# Patient Record
Sex: MALE | Race: WHITE | NOT HISPANIC OR LATINO | Employment: FULL TIME | URBAN - NONMETROPOLITAN AREA
[De-identification: names, ages, dates, MRNs, and addresses within clinical notes are randomized per-mention and may not be internally consistent; named-entity substitution may affect disease eponyms.]

---

## 2020-02-13 ENCOUNTER — APPOINTMENT (OUTPATIENT)
Dept: CT IMAGING | Facility: HOSPITAL | Age: 70
End: 2020-02-13
Payer: MEDICARE

## 2020-02-13 ENCOUNTER — HOSPITAL ENCOUNTER (EMERGENCY)
Facility: HOSPITAL | Age: 70
Discharge: HOME | End: 2020-02-13
Payer: MEDICARE

## 2020-02-13 VITALS
HEART RATE: 64 BPM | RESPIRATION RATE: 16 BRPM | DIASTOLIC BLOOD PRESSURE: 80 MMHG | OXYGEN SATURATION: 92 % | TEMPERATURE: 97.5 F | BODY MASS INDEX: 30.75 KG/M2 | WEIGHT: 232 LBS | HEIGHT: 73 IN | SYSTOLIC BLOOD PRESSURE: 123 MMHG

## 2020-02-13 DIAGNOSIS — N30.90 CYSTITIS: Primary | ICD-10-CM

## 2020-02-13 LAB
ALBUMIN SERPL-MCNC: 4.1 G/DL (ref 3.5–5.3)
ALP SERPL-CCNC: 45 U/L (ref 45–115)
ALT SERPL-CCNC: 13 U/L (ref 0–52)
ANION GAP SERPL CALC-SCNC: 7 MMOL/L (ref 3–11)
APTT PPP: 33.8 SECONDS (ref 25.1–36.5)
AST SERPL-CCNC: 18 U/L (ref 0–39)
BACTERIA #/AREA URNS HPF: ABNORMAL /HPF
BASOPHILS # BLD AUTO: 0 10*3/UL
BASOPHILS NFR BLD AUTO: 1 % (ref 0–2)
BILIRUB SERPL-MCNC: 1.49 MG/DL (ref 0–1.4)
BILIRUB UR QL: NEGATIVE
BUN SERPL-MCNC: 15 MG/DL (ref 7–25)
CALCIUM ALBUM COR SERPL-MCNC: 9.2 MG/DL (ref 8.6–10.3)
CALCIUM SERPL-MCNC: 9.3 MG/DL (ref 8.6–10.3)
CHLORIDE SERPL-SCNC: 105 MMOL/L (ref 98–107)
CLARITY UR: ABNORMAL
CO2 SERPL-SCNC: 26 MMOL/L (ref 21–32)
COLOR UR: ABNORMAL
CREAT SERPL-MCNC: 0.83 MG/DL (ref 0.7–1.3)
EOSINOPHIL # BLD AUTO: 0.1 10*3/UL
EOSINOPHIL NFR BLD AUTO: 2 % (ref 0–3)
ERYTHROCYTE [DISTWIDTH] IN BLOOD BY AUTOMATED COUNT: 13.7 % (ref 11.5–15)
GFR SERPL CREATININE-BSD FRML MDRD: 89 ML/MIN/1.73M*2
GLUCOSE SERPL-MCNC: 92 MG/DL (ref 70–105)
GLUCOSE UR QL: NEGATIVE MG/DL
HCT VFR BLD AUTO: 45.2 % (ref 38–50)
HGB BLD-MCNC: 15.1 G/DL (ref 13.2–17.2)
HGB UR QL: ABNORMAL
INR BLD: 1.1
KETONES UR-MCNC: 15 MG/DL
LEUKOCYTE ESTERASE UR QL STRIP: ABNORMAL
LIPASE SERPL-CCNC: 12 U/L (ref 11–82)
LYMPHOCYTES # BLD AUTO: 1.5 10*3/UL
LYMPHOCYTES NFR BLD AUTO: 28 % (ref 15–47)
MCH RBC QN AUTO: 31 PG (ref 29–34)
MCHC RBC AUTO-ENTMCNC: 33.5 G/DL (ref 32–36)
MCV RBC AUTO: 92.6 FL (ref 82–97)
MONOCYTES # BLD AUTO: 0.7 10*3/UL
MONOCYTES NFR BLD AUTO: 13 % (ref 5–13)
NEUTROPHILS # BLD AUTO: 3 10*3/UL
NEUTROPHILS NFR BLD AUTO: 56 % (ref 46–70)
NITRITE UR QL: NEGATIVE
PH UR: 6 PH
PLATELET # BLD AUTO: 219 10*3/UL (ref 130–350)
PMV BLD AUTO: 8.4 FL (ref 6.9–10.8)
POTASSIUM SERPL-SCNC: 3.9 MMOL/L (ref 3.5–5.1)
PROT SERPL-MCNC: 6.6 G/DL (ref 6–8.3)
PROT UR STRIP-MCNC: 100 MG/DL
PROTHROMBIN TIME: 12.6 SECONDS (ref 9.4–12.5)
RBC # BLD AUTO: 4.88 10*6/ΜL (ref 4.1–5.8)
RBC #/AREA URNS HPF: >100 /HPF
SODIUM SERPL-SCNC: 138 MMOL/L (ref 135–145)
SP GR UR: 1.01 (ref 1–1.03)
SQUAMOUS #/AREA URNS HPF: ABNORMAL /HPF
UROBILINOGEN UR-MCNC: 0.2 E.U./DL
WBC # BLD AUTO: 5.3 10*3/UL (ref 3.7–9.6)
WBC #/AREA URNS HPF: ABNORMAL /HPF

## 2020-02-13 PROCEDURE — 85610 PROTHROMBIN TIME: CPT | Performed by: FAMILY MEDICINE

## 2020-02-13 PROCEDURE — 80053 COMPREHEN METABOLIC PANEL: CPT | Performed by: FAMILY MEDICINE

## 2020-02-13 PROCEDURE — 74176 CT ABD & PELVIS W/O CONTRAST: CPT | Mod: 26,MG | Performed by: RADIOLOGY

## 2020-02-13 PROCEDURE — 99283 EMERGENCY DEPT VISIT LOW MDM: CPT

## 2020-02-13 PROCEDURE — 74176 CT ABD & PELVIS W/O CONTRAST: CPT | Mod: MG

## 2020-02-13 PROCEDURE — 83690 ASSAY OF LIPASE: CPT | Performed by: FAMILY MEDICINE

## 2020-02-13 PROCEDURE — 36415 COLL VENOUS BLD VENIPUNCTURE: CPT | Performed by: FAMILY MEDICINE

## 2020-02-13 PROCEDURE — 87088 URINE BACTERIA CULTURE: CPT | Performed by: FAMILY MEDICINE

## 2020-02-13 PROCEDURE — 81001 URINALYSIS AUTO W/SCOPE: CPT | Performed by: FAMILY MEDICINE

## 2020-02-13 PROCEDURE — 85025 COMPLETE CBC W/AUTO DIFF WBC: CPT | Performed by: FAMILY MEDICINE

## 2020-02-13 PROCEDURE — 85730 THROMBOPLASTIN TIME PARTIAL: CPT | Performed by: FAMILY MEDICINE

## 2020-02-13 PROCEDURE — 99284 EMERGENCY DEPT VISIT MOD MDM: CPT | Performed by: FAMILY MEDICINE

## 2020-02-13 PROCEDURE — G1004 CDSM NDSC: HCPCS | Performed by: RADIOLOGY

## 2020-02-13 RX ORDER — CIPROFLOXACIN 500 MG/1
500 TABLET ORAL 2 TIMES DAILY
Qty: 14 TABLET | Refills: 0 | Status: SHIPPED | OUTPATIENT
Start: 2020-02-13 | End: 2020-02-20

## 2020-02-13 ASSESSMENT — ENCOUNTER SYMPTOMS
COUGH: 0
DYSURIA: 0
ABDOMINAL PAIN: 0
CHILLS: 0
FEVER: 0
PALPITATIONS: 0
FREQUENCY: 0
HEADACHES: 0
BACK PAIN: 1
SHORTNESS OF BREATH: 0
DIARRHEA: 0
HEMATURIA: 1
VOMITING: 0
CONFUSION: 0

## 2020-02-13 NOTE — ED PROVIDER NOTES
HPI:  Chief Complaint   Patient presents with   • Blood in Urine     started 2 days ago, afebrile, no pain       Patient is a pleasant 69-year-old male presenting to the emergency room with complaints of hematuria over the last 2 days.  Patient reports he thought this was originally secondary to his diet however the hematuria persist.  The patient denies any dysuria or frequency.  Patient does endorse some minor left flank pain however he attributes this to being a  and sitting for prolonged periods.  He reports his back pain started approximately 5 days ago which he reports is minor.  No vomiting or diarrhea.  No abdominal pain.  No rashes.          HISTORY:  History reviewed. No pertinent past medical history.    Past Surgical History:   Procedure Laterality Date   • ABDOMINAL SURGERY     • APPENDECTOMY     • HERNIA REPAIR         History reviewed. No pertinent family history.    Social History     Tobacco Use   • Smoking status: Never Smoker   • Smokeless tobacco: Never Used   Substance Use Topics   • Alcohol use: Not Currently   • Drug use: Not Currently         ROS:  Review of Systems   Constitutional: Negative for chills and fever.   Respiratory: Negative for cough and shortness of breath.    Cardiovascular: Negative for chest pain and palpitations.   Gastrointestinal: Negative for abdominal pain, diarrhea and vomiting.   Genitourinary: Positive for hematuria. Negative for dysuria and frequency.   Musculoskeletal: Positive for back pain.   Skin: Negative for rash.   Neurological: Negative for headaches.   Psychiatric/Behavioral: Negative for confusion.       PE:  ED Triage Vitals   Temp Heart Rate Resp BP SpO2   02/13/20 0743 02/13/20 0743 02/13/20 0743 02/13/20 0743 02/13/20 0743   36.4 °C (97.5 °F) 65 18 143/92 93 %      Temp Source Heart Rate Source Patient Position BP Location FiO2 (%)   02/13/20 0743 -- 02/13/20 0858 -- --   Temporal  Sitting         Physical Exam  Vitals signs and nursing  note reviewed.   Constitutional:       Appearance: Normal appearance.   HENT:      Head: Normocephalic.   Cardiovascular:      Rate and Rhythm: Normal rate and regular rhythm.   Pulmonary:      Effort: Pulmonary effort is normal.      Breath sounds: Normal breath sounds.   Abdominal:      Tenderness: There is no abdominal tenderness. There is no right CVA tenderness or left CVA tenderness.   Neurological:      Mental Status: He is alert.   Psychiatric:         Mood and Affect: Mood normal.         Behavior: Behavior normal.         ED LABS:  Labs Reviewed   URINALYSIS DIPSTICK REFLEX TO CULTURE FOR USE WITH MICROSCOPIC PANEL - Abnormal       Result Value    Color, Urine Brown (*)     Clarity, Urine Cloudy (*)     pH, Urine 6.0      Specific Gravity, Urine 1.015      Protein, Urine 100  (*)     Glucose, Urine Negative      Ketones, Urine 15  (*)     Blood, Urine Large (*)     Nitrite, Urine Negative      Bilirubin, Urine Negative      Leukocytes, Urine Trace (*)     Urobilinogen, Urine 0.2     URINALYSIS MICROSCOPIC, REFLEX CULTURE - Abnormal    RBC, Urine >100 (*)     WBC, Urine 10-14 (*)     Squamous Epithelial, Urine 0-4      Bacteria, Urine Moderate (*)    PROTIME-INR - Abnormal    Protime 12.6 (*)     INR 1.1     COMPREHENSIVE METABOLIC PANEL - Abnormal    Sodium 138      Potassium 3.9      Chloride 105      CO2 26      Anion Gap 7      BUN 15      Creatinine 0.83      Glucose 92      Calcium 9.3      AST 18      ALT (SGPT) 13      Alkaline Phosphatase 45      Total Protein 6.6      Albumin 4.1      Total Bilirubin 1.49 (*)     eGFR 89      Corrected Calcium 9.2      Narrative:     Estimated GFR calculated using the 2009 CKD-EPI creatinine equation.   PTT (ACTIVATED PARTIAL THROMBOPLASTIN TIME) - Normal    PTT 33.8     LIPASE - Normal    Lipase 12     URINE CULTURE   URINALYSIS WITH MICROSCOPIC, REFLEX CULTURE    Narrative:     The following orders were created for panel order Urinalysis w/microscopic, reflex  culture Urine, Clean Catch.  Procedure                               Abnormality         Status                     ---------                               -----------         ------                     Urinalysis, Dip (part 1 of...[[id number]]  Abnormal            Final result               Urinalysis, Micro (part 2...[[id number]]  Abnormal            Final result                 Please view results for these tests on the individual orders.   CBC WITH AUTO DIFFERENTIAL    WBC 5.3      RBC 4.88      Hemoglobin 15.1      Hematocrit 45.2      MCV 92.6      MCH 31.0      MCHC 33.5      RDW 13.7      Platelets 219      MPV 8.4      Neutrophils% 56      Lymphocytes% 28      Monocytes% 13      Eosinophils% 2      Basophils% 1      Neutrophils Absolute 3.00      Lymphocytes Absolute 1.50      Monocytes Absolute 0.70      Eosinophils Absolute 0.10      Basophils Absolute 0.00           ED IMAGES:  CT abdomen pelvis renal stone protocol    (Results Pending)       ED PROCEDURES:  Procedures    ED COURSE:   The patient is a very pleasant 69-year-old male who presents emergency room with complaints of blood in his urine.  Patient CMP is unremarkable.  INR is normal at 1.1.  Patient CBC is unremarkable as well.  Urinalysis did show greater than 100 RBCs and 10-14 WBCs.  Moderate amount of bacteria.  CT scan of abdomen pelvis -no obvious ureterolithiasis is noted.  Patient does have a rather large bladder diverticulum which is changed from previous exam.  Thickening of bladder wall is also noted suggestive of cystitis.  The patient will be treated with Cipro 500 mg twice daily x7 days.  Contact information was given to the patient for follow-up in clinic this coming week.         MDM:  MDM  Number of Diagnoses or Management Options     Amount and/or Complexity of Data Reviewed  Clinical lab tests: reviewed and ordered  Tests in the radiology section of CPT®: ordered and reviewed  Review and summarize past medical records: yes    Risk  of Complications, Morbidity, and/or Mortality  Presenting problems: moderate  Diagnostic procedures: high  Management options: moderate    Patient Progress  Patient progress: stable      Final diagnoses:   [N30.90] Cystitis        Sukhwinder Estes MD  02/13/20 0904

## 2020-02-13 NOTE — DISCHARGE INSTRUCTIONS
Cipro 500 mg p.o. twice daily x7 days.  Please avoid any aspirin products and follow-up at [facility] next week.

## 2020-02-15 LAB — BACTERIA UR CULT: NORMAL

## 2020-02-18 ENCOUNTER — OFFICE VISIT (OUTPATIENT)
Dept: INTERNAL MEDICINE | Facility: CLINIC | Age: 70
End: 2020-02-18
Payer: MEDICARE

## 2020-02-18 VITALS
DIASTOLIC BLOOD PRESSURE: 78 MMHG | SYSTOLIC BLOOD PRESSURE: 130 MMHG | OXYGEN SATURATION: 97 % | WEIGHT: 235 LBS | HEIGHT: 73 IN | BODY MASS INDEX: 31.14 KG/M2 | HEART RATE: 80 BPM | RESPIRATION RATE: 16 BRPM

## 2020-02-18 DIAGNOSIS — I89.0 LYMPHEDEMA: ICD-10-CM

## 2020-02-18 DIAGNOSIS — R31.0 GROSS HEMATURIA: Primary | ICD-10-CM

## 2020-02-18 DIAGNOSIS — N32.3 BLADDER DIVERTICULUM: ICD-10-CM

## 2020-02-18 PROCEDURE — G0463 HOSPITAL OUTPT CLINIC VISIT: HCPCS | Performed by: INTERNAL MEDICINE

## 2020-02-18 PROCEDURE — 99204 OFFICE O/P NEW MOD 45 MIN: CPT | Performed by: INTERNAL MEDICINE

## 2020-02-18 ASSESSMENT — PAIN SCALES - GENERAL: PAINLEVEL: 0-NO PAIN

## 2020-02-18 NOTE — PROGRESS NOTES
Internal Medicine History and Physical    Chief Complaint:   Chief Complaint   Patient presents with   • Establish Care     Patient is in today to get established.  Patient was also in ER last week.          HPI: Patient is a 69 y.o. male comes in to get established patients not seen a physician in years.  On 2/11 this patient started to have gross painless hematuria presented to the emergency room on 2/13 CT scan at that time revealed a fairly large bladder diverticulum and then some thickening of the bladder wall.  Urine culture was unremarkable.  The patient was placed on Cipro and has had no further bleeding since his emergency room visit.    Social History     Occupational History   • Occupation:    Tobacco Use   • Smoking status: Never Smoker   • Smokeless tobacco: Never Used   Substance and Sexual Activity   • Alcohol use: Not Currently   • Drug use: Not Currently   • Sexual activity: Defer   Social History Narrative   • Not on file       History reviewed. No pertinent family history.    History reviewed. No pertinent past medical history.    Past Surgical History:   Procedure Laterality Date   • ABDOMINAL SURGERY      anuerysm   • APPENDECTOMY     • HERNIA REPAIR      x 3       Review of Systems  HEENT: The patient rarely uses reading glasses his last eye exam was 10 to 15 years ago.  Hearing is decreased and he is a hearing aid in the right ear.  He denies any problems chewing, swallowing, change in his voice.  Pulmonary: No cough, shortness of breath, or dyspnea upon exertion  Cardiovascular: No chest pain, palpitations, or anginal symptoms patient evidently did have a partially ruptured abdominal aortic aneurysm 6 to 7 years ago requiring emergent surgery.  GI: No indigestion, heartburn, constipation or diarrhea patient has never had a colonoscopy and does not wish to have one.  : The patient has nocturia x1  Musculoskeletal: A little bit of left knee discomfort otherwise he does fairly  well.  Sleep: Stable  Weight: Down about 40 pounds in the last 6 to 8 months due to extensive dieting.  Energy: Improved with the weight loss  Stress: Stable      Current Outpatient Medications:   •  ciprofloxacin (CIPRO) 500 mg tablet, Take 1 tablet (500 mg total) by mouth 2 (two) times a day for 7 days, Disp: 14 tablet, Rfl: 0    Allergies: No Known Allergies      Labs:   Admission on 02/13/2020, Discharged on 02/13/2020   Component Date Value Ref Range Status   • Color, Urine 02/13/2020 Brown* Yellow Final   • Clarity, Urine 02/13/2020 Cloudy* Clear Final   • pH, Urine 02/13/2020 6.0  5.0 - 8.0 PH Final   • Specific Gravity, Urine 02/13/2020 1.015  1.003 - 1.030 Final   • Protein, Urine 02/13/2020 100 * Negative mg/dL Final   • Glucose, Urine 02/13/2020 Negative  Negative mg/dL Final   • Ketones, Urine 02/13/2020 15 * Negative mg/dL Final   • Blood, Urine 02/13/2020 Large* Negative Final   • Nitrite, Urine 02/13/2020 Negative  Negative Final   • Bilirubin, Urine 02/13/2020 Negative  Negative Final   • Leukocytes, Urine 02/13/2020 Trace* Negative Final   • Urobilinogen, Urine 02/13/2020 0.2  <2.0 E.U./dL Final   • RBC, Urine 02/13/2020 >100* None seen, 0-2, Negative /HPF Final   • WBC, Urine 02/13/2020 10-14* 0 - 4 /HPF Final   • Squamous Epithelial, Urine 02/13/2020 0-4  None Seen-9 /HPF Final   • Bacteria, Urine 02/13/2020 Moderate* None seen, Few /HPF Final   • Urine Culture 02/13/2020 Few (<10 Colonies) Diphtheroid gram positive rods   Final    Normal skin charles.   • WBC 02/13/2020 5.3  3.7 - 9.6 10*3/uL Final   • RBC 02/13/2020 4.88  4.10 - 5.80 10*6/µL Final   • Hemoglobin 02/13/2020 15.1  13.2 - 17.2 g/dL Final   • Hematocrit 02/13/2020 45.2  38.0 - 50.0 % Final   • MCV 02/13/2020 92.6  82.0 - 97.0 fL Final   • MCH 02/13/2020 31.0  29.0 - 34.0 pg Final   • MCHC 02/13/2020 33.5  32.0 - 36.0 g/dL Final   • RDW 02/13/2020 13.7  11.5 - 15.0 % Final   • Platelets 02/13/2020 219  130 - 350 10*3/uL Final   • MPV  02/13/2020 8.4  6.9 - 10.8 fL Final   • Neutrophils% 02/13/2020 56  46 - 70 % Final   • Lymphocytes% 02/13/2020 28  15 - 47 % Final   • Monocytes% 02/13/2020 13  5 - 13 % Final   • Eosinophils% 02/13/2020 2  0 - 3 % Final   • Basophils% 02/13/2020 1  0 - 2 % Final   • Neutrophils Absolute 02/13/2020 3.00  10*3/uL Final   • Lymphocytes Absolute 02/13/2020 1.50  10*3/uL Final   • Monocytes Absolute 02/13/2020 0.70  10*3/uL Final   • Eosinophils Absolute 02/13/2020 0.10  10*3/uL Final   • Basophils Absolute 02/13/2020 0.00  10*3/uL Final   • Protime 02/13/2020 12.6* 9.4 - 12.5 seconds Final   • INR 02/13/2020 1.1  <=1.1 Final   • PTT 02/13/2020 33.8  25.1 - 36.5 SECONDS Final   • Sodium 02/13/2020 138  135 - 145 mmol/L Final   • Potassium 02/13/2020 3.9  3.5 - 5.1 mmol/L Final   • Chloride 02/13/2020 105  98 - 107 mmol/L Final   • CO2 02/13/2020 26  21 - 32 mmol/L Final   • Anion Gap 02/13/2020 7  3 - 11 mmol/L Final   • BUN 02/13/2020 15  7 - 25 mg/dL Final   • Creatinine 02/13/2020 0.83  0.70 - 1.30 mg/dL Final   • Glucose 02/13/2020 92  70 - 105 mg/dL Final   • Calcium 02/13/2020 9.3  8.6 - 10.3 mg/dL Final   • AST 02/13/2020 18  0 - 39 U/L Final   • ALT (SGPT) 02/13/2020 13  0 - 52 U/L Final   • Alkaline Phosphatase 02/13/2020 45  45 - 115 U/L Final   • Total Protein 02/13/2020 6.6  6.0 - 8.3 g/dL Final   • Albumin 02/13/2020 4.1  3.5 - 5.3 g/dL Final   • Total Bilirubin 02/13/2020 1.49* 0.00 - 1.40 mg/dL Final   • eGFR 02/13/2020 89  >60 mL/min/1.73m*2 Final   • Corrected Calcium 02/13/2020 9.2  8.6 - 10.3 mg/dL Final   • Lipase 02/13/2020 12  11 - 82 U/L Final           Imaging: Ct Abdomen Pelvis Renal Stone Protocol    Result Date: 2/13/2020  Narrative: DATE OF STUDY: 2/13/2020 8:31 AM STUDY: CT ABDOMEN PELVIS RENAL STONE PROTOCOL HISTORY:  Hematuria  left flank pain COMPARISONS: 8/12/2015 CONTRAST: Without IV contrast Contrast per Department Protocol. TECHNIQUE: Multiple computed axial tomograms obtained from  dome the diaphragm to ischial tuberosities without IV contrast. Coronal and sagittal reconstructions. FINDINGS: LUNG BASES: Scattered linear subsegmental atelectasis or scar in the lung bases increased since previous study. Postoperative change in the anterior abdominal wall consistent with previous hernia repair. ABDOMEN: No calcifications identified within the kidneys or along the course of the ureters. A 1.3 cm hypodensity in the left lateral mid kidney is likely a small renal cyst that was present previously. Probable parapelvic cysts which were also present previously. Accessory splenule. Evaluation of visceral organs limited by lack of IV contrast. The adrenals, pancreas, gallbladder, kidneys have an otherwise normal and stable CT scan appearance. Atherosclerotic change in the aorta and its branches. PELVIS: There is a very large bladder diverticulum along the right side of the bladder. It is actually bigger than the bladder. The bladder wall is thickened. There is some stranding around the distal ureters and there is either a second diverticulum off the posterior aspect of the bladder possibly a Hutch diverticulum off the posterior aspect of the bladder related to one of the ureters, likely the right. That measures 1 cm. The large bladder diverticulum creates mass effect on the prostate and seminal vesicles which are pushed to the left. The prostate is enlarged and heterogeneous with areas of calcifications. Soft tissue density posterior to the prostate and extending superiorly from it probably related to seminal vesicles. Other mass lesion not completely excluded but considered less likely. Follow-up contrasted CT scan may be helpful. Small lymph nodes in the pelvis slightly more numerous than normally seen, though most do not appear enlarged by CT criteria. Left inguinal hernia contained fat. Postoperative change right groin likely related to previous hernia repair. Scattered degenerative change in the  "spine similar to previous study. Mild anterolisthesis L4 on 5 similar to previous study.     Impression: IMPRESSION: 1.  Thickened bladder wall. Clinical correlation regarding possible cystitis suggested. 2.  There is a very large right bladder diverticulum that pushes the bladder to the left and creates mass effect on the prostate and seminal vesicles. That large diverticulum extends into the right inferior pelvis. 3.  Prostate is heterogeneous with areas of calcification. That can be seen with both benign and malignant etiologies. 4.  Probable parapelvic cysts in the kidneys but follow-up with contrasted CT scan may be helpful  after current episode. Stable left renal cyst. 5.  Otherwise stable CT scan of abdomen and pelvis see above findings.      Vitals: Blood pressure 130/78, pulse 80, resp. rate 16, height 1.854 m (6' 1\"), weight 107 kg (235 lb), SpO2 97 %.      Physical Exam  Vitals signs and nursing note reviewed.   Constitutional:       Appearance: Normal appearance.   HENT:      Right Ear: External ear normal.      Left Ear: Tympanic membrane, ear canal and external ear normal.      Nose: Nose normal.      Mouth/Throat:      Mouth: Mucous membranes are moist.   Eyes:      Extraocular Movements: Extraocular movements intact.      Conjunctiva/sclera: Conjunctivae normal.      Pupils: Pupils are equal, round, and reactive to light.   Neck:      Musculoskeletal: Normal range of motion and neck supple.   Cardiovascular:      Rate and Rhythm: Normal rate and regular rhythm.   Pulmonary:      Effort: Pulmonary effort is normal.      Breath sounds: Normal breath sounds. No rales.   Abdominal:      General: Bowel sounds are normal.      Palpations: Abdomen is soft.      Tenderness: There is no right CVA tenderness or left CVA tenderness.      Comments: Patient has marked weakness of the musculature from his previous abdominal aortic aneurysm repair   Musculoskeletal:      Comments: Patient has +3/+4 for lymphedema, " right leg is a little bit worse than the left.   Lymphadenopathy:      Cervical: No cervical adenopathy.           Plan and Discussion:   Diagnosis Plan   1. Gross hematuria  Ambulatory referral to Urology   2. Lymphedema     3. Bladder diverticulum     For the patient's gross hematuria/bladder diverticulum he needs a cystoscopy.  Will refer him to urology.  Lymphedema: Cording of the patient is markedly improved with his weight loss and at this time.  He is not seeking therapy or treatment for this.    CAM LAWRENCE MD    Date: 2/18/2020  Time: 1:58 PM

## 2020-03-10 ENCOUNTER — TELEPHONE (OUTPATIENT)
Dept: FAMILY MEDICINE | Facility: CLINIC | Age: 70
End: 2020-03-10

## 2020-03-10 NOTE — TELEPHONE ENCOUNTER
Pt contacted.  States he has had blood in his urine again for 2 days.  Per Dr Decker, if pt is having infectious symptoms, he should be seen.  Pt denies fever, chills, pain with urination.  Pt is having frequency.  Pt states that he is driving to [place] for work.  Pt will go into urgent care there if he continues to have concerns.  Pt advised that most importantly he keep his appt with urology.  Pt verbalized understanding.

## 2020-03-10 NOTE — TELEPHONE ENCOUNTER
Patient is having blood in urine again and is currently in [place], wondering if he needs to go in. Please call.

## 2020-03-11 ENCOUNTER — TRANSFERRED RECORDS (OUTPATIENT)
Dept: HEALTH INFORMATION MANAGEMENT | Facility: CLINIC | Age: 70
End: 2020-03-11

## 2020-03-11 ENCOUNTER — HOSPITAL ENCOUNTER (EMERGENCY)
Facility: CLINIC | Age: 70
Discharge: HOME | End: 2020-03-11
Attending: EMERGENCY MEDICINE | Admitting: EMERGENCY MEDICINE
Payer: MEDICARE

## 2020-03-11 ENCOUNTER — APPOINTMENT (OUTPATIENT)
Dept: MRI IMAGING | Facility: CLINIC | Age: 70
End: 2020-03-11
Attending: EMERGENCY MEDICINE
Payer: MEDICARE

## 2020-03-11 VITALS
DIASTOLIC BLOOD PRESSURE: 102 MMHG | RESPIRATION RATE: 18 BRPM | TEMPERATURE: 98.8 F | SYSTOLIC BLOOD PRESSURE: 110 MMHG | OXYGEN SATURATION: 94 % | HEART RATE: 71 BPM

## 2020-03-11 DIAGNOSIS — R20.2 PARESTHESIAS: ICD-10-CM

## 2020-03-11 DIAGNOSIS — R20.2 TINGLING: ICD-10-CM

## 2020-03-11 LAB
ALBUMIN SERPL-MCNC: 3.6 G/DL (ref 3.4–5)
ALBUMIN UR-MCNC: 100 MG/DL
ALP SERPL-CCNC: 53 U/L (ref 40–150)
ALT SERPL W P-5'-P-CCNC: 20 U/L (ref 0–70)
ANION GAP SERPL CALCULATED.3IONS-SCNC: 3 MMOL/L (ref 3–14)
APPEARANCE UR: ABNORMAL
AST SERPL W P-5'-P-CCNC: 14 U/L (ref 0–45)
BASOPHILS # BLD AUTO: 0 10E9/L (ref 0–0.2)
BASOPHILS NFR BLD AUTO: 0.3 %
BILIRUB SERPL-MCNC: 0.7 MG/DL (ref 0.2–1.3)
BILIRUB UR QL STRIP: NEGATIVE
BUN SERPL-MCNC: 16 MG/DL (ref 7–30)
CALCIUM SERPL-MCNC: 8.7 MG/DL (ref 8.5–10.1)
CHLORIDE SERPL-SCNC: 111 MMOL/L (ref 94–109)
CO2 SERPL-SCNC: 27 MMOL/L (ref 20–32)
COLOR UR AUTO: ABNORMAL
CREAT SERPL-MCNC: 0.78 MG/DL (ref 0.66–1.25)
DIFFERENTIAL METHOD BLD: NORMAL
EOSINOPHIL # BLD AUTO: 0 10E9/L (ref 0–0.7)
EOSINOPHIL NFR BLD AUTO: 0.7 %
ERYTHROCYTE [DISTWIDTH] IN BLOOD BY AUTOMATED COUNT: 13.6 % (ref 10–15)
GFR SERPL CREATININE-BSD FRML MDRD: >90 ML/MIN/{1.73_M2}
GLUCOSE SERPL-MCNC: 106 MG/DL (ref 70–99)
GLUCOSE UR STRIP-MCNC: NEGATIVE MG/DL
HCT VFR BLD AUTO: 43.7 % (ref 40–53)
HGB BLD-MCNC: 14.6 G/DL (ref 13.3–17.7)
HGB UR QL STRIP: ABNORMAL
IMM GRANULOCYTES # BLD: 0 10E9/L (ref 0–0.4)
IMM GRANULOCYTES NFR BLD: 0.2 %
INTERPRETATION ECG - MUSE: NORMAL
KETONES UR STRIP-MCNC: NEGATIVE MG/DL
LEUKOCYTE ESTERASE UR QL STRIP: ABNORMAL
LYMPHOCYTES # BLD AUTO: 1.3 10E9/L (ref 0.8–5.3)
LYMPHOCYTES NFR BLD AUTO: 21.3 %
MCH RBC QN AUTO: 31.5 PG (ref 26.5–33)
MCHC RBC AUTO-ENTMCNC: 33.4 G/DL (ref 31.5–36.5)
MCV RBC AUTO: 94 FL (ref 78–100)
MONOCYTES # BLD AUTO: 0.6 10E9/L (ref 0–1.3)
MONOCYTES NFR BLD AUTO: 10.6 %
MUCOUS THREADS #/AREA URNS LPF: PRESENT /LPF
NEUTROPHILS # BLD AUTO: 4 10E9/L (ref 1.6–8.3)
NEUTROPHILS NFR BLD AUTO: 66.9 %
NITRATE UR QL: NEGATIVE
NRBC # BLD AUTO: 0 10*3/UL
NRBC BLD AUTO-RTO: 0 /100
PH UR STRIP: 6.5 PH (ref 5–7)
PLATELET # BLD AUTO: 191 10E9/L (ref 150–450)
POTASSIUM SERPL-SCNC: 3.7 MMOL/L (ref 3.4–5.3)
PROT SERPL-MCNC: 6.8 G/DL (ref 6.8–8.8)
RBC # BLD AUTO: 4.64 10E12/L (ref 4.4–5.9)
RBC #/AREA URNS AUTO: >182 /HPF (ref 0–2)
SODIUM SERPL-SCNC: 141 MMOL/L (ref 133–144)
SOURCE: ABNORMAL
SP GR UR STRIP: 1.01 (ref 1–1.03)
SQUAMOUS #/AREA URNS AUTO: 1 /HPF (ref 0–1)
TROPONIN I SERPL-MCNC: <0.015 UG/L (ref 0–0.04)
UROBILINOGEN UR STRIP-MCNC: NORMAL MG/DL (ref 0–2)
WBC # BLD AUTO: 6 10E9/L (ref 4–11)
WBC #/AREA URNS AUTO: 73 /HPF (ref 0–5)

## 2020-03-11 PROCEDURE — 25500064 ZZH RX 255 OP 636: Performed by: EMERGENCY MEDICINE

## 2020-03-11 PROCEDURE — 99285 EMERGENCY DEPT VISIT HI MDM: CPT | Mod: 25

## 2020-03-11 PROCEDURE — 84484 ASSAY OF TROPONIN QUANT: CPT | Performed by: EMERGENCY MEDICINE

## 2020-03-11 PROCEDURE — 80053 COMPREHEN METABOLIC PANEL: CPT | Performed by: EMERGENCY MEDICINE

## 2020-03-11 PROCEDURE — 81001 URINALYSIS AUTO W/SCOPE: CPT | Performed by: EMERGENCY MEDICINE

## 2020-03-11 PROCEDURE — A9585 GADOBUTROL INJECTION: HCPCS | Performed by: EMERGENCY MEDICINE

## 2020-03-11 PROCEDURE — 85025 COMPLETE CBC W/AUTO DIFF WBC: CPT | Performed by: EMERGENCY MEDICINE

## 2020-03-11 PROCEDURE — 93005 ELECTROCARDIOGRAM TRACING: CPT

## 2020-03-11 PROCEDURE — 70553 MRI BRAIN STEM W/O & W/DYE: CPT

## 2020-03-11 RX ORDER — GADOBUTROL 604.72 MG/ML
11 INJECTION INTRAVENOUS ONCE
Status: COMPLETED | OUTPATIENT
Start: 2020-03-11 | End: 2020-03-11

## 2020-03-11 RX ADMIN — GADOBUTROL 11 ML: 604.72 INJECTION INTRAVENOUS at 11:16

## 2020-03-11 ASSESSMENT — ENCOUNTER SYMPTOMS
WEAKNESS: 0
SPEECH DIFFICULTY: 0
HEMATURIA: 1
NUMBNESS: 1
FEVER: 0

## 2020-03-11 NOTE — ED PROVIDER NOTES
History     Chief Complaint:  Numbness and Hematuria    HPI   Henry Villatoro is a 69 year old male who presents via EMS for evaluation of left arm numbness. Approximately 6-7am, the patient reports the onset of left arm numbness after getting back on a bus from a rest stop. He also felt a slight tingling in his right arm, but this has since resolved. The left arm symptoms have persisted, thus prompting call to EMS. He denies any left upper extremity weakness, lower extremity changes, or perceived speech difficulties. He has no prior history of stroke and is not anticoagulated. The only change to his normal activities of late is he recently started a keto diet. Secondary to the numbness, he reports continued hematuria. He was seen for this a month ago in the [place], [place] ED and was diagnosed with cystitis.  He has a urology follow-up appointment in a couple weeks and denies any acute worsening of the bleeding. Of note, he is due to return to [facility].     Allergies:  NKDA    Medications:    The patient is currently on no regular medications.     Past Medical History:    Lymphedema  Bladder diverticulum     Past Surgical History:    Appendectomy   Abdominal aneurysm surgery   Hernia repair x3    Family History:    No past pertinent family history.    Social History:  Marital Status:   [2]  Negative for tobacco use.  Negative for current alcohol use.     Review of Systems   Constitutional: Negative for fever.   Eyes: Negative for visual disturbance.   Genitourinary: Positive for hematuria.   Neurological: Positive for numbness (left). Negative for speech difficulty and weakness.   All other systems reviewed and are negative.      Physical Exam     Patient Vitals for the past 24 hrs:   BP Temp Temp src Pulse Resp SpO2   03/11/20 1202 (!) 110/102 -- -- 71 -- 96 %   03/11/20 0956 136/86 98.8  F (37.1  C) Oral 73 18 95 %      Physical Exam  Vitals: reviewed by me  General: Pt seen on [facility]  pleasant, cooperative, and alert to conversation  Eyes: Tracking well, clear conjunctiva BL  ENT: MMM, midline trachea.   Lungs: No tachypnea, no accessory muscle use. No respiratory distress.   CV: Rate as above, regular rhythm.    Abd: Soft, non tender, no guarding, no rebound. Non distended  MSK: no peripheral edema or joint effusion.  No evidence of trauma  Skin: No rash, normal turgor and temperature  Neuro: Clear speech and no facial droop.  CN 2 - 12 in tact  BUE and BLE with SILT and 5/5 motor  Psych: Not RIS, no e/o AH/VH      Emergency Department Course   ECG:  Indication: Numbness, unilateral  Time: 1015  Vent. Rate 69 bpm. NM interval 200. QRS duration 96. QT/QTc 410/439. P-R-T axis 69 -11 36.    Sinus rhythm with PACs  Otherwise normal ECG.     Imaging:  Radiographic findings were communicated with the patient who voiced understanding of the findings.  MR Brain w/o & w/ contrast:  Unremarkable brain MRI without evidence of acute infarct,   mass, hemorrhage, or herniation, as per radiology.     Laboratory:  CBC: WBC: 6.0, HGB: 14.6, PLT: 191  CMP: Glucose 106 (H), Cl: 111 (H), o/w WNL (Creatinine: 0.78)  1007 Troponin: <0.015     UA with Microscopic: Blood: Large (A), Albumin: 100 (A), Leukocyte esterase: Moderate (A), WBC: 73 (H), RBC: >182 (H), Mucous: Present (A), o/w WNL     Procedures:  None    Emergency Department Course:  Nursing notes and vitals reviewed.   1000: I performed an exam of the patient as documented above.      IV was inserted and blood was drawn for laboratory testing, results above.   EKG obtained in the ED, see results above.   The patient provided a urine sample here in the emergency department. This was sent for laboratory testing, findings above.  The patient was sent for a MR brain while in the emergency department, results above.      1240: I rechecked the patient and discussed the results of his workup thus far.     Findings and plan explained to the Patient. Patient  discharged home with instructions regarding supportive care, medications, and reasons to return. The importance of close follow-up was reviewed.     I personally reviewed the laboratory and imaging results with the Patient and answered all related questions prior to discharge.    Impression & Plan      Medical Decision Making:  Henry Villatoro is a 69 year old male who presents to the emergency room with paresthesias in the left upper extremity, unclear cause. His MRI is negative, and he is resting comfortably here in the ER with no pain or discomfort. I did do a troponin to rule out any type of atypical MI, and he has no evidence of cardiac injury.  Furthermore, this is nonexertional, his EKG is unremarkable, and at no point is he had any chest pain or chest pain equivalent.  His only complaint is paresthesias predominantly in his left but possibly also in his right upper extremity, leading me to think this is likely neurological. Reassuringly, he has a normal neurologic exam here, is otherwise doing well, and will plan for discharge home with instructions to follow-up with his primary care doctor for a stress test and continued management of his paresthesias.     Diagnosis:    ICD-10-CM    1. Tingling  R20.2    2. Paresthesias  R20.2        Disposition:  discharged to home      Scribe Disclosure:  I, Chanda Ackerman, am serving as a scribe on 3/11/2020 at 9:58 AM to personally document services performed by Christopher Pierson MD based on my observations and the provider's statements to me.       3/11/2020    EMERGENCY DEPARTMENT       Christopher Pierson MD  03/11/20 3952

## 2020-03-11 NOTE — ED TRIAGE NOTES
Left arm numbness starting today, about 3 hours ago. Blood in urine started a couple of weeks ago. Went to ED in [place]. Was told he has thickening on one side of his bladder

## 2020-03-11 NOTE — ED NOTES
Bed: ED10  Expected date: 3/11/20  Expected time: 9:36 AM  Means of arrival: Ambulance  Comments:  [place] 69m left arm numb  ETA 0989

## 2020-03-11 NOTE — ED AVS SNAPSHOT
Emergency Department  [address] [place] [id number]  Phone:  [phone]  Fax:  [phone]                                    Henry Villatoro   MRN: [id number]    Department:   Emergency Department   Date of Visit:  3/11/2020           After Visit Summary Signature Page    I have received my discharge instructions, and my questions have been answered. I have discussed any challenges I see with this plan with the nurse or doctor.    ..........................................................................................................................................  Patient/Patient Representative Signature      ..........................................................................................................................................  Patient Representative Print Name and Relationship to Patient    ..................................................               ................................................  Date                                   Time    ..........................................................................................................................................  Reviewed by Signature/Title    ...................................................              ..............................................  Date                                               Time          22EPIC Rev 08/18

## 2020-04-03 ENCOUNTER — TELEPHONE (OUTPATIENT)
Dept: UROLOGY | Facility: CLINIC | Age: 70
End: 2020-04-03

## 2020-05-15 ENCOUNTER — OFFICE VISIT (OUTPATIENT)
Dept: FAMILY MEDICINE | Facility: CLINIC | Age: 70
End: 2020-05-15
Payer: MEDICARE

## 2020-05-15 VITALS
OXYGEN SATURATION: 93 % | DIASTOLIC BLOOD PRESSURE: 68 MMHG | TEMPERATURE: 98.1 F | HEART RATE: 90 BPM | SYSTOLIC BLOOD PRESSURE: 126 MMHG

## 2020-05-15 DIAGNOSIS — R31.0 GROSS HEMATURIA: ICD-10-CM

## 2020-05-15 DIAGNOSIS — H91.93 BILATERAL HEARING LOSS, UNSPECIFIED HEARING LOSS TYPE: Primary | ICD-10-CM

## 2020-05-15 DIAGNOSIS — N32.3 BLADDER DIVERTICULUM: ICD-10-CM

## 2020-05-15 DIAGNOSIS — R35.0 FREQUENCY OF URINATION: ICD-10-CM

## 2020-05-15 LAB
ANION GAP SERPL CALC-SCNC: 9 MMOL/L (ref 3–11)
BILIRUBIN, POC: NEGATIVE
BLOOD URINE, POC: ABNORMAL
BUN SERPL-MCNC: 18 MG/DL (ref 7–25)
CALCIUM SERPL-MCNC: 9.4 MG/DL (ref 8.6–10.3)
CHLORIDE SERPL-SCNC: 105 MMOL/L (ref 98–107)
CO2 SERPL-SCNC: 25 MMOL/L (ref 21–32)
CREAT SERPL-MCNC: 0.85 MG/DL (ref 0.7–1.3)
GFR SERPL CREATININE-BSD FRML MDRD: 88 ML/MIN/1.73M*2
GLUCOSE SERPL-MCNC: 104 MG/DL (ref 70–105)
GLUCOSE URINE, POC: NEGATIVE MG/DL
KETONES, POC: NEGATIVE MG/DL
LEUKOCYTE EST, POC: ABNORMAL
NITRITE, POC: POSITIVE
POC PH URINE: 6 PH (ref 5–9)
POC SPECIFIC GRAVITY: 1.01 (ref 1–1.03)
POTASSIUM SERPL-SCNC: 3.7 MMOL/L (ref 3.5–5.1)
PROTEIN, POC: >=300 MG/DL
SODIUM SERPL-SCNC: 139 MMOL/L (ref 135–145)
UROBILINOGEN, POC: 0.2

## 2020-05-15 PROCEDURE — 81003 URINALYSIS AUTO W/O SCOPE: CPT | Mod: QW | Performed by: FAMILY MEDICINE

## 2020-05-15 PROCEDURE — 36415 COLL VENOUS BLD VENIPUNCTURE: CPT | Performed by: FAMILY MEDICINE

## 2020-05-15 PROCEDURE — 80048 BASIC METABOLIC PNL TOTAL CA: CPT | Performed by: FAMILY MEDICINE

## 2020-05-15 PROCEDURE — 99214 OFFICE O/P EST MOD 30 MIN: CPT | Performed by: FAMILY MEDICINE

## 2020-05-15 PROCEDURE — G0463 HOSPITAL OUTPT CLINIC VISIT: HCPCS | Performed by: FAMILY MEDICINE

## 2020-05-15 RX ORDER — CIPROFLOXACIN 500 MG/1
500 TABLET ORAL 2 TIMES DAILY
Qty: 14 TABLET | Refills: 0 | Status: SHIPPED | OUTPATIENT
Start: 2020-05-15 | End: 2020-05-22

## 2020-05-15 ASSESSMENT — ENCOUNTER SYMPTOMS
HEMATURIA: 1
NAUSEA: 0
FLANK PAIN: 0
CHILLS: 0
ARTHRALGIAS: 1
VOMITING: 0
FREQUENCY: 1
SWEATS: 0
CONSTITUTIONAL NEGATIVE: 1

## 2020-05-15 NOTE — PROGRESS NOTES
Subjective      Cesar Yuen is a 69 y.o. male who presents for Urinary Frequency (Frequency and cloudy urine X3. Denies fever/chills. Denies hematuria. Denies dysuria. )      Additionally notes hearing loss.  This is been a longstanding issue for him.  He does wear a hearing aid in right ear.  States that he has issues hearing almost any voice but primarily seems to be more higher frequencies it sounds like.    Urinary Frequency    This is a new problem. The current episode started in the past 7 days. The problem occurs every urination. The problem has been unchanged. The patient is experiencing no pain. There has been no fever. He is not sexually active. Associated symptoms include frequency and hematuria (hx; none currently). Pertinent negatives include no chills, discharge, flank pain, hesitancy, nausea, sweats, urgency or vomiting. He has tried nothing for the symptoms. The treatment provided no relief.   Notably the patient had a history of recent gross hematuria with with the last few months.  He was referred to urology however this appointment got rescheduled due to the COVID-19 issues.  Has not had any bleeding since that episode though he did have evidence on CT scan of a bladder diverticula.    The following have been reviewed and updated as appropriate in this visit:  Allergies  Meds  Problems  Med Hx  Surg Hx  Fam Hx         No Known Allergies  Current Outpatient Medications   Medication Sig Dispense Refill   • ciprofloxacin (CIPRO) 500 mg tablet Take 1 tablet (500 mg total) by mouth 2 (two) times a day for 7 days 14 tablet 0     No current facility-administered medications for this visit.      History reviewed. No pertinent past medical history.  Past Surgical History:   Procedure Laterality Date   • ABDOMINAL SURGERY      anuerysm   • APPENDECTOMY     • HERNIA REPAIR      x 3     History reviewed. No pertinent family history.  Social History     Occupational History   • Occupation:     Tobacco Use   • Smoking status: Never Smoker   • Smokeless tobacco: Never Used   Substance and Sexual Activity   • Alcohol use: Not Currently   • Drug use: Not Currently   • Sexual activity: Defer   Social History Narrative   • Not on file       Review of Systems   Constitutional: Negative.  Negative for chills.   HENT: Positive for hearing loss.    Gastrointestinal: Negative for nausea and vomiting.   Genitourinary: Positive for frequency and hematuria (hx; none currently). Negative for flank pain, hesitancy and urgency.   Musculoskeletal: Positive for arthralgias.   All other systems reviewed and are negative.      Objective     VITAL SIGNS  /68   Pulse 90   Temp 36.7 °C (98.1 °F) (Temporal)   SpO2 93%     Physical Exam  Vitals signs and nursing note reviewed.   Constitutional:       General: He is not in acute distress.     Appearance: He is well-developed.      Comments: Hard of hearing   HENT:      Head: Normocephalic and atraumatic.      Right Ear: Tympanic membrane normal.      Left Ear: Tympanic membrane normal.      Nose: Nose normal.      Mouth/Throat:      Mouth: Mucous membranes are moist.   Eyes:      Pupils: Pupils are equal, round, and reactive to light.   Cardiovascular:      Rate and Rhythm: Normal rate and regular rhythm.      Heart sounds: Normal heart sounds.   Pulmonary:      Effort: Pulmonary effort is normal.      Breath sounds: Normal breath sounds.   Abdominal:      General: Bowel sounds are normal. There is no distension.      Palpations: Abdomen is soft.      Tenderness: There is no abdominal tenderness. There is no right CVA tenderness, left CVA tenderness or guarding.   Genitourinary:     Prostate: Not enlarged and not tender.   Skin:     General: Skin is warm.      Capillary Refill: Capillary refill takes less than 2 seconds.   Neurological:      Mental Status: He is alert and oriented to person, place, and time.         Lab Results   Component Value Date    GLUCOSE 92  02/13/2020    CALCIUM 9.3 02/13/2020     02/13/2020    K 3.9 02/13/2020    CO2 26 02/13/2020     02/13/2020    BUN 15 02/13/2020    CREATININE 0.83 02/13/2020    ANIONGAP 7 02/13/2020     Lab Results   Component Value Date    WBC 5.3 02/13/2020    HGB 15.1 02/13/2020    HCT 45.2 02/13/2020    MCV 92.6 02/13/2020     02/13/2020       Assessment/Plan   Problem List Items Addressed This Visit        Genitourinary    Gross hematuria    Relevant Orders    Ambulatory referral to Urology    Basic metabolic panel Blood, Venous    Bladder diverticulum    Relevant Orders    Ambulatory referral to Urology      Other Visit Diagnoses     Bilateral hearing loss, unspecified hearing loss type    -  Primary    Relevant Orders    Ambulatory referral to Audiology    Frequency of urination        Relevant Medications    ciprofloxacin (CIPRO) 500 mg tablet    Other Relevant Orders    POCT Urinalysis, Dipstick Only (Completed)    Basic metabolic panel Blood, Venous        Referral to audiology was sent.  Patient truly needs some consideration for new hearing aids or to consider wearing both hearing aids.    Patient should be seen by urology given the questionable bladder diverticulum as well as history of close hematuria.  On this occasion it seems fairly positive for UTI and will treat with ciprofloxacin.  He was advised to drink plenty of fluids and notify us if he has any issues such as fevers, chills, nausea, vomiting, flank pain.    Giorgi Fuller MD

## 2020-06-02 ENCOUNTER — TELEPHONE (OUTPATIENT)
Dept: UROLOGY | Facility: CLINIC | Age: 70
End: 2020-06-02

## 2020-06-02 NOTE — TELEPHONE ENCOUNTER
Called patient to see if he could do a CT scan before his appointment next week. He said that he would be able to and wanted to confirm that we got the scans that they did a while back. I told him that we did and this scan that we want him to do is different and will show us more. And that we also want him to get blood work. He said that he will need to do it today or tomorrow and he is busy the rest of the week. He can do it today some time after 2 or tomorrow in the morning. I told him I will call the [facility] and get it scheduled and call him back with that information. He voiced understanding and had no questions.         Left message with imaging that I needed patient scheduled and to call back on [phone].

## 2020-06-02 NOTE — TELEPHONE ENCOUNTER
I first got a hold of the [facility] to schedule the CT scan and they said they absolutely cannot get him in for the next couple weeks because the radiologist that reads the urograms is out. I called the patient and told him this and told him that if he wanted to he could go in to get the labs drawn before the appointment next week. He then said that he had labs drawn previously and why we couldn't use those lab results. I told him that they did not get a PSA which is a lab test that we do to check the level of the prostate. I tried to explain this to him several times, but he was getting frustrated and would not let me speak and kept saying that all we want to do is send him a bill. I apologized and told him he did not need to get the labs drawn until he speaks to Ynes at his appointment, so she could explain the reasoning for the tests to him. He said if he is going to get them done anyway he might as well do them now. He had no other questions or concerns.

## 2020-06-03 ENCOUNTER — TELEPHONE (OUTPATIENT)
Dept: UROLOGY | Facility: CLINIC | Age: 70
End: 2020-06-03

## 2020-06-03 NOTE — TELEPHONE ENCOUNTER
This nurse placed call to patient to inform them of updated appointment times (Lab, CT and Consult). I am also placing reminders/ directions in mail to patient. LVM for patient to return call to myself.

## 2020-06-08 ENCOUNTER — APPOINTMENT (OUTPATIENT)
Dept: LAB | Facility: CLINIC | Age: 70
End: 2020-06-08
Payer: MEDICARE

## 2020-06-08 ENCOUNTER — TELEPHONE (OUTPATIENT)
Dept: UROLOGY | Facility: CLINIC | Age: 70
End: 2020-06-08

## 2020-06-08 DIAGNOSIS — R39.15 URGENCY OF URINATION: ICD-10-CM

## 2020-06-08 LAB
BACTERIA #/AREA URNS HPF: ABNORMAL /HPF
BILIRUB UR QL: NEGATIVE
CLARITY UR: ABNORMAL
COLOR UR: YELLOW
GLUCOSE UR QL: NEGATIVE MG/DL
HGB UR QL: ABNORMAL
KETONES UR-MCNC: NEGATIVE MG/DL
LEUKOCYTE ESTERASE UR QL STRIP: ABNORMAL
NITRITE UR QL: POSITIVE
PH UR: 6 PH
PROT UR STRIP-MCNC: 100 MG/DL
RBC #/AREA URNS HPF: ABNORMAL /HPF
SP GR UR: 1.01 (ref 1–1.03)
SQUAMOUS #/AREA URNS HPF: ABNORMAL /HPF
UROBILINOGEN UR-MCNC: 0.2 E.U./DL
WBC #/AREA URNS HPF: >100 /HPF

## 2020-06-08 PROCEDURE — 87088 URINE BACTERIA CULTURE: CPT

## 2020-06-08 PROCEDURE — 81003 URINALYSIS AUTO W/O SCOPE: CPT

## 2020-06-08 NOTE — TELEPHONE ENCOUNTER
This nurse spoke with provider, Ynes GANDHI in regard to this patient. Ynes advises to contact patient and have them complete a UA today in [place]. Upon results of this UA, we will move forward or reschedule further testing ordered for tomorrow. This nurse placed order and contacted patient to visit about plan. This nurse asked patient if they had done testing for UA anywhere or how they are aware that they have UTI. Patient states that they ordered bulk dill some time ago and every time they eat it, they end up with UTI symptoms. Patient also states that they had not used any of this dill for quite some time for this reason but then had found some of the dill in the freezer this weekend and decided to make it. Patient states now it has been 3 days since they ate the dill and they are having UTI symptoms. This nurse let patient know to proceed to the [place] [facility], complete the UA and once we receive results, we would contact him.

## 2020-06-08 NOTE — TELEPHONE ENCOUNTER
Voice mail.    Pt is calling stating that he has an UTI. He was wondering if he can still come in or not. He was wondering if his UTI will effect anything like a test.     Please call back at [phone]  Thank you!

## 2020-06-10 ENCOUNTER — HOSPITAL ENCOUNTER (EMERGENCY)
Facility: HOSPITAL | Age: 70
Discharge: HOME | End: 2020-06-11
Payer: MEDICARE

## 2020-06-10 ENCOUNTER — APPOINTMENT (OUTPATIENT)
Dept: CT IMAGING | Facility: HOSPITAL | Age: 70
End: 2020-06-10
Payer: MEDICARE

## 2020-06-10 DIAGNOSIS — N39.0 LOWER URINARY TRACT INFECTIOUS DISEASE: ICD-10-CM

## 2020-06-10 DIAGNOSIS — R33.8 ACUTE URINARY RETENTION: Primary | ICD-10-CM

## 2020-06-10 LAB
ALBUMIN SERPL-MCNC: 3.8 G/DL (ref 3.5–5.3)
ALP SERPL-CCNC: 44 U/L (ref 45–115)
ALT SERPL-CCNC: 13 U/L (ref 7–52)
ANION GAP SERPL CALC-SCNC: 9 MMOL/L (ref 3–11)
AST SERPL-CCNC: 15 U/L
BACTERIA #/AREA URNS HPF: ABNORMAL /HPF
BACTERIA UR CULT: ABNORMAL
BASOPHILS # BLD AUTO: 0.1 10*3/UL
BASOPHILS NFR BLD AUTO: 1 % (ref 0–2)
BILIRUB SERPL-MCNC: 0.59 MG/DL (ref 0.2–1.4)
BILIRUB UR QL: NEGATIVE
BUN SERPL-MCNC: 13 MG/DL (ref 7–25)
CALCIUM ALBUM COR SERPL-MCNC: 9.2 MG/DL (ref 8.6–10.3)
CALCIUM SERPL-MCNC: 9 MG/DL (ref 8.6–10.3)
CHLORIDE SERPL-SCNC: 102 MMOL/L (ref 98–107)
CLARITY UR: CLEAR
CO2 SERPL-SCNC: 23 MMOL/L (ref 21–32)
COLOR UR: YELLOW
CREAT SERPL-MCNC: 0.89 MG/DL (ref 0.7–1.3)
EOSINOPHIL # BLD AUTO: 0.1 10*3/UL
EOSINOPHIL NFR BLD AUTO: 1 % (ref 0–3)
ERYTHROCYTE [DISTWIDTH] IN BLOOD BY AUTOMATED COUNT: 13.6 % (ref 11.5–15)
GFR SERPL CREATININE-BSD FRML MDRD: 86 ML/MIN/1.73M*2
GLUCOSE SERPL-MCNC: 115 MG/DL (ref 70–105)
GLUCOSE UR QL: NEGATIVE MG/DL
HCT VFR BLD AUTO: 41.8 % (ref 38–50)
HGB BLD-MCNC: 14.2 G/DL (ref 13.2–17.2)
HGB UR QL: ABNORMAL
KETONES UR-MCNC: NEGATIVE MG/DL
LEUKOCYTE ESTERASE UR QL STRIP: ABNORMAL
LIPASE SERPL-CCNC: 16 U/L (ref 11–82)
LYMPHOCYTES # BLD AUTO: 1.6 10*3/UL
LYMPHOCYTES NFR BLD AUTO: 16 % (ref 15–47)
MCH RBC QN AUTO: 31 PG (ref 29–34)
MCHC RBC AUTO-ENTMCNC: 33.9 G/DL (ref 32–36)
MCV RBC AUTO: 91.2 FL (ref 82–97)
MONOCYTES # BLD AUTO: 1.1 10*3/UL
MONOCYTES NFR BLD AUTO: 11 % (ref 5–13)
NEUTROPHILS # BLD AUTO: 7.3 10*3/UL
NEUTROPHILS NFR BLD AUTO: 71 % (ref 46–70)
NITRITE UR QL: NEGATIVE
PH UR: 7 PH
PLATELET # BLD AUTO: 261 10*3/UL (ref 130–350)
PMV BLD AUTO: 7.8 FL (ref 6.9–10.8)
POTASSIUM SERPL-SCNC: 3.9 MMOL/L (ref 3.5–5.1)
PROT SERPL-MCNC: 6.5 G/DL (ref 6–8.3)
PROT UR STRIP-MCNC: NEGATIVE MG/DL
RBC # BLD AUTO: 4.58 10*6/ΜL (ref 4.1–5.8)
RBC #/AREA URNS HPF: ABNORMAL /HPF
SODIUM SERPL-SCNC: 134 MMOL/L (ref 135–145)
SP GR UR: 1.01 (ref 1–1.03)
SQUAMOUS #/AREA URNS HPF: ABNORMAL /HPF
UROBILINOGEN UR-MCNC: 0.2 E.U./DL
WBC # BLD AUTO: 10.3 10*3/UL (ref 3.7–9.6)
WBC #/AREA URNS HPF: ABNORMAL /HPF

## 2020-06-10 PROCEDURE — 81003 URINALYSIS AUTO W/O SCOPE: CPT | Performed by: EMERGENCY MEDICINE

## 2020-06-10 PROCEDURE — 85025 COMPLETE CBC W/AUTO DIFF WBC: CPT | Performed by: EMERGENCY MEDICINE

## 2020-06-10 PROCEDURE — 99284 EMERGENCY DEPT VISIT MOD MDM: CPT

## 2020-06-10 PROCEDURE — 2550000100 HC RX 255: Performed by: EMERGENCY MEDICINE

## 2020-06-10 PROCEDURE — 6360000200 HC RX 636 W HCPCS (ALT 250 FOR IP): Mod: JW | Performed by: EMERGENCY MEDICINE

## 2020-06-10 PROCEDURE — 36415 COLL VENOUS BLD VENIPUNCTURE: CPT | Performed by: EMERGENCY MEDICINE

## 2020-06-10 PROCEDURE — G1004 CDSM NDSC: HCPCS | Performed by: RADIOLOGY

## 2020-06-10 PROCEDURE — 83690 ASSAY OF LIPASE: CPT | Performed by: EMERGENCY MEDICINE

## 2020-06-10 PROCEDURE — 80053 COMPREHEN METABOLIC PANEL: CPT | Performed by: EMERGENCY MEDICINE

## 2020-06-10 PROCEDURE — 74177 CT ABD & PELVIS W/CONTRAST: CPT | Mod: 26,MG | Performed by: RADIOLOGY

## 2020-06-10 PROCEDURE — G1004 CDSM NDSC: HCPCS

## 2020-06-10 RX ORDER — IOPAMIDOL 755 MG/ML
140 INJECTION, SOLUTION INTRAVASCULAR ONCE
Status: COMPLETED | OUTPATIENT
Start: 2020-06-10 | End: 2020-06-10

## 2020-06-10 RX ORDER — KETOROLAC TROMETHAMINE 30 MG/ML
15 INJECTION, SOLUTION INTRAMUSCULAR; INTRAVENOUS ONCE
Status: COMPLETED | OUTPATIENT
Start: 2020-06-10 | End: 2020-06-10

## 2020-06-10 RX ADMIN — KETOROLAC TROMETHAMINE 15 MG: 30 INJECTION, SOLUTION INTRAMUSCULAR; INTRAVENOUS at 22:47

## 2020-06-10 RX ADMIN — IOPAMIDOL 140 ML: 755 INJECTION, SOLUTION INTRAVENOUS at 23:35

## 2020-06-10 ASSESSMENT — ENCOUNTER SYMPTOMS
WEAKNESS: 0
RHINORRHEA: 0
BACK PAIN: 1
HEMATURIA: 0
EYE DISCHARGE: 0
CONSTIPATION: 0
SHORTNESS OF BREATH: 0
FEVER: 0
NUMBNESS: 0
DYSURIA: 0
SORE THROAT: 0
COUGH: 0
FLANK PAIN: 0
BLOOD IN STOOL: 0
CHEST TIGHTNESS: 0
CONFUSION: 0
DIZZINESS: 0
NAUSEA: 0
FREQUENCY: 0
VOMITING: 0
DIARRHEA: 0
TROUBLE SWALLOWING: 0

## 2020-06-10 ASSESSMENT — PAIN DESCRIPTION - DESCRIPTORS: DESCRIPTORS: ACHING

## 2020-06-11 ENCOUNTER — HOSPITAL ENCOUNTER (EMERGENCY)
Facility: HOSPITAL | Age: 70
Discharge: HOME | End: 2020-06-11
Attending: EMERGENCY MEDICINE
Payer: MEDICARE

## 2020-06-11 VITALS
HEART RATE: 75 BPM | WEIGHT: 230 LBS | DIASTOLIC BLOOD PRESSURE: 70 MMHG | TEMPERATURE: 98.8 F | RESPIRATION RATE: 15 BRPM | SYSTOLIC BLOOD PRESSURE: 114 MMHG | OXYGEN SATURATION: 93 % | HEIGHT: 73 IN | BODY MASS INDEX: 30.48 KG/M2

## 2020-06-11 VITALS
TEMPERATURE: 97.9 F | WEIGHT: 229.94 LBS | HEART RATE: 94 BPM | HEIGHT: 73 IN | DIASTOLIC BLOOD PRESSURE: 89 MMHG | BODY MASS INDEX: 30.47 KG/M2 | RESPIRATION RATE: 18 BRPM | SYSTOLIC BLOOD PRESSURE: 142 MMHG | OXYGEN SATURATION: 97 %

## 2020-06-11 DIAGNOSIS — N39.0 URINARY TRACT INFECTION: Primary | ICD-10-CM

## 2020-06-11 DIAGNOSIS — R10.9 ABDOMINAL PAIN: ICD-10-CM

## 2020-06-11 PROCEDURE — 99282 EMERGENCY DEPT VISIT SF MDM: CPT | Performed by: EMERGENCY MEDICINE

## 2020-06-11 PROCEDURE — 2500000200 HC RX 250 WO HCPCS

## 2020-06-11 PROCEDURE — 96374 THER/PROPH/DIAG INJ IV PUSH: CPT

## 2020-06-11 PROCEDURE — 99284 EMERGENCY DEPT VISIT MOD MDM: CPT | Performed by: EMERGENCY MEDICINE

## 2020-06-11 RX ORDER — LIDOCAINE HYDROCHLORIDE 20 MG/ML
JELLY TOPICAL
Status: COMPLETED
Start: 2020-06-11 | End: 2020-06-11

## 2020-06-11 RX ORDER — TAMSULOSIN HYDROCHLORIDE 0.4 MG/1
0.4 CAPSULE ORAL
Qty: 30 CAPSULE | Refills: 0 | Status: SHIPPED | OUTPATIENT
Start: 2020-06-11 | End: 2020-07-11 | Stop reason: WASHOUT

## 2020-06-11 RX ORDER — LIDOCAINE HYDROCHLORIDE 20 MG/ML
5 JELLY TOPICAL ONCE
Status: COMPLETED | OUTPATIENT
Start: 2020-06-11 | End: 2020-06-11

## 2020-06-11 RX ADMIN — LIDOCAINE HYDROCHLORIDE 5 ML: 20 JELLY TOPICAL at 01:00

## 2020-06-11 ASSESSMENT — ENCOUNTER SYMPTOMS
DIFFICULTY URINATING: 1
NAUSEA: 0
FEVER: 0
SHORTNESS OF BREATH: 0
VOMITING: 0
CHILLS: 0
ABDOMINAL PAIN: 1
FLANK PAIN: 1

## 2020-06-11 NOTE — DISCHARGE INSTRUCTIONS
Thank you for your visit today trusting us with your healthcare needs.      Return immediately if worse abdominal pain, vomiting, bloody diarrhea, fever.  Complete current course of antibiotic for urine infection.  Tamsulosin as prescribed for urinary retention.  Urinary catheter is to remain in place.  Drink lots of fluids.  Follow-up with primary care physician in 5 days.Follow-up with urology as scheduled.

## 2020-06-11 NOTE — ED PROVIDER NOTES
HPI:  Chief Complaint   Patient presents with   • Abdominal Pain     Pt currently being treated for a UTI but now he states he has bad abdominal pain that started this morning. He states he feels bloated and has gained approx 5# and feels like it is all in his abdomen.        Patient is a 70-year-old man who presents with a chief complaint of abdominal pain radiating to his back since this afternoon that is constant, dull, worse when he lays on his back.  Patient denies any nausea, vomiting, diarrhea.  Patient denies any constipation, black or bloody stool.  Patient states he was diagnosed with a urinary tract infection 2 days ago and is taking Bactrim for his UTI.  Patient currently denies any dysuria, frequency, urgency, hematuria.  Patient denies any fever.  Patient states he has had surgery for an abdominal aortic aneurysm repair a number of years ago.          HISTORY:  History reviewed. No pertinent past medical history.    Past Surgical History:   Procedure Laterality Date   • ABDOMINAL SURGERY      anuerysm   • APPENDECTOMY     • HERNIA REPAIR      x 3       History reviewed. No pertinent family history.    Social History     Tobacco Use   • Smoking status: Never Smoker   • Smokeless tobacco: Never Used   Substance Use Topics   • Alcohol use: Not Currently   • Drug use: Not Currently         ROS:  Review of Systems   Constitutional: Negative for fever.   HENT: Negative for congestion, rhinorrhea, sore throat and trouble swallowing.    Eyes: Negative for discharge.   Respiratory: Negative for cough, chest tightness and shortness of breath.    Cardiovascular: Positive for leg swelling. Negative for chest pain.   Gastrointestinal: Negative for blood in stool, constipation, diarrhea, nausea and vomiting.   Genitourinary: Negative for dysuria, flank pain, frequency and hematuria.   Musculoskeletal: Positive for back pain.   Skin: Negative for rash.   Neurological: Negative for dizziness, weakness and  numbness.   Psychiatric/Behavioral: Negative for confusion.   All other systems reviewed and are negative.      PE:  ED Triage Vitals [06/10/20 2229]   Temp Heart Rate Resp BP SpO2   37.1 °C (98.8 °F) 83 18 (!) 172/108 94 %      Temp src Heart Rate Source Patient Position BP Location FiO2 (%)   -- -- -- -- --       Physical Exam  Vitals signs and nursing note reviewed.   Constitutional:       Appearance: He is well-developed.   HENT:      Head: Normocephalic and atraumatic.      Mouth/Throat:      Mouth: Mucous membranes are moist.      Pharynx: Oropharynx is clear.   Eyes:      Extraocular Movements: Extraocular movements intact.      Conjunctiva/sclera: Conjunctivae normal.      Pupils: Pupils are equal, round, and reactive to light.   Neck:      Musculoskeletal: Neck supple.   Cardiovascular:      Rate and Rhythm: Normal rate and regular rhythm.      Heart sounds: Normal heart sounds. No murmur.   Pulmonary:      Effort: Pulmonary effort is normal. No respiratory distress.      Breath sounds: Normal breath sounds.   Abdominal:      General: Bowel sounds are decreased. There is distension.      Palpations: Abdomen is soft. There is no pulsatile mass.      Tenderness: There is generalized abdominal tenderness. There is no guarding or rebound.      Hernia: A hernia is present. Hernia is present in the ventral area.   Skin:     General: Skin is warm and dry.      Capillary Refill: Capillary refill takes less than 2 seconds.   Neurological:      General: No focal deficit present.      Mental Status: He is alert and oriented to person, place, and time.      Cranial Nerves: No cranial nerve deficit.      Motor: No weakness.   Psychiatric:         Mood and Affect: Mood normal.         ED LABS:  Labs Reviewed   CBC WITH AUTO DIFFERENTIAL - Abnormal       Result Value    WBC 10.3 (*)     RBC 4.58      Hemoglobin 14.2      Hematocrit 41.8      MCV 91.2      MCH 31.0      MCHC 33.9      RDW 13.6      Platelets 261      MPV  7.8      Neutrophils% 71 (*)     Lymphocytes% 16      Monocytes% 11      Eosinophils% 1      Basophils% 1      Neutrophils Absolute 7.30      Lymphocytes Absolute 1.60      Monocytes Absolute 1.10      Eosinophils Absolute 0.10      Basophils Absolute 0.10     COMPREHENSIVE METABOLIC PANEL - Abnormal    Sodium 134 (*)     Potassium 3.9      Chloride 102      CO2 23      Anion Gap 9      BUN 13      Creatinine 0.89      Glucose 115 (*)     Calcium 9.0      AST 15      ALT (SGPT) 13      Alkaline Phosphatase 44 (*)     Total Protein 6.5      Albumin 3.8      Total Bilirubin 0.59      eGFR 86      Corrected Calcium 9.2      Narrative:     Estimated GFR calculated using the 2009 CKD-EPI creatinine equation.   URINALYSIS, MICROSCOPIC ONLY - Abnormal    RBC, Urine 0-2      WBC, Urine 50-75 (*)     Squamous Epithelial, Urine 0-4      Bacteria, Urine Few     URINALYSIS, DIPSTICK ONLY, FOR USE WITH MICROSCOPIC PANEL - Abnormal    Color, Urine Yellow      Clarity, Urine Clear      Specific Gravity, Urine 1.015      Leukocytes, Urine Large (*)     Nitrite, Urine Negative      Protein, Urine Negative      Ketones, Urine Negative      Urobilinogen, Urine 0.2      Bilirubin, Urine Negative      Blood, Urine Trace-lysed (*)     Glucose, Urine Negative      pH, Urine 7.0     LIPASE - Normal    Lipase 16     URINALYSIS WITH MICROSCOPIC    Narrative:     The following orders were created for panel order Urinalysis w/microscopic Urine, Clean Catch.  Procedure                               Abnormality         Status                     ---------                               -----------         ------                     Urinalysis, microscopic U...[[id number]]  Abnormal            Final result               Urinalysis, dipstick Urin...[[id number]]  Abnormal            Final result                 Please view results for these tests on the individual orders.         ED IMAGES:  CT ABDOMEN PELVIS W IV CONTRAST No Oral Contrast    (Results  Pending)       ED PROCEDURES:  Procedures    ED COURSE:            MDM:  MDM  Number of Diagnoses or Management Options  Acute urinary retention:   Lower urinary tract infectious disease:   Diagnosis management comments: Patient had an IV established and was given Toradol for his pain with some improvement in his pain.  Patient CBC and CMP were unremarkable.  Patient's UA shows 50-75 white cells with few bacteria which is improved compared to his UA 2 days ago.  On reviewing patient's urine culture he grew out staph aureus that is sensitive to Bactrim which is the antibiotic he is currently taking.  Patient did undergo CT of the abdomen pelvis due to abdominal pain with distention concerning for small bowel obstruction.  CT of the abdomen/pelvis showed no evidence of small bowel obstruction, but distended bladder with large bladder diverticulum.  No other acute findings per radiology.  I did view the patient's images.Patient was somewhat reluctant but did agree to having a Pope catheter placed which drained over thousand mL's of urine with resolution of his symptoms.  Patient appears to have acute urinary retention with resolving UTI.  Patient had Pope placed to a leg bag will be discharged home with a Pope in place.  Patient does not appear to have small bowel obstruction, aortic aneurysm, aortic dissection, perforated intestine, kidney stone, diverticulitis, appendicitis, dehydration, sepsis, or other serious etiology of his symptoms.  Patient will be discharged home.       Amount and/or Complexity of Data Reviewed  Clinical lab tests: reviewed  Tests in the radiology section of CPT®: reviewed  Tests in the medicine section of CPT®: reviewed  Decide to obtain previous medical records or to obtain history from someone other than the patient: yes        Final diagnoses:   [R33.8] Acute urinary retention   [N39.0] Lower urinary tract infectious disease        Jackie Jansen MD  06/11/20 0128

## 2020-06-11 NOTE — DISCHARGE INSTRUCTIONS
I believe that the mass that you feel is a pre-existing hernia.  I think that your abdominal pain continues to be related to your urinary tract infection, continue your antibiotics and other medications as previously prescribed.  Return to the emergency department if worsening pain or fever or other emergent concern develop.  Follow-up with your urologist as soon as possible.

## 2020-06-11 NOTE — ED PROVIDER NOTES
HPI:  Chief Complaint   Patient presents with   • Abdominal Pain     large swelling on abdomen       Patient presents to the emergency department complaining of continued abdominal and right low back pain since being seen in the emergency department last night.  He states this is not dramatically better or worse but is frustrated this is not improving.  He states he has not yet filled medications that were prescribed at that time.  He states he is on an antibiotic that was prescribed by the urology clinic, is not sure what this was although on record review this appears to be Bactrim.  He denies fevers or chills.  He did have apparent urinary retention with a Pope catheter placed last night.  He states he has drained the Pope catheter several times since then and this appears to be draining well.  He does have a history of some previous urinary tract infections but has never had to have a Pope catheter placed in the past.    He did have a CT scan of the abdomen pelvis last night that was read as largely unremarkable with the exception of a large urinary bladder diverticulum with some changes suspicious for cystitis.  Patient feels that he has a raised area to the right of midline in his abdomen, points adjacent to a large old surgical incision scar.  On reviewing his CT scan this appears to correlate with an area of nonincarcerated incisional hernia.    No associated fevers or chills.  No vomiting.  No diarrhea.      History provided by:  Patient  Abdominal Pain   Associated symptoms: no chest pain, no chills, no fever, no nausea, no shortness of breath and no vomiting        HISTORY:  History reviewed. No pertinent past medical history.    Past Surgical History:   Procedure Laterality Date   • ABDOMINAL SURGERY      anuerysm   • APPENDECTOMY     • HERNIA REPAIR      x 3       History reviewed. No pertinent family history.    Social History     Tobacco Use   • Smoking status: Never Smoker   • Smokeless  tobacco: Never Used   Substance Use Topics   • Alcohol use: Not Currently   • Drug use: Not Currently         ROS:  Review of Systems   Constitutional: Negative for chills and fever.   Respiratory: Negative for shortness of breath.    Cardiovascular: Negative for chest pain.   Gastrointestinal: Positive for abdominal pain. Negative for nausea and vomiting.   Genitourinary: Positive for difficulty urinating (now resolved with fuchs catheter) and flank pain.       PE:  ED Triage Vitals [06/11/20 0711]   Temp Heart Rate Resp BP SpO2   36.6 °C (97.9 °F) 94 18 142/89 97 %      Temp Source Heart Rate Source Patient Position BP Location FiO2 (%)   Oral -- -- -- --       Physical Exam  Vitals signs and nursing note reviewed.   Constitutional:       Appearance: He is well-developed.   HENT:      Head: Normocephalic and atraumatic.   Neck:      Musculoskeletal: Normal range of motion.   Pulmonary:      Effort: Pulmonary effort is normal. No respiratory distress.      Breath sounds: No stridor.   Abdominal:      Comments: Largely nontender abdomen.  Does have area of soft tissue prominence adjacent to incisional scar which is nontender   Genitourinary:     Comments: Fuchs catheter in place  Musculoskeletal:      Comments: Patient indicates just below right CVA area as area of back pain although does not have true CVA tenderness   Skin:     General: Skin is warm and dry.      Findings: No rash.   Neurological:      Mental Status: He is alert.         ED LABS:  Labs Reviewed - No data to display      ED IMAGES:  No orders to display       ED PROCEDURES:  Procedures    ED COURSE:            MDM:  MDM    This is a 70 year old male with continued abdominal pain after being seen last night with finding of urinary retention and UTI, otherwise unremarkable extensive workup including CT scan.  He is afebrile, does not have CVA tenderness and I do not suspect developing pyelonephritis.    I suspect he has some residual symptoms of his  cystitis and maybe some discomfort related to the fuchs catheter itself.  The area he feels on his abdomen appears to be non-incarcerated hernia.    We will have patient continue his antibiotics (did have MSSA on culture with no resistance to bactrim) and will f/u with urology.    Final diagnoses:   [N39.0] Urinary tract infection   [R10.9] Abdominal pain        Cristian Johnson MD  06/11/20 0811

## 2020-06-13 ENCOUNTER — HOSPITAL ENCOUNTER (EMERGENCY)
Facility: HOSPITAL | Age: 70
Discharge: HOME | End: 2020-06-13
Attending: FAMILY MEDICINE
Payer: MEDICARE

## 2020-06-13 VITALS
BODY MASS INDEX: 31.02 KG/M2 | HEIGHT: 72 IN | SYSTOLIC BLOOD PRESSURE: 120 MMHG | DIASTOLIC BLOOD PRESSURE: 79 MMHG | WEIGHT: 229 LBS | HEART RATE: 84 BPM | OXYGEN SATURATION: 98 % | TEMPERATURE: 99 F | RESPIRATION RATE: 16 BRPM

## 2020-06-13 DIAGNOSIS — S69.90XA FISHHOOK INJURY TO FINGER: Primary | ICD-10-CM

## 2020-06-13 PROCEDURE — 6360000200 HC RX 636 W HCPCS (ALT 250 FOR IP): Performed by: FAMILY MEDICINE

## 2020-06-13 PROCEDURE — 99282 EMERGENCY DEPT VISIT SF MDM: CPT | Performed by: FAMILY MEDICINE

## 2020-06-13 PROCEDURE — 90471 IMMUNIZATION ADMIN: CPT | Performed by: FAMILY MEDICINE

## 2020-06-13 PROCEDURE — 99283 EMERGENCY DEPT VISIT LOW MDM: CPT | Performed by: FAMILY MEDICINE

## 2020-06-13 PROCEDURE — 90715 TDAP VACCINE 7 YRS/> IM: CPT | Performed by: FAMILY MEDICINE

## 2020-06-13 RX ADMIN — TETANUS TOXOID, REDUCED DIPHTHERIA TOXOID AND ACELLULAR PERTUSSIS VACCINE, ADSORBED 0.5 ML: 5; 2.5; 8; 8; 2.5 SUSPENSION INTRAMUSCULAR at 16:59

## 2020-06-13 ASSESSMENT — ENCOUNTER SYMPTOMS
HEMATURIA: 0
PALPITATIONS: 0
ABDOMINAL PAIN: 0
COLOR CHANGE: 0
EYE PAIN: 0
SEIZURES: 0
SHORTNESS OF BREATH: 0
DYSURIA: 0
COUGH: 0
VOMITING: 0
FEVER: 0
SORE THROAT: 0
BACK PAIN: 0
CHILLS: 0
ARTHRALGIAS: 0

## 2020-06-13 NOTE — DISCHARGE INSTRUCTIONS
Watch for signs infection such as redness, swelling, drainage.  Triple antibiotic ointment to the finger daily.  Follow-up as needed.

## 2020-06-13 NOTE — ED PROVIDER NOTES
HPI:  Chief Complaint   Patient presents with   • Foreign Body     fish hook in ring finger of right hand, patient attempted to remove several times       70-year-old male presents with a fishhook in his ring finger of his right hand.  He is unable to remove it at home.      History provided by:  Patient   used: No    Foreign Body   Intake: [place] right ring finger.  Suspected object: [place].  Pain quality:  Sharp  Pain severity:  Mild  Duration:  2 hours  Timing:  Constant  Progression:  Unchanged  Chronicity:  New  Exacerbated by: Moving and palpation.  Ineffective treatments:  None tried  Associated symptoms: no abdominal pain, no cough, no ear pain, no sore throat and no vomiting        HISTORY:  History reviewed. No pertinent past medical history.    Past Surgical History:   Procedure Laterality Date   • ABDOMINAL SURGERY      anuerysm   • APPENDECTOMY     • HERNIA REPAIR      x 3       History reviewed. No pertinent family history.    Social History     Tobacco Use   • Smoking status: Never Smoker   • Smokeless tobacco: Never Used   Substance Use Topics   • Alcohol use: Not Currently   • Drug use: Not Currently         ROS:  Review of Systems   Constitutional: Negative for chills and fever.   HENT: Negative for ear pain and sore throat.    Eyes: Negative for pain and visual disturbance.   Respiratory: Negative for cough and shortness of breath.    Cardiovascular: Negative for chest pain and palpitations.   Gastrointestinal: Negative for abdominal pain and vomiting.   Genitourinary: Negative for dysuria and hematuria.   Musculoskeletal: Negative for arthralgias and back pain.   Skin: Negative for color change and rash.   Neurological: Negative for seizures and syncope.   All other systems reviewed and are negative.      PE:  ED Triage Vitals [06/13/20 1555]   Temp Heart Rate Resp BP SpO2   37.2 °C (99 °F) 72 16 138/84 93 %      Temp Source Heart Rate Source Patient Position BP  Location FiO2 (%)   Temporal -- -- -- --       Physical Exam  Vitals signs and nursing note reviewed.   Constitutional:       Appearance: He is well-developed.   HENT:      Head: Normocephalic and atraumatic.   Eyes:      Conjunctiva/sclera: Conjunctivae normal.      Pupils: Pupils are equal, round, and reactive to light.   Neck:      Musculoskeletal: Normal range of motion and neck supple.   Cardiovascular:      Rate and Rhythm: Normal rate and regular rhythm.      Heart sounds: Normal heart sounds. No murmur.   Pulmonary:      Effort: Pulmonary effort is normal. No respiratory distress.      Breath sounds: Normal breath sounds.   Abdominal:      General: Bowel sounds are normal.      Palpations: Abdomen is soft.      Tenderness: There is no abdominal tenderness.   Musculoskeletal: Normal range of motion.        Hands:    Skin:     General: Skin is warm and dry.      Capillary Refill: Capillary refill takes less than 2 seconds.   Neurological:      Mental Status: He is alert and oriented to person, place, and time.         ED LABS:  Labs Reviewed - No data to display      ED IMAGES:  No orders to display       ED PROCEDURES:  Procedures    ED COURSE:     70-year-old male presents with a fishhook in his right ring finger.  This was removed easily using a length of 0-silk and a sharp, snapping movement.  The patient's tetanus is updated.  He will follow-up as needed.       MDM:  MDM  Number of Diagnoses or Management Options  [place] injury to finger:   Risk of Complications, Morbidity, and/or Mortality  Presenting problems: moderate  Diagnostic procedures: minimal  Management options: low    Patient Progress  Patient progress: improved      Final diagnoses:   [S69.90XA] [place] injury to finger        Raj Houston MD  06/13/20 [id number]

## 2020-06-18 ENCOUNTER — APPOINTMENT (OUTPATIENT)
Dept: LAB | Facility: CLINIC | Age: 70
End: 2020-06-18
Payer: MEDICARE

## 2020-06-18 ENCOUNTER — HOSPITAL ENCOUNTER (OUTPATIENT)
Dept: CT IMAGING | Facility: HOSPITAL | Age: 70
Discharge: HOME | End: 2020-06-18
Payer: MEDICARE

## 2020-06-18 VITALS — DIASTOLIC BLOOD PRESSURE: 78 MMHG | SYSTOLIC BLOOD PRESSURE: 128 MMHG

## 2020-06-18 DIAGNOSIS — R93.89 ABNORMAL CT SCAN: ICD-10-CM

## 2020-06-18 DIAGNOSIS — R31.0 GROSS HEMATURIA: ICD-10-CM

## 2020-06-18 LAB — PSA SERPL-MCNC: 1.22 NG/ML (ref 0–4)

## 2020-06-18 PROCEDURE — 36415 COLL VENOUS BLD VENIPUNCTURE: CPT

## 2020-06-18 PROCEDURE — 2550000100 HC RX 255: Performed by: NURSE PRACTITIONER

## 2020-06-18 PROCEDURE — 84153 ASSAY OF PSA TOTAL: CPT

## 2020-06-18 PROCEDURE — 6360000200 HC RX 636 W HCPCS (ALT 250 FOR IP): Performed by: NURSE PRACTITIONER

## 2020-06-18 PROCEDURE — G1004 CDSM NDSC: HCPCS

## 2020-06-18 RX ORDER — IOPAMIDOL 755 MG/ML
100 INJECTION, SOLUTION INTRAVASCULAR ONCE
Status: COMPLETED | OUTPATIENT
Start: 2020-06-18 | End: 2020-06-18

## 2020-06-18 RX ORDER — FUROSEMIDE 10 MG/ML
10 INJECTION INTRAMUSCULAR; INTRAVENOUS ONCE
Status: COMPLETED | OUTPATIENT
Start: 2020-06-18 | End: 2020-06-18

## 2020-06-18 RX ADMIN — FUROSEMIDE 10 MG: 10 INJECTION, SOLUTION INTRAMUSCULAR; INTRAVENOUS at 12:22

## 2020-06-18 RX ADMIN — IOPAMIDOL 100 ML: 755 INJECTION, SOLUTION INTRAVENOUS at 13:00

## 2020-06-22 ENCOUNTER — CONSULT (OUTPATIENT)
Dept: UROLOGY | Facility: CLINIC | Age: 70
End: 2020-06-22
Payer: MEDICARE

## 2020-06-22 VITALS
SYSTOLIC BLOOD PRESSURE: 123 MMHG | DIASTOLIC BLOOD PRESSURE: 72 MMHG | OXYGEN SATURATION: 95 % | TEMPERATURE: 97.6 F | HEART RATE: 73 BPM

## 2020-06-22 DIAGNOSIS — R31.0 GROSS HEMATURIA: ICD-10-CM

## 2020-06-22 DIAGNOSIS — R33.9 URINARY RETENTION: Primary | ICD-10-CM

## 2020-06-22 DIAGNOSIS — N32.3 BLADDER DIVERTICULUM: ICD-10-CM

## 2020-06-22 PROCEDURE — 99203 OFFICE O/P NEW LOW 30 MIN: CPT | Mod: 25 | Performed by: NURSE PRACTITIONER

## 2020-06-22 PROCEDURE — G0463 HOSPITAL OUTPT CLINIC VISIT: HCPCS | Performed by: NURSE PRACTITIONER

## 2020-06-22 PROCEDURE — 51700 IRRIGATION OF BLADDER: CPT | Performed by: NURSE PRACTITIONER

## 2020-06-22 RX ORDER — FINASTERIDE 5 MG/1
5 TABLET, FILM COATED ORAL DAILY
Qty: 30 TABLET | Refills: 11 | Status: SHIPPED | OUTPATIENT
Start: 2020-06-22 | End: 2020-12-17 | Stop reason: ALTCHOICE

## 2020-06-22 ASSESSMENT — PAIN SCALES - GENERAL: PAINLEVEL: 0-NO PAIN

## 2020-06-22 NOTE — LETTER
[facility]  [address] [facility] [place] [address] [phone]  Dept: [phone]    June 22, 2020     Michael Oscar MD  [address] [facility] [place] [address]    Patient: Cesar Yuen   YOB: 1950   Date of Visit: 6/22/2020       Dear Dr. Oscar:    Thank you for referring Cesar Yuen to me for evaluation. Below are my notes for this consultation.    If you have questions, please do not hesitate to call me. I look forward to following your patient along with you.         Sincerely,        Seble Penny CNP        CC: No Recipients  Seble Penny CNP  6/22/2020  9:33 AM  Signed  6/22/2020    Chief Complaint:    Chief Complaint   Patient presents with   • Consult     gross hematuria       History of Present Illness:  70 year old male who is here to establish care due to gross hematuria. He reports he first saw gross hematuria about 3-4 months ago - this was associated with a UTI. He states he has had 3 of them since having incident of gross hematuria. He was seen in ED and had indwelling Pope catheter placed on 6/11/20 due to UTI. He feels the UTI is associated with dill he had bought from [employer]. Urine culture showed staph and he was treated with Bactrim ds. CT urogram done on 6/3/20 shows he has a large right sided diverticulum and bladder out let obstruction. He was put on flomax 0.4 mg after catheter placement.   His stream he feels was good prior to catheter placement. He had no dysuria. Nocturia once a night. No history of kidney stones. No history of smoking or pelvic irradiation. No family history of prostate cancer. Last PSA of 1.22 on 6/18/20.   Past Medical History:  No past medical history on file.  Past Surgical History:  Past Surgical History:   Procedure Laterality Date   • ABDOMINAL SURGERY      anuerysm   • APPENDECTOMY     • HERNIA REPAIR      x 3      Social History:  Social History     Tobacco Use   • Smoking status: Never Smoker   • Smokeless tobacco: Never  Used   Substance Use Topics   • Alcohol use: Not Currently     Family History:      No family history on file.  Allergies:  No Known Allergies  Medications:  Current Outpatient Medications   Medication Sig Dispense Refill   • tamsulosin (FLOMAX) 0.4 mg capsule Take 1 capsule (0.4 mg total) by mouth daily with dinner 30 capsule 0   • finasteride (PROSCAR) 5 mg tablet Take 1 tablet (5 mg total) by mouth daily 30 tablet 11     No current facility-administered medications for this visit.        REVIEW OF SYSTEMS:  Review of Systems   GENERAL/CONSTITUTIONAL:  No change in appetite.  Denies chills.  Energy level is good.  Denies fevers.  RESPIRATORY:  No shortness of breath.  CARDIOVASCULAR:  No chest pain.  GASTROINTESTINAL:  No constipation, diarrhea, nausea, or vomiting.  GENITOURINARY:  See HPI for details.    VITAL SIGNS:   temporal temperature is 36.4 °C (97.6 °F). His blood pressure is 123/72 and his pulse is 73. His oxygen saturation is 95%.     PHYSICAL EXAMINATION:  Physical Exam   GENERAL APPEARANCE:  Alert and oriented x3, pleasant, well nourished, well developed, in no acute distress male.  HEAD:  Normocephalic, atraumatic.  SKIN:  Normal, warm and dry.  HEART:  Regular rate and rhythm, no murmurs.  LUNGS:  Clear to auscultation bilaterally with normal respiratory effect.  ABDOMEN:  Soft, nontender, nondistended, no organomegaly.  Bowel sounds present. Bladder is nontender and non-palpable.  BACK:  No costovertebral angle tenderness, spine nontender to palpation.  GENITOURINARY: indwelling fuchs present draining yellow urine.   MUSCULOSKELETAL:  Normal.  EXTREMITIES:  No edema.  NEUROLOGIC:  Gait normal.    Lab Results   Component Value Date    PSA 1.22 06/18/2020       Lab Results   Component Value Date    COLORU Yellow 06/10/2020    CLARITYU Clear 06/10/2020    SPECGRAVU 1.015 06/10/2020    LEUKOCYTESU Large (A) 06/10/2020    NITRITEU Negative 06/10/2020    PROTUR Negative 06/10/2020    KETONESU Negative  06/10/2020    BILIRUBINU Negative 06/10/2020    BLOODU Trace-lysed (A) 06/10/2020    GLUCOSEU Negative 06/10/2020    SEBASTIÁN 7.0 06/10/2020    WBC 10.3 (H) 06/10/2020    RBC 4.58 06/10/2020           Ct Abdomen Pelvis W Iv Contrast No Oral Contrast    Result Date: 6/11/2020  Narrative: Exam: CT of the abdomen and pelvis with contrast from 06/10/2020 Clinical History:  Abdominal pain Comparison(s): 2/13/2020 Technique: Helical axial imaging was performed through the abdomen and pelvis after the intravenous administration of 140 cc of Isovue-370 and without. Five millimeter axial reconstructions and sagittal and coronal reformations were constructed and reviewed. Dose Reduction Technique: Dose reduction technique utilized; automatic/anatomic modulation of X-ray tube current (Auto mA). Findings: Abdomen CT: Scattered subsegmental atelectasis in the lung bases. Motion blurs some of these images. No free air visualized. Accessory splenule. The liver, spleen, adrenals, gallbladder, pancreas have a normal and stable CT scan appearance. Hypodensities in the kidneys some too small to further characterize but the largest compatible with cyst stable since previous exam. Probable parapelvic cysts left kidney. Kidneys have an otherwise normal and stable CT scan appearance. Atherosclerotic change is seen in the aorta and some of its branches. Significant motion limits evaluation of some of the bowel and lack of oral contrast limits evaluation of some of the bowel. There is significant stool seen in the ascending colon. Levoscoliosis in the spine. Scattered mild degenerative change in the spine similar to previous study. Postoperative change consistent with previous hernia repair. Patient is status post appendectomy. Pelvis CT: Large bladder diverticulum is approximately equal in size to the bladder and extends off the posterior right side of the bladder. The bladder diverticulum and the bladder appear thick walled. Clinical  correlation regarding possible cystitis suggested. Prostate is heterogeneous.     Impression: IMPRESSION: 1.  Linear subsegmental atelectasis in the lung bases. 2.  Stable hypodensities and cysts in the kidneys. 3.  Large bladder diverticulum similar in appearance to previous study. Both diverticulum and the bladder wall are thick-walled. Clinical correlation suggested regarding possibility of cystitis. 4.  Preliminary report given by virtual radiology. No significant discrepancy with the preliminary report given by virtual radiology soon after the  CT.    Ct Abdomen Pelvis Urogram With And Without Iv Contrast    Result Date: 6/18/2020  Narrative: CT urogram with and without contrast  06/18/2020 Clinical history: Gross hematuria; Hematuria. Comparison(s): CT 6/10/2020 Technique: The patient was hydrated with 5 glasses of water.  Precontrast CT was performed from the level of the adrenal glands through the bladder. After intravenous administration of 10 mg of Lasix and 100 cc of Isovue-370, helical axial imaging was performed through the abdomen at the level of the kidneys after a 100 second delay. After 8-10 minutes, helical axial imaging was again performed from the level of the adrenal glands through the bladder.  0 cc's of contrast were discarded from a single use vial.  Multiplanar reformations were constructed and reviewed.   Dose reduction technique utilized; automatic/anatomic modulation of X-ray tube current (Auto mA). FINDINGS: CT abdomen: Lung bases are clear. Liver is normal. Gallbladder and common bile duct are normal. Visualized portions of the spleen, pancreas and adrenal glands are normal. Kidneys, collecting systems and proximal ureters are normal. Benign-appearing cyst in the left kidney. Bowel loops are normal. No mass, adenopathy or fluid collection. Postoperative changes in the anterior abdominal wall. CT pelvis: Distal ureters are normal. Pope catheter in the bladder. Large right posterior  "lateral bladder diverticulum. Bladder wall slightly thickened diffusely without mass suggesting changes from bladder outlet obstruction. Bowel loops are normal. No mass, adenopathy or fluid collection. Postoperative changes in the right inguinal region.     Impression: IMPRESSION: 1.  Large right posterior lateral bladder diverticulum. 2.  Bladder wall diffusely thickened suggesting changes from bladder outlet obstruction. No mass lesion. No renal stone.      Assessment and Plan:     Cesar was seen today for consult.    Diagnoses and all orders for this visit:    Urinary retention  -     finasteride (PROSCAR) 5 mg tablet; Take 1 tablet (5 mg total) by mouth daily    Gross hematuria  -     Ambulatory referral to Urology  -     Ambulatory referral to Urology  -     Cystoscopy; Future    Bladder diverticulum  -     Ambulatory referral to Urology    Patient here to establish care due gross hematuria associated with UTI. CT urogram shows large right bladder diverticulum and bladder thickening associated with Bladder outlet obstruction.   We discussed BPH, symptoms including frequent urination, urinating frequently at night, difficulty starting urination, having to urinate urgently, and having a weak urine stream. These symptoms typically appear slowly and get worse gradually over a period of years. We will go ahead and do a voiding trial today.    Patient was placed in supine position. 180 cc of sterile water was instilled into the bladder. Catheter balloon was completely deflated and intact. Catheter was removed without difficulty. Patient tolerated this well. Patient was given privacy and time to void. Patient was able to void 50 ml from bladder. Patient given option to be taught self intermittent catheterization or have catheter replaced. He opts to leave catheter out. If has problems with \"bloating\" he will go back to ED. We will put him on some Finasteride 5 mg along with Flomax 0.4 mg for now.    We will set " patient up in the near furture for cystoscopy for further evaluation of gross hematuria. He will continue with as prescribed.       Seble Penny, CNP

## 2020-06-22 NOTE — PATIENT INSTRUCTIONS
"What is Benign Prostatic Hyperplasia (BPH)?  BPH is an enlarged prostate. The prostate goes through two main growth cycles during a man’s life. The first occurs early in puberty, when the prostate doubles in size. The second phase of growth starts around age 25 and goes on for most of the rest of a man's life. BPH most often occurs during this second growth phase.  As the prostate enlarges, it presses against the urethra. The bladder wall becomes thicker. One day, the bladder may weaken and lose the ability to empty fully, leaving some urine in the bladder. Narrowing of the urethra and urinary retention - being unable to empty the bladder fully - cause many of the problems of BPH.  BPH is benign. This means it is not cancer. It does not cause or lead to cancer. However, BPH and cancer can happen at the same time.  BPH is common. About half of all men between ages 51 and 60 have BPH. Up to 90% of men over age 80 have it.  What is the Prostate  The prostate   is part of the male reproductive system. It is about the size of a walnut and weighs about an ounce. The prostate is found below the bladder and in front of the rectum. It goes all the way around a tube called the urethra, which carries urine from the bladder out through the penis.  The prostate’s main job is to make fluid for semen. During ejaculation, sperm made in the testicles moves to the urethra. At the same time, fluid from the prostate and the seminal vesicles also moves into the urethra. This mixture - semen - goes through the urethra and out through the penis.    Normal and Enlarged Prostate  Symptoms  When the prostate is enlarged, it can bother or block the bladder. Needing to urinate often is a common symptom of BPH. This might be every 1 to 2 hours, mainly at night.  Other symptoms include:  • Feeling that the bladder is full, even right after urinating  • Feeling that urinating \"can't wait\"  • A weak flow of urine  • Needing to stop and start " urinating several times  • Trouble starting to urinate  • Trouble starting to urinate  • Needing to push or strain to urinate  If BPH becomes severe, you might not be able to urinate at all. This is an emergency that must be treated right away.  How Can BPH Affect Your Life?  In most men, BPH gets worse with age. It can lead to bladder damage and infection. It can cause blood in the urine and cause kidney damage.  Causes  The causes of BPH are not well-understood. Some researchers believe that factors related to aging and the testicles may cause BPH. This is because BPH does not develop in men whose testicles were removed before puberty.  Throughout their lives, men produce both testosterone, a male hormone, and small amounts of estrogen, a female hormone. As men age, the amount of active testosterone in the blood lowers, leaving a higher share of estrogen. Studies have suggested that BPH may happen because the higher share of estrogen in the prostate adds to the activity of substances that start prostate cells to grow.  Another theory points to dihydrotestosterone (DHT), a male hormone that plays a role in prostate development and growth. Some research has shown that, even when testosterone levels in the blood start to fall, high levels of DHT still build up in the prostate. This may push prostate cells to continue to grow. Scientists have noted that men who do not produce DHT do not develop BPH.  Who is at Risk for BPH?  Aging and a family history of BPH increase a man’s risk for BPH. Obesity, lack of staying active, and erectile dysfunction can also increase risk.  Can BPH be Prevented?  There is no sure way to stop BPH, but losing weight and eating a healthy diet that involves fruits and vegetables may help. This may relate to having too much body fat, may increase hormone levels and other factors in the blood, and stimulate the growth of prostate cells. Staying active also helps control weight and hormone  levels.  Diagnosis  See your doctor if you have symptoms that might be BPH. See your doctor right away if you have blood in your urine, pain or burning when you urinate, or if you cannot urinate.  Your doctor can diagnose BPH based on  • Personal or family history  • A physical exam  • Medical tests  The American U[employer] Association (AUA) has built a BPH Symptom Score Index. It’s a series of questions about how often urinary symptoms happen. The score rates BPH from mild to severe. Take the test and talk with your doctor about your results.  Your doctor will review your Symptom Score and take a medical history. You will also have a physical exam that involves a digital rectal exam (HARISH). Your doctor may also want you to have some or all of these tests:  • Cystoscopy to look at the urethra or bladder with a scope  • Post-void residual volume to measure urine left in the bladder after urinating  • PSA blood test to screen for prostate cancer  • Ultrasound of the prostate  • Urinalysis (urine test)  • Uroflowmetry to measure how fast urine flows  • Urodynamic pressure to test pressure in the bladder during urinating  • Urinary blood test to screen for bladder cancer  •   PSA Blood Test  Prostate-specific antigen (PSA) is a protein that is made only by the prostate. When the prostate is healthy, very little PSA is found in the blood.  The PSA blood test measures the level of PSA in the blood. The test can be done in a lab, hospital, or doctor's office. No special preparation is needed. The PSA test should be done before the doctor does a HARISH. You should not ejaculate for 2 days before a PSA test. That’s because ejaculation can raise the PSA level for 24 to 48 hours.  A low PSA is better for prostate health. A rapid rise in PSA may be a sign that something is wrong. BPH is one possible cause of a high PSA level. Inflammation of the prostate, or prostatitis, is another common cause of a high PSA  level.                      Digital Rectal Exam of the Prostate    Digital Rectal Exam  The HARISH is done with the man bending over or lying curled on his side. The doctor puts a lubricated, gloved finger into the rectum to feel the shape and thickness of the prostate. The HARISH can help your doctor find prostate problems.  Treatment  There are many options for treating BPH. You and your doctor will decide together which treatment is right for you. Sometimes a mixture of treatments works best. Mild cases of BPH may not need treatment.  The main types of treatments for BPH are  • Watchful Waiting/Active Surveillance  • Medical Therapies  • Minimally Invasive Surgery  • Surgery  Watchful Waiting/Active Surveillance  If you and your doctor choose this treatment option, your BPH will be closely watched but not actively treated. Diet and medicine can control your symptoms. You will have a yearly exam. If your symptoms get worse or if new symptoms appear, your doctor may suggest that you begin active treatment.  What Are The Benefits, Risks and Side Effects of Watchful Waiting/Active Surveillance?  There are no side effects, but it may be harder to reduce your symptoms later.  Who Are Good Candidates for Watchful Waiting/Active Surveillance?  Men with mild symptoms may be good candidates for this. Men with moderate symptoms that do not bother them are also good candidates.  Medical Therapies  Alpha Blockers  Alpha blockers are pills that relax the muscles of the prostate and bladder. They improve urine flow, reduce blockage of the urethra, and reduce BPH symptoms. They do not reduce the size of the prostate. Alpha-blocking drugs include alfuzosin (Uroxatral), terazosin (Hytrin), doxazosin (Cadura), and tamsulosin (Flomax).  What are The Benefits, Risks and Side Effects of Alpha Blockers?  One benefit of alpha blockers is they start to work right away. Side effects may include dizziness, lightheadedness, fatigue, and trouble  ejaculating.  Who Are Good Candidates for Alpha Blockers?  Men with moderate to severe BPH and men who are bothered by their symptoms are good candidates. Alpha blockers are not a good choice for men who are about to have cataract surgery.  5-Alpha Reducatase Inhibitors  5-alpha reductase inhibitors are pills that block the production of DHT, a male hormone that can build up in the prostate and may cause prostate growth. They shrink the prostate and increase urine flow. These drugs include finasteride (Proscar) and dutasteride (Avodart).  What are the benefits, Risks and Side Effects of 5-Alpha Reducatase Inhibitors?  These drugs reduce the risk of BPH complications. They also make it less likely that you will need surgery. Side effects include erectile dysfunction and reduced libido (sex drive). You must keep taking the pills to prevent symptoms from coming back.  Who Are Good Candidates for 5-Alpha Reducatase Inhibitors?  These drugs may be best for men with very large prostate glands. Older men are good candidates.  Combination Therapy  In combination therapy, an alpha blocker and a 5-alpha reductase inhibitor are used together. Many studies, such as the Medical Therapy of Prostatic Symptoms (MTOPS) study, have shown that combining two types of medication, instead of using just one, can more effectively improve symptoms, urine flow, and quality of life. Possible drug combinations include  • Finasteride (Proscar) and doxazosin (Cadura)  • Dutasteride (Avodart) and tamsulosin (Magalis), a combination that is available in a single tablet  A urologist may prescribe a combination of alpha blockers and drugs called antimuscarinics for patients with overactive bladder symptoms. In overactive bladder, the bladder muscles contract uncontrollably and cause urinary frequency, urinary urgency, and urinary incontinence. Antimuscarinics are medications that relax the bladder muscles.  What Are The Benefits, Risks and Side  "Effects of Combination Therapy?  Alpha blockers and 5-alpha reductase inhibiters work better together than either drug does alone. They improve symptoms and prevent BPH from getting worse. But each drug can cause side effects. By taking two drugs, you may have more side effects than if you were taking just one.  In the MTOPS study, the most common side effects in patients on combination therapy were dizziness, erectile dysfunction, weakness or lack of energy, and a drop in blood pressure when moving from sitting or lying down to standing.  Who are Good Candidates for Combination Therapy?  Men with larger prostates are good candidates for this treatment.  Phytotherapies  Phytotherapies are herbal treatments. They are a popular form of \"self-treatment.\" They are not prescribed by a doctor. Instead, you buy them over the counter as dietary supplements. One popular herb is saw palmetto.   Doctors do not currently recommend herbal treatments for BPH.      Catheterization  This is not therapy or a treatment but rather a temporary measure for men who cannot empty their bladders completely. A catheter is a thin, hollow plastic tube that is inserted into the bladder to drain urine. The catheter may be \"clean,\" which means it is placed and removed every 6 to 8 hours. Or it may be \"indwelling,\" which means it is left in the bladder for a short or long time.  A catheter can be placed in the bladder through the urethra. Or, the doctor may insert the catheter through a small puncture in the bladder, just below the navel and above the pubic bone. This is called a suprapubic catheter. A “clean” catheter is one that you, or a caregiver, can place and remove so that you do not have to wear it all the time.  • What are the Benefits, Risks, and Side Effects of Catheterization?  • Catheters can provide temporary relief of BPH symptoms. Infection is the biggest risk of having a catheter in place for a long time. Bacteria can stick to the " catheter surface. This makes it hard for the immune system or antibiotics to work. Using a catheter for a few years increases risk for bladder cancer and can destroy the tissue of the penis. This is probably due to the long-term irritation caused by the catheter sitting in the bladder or at the meatus (urine opening at the tip of the penis). The risk of infection and cancer is lower with “clean intermittent catheterization” than with an “indwelling” catheter.  • Who are Good Candidates for Catheterization?  • Good candidates for catheterization include men who are waiting for medication to work or waiting for surgery. Catheters are also used during treatment for an infection. They may be a good choice for men who have multiple medical problems and a short life expectancy. The risks and discomfort of surgery may outweigh the risk of infection or cancer.        Surgery  When medical therapy fails or when BPH symptoms are severe, surgery can be done to remove obstructing prostate tissue. Surgery is almost always recommended if you:  • Are unable to urinate  • Have kidney damage  • Have frequent urinary tractd infections  • Have a lot of bleeding  • Have stones in the bladder  These types of surgery can be performed for BPH (please note these surgeries appear in order of least invasive to most invasive):  • Transurethral Incision of the Prostate (TUIP)  • Revolix Laser Enucleation of the Prostate   • Urolift  • Transurethral Resection of the Prostate (TURP)    Minimally Invasive Surgeries  Minimally invasive surgery is done with minimal anesthesia, as on outpatient and involves a quicker recovery. The procedure may be done in your doctor's office or at an outpatient center.  Choosing the right type of minimally invasive surgery for you may depend on:  • The size of your prostate  • How healthy you are  • Your personal choice    What Are the Benefits, Risks, and Side Effects of Minimally Invasive Surgeries?  Symptom  relief is the biggest benefit of minimally invasive surgery. Some men have fewer problems controlling their urine after they have one of these procedures. However, having minimally invasive surgery may increase the risk that you will need to have another surgery in the future.  Side effects from minimally invasive surgery may include:  • Urinary tract infection  • Blood in the urine  • Burning with urination  • Needing to urinate more often  • Sudden urges to urinate  • Less often:  o Erectile dysfunction  o Semen flowing backward into the bladder instead of out of the penis (retrograde ejaculation)  Who Are Good Candidates for Minimally Invasive Surgery?  Men who are having trouble urinating are good candidates for minimally invasive surgery. In addition, you may be a good candidate if you:  • Have moderate to severe BPH symptoms  • Have urinary tract obstruction, bladder stones, or blood in your urine  • Can't empty your bladder completely  • Have bleeding from your prostate  • Urinate very slowly  • Have taken medications for BPH but they did not work  Prostatic Urethral Lift (PUL) - UROLIFT  PUL (also known as UroLift) uses a needle to place tiny implants in the prostate. These implants lift and hold the enlarged prostate so that it no longer blocks the urethra. PUL may be done with either local or general anesthesia.  What Are The Benefits, Risks and Side Effects of PUL?  PUL uses no cutting or heat to destroy or remove prostate tissue. It takes less than an hour and you can usually go home the same day. Most men see symptom improvement within about 2 weeks. However, reduction of symptoms and improvement in urinary flow may be less than with transurethral resection of the prostate (TURP).  Some men may have pain or burning when passing urine, blood in the urine, or a strong urge to urinate. These side effects usually go away within two to four weeks. Men may have fewer sexual side effects after PUL than after  other types of minimally invasive surgery.  Who Are Good Candidates For Prostatic Stents?  Men with many medical problems may be good candidates. Men for whom surgery is high-risk may also be good candidates.  Many men with enlarged prostates and urinary symptoms may be good candidates for PUL. However, it is not clear if this therapy works for those prostates with a middle lobe. Men who have PUL can still have other treatment if they need it. If you are allergic to nickel, titanium, or stainless steel, talk to your doctor before getting PUL. This is a newer treatment, so it’s not clear if future treatment would be needed. There are concerns that this may not be a durable treatment as 33% of men need additional surgeries or go back on medications.  Transurethral Incision of the Prostate (TUIP)  TUIP may be used if you have a smaller prostate but major blockage of the urethra. Instead of cutting and removing tissue, TUIP widens the urethra. The surgeon uses a laser beam or an electrical current to make small cuts in the bladder neck, where the urethra joins the bladder, and in the prostate. This reduces the pressure of the prostate on the urethra and makes urination easier. A catheter is left in your bladder for 1 to 3 days after surgery. The hospital stay is 1 to 3 days.  What are the Benefits, Risks, and Side Effects of TUIP  TUIP may improve the ability to urinate. It may ease symptoms. Temporary urine retention, urinary tract infection, dry orgasm, incontinence, and erectile dysfunction are possible side effects. Some men need additional treatment after TUIP.  Who are Good Candidates for TUIP?  Men who have a smaller prostate or do not want a more complete prostate resection but need surgery are good candidates for TUIP. The procedure is less likely to interfere with ejaculation than the more substantial TURP.    Revolix Laser Enucleation of the Prostate   Revolix laser Ablaion of Prostate, the surgeon places a  thin, tube-like instrument (a resectoscope) through the penis into the urethra. A laser inserted into the resectoscope destroys the excess prostate tissue.  What are the Benefits, Risks, and Side Effects of Revolix?  No incisions (cuts) are needed. There is very little bleeding. Recovery is rapid. You will need a catheter, but it is usually removed the next day. You may have blood in your urine or frequent or painful urination for a few days. This is usually an outpatient procedure. But this treatment requires anesthesia. As with any surgery, anesthesia poses a risk.  Who are Good Candidates for Revolix?  Men with larger prostates who wish to avoid more-invasive surgery may be good candidates for this treatment. Men with a higher risk of bleeding, such as those taking blood-thinning medications, may also be good candidates for Revolix.  After Treatment  For most men, symptoms of BPH improve after treatment. Infection, bleeding, incontinence, and erectile dysfunction may occur after some treatments. In some cases, scar tissue may form.  What are the Long-Term Side Effects of Treatment?  Side effects vary with the type of treatment you choose. Most side effects are temporary. It may take a while for sexual function to fully return. Most experts agree that if you were able to have an erection shortly before surgery, you will probably be able to do so after surgery. Most men find little or no difference in orgasm. You may have retrograde ejaculation (when semen enters the bladder rather than being sent out through the penis). For most men, side effects lessen with time. Some treatments may cause long-term side effects for some men.         Transurethral Resection of the Prostate (TURP)   TURP is also a very common surgery for BPH. About 150,000 men in the [place] have TURP each year.  After anesthesia, the surgeon inserts a thin, tube-like instrument (a rectoscope) through the tip of the penis into the urethra.  The resectoscope has a light, valves for irrigating fluid, and a thin wire loop. An electrical current is passed along the wire. The surgeon uses the electrified wire to cut away prostate tissue that is blocking the urethra and seal blood vessels. The removed tissue is flushed into the bladder and from there out of the body. You will need to use a catheter for 1 to 2 days after the procedure.  What are the Benefits, Risks, and Side Effects of TURP?  This treatment has well known long-term outcomes. Other treatments are generally compared with it. Symptoms generally improve markedly. The effects of treatment last for 15 years or more.  TURP does not remove the entire prostate. No incisions (cuts) are needed. The hospital stay is 1 to 2 days or until there is no significant blood in your urine. TURP does require anesthesia. As with any surgery, anesthesia poses a risk.  Side effects of TURP may include retrograde ejaculation, erectile dysfunction, urinary tract infections right after surgery, and urinary incontinence. Full recovery takes about 4 to 6 weeks.  Who are Good Candidates for TURP?  Men who require surgery because of moderate to severe BPH symptoms may be good candidates for TURP.  How Can You Prevent a Recurrence of BPH?  Once you have been treated for BPH, taking medication can prevent symptoms from returning or getting worse. Some men may need additional treatment. Some men need repeated treatment to get rid of bothersome symptoms. In older men, it may be possible to control BPH symptoms to the end of life.        Patient Education     Cystoscopy, Care After  Refer to this sheet in the next few weeks. These instructions provide you with information about caring for yourself after your procedure. Your health care provider may also give you more specific instructions. Your treatment has been planned according to current medical practices, but problems sometimes occur. Call your health care provider if you  have any problems or questions after your procedure.  What can I expect after the procedure?  After the procedure, it is common to have:  · Mild pain when you urinate. Pain should stop within a few minutes after you urinate. This may last for up to 1 week.  · A small amount of blood in your urine for several days.  · Feeling like you need to urinate but producing only a small amount of urine.  Follow these instructions at home:    Medicines  · Take over-the-counter and prescription medicines only as told by your health care provider.  · If you were prescribed an antibiotic medicine, take it as told by your health care provider. Do not stop taking the antibiotic even if you start to feel better.  General instructions    · Return to your normal activities as told by your health care provider. Ask your health care provider what activities are safe for you.  · Do not drive for 24 hours if you received a sedative.  · Watch for any blood in your urine. If the amount of blood in your urine increases, call your health care provider.  · Follow instructions from your health care provider about eating or drinking restrictions.  · If a tissue sample was removed for testing (biopsy) during your procedure, it is your responsibility to get your test results. Ask your health care provider or the department performing the test when your results will be ready.  · Drink enough fluid to keep your urine clear or pale yellow.  · Keep all follow-up visits as told by your health care provider. This is important.  Contact a health care provider if:  · You have pain that gets worse or does not get better with medicine, especially pain when you urinate.  · You have difficulty urinating.  Get help right away if:  · You have more blood in your urine.  · You have blood clots in your urine.  · You have abdominal pain.  · You have a fever or chills.  · You are unable to urinate.  This information is not intended to replace advice given to you by  your health care provider. Make sure you discuss any questions you have with your health care provider.  Document Released: 07/07/2006 Document Revised: 05/25/2017 Document Reviewed: 11/03/2016  [employer] Patient Education © 2019 [employer]       Patient Education     Cystoscopy  Cystoscopy is a procedure that is used to help diagnose and sometimes treat conditions that affect the lower urinary tract. The lower urinary tract includes the bladder and the urethra. The urethra is the tube that drains urine from the bladder. Cystoscopy is done using a thin, tube-shaped instrument with a light and camera at the end (cystoscope). The cystoscope may be hard or flexible, depending on the goal of the procedure. The cystoscope is inserted through the urethra, into the bladder.  Cystoscopy may be recommended if you have:  · Urinary tract infections that keep coming back.  · Blood in the urine (hematuria).  · An inability to control when you urinate (urinary incontinence) or an overactive bladder.  · Unusual cells found in a urine sample.  · A blockage in the urethra, such as a urinary stone.  · Painful urination.  · An abnormality in the bladder found during an intravenous pyelogram (IVP) or CT scan.  Cystoscopy may also be done to remove a sample of tissue to be examined under a microscope (biopsy).  Tell a health care provider about:  · Any allergies you have.  · All medicines you are taking, including vitamins, herbs, eye drops, creams, and over-the-counter medicines.  · Any problems you or family members have had with anesthetic medicines.  · Any blood disorders you have.  · Any surgeries you have had.  · Any medical conditions you have.  · Whether you are pregnant or may be pregnant.  What are the risks?  Generally, this is a safe procedure. However, problems may occur, including:  · Infection.  · Bleeding.  · Allergic reactions to medicines.  · Damage to other structures or organs.  What happens before the  procedure?  · Ask your health care provider about:  ? Changing or stopping your regular medicines. This is especially important if you are taking diabetes medicines or blood thinners.  ? Taking medicines such as aspirin and ibuprofen. These medicines can thin your blood. Do not take these medicines unless your health care provider tells you to take them.  ? Taking over-the-counter medicines, vitamins, herbs, and supplements.  · Follow instructions from your health care provider about eating or drinking restrictions.  · Ask your health care provider what steps will be taken to help prevent infection. These may include:  ? Washing skin with a germ-killing soap.  ? Taking antibiotic medicine.  · You may have an exam or testing, such as:  ? X-rays of the bladder, urethra, or kidneys.  ? Urine tests to check for signs of infection.  · Plan to have someone take you home from the hospital or clinic.  What happens during the procedure?    · You will be given one or more of the following:  ? A medicine to help you relax (sedative).  ? A medicine to numb the area (local anesthetic).  · The area around the opening of your urethra will be cleaned.  · The cystoscope will be passed through your urethra into your bladder.  · Germ-free (sterile) fluid will flow through the cystoscope to fill your bladder. The fluid will stretch your bladder so that your health care provider can clearly examine your bladder walls.  · Your doctor will look at the urethra and bladder. Your doctor may take a biopsy or remove stones.  · The cystoscope will be removed, and your bladder will be emptied.  The procedure may vary among health care providers and hospitals.  What can I expect after the procedure?  After the procedure, it is common to have:  · Some soreness or pain in your abdomen and urethra.  · Urinary symptoms. These include:  ? Mild pain or burning when you urinate. Pain should stop within a few minutes after you urinate. This may last for  up to 1 week.  ? A small amount of blood in your urine for several days.  ? Feeling like you need to urinate but producing only a small amount of urine.  Follow these instructions at home:  Medicines  · Take over-the-counter and prescription medicines only as told by your health care provider.  · If you were prescribed an antibiotic medicine, take it as told by your health care provider. Do not stop taking the antibiotic even if you start to feel better.  General instructions  · Return to your normal activities as told by your health care provider. Ask your health care provider what activities are safe for you.  · Do not drive for 24 hours if you were given a sedative during your procedure.  · Watch for any blood in your urine. If the amount of blood in your urine increases, call your health care provider.  · Follow instructions from your health care provider about eating or drinking restrictions.  · If a tissue sample was removed for testing (biopsy) during your procedure, it is up to you to get your test results. Ask your health care provider, or the department that is doing the test, when your results will be ready.  · Drink enough fluid to keep your urine pale yellow.  · Keep all follow-up visits as told by your health care provider. This is important.  Contact a health care provider if you:  · Have pain that gets worse or does not get better with medicine, especially pain when you urinate.  · Have trouble urinating.  · Have more blood in your urine.  Get help right away if you:  · Have blood clots in your urine.  · Have abdominal pain.  · Have a fever or chills.  · Are unable to urinate.  Summary  · Cystoscopy is a procedure that is used to help diagnose and sometimes treat conditions that affect the lower urinary tract.  · Cystoscopy is done using a thin, tube-shaped instrument with a light and camera at the end.  · After the procedure, it is common to have some soreness or pain in your abdomen and  urethra.  · Watch for any blood in your urine. If the amount of blood in your urine increases, call your health care provider.  · If you were prescribed an antibiotic medicine, take it as told by your health care provider. Do not stop taking the antibiotic even if you start to feel better.  This information is not intended to replace advice given to you by your health care provider. Make sure you discuss any questions you have with your health care provider.  Document Released: 12/15/2001 Document Revised: 12/10/2019 Document Reviewed: 12/10/2019  Elsevier Patient Education © 2020 [employer]

## 2020-06-22 NOTE — PROGRESS NOTES
6/22/2020    Chief Complaint:    Chief Complaint   Patient presents with   • Consult     gross hematuria       History of Present Illness:  70 year old male who is here to establish care due to gross hematuria. He reports he first saw gross hematuria about 3-4 months ago - this was associated with a UTI. He states he has had 3 of them since having incident of gross hematuria. He was seen in ED and had indwelling Pope catheter placed on 6/11/20 due to UTI. He feels the UTI is associated with dill he had bought from [employer]. Urine culture showed staph and he was treated with Bactrim ds. CT urogram done on 6/3/20 shows he has a large right sided diverticulum and bladder out let obstruction. He was put on flomax 0.4 mg after catheter placement.   His stream he feels was good prior to catheter placement. He had no dysuria. Nocturia once a night. No history of kidney stones. No history of smoking or pelvic irradiation. No family history of prostate cancer. Last PSA of 1.22 on 6/18/20.   Past Medical History:  No past medical history on file.  Past Surgical History:  Past Surgical History:   Procedure Laterality Date   • ABDOMINAL SURGERY      anuerysm   • APPENDECTOMY     • HERNIA REPAIR      x 3      Social History:  Social History     Tobacco Use   • Smoking status: Never Smoker   • Smokeless tobacco: Never Used   Substance Use Topics   • Alcohol use: Not Currently     Family History:      No family history on file.  Allergies:  No Known Allergies  Medications:  Current Outpatient Medications   Medication Sig Dispense Refill   • tamsulosin (FLOMAX) 0.4 mg capsule Take 1 capsule (0.4 mg total) by mouth daily with dinner 30 capsule 0   • finasteride (PROSCAR) 5 mg tablet Take 1 tablet (5 mg total) by mouth daily 30 tablet 11     No current facility-administered medications for this visit.        REVIEW OF SYSTEMS:  Review of Systems   GENERAL/CONSTITUTIONAL:  No change in appetite.  Denies chills.  Energy level is good.   Denies fevers.  RESPIRATORY:  No shortness of breath.  CARDIOVASCULAR:  No chest pain.  GASTROINTESTINAL:  No constipation, diarrhea, nausea, or vomiting.  GENITOURINARY:  See HPI for details.    VITAL SIGNS:   temporal temperature is 36.4 °C (97.6 °F). His blood pressure is 123/72 and his pulse is 73. His oxygen saturation is 95%.     PHYSICAL EXAMINATION:  Physical Exam   GENERAL APPEARANCE:  Alert and oriented x3, pleasant, well nourished, well developed, in no acute distress male.  HEAD:  Normocephalic, atraumatic.  SKIN:  Normal, warm and dry.  HEART:  Regular rate and rhythm, no murmurs.  LUNGS:  Clear to auscultation bilaterally with normal respiratory effect.  ABDOMEN:  Soft, nontender, nondistended, no organomegaly.  Bowel sounds present. Bladder is nontender and non-palpable.  BACK:  No costovertebral angle tenderness, spine nontender to palpation.  GENITOURINARY: indwelling fuchs present draining yellow urine.   MUSCULOSKELETAL:  Normal.  EXTREMITIES:  No edema.  NEUROLOGIC:  Gait normal.    Lab Results   Component Value Date    PSA 1.22 06/18/2020       Lab Results   Component Value Date    COLORU Yellow 06/10/2020    CLARITYU Clear 06/10/2020    SPECGRAVU 1.015 06/10/2020    LEUKOCYTESU Large (A) 06/10/2020    NITRITEU Negative 06/10/2020    PROTUR Negative 06/10/2020    KETONESU Negative 06/10/2020    BILIRUBINU Negative 06/10/2020    BLOODU Trace-lysed (A) 06/10/2020    GLUCOSEU Negative 06/10/2020    SEBASTIÁN 7.0 06/10/2020    WBC 10.3 (H) 06/10/2020    RBC 4.58 06/10/2020           Ct Abdomen Pelvis W Iv Contrast No Oral Contrast    Result Date: 6/11/2020  Narrative: Exam: CT of the abdomen and pelvis with contrast from 06/10/2020 Clinical History:  Abdominal pain Comparison(s): 2/13/2020 Technique: Helical axial imaging was performed through the abdomen and pelvis after the intravenous administration of 140 cc of Isovue-370 and without. Five millimeter axial reconstructions and sagittal and coronal  reformations were constructed and reviewed. Dose Reduction Technique: Dose reduction technique utilized; automatic/anatomic modulation of X-ray tube current (Auto mA). Findings: Abdomen CT: Scattered subsegmental atelectasis in the lung bases. Motion blurs some of these images. No free air visualized. Accessory splenule. The liver, spleen, adrenals, gallbladder, pancreas have a normal and stable CT scan appearance. Hypodensities in the kidneys some too small to further characterize but the largest compatible with cyst stable since previous exam. Probable parapelvic cysts left kidney. Kidneys have an otherwise normal and stable CT scan appearance. Atherosclerotic change is seen in the aorta and some of its branches. Significant motion limits evaluation of some of the bowel and lack of oral contrast limits evaluation of some of the bowel. There is significant stool seen in the ascending colon. Levoscoliosis in the spine. Scattered mild degenerative change in the spine similar to previous study. Postoperative change consistent with previous hernia repair. Patient is status post appendectomy. Pelvis CT: Large bladder diverticulum is approximately equal in size to the bladder and extends off the posterior right side of the bladder. The bladder diverticulum and the bladder appear thick walled. Clinical correlation regarding possible cystitis suggested. Prostate is heterogeneous.     Impression: IMPRESSION: 1.  Linear subsegmental atelectasis in the lung bases. 2.  Stable hypodensities and cysts in the kidneys. 3.  Large bladder diverticulum similar in appearance to previous study. Both diverticulum and the bladder wall are thick-walled. Clinical correlation suggested regarding possibility of cystitis. 4.  Preliminary report given by virtual radiology. No significant discrepancy with the preliminary report given by virtual radiology soon after the  CT.    Ct Abdomen Pelvis Urogram With And Without Iv Contrast    Result  Date: 6/18/2020  Narrative: CT urogram with and without contrast  06/18/2020 Clinical history: Gross hematuria; Hematuria. Comparison(s): CT 6/10/2020 Technique: The patient was hydrated with 5 glasses of water.  Precontrast CT was performed from the level of the adrenal glands through the bladder. After intravenous administration of 10 mg of Lasix and 100 cc of Isovue-370, helical axial imaging was performed through the abdomen at the level of the kidneys after a 100 second delay. After 8-10 minutes, helical axial imaging was again performed from the level of the adrenal glands through the bladder.  0 cc's of contrast were discarded from a single use vial.  Multiplanar reformations were constructed and reviewed.   Dose reduction technique utilized; automatic/anatomic modulation of X-ray tube current (Auto mA). FINDINGS: CT abdomen: Lung bases are clear. Liver is normal. Gallbladder and common bile duct are normal. Visualized portions of the spleen, pancreas and adrenal glands are normal. Kidneys, collecting systems and proximal ureters are normal. Benign-appearing cyst in the left kidney. Bowel loops are normal. No mass, adenopathy or fluid collection. Postoperative changes in the anterior abdominal wall. CT pelvis: Distal ureters are normal. Pope catheter in the bladder. Large right posterior lateral bladder diverticulum. Bladder wall slightly thickened diffusely without mass suggesting changes from bladder outlet obstruction. Bowel loops are normal. No mass, adenopathy or fluid collection. Postoperative changes in the right inguinal region.     Impression: IMPRESSION: 1.  Large right posterior lateral bladder diverticulum. 2.  Bladder wall diffusely thickened suggesting changes from bladder outlet obstruction. No mass lesion. No renal stone.      Assessment and Plan:     Cesar was seen today for consult.    Diagnoses and all orders for this visit:    Urinary retention  -     finasteride (PROSCAR) 5 mg tablet;  "Take 1 tablet (5 mg total) by mouth daily    Gross hematuria  -     Ambulatory referral to Urology  -     Ambulatory referral to Urology  -     Cystoscopy; Future    Bladder diverticulum  -     Ambulatory referral to Urology    Patient here to establish care due gross hematuria associated with UTI. CT urogram shows large right bladder diverticulum and bladder thickening associated with Bladder outlet obstruction.   We discussed BPH, symptoms including frequent urination, urinating frequently at night, difficulty starting urination, having to urinate urgently, and having a weak urine stream. These symptoms typically appear slowly and get worse gradually over a period of years. We will go ahead and do a voiding trial today.    Patient was placed in supine position. 180 cc of sterile water was instilled into the bladder. Catheter balloon was completely deflated and intact. Catheter was removed without difficulty. Patient tolerated this well. Patient was given privacy and time to void. Patient was able to void 50 ml from bladder. Patient given option to be taught self intermittent catheterization or have catheter replaced. He opts to leave catheter out. If has problems with \"bloating\" he will go back to ED. We will put him on some Finasteride 5 mg along with Flomax 0.4 mg for now.    We will set patient up in the near furture for cystoscopy for further evaluation of gross hematuria. He will continue with as prescribed.       Seble Penny CNP   "

## 2020-08-18 ENCOUNTER — TELEPHONE (OUTPATIENT)
Dept: UROLOGY | Facility: CLINIC | Age: 70
End: 2020-08-18

## 2020-08-18 NOTE — TELEPHONE ENCOUNTER
Philip at 10:26 am on 8/18/20  Pt is calling to cancel his appt for tomorrow. He states that he owes us money and wants to pay that off before he makes another appt.  Please call back at [phone]  Thank you!

## 2020-10-12 ENCOUNTER — ANCILLARY PROCEDURE (OUTPATIENT)
Dept: RADIOLOGY | Facility: CLINIC | Age: 70
End: 2020-10-12
Payer: MEDICARE

## 2020-10-12 ENCOUNTER — OFFICE VISIT (OUTPATIENT)
Dept: URGENT CARE | Facility: CLINIC | Age: 70
End: 2020-10-12
Payer: MEDICARE

## 2020-10-12 ENCOUNTER — ANCILLARY PROCEDURE (OUTPATIENT)
Dept: ULTRASOUND IMAGING | Facility: CLINIC | Age: 70
End: 2020-10-12
Payer: MEDICARE

## 2020-10-12 VITALS
BODY MASS INDEX: 31.6 KG/M2 | SYSTOLIC BLOOD PRESSURE: 118 MMHG | HEART RATE: 81 BPM | TEMPERATURE: 98.8 F | OXYGEN SATURATION: 95 % | DIASTOLIC BLOOD PRESSURE: 66 MMHG | RESPIRATION RATE: 18 BRPM | WEIGHT: 233 LBS

## 2020-10-12 DIAGNOSIS — N30.01 ACUTE CYSTITIS WITH HEMATURIA: ICD-10-CM

## 2020-10-12 DIAGNOSIS — L03.115 CELLULITIS OF RIGHT LOWER EXTREMITY: Primary | ICD-10-CM

## 2020-10-12 DIAGNOSIS — R60.0 EDEMA OF RIGHT LOWER LEG: ICD-10-CM

## 2020-10-12 DIAGNOSIS — R31.9 HEMATURIA, UNSPECIFIED TYPE: ICD-10-CM

## 2020-10-12 LAB
ALBUMIN SERPL-MCNC: 3.7 G/DL (ref 3.5–5.3)
ALP SERPL-CCNC: 55 U/L (ref 45–115)
ALT SERPL-CCNC: 18 U/L (ref 7–52)
ANION GAP SERPL CALC-SCNC: 7 MMOL/L (ref 3–11)
AST SERPL-CCNC: 18 U/L
BACTERIA #/AREA URNS HPF: ABNORMAL /HPF
BASOPHILS # BLD AUTO: 0 10*3/UL
BASOPHILS NFR BLD AUTO: 1 % (ref 0–2)
BILIRUB SERPL-MCNC: 0.81 MG/DL (ref 0.2–1.4)
BILIRUB UR QL: NEGATIVE
BUN SERPL-MCNC: 15 MG/DL (ref 7–25)
CALCIUM ALBUM COR SERPL-MCNC: 9.4 MG/DL (ref 8.6–10.3)
CALCIUM SERPL-MCNC: 9.2 MG/DL (ref 8.6–10.3)
CHLORIDE SERPL-SCNC: 103 MMOL/L (ref 98–107)
CLARITY UR: ABNORMAL
CO2 SERPL-SCNC: 28 MMOL/L (ref 21–32)
COLOR UR: YELLOW
CREAT SERPL-MCNC: 0.94 MG/DL (ref 0.7–1.3)
CRP SERPL-MCNC: 123.1 MG/L
EOSINOPHIL # BLD AUTO: 0.1 10*3/UL
EOSINOPHIL NFR BLD AUTO: 1 % (ref 0–3)
ERYTHROCYTE [DISTWIDTH] IN BLOOD BY AUTOMATED COUNT: 13.6 % (ref 11.5–15)
ERYTHROCYTE [SEDIMENTATION RATE] IN BLOOD: 36 MM/HR
GFR SERPL CREATININE-BSD FRML MDRD: 81 ML/MIN/1.73M*2
GLUCOSE SERPL-MCNC: 178 MG/DL (ref 70–105)
GLUCOSE UR QL: NEGATIVE MG/DL
HCT VFR BLD AUTO: 44.2 % (ref 38–50)
HGB BLD-MCNC: 15 G/DL (ref 13.2–17.2)
HGB UR QL: ABNORMAL
KETONES UR-MCNC: NEGATIVE MG/DL
LEUKOCYTE ESTERASE UR QL STRIP: ABNORMAL
LYMPHOCYTES # BLD AUTO: 1 10*3/UL
LYMPHOCYTES NFR BLD AUTO: 13 % (ref 15–47)
MCH RBC QN AUTO: 31.1 PG (ref 29–34)
MCHC RBC AUTO-ENTMCNC: 33.9 G/DL (ref 32–36)
MCV RBC AUTO: 91.7 FL (ref 82–97)
MONOCYTES # BLD AUTO: 1.1 10*3/UL
MONOCYTES NFR BLD AUTO: 15 % (ref 5–13)
NEUTROPHILS # BLD AUTO: 5.4 10*3/UL
NEUTROPHILS NFR BLD AUTO: 71 % (ref 46–70)
NITRITE UR QL: NEGATIVE
PH UR: 6 PH
PLATELET # BLD AUTO: 219 10*3/UL (ref 130–350)
PMV BLD AUTO: 9 FL (ref 6.9–10.8)
POTASSIUM SERPL-SCNC: 3.7 MMOL/L (ref 3.5–5.1)
PROT SERPL-MCNC: 6.8 G/DL (ref 6–8.3)
PROT UR STRIP-MCNC: 30 MG/DL
RBC # BLD AUTO: 4.82 10*6/ΜL (ref 4.1–5.8)
RBC #/AREA URNS HPF: ABNORMAL /HPF
SODIUM SERPL-SCNC: 138 MMOL/L (ref 135–145)
SP GR UR: 1.01 (ref 1–1.03)
SQUAMOUS #/AREA URNS HPF: ABNORMAL /HPF
UROBILINOGEN UR-MCNC: 0.2 E.U./DL
WBC # BLD AUTO: 7.7 10*3/UL (ref 3.7–9.6)
WBC #/AREA URNS HPF: ABNORMAL /HPF
WBC CLUMPS #/AREA URNS HPF: ABNORMAL /HPF

## 2020-10-12 PROCEDURE — 85652 RBC SED RATE AUTOMATED: CPT | Performed by: NURSE PRACTITIONER

## 2020-10-12 PROCEDURE — 85025 COMPLETE CBC W/AUTO DIFF WBC: CPT | Performed by: NURSE PRACTITIONER

## 2020-10-12 PROCEDURE — 87088 URINE BACTERIA CULTURE: CPT | Performed by: NURSE PRACTITIONER

## 2020-10-12 PROCEDURE — 86140 C-REACTIVE PROTEIN: CPT | Performed by: NURSE PRACTITIONER

## 2020-10-12 PROCEDURE — 6360000200 HC RX 636 W HCPCS (ALT 250 FOR IP): Performed by: NURSE PRACTITIONER

## 2020-10-12 PROCEDURE — G0463 HOSPITAL OUTPT CLINIC VISIT: HCPCS | Performed by: NURSE PRACTITIONER

## 2020-10-12 PROCEDURE — 36415 COLL VENOUS BLD VENIPUNCTURE: CPT | Performed by: NURSE PRACTITIONER

## 2020-10-12 PROCEDURE — 80053 COMPREHEN METABOLIC PANEL: CPT | Performed by: NURSE PRACTITIONER

## 2020-10-12 PROCEDURE — 99214 OFFICE O/P EST MOD 30 MIN: CPT | Performed by: NURSE PRACTITIONER

## 2020-10-12 PROCEDURE — 93971 EXTREMITY STUDY: CPT | Mod: RT

## 2020-10-12 PROCEDURE — 73630 X-RAY EXAM OF FOOT: CPT | Mod: RT

## 2020-10-12 PROCEDURE — 73630 X-RAY EXAM OF FOOT: CPT | Mod: 26,RT | Performed by: RADIOLOGY

## 2020-10-12 PROCEDURE — 81003 URINALYSIS AUTO W/O SCOPE: CPT | Performed by: NURSE PRACTITIONER

## 2020-10-12 PROCEDURE — 93971 EXTREMITY STUDY: CPT | Mod: 26,RT | Performed by: RADIOLOGY

## 2020-10-12 RX ORDER — CEFTRIAXONE SODIUM 1 G/1
1000 INJECTION, POWDER, FOR SOLUTION INTRAMUSCULAR; INTRAVENOUS ONCE
Status: COMPLETED | OUTPATIENT
Start: 2020-10-12 | End: 2020-10-12

## 2020-10-12 RX ORDER — CEPHALEXIN 500 MG/1
500 CAPSULE ORAL 4 TIMES DAILY
Qty: 40 CAPSULE | Refills: 0 | Status: SHIPPED | OUTPATIENT
Start: 2020-10-12 | End: 2020-10-15 | Stop reason: ALTCHOICE

## 2020-10-12 RX ADMIN — CEFTRIAXONE SODIUM 1000 MG: 1 INJECTION, POWDER, FOR SOLUTION INTRAMUSCULAR; INTRAVENOUS at 10:23

## 2020-10-12 ASSESSMENT — ENCOUNTER SYMPTOMS
FEVER: 0
WOUND: 1
DIARRHEA: 0
SHORTNESS OF BREATH: 0
NAUSEA: 0
CHILLS: 0
HEMATURIA: 1

## 2020-10-12 NOTE — PROGRESS NOTES
Subjective      Cesar Yuen is a 70 y.o. male who presents for redness and swelling to right foot.    Patient states that he has redness and swelling in right foot and now going up to his calf for 2 days. Has a scab to top of right foot for over a month. Rates pain in RLE at 4/10.  Is able to bear weight on RLE. Denies injury.       History provided by:  Patient   used: No        The following have been reviewed and updated as appropriate in this visit:  Allergies  Meds  Problems  Med Hx  Surg Hx  Fam Hx         No Known Allergies  Current Outpatient Medications   Medication Sig Dispense Refill   • cephalexin (Keflex) 500 mg capsule Take 1 capsule (500 mg total) by mouth 4 (four) times a day for 10 days 40 capsule 0   • finasteride (PROSCAR) 5 mg tablet Take 1 tablet (5 mg total) by mouth daily (Patient not taking: Reported on 10/12/2020 ) 30 tablet 11     No current facility-administered medications for this visit.      History reviewed. No pertinent past medical history.  Past Surgical History:   Procedure Laterality Date   • ABDOMINAL SURGERY      anuerysm   • APPENDECTOMY     • HERNIA REPAIR      x 3     History reviewed. No pertinent family history.  Social History     Occupational History   • Occupation:    Tobacco Use   • Smoking status: Never Smoker   • Smokeless tobacco: Never Used   Substance and Sexual Activity   • Alcohol use: Not Currently   • Drug use: Not Currently   • Sexual activity: Defer   Social History Narrative   • Not on file       Review of Systems   Constitutional: Negative for chills and fever.   Respiratory: Negative for shortness of breath.    Cardiovascular: Negative for chest pain.   Gastrointestinal: Negative for diarrhea and nausea.   Genitourinary: Positive for hematuria (Reports he is seeing a provider in [place] for blood in his urine. ).   Skin: Positive for wound (scab to doral area of right foot approx 2 cm in diameter ).        Objective   /66   Pulse 81   Temp 37.1 °C (98.8 °F) (Temporal)   Resp 18   Wt 105.7 kg (233 lb)   SpO2 95%   BMI 31.60 kg/m²     Physical Exam  Constitutional:       Appearance: Normal appearance.   HENT:      Head: Normocephalic.   Cardiovascular:      Rate and Rhythm: Normal rate and regular rhythm.   Pulmonary:      Effort: Pulmonary effort is normal.      Breath sounds: Normal breath sounds.   Musculoskeletal:         General: Swelling and tenderness present.      Right knee: He exhibits swelling and erythema.      Right ankle: He exhibits swelling.      Right lower leg: Edema (+2 pitting edema to RLE, circumfrance is 44.5cm.  Compared with left at 41 cm.) present.      Left lower leg: No edema.        Legs:         Feet:    Feet:      Right foot:      Toenail Condition: Right toenails are abnormally thick.      Left foot:      Toenail Condition: Left toenails are abnormally thick.   Skin:     Capillary Refill: Capillary refill takes 2 to 3 seconds.      Findings: Erythema (RLE from knee to foot ) and wound present.      Comments: 2+ pitting edema to RLE from knee to foot.   Scab to dorsal aspect of right foot that is approximately 2 cm in diameter    Neurological:      Mental Status: He is alert and oriented to person, place, and time.             Assessment/Plan   Diagnoses and all orders for this visit:    Cellulitis of right lower extremity  -     cephalexin (Keflex) 500 mg capsule; Take 1 capsule (500 mg total) by mouth 4 (four) times a day for 10 days    Acute cystitis with hematuria    Edema of right lower leg  -     CBC w/auto differential Blood, Venous  -     Comprehensive metabolic panel Blood, Venous  -     Sedimentation rate, automated Blood, Venous  -     C-reactive protein (Inflammation) Blood, Venous  -     US Venous duplex lower extremity right  -     X-ray foot 3 or more views right  -     cefTRIAXone (ROCEPHIN) IM 1,000 mg  -     cephalexin (Keflex) 500 mg capsule; Take 1  capsule (500 mg total) by mouth 4 (four) times a day for 10 days    Hematuria, unspecified type  -     Urinalysis w/microscopic, reflex culture Urine, Clean Catch  -     Urinalysis, Dip (part 1 of panel) Urine, Clean Catch  -     Urinalysis, Micro (part 2 of panel) Urine, Clean Catch  -     Urine culture        Magalie Ferguson, CNP

## 2020-10-12 NOTE — PATIENT INSTRUCTIONS
Discussed plan of care with Dr Rhoades who agreed with starting Keflex with close follow up in 2 days.     Take Keflex as ordered 4 times per day for 10 days. Please take entire course of antibiotics.   This antibiotic will also treat your urinary tract infection.     If you develop fever greater than 100.4, increased pain, worsening redness or swelling please return to the clinic or local emergency department.       Patient Education     Cellulitis, Adult    Cellulitis is a skin infection. The infected area is often warm, red, swollen, and sore. It occurs most often in the arms and lower legs. It is very important to get treated for this condition.  What are the causes?  This condition is caused by bacteria. The bacteria enter through a break in the skin, such as a cut, burn, insect bite, open sore, or crack.  What increases the risk?  This condition is more likely to occur in people who:  · Have a weak body defense system (immune system).  · Have open cuts, burns, bites, or scrapes on the skin.  · Are older than 60 years of age.  · Have a blood sugar problem (diabetes).  · Have a long-lasting (chronic) liver disease (cirrhosis) or kidney disease.  · Are very overweight (obese).  · Have a skin problem, such as:  ? Itchy rash (eczema).  ? Slow movement of blood in the veins (venous stasis).  ? Fluid buildup below the skin (edema).  · Have been treated with high-energy rays (radiation).  · Use IV drugs.  What are the signs or symptoms?  Symptoms of this condition include:  · Skin that is:  ? Red.  ? Streaking.  ? Spotting.  ? Swollen.  ? Sore or painful when you touch it.  ? Warm.  · A fever.  · Chills.  · Blisters.  How is this diagnosed?  This condition is diagnosed based on:  · Medical history.  · Physical exam.  · Blood tests.  · Imaging tests.  How is this treated?  Treatment for this condition may include:  · Medicines to treat infections or allergies.  · Home care, such as:  ? Rest.  ? Placing cold or warm  cloths (compresses) on the skin.  · Hospital care, if the condition is very bad.  Follow these instructions at home:  Medicines  · Take over-the-counter and prescription medicines only as told by your doctor.  · If you were prescribed an antibiotic medicine, take it as told by your doctor. Do not stop taking it even if you start to feel better.  General instructions    · Drink enough fluid to keep your pee (urine) pale yellow.  · Do not touch or rub the infected area.  · Raise (elevate) the infected area above the level of your heart while you are sitting or lying down.  · Place cold or warm cloths on the area as told by your doctor.  · Keep all follow-up visits as told by your doctor. This is important.  Contact a doctor if:  · You have a fever.  · You do not start to get better after 1-2 days of treatment.  · Your bone or joint under the infected area starts to hurt after the skin has healed.  · Your infection comes back. This can happen in the same area or another area.  · You have a swollen bump in the area.  · You have new symptoms.  · You feel ill and have muscle aches and pains.  Get help right away if:  · Your symptoms get worse.  · You feel very sleepy.  · You throw up (vomit) or have watery poop (diarrhea) for a long time.  · You see red streaks coming from the area.  · Your red area gets larger.  · Your red area turns dark in color.  These symptoms may represent a serious problem that is an emergency. Do not wait to see if the symptoms will go away. Get medical help right away. Call your local emergency services (911 in the [place] Do not drive yourself to the hospital.  Summary  · Cellulitis is a skin infection. The area is often warm, red, swollen, and sore.  · This condition is treated with medicines, rest, and cold and warm cloths.  · Take all medicines only as told by your doctor.  · Tell your doctor if symptoms do not start to get better after 1-2 days of treatment.  This information is not intended  to replace advice given to you by your health care provider. Make sure you discuss any questions you have with your health care provider.  Document Released: 06/05/2009 Document Revised: 05/09/2019 Document Reviewed: 05/09/2019  Elsevier Patient Education © 2020 [employer]

## 2020-10-14 LAB — BACTERIA UR CULT: ABNORMAL

## 2020-10-15 ENCOUNTER — APPOINTMENT (OUTPATIENT)
Dept: CARDIOLOGY | Facility: CLINIC | Age: 70
End: 2020-10-15
Payer: MEDICARE

## 2020-10-15 ENCOUNTER — OFFICE VISIT (OUTPATIENT)
Dept: FAMILY MEDICINE | Facility: CLINIC | Age: 70
End: 2020-10-15
Payer: MEDICARE

## 2020-10-15 VITALS
HEART RATE: 62 BPM | TEMPERATURE: 98.3 F | SYSTOLIC BLOOD PRESSURE: 118 MMHG | WEIGHT: 237 LBS | DIASTOLIC BLOOD PRESSURE: 62 MMHG | BODY MASS INDEX: 32.14 KG/M2 | OXYGEN SATURATION: 95 %

## 2020-10-15 DIAGNOSIS — I49.9 CARDIAC ARRHYTHMIA, UNSPECIFIED CARDIAC ARRHYTHMIA TYPE: ICD-10-CM

## 2020-10-15 DIAGNOSIS — L03.115 CELLULITIS OF RIGHT FOOT: Primary | ICD-10-CM

## 2020-10-15 PROCEDURE — 99214 OFFICE O/P EST MOD 30 MIN: CPT | Mod: 25 | Performed by: FAMILY MEDICINE

## 2020-10-15 PROCEDURE — 93005 ELECTROCARDIOGRAM TRACING: CPT | Performed by: FAMILY MEDICINE

## 2020-10-15 PROCEDURE — G0463 HOSPITAL OUTPT CLINIC VISIT: HCPCS | Performed by: FAMILY MEDICINE

## 2020-10-15 RX ORDER — AMOXICILLIN AND CLAVULANATE POTASSIUM 875; 125 MG/1; MG/1
1 TABLET, FILM COATED ORAL 2 TIMES DAILY
Qty: 14 TABLET | Refills: 0 | Status: SHIPPED | OUTPATIENT
Start: 2020-10-15 | End: 2020-10-15 | Stop reason: SDUPTHER

## 2020-10-15 RX ORDER — AMOXICILLIN AND CLAVULANATE POTASSIUM 875; 125 MG/1; MG/1
1 TABLET, FILM COATED ORAL 2 TIMES DAILY
Qty: 14 TABLET | Refills: 0 | Status: SHIPPED | OUTPATIENT
Start: 2020-10-15 | End: 2020-10-22

## 2020-10-15 ASSESSMENT — PAIN SCALES - GENERAL: PAINLEVEL: 4

## 2020-10-15 NOTE — MEDICAL STUDENT
Cesar has been having redness and swelling of the right leg sicne Saturday 10/10.   He was seen on the 13th and prescribed Keflex for suspected cellulitis.  Today his swelling has increased.  The erythema has progressed further up the leg. He has an open wound on the dorsal foot.  He has no history of DM.       LLE 51.5       Vs 43.     35 ankle     Vs 27

## 2020-10-15 NOTE — PROGRESS NOTES
Subjective      HPI  Cesar Yuen is a 70 y.o. male who presents for follow-up on a recent urgent care visit for swelling of his right leg.  At that visit he did have an ultrasound to rule out DVT and also have labs.  His white count was not elevated but both CRP and sed rate were markedly elevated.  He was started on cephalexin and returns today for evaluation.  Unfortunately the swelling and redness has gotten worse despite the antibiotic.  He does have a wound on the dorsum of the foot that he thinks this initiated from.  That does drain and soaks his sock at times.      The following have been reviewed and updated as appropriate in this visit:    No Known Allergies  Current Outpatient Medications   Medication Sig Dispense Refill   • amoxicillin-pot clavulanate (AUGMENTIN) 875-125 mg per tablet Take 1 tablet by mouth 2 (two) times a day for 7 days 14 tablet 0   • finasteride (PROSCAR) 5 mg tablet Take 1 tablet (5 mg total) by mouth daily (Patient not taking: Reported on 10/12/2020 ) 30 tablet 11     No current facility-administered medications for this visit.      History reviewed. No pertinent past medical history.  Past Surgical History:   Procedure Laterality Date   • ABDOMINAL SURGERY      anuerysm   • APPENDECTOMY     • HERNIA REPAIR      x 3       Review of Systems    Objective   /62 (BP Location: Left arm, Patient Position: Sitting, Cuff Size: Large Adult)   Pulse 62   Temp 36.8 °C (98.3 °F) (Temporal)   Wt 107.5 kg (237 lb)   SpO2 95%   BMI 32.14 kg/m²     Physical Exam  Vitals signs and nursing note reviewed.   Constitutional:       Appearance: Normal appearance. He is well-developed.   HENT:      Head: Normocephalic and atraumatic.   Eyes:      Conjunctiva/sclera: Conjunctivae normal.      Pupils: Pupils are equal, round, and reactive to light.   Neck:      Thyroid: No thyromegaly.   Cardiovascular:      Rate and Rhythm: Normal rate. Rhythm irregular.      Heart sounds: Normal heart  sounds. No murmur. No friction rub. No gallop.    Pulmonary:      Effort: Pulmonary effort is normal.      Breath sounds: Normal breath sounds. No wheezing or rales.   Lymphadenopathy:      Cervical: No cervical adenopathy.   Skin:     General: Skin is warm and dry.      Comments: The right leg is erythematous to just below the knee.  Warm and tender to palpation.  Dime size lesion on the dorsum of his right foot.  Minimal drainage.  2-3+ edema to knee   Neurological:      Mental Status: He is alert.         Assessment/Plan   1. Cellulitis of right foot  By his report and from what I can see in the note from urgent care this has not improved and in fact is somewhat significantly worse.  He does not have any systemic symptoms at this point thankfully.  We will have him stop the cephalexin and switch him to Augmentin to improve coverage.  I explained to him that he should see improvement in the next few days.  I also encouraged him to keep this foot elevated higher than his hip to aid with the edema.    - amoxicillin-pot clavulanate (AUGMENTIN) 875-125 mg per tablet; Take 1 tablet by mouth 2 (two) times a day for 7 days  Dispense: 14 tablet; Refill: 0    2. Cardiac arrhythmia, unspecified cardiac arrhythmia type  He did have an irregularity noted to his heart rhythm today that I cannot find any evidence for previously.  EKG today was repeatedly read as atrial fibrillation however I do think that is an erroneous reading.  I will ask for cardiology over read on that but I think he ultimately would benefit from a Holter monitor as well for better clarification.  We will notify him of the results of the Holter monitor once available.    - ECG 12 lead  - Holter monitor 48 hour; Future      FELY BAILEY MD  10/15/20

## 2020-10-16 PROCEDURE — 93010 ELECTROCARDIOGRAM REPORT: CPT | Performed by: INTERNAL MEDICINE

## 2020-10-18 ENCOUNTER — APPOINTMENT (OUTPATIENT)
Dept: LAB | Facility: CLINIC | Age: 70
End: 2020-10-18
Payer: MEDICARE

## 2020-10-18 ENCOUNTER — OFFICE VISIT (OUTPATIENT)
Dept: URGENT CARE | Facility: CLINIC | Age: 70
End: 2020-10-18
Payer: MEDICARE

## 2020-10-18 VITALS
TEMPERATURE: 98 F | OXYGEN SATURATION: 93 % | SYSTOLIC BLOOD PRESSURE: 114 MMHG | HEART RATE: 78 BPM | DIASTOLIC BLOOD PRESSURE: 63 MMHG | RESPIRATION RATE: 18 BRPM

## 2020-10-18 DIAGNOSIS — L03.115 CELLULITIS OF RIGHT LOWER EXTREMITY: Primary | ICD-10-CM

## 2020-10-18 DIAGNOSIS — R60.9 SWELLING: ICD-10-CM

## 2020-10-18 LAB
ANION GAP SERPL CALC-SCNC: 8 MMOL/L (ref 3–11)
BASOPHILS # BLD AUTO: 0.1 10*3/UL
BASOPHILS NFR BLD AUTO: 1 % (ref 0–2)
BUN SERPL-MCNC: 15 MG/DL (ref 7–25)
CALCIUM SERPL-MCNC: 8.7 MG/DL (ref 8.6–10.3)
CHLORIDE SERPL-SCNC: 106 MMOL/L (ref 98–107)
CO2 SERPL-SCNC: 28 MMOL/L (ref 21–32)
CREAT SERPL-MCNC: 0.86 MG/DL (ref 0.7–1.3)
EOSINOPHIL # BLD AUTO: 0.1 10*3/UL
EOSINOPHIL NFR BLD AUTO: 2 % (ref 0–3)
ERYTHROCYTE [DISTWIDTH] IN BLOOD BY AUTOMATED COUNT: 13.3 % (ref 11.5–15)
GFR SERPL CREATININE-BSD FRML MDRD: 87 ML/MIN/1.73M*2
GLUCOSE SERPL-MCNC: 109 MG/DL (ref 70–105)
HCT VFR BLD AUTO: 39.2 % (ref 38–50)
HGB BLD-MCNC: 13.2 G/DL (ref 13.2–17.2)
LYMPHOCYTES # BLD AUTO: 1.6 10*3/UL
LYMPHOCYTES NFR BLD AUTO: 22 % (ref 15–47)
MCH RBC QN AUTO: 30.9 PG (ref 29–34)
MCHC RBC AUTO-ENTMCNC: 33.7 G/DL (ref 32–36)
MCV RBC AUTO: 91.8 FL (ref 82–97)
MONOCYTES # BLD AUTO: 0.9 10*3/UL
MONOCYTES NFR BLD AUTO: 13 % (ref 5–13)
NEUTROPHILS # BLD AUTO: 4.4 10*3/UL
NEUTROPHILS NFR BLD AUTO: 62 % (ref 46–70)
PLATELET # BLD AUTO: 380 10*3/UL (ref 130–350)
PMV BLD AUTO: 7.5 FL (ref 6.9–10.8)
POTASSIUM SERPL-SCNC: 4 MMOL/L (ref 3.5–5.1)
RBC # BLD AUTO: 4.27 10*6/ΜL (ref 4.1–5.8)
SODIUM SERPL-SCNC: 142 MMOL/L (ref 135–145)
WBC # BLD AUTO: 7.1 10*3/UL (ref 3.7–9.6)

## 2020-10-18 PROCEDURE — 85025 COMPLETE CBC W/AUTO DIFF WBC: CPT | Performed by: NURSE PRACTITIONER

## 2020-10-18 PROCEDURE — 80048 BASIC METABOLIC PNL TOTAL CA: CPT | Performed by: NURSE PRACTITIONER

## 2020-10-18 PROCEDURE — 36415 COLL VENOUS BLD VENIPUNCTURE: CPT | Performed by: NURSE PRACTITIONER

## 2020-10-18 PROCEDURE — 87205 SMEAR GRAM STAIN: CPT | Performed by: NURSE PRACTITIONER

## 2020-10-18 PROCEDURE — 99214 OFFICE O/P EST MOD 30 MIN: CPT | Performed by: NURSE PRACTITIONER

## 2020-10-18 PROCEDURE — G0463 HOSPITAL OUTPT CLINIC VISIT: HCPCS | Performed by: NURSE PRACTITIONER

## 2020-10-18 RX ORDER — DOXYCYCLINE 100 MG/1
100 CAPSULE ORAL 2 TIMES DAILY
Qty: 20 CAPSULE | Refills: 0 | Status: SHIPPED | OUTPATIENT
Start: 2020-10-18 | End: 2020-10-28

## 2020-10-18 NOTE — PATIENT INSTRUCTIONS
Patient Education     Cellulitis, Adult    Cellulitis is a skin infection. The infected area is often warm, red, swollen, and sore. It occurs most often in the arms and lower legs. It is very important to get treated for this condition.  What are the causes?  This condition is caused by bacteria. The bacteria enter through a break in the skin, such as a cut, burn, insect bite, open sore, or crack.  What increases the risk?  This condition is more likely to occur in people who:  · Have a weak body defense system (immune system).  · Have open cuts, burns, bites, or scrapes on the skin.  · Are older than 60 years of age.  · Have a blood sugar problem (diabetes).  · Have a long-lasting (chronic) liver disease (cirrhosis) or kidney disease.  · Are very overweight (obese).  · Have a skin problem, such as:  ? Itchy rash (eczema).  ? Slow movement of blood in the veins (venous stasis).  ? Fluid buildup below the skin (edema).  · Have been treated with high-energy rays (radiation).  · Use IV drugs.  What are the signs or symptoms?  Symptoms of this condition include:  · Skin that is:  ? Red.  ? Streaking.  ? Spotting.  ? Swollen.  ? Sore or painful when you touch it.  ? Warm.  · A fever.  · Chills.  · Blisters.  How is this diagnosed?  This condition is diagnosed based on:  · Medical history.  · Physical exam.  · Blood tests.  · Imaging tests.  How is this treated?  Treatment for this condition may include:  · Medicines to treat infections or allergies.  · Home care, such as:  ? Rest.  ? Placing cold or warm cloths (compresses) on the skin.  · Hospital care, if the condition is very bad.  Follow these instructions at home:  Medicines  · Take over-the-counter and prescription medicines only as told by your doctor.  · If you were prescribed an antibiotic medicine, take it as told by your doctor. Do not stop taking it even if you start to feel better.  General instructions    · Drink enough fluid to keep your pee (urine) pale  yellow.  · Do not touch or rub the infected area.  · Raise (elevate) the infected area above the level of your heart while you are sitting or lying down.  · Place cold or warm cloths on the area as told by your doctor.  · Keep all follow-up visits as told by your doctor. This is important.  Contact a doctor if:  · You have a fever.  · You do not start to get better after 1-2 days of treatment.  · Your bone or joint under the infected area starts to hurt after the skin has healed.  · Your infection comes back. This can happen in the same area or another area.  · You have a swollen bump in the area.  · You have new symptoms.  · You feel ill and have muscle aches and pains.  Get help right away if:  · Your symptoms get worse.  · You feel very sleepy.  · You throw up (vomit) or have watery poop (diarrhea) for a long time.  · You see red streaks coming from the area.  · Your red area gets larger.  · Your red area turns dark in color.  These symptoms may represent a serious problem that is an emergency. Do not wait to see if the symptoms will go away. Get medical help right away. Call your local emergency services (911 in the [place] Do not drive yourself to the hospital.  Summary  · Cellulitis is a skin infection. The area is often warm, red, swollen, and sore.  · This condition is treated with medicines, rest, and cold and warm cloths.  · Take all medicines only as told by your doctor.  · Tell your doctor if symptoms do not start to get better after 1-2 days of treatment.  This information is not intended to replace advice given to you by your health care provider. Make sure you discuss any questions you have with your health care provider.  Document Released: 06/05/2009 Document Revised: 05/09/2019 Document Reviewed: 05/09/2019  Elsevier Patient Education © 2020 [employer]

## 2020-10-18 NOTE — PROGRESS NOTES
Subjective      Cesar Yuen is a 70 y.o. male who presents for follow up for cellulitis.    HPI  Cesar notes that his redness and swelling of his right lower extremity has not improved since he was seen by Dr. Machado on 10/15/2020.    He denies feeling unwell with fever, chills, nausea, vomiting, or changes in appetite.   He does endorse tenderness to the right lower extremity.   He also is having some drainage from the small wound on the dorsal side of his right foot.     The following have been reviewed and updated as appropriate in this visit:  Allergies  Meds  Problems  Surg Hx  Fam Hx         Review of Systems See HPI    Objective   /63   Pulse 78   Temp 36.7 °C (98 °F) (Temporal)   Resp 18   SpO2 93%     Physical Exam  Vitals signs and nursing note reviewed.   Constitutional:       General: He is not in acute distress.     Appearance: He is well-developed.   HENT:      Head: Normocephalic.      Right Ear: External ear normal.      Left Ear: External ear normal.      Nose: Nose normal.   Neck:      Musculoskeletal: Normal range of motion and neck supple.   Cardiovascular:      Rate and Rhythm: Normal rate and regular rhythm.      Pulses: Normal pulses.      Heart sounds: Normal heart sounds.   Pulmonary:      Effort: Pulmonary effort is normal. No respiratory distress.      Breath sounds: Normal breath sounds.   Abdominal:      General: Bowel sounds are normal.      Palpations: Abdomen is soft.      Tenderness: There is no abdominal tenderness.   Skin:     General: Skin is warm and dry.      Capillary Refill: Capillary refill takes less than 2 seconds.      Findings: Erythema and wound present.      Comments: See below picture   Neurological:      Mental Status: He is alert and oriented to person, place, and time.   Psychiatric:         Behavior: Behavior normal.         Thought Content: Thought content normal.          No results found for this or any previous visit (from the past 24  hour(s)).    Assessment/Plan   Diagnoses and all orders for this visit:    Cellulitis of right lower extremity  -     Basic metabolic panel Blood, Venous  -     CBC w/auto differential Blood, Venous  -     Lesion culture w/stain  -     Postop shoe  -     doxycycline (MONODOX) 100 mg capsule; Take 1 capsule (100 mg total) by mouth 2 (two) times a day for 10 days In addition to Augmentin.    Swelling  -     Postop shoe      Will add doxycycline for possible MRSA coverage. Wound culture and labs obtained as well.    ACE compression added to right lower extremity. With compression, his shoe did not fit. Therefore he was provided with a post op shoe to protect his infected extremity.     Keep follow up appointment with Dr. Machado on 10/20.    Handout on cellulitis provided.     Sanam Mcdaniel DNP

## 2020-10-20 ENCOUNTER — OFFICE VISIT (OUTPATIENT)
Dept: FAMILY MEDICINE | Facility: CLINIC | Age: 70
End: 2020-10-20
Payer: MEDICARE

## 2020-10-20 VITALS
SYSTOLIC BLOOD PRESSURE: 100 MMHG | HEART RATE: 79 BPM | BODY MASS INDEX: 32.47 KG/M2 | OXYGEN SATURATION: 97 % | DIASTOLIC BLOOD PRESSURE: 62 MMHG | TEMPERATURE: 97 F | WEIGHT: 239.4 LBS

## 2020-10-20 DIAGNOSIS — L03.115 CELLULITIS OF RIGHT LEG: Primary | ICD-10-CM

## 2020-10-20 PROCEDURE — 99213 OFFICE O/P EST LOW 20 MIN: CPT | Performed by: FAMILY MEDICINE

## 2020-10-20 PROCEDURE — G0463 HOSPITAL OUTPT CLINIC VISIT: HCPCS | Performed by: FAMILY MEDICINE

## 2020-10-20 ASSESSMENT — PAIN SCALES - GENERAL: PAINLEVEL: 2

## 2020-10-20 NOTE — PROGRESS NOTES
Subjective      HPI  Cesar Yuen is a 70 y.o. male who presents for f/u on recent cellulitis of the right lower leg. We switched him to Augmentin last week and he was back in UC on Sunday as it had not improved and  and Doxycycline was added for MRSA coverage.  He feels like it is slightly improved.  He did not get the Doxy started until yesterday morning though. He has also been wrapping the leg.       The following have been reviewed and updated as appropriate in this visit:    No Known Allergies  Current Outpatient Medications   Medication Sig Dispense Refill   • doxycycline (MONODOX) 100 mg capsule Take 1 capsule (100 mg total) by mouth 2 (two) times a day for 10 days In addition to Augmentin. 20 capsule 0   • amoxicillin-pot clavulanate (AUGMENTIN) 875-125 mg per tablet Take 1 tablet by mouth 2 (two) times a day for 7 days 14 tablet 0   • finasteride (PROSCAR) 5 mg tablet Take 1 tablet (5 mg total) by mouth daily (Patient not taking: Reported on 10/12/2020 ) 30 tablet 11     No current facility-administered medications for this visit.      History reviewed. No pertinent past medical history.  Past Surgical History:   Procedure Laterality Date   • ABDOMINAL SURGERY      anuerysm   • APPENDECTOMY     • HERNIA REPAIR      x 3       Review of Systems    Objective   /62 (BP Location: Left arm, Patient Position: Sitting, Cuff Size: Regular Adult)   Pulse 79   Temp 36.1 °C (97 °F) (Temporal)   Wt 108.6 kg (239 lb 6.4 oz)   SpO2 97%   BMI 32.47 kg/m²     Physical Exam  Constitutional:       Appearance: Normal appearance.   HENT:      Head: Normocephalic and atraumatic.   Cardiovascular:      Rate and Rhythm: Normal rate. Rhythm irregular.      Heart sounds: No murmur. No friction rub. No gallop.    Pulmonary:      Effort: Pulmonary effort is normal.      Breath sounds: Normal breath sounds.   Skin:     General: Skin is warm and dry.      Findings: Erythema (R mid calf. and 3+ pitting edema.) and lesion  (R foot dorsum. 2x3 cm.  Serosanguinous drainage) present.   Neurological:      Mental Status: He is alert.         Assessment/Plan   1. Cellulitis of right leg  Cesar appears to have improved from UC visit.  The erythema of the right leg is down to midcalf, swelling has decreased, and the wound on the dorsum of his right foot looks like it is drying out and healing.  Considering he has only taken the Doxy for <24 hours, this is encouraging and recommend he continue his current regimen of Augmentin + Doxy.  He is advised to finish his course of antibiotics completely and is to let us know if he does not continue to improve.      FELY BAILEY MD  10/20/20

## 2020-10-22 ENCOUNTER — ANCILLARY PROCEDURE (OUTPATIENT)
Dept: CARDIOLOGY | Facility: CLINIC | Age: 70
End: 2020-10-22
Payer: MEDICARE

## 2020-10-22 ENCOUNTER — OFFICE VISIT (OUTPATIENT)
Dept: FAMILY MEDICINE | Facility: CLINIC | Age: 70
End: 2020-10-22
Payer: MEDICARE

## 2020-10-22 VITALS
HEIGHT: 73 IN | WEIGHT: 235 LBS | HEART RATE: 96 BPM | OXYGEN SATURATION: 95 % | SYSTOLIC BLOOD PRESSURE: 92 MMHG | DIASTOLIC BLOOD PRESSURE: 58 MMHG | TEMPERATURE: 97.6 F | BODY MASS INDEX: 31.14 KG/M2

## 2020-10-22 DIAGNOSIS — R60.9 EDEMA, UNSPECIFIED TYPE: Primary | ICD-10-CM

## 2020-10-22 LAB
BACTERIA ISLT CULT: NORMAL
GRAM STN SPEC: NORMAL

## 2020-10-22 PROCEDURE — 99213 OFFICE O/P EST LOW 20 MIN: CPT | Performed by: FAMILY MEDICINE

## 2020-10-22 PROCEDURE — G0463 HOSPITAL OUTPT CLINIC VISIT: HCPCS | Performed by: FAMILY MEDICINE

## 2020-10-22 PROCEDURE — 93226 XTRNL ECG REC<48 HR SCAN A/R: CPT

## 2020-10-22 RX ORDER — FUROSEMIDE 40 MG/1
40 TABLET ORAL DAILY
Qty: 5 TABLET | Refills: 0 | Status: SHIPPED | OUTPATIENT
Start: 2020-10-22 | End: 2020-10-27 | Stop reason: ALTCHOICE

## 2020-10-22 ASSESSMENT — PAIN SCALES - GENERAL: PAINLEVEL: 2

## 2020-10-22 NOTE — PROGRESS NOTES
"Subjective      HPI  Cesar Yuen is a 70 y.o. male who presents for follow-up on cellulitis his right lower extremity.  He had a wound on the dorsum of the foot and erythema and swelling into the foreleg now for 12 days.  He was initially started on cephalexin after a dose of ceftriaxone in urgent care and we subsequently switched him to Augmentin 3 days later when he had no further improvement.  He was back in urgent care 3 days later and doxycycline was added to cover MRSA.  I had seen him 2 days ago he thought this was improving but it only been on the doxycycline for 1 day so we did not make any changes at that time.  He returns today feeling like there has been no change since we last saw him.      The following have been reviewed and updated as appropriate in this visit:    No Known Allergies  Current Outpatient Medications   Medication Sig Dispense Refill   • doxycycline (MONODOX) 100 mg capsule Take 1 capsule (100 mg total) by mouth 2 (two) times a day for 10 days In addition to Augmentin. 20 capsule 0   • amoxicillin-pot clavulanate (AUGMENTIN) 875-125 mg per tablet Take 1 tablet by mouth 2 (two) times a day for 7 days 14 tablet 0   • finasteride (PROSCAR) 5 mg tablet Take 1 tablet (5 mg total) by mouth daily (Patient not taking: Reported on 10/12/2020 ) 30 tablet 11     No current facility-administered medications for this visit.      History reviewed. No pertinent past medical history.  Past Surgical History:   Procedure Laterality Date   • ABDOMINAL SURGERY      anuerysm   • APPENDECTOMY     • HERNIA REPAIR      x 3       Review of Systems    Objective   BP 92/58 (BP Location: Left arm, Patient Position: Sitting, Cuff Size: Large Adult)   Pulse 96   Temp 36.4 °C (97.6 °F) (Temporal)   Ht 1.854 m (6' 1\")   Wt 106.6 kg (235 lb)   SpO2 95%   BMI 31.00 kg/m²     Physical Exam  Vitals signs and nursing note reviewed.   Constitutional:       Appearance: Normal appearance.   HENT:      Head: " Normocephalic and atraumatic.   Musculoskeletal:      Right lower leg: Edema present.      Left lower leg: Edema present.      Comments: Right lower extremity: He does still have 2+ pitting edema and erythema of the foreleg.  The foot does have decreased swelling and the skin is not tense.  The wound on the dorsum of the foot is improved both in size and depth.   Neurological:      Mental Status: He is alert.         Assessment/Plan   1. Edema, unspecified type  I do think that his leg overall has improved in the last 2 days.  He certainly has less swelling although certainly not back to normal.  He also has 1+ edema on the left and I think just needs a diuretic to help decompress the swelling.  He is to finish out the doxycycline and will do a 5-day course of Lasix as below.  I did caution him about the risk of orthostasis with this.  If he does not see marked improvement or complete resolution he is to let me know    - furosemide (Lasix) 40 mg tablet; Take 1 tablet (40 mg total) by mouth daily for 5 days  Dispense: 5 tablet; Refill: 0      FELY BAILEY MD  10/22/20

## 2020-10-25 ENCOUNTER — ANCILLARY PROCEDURE (OUTPATIENT)
Dept: RADIOLOGY | Facility: CLINIC | Age: 70
End: 2020-10-25
Payer: MEDICARE

## 2020-10-25 ENCOUNTER — OFFICE VISIT (OUTPATIENT)
Dept: URGENT CARE | Facility: CLINIC | Age: 70
End: 2020-10-25
Payer: MEDICARE

## 2020-10-25 ENCOUNTER — TELEPHONE (OUTPATIENT)
Dept: FAMILY MEDICINE | Facility: CLINIC | Age: 70
End: 2020-10-25

## 2020-10-25 VITALS
BODY MASS INDEX: 31.4 KG/M2 | TEMPERATURE: 99.5 F | SYSTOLIC BLOOD PRESSURE: 127 MMHG | WEIGHT: 238 LBS | OXYGEN SATURATION: 93 % | DIASTOLIC BLOOD PRESSURE: 58 MMHG | RESPIRATION RATE: 18 BRPM | HEART RATE: 127 BPM

## 2020-10-25 DIAGNOSIS — D72.829 LEUKOCYTOSIS, UNSPECIFIED TYPE: ICD-10-CM

## 2020-10-25 DIAGNOSIS — N30.00 ACUTE CYSTITIS WITHOUT HEMATURIA: ICD-10-CM

## 2020-10-25 DIAGNOSIS — R33.9 URINARY RETENTION: Primary | ICD-10-CM

## 2020-10-25 LAB
ALBUMIN SERPL-MCNC: 3.6 G/DL (ref 3.5–5.3)
ALP SERPL-CCNC: 58 U/L (ref 45–115)
ALT SERPL-CCNC: 18 U/L (ref 7–52)
ANION GAP SERPL CALC-SCNC: 10 MMOL/L (ref 3–11)
AST SERPL-CCNC: 20 U/L
BACTERIA #/AREA URNS HPF: ABNORMAL /HPF
BASOPHILS # BLD AUTO: 0 10*3/UL
BASOPHILS NFR BLD AUTO: 0 % (ref 0–2)
BILIRUB SERPL-MCNC: 1.74 MG/DL (ref 0.2–1.4)
BILIRUB UR QL: NEGATIVE
BUN SERPL-MCNC: 28 MG/DL (ref 7–25)
CALCIUM ALBUM COR SERPL-MCNC: 10 MG/DL (ref 8.6–10.3)
CALCIUM SERPL-MCNC: 9.7 MG/DL (ref 8.6–10.3)
CHLORIDE SERPL-SCNC: 98 MMOL/L (ref 98–107)
CLARITY UR: ABNORMAL
CO2 SERPL-SCNC: 24 MMOL/L (ref 21–32)
COLOR UR: ABNORMAL
CREAT SERPL-MCNC: 1.32 MG/DL (ref 0.7–1.3)
EOSINOPHIL # BLD AUTO: 0 10*3/UL
EOSINOPHIL NFR BLD AUTO: 0 % (ref 0–3)
ERYTHROCYTE [DISTWIDTH] IN BLOOD BY AUTOMATED COUNT: 13.9 % (ref 11.5–15)
GFR SERPL CREATININE-BSD FRML MDRD: 54 ML/MIN/1.73M*2
GLUCOSE SERPL-MCNC: 150 MG/DL (ref 70–105)
GLUCOSE UR QL: NEGATIVE MG/DL
HCT VFR BLD AUTO: 42 % (ref 38–50)
HGB BLD-MCNC: 14 G/DL (ref 13.2–17.2)
HGB UR QL: ABNORMAL
KETONES UR-MCNC: ABNORMAL MG/DL
LACTATE BLDV-SCNC: 1.33 MMOL/L (ref 0.5–1.99)
LEUKOCYTE ESTERASE UR QL STRIP: ABNORMAL
LYMPHOCYTES # BLD AUTO: 1.3 10*3/UL
LYMPHOCYTES NFR BLD AUTO: 7 % (ref 15–47)
MCH RBC QN AUTO: 30.5 PG (ref 29–34)
MCHC RBC AUTO-ENTMCNC: 33.2 G/DL (ref 32–36)
MCV RBC AUTO: 91.8 FL (ref 82–97)
MONOCYTES # BLD AUTO: 2.1 10*3/UL
MONOCYTES NFR BLD AUTO: 12 % (ref 5–13)
NEUTROPHILS # BLD AUTO: 14.5 10*3/UL
NEUTROPHILS NFR BLD AUTO: 81 % (ref 46–70)
NITRITE UR QL: NEGATIVE
PH UR: 5.5 PH
PLATELET # BLD AUTO: 370 10*3/UL (ref 130–350)
PMV BLD AUTO: 7.7 FL (ref 6.9–10.8)
POTASSIUM SERPL-SCNC: 3.4 MMOL/L (ref 3.5–5.1)
PROT SERPL-MCNC: 7.1 G/DL (ref 6–8.3)
PROT UR STRIP-MCNC: 30 MG/DL
RBC # BLD AUTO: 4.58 10*6/ΜL (ref 4.1–5.8)
RBC #/AREA URNS HPF: ABNORMAL /HPF
SODIUM SERPL-SCNC: 132 MMOL/L (ref 135–145)
SP GR UR: 1.02 (ref 1–1.03)
SPECIMEN VOL ?TM UR: 999 ML
SPECIMEN VOL ?TM UR: 999 ML
SQUAMOUS #/AREA URNS HPF: ABNORMAL /HPF
UROBILINOGEN UR-MCNC: 0.2 E.U./DL
WBC # BLD AUTO: 17.9 10*3/UL (ref 3.7–9.6)
WBC #/AREA URNS HPF: ABNORMAL /HPF
WBC CLUMPS #/AREA URNS HPF: ABNORMAL /HPF

## 2020-10-25 PROCEDURE — 74018 RADEX ABDOMEN 1 VIEW: CPT

## 2020-10-25 PROCEDURE — 81001 URINALYSIS AUTO W/SCOPE: CPT | Performed by: NURSE PRACTITIONER

## 2020-10-25 PROCEDURE — 83605 ASSAY OF LACTIC ACID: CPT | Performed by: NURSE PRACTITIONER

## 2020-10-25 PROCEDURE — 36415 COLL VENOUS BLD VENIPUNCTURE: CPT | Performed by: NURSE PRACTITIONER

## 2020-10-25 PROCEDURE — 51798 US URINE CAPACITY MEASURE: CPT | Performed by: NURSE PRACTITIONER

## 2020-10-25 PROCEDURE — 87088 URINE BACTERIA CULTURE: CPT | Performed by: NURSE PRACTITIONER

## 2020-10-25 PROCEDURE — 74018 RADEX ABDOMEN 1 VIEW: CPT | Mod: 26 | Performed by: RADIOLOGY

## 2020-10-25 PROCEDURE — 6360000200 HC RX 636 W HCPCS (ALT 250 FOR IP): Performed by: PHYSICIAN ASSISTANT

## 2020-10-25 PROCEDURE — G0463 HOSPITAL OUTPT CLINIC VISIT: HCPCS | Performed by: NURSE PRACTITIONER

## 2020-10-25 PROCEDURE — 80053 COMPREHEN METABOLIC PANEL: CPT | Performed by: NURSE PRACTITIONER

## 2020-10-25 PROCEDURE — 99214 OFFICE O/P EST MOD 30 MIN: CPT | Performed by: NURSE PRACTITIONER

## 2020-10-25 PROCEDURE — 85025 COMPLETE CBC W/AUTO DIFF WBC: CPT | Performed by: NURSE PRACTITIONER

## 2020-10-25 RX ORDER — CIPROFLOXACIN 500 MG/1
500 TABLET ORAL 2 TIMES DAILY
Qty: 10 TABLET | Refills: 0 | Status: SHIPPED | OUTPATIENT
Start: 2020-10-25 | End: 2020-10-30

## 2020-10-25 RX ORDER — TAMSULOSIN HYDROCHLORIDE 0.4 MG/1
0.4 CAPSULE ORAL
Qty: 10 CAPSULE | Refills: 0 | Status: SHIPPED | OUTPATIENT
Start: 2020-10-25 | End: 2020-10-27 | Stop reason: SDUPTHER

## 2020-10-25 RX ORDER — SODIUM CHLORIDE 9 MG/ML
1000 INJECTION, SOLUTION INTRAVENOUS ONCE
Status: COMPLETED | OUTPATIENT
Start: 2020-10-25 | End: 2020-10-25

## 2020-10-25 RX ORDER — CEFTRIAXONE SODIUM 1 G/1
1000 INJECTION, POWDER, FOR SOLUTION INTRAMUSCULAR; INTRAVENOUS ONCE
Status: COMPLETED | OUTPATIENT
Start: 2020-10-25 | End: 2020-10-25

## 2020-10-25 RX ADMIN — CEFTRIAXONE SODIUM 1000 MG: 1 INJECTION, POWDER, FOR SOLUTION INTRAMUSCULAR; INTRAVENOUS at 17:00

## 2020-10-25 RX ADMIN — SODIUM CHLORIDE 1000 ML: 9 INJECTION, SOLUTION INTRAVENOUS at 15:48

## 2020-10-25 ASSESSMENT — ENCOUNTER SYMPTOMS
CONSTIPATION: 0
SORE THROAT: 0
DIARRHEA: 0
ACTIVITY CHANGE: 1
DIFFICULTY URINATING: 1
SINUS PRESSURE: 0
TROUBLE SWALLOWING: 0
SHORTNESS OF BREATH: 0
FATIGUE: 0
HEMATURIA: 0
DIZZINESS: 0
NECK PAIN: 0
PALPITATIONS: 0
FEVER: 0
VOMITING: 0
COUGH: 0
NAUSEA: 0
LIGHT-HEADEDNESS: 0
ABDOMINAL DISTENTION: 1
NECK STIFFNESS: 0
HEADACHES: 0
WEAKNESS: 0
APPETITE CHANGE: 1
FREQUENCY: 0
SINUS PAIN: 0
BACK PAIN: 1
NUMBNESS: 0
CHOKING: 0

## 2020-10-25 NOTE — PROGRESS NOTES
Subjective      Cesar Yuen is a 70 y.o. male who presents for Urinary retention ongoing for 3 days.     HPI  Patient  presents for urinary retention ongoing for 3 days.  He states he is able to produce a small amount of urine when standing in front of the commode.  He relates few dribbles only.  He has been using a depends and finds that when he just relaxes he is able to produce  Urine  but not a significant amount.  He complains of loss of appetite.  He states he has not been drinking as much fluids as of late.  He just completed a round trip bus trip to [place].  He has been with much sitting and believes that his back pain may be attributed to the sitting and travel.    Review of his chart and noted to be with urinary retention in June. He was provided urinary catheter at that time and then asked to follow up with urology. A referral was placed. Patient says he did not go as he couldn't afford this intervention. He was also provided finasteride and Flomax in June and he says he has been using them.  He was provided with a CT scan of his abdomen in June.  And did find bladder diverticulum at that time.    Also noted in his chart is that he has been being seen for a cellulitis.  He was originally prescribed Keflex.  Later Bactrim was added to the regiment and doxycycline.  He has been prescribed a furosemide 40 mg daily for 5 days.  He is poor report of his medications.  When asked about finasteride and Flomax he questions whether or not that is the water pill.      The following have been reviewed and updated as appropriate in this visit:         No Known Allergies  Current Outpatient Medications   Medication Sig Dispense Refill   • doxycycline (MONODOX) 100 mg capsule Take 1 capsule (100 mg total) by mouth 2 (two) times a day for 10 days In addition to Augmentin. 20 capsule 0   • tamsulosin (FLOMAX) 0.4 mg capsule Take 1 capsule (0.4 mg total) by mouth daily with dinner for 10 days 10 capsule 0   •  ciprofloxacin (Cipro) 500 mg tablet Take 1 tablet (500 mg total) by mouth 2 (two) times a day for 5 days 10 tablet 0   • furosemide (Lasix) 40 mg tablet Take 1 tablet (40 mg total) by mouth daily for 5 days (Patient not taking: Reported on 10/25/2020 ) 5 tablet 0   • finasteride (PROSCAR) 5 mg tablet Take 1 tablet (5 mg total) by mouth daily (Patient not taking: Reported on 10/12/2020 ) 30 tablet 11     Current Facility-Administered Medications   Medication Dose Route Frequency Provider Last Rate Last Admin   • sodium chloride 0.9 % bolus 1,000 mL  1,000 mL intravenous Once Jeri Ackerman  mL/hr at 10/25/20 1548 1,000 mL at 10/25/20 1548     Past Medical History:   Diagnosis Date   • Edema     BLE     Past Surgical History:   Procedure Laterality Date   • ABDOMINAL SURGERY      anuerysm   • APPENDECTOMY     • HERNIA REPAIR      x 3     No family history on file.  Social History     Occupational History   • Occupation:    Tobacco Use   • Smoking status: Never Smoker   • Smokeless tobacco: Never Used   Substance and Sexual Activity   • Alcohol use: Not Currently   • Drug use: Not Currently   • Sexual activity: Defer   Social History Narrative   • Not on file       Review of Systems   Constitutional: Positive for activity change and appetite change. Negative for fatigue and fever.   HENT: Negative for congestion, sinus pressure, sinus pain, sore throat and trouble swallowing.    Respiratory: Negative for cough, choking and shortness of breath.    Cardiovascular: Negative for chest pain and palpitations.   Gastrointestinal: Positive for abdominal distention. Negative for constipation, diarrhea, nausea and vomiting.   Genitourinary: Positive for decreased urine volume and difficulty urinating. Negative for discharge, frequency, hematuria, testicular pain and urgency.   Musculoskeletal: Positive for back pain. Negative for neck pain and neck stiffness.   Skin: Negative for rash.   Neurological: Negative  for dizziness, weakness, light-headedness, numbness and headaches.       Objective   /80   Pulse (!) 144   Temp 36.7 °C (98 °F) (Temporal)   Resp 20   Wt 108 kg (238 lb)   SpO2 93%   BMI 31.40 kg/m²     Physical Exam  Constitutional:       Appearance: Normal appearance.   HENT:      Head: Normocephalic and atraumatic.   Eyes:      General:         Right eye: No discharge.         Left eye: No discharge.      Extraocular Movements: Extraocular movements intact.   Neck:      Musculoskeletal: Normal range of motion and neck supple. No neck rigidity.   Cardiovascular:      Rate and Rhythm: Tachycardia present.      Comments: Heart rate slowed after initial presentation and with resolution of bladder distention  Abdominal:      General: There is distension.      Palpations: Abdomen is soft. There is no mass.      Tenderness: There is abdominal tenderness. There is no right CVA tenderness, left CVA tenderness or rebound.      Hernia: No hernia is present.      Comments: Initially bladder distention prior to catheterization.  2600 mL genaro-colored urine initially withdrawn.   Genitourinary:     Penis: Normal.       Scrotum/Testes: Normal.   Musculoskeletal: Normal range of motion.   Skin:     General: Skin is warm and dry.      Comments: Cellulitis right lower extremity.  Erythema, swelling, heat extending from just below the knee and encompasses the entire top of the foot.  1 small dime sized lesion on top of foot.  Cellular debris present in center.  Not necrotic.   Neurological:      General: No focal deficit present.      Mental Status: He is alert.      Cranial Nerves: No cranial nerve deficit.   Psychiatric:         Mood and Affect: Mood normal.         Behavior: Behavior normal.         Recent Results (from the past 24 hour(s))   POCT Bladder Scan    Collection Time: 10/25/20 12:40 PM   Result Value Ref Range    Urine Volume 999 mL    Urine Volume 999 mL   Urinalysis, Dip (part 1 of panel) Urine, Clean  Catch    Collection Time: 10/25/20  1:19 PM    Specimen: Urine, Clean Catch   Result Value Ref Range    Color, Urine Dark Yellow (A) Yellow    Clarity, Urine Cloudy (A) Clear    pH, Urine 5.5 5.0 - 8.0 PH    Specific Gravity, Urine 1.020 1.003 - 1.030    Protein, Urine 30  (A) Negative mg/dL    Glucose, Urine Negative Negative mg/dL    Ketones, Urine Trace (A) Negative mg/dL    Blood, Urine Large (A) Negative    Nitrite, Urine Negative Negative    Bilirubin, Urine Negative Negative    Leukocytes, Urine Small (A) Negative    Urobilinogen, Urine 0.2 <2.0 E.U./dL   Urinalysis, Micro (part 2 of panel) Urine, Clean Catch    Collection Time: 10/25/20  1:19 PM    Specimen: Urine, Clean Catch   Result Value Ref Range    RBC, Urine  (A) None seen, 0-2, Negative /HPF    WBC, Urine  (A) 0 - 4 /HPF    WBC Clumps, Urine Few (A) None seen /HPF    Squamous Epithelial, Urine 0-4 None Seen-9 /HPF    Bacteria, Urine None seen None seen, Few /HPF   CBC w/auto differential Blood, Venous    Collection Time: 10/25/20  1:31 PM   Result Value Ref Range    WBC 17.9 (H) 3.7 - 9.6 10*3/uL    RBC 4.58 4.10 - 5.80 10*6/µL    Hemoglobin 14.0 13.2 - 17.2 g/dL    Hematocrit 42.0 38.0 - 50.0 %    MCV 91.8 82.0 - 97.0 fL    MCH 30.5 29.0 - 34.0 pg    MCHC 33.2 32.0 - 36.0 g/dL    RDW 13.9 11.5 - 15.0 %    Platelets 370 (H) 130 - 350 10*3/uL    MPV 7.7 6.9 - 10.8 fL    Neutrophils% 81 (H) 46 - 70 %    Lymphocytes% 7 (L) 15 - 47 %    Monocytes% 12 5 - 13 %    Eosinophils% 0 0 - 3 %    Basophils% 0 0 - 2 %    Neutrophils Absolute 14.50 10*3/uL    Lymphocytes Absolute 1.30 10*3/uL    Monocytes Absolute 2.10 10*3/uL    Eosinophils Absolute 0.00 10*3/uL    Basophils Absolute 0.00 10*3/uL   Comprehensive metabolic panel Blood, Venous    Collection Time: 10/25/20  1:31 PM   Result Value Ref Range    Sodium 132 (L) 135 - 145 mmol/L    Potassium 3.4 (L) 3.5 - 5.1 mmol/L    Chloride 98 98 - 107 mmol/L    CO2 24 21 - 32 mmol/L    Anion Gap 10 3 - 11  mmol/L    BUN 28 (H) 7 - 25 mg/dL    Creatinine 1.32 (H) 0.70 - 1.30 mg/dL    Glucose 150 (H) 70 - 105 mg/dL    Calcium 9.7 8.6 - 10.3 mg/dL    AST 20 <40 U/L    ALT (SGPT) 18 7 - 52 U/L    Alkaline Phosphatase 58 45 - 115 U/L    Total Protein 7.1 6.0 - 8.3 g/dL    Albumin 3.6 3.5 - 5.3 g/dL    Total Bilirubin 1.74 (H) 0.20 - 1.40 mg/dL    eGFR 54 (L) >60 mL/min/1.73m*2    Corrected Calcium 10.0 8.6 - 10.3 mg/dL   POCT lactic acid (lactate) Blood, Venous    Collection Time: 10/25/20  3:13 PM   Result Value Ref Range    POC Lactate 1.33 0.50 - 1.99 mmol/L       Assessment/Plan   Diagnoses and all orders for this visit:    Urinary retention  -     Urinalysis w/microscopic, reflex culture Urine, Clean Catch  -     Urinalysis, Dip (part 1 of panel) Urine, Clean Catch  -     Urinalysis, Micro (part 2 of panel) Urine, Clean Catch  -     POCT Bladder Scan  -     X-ray kidneys ureters bladder  -     CBC w/auto differential Blood, Venous; Future  -     Comprehensive metabolic panel Blood, Venous; Future  -     Ambulatory referral to Urology; Future  -     tamsulosin (FLOMAX) 0.4 mg capsule; Take 1 capsule (0.4 mg total) by mouth daily with dinner for 10 days  -     Urine culture  -     Bladder Installation  -     POCT lactic acid (lactate) Blood, Venous; Future  -     POCT lactic acid (lactate) Blood, Venous  -     sodium chloride 0.9 % bolus 1,000 mL    Leukocytosis, unspecified type  -     Lactic acid, plasma Blood, Venous; Future  -     POCT lactic acid (lactate) Blood, Venous; Future  -     POCT lactic acid (lactate) Blood, Venous  -     sodium chloride 0.9 % bolus 1,000 mL  -     ciprofloxacin (Cipro) 500 mg tablet; Take 1 tablet (500 mg total) by mouth 2 (two) times a day for 5 days    Provided urinary catheter, initial void was 2600  ml.  Teresita-colored urine.  Catheter met with no resistance.  Provided NS IVF 1000 ml Bolus  Provided KUB: Constipation. Gas dilation    CBC: elevated WBC, plates and nuetrophils  CMP: Low  sodium and potassium- dehydration  Lactic Acid: Normal  Heart rate 142; wearing a Holter monitor.  Noted to be for irregular heartbeat. Holter monitor turned into nursing staff  Patient states that cellulitis of the right lower extremity is improved.    Patient Instructions   Diagnosed with urinary retention.  And with urinary tract infection: Drained 2600 mL from the bladder.  Urinary catheter indwelling placed and patient discharged with it in place. Provided Cipro- antibiotic- as per Dr. Machado via on call consult.   Urinalysis: blood in urine, protein and ketones indicates infection. Will be sent for culture.   CBC complete blood count:  Elevated WBC  CMP complete metabolic panel: Showing some dehydration  Lactic acid is normal today.   Abdominal x-ray KUB, radiologist read gas only      Follow-up with Dr. Machado in the next 1 to 2 days.  Continue taking Flomax and finasteride as prescribed.  Prescription at the local pharmacy for Flomax as it was not listed in your medications    Also diagnosed with constipation; increase fluids and activity.  Please purchase over-the-counter magnesium citrate.  One 8 ounce bottle.  Take 4 ounces and if no bowel movement is produced within 3 to 4 hours then take the other 4 ounces.  Please purchase over-the-counter stool softener.      Jeri Ackerman, CNP

## 2020-10-25 NOTE — Clinical Note
Please review Cesar's chart.  Unable to get an appointment with you this week, so advised to present to  on Tuesday when you work [facility].  Will have him follow up with you.  He currently has [facility] Catheter in place.

## 2020-10-25 NOTE — PROGRESS NOTES
Bladder Installation    Date/Time: 10/25/2020 2:48 PM  Performed by: Britta Chau RN  Authorized by: Jeri Ackerman CNP     Consent  Verbal consent was obtained from the patient. Written consent was not obtained from the patient. Risks, benefits, and alternatives were discussed. Consent given by patient. The patient states understanding of the procedure being performed. Patient ID confirmed verbally with the patient.       Indications:     Indications:  Other    Other indication::  Urinary retenction  Procedure Details:     Preparation: Patient was prepped and draped in usual sterile fashion      Catheter type:  Coude    Catheter size:  16 Fr    Complicated insertion: No      Altered anatomy: No      Bladder irrigation: No      Number of attempts:  1  Post-procedure:     Patient tolerance:  Patient tolerated the procedure well with no immediate complications  Procedure comments: Unable to document on flow sheet as previous fuchs documentation has not been removed.  Witness RUBIN ReyesN.  2605 urine output

## 2020-10-25 NOTE — PATIENT INSTRUCTIONS
Diagnosed with urinary retention and with urinary tract infection: Drained 2600 mL from the bladder.  Urinary catheter indwelling placed and patient discharged with it in place. Provided Cipro- antibiotic- as per Dr. Machado via on call consult.     Advised to begin Ciprofloxacin - 1 tablet in AM and PM x 5 days.      Urinalysis: blood in urine, protein and ketones indicates infection. Will be sent for culture.   CBC complete blood count:  Elevated WBC  CMP complete metabolic panel: Showing some dehydration  Lactic acid is normal today.   Abdominal x-ray KUB, radiologist read gas only.    Follow-up with Dr. Machado in the next 1 to 2 days.  Continue taking Flomax and finasteride as prescribed.  Prescription at the local pharmacy for Flomax as it was not listed in your medications    Also diagnosed with constipation; increase fluids and activity.  Please purchase over-the-counter magnesium citrate.  One 8 ounce bottle.  Take 4 ounces and if no bowel movement is produced within 3 to 4 hours then take the other 4 ounces.  Please purchase over-the-counter stool softener.    Addendum:  Noted purulent discharge from [facility].  Due to elevated WBC and discharge, decision was made to given 1 gram Rocephin while in office today.  Reviewed final radiology report with Cesar - mild gaseous distention of bowel.  He may get some benefit from Gas X - follow package instructions.      Advised to follow up on Tuesday to see Dr. Machado, who is working in [facility] that day.  No appointment needed - walk in appointments only.

## 2020-10-25 NOTE — TELEPHONE ENCOUNTER
Caller complained that he is unable to void and his lower back hurts.  He was advised to proceed to urgent care in [place].  He stated he would do so.

## 2020-10-27 ENCOUNTER — APPOINTMENT (OUTPATIENT)
Dept: LAB | Facility: CLINIC | Age: 70
End: 2020-10-27
Payer: MEDICARE

## 2020-10-27 ENCOUNTER — OFFICE VISIT (OUTPATIENT)
Dept: URGENT CARE | Facility: CLINIC | Age: 70
End: 2020-10-27
Payer: MEDICARE

## 2020-10-27 VITALS
WEIGHT: 233 LBS | HEART RATE: 58 BPM | BODY MASS INDEX: 30.74 KG/M2 | TEMPERATURE: 98.5 F | OXYGEN SATURATION: 94 % | DIASTOLIC BLOOD PRESSURE: 60 MMHG | SYSTOLIC BLOOD PRESSURE: 104 MMHG

## 2020-10-27 DIAGNOSIS — I48.92 ATRIAL FLUTTER WITH RAPID VENTRICULAR RESPONSE (CMS/HCC): Primary | ICD-10-CM

## 2020-10-27 DIAGNOSIS — N40.1 URINARY RETENTION DUE TO BENIGN PROSTATIC HYPERPLASIA: ICD-10-CM

## 2020-10-27 DIAGNOSIS — R33.8 URINARY RETENTION DUE TO BENIGN PROSTATIC HYPERPLASIA: ICD-10-CM

## 2020-10-27 DIAGNOSIS — N30.00 ACUTE CYSTITIS WITHOUT HEMATURIA: ICD-10-CM

## 2020-10-27 DIAGNOSIS — R79.89 ELEVATED SERUM CREATININE: ICD-10-CM

## 2020-10-27 LAB
ALBUMIN SERPL-MCNC: 3.3 G/DL (ref 3.5–5.3)
ALP SERPL-CCNC: 49 U/L (ref 45–115)
ALT SERPL-CCNC: 18 U/L (ref 7–52)
ANION GAP SERPL CALC-SCNC: 8 MMOL/L (ref 3–11)
AST SERPL-CCNC: 17 U/L
BACTERIA UR CULT: NORMAL
BASOPHILS # BLD AUTO: 0.1 10*3/UL
BASOPHILS NFR BLD AUTO: 1 % (ref 0–2)
BILIRUB SERPL-MCNC: 0.49 MG/DL (ref 0.2–1.4)
BUN SERPL-MCNC: 23 MG/DL (ref 7–25)
CALCIUM ALBUM COR SERPL-MCNC: 9.4 MG/DL (ref 8.6–10.3)
CALCIUM SERPL-MCNC: 8.8 MG/DL (ref 8.6–10.3)
CHLORIDE SERPL-SCNC: 104 MMOL/L (ref 98–107)
CO2 SERPL-SCNC: 25 MMOL/L (ref 21–32)
CREAT SERPL-MCNC: 0.81 MG/DL (ref 0.7–1.3)
EOSINOPHIL # BLD AUTO: 0.1 10*3/UL
EOSINOPHIL NFR BLD AUTO: 1 % (ref 0–3)
ERYTHROCYTE [DISTWIDTH] IN BLOOD BY AUTOMATED COUNT: 13.4 % (ref 11.5–15)
GFR SERPL CREATININE-BSD FRML MDRD: 89 ML/MIN/1.73M*2
GLUCOSE SERPL-MCNC: 110 MG/DL (ref 70–105)
HCT VFR BLD AUTO: 39 % (ref 38–50)
HGB BLD-MCNC: 13 G/DL (ref 13.2–17.2)
LYMPHOCYTES # BLD AUTO: 1.9 10*3/UL
LYMPHOCYTES NFR BLD AUTO: 26 % (ref 15–47)
MCH RBC QN AUTO: 30.6 PG (ref 29–34)
MCHC RBC AUTO-ENTMCNC: 33.3 G/DL (ref 32–36)
MCV RBC AUTO: 92 FL (ref 82–97)
MONOCYTES # BLD AUTO: 0.9 10*3/UL
MONOCYTES NFR BLD AUTO: 13 % (ref 5–13)
NEUTROPHILS # BLD AUTO: 4.3 10*3/UL
NEUTROPHILS NFR BLD AUTO: 59 % (ref 46–70)
PLATELET # BLD AUTO: 288 10*3/UL (ref 130–350)
PMV BLD AUTO: 8 FL (ref 6.9–10.8)
POTASSIUM SERPL-SCNC: 3.5 MMOL/L (ref 3.5–5.1)
PROT SERPL-MCNC: 6.3 G/DL (ref 6–8.3)
RBC # BLD AUTO: 4.24 10*6/ΜL (ref 4.1–5.8)
SODIUM SERPL-SCNC: 137 MMOL/L (ref 135–145)
WBC # BLD AUTO: 7.2 10*3/UL (ref 3.7–9.6)

## 2020-10-27 PROCEDURE — 80053 COMPREHEN METABOLIC PANEL: CPT

## 2020-10-27 PROCEDURE — 99214 OFFICE O/P EST MOD 30 MIN: CPT | Performed by: FAMILY MEDICINE

## 2020-10-27 PROCEDURE — 36415 COLL VENOUS BLD VENIPUNCTURE: CPT

## 2020-10-27 PROCEDURE — 93227 XTRNL ECG REC<48 HR R&I: CPT | Performed by: INTERNAL MEDICINE

## 2020-10-27 PROCEDURE — G0463 HOSPITAL OUTPT CLINIC VISIT: HCPCS | Performed by: FAMILY MEDICINE

## 2020-10-27 PROCEDURE — 85025 COMPLETE CBC W/AUTO DIFF WBC: CPT

## 2020-10-27 RX ORDER — METOPROLOL SUCCINATE 25 MG/1
25 TABLET, EXTENDED RELEASE ORAL DAILY
Qty: 30 TABLET | Refills: 3 | Status: SHIPPED | OUTPATIENT
Start: 2020-10-27 | End: 2021-01-16

## 2020-10-27 RX ORDER — TAMSULOSIN HYDROCHLORIDE 0.4 MG/1
0.4 CAPSULE ORAL
Qty: 90 CAPSULE | Refills: 1 | Status: SHIPPED | OUTPATIENT
Start: 2020-10-27 | End: 2020-11-06 | Stop reason: WASHOUT

## 2020-10-27 NOTE — PROGRESS NOTES
Subjective      HPI  Cesar Yuen is a 70 y.o. male who presents for follow-up on his visit yesterday for urinary retention and UTI.  I had seen him late last week for his cellulitis in his lower leg and this was improving but he still had edema.  We had started him on some furosemide at that time to improve the edema.  Then over the weekend he had trouble with emptying his bladder and came in to the urgent care where he was found to have over 2 L of urine retained.  At that point a urinary catheter was placed and he was advised to follow-up today.    In addition when I had initially seen him on the 15th he was seemingly having an irregular heart rate.  EKG at that point was ultimately read as atrial flutter and a Holter monitor was placed.  This has now come back as being predominantly atrial flutter that is poorly rate controlled.      The following have been reviewed and updated as appropriate in this visit:    No Known Allergies  Current Outpatient Medications   Medication Sig Dispense Refill   • ciprofloxacin (Cipro) 500 mg tablet Take 1 tablet (500 mg total) by mouth 2 (two) times a day for 5 days 10 tablet 0   • doxycycline (MONODOX) 100 mg capsule Take 1 capsule (100 mg total) by mouth 2 (two) times a day for 10 days In addition to Augmentin. 20 capsule 0   • tamsulosin (FLOMAX) 0.4 mg capsule Take 1 capsule (0.4 mg total) by mouth daily with dinner for 10 days (Patient not taking: Reported on 10/27/2020 ) 10 capsule 0   • furosemide (Lasix) 40 mg tablet Take 1 tablet (40 mg total) by mouth daily for 5 days (Patient not taking: Reported on 10/25/2020 ) 5 tablet 0   • finasteride (PROSCAR) 5 mg tablet Take 1 tablet (5 mg total) by mouth daily (Patient not taking: Reported on 10/12/2020 ) 30 tablet 11     No current facility-administered medications for this visit.      Past Medical History:   Diagnosis Date   • Edema     BLE     Past Surgical History:   Procedure Laterality Date   • ABDOMINAL SURGERY       anuerysm   • APPENDECTOMY     • HERNIA REPAIR      x 3       Review of Systems    Objective   /60 (BP Location: Left arm, Patient Position: Sitting, Cuff Size: Large Adult)   Pulse 58   Temp 36.9 °C (98.5 °F) (Temporal)   Wt 105.7 kg (233 lb)   SpO2 94%   BMI 30.74 kg/m²     Physical Exam  Vitals signs and nursing note reviewed.   Constitutional:       Appearance: Normal appearance. He is well-developed.   HENT:      Head: Normocephalic and atraumatic.   Eyes:      Conjunctiva/sclera: Conjunctivae normal.      Pupils: Pupils are equal, round, and reactive to light.   Neck:      Thyroid: No thyromegaly.   Cardiovascular:      Rate and Rhythm: Normal rate. Rhythm irregularly irregular.      Heart sounds: Normal heart sounds. No murmur. No friction rub. No gallop.    Pulmonary:      Effort: Pulmonary effort is normal.      Breath sounds: Normal breath sounds. No wheezing or rales.   Musculoskeletal:      Right lower leg: Edema (2+) present.      Left lower leg: Edema (1+) present.   Lymphadenopathy:      Cervical: No cervical adenopathy.   Skin:     General: Skin is warm and dry.      Comments: Erythema of the foreleg and dorsum of the foot is significantly improved.  The wound on the dorsum of the foot is near completely reepithelialized.   Neurological:      Mental Status: He is alert.         Assessment/Plan   1. Acute cystitis without hematuria  Urine culture has now come back negative after 48 hours.  I think this is not consistent with an infection.    2. Elevated serum creatinine  This was most likely due to the urinary retention and ureteral reflux.  Creatinine is back to normal today.    - Comprehensive metabolic panel Blood, Venous; Future    3. Urinary retention due to benign prostatic hyperplasia  We will have him continue to use the catheter through Thursday morning.  At that point he is to come in to have nursing remove the catheter and will see him then Thursday afternoon to see if he was able  to void and check a bladder scan if needed.  In the interim he is to start the tamsulosin.    - CBC w/auto differential Blood, Venous; Future  - tamsulosin (FLOMAX) 0.4 mg capsule; Take 1 capsule (0.4 mg total) by mouth daily with dinner for 10 days  Dispense: 90 capsule; Refill: 1    4. Atrial flutter with rapid ventricular response   His Holter monitor did show predominantly atrial flutter with rapid ventricular response typically in the 120s.  This is a new diagnosis so we will start him on metoprolol as below.  He is a bit hypotensive so we will have to be cautious with dosing.  We will also start him on Eliquis as below.  We will get him set up for an echo as well.  Will follow up on this at his visit on Thursday.    - apixaban (ELIQUIS) 5 mg tablet; Take 1 tablet (5 mg total) by mouth 2 (two) times a day  Dispense: 60 tablet; Refill: 2  - metoprolol succinate XL (TOPROL-XL) 25 mg 24 hr tablet; Take 1 tablet (25 mg total) by mouth daily  Dispense: 30 tablet; Refill: 3  - US Echo complete w saline; Future      FELY BAILEY MD  10/27/20

## 2020-10-27 NOTE — PATIENT INSTRUCTIONS
1.  Finish out antibiotics    2.  Start tamsulosin 0.4mg for prostate    3.  Start Metoprolol 25mg daily to slow your heart rate    4.  Start Eliquis 5mg AM and PM to keep your blood from clotting    5.  Come to clinic Thursday AM to have the catheter removed, come back in the afternoon if you cannot empty your bladder

## 2020-10-29 ENCOUNTER — OFFICE VISIT (OUTPATIENT)
Dept: FAMILY MEDICINE | Facility: CLINIC | Age: 70
End: 2020-10-29
Payer: MEDICARE

## 2020-10-29 ENCOUNTER — CLINICAL SUPPORT (OUTPATIENT)
Dept: URGENT CARE | Facility: CLINIC | Age: 70
End: 2020-10-29
Payer: MEDICARE

## 2020-10-29 VITALS
HEART RATE: 64 BPM | OXYGEN SATURATION: 95 % | DIASTOLIC BLOOD PRESSURE: 70 MMHG | RESPIRATION RATE: 16 BRPM | TEMPERATURE: 97.9 F | SYSTOLIC BLOOD PRESSURE: 122 MMHG

## 2020-10-29 VITALS
HEART RATE: 103 BPM | BODY MASS INDEX: 30.99 KG/M2 | TEMPERATURE: 98 F | HEIGHT: 73 IN | SYSTOLIC BLOOD PRESSURE: 102 MMHG | OXYGEN SATURATION: 96 % | DIASTOLIC BLOOD PRESSURE: 52 MMHG | WEIGHT: 233.8 LBS

## 2020-10-29 DIAGNOSIS — N30.00 ACUTE CYSTITIS WITHOUT HEMATURIA: ICD-10-CM

## 2020-10-29 DIAGNOSIS — I48.92 ATRIAL FLUTTER WITH RAPID VENTRICULAR RESPONSE (CMS/HCC): Primary | ICD-10-CM

## 2020-10-29 PROCEDURE — 99214 OFFICE O/P EST MOD 30 MIN: CPT | Performed by: FAMILY MEDICINE

## 2020-10-29 PROCEDURE — G0463 HOSPITAL OUTPT CLINIC VISIT: HCPCS | Performed by: FAMILY MEDICINE

## 2020-10-29 ASSESSMENT — PAIN SCALES - GENERAL: PAINLEVEL: 2

## 2020-10-29 NOTE — PROGRESS NOTES
Procedures  Per Dr. Machado patient to have fuchs catheter removed this am and has a follow up appointment this afternoon with provider. Catheter removed with clear genaro urine .

## 2020-10-29 NOTE — PROGRESS NOTES
"Subjective      HPI  Cesar Yuen is a 70 y.o. male who presents for follow up on his urinary retention.   He has appointment with urology in 2 weeks.  He had the catheter removed this morning and has not had the urge to go since than.       His R foot is not hurting like it was before, but is still swollen and peeling.         The following have been reviewed and updated as appropriate in this visit:    No Known Allergies  Current Outpatient Medications   Medication Sig Dispense Refill   • apixaban (ELIQUIS) 5 mg tablet Take 1 tablet (5 mg total) by mouth 2 (two) times a day 60 tablet 2   • metoprolol succinate XL (TOPROL-XL) 25 mg 24 hr tablet Take 1 tablet (25 mg total) by mouth daily 30 tablet 3   • tamsulosin (FLOMAX) 0.4 mg capsule Take 1 capsule (0.4 mg total) by mouth daily with dinner for 10 days 90 capsule 1   • ciprofloxacin (Cipro) 500 mg tablet Take 1 tablet (500 mg total) by mouth 2 (two) times a day for 5 days 10 tablet 0   • finasteride (PROSCAR) 5 mg tablet Take 1 tablet (5 mg total) by mouth daily (Patient not taking: Reported on 10/12/2020 ) 30 tablet 11     No current facility-administered medications for this visit.      Past Medical History:   Diagnosis Date   • Edema     BLE     Past Surgical History:   Procedure Laterality Date   • ABDOMINAL SURGERY      anuerysm   • APPENDECTOMY     • HERNIA REPAIR      x 3       Review of Systems    Objective   /52 (BP Location: Left arm, Patient Position: Sitting, Cuff Size: Large Adult)   Pulse 103   Temp 36.7 °C (98 °F) (Temporal)   Ht 1.854 m (6' 1\")   Wt 106.1 kg (233 lb 12.8 oz)   SpO2 96%   BMI 30.85 kg/m²     Physical Exam  Vitals signs and nursing note reviewed.   Constitutional:       Appearance: He is well-developed.   HENT:      Head: Normocephalic and atraumatic.   Eyes:      Conjunctiva/sclera: Conjunctivae normal.      Pupils: Pupils are equal, round, and reactive to light.   Neck:      Thyroid: No thyromegaly. "   Cardiovascular:      Rate and Rhythm: Normal rate. Rhythm irregularly irregular.      Heart sounds: Normal heart sounds. No murmur. No friction rub. No gallop.    Pulmonary:      Effort: Pulmonary effort is normal.      Breath sounds: Normal breath sounds. No wheezing or rales.   Musculoskeletal:      Right lower leg: Edema (2+ R foot up to calf ) present.      Left lower leg: Edema (1+) present.   Lymphadenopathy:      Cervical: No cervical adenopathy.   Skin:     General: Skin is warm and dry.   Neurological:      Mental Status: He is alert.         Assessment/Plan      1. Atrial flutter with rapid ventricular response (CMS/HCC) Legs/feet should continue to get better with time, I think much of his edema is due to his cardiac condition.   His rate is improved but he remains hypotensive but asymptomatic.  We will hold off on increasing his beta-blockade for now.  He is anticoagulated with Eliquis.  No changes made to medication at this time.  I would like to get patient into see cardiology, to have them on board with everything that is going on with patient.   If possible would like to do this at the same time he has his echo.        - Ambulatory referral to Cardiology; Future    2. Acute cystitis without hematuria  -    Biggest thing at this time is to have him empty his bladder.   If he has not voided by 5 pm tonight he is to come back to clinic for catheter placement.   Patient advised to drink plenty of fluids.  If we do need to replace the catheter I would recommend that this stays in until his visit with urology on the 18th of November.      Portions of this note was documented by Lorena Park as I performed the exam and collected the information from Cesar Yuen. I attest that I have reviewed the information as documented, verified the accuracy of the documentation and added additional information as needed.       FELY BAILEY MD  10/30/20

## 2020-10-30 ENCOUNTER — CLINICAL SUPPORT (OUTPATIENT)
Dept: FAMILY MEDICINE | Facility: CLINIC | Age: 70
End: 2020-10-30
Payer: MEDICARE

## 2020-10-30 VITALS
BODY MASS INDEX: 31.56 KG/M2 | OXYGEN SATURATION: 95 % | WEIGHT: 233 LBS | HEIGHT: 72 IN | HEART RATE: 85 BPM | TEMPERATURE: 96.9 F | DIASTOLIC BLOOD PRESSURE: 62 MMHG | SYSTOLIC BLOOD PRESSURE: 138 MMHG

## 2020-10-30 DIAGNOSIS — R33.9 URINARY RETENTION: Primary | ICD-10-CM

## 2020-10-30 PROCEDURE — 51702 INSERT TEMP BLADDER CATH: CPT

## 2020-10-30 ASSESSMENT — PAIN SCALES - GENERAL: PAINLEVEL: 0-NO PAIN

## 2020-10-30 NOTE — PROGRESS NOTES
Bladder Catheterization    Date/Time: 10/30/2020 8:38 AM  Performed by: Britta Chau RN  Authorized by: Balbir Machado MD     Consent  Verbal consent was obtained from the patient. Written consent was not obtained from the patient. Risks, benefits, and alternatives were discussed. Consent given by patient. The patient states understanding of the procedure being performed. Patient ID confirmed verbally with the patient and provided demographic data.       Indications:     Indications: urinary retention    Procedure Details:     Preparation: Patient was prepped and draped in usual sterile fashion      Catheter insertion:  Indwelling    Catheter type:  Coude    Catheter size:  16 Fr    Complicated insertion: No      Altered anatomy: No      Number of attempts:  1    Volume of urine drained (mL):  1400    Urine characteristics:  Bloody  Post-procedure:     Patient tolerance:  Patient tolerated the procedure well with no immediate complications  Procedure comments: Provider notified

## 2020-11-05 ENCOUNTER — ANCILLARY PROCEDURE (OUTPATIENT)
Dept: CARDIOLOGY | Facility: CLINIC | Age: 70
End: 2020-11-05
Payer: MEDICARE

## 2020-11-05 VITALS — WEIGHT: 233 LBS | BODY MASS INDEX: 31.56 KG/M2 | HEIGHT: 72 IN

## 2020-11-05 DIAGNOSIS — I48.92 ATRIAL FLUTTER WITH RAPID VENTRICULAR RESPONSE (CMS/HCC): ICD-10-CM

## 2020-11-05 LAB
ASCENDING AORTA: 4.16 CM
AV LVOT PEAK GRADIENT: 4.41 MMHG
AV MEAN GRADIENT: 3.74 MMHG
AV PEAK GRADIENT: 5.86 MMHG
BSA FOR ECHO PROCEDURE: 2.32 M2
DOP CALC AO PEAK VEL: 1.21 M/S
DOP CALC AO VTI: 23.8 CM
DOP CALC LVOT AREA: 5.31 CM2
DOP CALC LVOT DIAMETER: 2.6 CM
DOP CALC LVOT PEAK VEL: 105 CM/S
DOP CALC LVOT STROKE VOLUME: 106 CM3
DOP CALC MV VTI: 15.24 CM
DOP CALC RVOT PEAK VEL: 0.7 M/S
DOP CALCLVOT PEAK VEL VTI: 19.9 CM
E/E' RATIO (AVERAGE): 6.1
E/E' RATIO: 5.7
ECHO EF ESTIMATED: 55 %
EJECTION FRACTION: 46 %
ERAP: 5 MMHG
INTERVENTRICULAR SEPTUM: 1 CM (ref 0.6–1.1)
IVC PROX: 1.11 CM
LA AREA A4C SYSTOLE: 97.4 CM3
LEFT ATRIUM SIZE: 3.82 CM
LEFT ATRIUM VOLUME INDEX: 42 ML/M2
LEFT ATRIUM VOLUME: 95.7 CM3
LEFT INTERNAL DIMENSION IN SYSTOLE: 3.6 CM (ref 4.17–6.32)
LEFT VENTRICLE DIASTOLIC VOLUME: 114 CM3
LEFT VENTRICLE SYSTOLIC VOLUME: 61 CM3
LEFT VENTRICULAR INTERNAL DIMENSION IN DIASTOLE: 4.8 CM (ref 7.2–10)
LVAD-AP2: 36.1 CM2
LVAD-AP4: 32.5 CM2
LVAD-AP4: 34.7 CM2
LVAD-AP4: 37.5 CM2
MV DEC SLOPE: 3290 MM/S2
MV DT: 242 MS
MV E/E' LATERAL: 6.5
MV MEAN GRADIENT: 1.35 MMHG
MV PEAK E VEL: 72.6 CM/S
MV PEAK GRADIENT: 3 MMHG
MV STENOSIS PRESSURE HALF TIME: 0.07 S
MV VALVE AREA P 1/2 METHOD: 3.19 CM2
MV VMAX: 80 CM/S
MVA (VTI): 6.93 CM2
POSTERIOR WALL: 0.9 CM (ref 0.6–1.1)
PV PEAK GRADIENT: 2.56 MMHG
PV VMAX: 0.8 M/S
RA AREA: 20.6 CM2
RH CV ECHO AV VALVE AREA VEL: 4.6 CM2
RH CV ECHO AV VALVE AREA VTI: 4.4 CM2
RH LVOT PEAK VELOCITY REST: 1.1 M/S
RIGHT VENTRICULAR INTERNAL DIMENSION IN DIASTOLE: 3.9 CM
RV AP4 BASE: 4 CM
S': 10.6 CM/S
TDI: 12.7 CM/S
TDILATERAL: 11.2 CM/S
TR MAX PG: 24.6 MMHG
TRICUSPID ANNULAR PLANE SYSTOLIC EXCURSION: 1.9 CM
TRICUSPID VALVE PEAK REGURGITATION VELOCITY: 2.48 M/S
TV REST PULMONARY ARTERY PRESSURE: 30 MMHG
Z-SCORE OF LEFT VENTRICULAR DIMENSION IN END DIASTOLE: -5.7
Z-SCORE OF LEFT VENTRICULAR DIMENSION IN END SYSTOLE: -2.8

## 2020-11-05 PROCEDURE — 93306 TTE W/DOPPLER COMPLETE: CPT

## 2020-11-05 PROCEDURE — 93306 TTE W/DOPPLER COMPLETE: CPT | Mod: 26 | Performed by: INTERNAL MEDICINE

## 2020-11-05 PROCEDURE — 2500000200 HC RX 250 WO HCPCS: Performed by: FAMILY MEDICINE

## 2020-11-05 RX ORDER — SODIUM CHLORIDE 9 MG/ML
10 INJECTION INTRAMUSCULAR; INTRAVENOUS; SUBCUTANEOUS ONCE
Status: COMPLETED | OUTPATIENT
Start: 2020-11-05 | End: 2020-11-05

## 2020-11-05 RX ADMIN — SODIUM CHLORIDE 10 ML: 9 INJECTION, SOLUTION INTRAMUSCULAR; INTRAVENOUS; SUBCUTANEOUS at 10:35

## 2020-11-10 ENCOUNTER — HOME MONITORING (OUTPATIENT)
Dept: FAMILY MEDICINE | Facility: CLINIC | Age: 70
End: 2020-11-10

## 2020-11-10 ENCOUNTER — TELEPHONE (OUTPATIENT)
Dept: FAMILY MEDICINE | Facility: CLINIC | Age: 70
End: 2020-11-10

## 2020-11-10 NOTE — TELEPHONE ENCOUNTER
COVID-19 SCREENING ASSESSMENT     CRITERIA FOR TESTING  Yes to Any Symptoms or Asymptomatic Testing With Approved Criteria/MH Agreement  What symptoms are you experiencing? None  Asymptomatic testing group: N/A     ORDER QUESTIONS  Date of onset: n/a    Are you a healthcare worker,  or active  or [employer]? No  Do you live in an institutional setting (long term care, assisted living, corrections, etc)? No  Are you a dialysis or current cancer patient? No  Are you currently pregnant? No  Do you work in an educational setting? No     OTHER QUESTIONS  Are you a [employer] employee? No  Have you had close contact with a lab confirmed case of COVID-19 in the last 14 days? no  Details on close contact: n/a    Call was lost, tried to call back. No answer.

## 2020-11-10 NOTE — PROGRESS NOTES
Patient had a left a message on the COVID 19 test results line in reference to being tested as his father is hospitalized at [facility] and is positive for Covid 19.  Advised patient that  is not testing asymptomatic patients at this time.  Patient does have a visit tomorrow with Dr. Balbir Machado.  Instructed patient to call the clinic and advise of exposure.  Clinic may want to cancel or complete a telemedicine visit.  Patient will call clinic.

## 2020-11-11 ENCOUNTER — OFFICE VISIT (OUTPATIENT)
Dept: FAMILY MEDICINE | Facility: CLINIC | Age: 70
End: 2020-11-11
Payer: MEDICARE

## 2020-11-11 VITALS
BODY MASS INDEX: 30.43 KG/M2 | HEART RATE: 88 BPM | SYSTOLIC BLOOD PRESSURE: 102 MMHG | OXYGEN SATURATION: 97 % | TEMPERATURE: 97.3 F | WEIGHT: 229.6 LBS | DIASTOLIC BLOOD PRESSURE: 58 MMHG | HEIGHT: 73 IN

## 2020-11-11 DIAGNOSIS — I48.92 ATRIAL FLUTTER WITH RAPID VENTRICULAR RESPONSE (CMS/HCC): Primary | ICD-10-CM

## 2020-11-11 DIAGNOSIS — R33.8 URINARY RETENTION DUE TO BENIGN PROSTATIC HYPERPLASIA: ICD-10-CM

## 2020-11-11 DIAGNOSIS — N40.1 URINARY RETENTION DUE TO BENIGN PROSTATIC HYPERPLASIA: ICD-10-CM

## 2020-11-11 DIAGNOSIS — I89.0 LYMPHEDEMA: ICD-10-CM

## 2020-11-11 PROCEDURE — 99214 OFFICE O/P EST MOD 30 MIN: CPT | Performed by: FAMILY MEDICINE

## 2020-11-11 PROCEDURE — G0463 HOSPITAL OUTPT CLINIC VISIT: HCPCS | Performed by: FAMILY MEDICINE

## 2020-11-11 ASSESSMENT — PAIN SCALES - GENERAL: PAINLEVEL: 2

## 2020-11-11 NOTE — PROGRESS NOTES
"Subjective      HPI  Cesar Yuen is a 70 y.o. male who presents for follow up on his atrial flutter. And swelling of his legs.   He indicates that the legs are looking better and are a little sore but not to bad.   He is not putting anything on the sore on his foot or his legs.     He is due to have a phone call visit with the heart doctors in December and if his heart is regular at this time should he still do that phone call.      He did have to have the catheter replace the last time as he was unable to void.   He has an appointment with the urologist next week.      He indicates that his dad was recently diagnosed with COVID and is now down at [place].   He was with him for a couple of hours at that time.   He is not experiencing any symptoms at this time for himself.        The following have been reviewed and updated as appropriate in this visit:    No Known Allergies  Current Outpatient Medications   Medication Sig Dispense Refill   • apixaban (ELIQUIS) 5 mg tablet Take 1 tablet (5 mg total) by mouth 2 (two) times a day 60 tablet 2   • metoprolol succinate XL (TOPROL-XL) 25 mg 24 hr tablet Take 1 tablet (25 mg total) by mouth daily 30 tablet 3   • finasteride (PROSCAR) 5 mg tablet Take 1 tablet (5 mg total) by mouth daily (Patient not taking: Reported on 10/12/2020 ) 30 tablet 11     No current facility-administered medications for this visit.      Past Medical History:   Diagnosis Date   • Edema     BLE     Past Surgical History:   Procedure Laterality Date   • ABDOMINAL SURGERY      anuerysm   • APPENDECTOMY     • HERNIA REPAIR      x 3       Review of Systems    Objective   /58 (BP Location: Left arm, Patient Position: Sitting, Cuff Size: Large Adult)   Pulse 88   Temp 36.3 °C (97.3 °F) (Temporal)   Ht 1.854 m (6' 1\")   Wt 104.1 kg (229 lb 9.6 oz)   SpO2 97%   BMI 30.29 kg/m²     Physical Exam  Vitals signs and nursing note reviewed.   Constitutional:       Appearance: Normal appearance. " He is well-developed.   HENT:      Head: Normocephalic and atraumatic.   Eyes:      Conjunctiva/sclera: Conjunctivae normal.      Pupils: Pupils are equal, round, and reactive to light.   Neck:      Thyroid: No thyromegaly.   Cardiovascular:      Rate and Rhythm: Normal rate and regular rhythm.      Heart sounds: Normal heart sounds. No murmur. No friction rub. No gallop.    Pulmonary:      Effort: Pulmonary effort is normal.      Breath sounds: Normal breath sounds. No wheezing or rales.   Lymphadenopathy:      Cervical: No cervical adenopathy.   Skin:     General: Skin is warm and dry.      Comments: R foot -  Superficial dequamation brawny changes on the lower 2/3 decreasing.    to palpation.   Wound dorsum of the foot has a thick eschar.      Neurological:      Mental Status: He is alert.         Assessment/Plan      1. Atrial flutter with rapid ventricular response (CMS/HCC)   Echo results reviewed and discussed with patient.   At this point there is no imminent concerns but will need to recheck down the road.   Heart rhythm is back to being regular and would assume sinus rhythm today.    Patient is to continue with Eliquis and metoprolol and follow up with heart doctors in December.   I did advise patient that he will need to stay on medications indefinitely whether it is these same ones or something different after seeing cardiology.  Follow up with me as needed.       2. Lymphedema -     Swelling is looking better.   Recommended Vaseline on his leg and foot to promote healing of the eschar build up on the top of the R foot.   Also discussed not peeling the skin on his foot as he could get further sores.   No indication for follow up unless things change with swelling or sores develop.       3. Urinary retention due to benign prostatic hyperplasia -  Catheter in place at this point.   Advised patient that will keep this in until he follows up with Urology next week.       Portions of this note was  documented by Lorena Park as I performed the exam and collected the information from Cesar Yuen. I attest that I have reviewed the information as documented, verified the accuracy of the documentation and added additional information as needed.       FELY BAILEY MD  11/11/20

## 2020-11-18 ENCOUNTER — OFFICE VISIT (OUTPATIENT)
Dept: UROLOGY | Facility: CLINIC | Age: 70
End: 2020-11-18
Payer: MEDICARE

## 2020-11-18 VITALS
HEART RATE: 91 BPM | DIASTOLIC BLOOD PRESSURE: 63 MMHG | WEIGHT: 229 LBS | BODY MASS INDEX: 30.35 KG/M2 | TEMPERATURE: 98.7 F | SYSTOLIC BLOOD PRESSURE: 114 MMHG | OXYGEN SATURATION: 95 % | HEIGHT: 73 IN

## 2020-11-18 DIAGNOSIS — N32.9 LESION OF BLADDER: Primary | ICD-10-CM

## 2020-11-18 DIAGNOSIS — R33.9 URINARY RETENTION: ICD-10-CM

## 2020-11-18 DIAGNOSIS — R31.0 GROSS HEMATURIA: ICD-10-CM

## 2020-11-18 DIAGNOSIS — Z11.59 SPECIAL SCREENING EXAMINATION FOR VIRAL DISEASE: ICD-10-CM

## 2020-11-18 PROCEDURE — 52000 CYSTOURETHROSCOPY: CPT | Performed by: UROLOGY

## 2020-11-18 PROCEDURE — 51701 INSERT BLADDER CATHETER: CPT | Performed by: UROLOGY

## 2020-11-18 ASSESSMENT — PAIN SCALES - GENERAL: PAINLEVEL: 0-NO PAIN

## 2020-11-18 NOTE — PROGRESS NOTES
Cystoscopy Procedure Note    11/18/20      Pre-operative Diagnosis:   1. Gross hematuria  Cystoscopy   2. Urinary retention  Ambulatory referral to Urology       Post-operative Diagnosis:   1. Gross hematuria  Cystoscopy   2. Urinary retention  Ambulatory referral to Urology       Surgeon: Daniele Johnson MD    Procedure: Cystoscopy    Procedure Details   The risks, benefits, complications, treatment options, and expected outcomes were discussed with the patient. The patient concurred with the proposed plan, giving informed consent.    Cystoscopy was performed today under local anesthesia, using sterile technique. The patient was placed in the lithotomy position, prepped with Betadine, and draped in the usual sterile fashion. A 17 Syriac flexible cystoscope was inserted into the urethral meatus and then used to inspect both the entire urethra and bladder.    Findings:  Bilobar hypertrophy of the prostate. Large right bladder diverticulum, some erythema at the base of the diverticulum. Moderate trabeculation. No urethral strictures.                 Complications:  None; patient tolerated the procedure well.    Plan:    Will need biopsy of the lesion within the bladder diverticulum. Discussed risks and benefits of cyst/bladder biopsy in the OR. Discussed perforation, bleeding, infection. Patient agrees to proceed.    We also discussed TURP. He clearly has bladder outlet dysfunction. Even if he eventually proceeds to diverticulectomy, his outlet needs to be optimized first. We discussed 10% chance to return to the OR for bleeding or continued resection. We also discussed the fact that it may not be successful to get catheter free, but optimizing his outflow tract is our best option. He understands that there is a 5 to 6 week recovery period that can be taxing. He would also like to proceed with TURP.    He failed his void trial today, catheter replaced. We will leave this in place until his TURP.    He will need to  hold his Eliquis for 3 days prior to the procedure.

## 2020-11-18 NOTE — PROGRESS NOTES
UROLOGY NURSE Pope Catheter removal VISIT:    Cesar Yuen is a 70 y.o. male here for Cystoscopy visit with Dr. Johnson. 16 Pashto catheter removed.  Urine color is genaro.  8cc saline removed from balloon.  Catheter removed without difficulty, tip intact.  Cystoscopy completed by Dr. Johnson.   Patient tolerated well.  Patient instructed on voiding trial after cystoscopy. Unable to urinate. Discussed with Dr. Johnson. Instructed to replace catheter. 16 Pashto catheter inserted with 250 cc urine obtained. 10 cc balloon filled with saline without difficulty.  Catheter draining well. He is instructed on  symptoms of infection, and when to seek medical care if needed.

## 2020-11-25 ENCOUNTER — TELEPHONE (OUTPATIENT)
Dept: FAMILY MEDICINE | Facility: CLINIC | Age: 70
End: 2020-11-25

## 2020-12-04 ENCOUNTER — TELEPHONE (OUTPATIENT)
Dept: FAMILY MEDICINE | Facility: CLINIC | Age: 70
End: 2020-12-04

## 2020-12-10 ENCOUNTER — TELEPHONE - BILLABLE (OUTPATIENT)
Dept: CARDIOLOGY | Facility: CLINIC | Age: 70
End: 2020-12-10
Payer: MEDICARE

## 2020-12-10 DIAGNOSIS — I48.92 ATRIAL FLUTTER WITH RAPID VENTRICULAR RESPONSE (CMS/HCC): Primary | ICD-10-CM

## 2020-12-10 PROCEDURE — G0463 HOSPITAL OUTPT CLINIC VISIT: HCPCS | Performed by: INTERNAL MEDICINE

## 2020-12-10 PROCEDURE — 99443 *INACTIVE DO NOT USE* PR PHYS/QHP TELEPHONE EVALUATION 21-30 MIN: CPT | Performed by: INTERNAL MEDICINE

## 2020-12-10 SDOH — HEALTH STABILITY: MENTAL HEALTH: HOW MANY DRINKS CONTAINING ALCOHOL DO YOU HAVE ON A TYPICAL DAY WHEN YOU ARE DRINKING?: 1 OR 2

## 2020-12-10 SDOH — HEALTH STABILITY: MENTAL HEALTH: HOW OFTEN DO YOU HAVE A DRINK CONTAINING ALCOHOL?: MONTHLY OR LESS

## 2020-12-10 SDOH — ECONOMIC STABILITY: TRANSPORTATION INSECURITY
IN THE PAST 12 MONTHS, HAS LACK OF TRANSPORTATION KEPT YOU FROM MEDICAL APPOINTMENTS OR FROM GETTING MEDICATIONS?: PATIENT DECLINED

## 2020-12-10 SDOH — HEALTH STABILITY: MENTAL HEALTH: HOW OFTEN DO YOU HAVE SIX OR MORE DRINKS ON ONE OCCASION?: NEVER

## 2020-12-10 SDOH — ECONOMIC STABILITY: FOOD INSECURITY: WITHIN THE PAST 12 MONTHS, THE FOOD YOU BOUGHT JUST DIDN'T LAST AND YOU DIDN'T HAVE MONEY TO GET MORE.: PATIENT DECLINED

## 2020-12-10 SDOH — ECONOMIC STABILITY: FOOD INSECURITY
WITHIN THE PAST 12 MONTHS, YOU WORRIED THAT YOUR FOOD WOULD RUN OUT BEFORE YOU GOT THE MONEY TO BUY MORE.: PATIENT DECLINED

## 2020-12-10 SDOH — ECONOMIC STABILITY: FOOD INSECURITY: HOW HARD IS IT FOR YOU TO PAY FOR THE VERY BASICS LIKE FOOD, HOUSING, MEDICAL CARE, AND HEATING?: PATIENT DECLINED

## 2020-12-10 ASSESSMENT — ACTIVITIES OF DAILY LIVING (ADL): LACK_OF_TRANSPORTATION: PATIENT DECLINED

## 2020-12-10 NOTE — PROGRESS NOTES
ELECTROPHYSIOLOGY TELEMEDICINE VISIT        Per discussion with Timoteo Burk DO, Cesar  has verbally consented to be treated via a telephone based visit.  A total of 25 minutes were required for this telephone based visit, of which, greather than 50% was spent in counceling and coordination of care related to The encounter diagnosis was Atrial flutter with rapid ventricular response (CMS/HCC) (HCC)..   Patient Location: Home  Provider Location: Clinic office  Technology used by Provider: Computer and telephone      Chief Complaint:    The encounter diagnosis was Atrial flutter with rapid ventricular response (CMS/HCC) (HCC)..    HPI:    Cesar Yuen is a very pleasant 70 y.o. y/o male who presents with a PMH significant for   Patient Active Problem List   Diagnosis   • Gross hematuria   • Lymphedema   • Bladder diverticulum   • Atrial flutter with rapid ventricular response (CMS/HCC) (HCC)         Cesar  presents today to discuss the management of his atrial flutter.  The arrhythmia was noted somewhat incidentally when he presented for routine follow-up and was found to have an irregular cardiac rhythm.  The electrocardiogram at that time in mid October showed atrial flutter which in turn prompted a Holter monitor which showed atrial flutter throughout the study with an average ventricular rate of 122 bpm.    Cesar tells me that he feels otherwise reasonably well.  He does note some peripheral edema but has not had palpitations, orthopnea PND or chest discomfort.  He has been following up with urology for some urinary retention issues and has a biopsy scheduled in a week or 2.  He has been anticoagulated appropriately and tells me that he has been compliant with his Eliquis as well as his metoprolol.      Medications:    Current Outpatient Medications   Medication Sig Dispense Refill   • multivitamin (MULTI-DAY ORAL) Take by mouth     • apixaban (ELIQUIS) 5 mg tablet Take 1 tablet (5 mg total) by mouth 2  (two) times a day 60 tablet 2   • metoprolol succinate XL (TOPROL-XL) 25 mg 24 hr tablet Take 1 tablet (25 mg total) by mouth daily 30 tablet 3   • finasteride (PROSCAR) 5 mg tablet Take 1 tablet (5 mg total) by mouth daily (Patient not taking: Reported on 10/12/2020 ) 30 tablet 11     No current facility-administered medications for this visit.         Fam Hx:    No family history of sudden cardiac death.   Family History   Problem Relation Age of Onset   • Heart attack Mother's Brother           Soc Hx:    Social History     Occupational History   • Occupation:    Tobacco Use   • Smoking status: Never Smoker   • Smokeless tobacco: Never Used   Substance and Sexual Activity   • Alcohol use: Not Currently     Frequency: Monthly or less     Drinks per session: 1 or 2     Binge frequency: Never   • Drug use: Not Currently   • Sexual activity: Defer   Social History Narrative   • Not on file        ROS:    ROS A comprehensive review of systems was conducted and except as noted in the HPI it is negative.     Objective:    Vitals:  There were no vitals filed for this visit.     Laboratory values:    Lab Results   Component Value Date    INR 1.1 02/13/2020     Lab Results   Component Value Date    WBC 7.2 10/27/2020    RBC 4.24 10/27/2020    HGB 13.0 (L) 10/27/2020    HCT 39.0 10/27/2020    MCV 92.0 10/27/2020    MCH 30.6 10/27/2020    MCHC 33.3 10/27/2020    RDW 13.4 10/27/2020     10/27/2020         Testing Personally Reviewed:      Echo Results    Lab Results   Component Value Date    EF 46 11/05/2020    LASIZE 3.82 11/05/2020    LAVOL 95.7 11/05/2020       ECG: I have reviewed the October 15 tracing as noted above    Telemetry: I have reviewed the Holter as noted above        Assessment and Plan:    Cesar is a very pleasant 70 y.o. male with:      Diagnoses and all orders for this visit:    Atrial flutter with rapid ventricular response (CMS/HCC) (HCC)    We discussed the options for managing the  atrial flutter, specifically that catheter ablation is considered to be first-line therapy.  I reviewed the procedure with him and after discussing the risks, benefits alternatives he is willing to proceed.  He would like however to coordinate this with his upcoming bladder biopsy.  This may be problematic giving that urology would likely want to hold the Eliquis where as we would prefer to keep it going.  I will try to see what can be arranged.     I also discussed that an implantable loop recorder would likely be useful to look for recurrence and also ensure that he does not have coexisting atrial fibrillation.    Thank you for allowing our participation in the care of this very pleasant patient.  Please do not hesitate to contact us at [phone] with any questions or concerns that you may have.    Timoteo Burk, DO     Please note that portions of this document were generated with speech recognition software.  Reasonable efforts have been made to correct any transcriptional errors however some typographical errors may remain.

## 2020-12-10 NOTE — PATIENT INSTRUCTIONS
SVT ABLATION PROCEDURE    The normal electrical system of the heart:    The heart has its own electrical conduction system.  The conduction system sends signals throughout the upper chambers (atria) and lower chambers (ventricles) of the heart to make it beat in a regular, coordinated rhythm.  The conduction system consists of two nodes that contain conduction cells and special pathways that transmit the impulse.    A normal heartbeat begins when an electrical impulse is fired from the sinus node in the right atrium.  The sinus node is responsible for setting the rate and rhythm of the heart and is therefore referred to as the heart's pacemaker.      The electrical impulse fired from the sinus node spreads throughout the atria, causing them to contract and squeeze blood into the ventricles.  The electrical impulse then reaches the atrioventricular node (AV node), which acts as a gateway, slowing and regulating the impulses travelling between the atria and the ventricles.  As the impulse travels down the pathways into the ventricles the heart contracts and pumps blood around the body.  The cycle then begins again.    A normal adult heart beats in a regular pattern  times a minute;  this is called sinus rhythm.      Arrhythmia:    Sometimes if the conduction pathway is damaged, blocked, or an extra pathway exists the heart's rhythm changes.  The heart may beat too quickly or too slowly or irregularly.  This may affect the heart's ability to pump blood around the body.  These abnormal heartbeats are known as arrhythmias.  Arrhythmias can occur in the atria or the ventricles.      Ablation:    An ablation is performed in people who have been diagnosed with an abnormal heart rhythm.  Your arrhythmia is due to an area of extra electrical activity (short circuit) within your heart.  In your heart, the electrical impulses bypass the normal conduction pathway and use an extra electrical pathway or arise from a  group of cells (foci) instead.      The procedure:    For your heart to beat normally again, the extra pathway or group of cells (foci) responsible for your arrhythmia must be blocked.  Ablation is a technique where the doctor applies a small amount of energy using a catheter (thin, flexible wire) directly onto the area responsible for the extra electrical circuit within your heart.  The use of energy causes scar tissue to form which blocks the area from generating or conducting the fast impulses that cause your arrhythmia, providing relief from the symptoms you have been experiencing.  Therefore, the normal conduction pathway takes over and your heart rhythm returns to normal.  This technique has a high success rate of curing many different types of arrhythmias.  It is performed under a local anesthetic with sedation which will help you relax.      Fluoroscope (X-ray) is used during the procedure so if you think you may be pregnant you should let us know before the procedure.        Risks of the procedure:    Ablation is safe; however, as with any procedure, there are potential risks.   We explain to all patients that anything can happen including death, heart attack and stroke although these events are extremely rare.      *Damage to the arteries or veins:  occasionally the catheter electrodes can accidentally damage the blood vessels when being moved into position in the heart.  The risk of this is between 3-5%.  Serious injury requiring surgical repair is extremely rare and occurs in less than 1% of patients.    *Deep vein thrombosis and pulmonary embolism:  the risk of developing blood clots in the legs that could travel to the lungs is extremely rare, less than 1%    *Palpitations:  it is common to experience palpitations (extra heartbeats) during the procedure, due to the catheter electrodes stimulating your heart.  Your heart will usually return to normal rhythm.  However, very occasionally extra treatment  (cardioverison) is needed to correct your arrhythmia.  You are then given a short-acting sedative and once you are asleep, a defibrillator is used to send electrical energy to the heart muscle to restore the normal rhythm and rate.      *Cardiac tamponade:  during placement, the catheters may puncture the heart muscle.  Most of the time, the heart heals itself and does not require any intervention.  Rarely, blood can accumulate around the heart and this has to be removed using a small drain.  The risk of this is less than 1%.      *Bruising or bleeding:  this is common in the groin following the procedure.  This usually disappears within a week and does not cause a problem.    *Success rate:  the ablation procedure is not always successful.  The success rate depends on where the arrhythmia is coming from.  If the procedure is unsuccessful, it may be possible to repeat it at a later date.    Before the procedure:    It is often necessary to hold some of your medications prior to the ablation procedure.  You may be asked to hold certain medications such as calcium channel blocker (diltiazem, cardizem, verapamil), a beta blocker (coreg, metoprolol, atenolol), or an antiarrhythmic medication (flecainide, propafenone, amiodarone) prior to the procedure.  This is necessary in some cases because these medications suppress the arrhythmia. During the procedure it is necessary to try to induce the arrhythmia so that it can be mapped and the exact location of the short circuit can be noted.  If you are asked to hold these medications, you may have episodes of the arrhythmia.  This is nothing to be concerned about and hopefully this will be taken care of with the ablation procedure.  In many cases however it will not be necessary to hold your medications and if you have not been told to do so you should simply continue with your medical therapy as you normally would.    If you are on warfarin, your last dose should be 3 days  prior to the procedure.    If you are on Eliquis, Pradaxa, or Xarelto you should continue this unless directed otherwise by your doctor.    If you are diabetic and are on long acting insulin, you should take 1/2 of the dose the night before the procedure.    You should not eat or drink anything after midnight the night before the procedure.  You can take all of your regular medications with sips of water the morning of the procedure, except for the ones that you were told to hold for the case.    The day of the procedure:    You will need to have someone drive you and pick you up and have someone stay at home with you the night after the procedure.      You will be taken to the Outpatient Care Unit where you will have blood tests taken and an IV will be inserted.  You will have your groin shaved and will be given a hospital gown to wear.      You will then be taken to the Electrophysiology Laboratory.  Outside the laboratory you will meet with Dr. Burk who will discuss the procedure and the risks with you again.  You will be given time to have all of your questions answered.  If you would like to have your family with you, they are welcome to accompany you.  You will be asked to sign the consent form and your family will be escorted to the waiting area.      Please ask your family to provide a phone number to the staff member in the waiting area if they choose to leave the waiting area at any time.  This will help the doctor get in touch with them if they are not in the waiting room.      During the procedure:    You will be taken into the Electrophysiology laboratory where there will be several staff members including nurses and cardiac radiology technicians.  Please be aware that in the room there may be representatives from the company that makes some of the mapping equipment.  They are trained nurses and technicians who help Dr. Burk with the equipment needed to do your ablation procedure.      There is a  lot of equipment in the room which is used to monitor your heart rhythm.  You will be awake during the procedure, but to help you relax your doctor will give you a short acting sedative.  In the beginning of the procedure, you will be kept quite awake.  Once the ablation is started, you will be given additional sedatives to make you comfortable.    The doctor will inject a local anesthetic into your groin.  This may sting and you may feel some mild discomfort.  When the local anesthetic has taken effect, the doctor will insert a small tube (sheath) into your groin.  You should not feel any pain, but you will feel the movement of hands working.  Through the sheath the doctor will thread several flexible wires (catheter electrodes) into your heart.  These special wires will record and ablate the extra signals from within your heart.  The type of arrhythmia you have will determine how many wires your doctor will use for your ablation. The catheters are about the size of a small drinking straw.  The doctor carefully moves the catheters into different positions within your heart under x-ray guidance.  You should not feel pain during this part of the procedure.    Once the catheters are in place, your doctor will attempt to start your arrhythmia by giving your heart small electrical impulses (paced beats) to make it beat at different speeds. This allows the doctor to collect detailed information about the cause of your arrhythmia and pinpoint where the area of extra electrical activity responsible for your arrhythmia is within your heart.  During this time you may feel your heart speeding up, slowing down, or missing a bat.  This may cause you some mild discomfort.  However, this is a normal part of the procedure and in the controlled setting of an ablation is not a danger to you.  Sometimes, your doctor may also need to give you drugs to bring on your arrhythmia.      The doctor will use the ablation catheter to map a  3-dimentional map of your heart.  This allows the doctor to know exactly the structure of your heart and to minimize the use of X-ray.  Once the area that is causing the arrhythmia is noted, the doctor will use the ablation catheter to deliver a small amount of energy directly onto the area of extra electrical activity to create a scar.      You may feel a slight burning sensation or heaviness in your chest during this part of the procedure.  The formation of scar tissue as a result of ablation will not interfere with the normal conduction or function of the heart.  This means that after the ablation your heartbeat will only follow the normal electrical pathway.    After the doctor has performed the ablation, you will be kept in the Electrophysiology laboratory and continue to be monitored.  The doctor will then stimulate your heart again in an attempt to start your arrhythmia.  If this is not possible the ablation is considered a success.      After the procedure is completed the catheters and sheaths will be removed from your groin.  Firm pressure will be applied to your groin where the catheter was inserted to stop you from bleeding.      After the procedure:    You will be moved to the recovery area where you will be monitored for about 3 hours.  You may feel a little sleepy until your sedative has worn off.  The nurse will follow your vital signs and check your groin for bleeding.  After 3 hours, you will be able to get up if there are no complications. You will be able to eat and drink normally as soon as you return to the recovery area.      You will receive some post-procedure instructions.  The doctor will have discussed the results with you and your family.  It is common to forget the details after the procedure. This is a side effect of the sedative you were given.  Your discharge instructions should summarize the results and will also tell you if any of your medications should be changed.      You will  have a small dressing on your puncture site that can be removed the next day.  It is important to keep the area clean and dry until it has healed, which takes about a week.  No other dressing should be placed on the area and no ointments or creams.  If you notice any swelling, redness or oozing, please call the cardiology office.      You can resume your normal daily activities when you leave the hospital.  You should not strain or lift heavy objects for one week so the incision site can heal.  Unless your job requires you to lift heavy objects, you can return to work in a day or two.  You are advised not to drive for one week.      Follow up care:    You will have a follow up appointment with Dr. Burk about 6-8 weeks after the procedure.             [facility]  IMPLANTABLE LOOP RECORDER IMPLANT PROCEDURE    The normal electrical system of the heart:    The heart has its own electrical conduction system.  The conduction system sends signals throughout the upper chambers (atria) and lower chambers (ventricles) of the heart to make it beat in a regular, coordinated rhythm.  The conduction system consists of two nodes that contain conduction cells and special pathways that transmit the impulse.    A normal heartbeat begins when an electrical impulse is fired from the sinus node in the right atrium.  The sinus node is responsible for setting the rate and rhythm of the heart and is therefore referred to as the heart's pacemaker.      The electrical impulse fired from the sinus node spreads throughout the atria, causing them to contract and squeeze blood into the ventricles.  The electrical impulse then reaches the atrioventricular node (AV node), which acts as a gateway, slowing and regulating the impulses travelling between the atria and the ventricles.  As the impulse travels down the pathways into the ventricles the heart contracts and pumps blood around the body.  The cycle then begins again.    A normal adult  heart beats in a regular pattern  times a minute;  this is called sinus rhythm.      Implantable loop recorder (ILR)    An ILR is a small device which contains a battery and electronic circuits.  It is about the size of a few matchsticks.  It is placed underneath the skin on the chest.  It is a long term monitor that can record heart rhythm issues.  An ILR is being recommended because there is an abnormality in the electrical conduction system of the heart or an arrhythmia that has not been picked up on other monitoring.      Risks of the procedure:    As with any invasive procedure there are risks, but in this case the risks are small.  Obviously, anything can happen related to the procedure but this is extremely rare.      *Bruising or bleeding:  This is common around the implant site especially in patients taking blood thinners.  Usually it resolves on its own and no intervention is needed.  The risk is 2-3%.  If this is noted at your wound check you may be placed on some extra antibiotics.  This could take several weeks to resolve.      *Infection:  The risk of infection is about 1-2%.  If an infection occurs, the device will be removed and you will be treated with antibiotics.     Before the procedure:    If you are on warfarin, Eliquis, Pradaxa, or Xarelto, you can continue this.    You can eat breakfast prior to the procedure.      We prefer if you have someone accompany you and drive you to and from the appointment.      You do not need to stop any of your medications prior to the procedure.      On the day of the procedure you should arrive at the designated time and check in at the hospital.  You will be taken to the [facility], given a hospital gown, and the procedure site will be cleaned.      The procedure:    You will be taken into the Electrophysiology laboratory where there will be several staff members including nurses and cardiac radiology technicians.  Please be aware that in the room there may be  representatives from the company that makes the ILR.  They are trained nurses and technicians who provide the device and help your doctor with the equipment during the procedure.      There is a lot of equipment in the room which is used to monitor your heart rhythm.  You will be given local anesthesia in your chest.  The ILR is then inserted underneath the skin.  The whole procedure takes about 5 minutes. The device will be tested to make sure it picks up your heart rate appropriately.  The incision will be closed with Dermabond (glue) and steri-strips.      After the procedure:    You will go to the recovery area and you will be able to go home.  The representative from the ILR  will come and see you and will set you up with a home remote monitor.        Post op care:    The Dermabond will come off on its own, as will the steri-strips.  Please do not pull on it.  You can shower.  The incision can get wet, just don't scrub at the area.      If you find any redness, swelling, drainage, warmth, or have a fever greater than 100 degrees, notify the  Main Office [phone] during normal business hours.     You may continue regular daily activities.  There are no limitations.

## 2020-12-14 ENCOUNTER — PREP FOR CASE (OUTPATIENT)
Dept: CARDIOLOGY | Facility: CLINIC | Age: 70
End: 2020-12-14

## 2020-12-14 ENCOUNTER — TELEPHONE (OUTPATIENT)
Dept: CARDIOLOGY | Facility: CLINIC | Age: 70
End: 2020-12-14

## 2020-12-14 DIAGNOSIS — I48.92 ATRIAL FLUTTER, UNSPECIFIED TYPE (CMS/HCC): Primary | ICD-10-CM

## 2020-12-14 NOTE — TELEPHONE ENCOUNTER
----- Message from Timoteo Burk DO sent at 12/10/2020 11:20 AM [facility] -----  Regarding: Procedure Scheduling  Please set this patient up for atrial flutter ablation and ILR implant.     He prefers to coordinate this with his admission for bladder biopsy.  The issue is going to be that urology will likely want him off the Eliquis in which case we will need to do a PHILLY or wait for several weeks before doing the procedure.      Thanks,     Timoteo

## 2020-12-17 NOTE — PRE-PROCEDURE INSTRUCTIONS
Pre-Surgery Instructions:   Medication Instructions   • multivitamin (MULTI-DAY ORAL) Do not take morning of surgery/procedure   • apixaban (ELIQUIS) 5 mg tablet Paused per prescribing provider   • metoprolol succinate XL (TOPROL-XL) 25 mg 24 hr tablet Takes at night

## 2020-12-21 ENCOUNTER — LAB (OUTPATIENT)
Dept: LAB | Facility: CLINIC | Age: 70
End: 2020-12-21
Payer: MEDICARE

## 2020-12-21 ENCOUNTER — APPOINTMENT (OUTPATIENT)
Dept: UROLOGY | Facility: CLINIC | Age: 70
End: 2020-12-21
Payer: MEDICARE

## 2020-12-21 ENCOUNTER — TELEPHONE (OUTPATIENT)
Dept: UROLOGY | Facility: CLINIC | Age: 70
End: 2020-12-21

## 2020-12-21 DIAGNOSIS — Z11.59 SPECIAL SCREENING EXAMINATION FOR VIRAL DISEASE: Primary | ICD-10-CM

## 2020-12-21 DIAGNOSIS — Z11.59 SPECIAL SCREENING EXAMINATION FOR VIRAL DISEASE: ICD-10-CM

## 2020-12-21 LAB — SARS-COV-2 RDRP RESP QL NAA+PROBE: NORMAL

## 2020-12-21 PROCEDURE — 87635 SARS-COV-2 COVID-19 AMP PRB: CPT | Mod: QW,PO

## 2020-12-21 NOTE — TELEPHONE ENCOUNTER
Pt is calling asking about scheduling his COVID test.  Please call back at [phone]  Thank you!

## 2020-12-21 NOTE — TELEPHONE ENCOUNTER
Philip at 8:07 am on 12/21/2020  Pt is stating that he has a few questions regarding his proc. He stated that he needed to schedule it as well.

## 2020-12-22 ENCOUNTER — ANESTHESIA (OUTPATIENT)
Dept: OPERATING ROOM | Facility: HOSPITAL | Age: 70
End: 2020-12-22
Payer: MEDICARE

## 2020-12-22 ENCOUNTER — ANESTHESIA EVENT (OUTPATIENT)
Dept: OPERATING ROOM | Facility: HOSPITAL | Age: 70
End: 2020-12-22
Payer: MEDICARE

## 2020-12-22 ENCOUNTER — HOSPITAL ENCOUNTER (OUTPATIENT)
Facility: HOSPITAL | Age: 70
Setting detail: OBSERVATION
Discharge: HOME | End: 2020-12-23
Attending: UROLOGY | Admitting: UROLOGY
Payer: MEDICARE

## 2020-12-22 DIAGNOSIS — N13.8 BPH WITH OBSTRUCTION/LOWER URINARY TRACT SYMPTOMS: Primary | ICD-10-CM

## 2020-12-22 DIAGNOSIS — N32.3 BLADDER DIVERTICULUM: ICD-10-CM

## 2020-12-22 DIAGNOSIS — N40.1 BPH WITH OBSTRUCTION/LOWER URINARY TRACT SYMPTOMS: Primary | ICD-10-CM

## 2020-12-22 PROCEDURE — (BLANK) HC GENERAL ANESTHESIA FACILITY CHARGE 1ST 15 MIN: Performed by: UROLOGY

## 2020-12-22 PROCEDURE — 6370000100 HC RX 637 (ALT 250 FOR IP): Performed by: UROLOGY

## 2020-12-22 PROCEDURE — 2580000300 HC RX 258: Performed by: UROLOGY

## 2020-12-22 PROCEDURE — (BLANK) HC GENERAL ANESTHESIA FACILITY CHARGE EACH ADDITIONAL MIN: Performed by: UROLOGY

## 2020-12-22 PROCEDURE — (BLANK) HC OR LEVEL 3 PROC EACH ADDITIONAL MIN: Performed by: UROLOGY

## 2020-12-22 PROCEDURE — 6360000200 HC RX 636 W HCPCS (ALT 250 FOR IP): Performed by: ANESTHESIOLOGY

## 2020-12-22 PROCEDURE — G0378 HOSPITAL OBSERVATION PER HR: HCPCS

## 2020-12-22 PROCEDURE — 52234 CYSTOSCOPY AND TREATMENT: CPT | Mod: 51 | Performed by: UROLOGY

## 2020-12-22 PROCEDURE — 93005 ELECTROCARDIOGRAM TRACING: CPT | Performed by: ANESTHESIOLOGY

## 2020-12-22 PROCEDURE — 88305 TISSUE EXAM BY PATHOLOGIST: CPT

## 2020-12-22 PROCEDURE — 00914 ANES TRURL PX RESCJ PRST8: CPT | Performed by: ANESTHESIOLOGY

## 2020-12-22 PROCEDURE — 2580000300 HC RX 258: Performed by: ANESTHESIOLOGY

## 2020-12-22 PROCEDURE — 6360000200 HC RX 636 W HCPCS (ALT 250 FOR IP)

## 2020-12-22 PROCEDURE — 52601 PROSTATECTOMY (TURP): CPT | Performed by: UROLOGY

## 2020-12-22 PROCEDURE — 2500000200 HC RX 250 WO HCPCS: Performed by: ANESTHESIOLOGY

## 2020-12-22 PROCEDURE — (BLANK) HC OR LEVEL 3 PROC 1ST 15MIN: Performed by: UROLOGY

## 2020-12-22 PROCEDURE — 6360000200 HC RX 636 W HCPCS (ALT 250 FOR IP): Performed by: UROLOGY

## 2020-12-22 PROCEDURE — 6360000200 HC RX 636 W HCPCS (ALT 250 FOR IP): Performed by: NURSE ANESTHETIST, CERTIFIED REGISTERED

## 2020-12-22 PROCEDURE — 99100 ANES PT EXTEME AGE<1 YR&>70: CPT | Performed by: ANESTHESIOLOGY

## 2020-12-22 PROCEDURE — (BLANK) HC RECOVERY PHASE-1 1ST  HOUR ACUITY LEVEL 3: Performed by: UROLOGY

## 2020-12-22 RX ORDER — ATROPA BELLADONNA AND OPIUM 16.2; 3 MG/1; MG/1
1 SUPPOSITORY RECTAL EVERY 6 HOURS PRN
Status: DISCONTINUED | OUTPATIENT
Start: 2020-12-22 | End: 2020-12-23 | Stop reason: HOSPADM

## 2020-12-22 RX ORDER — ONDANSETRON HYDROCHLORIDE 2 MG/ML
INJECTION, SOLUTION INTRAVENOUS AS NEEDED
Status: DISCONTINUED | OUTPATIENT
Start: 2020-12-22 | End: 2020-12-22 | Stop reason: SURG

## 2020-12-22 RX ORDER — METOCLOPRAMIDE HYDROCHLORIDE 5 MG/ML
10 INJECTION INTRAMUSCULAR; INTRAVENOUS ONCE AS NEEDED
Status: COMPLETED | OUTPATIENT
Start: 2020-12-22 | End: 2020-12-22

## 2020-12-22 RX ORDER — FAMOTIDINE 10 MG/ML
INJECTION INTRAVENOUS
Status: COMPLETED
Start: 2020-12-22 | End: 2020-12-22

## 2020-12-22 RX ORDER — METOPROLOL SUCCINATE 25 MG/1
25 TABLET, EXTENDED RELEASE ORAL DAILY
Status: DISCONTINUED | OUTPATIENT
Start: 2020-12-22 | End: 2020-12-23 | Stop reason: HOSPADM

## 2020-12-22 RX ORDER — SODIUM CHLORIDE 9 MG/ML
100 INJECTION, SOLUTION INTRAVENOUS CONTINUOUS
Status: ACTIVE | OUTPATIENT
Start: 2020-12-22 | End: 2020-12-23

## 2020-12-22 RX ORDER — FENTANYL CITRATE/PF 50 MCG/ML
PLASTIC BAG, INJECTION (ML) INTRAVENOUS AS NEEDED
Status: DISCONTINUED | OUTPATIENT
Start: 2020-12-22 | End: 2020-12-22 | Stop reason: SURG

## 2020-12-22 RX ORDER — ONDANSETRON 4 MG/1
4 TABLET, ORALLY DISINTEGRATING ORAL EVERY 6 HOURS PRN
Status: DISCONTINUED | OUTPATIENT
Start: 2020-12-22 | End: 2020-12-23 | Stop reason: HOSPADM

## 2020-12-22 RX ORDER — FENTANYL CITRATE/PF 50 MCG/ML
25 PLASTIC BAG, INJECTION (ML) INTRAVENOUS EVERY 5 MIN PRN
Status: DISCONTINUED | OUTPATIENT
Start: 2020-12-22 | End: 2020-12-22 | Stop reason: HOSPADM

## 2020-12-22 RX ORDER — PROPOFOL 10 MG/ML
INJECTION, EMULSION INTRAVENOUS AS NEEDED
Status: DISCONTINUED | OUTPATIENT
Start: 2020-12-22 | End: 2020-12-22 | Stop reason: SURG

## 2020-12-22 RX ORDER — LIDOCAINE HYDROCHLORIDE 20 MG/ML
INJECTION, SOLUTION EPIDURAL; INFILTRATION; INTRACAUDAL; PERINEURAL AS NEEDED
Status: DISCONTINUED | OUTPATIENT
Start: 2020-12-22 | End: 2020-12-22 | Stop reason: SURG

## 2020-12-22 RX ORDER — LABETALOL HCL 20 MG/4 ML
5 SYRINGE (ML) INTRAVENOUS EVERY 5 MIN PRN
Status: DISCONTINUED | OUTPATIENT
Start: 2020-12-22 | End: 2020-12-22 | Stop reason: HOSPADM

## 2020-12-22 RX ORDER — ALUMINUM HYDROXIDE, MAGNESIUM HYDROXIDE, AND SIMETHICONE 1200; 120; 1200 MG/30ML; MG/30ML; MG/30ML
15-30 SUSPENSION ORAL EVERY 4 HOURS PRN
Status: DISCONTINUED | OUTPATIENT
Start: 2020-12-22 | End: 2020-12-23 | Stop reason: HOSPADM

## 2020-12-22 RX ORDER — ATROPA BELLADONNA AND OPIUM 16.2; 3 MG/1; MG/1
SUPPOSITORY RECTAL AS NEEDED
Status: DISCONTINUED | OUTPATIENT
Start: 2020-12-22 | End: 2020-12-22 | Stop reason: HOSPADM

## 2020-12-22 RX ORDER — DEXAMETHASONE SODIUM PHOSPHATE 4 MG/ML
INJECTION, SOLUTION INTRA-ARTICULAR; INTRALESIONAL; INTRAMUSCULAR; INTRAVENOUS; SOFT TISSUE AS NEEDED
Status: DISCONTINUED | OUTPATIENT
Start: 2020-12-22 | End: 2020-12-22 | Stop reason: SURG

## 2020-12-22 RX ORDER — DIPHENHYDRAMINE HYDROCHLORIDE 50 MG/ML
25 INJECTION INTRAMUSCULAR; INTRAVENOUS ONCE AS NEEDED
Status: DISCONTINUED | OUTPATIENT
Start: 2020-12-22 | End: 2020-12-22 | Stop reason: HOSPADM

## 2020-12-22 RX ORDER — DEXAMETHASONE SODIUM PHOSPHATE 4 MG/ML
4 INJECTION, SOLUTION INTRA-ARTICULAR; INTRALESIONAL; INTRAMUSCULAR; INTRAVENOUS; SOFT TISSUE ONCE AS NEEDED
Status: DISCONTINUED | OUTPATIENT
Start: 2020-12-22 | End: 2020-12-22 | Stop reason: HOSPADM

## 2020-12-22 RX ORDER — ACETAMINOPHEN 325 MG/1
325-650 TABLET ORAL EVERY 4 HOURS PRN
Status: DISCONTINUED | OUTPATIENT
Start: 2020-12-22 | End: 2020-12-23 | Stop reason: HOSPADM

## 2020-12-22 RX ORDER — SODIUM CHLORIDE, SODIUM LACTATE, POTASSIUM CHLORIDE, CALCIUM CHLORIDE 600; 310; 30; 20 MG/100ML; MG/100ML; MG/100ML; MG/100ML
100 INJECTION, SOLUTION INTRAVENOUS CONTINUOUS
Status: DISCONTINUED | OUTPATIENT
Start: 2020-12-22 | End: 2020-12-22 | Stop reason: HOSPADM

## 2020-12-22 RX ORDER — CEFTRIAXONE 1 G/1
INJECTION, POWDER, FOR SOLUTION INTRAMUSCULAR; INTRAVENOUS
Status: DISCONTINUED
Start: 2020-12-22 | End: 2020-12-23 | Stop reason: HOSPADM

## 2020-12-22 RX ORDER — SODIUM CHLORIDE 0.9 % (FLUSH) 0.9 %
3 SYRINGE (ML) INJECTION AS NEEDED
Status: DISCONTINUED | OUTPATIENT
Start: 2020-12-22 | End: 2020-12-23 | Stop reason: HOSPADM

## 2020-12-22 RX ORDER — NALOXONE HYDROCHLORIDE 0.4 MG/ML
0.2 INJECTION, SOLUTION INTRAMUSCULAR; INTRAVENOUS; SUBCUTANEOUS AS NEEDED
Status: DISCONTINUED | OUTPATIENT
Start: 2020-12-22 | End: 2020-12-22 | Stop reason: HOSPADM

## 2020-12-22 RX ORDER — SODIUM CHLORIDE 0.9 % (FLUSH) 0.9 %
2 SYRINGE (ML) INJECTION AS NEEDED
Status: DISCONTINUED | OUTPATIENT
Start: 2020-12-22 | End: 2020-12-22 | Stop reason: HOSPADM

## 2020-12-22 RX ORDER — PROCHLORPERAZINE 25 MG/1
25 SUPPOSITORY RECTAL EVERY 12 HOURS PRN
Status: DISCONTINUED | OUTPATIENT
Start: 2020-12-22 | End: 2020-12-23 | Stop reason: HOSPADM

## 2020-12-22 RX ORDER — CALCIUM CARBONATE 200(500)MG
500-1000 TABLET,CHEWABLE ORAL 4 TIMES DAILY PRN
Status: DISCONTINUED | OUTPATIENT
Start: 2020-12-22 | End: 2020-12-23 | Stop reason: HOSPADM

## 2020-12-22 RX ORDER — METOPROLOL TARTRATE 1 MG/ML
1 INJECTION, SOLUTION INTRAVENOUS EVERY 5 MIN PRN
Status: DISCONTINUED | OUTPATIENT
Start: 2020-12-22 | End: 2020-12-22 | Stop reason: HOSPADM

## 2020-12-22 RX ORDER — ONDANSETRON 4 MG/1
4 TABLET, FILM COATED ORAL EVERY 6 HOURS PRN
Status: DISCONTINUED | OUTPATIENT
Start: 2020-12-22 | End: 2020-12-23 | Stop reason: HOSPADM

## 2020-12-22 RX ORDER — METOCLOPRAMIDE HYDROCHLORIDE 5 MG/ML
INJECTION INTRAMUSCULAR; INTRAVENOUS
Status: COMPLETED
Start: 2020-12-22 | End: 2020-12-22

## 2020-12-22 RX ORDER — SODIUM CHLORIDE 0.9 % (FLUSH) 0.9 %
2 SYRINGE (ML) INJECTION EVERY 8 HOURS SCHEDULED
Status: DISCONTINUED | OUTPATIENT
Start: 2020-12-22 | End: 2020-12-22 | Stop reason: HOSPADM

## 2020-12-22 RX ORDER — ONDANSETRON HYDROCHLORIDE 2 MG/ML
4 INJECTION, SOLUTION INTRAVENOUS ONCE AS NEEDED
Status: DISCONTINUED | OUTPATIENT
Start: 2020-12-22 | End: 2020-12-22 | Stop reason: HOSPADM

## 2020-12-22 RX ORDER — PROCHLORPERAZINE EDISYLATE 5 MG/ML
10 INJECTION INTRAMUSCULAR; INTRAVENOUS EVERY 6 HOURS PRN
Status: DISCONTINUED | OUTPATIENT
Start: 2020-12-22 | End: 2020-12-23 | Stop reason: HOSPADM

## 2020-12-22 RX ORDER — OXYCODONE HYDROCHLORIDE 5 MG/1
5-10 TABLET ORAL EVERY 4 HOURS PRN
Status: DISCONTINUED | OUTPATIENT
Start: 2020-12-22 | End: 2020-12-23 | Stop reason: HOSPADM

## 2020-12-22 RX ORDER — FAMOTIDINE 10 MG/ML
20 INJECTION INTRAVENOUS ONCE
Status: COMPLETED | OUTPATIENT
Start: 2020-12-22 | End: 2020-12-22

## 2020-12-22 RX ORDER — AMOXICILLIN 250 MG
1-2 CAPSULE ORAL 2 TIMES DAILY
Status: DISCONTINUED | OUTPATIENT
Start: 2020-12-22 | End: 2020-12-23 | Stop reason: HOSPADM

## 2020-12-22 RX ORDER — HYDROMORPHONE HYDROCHLORIDE 1 MG/ML
.4-.6 INJECTION, SOLUTION INTRAMUSCULAR; INTRAVENOUS; SUBCUTANEOUS
Status: DISCONTINUED | OUTPATIENT
Start: 2020-12-22 | End: 2020-12-23 | Stop reason: HOSPADM

## 2020-12-22 RX ORDER — ONDANSETRON HYDROCHLORIDE 2 MG/ML
4 INJECTION, SOLUTION INTRAVENOUS EVERY 6 HOURS PRN
Status: DISCONTINUED | OUTPATIENT
Start: 2020-12-22 | End: 2020-12-23 | Stop reason: HOSPADM

## 2020-12-22 RX ORDER — PROCHLORPERAZINE MALEATE 10 MG
10 TABLET ORAL EVERY 6 HOURS PRN
Status: DISCONTINUED | OUTPATIENT
Start: 2020-12-22 | End: 2020-12-23 | Stop reason: HOSPADM

## 2020-12-22 RX ADMIN — METOCLOPRAMIDE HYDROCHLORIDE 10 MG: 5 INJECTION INTRAMUSCULAR; INTRAVENOUS at 12:40

## 2020-12-22 RX ADMIN — DEXMEDETOMIDINE HYDROCHLORIDE 8 MCG: 4 INJECTION, SOLUTION INTRAVENOUS at 14:53

## 2020-12-22 RX ADMIN — Medication 100 MCG: at 15:34

## 2020-12-22 RX ADMIN — PROPOFOL 130 MG: 10 INJECTION, EMULSION INTRAVENOUS at 14:29

## 2020-12-22 RX ADMIN — DEXAMETHASONE SODIUM PHOSPHATE 4 MG: 4 INJECTION, SOLUTION INTRAMUSCULAR; INTRAVENOUS at 15:26

## 2020-12-22 RX ADMIN — Medication 100 MCG: at 15:19

## 2020-12-22 RX ADMIN — FENTANYL CITRATE 50 MCG: 0.05 INJECTION, SOLUTION INTRAMUSCULAR; INTRAVENOUS at 14:38

## 2020-12-22 RX ADMIN — FAMOTIDINE 20 MG: 10 INJECTION, SOLUTION INTRAVENOUS at 12:40

## 2020-12-22 RX ADMIN — CEFTRIAXONE 1000 MG: 1 INJECTION, POWDER, FOR SOLUTION INTRAMUSCULAR; INTRAVENOUS at 14:38

## 2020-12-22 RX ADMIN — LIDOCAINE HYDROCHLORIDE 100 MG: 20 INJECTION, SOLUTION EPIDURAL; INFILTRATION; INTRACAUDAL; PERINEURAL at 14:29

## 2020-12-22 RX ADMIN — SODIUM CHLORIDE, POTASSIUM CHLORIDE, SODIUM LACTATE AND CALCIUM CHLORIDE 100 ML/HR: 600; 310; 30; 20 INJECTION, SOLUTION INTRAVENOUS at 12:41

## 2020-12-22 RX ADMIN — SODIUM CHLORIDE 100 ML/HR: 9 INJECTION, SOLUTION INTRAVENOUS at 22:44

## 2020-12-22 RX ADMIN — FENTANYL CITRATE 50 MCG: 0.05 INJECTION, SOLUTION INTRAMUSCULAR; INTRAVENOUS at 14:29

## 2020-12-22 RX ADMIN — DEXMEDETOMIDINE HYDROCHLORIDE 12 MCG: 4 INJECTION, SOLUTION INTRAVENOUS at 14:47

## 2020-12-22 RX ADMIN — ONDANSETRON 4 MG: 2 INJECTION INTRAMUSCULAR; INTRAVENOUS at 15:26

## 2020-12-22 RX ADMIN — FENTANYL CITRATE 50 MCG: 0.05 INJECTION, SOLUTION INTRAMUSCULAR; INTRAVENOUS at 14:42

## 2020-12-22 RX ADMIN — Medication 100 MCG: at 15:07

## 2020-12-22 RX ADMIN — FAMOTIDINE 20 MG: 10 INJECTION INTRAVENOUS at 12:40

## 2020-12-22 ASSESSMENT — ENCOUNTER SYMPTOMS: DYSRHYTHMIAS: 1

## 2020-12-22 NOTE — H&P
"[facility]  Urology    CC:  \"I am here for my bladder\"    HPI:  Cesar Yuen is a 70 y.o. male with a complex PMH, presenting with a bladder lesion found on cystoscopy, bladder outlet obstruction, large bladder diverticulum, and urinary retention. He has elected to proceed with TURBT and TURP. He has held his blood thinner. He has not had infection since we saw him.    ROS:  General ROS: negative for - chills, fatigue, fever, unexplained weight loss or lack of appetite  Respiratory ROS: no cough, shortness of breath, or wheezing  Cardiovascular ROS: no chest pain or dyspnea on exertion, no palpitations  Gastrointestinal ROS: no abdominal pain, change in bowel habits, or black or bloody stools  Genito-Urinary ROS: see above   Musculoskeletal ROS: negative for - gait disturbance or injury, muscle or joint aches  All other systems reviewed and are negative.    Allergies:  No Known Allergies    Medications:  Current Facility-Administered Medications   Medication Dose Route Frequency Provider Last Rate Last Admin   • cefTRIAXone (ROCEPHIN) 1,000 mg in normal saline 50 mL IVPB - MBP  1,000 mg intravenous Once Daniele Johnson MD       • cefTRIAXone (ROCEPHIN) IV Push            • sodium chloride flush 2 mL  2 mL intravenous q8h JOY Dustin Handley MD       • sodium chloride flush 2 mL  2 mL intravenous PRN Dustin Handley MD       • LR infusion  100 mL/hr intravenous Continuous Dustin Handley  mL/hr at 12/22/20 1241 100 mL/hr at 12/22/20 1241       Past Medical History:  Past Medical History:   Diagnosis Date   • Atrial flutter (CMS/HCC) (HCC)    • Chipped tooth    • Dysrhythmia    • Edema     BLE   • History of transfusion    • Hypertension    • Urinary retention     indwelling Pope in place since November 2020        Past Surgical History:  Past Surgical History:   Procedure Laterality Date   • ABDOMINAL SURGERY      anuerysm   • APPENDECTOMY     • HERNIA REPAIR      x 3        Social " History:  Social History     Tobacco Use   • Smoking status: Never Smoker   • Smokeless tobacco: Never Used   Substance Use Topics   • Alcohol use: Not Currently     Frequency: Monthly or less     Drinks per session: 1 or 2     Binge frequency: Never       Family History:      Family History   Problem Relation Age of Onset   • Heart attack Mother's Brother        Physical Exam:      /90   Pulse 107   Temp 36.8 °C (98.2 °F) (Temporal)   Resp 18   Wt 104.3 kg (230 lb)   SpO2 93%   BMI 30.34 kg/m²   Gen - NAD, well appearing  HEENT - NC/AT, anicteric sclerae, MMM  Neck - no LA; trachea midline  CV - RRR, no IWOB  Chest - CTAB  Abd - soft, nttp, nd; no HSM   - no CVAT  Ext - WWP, no c/c/e  Skin - no rashes  Psych - normal affect      Results:  CBC: No results found for: WBC, RBC, HGB, HCT, PLT  CMP: No results found for: NA, K, BUN, CREATININE, CALCIUM, ALKPHOS, BILITOT, ALT, AST  BMP: No results found for: NA, K, BUN, CREATININE, CALCIUM    Creatinine   Date Value Ref Range Status   10/27/2020 0.81 0.70 - 1.30 mg/dL Final   10/25/2020 1.32 (H) 0.70 - 1.30 mg/dL Final   10/18/2020 0.86 0.70 - 1.30 mg/dL Final   10/12/2020 0.94 0.70 - 1.30 mg/dL Final   06/10/2020 0.89 0.70 - 1.30 mg/dL Final     Lab Results   Component Value Date    PSA 1.22 06/18/2020           Assessment & Plan:  Cesar Yuen is a 70 y.o. male with a complex PMH, presenting with a bladder lesion found on cystoscopy, bladder outlet obstruction, large bladder diverticulum, and urinary retention.    Again, risks, benefits, possible complications of TURBT and TURP reviewed. We also discussed alternative treatment strategies, such as CIC. Informed consent obtained.        ---------------------------------  Daniele Johnson MD  12/22/20 1:44 PM

## 2020-12-22 NOTE — OP NOTE
Cesar Yuen  12/22/20    PREOPERATIVE DIAGNOSIS:  Pre-op Diagnosis     * Lesion of bladder [N32.9]    POSTOPERATIVE DIAGNOSIS:  Post-op Diagnosis     * Lesion of bladder [N32.9]    PROCEDURE:    Procedures:    * CYSTOSCOPY WITH BLADDER BIOPSY    * CYSTOSCOPY TRANSURETHRAL RESECTION PROSTATE, exam under anasthesia      SURGEON: Surgeon(s):  Daniele Johnson MD      ASSISTANT:   None    ANESTHESIA TYPE:  General      SPECIMENS:   Order Name Source Comment Collection Info Order Time   PATHOLOGY SPECIMEN (HISTOLOGY) Urinary Bladder  Collected By: Daniele Johnson MD 12/22/2020  2:44 PM   PATHOLOGY SPECIMEN (HISTOLOGY) Urinary Bladder  Collected By: Daniele Johnson MD 12/22/2020  2:44 PM   PATHOLOGY SPECIMEN (HISTOLOGY) Prostate  Collected By: Daniele Johnson MD 12/22/2020  2:54 PM       DRAINS:    Urethral Catheter Latex;Triple-lumen 22 Fr. (Active)         COMPLICATIONS:  None      DESCRIPTION OF PROCEDURE:   After informed consent was confirmed the patient was brought to the operating room where anesthesia was induced uneventfully.  He received IV ceftriaxone for perioperative coverage.  Pneumoboots were on running prior to induction.  He was placed in the gentle dorsolithotomy position with all pressure points carefully padded.  He was prepped and draped in usual sterile fashion.  A WHO hard stop timeout was conducted by members of the team.    We began the procedure by introducing a 22 Kazakh Storz cystoscope per urethra and into the bladder.  We surveyed the bladder with both a 30 and 70 degree lenses.  There is some catheter cystitis on the posterior wall, and some erythematous patches on the posterior wall as well.  Within the posterior depth of the large right bladder diverticulum, there was a small area of erythema.  Both the posterior wall location, as well as the diverticular location, were sampled with the cold cup biopsy forceps.  Using the ball bipolar electrocautery, these locations were  fulgurated, total area fulgurated approximately 1.5 cm in diameter.  Adequate hemostasis was ensured on low pressure.    We then switched to the 26 Yoruba resectoscope.  Using the loop bipolar electrocautery, we began resecting the prostate down to the level of the surgical capsule.  Care was taken to stay proximal to the verumontanum in order to spare the sphincter.  Once we had adequately reached the surgical capsule, we obtained adequate hemostasis with the ball bipolar electrocautery.  We then evacuated the prostate chips from the bladder.  Care was taken to evacuate any chips that had fallen into the diverticulum.  Final survey of the prostate demonstrated adequate hemostasis.  The UOs were far from the resection, and spared.    A 20 Yoruba catheter was placed with the aid of a Mandarin cath guide.  Continuous bladder irrigation was begun.      Exam under anesthesia revealed a circumcised phallus without lesions, both testes descended without masses.  HARISH revealed a 40 cc gland without any concerning nodules.  Bimanual was negative.    Patient was brought out of dorsolithotomy position.  He woke from anesthesia uneventfully and was returned to the PACU in stable condition.    No blood loss documented.    Daniele Johnson MD  Phone Number: [phone]    DATE: 12/22/20      TIME: 3:37 PM

## 2020-12-22 NOTE — ANESTHESIA PROCEDURE NOTES
Airway  Urgency: elective    Airway not difficult    General Information and Staff    Patient location during procedure: OR  Anesthesiologist: Dustin Handley MD  CRNA: Tonio Claudio CRNA  Other anesthesia staff: LISSETH Bridges  Performed: CRNA and other anesthesia staff     Indications and Patient Condition  Indications for airway management: anesthesia  Spontaneous ventilation: present  Sedation level: deep  Preoxygenated: yes  Patient position: sniffing  MILS maintained throughout  Mask difficulty assessment: 1 - vent by mask    Final Airway Details  Final airway type: supraglottic airway      Successful airway: unique  Size 5  SGA cuff pressure (cm H2O): Minimal occlusive pressure.  Placement verified by: chest auscultation   Number of attempts at approach: 1

## 2020-12-22 NOTE — PERIOPERATIVE NURSING NOTE
MINDY Buck RN met patient in [facility], per report patient stated that he did not want staff to update any family onhis behalf throughout the procedure, only to call brother Joshua when ready to leave.

## 2020-12-22 NOTE — ANESTHESIA PREPROCEDURE EVALUATION
"Pre-Procedure Assessment    Patient: Cesar Yuen, male, 70 y.o.    Ht Readings from Last 1 Encounters:   11/18/20 1.854 m (6' 1\")     Wt Readings from Last 1 Encounters:   12/22/20 104.3 kg (230 lb)       Last Vitals  /90 (12/22/20 1209)    Temp 36.8 °C (98.2 °F) (12/22/20 1209)    Pulse 107 (12/22/20 1209)   Resp 18 (12/22/20 1209)    SpO2 93 % (12/22/20 1209)    Pain Score         Problem list reviewed and Medical history reviewed           Airway   Mallampati: II  TM distance: >3 FB  Neck ROM: full      Dental      Pulmonary - negative ROS    breath sounds clear to auscultation  Cardiovascular   (+) hypertension, dysrhythmias,     Rhythm: regular  Rate: normal    Mental Status/Neuro/Psych - negative ROS   Pt is alert.        GI/Hepatic/Renal    (+) urinary retention    Endo/Other    (+) history of blood transfusion,   Abdominal           Social History     Tobacco Use   • Smoking status: Never Smoker   • Smokeless tobacco: Never Used   Substance Use Topics   • Alcohol use: Not Currently     Frequency: Monthly or less     Drinks per session: 1 or 2     Binge frequency: Never      Hematology   WBC   Date Value Ref Range Status   10/27/2020 7.2 3.7 - 9.6 10*3/uL Final     RBC   Date Value Ref Range Status   10/27/2020 4.24 4.10 - 5.80 10*6/µL Final     MCV   Date Value Ref Range Status   10/27/2020 92.0 82.0 - 97.0 fL Final     Hemoglobin   Date Value Ref Range Status   10/27/2020 13.0 (L) 13.2 - 17.2 g/dL Final     Hematocrit   Date Value Ref Range Status   10/27/2020 39.0 38.0 - 50.0 % Final     Platelets   Date Value Ref Range Status   10/27/2020 288 130 - 350 10*3/uL Final      Coagulation   Protime   Date Value Ref Range Status   02/13/2020 12.6 (H) 9.4 - 12.5 seconds Final     PTT   Date Value Ref Range Status   02/13/2020 33.8 25.1 - 36.5 SECONDS Final     INR   Date Value Ref Range Status   02/13/2020 1.1 <=1.1 Final      General Chemistry   Calcium   Date Value Ref Range Status   10/27/2020 8.8 " 8.6 - 10.3 mg/dL Final     BUN   Date Value Ref Range Status   10/27/2020 23 7 - 25 mg/dL Final     Creatinine   Date Value Ref Range Status   10/27/2020 0.81 0.70 - 1.30 mg/dL Final     Glucose   Date Value Ref Range Status   10/27/2020 110 (H) 70 - 105 mg/dL Final     Sodium   Date Value Ref Range Status   10/27/2020 137 135 - 145 mmol/L Final     Potassium   Date Value Ref Range Status   10/27/2020 3.5 3.5 - 5.1 mmol/L Final     CO2   Date Value Ref Range Status   10/27/2020 25 21 - 32 mmol/L Final     Chloride   Date Value Ref Range Status   10/27/2020 104 98 - 107 mmol/L Final     Anesthesia Plan    ASA 3   NPO status reviewed: > 6 hours    General         Induction: intravenous   Airway Planning: LMA          Plan for postoperative opioid use.    Anesthetic plan and risks discussed with patient.      Plan discussed with CRNA.

## 2020-12-23 VITALS
DIASTOLIC BLOOD PRESSURE: 64 MMHG | TEMPERATURE: 97.7 F | WEIGHT: 246.91 LBS | BODY MASS INDEX: 32.72 KG/M2 | OXYGEN SATURATION: 91 % | HEART RATE: 101 BPM | HEIGHT: 73 IN | RESPIRATION RATE: 16 BRPM | SYSTOLIC BLOOD PRESSURE: 118 MMHG

## 2020-12-23 PROCEDURE — 93010 ELECTROCARDIOGRAM REPORT: CPT | Mod: NCNR | Performed by: INTERNAL MEDICINE

## 2020-12-23 PROCEDURE — 2590000100 HC RX 259: Performed by: UROLOGY

## 2020-12-23 PROCEDURE — 6370000100 HC RX 637 (ALT 250 FOR IP): Performed by: UROLOGY

## 2020-12-23 PROCEDURE — G0378 HOSPITAL OBSERVATION PER HR: HCPCS

## 2020-12-23 RX ORDER — SULFAMETHOXAZOLE AND TRIMETHOPRIM 800; 160 MG/1; MG/1
1 TABLET ORAL 2 TIMES DAILY
Qty: 10 TABLET | Refills: 0 | Status: SHIPPED | OUTPATIENT
Start: 2020-12-23 | End: 2020-12-28

## 2020-12-23 RX ORDER — OXYCODONE HYDROCHLORIDE 5 MG/1
5 TABLET ORAL EVERY 4 HOURS PRN
Qty: 15 TABLET | Refills: 0 | Status: SHIPPED | OUTPATIENT
Start: 2020-12-23 | End: 2020-12-30

## 2020-12-23 RX ADMIN — DOCUSATE SODIUM 50MG AND SENNOSIDES 8.6MG 1 TABLET: 8.6; 5 TABLET, FILM COATED ORAL at 08:24

## 2020-12-23 RX ADMIN — METOPROLOL SUCCINATE 25 MG: 25 TABLET, EXTENDED RELEASE ORAL at 08:24

## 2020-12-23 ASSESSMENT — ACTIVITIES OF DAILY LIVING (ADL)
PATIENT'S MEMORY ADEQUATE TO SAFELY COMPLETE DAILY ACTIVITIES?: YES
ADEQUATE_TO_COMPLETE_ADL: YES

## 2020-12-23 NOTE — INTERDISCIPLINARY/THERAPY
Case Management Discharge Note  [phone]    Discharge Disposition: home     Transportation: has a ride     Specialty Referrals: f/u with urology as scheduled     Support System Notified: none notified by this CM

## 2020-12-23 NOTE — PROGRESS NOTES
"Urology Daily Progress Note    Cesar Yuen   :  1950   Age: 70 y.o.  MRN: [id number]  Primary Care Physician:  FELY BAILEY MD   Attending Physician: Daniele Johnson MD  Code Status: Full Code  Admit Date:  2020    Date of Service: 2020  Admitting Diagnosis: No Principal Problem: There is no principal problem currently on the Problem List. Please update the Problem List and refresh.    Primary Urologist: Daniele Johnson    Patient Summary:   Cesar Yuen is a 70 y.o. male with a PMHx of BPH,  admitted on 2020 for  TURP.    Interval History:  No events    Physical Exam:  Patient Vitals for the past 24 hrs:   BP Temp Temp src Pulse Resp SpO2 Height Weight   20 0824 118/64 -- -- 101 -- -- 1.854 m (6' 1\") --   20 0422 113/68 36.5 °C (97.7 °F) Oral 78 16 91 % -- --   20 0322 103/70 -- -- 108 22 90 % -- --   20 0220 109/66 -- -- 96 20 92 % -- --   20 0122 108/58 36.4 °C (97.5 °F) Axillary 101 16 90 % -- --   20 0022 112/76 -- -- 87 16 99 % -- --   20 0000 -- -- -- -- -- -- -- 112 kg (246 lb 14.6 oz)   20 2352 110/79 -- -- 58 15 98 % -- --   20 2322 108/67 -- -- 76 18 91 % -- --   20 113/81 36.7 °C (98.1 °F) Oral 75 18 91 % -- --   20 118/71 37.1 °C (98.8 °F) Oral 114 18 91 % -- --   20 -- 36.4 °C (97.5 °F) Temporal 77 16 94 % -- --   12/22/20 1930 106/73 -- -- 80 18 94 % -- --   20 1900 111/70 -- -- 82 18 93 % -- --   20 1830 114/75 -- -- 88 17 94 % -- --   20 1800 117/80 -- -- 89 18 93 % -- --   20 1745 127/81 -- -- 65 23 94 % -- --   20 1730 117/82 -- -- 66 15 94 % -- --   20 1715 127/90 -- -- 65 15 96 % -- --   20 1700 113/77 -- -- 67 16 93 % -- --   20 1645 118/86 -- -- 65 12 96 % -- --   20 1630 123/84 -- -- 65 13 99 % -- --   20 1615 116/82 -- -- 65 11 97 % -- --   20 1600 112/80 -- -- 65 11 96 % -- --   20 1555 110/78 -- " -- 64 11 97 % -- --   12/22/20 1550 108/79 -- -- 64 12 96 % -- --   12/22/20 1545 117/81 -- -- 63 11 97 % -- --   12/22/20 1540 132/85 -- -- 62 12 96 % -- --   12/22/20 1538 129/89 36.2 °C (97.2 °F) Temporal 63 12 97 % -- --       I/O last 3 completed shifts:  In: 1310.2 [P.O.:250; I.V.:1060.2]  Out: 3650 [Urine:3650]      Gen: no acute distress  Resp: no respiratory distress  Abdomen: soft, nondistended, no tenderness  : No CVAT, fuchs draining yellow urine off a drip    Labs:  CBC RESULTS: No results found for: WBC, RBC, HGB, HCT, MCV, MCH, MCHC, RDW, PLT    Last Basic Metabolic Panel:  No results found for: NA  No results found for: K  No results found for: CALCIUM  No results found for: BUN  No results found for: CREATININE  No results found for: GLUCOSE      Assessment and Plan:  Cesar Yuen is a 70 y.o. male with a PMHx of BPH,  admitted on 12/22/2020 for  TURP.    - Doing well  - D/c home today with catheter      Daniele Johnson MD  12/23/20 1:24 PM

## 2020-12-23 NOTE — NURSING END OF SHIFT
Nursing End of Shift Summary:    Patient: Cesar Yuen  MRN: [id number]  : 1950, Age: 70 y.o.    Location: [address] [place] [address]    Nursing Goals  Clinical Goals for the Shift: Rest, safety, monitor vitals and CBI.    Narrative Summary of Progress Toward Clinical Goals:  Slept some. Denies pain. Vital signs stable. CBI continues with clear yellow urine returned. Safety maintained.    Barriers to Goals/Nursing Concerns:  No    New Patient or Family Concerns/Issues:  No    Shift Summary:      Significant Events & Communications to Providers (last 12 hours)      Last 5 Values    No documentation.              Oxygen Usage (last 12 hours)      Last 5 Values     Row Name 20                   Oxygen Weaning Trial by Nursing    Is Patient on Room Air OR on the Same Amount of O2 as at Home?  Yes                   Mobility (last 12 hours)      Last 5 Values     Row Name 20 17320 2252 20 0000          Mobility    Activity  --  --  Chair  --     Level of Assistance  --  --  Independent  --     Anti-Embolism Devices  Bilateral;AE calf pump  Bilateral;AE calf pump  --  Bilateral;AE calf pump         Urethral Catheter    Active Urethral Catheter     Name:   Placement date:   Placement time:   Site:   Days:    Urethral Catheter Latex;Triple-lumen 22 Fr.   20    1517     less than 1    Continuous Bladder Irrigation   20    --     1            Active Lines    Active Central venous catheter / Peripherally inserted central catheter / Implantable Port / Hemodialysis catheter / Midline Catheter     None              Infusing Medications   Medication Dose Last Rate   • sodium chloride 0.9 %  100 mL/hr 100 mL/hr (20 0000)     PRN Medications   Medication Dose Last Admin   • sodium chloride  3 mL     • acetaminophen  325-650 mg     • oxyCODONE  5-10 mg     • HYDROmorphone  0.4-0.6 mg     • ondansetron  4 mg      Or   • ondansetron ODT  4 mg      Or   • ondansetron  4 mg     •  prochlorperazine  10 mg      Or   • prochlorperazine  10 mg      Or   • prochlorperazine  25 mg     • calcium carbonate  500-1,000 mg     • alum-mag hydroxide-simeth  15-30 mL     • belladonna alkaloids-opium  1 suppository       _________________________  Lexi Benton RN  12/23/20 4:31 AM

## 2020-12-23 NOTE — PERIOPERATIVE NURSING NOTE
2010:  Report phoned to Bertin.  Pt resting to cart and looking on phone. Denies pain and voices warmth.  Pope emptied and urine remains clear without clots noted.

## 2020-12-23 NOTE — ANESTHESIA POSTPROCEDURE EVALUATION
Patient: Cesar Yuen    Procedure Summary     Date: 12/22/20 Room / Location: [facility] OR 08 / [facility] OR    Anesthesia Start: 1425 Anesthesia Stop: 1541    Procedures:       CYSTOSCOPY WITH BLADDER BIOPSY (N/A Urethra)      CYSTOSCOPY TRANSURETHRAL RESECTION PROSTATE, exam under anasthesia (N/A Urethra) Diagnosis:       Lesion of bladder      (Lesion of bladder [N32.9])    Surgeons: Daniele Johnson MD Responsible Provider: Dustin Handley MD    Anesthesia Type: general ASA Status: 3          Anesthesia Type: general    Last vitals  Vitals Value Taken Time   /73 12/22/20 1930   Temp 36.4 °C (97.5 °F) 12/22/20 2000   Pulse 77 12/22/20 2000   Resp 16 12/22/20 2000   SpO2 94 % 12/22/20 2000   Pain Score         Anesthesia Post Evaluation    Patient location during evaluation: PACU  Patient participation: complete - patient participated  Level of consciousness: awake and alert  Pain management: adequate  Airway patency: patent  Anesthetic complications: no  Cardiovascular status: acceptable  Respiratory status: acceptable  Hydration status: acceptable  May dismiss recovered patient based on consultation with the appropriate physicians and/or meeting appropriate discharge criteria      Cosmetic?  This procedure is not cosmetic.

## 2020-12-28 ENCOUNTER — TELEPHONE (OUTPATIENT)
Dept: CARDIOLOGY | Facility: CLINIC | Age: 70
End: 2020-12-28

## 2020-12-28 DIAGNOSIS — N40.1 BPH WITH OBSTRUCTION/LOWER URINARY TRACT SYMPTOMS: ICD-10-CM

## 2020-12-28 DIAGNOSIS — Z01.812 ENCOUNTER FOR PREPROCEDURAL LABORATORY EXAMINATION: Primary | ICD-10-CM

## 2020-12-28 DIAGNOSIS — N13.8 BPH WITH OBSTRUCTION/LOWER URINARY TRACT SYMPTOMS: ICD-10-CM

## 2020-12-28 DIAGNOSIS — N32.3 BLADDER DIVERTICULUM: ICD-10-CM

## 2020-12-28 RX ORDER — SULFAMETHOXAZOLE AND TRIMETHOPRIM 800; 160 MG/1; MG/1
1 TABLET ORAL 2 TIMES DAILY
OUTPATIENT
Start: 2020-12-28 | End: 2021-01-02

## 2020-12-28 NOTE — TELEPHONE ENCOUNTER
Procedure:  You are scheduled for an A-flutter ablation on 2/8/21 at 0900 with Dr. Burk at [facility].    Preadmit:  Please arrive at 0615 on 2/8/21 to the  of the hospital for your Preadmission appointment at 0630 per Demetrice.  Please bring all of your medications in their original container or have a complete, current and accurate list of all your medications, dosages and instructions.      [facility] currently has a one visitor policy due to COVID-19 mitigation.  You will be screened at the front door for any symptoms you are currently having, contact with suspected or positive COVID-19 cases and have your temperature taken.  Please wear a mask or cloth mask while you are in the hospital.     You may not have anything to eat or drink after midnight on the day of your procedure.      You may take your morning medications with a sip of water with the exception of any vitamins or supplements and diuretics. You do not have to hold your Eliquis for this procedure.     You will need to have a  and someone to stay with you after your procedure.    You will need to have a swab done to test you for COVID-19 on 2/1/21 at 1000 at [address].  Please follow strict social distancing guidelines between the time of your COVID-19 test and your scheduled procedure.     Marco verbalizes understanding of instructions and denies any further questions.

## 2020-12-28 NOTE — LETTER
December 29, 2020    Cesar Yuen  [address]  [place] [facility] [place] [address]      Dear Mr. Yuen:    Procedure:  You are scheduled for an A-flutter ablation on 2/8/21 with Dr. Burk at [facility].    Preadmit:  Please arrive at 6:15 AM on 2/8/21 to the  of the hospital for your Preadmission appointment at 6:30 AM per Demetrice.  Please bring all of your medications in their original container or have a complete, current and accurate list of all your medications, dosages and instructions.      [facility] currently has a one visitor policy due to COVID-19 mitigation.  You will be screened at the front door for any symptoms you are currently having, contact with suspected or positive COVID-19 cases and have your temperature taken.  Please wear a mask or cloth mask while you are in the hospital.     You may not have anything to eat or drink after midnight on the day of your procedure.      You may take your morning medications with a sip of water with the exception of any vitamins or supplements and diuretics. You do not have to hold your Eliquis for this procedure.     You will need to have a  and someone to stay with you after your procedure.    You will need to have a swab done to test you for COVID-19 on 2/1/21 at 10:00 AM at [address].  Please follow strict social distancing guidelines between the time of your COVID-19 test and your scheduled procedure.       If you have any questions or concerns, please don't hesitate to call me at [phone].    Sincerely,             Liudmila Chávez RN                Social distancing guidelines for [place] facilities request that:    • If you are scheduled for elective surgery, you must be tested at least 7 days prior to the scheduled procedure date.  • You will be asked to follow strict social distancing guidelines for the 7 days leading up to your procedure.  • You are encouraged to wear a mask if you are leaving your  home.  • You are allowed to work, if you can practice strict social distancing as a part of your normal job.  • You are allowed to attend other medically necessary appointments.  • You should refrain from trips, vacations or entering any unnecessary establishments during this seven day period.  • You must continue strict hand washing protocol.

## 2020-12-28 NOTE — TELEPHONE ENCOUNTER
----- Message from Herminia Zamudio LPN sent at 12/14/2020 10:20 AM [facility] -----  Regarding: FW: Procedure Scheduling  Orders placed for flutter ablation and loop insert.   No meds to hold  ----- Message -----  From: Timoteo Burk DO  Sent: 12/10/2020  11:20 AM [facility]  To: , #  Subject: Procedure Scheduling                             Please set this patient up for atrial flutter ablation and ILR implant.     He prefers to coordinate this with his admission for bladder biopsy.  The issue is going to be that urology will likely want him off the Eliquis in which case we will need to do a PHILLY or wait for several weeks before doing the procedure.      Thanks,     Timoteo

## 2020-12-28 NOTE — TELEPHONE ENCOUNTER
I spoke with Cesar.  He is off his Eliquis and has been instructed to restart on 1/1/21. He wants to make sure that all of his bladder problems are resolved before having his ablation.  He would like his ablation on 2/8/20 and [place]00 [place] 1400. I will call him back with specifics once he is scheduled.

## 2020-12-29 ENCOUNTER — TELEPHONE (OUTPATIENT)
Dept: UROLOGY | Facility: CLINIC | Age: 70
End: 2020-12-29

## 2020-12-29 NOTE — TELEPHONE ENCOUNTER
12/29/20 Called and left message for patient to call back to reschedule.    ----- Message from Daniele Johnson MD sent at 12/22/2020  3:40 PM MST -----  Hung Byers,    Let's push Cesar's post-op appointment back. He is scheduled for 12/30, but he needs to keep his catheter for 2 weeks. He'll be going home from the hospital tomorrow.

## 2020-12-30 NOTE — TELEPHONE ENCOUNTER
Pt has been calling and has been hanging up. He stated that he thinks his hearing aid is messing with his phone and that he does not mean to hang up.

## 2020-12-30 NOTE — TELEPHONE ENCOUNTER
Pt is returning a call to Shira regarding rescheduling his appt for 1 or so weeks out.   Please call back at [phone]

## 2020-12-30 NOTE — TELEPHONE ENCOUNTER
Pt is calling stating that he knows that he is not to come to his appt today. He stated that he knows it is to be rescheduled for next week Wednesday. He just needs a time.  Please call back at [phone]

## 2021-01-06 ENCOUNTER — OFFICE VISIT (OUTPATIENT)
Dept: UROLOGY | Facility: CLINIC | Age: 71
End: 2021-01-06
Payer: MEDICARE

## 2021-01-06 VITALS
SYSTOLIC BLOOD PRESSURE: 132 MMHG | TEMPERATURE: 97.7 F | HEIGHT: 73 IN | WEIGHT: 246.91 LBS | BODY MASS INDEX: 32.72 KG/M2 | HEART RATE: 92 BPM | RESPIRATION RATE: 16 BRPM | OXYGEN SATURATION: 94 % | DIASTOLIC BLOOD PRESSURE: 80 MMHG

## 2021-01-06 DIAGNOSIS — R33.9 URINARY RETENTION: Primary | ICD-10-CM

## 2021-01-06 PROCEDURE — 99024 POSTOP FOLLOW-UP VISIT: CPT | Performed by: UROLOGY

## 2021-01-06 ASSESSMENT — PAIN SCALES - GENERAL: PAINLEVEL: 0-NO PAIN

## 2021-01-07 NOTE — PROGRESS NOTES
Urology Post-op    The patient returns today for routine postop visit.  He had a uncomplicated TURP on 12/22/2020.  He has had very little hematuria since we last saw him.  No infective symptoms.  His catheter has been draining well.  He resumed his blood thinners about 3 days ago without difficulty.  The pathology from his bladder biopsies and his TURP chips were negative.    On today's exam he is afebrile and well-appearing.  No flank pain or tenderness, abdomen is benign.  His catheter was draining clear urine.  He was backfilled and was able to easily urinate.    Patient was able to demonstrate good voiding function today.  We did discuss the risk of recurrent urinary retention.  He understands to come back to my office should he have difficulty urinating during the course of the afternoon.  We also discussed the risk of recurrent hematuria, which may have been on and off for the next 4 to 5 weeks.  He will abstain from heavy lifting, and avoid constipation in an attempt to avoid a clot retention episode.  Again he is on blood thinners for A. fib, which he has resumed currently without significant hematuria.  We will continue to try to keep his blood thinners on during this period of time.    All questions encouraged and answered.  Return precautions reviewed.  We will see him back in 6 weeks if he is doing well to assess his symptom response and lower urinary tract symptoms.

## 2021-01-12 ENCOUNTER — TELEPHONE (OUTPATIENT)
Dept: FAMILY MEDICINE | Facility: CLINIC | Age: 71
End: 2021-01-12

## 2021-01-15 NOTE — TELEPHONE ENCOUNTER
01/12/21-   HE MUST CALL [employer] [phone] TO SCHEDULE REFILLS.    CALLED COMPANY. THEY MISPLACED HIS APPLICATION. AGAIN. RESENT.  -[employer]  
01/14/21  ANOTHER COPY OF HIS MEDICARE CARD WAS REQUESTED AND SENT TO THE COMPANY-[employer]  
1/15/21-ASSISTANCE APPROVED. SHOULD RECEIVE IN 7-10 DAYS PER VIKTORIA RUSS  
No pertinent family history in first degree relatives

## 2021-01-16 ENCOUNTER — ANCILLARY PROCEDURE (OUTPATIENT)
Dept: RADIOLOGY | Facility: CLINIC | Age: 71
End: 2021-01-16
Payer: MEDICARE

## 2021-01-16 ENCOUNTER — OFFICE VISIT (OUTPATIENT)
Dept: URGENT CARE | Facility: CLINIC | Age: 71
End: 2021-01-16
Payer: MEDICARE

## 2021-01-16 VITALS
OXYGEN SATURATION: 91 % | HEART RATE: 117 BPM | DIASTOLIC BLOOD PRESSURE: 88 MMHG | TEMPERATURE: 99.2 F | RESPIRATION RATE: 18 BRPM | SYSTOLIC BLOOD PRESSURE: 142 MMHG

## 2021-01-16 DIAGNOSIS — J18.9 PNEUMONIA OF RIGHT LOWER LOBE DUE TO INFECTIOUS ORGANISM: ICD-10-CM

## 2021-01-16 DIAGNOSIS — R10.9 ABDOMINAL PAIN, UNSPECIFIED ABDOMINAL LOCATION: ICD-10-CM

## 2021-01-16 DIAGNOSIS — I50.31 ACUTE DIASTOLIC CONGESTIVE HEART FAILURE (CMS/HCC): Primary | ICD-10-CM

## 2021-01-16 DIAGNOSIS — I48.92 ATRIAL FLUTTER WITH RAPID VENTRICULAR RESPONSE (CMS/HCC): ICD-10-CM

## 2021-01-16 LAB
ALBUMIN SERPL-MCNC: 4.3 G/DL (ref 3.5–5.3)
ALP SERPL-CCNC: 51 U/L (ref 45–115)
ALT SERPL-CCNC: 12 U/L (ref 7–52)
ANION GAP SERPL CALC-SCNC: 8 MMOL/L (ref 3–11)
AST SERPL-CCNC: 13 U/L
BASOPHILS # BLD AUTO: 0.1 10*3/UL
BASOPHILS NFR BLD AUTO: 1 % (ref 0–2)
BILIRUB SERPL-MCNC: 1.81 MG/DL (ref 0.2–1.4)
BNP SERPL-MCNC: 67 PG/ML (ref 0–100)
BUN SERPL-MCNC: 13 MG/DL (ref 7–25)
CALCIUM ALBUM COR SERPL-MCNC: 9.4 MG/DL (ref 8.6–10.3)
CALCIUM SERPL-MCNC: 9.6 MG/DL (ref 8.6–10.3)
CHLORIDE SERPL-SCNC: 103 MMOL/L (ref 98–107)
CO2 SERPL-SCNC: 28 MMOL/L (ref 21–32)
CREAT SERPL-MCNC: 0.9 MG/DL (ref 0.7–1.3)
EOSINOPHIL # BLD AUTO: 0.1 10*3/UL
EOSINOPHIL NFR BLD AUTO: 1 % (ref 0–3)
ERYTHROCYTE [DISTWIDTH] IN BLOOD BY AUTOMATED COUNT: 14.6 % (ref 11.5–15)
GFR SERPL CREATININE-BSD FRML MDRD: 86 ML/MIN/1.73M*2
GLUCOSE SERPL-MCNC: 108 MG/DL (ref 70–105)
HCT VFR BLD AUTO: 46.2 % (ref 38–50)
HGB BLD-MCNC: 15.4 G/DL (ref 13.2–17.2)
LYMPHOCYTES # BLD AUTO: 1.8 10*3/UL
LYMPHOCYTES NFR BLD AUTO: 17 % (ref 15–47)
MCH RBC QN AUTO: 30.4 PG (ref 29–34)
MCHC RBC AUTO-ENTMCNC: 33.4 G/DL (ref 32–36)
MCV RBC AUTO: 91.2 FL (ref 82–97)
MONOCYTES # BLD AUTO: 1.2 10*3/UL
MONOCYTES NFR BLD AUTO: 12 % (ref 5–13)
NEUTROPHILS # BLD AUTO: 7.5 10*3/UL
NEUTROPHILS NFR BLD AUTO: 70 % (ref 46–70)
PLATELET # BLD AUTO: 257 10*3/UL (ref 130–350)
PMV BLD AUTO: 8.6 FL (ref 6.9–10.8)
POTASSIUM SERPL-SCNC: 4.1 MMOL/L (ref 3.5–5.1)
PROT SERPL-MCNC: 7.5 G/DL (ref 6–8.3)
RBC # BLD AUTO: 5.06 10*6/ΜL (ref 4.1–5.8)
SODIUM SERPL-SCNC: 139 MMOL/L (ref 135–145)
TROPONIN I SERPL-MCNC: 4.2 PG/ML
WBC # BLD AUTO: 10.6 10*3/UL (ref 3.7–9.6)

## 2021-01-16 PROCEDURE — 93010 ELECTROCARDIOGRAM REPORT: CPT | Performed by: FAMILY MEDICINE

## 2021-01-16 PROCEDURE — 93005 ELECTROCARDIOGRAM TRACING: CPT | Performed by: FAMILY MEDICINE

## 2021-01-16 PROCEDURE — 80053 COMPREHEN METABOLIC PANEL: CPT | Performed by: FAMILY MEDICINE

## 2021-01-16 PROCEDURE — 84484 ASSAY OF TROPONIN QUANT: CPT | Performed by: FAMILY MEDICINE

## 2021-01-16 PROCEDURE — 85025 COMPLETE CBC W/AUTO DIFF WBC: CPT | Performed by: FAMILY MEDICINE

## 2021-01-16 PROCEDURE — 99214 OFFICE O/P EST MOD 30 MIN: CPT | Mod: 25 | Performed by: FAMILY MEDICINE

## 2021-01-16 PROCEDURE — G0463 HOSPITAL OUTPT CLINIC VISIT: HCPCS | Performed by: FAMILY MEDICINE

## 2021-01-16 PROCEDURE — 83880 ASSAY OF NATRIURETIC PEPTIDE: CPT | Performed by: FAMILY MEDICINE

## 2021-01-16 PROCEDURE — 36415 COLL VENOUS BLD VENIPUNCTURE: CPT | Performed by: FAMILY MEDICINE

## 2021-01-16 PROCEDURE — 71046 X-RAY EXAM CHEST 2 VIEWS: CPT

## 2021-01-16 RX ORDER — METOPROLOL SUCCINATE 25 MG/1
50 TABLET, EXTENDED RELEASE ORAL DAILY
Qty: 60 TABLET | Refills: 11 | Status: SHIPPED | OUTPATIENT
Start: 2021-01-16 | End: 2022-05-18 | Stop reason: ALTCHOICE

## 2021-01-16 RX ORDER — AMOXICILLIN AND CLAVULANATE POTASSIUM 875; 125 MG/1; MG/1
1 TABLET, FILM COATED ORAL 2 TIMES DAILY
Qty: 14 TABLET | Refills: 0 | Status: SHIPPED | OUTPATIENT
Start: 2021-01-16 | End: 2021-01-23

## 2021-01-16 RX ORDER — FUROSEMIDE 20 MG/1
20 TABLET ORAL DAILY
Qty: 30 TABLET | Refills: 2 | Status: SHIPPED | OUTPATIENT
Start: 2021-01-16 | End: 2021-05-05

## 2021-01-16 ASSESSMENT — ENCOUNTER SYMPTOMS
DIARRHEA: 0
VOMITING: 0
ABDOMINAL PAIN: 1
RECTAL PAIN: 0
MUSCULOSKELETAL NEGATIVE: 1
STRIDOR: 0
ENDOCRINE NEGATIVE: 1
SHORTNESS OF BREATH: 1
BLOOD IN STOOL: 0
ANAL BLEEDING: 0
CONSTIPATION: 0
ABDOMINAL DISTENTION: 0
WHEEZING: 0
CONSTITUTIONAL NEGATIVE: 1
EYES NEGATIVE: 1

## 2021-01-16 NOTE — PROGRESS NOTES
Subjective      Cesar Yuen is a 70 y.o. male who presents for Abdominal Pain and Shortness of Breath     Patient has today complains of a fall.  This happened 2 days ago.  Does note persistent abdominal pain though it is improving.  Denies any fevers or chills.  Denies any change in stools or urination.  Denies any blood in his stool or urine.  Does endorse some increasing shortness of breath last few days.  Is on Eliquis.  Had a previous history of AAA repair.  Is taking metoprolol for atrial fibrillation.  Has a planned EP study in 1 month for atrial flutter.      Shortness of Breath  Associated symptoms: abdominal pain    Associated symptoms: no vomiting and no wheezing        The following have been reviewed and updated as appropriate in this visit:         No Known Allergies  Current Outpatient Medications   Medication Sig Dispense Refill   • metoprolol succinate XL (TOPROL-XL) 25 mg 24 hr tablet Take 2 tablets (50 mg total) by mouth daily 60 tablet 11   • furosemide (LASIX) 20 mg tablet Take 1 tablet (20 mg total) by mouth daily 30 tablet 2   • amoxicillin-pot clavulanate (Augmentin) 875-125 mg per tablet Take 1 tablet by mouth 2 (two) times a day for 7 days 14 tablet 0   • multivitamin (MULTI-DAY ORAL) Take by mouth       No current facility-administered medications for this visit.      Past Medical History:   Diagnosis Date   • Atrial flutter (CMS/HCC) (HCC)    • Chipped tooth    • Dysrhythmia    • Edema     BLE   • History of transfusion    • Hypertension    • Urinary retention     indwelling Pope in place since November 2020      Past Surgical History:   Procedure Laterality Date   • ABDOMINAL SURGERY      anuerysm   • APPENDECTOMY     • HERNIA REPAIR      x 3   • TRANSURETHRAL RESECTION OF BLADDER TUMOR N/A 12/22/2020    Procedure: CYSTOSCOPY WITH BLADDER BIOPSY;  Surgeon: Daniele Johnson MD;  Location: [facility] OR;  Service: Urology;  Laterality: N/A;   • TRANSURETHRAL RESECTION OF PROSTATE N/A  12/22/2020    Procedure: CYSTOSCOPY TRANSURETHRAL RESECTION PROSTATE, exam under anasthesia;  Surgeon: Daniele Johnson MD;  Location: [facility];  Service: Urology;  Laterality: N/A;     Family History   Problem Relation Age of Onset   • Heart attack Mother's Brother      Social History     Occupational History   • Occupation:    Tobacco Use   • Smoking status: Never Smoker   • Smokeless tobacco: Never Used   Substance and Sexual Activity   • Alcohol use: Not Currently     Frequency: Monthly or less     Drinks per session: 1 or 2     Binge frequency: Never   • Drug use: Not Currently   • Sexual activity: Defer   Social History Narrative   • Not on file       Review of Systems   Constitutional: Negative.    HENT: Negative.    Eyes: Negative.    Respiratory: Positive for shortness of breath. Negative for wheezing and stridor.    Gastrointestinal: Positive for abdominal pain. Negative for abdominal distention, anal bleeding, blood in stool, constipation, diarrhea, rectal pain and vomiting.   Endocrine: Negative.    Genitourinary: Negative.    Musculoskeletal: Negative.    Skin: Negative.        Objective     VITAL SIGNS  /88   Pulse 117   Temp 37.3 °C (99.2 °F) (Temporal)   Resp 18   SpO2 91%     Physical Exam  Vitals signs and nursing note reviewed.   Constitutional:       General: He is not in acute distress.     Appearance: He is well-developed. He is obese. He is not ill-appearing or toxic-appearing.   HENT:      Head: Normocephalic and atraumatic.      Nose: Nose normal.      Mouth/Throat:      Mouth: Mucous membranes are moist.   Eyes:      Pupils: Pupils are equal, round, and reactive to light.   Neck:      Musculoskeletal: Neck supple.   Cardiovascular:      Rate and Rhythm: Normal rate and regular rhythm.      Heart sounds: Normal heart sounds. No murmur. No gallop.    Pulmonary:      Effort: Pulmonary effort is normal.      Breath sounds: Rales (bilateral lower lobes) present. No wheezing.    Abdominal:      General: Bowel sounds are normal.      Palpations: Abdomen is soft.      Tenderness: There is abdominal tenderness.          Comments: Abdominal wall scar noted above.  He has mild tenderness but no guarding or rebound.   Musculoskeletal:      Right lower le+ Edema present.      Left lower le+ Edema present.   Skin:     General: Skin is warm.      Capillary Refill: Capillary refill takes less than 2 seconds.   Neurological:      Mental Status: He is alert and oriented to person, place, and time.         Lab Results   Component Value Date    GLUCOSE 108 (H) 2021    CALCIUM 9.6 2021     2021    K 4.1 2021    CO2 28 2021     2021    BUN 13 2021    CREATININE 0.90 2021    ANIONGAP 8 2021     Lab Results   Component Value Date    WBC 10.6 (H) 2021    HGB 15.4 2021    HCT 46.2 2021    MCV 91.2 2021     2021     No results found for: TSH  No results found for: HGBA1C   No results found for: CHOL, HDL, LDLCALC, TRIG  Lab Results   Component Value Date    SEDRATE 36 (H) 10/12/2020       Assessment/Plan   Problem List Items Addressed This Visit        Circulatory    Atrial flutter with rapid ventricular response (CMS/HCC) ([facility])    Relevant Medications    metoprolol succinate XL (TOPROL-XL) 25 mg 24 hr tablet    furosemide (LASIX) 20 mg tablet      Other Visit Diagnoses     Acute diastolic congestive heart failure (CMS/HCC) ([facility])    -  Primary    Relevant Medications    metoprolol succinate XL (TOPROL-XL) 25 mg 24 hr tablet    furosemide (LASIX) 20 mg tablet    Other Relevant Orders    ECG 12 lead -Normal, Today (Completed)    X-ray chest 2 views    B-type natriuretic peptide (BNP) Blood, Venous    HS Troponin I    Abdominal pain, unspecified abdominal location        Relevant Orders    CBC w/auto differential Blood, Venous (Completed)    Comprehensive metabolic panel Blood, Venous (Completed)     Pneumonia of right lower lobe due to infectious organism        Relevant Medications    amoxicillin-pot clavulanate (Augmentin) 875-125 mg per tablet        Patient is currently in atrial flutter and does have a rapid ventricular response.  We will increase his metoprolol.  He does have some evidence of heart failure but I think most of this is related to his uncontrolled atrial flutter and therefore needs reasonable to increase his beta-blocker even with signs of CHF.  Would recommend monitoring his salt intake.  We will add a dose of Lasix to his regimen.   White blood cell count is minimally elevated and therefore given this in combination with findings on x-ray will treat for a possible infiltrative process such as pneumonia in the right lower lobe.  No comparison x-ray could be obtained.  Patient notify us of any worsening of abdominal pain.  Overall this is improving and therefore I think is simply a abdominal wall contusion that he is dealing with.    Giorgi Fuller MD

## 2021-01-18 ENCOUNTER — OFFICE VISIT (OUTPATIENT)
Dept: URGENT CARE | Facility: CLINIC | Age: 71
End: 2021-01-18
Payer: MEDICARE

## 2021-01-18 VITALS
OXYGEN SATURATION: 93 % | HEART RATE: 112 BPM | WEIGHT: 245 LBS | SYSTOLIC BLOOD PRESSURE: 138 MMHG | BODY MASS INDEX: 32.32 KG/M2 | RESPIRATION RATE: 22 BRPM | DIASTOLIC BLOOD PRESSURE: 77 MMHG | TEMPERATURE: 99.3 F

## 2021-01-18 DIAGNOSIS — M54.9 ACUTE RIGHT-SIDED BACK PAIN, UNSPECIFIED BACK LOCATION: Primary | ICD-10-CM

## 2021-01-18 PROCEDURE — 71046 X-RAY EXAM CHEST 2 VIEWS: CPT | Mod: 26,CMS | Performed by: RADIOLOGY

## 2021-01-18 PROCEDURE — G0463 HOSPITAL OUTPT CLINIC VISIT: HCPCS | Performed by: NURSE PRACTITIONER

## 2021-01-18 PROCEDURE — 99213 OFFICE O/P EST LOW 20 MIN: CPT | Mod: 24 | Performed by: NURSE PRACTITIONER

## 2021-01-18 RX ORDER — METHOCARBAMOL 500 MG/1
500 TABLET, FILM COATED ORAL 3 TIMES DAILY
Qty: 30 TABLET | Refills: 0 | Status: SHIPPED | OUTPATIENT
Start: 2021-01-18 | End: 2021-01-28

## 2021-01-18 ASSESSMENT — ENCOUNTER SYMPTOMS
HEMATURIA: 0
BACK PAIN: 1
CHILLS: 0
FEVER: 0
FLANK PAIN: 0
COUGH: 0
DYSURIA: 0
SHORTNESS OF BREATH: 1

## 2021-01-18 ASSESSMENT — PAIN SCALES - GENERAL: PAINLEVEL: 8

## 2021-01-18 NOTE — PATIENT INSTRUCTIONS
Continue current medications.    Take Robaxin three times per day as needed for back pain and spasms.    Take Tylenol 500mg 2 tablets three times per day as needed for pain.     May try Ibuprofen or Aleve as per the label.     Try Miralax samples we gave you in the clinic. Could also try Senna which is a pill form which is a gentle laxative.     Follow up with Dr Machado if symptoms fail to improve.     Patient Education     Acute Back Pain, Adult  Acute back pain is sudden and usually short-lived. It is often caused by an injury to the muscles and tissues in the back. The injury may result from:  · A muscle or ligament getting overstretched or torn (strained). Ligaments are tissues that connect bones to each other. Lifting something improperly can cause a back strain.  · Wear and tear (degeneration) of the spinal disks. Spinal disks are circular tissue that provides cushioning between the bones of the spine (vertebrae).  · Twisting motions, such as while playing sports or doing yard work.  · A hit to the back.  · Arthritis.  You may have a physical exam, lab tests, and imaging tests to find the cause of your pain. Acute back pain usually goes away with rest and home care.  Follow these instructions at home:  Managing pain, stiffness, and swelling  · Take over-the-counter and prescription medicines only as told by your health care provider.  · Your health care provider may recommend applying ice during the first 24-48 hours after your pain starts. To do this:  ? Put ice in a plastic bag.  ? Place a towel between your skin and the bag.  ? Leave the ice on for 20 minutes, 2-3 times a day.  · If directed, apply heat to the affected area as often as told by your health care provider. Use the heat source that your health care provider recommends, such as a moist heat pack or a heating pad.  ? Place a towel between your skin and the heat source.  ? Leave the heat on for 20-30 minutes.  ? Remove the heat if your skin turns  "bright red. This is especially important if you are unable to feel pain, heat, or cold. You have a greater risk of getting burned.  Activity    · Do not stay in bed. Staying in bed for more than 1-2 days can delay your recovery.  · Sit up and stand up straight. Avoid leaning forward when you sit, or hunching over when you stand.  ? If you work at a desk, sit close to it so you do not need to lean over. Keep your chin tucked in. Keep your neck drawn back, and keep your elbows bent at a right angle. Your arms should look like the letter \"L.\"  ? Sit high and close to the steering wheel when you drive. Add lower back (lumbar) support to your car seat, if needed.  · Take short walks on even surfaces as soon as you are able. Try to increase the length of time you walk each day.  · Do not sit, drive, or  one place for more than 30 minutes at a time. Sitting or standing for long periods of time can put stress on your back.  · Do not drive or use heavy machinery while taking prescription pain medicine.  · Use proper lifting techniques. When you bend and lift, use positions that put less stress on your back:  ? Bend your knees.  ? Keep the load close to your body.  ? Avoid twisting.  · Exercise regularly as told by your health care provider. Exercising helps your back heal faster and helps prevent back injuries by keeping muscles strong and flexible.  · Work with a physical therapist to make a safe exercise program, as recommended by your health care provider. Do any exercises as told by your physical therapist.  Lifestyle  · Maintain a healthy weight. Extra weight puts stress on your back and makes it difficult to have good posture.  · Avoid activities or situations that make you feel anxious or stressed. Stress and anxiety increase muscle tension and can make back pain worse. Learn ways to manage anxiety and stress, such as through exercise.  General instructions  · Sleep on a firm mattress in a comfortable " position. Try lying on your side with your knees slightly bent. If you lie on your back, put a pillow under your knees.  · Follow your treatment plan as told by your health care provider. This may include:  ? Cognitive or behavioral therapy.  ? Acupuncture or massage therapy.  ? Meditation or yoga.  Contact a health care provider if:  · You have pain that is not relieved with rest or medicine.  · You have increasing pain going down into your legs or buttocks.  · Your pain does not improve after 2 weeks.  · You have pain at night.  · You lose weight without trying.  · You have a fever or chills.  Get help right away if:  · You develop new bowel or bladder control problems.  · You have unusual weakness or numbness in your arms or legs.  · You develop nausea or vomiting.  · You develop abdominal pain.  · You feel faint.  Summary  · Acute back pain is sudden and usually short-lived.  · Use proper lifting techniques. When you bend and lift, use positions that put less stress on your back.  · Take over-the-counter and prescription medicines and apply heat or ice as directed by your health care provider.  This information is not intended to replace advice given to you by your health care provider. Make sure you discuss any questions you have with your health care provider.  Document Revised: 04/07/2020 Document Reviewed: 08/01/2018  Elsevier Patient Education © 2020 [employer]

## 2021-01-18 NOTE — PROGRESS NOTES
Subjective      Cesar Yuen is a 70 y.o. male who presents for back pain     HPI   Cesar reports that he fell on the 14th landing on his stomach and right side.  However since then he has had back pain on his right lower back.  He rates his pain 8/10 at rest and is worse with activity. Has not tried any OTC pain medications. Did buy a back brace and has minimal relief when he wears it. Tried ice last night but it did not help.No loss of bladder or bowel function. Also reports he has not had a bowel movement in 2-3 days. Denies fever, chills, hematuria.      The following have been reviewed and updated as appropriate in this visit:         No Known Allergies  Current Outpatient Medications   Medication Sig Dispense Refill   • metoprolol succinate XL (TOPROL-XL) 25 mg 24 hr tablet Take 2 tablets (50 mg total) by mouth daily 60 tablet 11   • furosemide (LASIX) 20 mg tablet Take 1 tablet (20 mg total) by mouth daily 30 tablet 2   • amoxicillin-pot clavulanate (Augmentin) 875-125 mg per tablet Take 1 tablet by mouth 2 (two) times a day for 7 days 14 tablet 0   • multivitamin (MULTI-DAY ORAL) Take by mouth     • methocarbamoL (Robaxin) 500 mg tablet Take 1 tablet (500 mg total) by mouth 3 (three) times a day for 10 days 30 tablet 0     No current facility-administered medications for this visit.      Past Medical History:   Diagnosis Date   • Atrial flutter (CMS/HCC) (HCC)    • Chipped tooth    • Dysrhythmia    • Edema     BLE   • History of transfusion    • Hypertension    • Urinary retention     indwelling Pope in place since November 2020      Past Surgical History:   Procedure Laterality Date   • ABDOMINAL SURGERY      anuerysm   • APPENDECTOMY     • HERNIA REPAIR      x 3   • TRANSURETHRAL RESECTION OF BLADDER TUMOR N/A 12/22/2020    Procedure: CYSTOSCOPY WITH BLADDER BIOPSY;  Surgeon: Daniele Johnson MD;  Location: [facility] OR;  Service: Urology;  Laterality: N/A;   • TRANSURETHRAL RESECTION OF PROSTATE N/A  12/22/2020    Procedure: CYSTOSCOPY TRANSURETHRAL RESECTION PROSTATE, exam under anasthesia;  Surgeon: Daniele Johnson MD;  Location: [facility];  Service: Urology;  Laterality: N/A;     Family History   Problem Relation Age of Onset   • Heart attack Mother's Brother      Social History     Occupational History   • Occupation:    Tobacco Use   • Smoking status: Never Smoker   • Smokeless tobacco: Never Used   Substance and Sexual Activity   • Alcohol use: Not Currently     Frequency: Monthly or less     Drinks per session: 1 or 2     Binge frequency: Never   • Drug use: Not Currently   • Sexual activity: Defer   Social History Narrative   • Not on file       Review of Systems   Constitutional: Negative for chills and fever.   Respiratory: Positive for shortness of breath (Has not started the lasix or augmentin as prescribed on 1/16/2020). Negative for cough.    Cardiovascular: Positive for leg swelling (has not started the lasix he was prescribed 2 days ago. ). Negative for chest pain.   Genitourinary: Negative for dysuria, flank pain, hematuria and urgency.   Musculoskeletal: Positive for back pain (right low back).       Objective   /77 (BP Location: Left arm, Patient Position: Sitting, Cuff Size: Regular Adult)   Pulse 112   Temp 37.4 °C (99.3 °F) (Temporal)   Resp 22   Wt 111.1 kg (245 lb)   SpO2 93%   BMI 32.32 kg/m²     Physical Exam  Vitals signs and nursing note reviewed.   Constitutional:       General: He is not in acute distress.     Appearance: Normal appearance. He is not ill-appearing.   Cardiovascular:      Rate and Rhythm: Tachycardia present. Rhythm irregularly irregular.      Heart sounds: No murmur. No friction rub. No gallop.       Comments: - 112  RR 20   Pulmonary:      Effort: Pulmonary effort is normal.      Breath sounds: Examination of the right-lower field reveals rales. Rales present.   Abdominal:      General: Bowel sounds are normal.      Tenderness: There is  no right CVA tenderness or left CVA tenderness.   Musculoskeletal:      Lumbar back: He exhibits pain and spasm. He exhibits normal range of motion, no tenderness, no swelling and no edema.      Right lower le+ Edema present.      Left lower leg: Edema present.   Neurological:      Mental Status: He is alert.         No results found for this or any previous visit (from the past 24 hour(s)).    Assessment/Plan   Diagnoses and all orders for this visit:    Acute right-sided back pain, unspecified back location  -     methocarbamoL (Robaxin) 500 mg tablet; Take 1 tablet (500 mg total) by mouth 3 (three) times a day for 10 days    Patient chest x-ray, note, and labs were reviewed from his visit on 2021.  Chest x-ray did show atelectasis in his right base which is consistent with the rales that were auscultated. Patient was advised to  his medications Lasix, metoprolol, and Augmentin from the pharmacy that he was prescribed on the 2021 and start taking them. In addition he is advised to taking Tylenol 1000 mg 3 times a day as needed pain.  May also try ibuprofen or Aleve if Tylenol is ineffective.  Instructed on taking Robaxin as prescribed for muscle spasm.  Discussed use of ice or heat for comfort.  May wear his back brace if it brings him comfort. Offered referral to physical therapy, which was declined at this time.  Advised to follow-up with his primary care provider if symptoms fail to improve. Pt verbalized agreement with plan that was discussed.     Patient Instructions   Continue current medications.    Take Robaxin three times per day as needed for back pain and spasms.    Take Tylenol 500mg 2 tablets three times per day as needed for pain.     May try Ibuprofen or Aleve as per the label.     Follow up with Dr Machado if symptoms fail to improve.     Patient Education     Acute Back Pain, Adult  Acute back pain is sudden and usually short-lived. It is often caused by an injury to the  muscles and tissues in the back. The injury may result from:  · A muscle or ligament getting overstretched or torn (strained). Ligaments are tissues that connect bones to each other. Lifting something improperly can cause a back strain.  · Wear and tear (degeneration) of the spinal disks. Spinal disks are circular tissue that provides cushioning between the bones of the spine (vertebrae).  · Twisting motions, such as while playing sports or doing yard work.  · A hit to the back.  · Arthritis.  You may have a physical exam, lab tests, and imaging tests to find the cause of your pain. Acute back pain usually goes away with rest and home care.  Follow these instructions at home:  Managing pain, stiffness, and swelling  · Take over-the-counter and prescription medicines only as told by your health care provider.  · Your health care provider may recommend applying ice during the first 24-48 hours after your pain starts. To do this:  ? Put ice in a plastic bag.  ? Place a towel between your skin and the bag.  ? Leave the ice on for 20 minutes, 2-3 times a day.  · If directed, apply heat to the affected area as often as told by your health care provider. Use the heat source that your health care provider recommends, such as a moist heat pack or a heating pad.  ? Place a towel between your skin and the heat source.  ? Leave the heat on for 20-30 minutes.  ? Remove the heat if your skin turns bright red. This is especially important if you are unable to feel pain, heat, or cold. You have a greater risk of getting burned.  Activity    · Do not stay in bed. Staying in bed for more than 1-2 days can delay your recovery.  · Sit up and stand up straight. Avoid leaning forward when you sit, or hunching over when you stand.  ? If you work at a desk, sit close to it so you do not need to lean over. Keep your chin tucked in. Keep your neck drawn back, and keep your elbows bent at a right angle. Your arms should look like the letter  "\"L.\"  ? Sit high and close to the steering wheel when you drive. Add lower back (lumbar) support to your car seat, if needed.  · Take short walks on even surfaces as soon as you are able. Try to increase the length of time you walk each day.  · Do not sit, drive, or  one place for more than 30 minutes at a time. Sitting or standing for long periods of time can put stress on your back.  · Do not drive or use heavy machinery while taking prescription pain medicine.  · Use proper lifting techniques. When you bend and lift, use positions that put less stress on your back:  ? Bend your knees.  ? Keep the load close to your body.  ? Avoid twisting.  · Exercise regularly as told by your health care provider. Exercising helps your back heal faster and helps prevent back injuries by keeping muscles strong and flexible.  · Work with a physical therapist to make a safe exercise program, as recommended by your health care provider. Do any exercises as told by your physical therapist.  Lifestyle  · Maintain a healthy weight. Extra weight puts stress on your back and makes it difficult to have good posture.  · Avoid activities or situations that make you feel anxious or stressed. Stress and anxiety increase muscle tension and can make back pain worse. Learn ways to manage anxiety and stress, such as through exercise.  General instructions  · Sleep on a firm mattress in a comfortable position. Try lying on your side with your knees slightly bent. If you lie on your back, put a pillow under your knees.  · Follow your treatment plan as told by your health care provider. This may include:  ? Cognitive or behavioral therapy.  ? Acupuncture or massage therapy.  ? Meditation or yoga.  Contact a health care provider if:  · You have pain that is not relieved with rest or medicine.  · You have increasing pain going down into your legs or buttocks.  · Your pain does not improve after 2 weeks.  · You have pain at night.  · You lose " weight without trying.  · You have a fever or chills.  Get help right away if:  · You develop new bowel or bladder control problems.  · You have unusual weakness or numbness in your arms or legs.  · You develop nausea or vomiting.  · You develop abdominal pain.  · You feel faint.  Summary  · Acute back pain is sudden and usually short-lived.  · Use proper lifting techniques. When you bend and lift, use positions that put less stress on your back.  · Take over-the-counter and prescription medicines and apply heat or ice as directed by your health care provider.  This information is not intended to replace advice given to you by your health care provider. Make sure you discuss any questions you have with your health care provider.  Document Revised: 04/07/2020 Document Reviewed: 08/01/2018  Elsevier Patient Education © 2020 [employer] Inc.           Magalie Ferguson DNP

## 2021-02-01 ENCOUNTER — APPOINTMENT (OUTPATIENT)
Dept: LAB | Facility: CLINIC | Age: 71
End: 2021-02-01
Payer: MEDICARE

## 2021-02-01 DIAGNOSIS — Z01.812 ENCOUNTER FOR PREPROCEDURAL LABORATORY EXAMINATION: ICD-10-CM

## 2021-02-01 LAB — SARS-COV-2 RDRP RESP QL NAA+PROBE: NEGATIVE

## 2021-02-01 PROCEDURE — 87635 SARS-COV-2 COVID-19 AMP PRB: CPT

## 2021-02-01 PROCEDURE — C9803 HOPD COVID-19 SPEC COLLECT: HCPCS | Mod: CS

## 2021-02-04 NOTE — TELEPHONE ENCOUNTER
Patient states that he is on Eliquis and I explained that he should stay on that.  He was also newly started on Lasix .  I explained that he should hold it the day of the procedure.  He was wondering how long he should not drive.  I estimated 3 or 4 days but that he will be instructed upon discharge. Marco verbalizes understanding.

## 2021-02-07 ENCOUNTER — ANESTHESIA EVENT (OUTPATIENT)
Dept: CARDIOLOGY | Facility: HOSPITAL | Age: 71
End: 2021-02-07
Payer: MEDICARE

## 2021-02-07 ASSESSMENT — ENCOUNTER SYMPTOMS: DYSRHYTHMIAS: 1

## 2021-02-08 ENCOUNTER — ANESTHESIA (OUTPATIENT)
Dept: CARDIOLOGY | Facility: HOSPITAL | Age: 71
End: 2021-02-08
Payer: MEDICARE

## 2021-02-08 ENCOUNTER — HOSPITAL ENCOUNTER (OUTPATIENT)
Facility: HOSPITAL | Age: 71
Setting detail: OUTPATIENT SURGERY
Discharge: HOME | End: 2021-02-08
Attending: INTERNAL MEDICINE | Admitting: INTERNAL MEDICINE
Payer: MEDICARE

## 2021-02-08 ENCOUNTER — PRE-ADMISSION TESTING (OUTPATIENT)
Dept: PREADMISSION TESTING | Facility: HOSPITAL | Age: 71
End: 2021-02-08
Payer: MEDICARE

## 2021-02-08 VITALS
RESPIRATION RATE: 13 BRPM | SYSTOLIC BLOOD PRESSURE: 118 MMHG | OXYGEN SATURATION: 91 % | HEART RATE: 85 BPM | TEMPERATURE: 97 F | BODY MASS INDEX: 31.68 KG/M2 | HEIGHT: 73 IN | WEIGHT: 239 LBS | DIASTOLIC BLOOD PRESSURE: 78 MMHG

## 2021-02-08 VITALS
SYSTOLIC BLOOD PRESSURE: 108 MMHG | OXYGEN SATURATION: 94 % | HEIGHT: 73 IN | HEART RATE: 50 BPM | BODY MASS INDEX: 31.7 KG/M2 | RESPIRATION RATE: 18 BRPM | TEMPERATURE: 97.3 F | WEIGHT: 239.2 LBS | DIASTOLIC BLOOD PRESSURE: 66 MMHG

## 2021-02-08 DIAGNOSIS — I48.3 TYPICAL ATRIAL FLUTTER (CMS/HCC): ICD-10-CM

## 2021-02-08 DIAGNOSIS — I48.92 ATRIAL FLUTTER, UNSPECIFIED TYPE (CMS/HCC): ICD-10-CM

## 2021-02-08 DIAGNOSIS — I48.92 ATRIAL FLUTTER WITH RAPID VENTRICULAR RESPONSE (CMS/HCC): Primary | ICD-10-CM

## 2021-02-08 DIAGNOSIS — I48.92 ATRIAL FLUTTER (CMS/HCC): ICD-10-CM

## 2021-02-08 LAB
ANION GAP SERPL CALC-SCNC: 7 MMOL/L (ref 3–11)
BASOPHILS # BLD AUTO: 0.1 10*3/UL
BASOPHILS NFR BLD AUTO: 1 % (ref 0–2)
BUN SERPL-MCNC: 20 MG/DL (ref 7–25)
CALCIUM SERPL-MCNC: 9.3 MG/DL (ref 8.6–10.3)
CHLORIDE SERPL-SCNC: 107 MMOL/L (ref 98–107)
CO2 SERPL-SCNC: 29 MMOL/L (ref 21–32)
CREAT SERPL-MCNC: 0.88 MG/DL (ref 0.7–1.3)
EOSINOPHIL # BLD AUTO: 0.2 10*3/UL
EOSINOPHIL NFR BLD AUTO: 3 % (ref 0–3)
ERYTHROCYTE [DISTWIDTH] IN BLOOD BY AUTOMATED COUNT: 14.4 % (ref 11.5–15)
GFR SERPL CREATININE-BSD FRML MDRD: 86 ML/MIN/1.73M*2
GLUCOSE SERPL-MCNC: 97 MG/DL (ref 70–105)
HCT VFR BLD AUTO: 42.4 % (ref 38–50)
HGB BLD-MCNC: 14.4 G/DL (ref 13.2–17.2)
LYMPHOCYTES # BLD AUTO: 2 10*3/UL
LYMPHOCYTES NFR BLD AUTO: 29 % (ref 15–47)
MCH RBC QN AUTO: 30.8 PG (ref 29–34)
MCHC RBC AUTO-ENTMCNC: 33.9 G/DL (ref 32–36)
MCV RBC AUTO: 90.7 FL (ref 82–97)
MONOCYTES # BLD AUTO: 0.8 10*3/UL
MONOCYTES NFR BLD AUTO: 12 % (ref 5–13)
NEUTROPHILS # BLD AUTO: 3.8 10*3/UL
NEUTROPHILS NFR BLD AUTO: 56 % (ref 46–70)
PLATELET # BLD AUTO: 220 10*3/UL (ref 130–350)
PMV BLD AUTO: 8.3 FL (ref 6.9–10.8)
POTASSIUM SERPL-SCNC: 4 MMOL/L (ref 3.5–5.1)
RBC # BLD AUTO: 4.68 10*6/ΜL (ref 4.1–5.8)
SODIUM SERPL-SCNC: 143 MMOL/L (ref 135–145)
WBC # BLD AUTO: 6.8 10*3/UL (ref 3.7–9.6)

## 2021-02-08 PROCEDURE — 6360000200 HC RX 636 W HCPCS (ALT 250 FOR IP): Mod: JW | Performed by: NURSE ANESTHETIST, CERTIFIED REGISTERED

## 2021-02-08 PROCEDURE — 93621 COMP EP EVL L PAC&REC C SINS: CPT | Mod: 26 | Performed by: INTERNAL MEDICINE

## 2021-02-08 PROCEDURE — 00537 ANES CARDIAC EP PROCEDURES: CPT | Performed by: NURSE ANESTHETIST, CERTIFIED REGISTERED

## 2021-02-08 PROCEDURE — 99024 POSTOP FOLLOW-UP VISIT: CPT | Performed by: PHYSICIAN ASSISTANT

## 2021-02-08 PROCEDURE — C1732 CATH, EP, DIAG/ABL, 3D/VECT: HCPCS | Performed by: INTERNAL MEDICINE

## 2021-02-08 PROCEDURE — C1887 CATHETER, GUIDING: HCPCS | Performed by: INTERNAL MEDICINE

## 2021-02-08 PROCEDURE — 99100 ANES PT EXTEME AGE<1 YR&>70: CPT | Performed by: NURSE ANESTHETIST, CERTIFIED REGISTERED

## 2021-02-08 PROCEDURE — 4810002000 HC EP LAB LEVEL 4 EACH ADDITIONAL MIN: Performed by: INTERNAL MEDICINE

## 2021-02-08 PROCEDURE — 36415 COLL VENOUS BLD VENIPUNCTURE: CPT

## 2021-02-08 PROCEDURE — (BLANK) HC GENERAL ANESTHESIA FACILITY CHARGE EACH ADDITIONAL MIN: Performed by: INTERNAL MEDICINE

## 2021-02-08 PROCEDURE — 2580000300 HC RX 258: Performed by: NURSE ANESTHETIST, CERTIFIED REGISTERED

## 2021-02-08 PROCEDURE — 85025 COMPLETE CBC W/AUTO DIFF WBC: CPT

## 2021-02-08 PROCEDURE — 4810001900 HC EP LAB LEVEL 4 FIRST 15 MIN: Performed by: INTERNAL MEDICINE

## 2021-02-08 PROCEDURE — 93005 ELECTROCARDIOGRAM TRACING: CPT | Mod: NCP | Performed by: INTERNAL MEDICINE

## 2021-02-08 PROCEDURE — 93653 COMPRE EP EVAL TX SVT: CPT | Performed by: INTERNAL MEDICINE

## 2021-02-08 PROCEDURE — 6360000200 HC RX 636 W HCPCS (ALT 250 FOR IP): Performed by: NURSE ANESTHETIST, CERTIFIED REGISTERED

## 2021-02-08 PROCEDURE — C1764 EVENT RECORDER, CARDIAC: HCPCS | Performed by: INTERNAL MEDICINE

## 2021-02-08 PROCEDURE — (BLANK) HC RECOVERY PHASE-2 EACH ADDITIONAL 1/2 HOUR ACUITY LEVEL 1: Performed by: INTERNAL MEDICINE

## 2021-02-08 PROCEDURE — (BLANK) HC GENERAL ANESTHESIA FACILITY CHARGE 1ST 15 MIN: Performed by: INTERNAL MEDICINE

## 2021-02-08 PROCEDURE — (BLANK) HC RECOVERY PHASE-1 1ST  HOUR ACUITY LEVEL 3: Performed by: INTERNAL MEDICINE

## 2021-02-08 PROCEDURE — (BLANK) HC RECOVERY PHASE-2 1ST 1/2 HOUR ACUITY LEVEL 1: Performed by: INTERNAL MEDICINE

## 2021-02-08 PROCEDURE — 93613 INTRACARDIAC EPHYS 3D MAPG: CPT | Performed by: INTERNAL MEDICINE

## 2021-02-08 PROCEDURE — 33285 INSJ SUBQ CAR RHYTHM MNTR: CPT | Mod: 51 | Performed by: INTERNAL MEDICINE

## 2021-02-08 PROCEDURE — 80048 BASIC METABOLIC PNL TOTAL CA: CPT

## 2021-02-08 PROCEDURE — C1894 INTRO/SHEATH, NON-LASER: HCPCS | Performed by: INTERNAL MEDICINE

## 2021-02-08 DEVICE — ICM LNQ22 LINQ II USA
Type: IMPLANTABLE DEVICE | Site: CHEST | Status: FUNCTIONAL
Brand: LINQ II™

## 2021-02-08 RX ORDER — SODIUM CHLORIDE, SODIUM LACTATE, POTASSIUM CHLORIDE, CALCIUM CHLORIDE 600; 310; 30; 20 MG/100ML; MG/100ML; MG/100ML; MG/100ML
INJECTION, SOLUTION INTRAVENOUS CONTINUOUS PRN
Status: DISCONTINUED | OUTPATIENT
Start: 2021-02-08 | End: 2021-02-08 | Stop reason: SURG

## 2021-02-08 RX ORDER — PROPOFOL 10 MG/ML
INJECTION, EMULSION INTRAVENOUS AS NEEDED
Status: DISCONTINUED | OUTPATIENT
Start: 2021-02-08 | End: 2021-02-08 | Stop reason: SURG

## 2021-02-08 RX ORDER — LIDOCAINE HYDROCHLORIDE 10 MG/ML
INJECTION, SOLUTION EPIDURAL; INFILTRATION; INTRACAUDAL; PERINEURAL AS NEEDED
Status: DISCONTINUED | OUTPATIENT
Start: 2021-02-08 | End: 2021-02-08 | Stop reason: HOSPADM

## 2021-02-08 RX ORDER — ACETAMINOPHEN 325 MG/1
325-650 TABLET ORAL EVERY 4 HOURS PRN
Status: DISCONTINUED | OUTPATIENT
Start: 2021-02-08 | End: 2021-02-08 | Stop reason: HOSPADM

## 2021-02-08 RX ORDER — LIDOCAINE HYDROCHLORIDE AND EPINEPHRINE 10; 10 UG/ML; MG/ML
INJECTION, SOLUTION INFILTRATION; PERINEURAL AS NEEDED
Status: DISCONTINUED | OUTPATIENT
Start: 2021-02-08 | End: 2021-02-08 | Stop reason: HOSPADM

## 2021-02-08 RX ORDER — LIDOCAINE HYDROCHLORIDE 20 MG/ML
INJECTION, SOLUTION EPIDURAL; INFILTRATION; INTRACAUDAL; PERINEURAL AS NEEDED
Status: DISCONTINUED | OUTPATIENT
Start: 2021-02-08 | End: 2021-02-08 | Stop reason: SURG

## 2021-02-08 RX ORDER — HYDROCODONE BITARTRATE AND ACETAMINOPHEN 5; 325 MG/1; MG/1
1-2 TABLET ORAL EVERY 4 HOURS PRN
Status: DISCONTINUED | OUTPATIENT
Start: 2021-02-08 | End: 2021-02-08 | Stop reason: HOSPADM

## 2021-02-08 RX ORDER — FENTANYL CITRATE/PF 50 MCG/ML
PLASTIC BAG, INJECTION (ML) INTRAVENOUS AS NEEDED
Status: DISCONTINUED | OUTPATIENT
Start: 2021-02-08 | End: 2021-02-08 | Stop reason: SURG

## 2021-02-08 RX ADMIN — FENTANYL CITRATE 50 MCG: 50 INJECTION, SOLUTION INTRAMUSCULAR; INTRAVENOUS at 08:29

## 2021-02-08 RX ADMIN — PHENYLEPHRINE HYDROCHLORIDE 30 MCG/MIN: 10 INJECTION INTRAVENOUS at 08:58

## 2021-02-08 RX ADMIN — Medication 200 MCG: at 08:36

## 2021-02-08 RX ADMIN — PROPOFOL 150 MG: 10 INJECTION, EMULSION INTRAVENOUS at 08:29

## 2021-02-08 RX ADMIN — Medication 200 MCG: at 08:50

## 2021-02-08 RX ADMIN — FENTANYL CITRATE 50 MCG: 50 INJECTION, SOLUTION INTRAMUSCULAR; INTRAVENOUS at 09:04

## 2021-02-08 RX ADMIN — LIDOCAINE HYDROCHLORIDE 100 MG: 20 INJECTION, SOLUTION EPIDURAL; INFILTRATION; INTRACAUDAL; PERINEURAL at 08:29

## 2021-02-08 RX ADMIN — Medication 200 MCG: at 08:34

## 2021-02-08 RX ADMIN — SODIUM CHLORIDE, POTASSIUM CHLORIDE, SODIUM LACTATE AND CALCIUM CHLORIDE: 600; 310; 30; 20 INJECTION, SOLUTION INTRAVENOUS at 08:22

## 2021-02-08 ASSESSMENT — ENCOUNTER SYMPTOMS: EXERCISE TOLERANCE: GOOD (>7 METS)

## 2021-02-08 ASSESSMENT — PAIN DESCRIPTION - DESCRIPTORS: DESCRIPTORS: ACHING

## 2021-02-08 NOTE — Clinical Note
Prepped: chest. The site was clipped. Prepped with: ChloraPrep. The patient was draped  in the usual sterile fashion.

## 2021-02-08 NOTE — DISCHARGE INSTRUCTIONS
Cardiac Electrophysiology    EP Study/Uncomplicated Ablation Discharge Instructions    • Groin care: You may remove the dressing over your groin and shower after 24 hours. Wash the area gently with soap and water. You may have some tenderness in your groin which will subside in a few days. Applying ice packs to the site may ease the discomfort, or you may take over-the-counter acetaminophen (Tylenol).      • Signs/symptoms to report: Please notify the EP Main Office [phone] during normal business hours (Monday - Friday, 07:30 a.m. - 5:30 p.m.) if you have any of the following:    ? redness, warmth, swelling, or drainage at the groin puncture site  ? fever greater than 101 degrees F.    If you start bleeding from the puncture site in the groin, lay flat and hold direct, firm pressure over the site for 15 minutes. If you are still bleeding after this, please go to the nearest Emergency Room or call 911.          If you have uncontrolled bleeding, go to the nearest emergency room or call 911.    • Activity: Avoid strenuous activity and do not lift anything heavier than 10 pounds   for 3-5 days or until the scab falls off and the skin has healed over the groin puncture site. After 3-5 days, you may resume normal activity.    • Driving: Do not drive for 24 hours.    • Medications:    Continue your daily medications unless they were specifically changed by the doctor.    You may be started on a blood thinner:    If you are prescribed  Pradaxa , Xarelto, or Eliquis: You do not need to have blood work checked while on these blood-thinners.    If you are taking a blood thinner called warfarin (Coumadin): please take your next dose the evening of your procedure. Resume your regular dose and have your blood checked (PT/INR level) in 3-5 days by your referring physician. If you need a place to have your PT/INR checked, you may call the [facility] at [phone] Ext [phone] to set up your first  appointment    FOLLOW-UP APPOINTMENTS:     ? If you are taking Coumadin (warfarin): You will need to have a PT/INR level (for adjustment of Coumadin dose) within 3-5 days of discharge from the hospital.  Please call your referring physician or the [facility] [phone] Ext [phone] to set up an appointment.    ? If you are taking Pradaxa , Xarelto, or Eliquis: You do not need to have blood work checked while on these blood-thinners.    ? Follow-up Clinic Appointment:     You will have a follow-up appointment with your doctor at 6-8 weeks, after your procedure for an EKG, examination, and to assess your progress.              If you do not receive an appointment within 3 weeks, please call the EP Main Office at           [phone]        • Questions or problems:            EP Main Office:  [phone] (Monday-Friday, 7:30 a.m. - 5:30 p.m.):              Cardiology On-Call:   For after-hours or weekend emergencies [phone]              Cardiac Electrophysiology    Implantable Loop Recorder  Discharge Instruction                                   Incision Check Appointment:     ? You will be seen in 2-4 weeks for an incision check and device evaluation. This will be done in the cardiology clinic. If you have staples, stitches, or steri-strips, they will be removed at this time. IF YOU DO NOT RECEIVE AN APPOINTMENT FOR YOUR INCISION CHECK WITHIN 7 DAYS AFTER DISCHARGE, CALL [phone].        Incision Care -    ? If you have a clear plastic dressing covering your incision, please remove this in 3 days.     ? If you have steri-strips (small bandages) covering your incision, do not remove them.     ? You may apply ice packs to reduce pain and swelling. Do not apply ice packs directly to the skin, and limit exposure to 20 minutes per hour.    ? Keep the incision area clean until your incision check appointment.    ? You may shower, but do not scrub the incision area.    ? Do not  put any ointment, cream, or lotion on your incision.     ? If you find any redness, swelling, drainage, warmth, or have a fever greater than 100 degrees, notify the EP Main Office [phone]  during normal business hours.     Activity - You may continue regular daily activities, but limit strenuous movements for the first 7 days.    Driving - Do not drive while taking prescription pain medication. If you have experienced a stroke or passing out spell as the reason for your loop recorder implant there may be specific driving restrictions. Please discuss this with your implanting doctor or primary care provider.    ID Card - You will be given a temporary ID card at discharge.  A permanent ID card will be mailed to you by the device company within 8 weeks. CARRY YOUR ID CARD WITH YOU AT ALL TIMES.    ILR (Implantable Loop Recorder) Follow-up Care - We will be able to download information from your device that will tell us about any irregular heart rhythms you may have had.    ? AFib patients will perform downloads remotely, arranged through our EP device clinic.    ? You will receive an appointment to follow-up with your doctor at the EP Clinic.    If you have any questions or problems, several people are available to help you. Most of our staff is available Monday through Friday, from 7:30 a.m. - 5:30 p.m.  You should expect a call back within the day if you leave a message.  For emergencies after-hours or on weekends, please call [phone] Electrophysiologist on-call.      IMPORTANT PHONE NUMBERS:     EP EP Main Office (Monday-Friday, 7:30 a.m. - 5:30 p.m.):    Office [phone]    Appointments for Incision Check and first 3 month clinic visit: [phone]          Cardiology On-Call:   For after-hours or weekend emergencies [phone] and ask for Electrophysiologist On-Call

## 2021-02-08 NOTE — H&P
[facility] [place]  [address] [place] [id number]  [phone]                                                    Cardiology History and Physical    Patient name: Cesar Yuen  is a 70 y.o. male.  MRN: [id number]  Admit date: 2/8/2021    Patient is a 70-year-old male history of atrial flutter.    He saw Dr. Burk in consultation back in December 2020.  Holter monitor showed average ventricular rate of 122 bpm with atrial flutter throughout the study.  Discussion was made for atrial flutter ablation and loop recorder implant.  There was some concern at the time of him possibly needing a TURP in the future.  Therefore his atrial flutter ablation was delayed until he completed his urological procedures. He had these completed in late December.    Patient presents at the outpatient cardiac procedures unit today.  He is doing well today and denies any issues.  Labs unremarkable.    CARDIOVASCULAR HISTORY:  No specialty comments available.       Defer to anesthesia for preoperative evaluation      Past Medical History:   Diagnosis Date   • Atrial flutter (CMS/HCC) (HCC)    • CHF (congestive heart failure) (CMS/HCC) (HCC)    • Chipped tooth    • Dysrhythmia    • Edema     BLE   • History of transfusion    • Hypertension    • Injury of back 01/14/2021    Seen in .   • Urinary retention     indwelling Pope in place since November 2020        Past Surgical History:   Procedure Laterality Date   • ABDOMINAL SURGERY      anuerysm   • APPENDECTOMY     • HERNIA REPAIR      x 3   • TRANSURETHRAL RESECTION OF BLADDER TUMOR N/A 12/22/2020    Procedure: CYSTOSCOPY WITH BLADDER BIOPSY;  Surgeon: Daniele Johnson MD;  Location: [facility] OR;  Service: Urology;  Laterality: N/A;   • TRANSURETHRAL RESECTION OF PROSTATE N/A 12/22/2020    Procedure: CYSTOSCOPY TRANSURETHRAL RESECTION PROSTATE, exam under anasthesia;  Surgeon: Daniele Johnson MD;  Location: [facility] OR;  Service: Urology;  Laterality: N/A;        No Known Allergies    Medications Prior to Admission   Medication Sig Dispense Refill Last Dose   • metoprolol succinate XL (TOPROL-XL) 25 mg 24 hr tablet Take 2 tablets (50 mg total) by mouth daily 60 tablet 11 2/8/2021 at 0400   • furosemide (LASIX) 20 mg tablet Take 1 tablet (20 mg total) by mouth daily 30 tablet 2 Past Week at Unknown time   • multivitamin (MULTI-DAY ORAL) Take by mouth   2/7/2021 at Unknown time       Family History   Problem Relation Age of Onset   • Heart attack Mother's Brother        Social History     Tobacco Use   • Smoking status: Never Smoker   • Smokeless tobacco: Never Used   Substance Use Topics   • Alcohol use: Not Currently     Frequency: Monthly or less     Drinks per session: 1 or 2     Binge frequency: Never   • Drug use: Not Currently       Review of Systems  ROS  Pertinent positives reviewed and listed in the HPI.    Objective     Physical Exam  Physical Exam   Constitutional: He is oriented to person, place, and time. No distress.   Neck: No hepatojugular reflux and no JVD present. Carotid bruit is not present.   Cardiovascular: Normal rate, regular rhythm, S1 normal, S2 normal, normal heart sounds and intact distal pulses. Exam reveals no S3, no S4 and no friction rub.   No murmur heard.  Pulmonary/Chest: Breath sounds normal. No respiratory distress. He has no decreased breath sounds. He has no wheezes. He has no rhonchi. He has no rales.   Abdominal: Soft. He exhibits no ascites. There is no abdominal tenderness.   Musculoskeletal:         General: No edema.   Neurological: He is oriented to person, place, and time.   Vitals reviewed.          Results from last 4 days   Lab Units 02/08/21  0638   WBC AUTO 10*3/uL 6.8   RBC AUTO 10*6/µL 4.68   HEMOGLOBIN g/dL 14.4   HEMATOCRIT % 42.4   PLATELETS AUTO 10*3/uL 220   BUN mg/dL 20   CREATININE mg/dL 0.88   POTASSIUM mmol/L 4.0   CHLORIDE mmol/L 107   SODIUM mmol/L 143   CO2 mmol/L 29         Assessment and  Plan  Diagnosis:  Atrial flutter with rapid ventricular response      Plan:  1. Atrial flutter ablation   2. loop recorder implant    This patient seen in conjunction with NENITA Mejia  2/8/2021

## 2021-02-08 NOTE — ANESTHESIA PREPROCEDURE EVALUATION
"Pre-Procedure Assessment    Patient: Cesar Yuen, male, 70 y.o.    Ht Readings from Last 1 Encounters:   01/06/21 1.854 m (6' 1\")     Wt Readings from Last 1 Encounters:   01/18/21 111.1 kg (245 lb)       Last Vitals  BP      Temp      Pulse     Resp      SpO2      Pain Score         Problem list reviewed and Medical history reviewed           Airway   Mallampati: II  TM distance: >3 FB  Neck ROM: full      Dental    (+) chipped    Pulmonary     breath sounds clear to auscultation  Cardiovascular   Exercise tolerance: good (>7 METS)  (+) hypertension, dysrhythmias, CHF,     Rhythm: regular  Rate: normal  ROS comment: 11/2020 ECHO  Normal left ventricular size and systolic function with no regional wall motion abnormalities.  Ejection fraction 55%.  No left ventricular hypertrophy.  Unable to assess for diastolic function due to atrial fibrillation.  Normal right ventricular size and function.  Moderate left atrial dilation.  No large PFO detected by saline contrast.  Right atrial dilation.  Mild ascending aorta dilation, 4.2 cm.  Mild mitral regurgitation.  Normal estimated pulmonary pressure, RVSP 30 mmHg.  No pericardial effusion.    10/2020 Holter    1.  Predominant rhythm appears to be atrial flutter with poorly controlled ventricular response.  Average ventricular  rate 122 bpm.  2.  Occasional PVC and ventricular couplet  3.  No significant pauses  4.  No entries in the patient's diary    Mental Status/Neuro/Psych    Pt is alert.        GI/Hepatic/Renal    (+) urinary retention    Endo/Other    (+) history of blood transfusion,   Abdominal           Social History     Tobacco Use   • Smoking status: Never Smoker   • Smokeless tobacco: Never Used   Substance Use Topics   • Alcohol use: Not Currently     Frequency: Monthly or less     Drinks per session: 1 or 2     Binge frequency: Never      Hematology   WBC   Date Value Ref Range Status   01/16/2021 10.6 (H) 3.7 - 9.6 10*3/uL Final     RBC   Date Value Ref " Range Status   01/16/2021 5.06 4.10 - 5.80 10*6/µL Final     MCV   Date Value Ref Range Status   01/16/2021 91.2 82.0 - 97.0 fL Final     Hemoglobin   Date Value Ref Range Status   01/16/2021 15.4 13.2 - 17.2 g/dL Final     Hematocrit   Date Value Ref Range Status   01/16/2021 46.2 38.0 - 50.0 % Final     Platelets   Date Value Ref Range Status   01/16/2021 257 130 - 350 10*3/uL Final      Coagulation   Protime   Date Value Ref Range Status   02/13/2020 12.6 (H) 9.4 - 12.5 seconds Final     PTT   Date Value Ref Range Status   02/13/2020 33.8 25.1 - 36.5 SECONDS Final     INR   Date Value Ref Range Status   02/13/2020 1.1 <=1.1 Final      General Chemistry   Calcium   Date Value Ref Range Status   01/16/2021 9.6 8.6 - 10.3 mg/dL Final     BUN   Date Value Ref Range Status   01/16/2021 13 7 - 25 mg/dL Final     Creatinine   Date Value Ref Range Status   01/16/2021 0.90 0.70 - 1.30 mg/dL Final     Glucose   Date Value Ref Range Status   01/16/2021 108 (H) 70 - 105 mg/dL Final     Sodium   Date Value Ref Range Status   01/16/2021 139 135 - 145 mmol/L Final     Potassium   Date Value Ref Range Status   01/16/2021 4.1 3.5 - 5.1 mmol/L Final     CO2   Date Value Ref Range Status   01/16/2021 28 21 - 32 mmol/L Final     Chloride   Date Value Ref Range Status   01/16/2021 103 98 - 107 mmol/L Final     Anesthesia Plan    ASA 3   NPO status reviewed: > 8 hours    General         Induction: intravenous   Airway Planning: LMA              Anesthetic plan and risks discussed with patient.

## 2021-02-08 NOTE — Clinical Note
Sheath(s) removed from the right femoral vein. Closure pressure applied by femoral compression system. Hemostasis achieved. Safegaurd with 20 ml

## 2021-02-08 NOTE — ANESTHESIA PROCEDURE NOTES
Airway  Date/Time: 2/8/2021 8:32 AM  Urgency: elective    Airway not difficult    General Information and Staff    Patient location during procedure: OR  Anesthesiologist: Alex Baumann MD  CRNA: Nancy Behr, CRNA  Performed: CRNA     Indications and Patient Condition  Indications for airway management: anesthesia  Spontaneous Ventilation: absent  Sedation level: deep  Preoxygenated: yes  MILS maintained throughout  Mask difficulty assessment: 1 - vent by mask    Final Airway Details  Final airway type: supraglottic airway      Successful airway: unique  Size 4  Airway Seal Pressure (cm H2O): 12    Placement verified by: chest auscultation, capnometry and palpation of cuff   Number of attempts at approach: 1

## 2021-02-09 ENCOUNTER — TELEPHONE (OUTPATIENT)
Dept: CARDIOLOGY | Facility: CLINIC | Age: 71
End: 2021-02-09

## 2021-02-09 PROCEDURE — 93010 ELECTROCARDIOGRAM REPORT: CPT | Mod: NCNR | Performed by: INTERNAL MEDICINE

## 2021-02-09 NOTE — TELEPHONE ENCOUNTER
Spoke with Jeri Morales CNP. Spitting blood and chest discomfort as described can occur after ablation to a mild level. Since patient is not having any current issues, continue to monitor. Go to ER if francisco bleeding occurs or chest pain returns for a long period, especially if accompanied by other symptoms. Patient may have mild bleeding in stool for next day. Monitor. No need to send in loop recorder transmission since parameters are set to alert us if arryhtmias occur. Advised to call urology about blood in urine.    Called patient back and informed him of recommendations. He verbalized understanding and agreed with plan. Reviewed f/u nurse visit next week.    Rosita Ureña RN

## 2021-02-09 NOTE — TELEPHONE ENCOUNTER
Patient left voicemail stating he had spit up some blood last night.     Called patient back. HE had ablation with Dr. Burk yesterday. He said he had one occasion of spitting up blood last night. He reports it wasn't pure blood but not just blood tinged sputum either. Last night he also had some chest pain 4/10 for about 15-20 minutes but then resolved. Denies radiating pain, shortness of breath, lightheadedness/dizziness, palpitations. Patient denies any history of spitting up blood. Patient does report some blood in his urine. He is followed by urology. Advised him to call his urologist and inform them of bleeding. Discussed that some throat irritation may occur from intubation during procedure. Some chest discomfort can occur after ablation but should be mild and transient. Will Consult EP NURY for further recommendations and call patient back.    Patient did not have urinary catheter inserted for procedure.    Rosita Ureña RN

## 2021-02-15 ENCOUNTER — CLINICAL SUPPORT (OUTPATIENT)
Dept: CARDIOLOGY | Facility: CLINIC | Age: 71
End: 2021-02-15

## 2021-02-15 NOTE — NURSING NOTE
02/15/21 1225   Incision 02/08/21 1st;Anterior;Midline Chest LOOP Recorder   Date First Assessed/Time First Assessed: 02/08/21 0941   Pre-Existing Wound: No  WCT Slow to Heal: No  Location Descriptor: 1st;Anterior;Midline  Location: Chest  Incision Type: Other (comment)  Wound Description (Comments): LOOP Recorder   Dressing Status No dressing - open to air   Drainage Amount None (no measurable drainage)   Signs of Infection None   Type of Closure Adhesive Glue   Telepone call to check his incision. He states it looks fine. Does not show any signs of infections. There is no pain at the incision site. Patient voices no other concerns at this time.

## 2021-03-11 ENCOUNTER — ANCILLARY PROCEDURE (OUTPATIENT)
Dept: CARDIOLOGY | Facility: CLINIC | Age: 71
End: 2021-03-11
Payer: MEDICARE

## 2021-03-11 DIAGNOSIS — Z45.09 ENCOUNTER FOR INTERROGATION OF CARDIAC RECORDER: ICD-10-CM

## 2021-03-11 PROCEDURE — G2066 INTER DEVC REMOTE 30D: HCPCS

## 2021-03-12 PROCEDURE — 93298 REM INTERROG DEV EVAL SCRMS: CPT | Performed by: INTERNAL MEDICINE

## 2021-03-18 DIAGNOSIS — Z23 HIGH PRIORITY FOR 2019-NCOV VACCINE: ICD-10-CM

## 2021-04-09 ENCOUNTER — TELEPHONE (OUTPATIENT)
Dept: UROLOGY | Facility: CLINIC | Age: 71
End: 2021-04-09

## 2021-04-09 NOTE — TELEPHONE ENCOUNTER
This nurse spoke with  regarding this patient's concerns and she stated that if he could come in today for a UA and bladder scan. However this patient is out of town for work. The patient stated that he not currently having any blood in his urine however he is still having issues voiding. The patient stated that he could make it in to the office on Monday, so I was able to get him in at 1145 with . Dr. Anderson stated that would be okay however if he starts to have more issues or concerns he needed to go the hospital. The patient was informed of this and he was in agreement with this plan. The patient thanked me for the call back.

## 2021-04-09 NOTE — TELEPHONE ENCOUNTER
Caller would like to discuss (a) voiding issues    Patient: Cesar Yuen    Callback Number: [phone]    Additional Info: Pt is calling stating that he had a procedure with Dr. Johnson in January. Originally everything was fine and he has had heart surgery with Dr. Burk. He states that he is now having blood in his urine and has a hard time voiding. He believes that there a blood clot blocking his flow.     I advised caller of a callback by the end of the business day or if call received after 4:00pm, the next business day.

## 2021-04-11 ENCOUNTER — ANCILLARY PROCEDURE (OUTPATIENT)
Dept: CARDIOLOGY | Facility: CLINIC | Age: 71
End: 2021-04-11
Payer: MEDICARE

## 2021-04-11 DIAGNOSIS — Z45.09 ENCOUNTER FOR INTERROGATION OF CARDIAC RECORDER: ICD-10-CM

## 2021-04-11 PROCEDURE — G2066 INTER DEVC REMOTE 30D: HCPCS

## 2021-04-12 ENCOUNTER — OFFICE VISIT (OUTPATIENT)
Dept: UROLOGY | Facility: CLINIC | Age: 71
End: 2021-04-12
Payer: MEDICARE

## 2021-04-12 ENCOUNTER — APPOINTMENT (OUTPATIENT)
Dept: LAB | Facility: CLINIC | Age: 71
End: 2021-04-12
Payer: MEDICARE

## 2021-04-12 VITALS
SYSTOLIC BLOOD PRESSURE: 146 MMHG | TEMPERATURE: 97.7 F | DIASTOLIC BLOOD PRESSURE: 83 MMHG | OXYGEN SATURATION: 93 % | HEART RATE: 64 BPM

## 2021-04-12 DIAGNOSIS — R33.9 URINARY RETENTION: Primary | ICD-10-CM

## 2021-04-12 LAB
BACTERIA #/AREA URNS HPF: ABNORMAL /HPF
BILIRUB UR QL: NEGATIVE
CLARITY UR: ABNORMAL
COLOR UR: YELLOW
GLUCOSE UR QL: NEGATIVE MG/DL
HGB UR QL: ABNORMAL
KETONES UR-MCNC: NEGATIVE MG/DL
LEUKOCYTE ESTERASE UR QL STRIP: ABNORMAL
NITRITE UR QL: POSITIVE
PH UR: 6 PH
PROT UR STRIP-MCNC: 30 MG/DL
RBC #/AREA URNS HPF: ABNORMAL /HPF
RENAL EPI CELLS #/AREA URNS HPF: ABNORMAL /HPF
SP GR UR: 1.02 (ref 1–1.03)
SPECIMEN VOL ?TM UR: 430 ML
SQUAMOUS #/AREA URNS HPF: ABNORMAL /HPF
UROBILINOGEN UR-MCNC: 0.2 E.U./DL
WBC #/AREA URNS HPF: >100 /HPF

## 2021-04-12 PROCEDURE — 51798 US URINE CAPACITY MEASURE: CPT | Performed by: UROLOGY

## 2021-04-12 PROCEDURE — 93298 REM INTERROG DEV EVAL SCRMS: CPT | Performed by: INTERNAL MEDICINE

## 2021-04-12 PROCEDURE — 99212 OFFICE O/P EST SF 10 MIN: CPT | Performed by: UROLOGY

## 2021-04-12 PROCEDURE — 81001 URINALYSIS AUTO W/SCOPE: CPT | Performed by: UROLOGY

## 2021-04-12 PROCEDURE — 87088 URINE BACTERIA CULTURE: CPT | Performed by: UROLOGY

## 2021-04-12 PROCEDURE — G0463 HOSPITAL OUTPT CLINIC VISIT: HCPCS | Performed by: UROLOGY

## 2021-04-12 RX ORDER — TAMSULOSIN HYDROCHLORIDE 0.4 MG/1
0.4 CAPSULE ORAL
Qty: 30 CAPSULE | Refills: 0 | Status: SHIPPED | OUTPATIENT
Start: 2021-04-12 | End: 2021-05-12 | Stop reason: WASHOUT

## 2021-04-12 ASSESSMENT — PAIN SCALES - GENERAL: PAINLEVEL: 0-NO PAIN

## 2021-04-12 NOTE — PROGRESS NOTES
Urology Brief Note    Patient is approximately 4 months status post TURP.  He has done exceedingly well until last week, when he began having new dribbling and difficulty emptying his bladder.  He does state, however, that he is able to empty his bladder.  No dysuria.  No gross hematuria.    On exam he is afebrile and well-appearing.  Bladder is not palpable.  No CVA tenderness.  Urine is clear.    Post void residual is just above 400 cc.    70-year-old male 4 months status post TURP, with no urinary complaints.  Postvoid residual today is reasonable considering his large bladder diverticulum, as he will always have some retained urine within this diverticulum that will be detected by the scanner.  He does appear to be emptying adequately.  No indication for catheter today.  However, I am concerned that he may be developing a bladder neck contracture.  We will set him up for a cystoscopy next week.  We will also check his urine for signs of infection, will treat appropriately.  Return precautions carefully reviewed.  He is in agreement with the plan.

## 2021-04-14 LAB — BACTERIA UR CULT: ABNORMAL

## 2021-04-16 ENCOUNTER — TELEPHONE (OUTPATIENT)
Dept: UROLOGY | Facility: CLINIC | Age: 71
End: 2021-04-16

## 2021-04-16 DIAGNOSIS — N39.0 LOWER URINARY TRACT INFECTIOUS DISEASE: Primary | ICD-10-CM

## 2021-04-16 RX ORDER — SULFAMETHOXAZOLE AND TRIMETHOPRIM 800; 160 MG/1; MG/1
1 TABLET ORAL 2 TIMES DAILY
Qty: 28 TABLET | Refills: 0 | Status: SHIPPED | OUTPATIENT
Start: 2021-04-16 | End: 2021-04-30

## 2021-04-16 NOTE — TELEPHONE ENCOUNTER
Caller would like to discuss (a) appointment   Patient: Cesar Yuen    Callback Number: [phone]    Additional Info: Pt is calling to reschedule his appt in 4/20. He stated that he has to run around and won't be available until the afternoon of 4/20. He was wondering if he can reschedule his cysto for Wednesday.       I advised caller of a callback by the end of the business day or if call received after 4:00pm, the next business day.

## 2021-04-20 ENCOUNTER — APPOINTMENT (OUTPATIENT)
Dept: LAB | Facility: CLINIC | Age: 71
End: 2021-04-20
Payer: MEDICARE

## 2021-04-20 ENCOUNTER — OFFICE VISIT (OUTPATIENT)
Dept: UROLOGY | Facility: CLINIC | Age: 71
End: 2021-04-20
Payer: MEDICARE

## 2021-04-20 VITALS
HEIGHT: 78 IN | OXYGEN SATURATION: 93 % | TEMPERATURE: 99 F | SYSTOLIC BLOOD PRESSURE: 132 MMHG | BODY MASS INDEX: 27.65 KG/M2 | WEIGHT: 239 LBS | RESPIRATION RATE: 16 BRPM | DIASTOLIC BLOOD PRESSURE: 74 MMHG | HEART RATE: 78 BPM

## 2021-04-20 DIAGNOSIS — Z11.52 ENCOUNTER FOR SCREENING FOR COVID-19: ICD-10-CM

## 2021-04-20 DIAGNOSIS — R31.0 GROSS HEMATURIA: ICD-10-CM

## 2021-04-20 DIAGNOSIS — N32.0 BLADDER NECK CONTRACTURE: Primary | ICD-10-CM

## 2021-04-20 DIAGNOSIS — N32.0 BLADDER NECK CONTRACTURE: ICD-10-CM

## 2021-04-20 LAB
ANION GAP SERPL CALC-SCNC: 10 MMOL/L (ref 3–11)
BACTERIA #/AREA URNS HPF: ABNORMAL /HPF
BASOPHILS # BLD AUTO: 0 10*3/UL
BASOPHILS NFR BLD AUTO: 1 % (ref 0–2)
BILIRUB UR QL: ABNORMAL
BUN SERPL-MCNC: 13 MG/DL (ref 7–25)
CALCIUM SERPL-MCNC: 9.2 MG/DL (ref 8.6–10.3)
CHLORIDE SERPL-SCNC: 104 MMOL/L (ref 98–107)
CLARITY UR: ABNORMAL
CO2 SERPL-SCNC: 23 MMOL/L (ref 21–32)
COLOR UR: ABNORMAL
CREAT SERPL-MCNC: 1.11 MG/DL (ref 0.7–1.3)
EOSINOPHIL # BLD AUTO: 0.1 10*3/UL
EOSINOPHIL NFR BLD AUTO: 1 % (ref 0–3)
ERYTHROCYTE [DISTWIDTH] IN BLOOD BY AUTOMATED COUNT: 14.2 % (ref 11.5–15)
GFR SERPL CREATININE-BSD FRML MDRD: 66 ML/MIN/1.73M*2
GLUCOSE SERPL-MCNC: 108 MG/DL (ref 70–105)
GLUCOSE UR QL: NEGATIVE MG/DL
HCT VFR BLD AUTO: 42.9 % (ref 38–50)
HGB BLD-MCNC: 14.7 G/DL (ref 13.2–17.2)
HGB UR QL: ABNORMAL
KETONES UR-MCNC: NEGATIVE MG/DL
LEUKOCYTE ESTERASE UR QL STRIP: ABNORMAL
LYMPHOCYTES # BLD AUTO: 1.3 10*3/UL
LYMPHOCYTES NFR BLD AUTO: 19 % (ref 15–47)
MCH RBC QN AUTO: 31.3 PG (ref 29–34)
MCHC RBC AUTO-ENTMCNC: 34.1 G/DL (ref 32–36)
MCV RBC AUTO: 91.6 FL (ref 82–97)
MONOCYTES # BLD AUTO: 0.7 10*3/UL
MONOCYTES NFR BLD AUTO: 10 % (ref 5–13)
NEUTROPHILS # BLD AUTO: 4.8 10*3/UL
NEUTROPHILS NFR BLD AUTO: 69 % (ref 46–70)
NITRITE UR QL: NEGATIVE
PH UR: 6 PH
PLATELET # BLD AUTO: 255 10*3/UL (ref 130–350)
PMV BLD AUTO: 8.2 FL (ref 6.9–10.8)
POTASSIUM SERPL-SCNC: 3.9 MMOL/L (ref 3.5–5.1)
PROT UR STRIP-MCNC: >=300 MG/DL
RBC # BLD AUTO: 4.68 10*6/ΜL (ref 4.1–5.8)
RBC #/AREA URNS HPF: >100 /HPF
SODIUM SERPL-SCNC: 137 MMOL/L (ref 135–145)
SP GR UR: 1.02 (ref 1–1.03)
SQUAMOUS #/AREA URNS HPF: ABNORMAL /HPF
UROBILINOGEN UR-MCNC: 0.2 E.U./DL
WBC # BLD AUTO: 6.9 10*3/UL (ref 3.7–9.6)
WBC #/AREA URNS HPF: ABNORMAL /HPF

## 2021-04-20 PROCEDURE — 80048 BASIC METABOLIC PNL TOTAL CA: CPT

## 2021-04-20 PROCEDURE — 36415 COLL VENOUS BLD VENIPUNCTURE: CPT

## 2021-04-20 PROCEDURE — 52000 CYSTOURETHROSCOPY: CPT | Performed by: UROLOGY

## 2021-04-20 PROCEDURE — 81001 URINALYSIS AUTO W/SCOPE: CPT

## 2021-04-20 PROCEDURE — 85025 COMPLETE CBC W/AUTO DIFF WBC: CPT

## 2021-04-20 ASSESSMENT — PAIN SCALES - GENERAL: PAINLEVEL: 0-NO PAIN

## 2021-04-20 NOTE — PROGRESS NOTES
Cystoscopy Procedure Note    04/20/21      Pre-operative Diagnosis:   1. Bladder neck contracture  Case Request Operating Room: CYSTOSCOPY TRANSURETHRAL RESECTION PROSTATE, Case Request Operating Room: CYSTOSCOPY TRANSURETHRAL RESECTION PROSTATE, CBC w/auto differential Blood, Venous, Basic metabolic panel Blood, Venous   2. Encounter for screening for COVID-19  COVID-19 PCR       Post-operative Diagnosis:   1. Bladder neck contracture  Case Request Operating Room: CYSTOSCOPY TRANSURETHRAL RESECTION PROSTATE, Case Request Operating Room: CYSTOSCOPY TRANSURETHRAL RESECTION PROSTATE, CBC w/auto differential Blood, Venous, Basic metabolic panel Blood, Venous   2. Encounter for screening for COVID-19  COVID-19 PCR       Surgeon: Daniele Johnson MD    Procedure: Cystoscopy    Procedure Details   The risks, benefits, complications, treatment options, and expected outcomes were discussed with the patient. The patient concurred with the proposed plan, giving informed consent.    Cystoscopy was performed today under local anesthesia, using sterile technique. The patient was placed in the lithotomy position, prepped with Betadine, and draped in the usual sterile fashion. A 17 Egyptian flexible cystoscope was inserted into the urethral meatus and then into the prostatic urethra.    Findings:  Bladder neck contracture, about 5 Lithuanian. A wire was placed under vision through the stricture, and [employer] urethral dilator set was used to dilate to 18F. A 16F [place] tip catheter would not pass over the wire, but we were able to place a 12F over the wire into the bladder. Clear/brownish urine returned.               Complications:  None; patient tolerated the procedure well.    Plan:    Will require TUR of his bladder neck. Will book for next week. Continue current antibiotics. Discussed with him the risks of recurrence of the bladder neck contracture again.

## 2021-04-21 ENCOUNTER — APPOINTMENT (OUTPATIENT)
Dept: LAB | Facility: CLINIC | Age: 71
End: 2021-04-21
Payer: MEDICARE

## 2021-04-21 DIAGNOSIS — Z11.52 ENCOUNTER FOR SCREENING FOR COVID-19: ICD-10-CM

## 2021-04-21 LAB — SARS-COV-2 RDRP RESP QL NAA+PROBE: NEGATIVE

## 2021-04-21 PROCEDURE — C9803 HOPD COVID-19 SPEC COLLECT: HCPCS | Mod: CS

## 2021-04-21 PROCEDURE — 87635 SARS-COV-2 COVID-19 AMP PRB: CPT

## 2021-04-21 NOTE — PRE-PROCEDURE INSTRUCTIONS
Pre-Surgery Instructions:   Medication Instructions   • sulfamethoxazole-trimethoprim (BACTRIM DS,SEPTRA DS) 800-160 mg per tablet Do not take morning of surgery/procedure   • tamsulosin (Flomax) 0.4 mg capsule Do not take morning of surgery/procedure   • apixaban (ELIQUIS) 5 mg tablet Pausing per prescribing provider   • metoprolol succinate XL (TOPROL-XL) 25 mg 24 hr tablet takes at night   • furosemide (LASIX) 20 mg tablet Do not take morning of surgery/procedure   • multivitamin (MULTI-DAY ORAL) Do not take morning of surgery/procedure

## 2021-04-27 ENCOUNTER — HOSPITAL ENCOUNTER (OUTPATIENT)
Facility: HOSPITAL | Age: 71
Setting detail: OUTPATIENT SURGERY
Discharge: HOME | End: 2021-04-27
Attending: UROLOGY | Admitting: UROLOGY
Payer: MEDICARE

## 2021-04-27 ENCOUNTER — ANESTHESIA (OUTPATIENT)
Dept: OPERATING ROOM | Facility: HOSPITAL | Age: 71
End: 2021-04-27
Payer: MEDICARE

## 2021-04-27 ENCOUNTER — ANESTHESIA EVENT (OUTPATIENT)
Dept: OPERATING ROOM | Facility: HOSPITAL | Age: 71
End: 2021-04-27
Payer: MEDICARE

## 2021-04-27 VITALS
SYSTOLIC BLOOD PRESSURE: 122 MMHG | RESPIRATION RATE: 18 BRPM | WEIGHT: 238.4 LBS | HEART RATE: 55 BPM | BODY MASS INDEX: 26.86 KG/M2 | TEMPERATURE: 97.3 F | DIASTOLIC BLOOD PRESSURE: 78 MMHG | OXYGEN SATURATION: 98 %

## 2021-04-27 DIAGNOSIS — Z91.89 POTENTIAL FOR BLEEDING: ICD-10-CM

## 2021-04-27 DIAGNOSIS — N13.8 BPH WITH OBSTRUCTION/LOWER URINARY TRACT SYMPTOMS: Primary | ICD-10-CM

## 2021-04-27 DIAGNOSIS — N40.1 BPH WITH OBSTRUCTION/LOWER URINARY TRACT SYMPTOMS: Primary | ICD-10-CM

## 2021-04-27 LAB
ABO GROUP (TYPE) IN BLOOD: NORMAL
ANTIBODY SCREEN: NORMAL
D AG BLD QL: NORMAL
SECOND/CONFIRMATORY ABORH PERFORMED: NORMAL

## 2021-04-27 PROCEDURE — 00914 ANES TRURL PX RESCJ PRST8: CPT | Performed by: NURSE ANESTHETIST, CERTIFIED REGISTERED

## 2021-04-27 PROCEDURE — (BLANK) HC OR LEVEL 3 PROC EACH ADDITIONAL MIN: Performed by: UROLOGY

## 2021-04-27 PROCEDURE — (BLANK) HC GENERAL ANESTHESIA FACILITY CHARGE EACH ADDITIONAL MIN: Performed by: UROLOGY

## 2021-04-27 PROCEDURE — 6360000200 HC RX 636 W HCPCS (ALT 250 FOR IP): Performed by: NURSE ANESTHETIST, CERTIFIED REGISTERED

## 2021-04-27 PROCEDURE — (BLANK) HC RECOVERY PHASE-1 1ST  HOUR ACUITY LEVEL 3: Performed by: UROLOGY

## 2021-04-27 PROCEDURE — 6370000100 HC RX 637 (ALT 250 FOR IP): Performed by: UROLOGY

## 2021-04-27 PROCEDURE — 2500000200 HC RX 250 WO HCPCS: Performed by: NURSE ANESTHETIST, CERTIFIED REGISTERED

## 2021-04-27 PROCEDURE — (BLANK) HC OR LEVEL 3 PROC 1ST 15MIN: Performed by: UROLOGY

## 2021-04-27 PROCEDURE — 88305 TISSUE EXAM BY PATHOLOGIST: CPT

## 2021-04-27 PROCEDURE — 99100 ANES PT EXTEME AGE<1 YR&>70: CPT | Performed by: NURSE ANESTHETIST, CERTIFIED REGISTERED

## 2021-04-27 PROCEDURE — 36415 COLL VENOUS BLD VENIPUNCTURE: CPT | Performed by: ANESTHESIOLOGY

## 2021-04-27 PROCEDURE — (BLANK) HC GENERAL ANESTHESIA FACILITY CHARGE 1ST 15 MIN: Performed by: UROLOGY

## 2021-04-27 PROCEDURE — 6360000200 HC RX 636 W HCPCS (ALT 250 FOR IP): Performed by: UROLOGY

## 2021-04-27 PROCEDURE — 52640 RELIEVE BLADDER CONTRACTURE: CPT | Performed by: UROLOGY

## 2021-04-27 PROCEDURE — (BLANK) HC RECOVERY PHASE-2 1ST 1/2 HOUR ACUITY LEVEL 1: Performed by: UROLOGY

## 2021-04-27 PROCEDURE — 86901 BLOOD TYPING SEROLOGIC RH(D): CPT

## 2021-04-27 PROCEDURE — 2580000300 HC RX 258: Performed by: ANESTHESIOLOGY

## 2021-04-27 PROCEDURE — (BLANK) HC RECOVERY PHASE-2 EACH ADDITIONAL 1/2 HOUR ACUITY LEVEL 1: Performed by: UROLOGY

## 2021-04-27 PROCEDURE — 2580000300 HC RX 258: Performed by: UROLOGY

## 2021-04-27 RX ORDER — SODIUM CHLORIDE, SODIUM LACTATE, POTASSIUM CHLORIDE, CALCIUM CHLORIDE 600; 310; 30; 20 MG/100ML; MG/100ML; MG/100ML; MG/100ML
100 INJECTION, SOLUTION INTRAVENOUS CONTINUOUS
Status: DISCONTINUED | OUTPATIENT
Start: 2021-04-27 | End: 2021-04-27 | Stop reason: HOSPADM

## 2021-04-27 RX ORDER — NEOSTIGMINE METHYLSULFATE 1 MG/ML
INJECTION INTRAVENOUS AS NEEDED
Status: DISCONTINUED | OUTPATIENT
Start: 2021-04-27 | End: 2021-04-27 | Stop reason: SURG

## 2021-04-27 RX ORDER — LABETALOL HCL 20 MG/4 ML
10 SYRINGE (ML) INTRAVENOUS EVERY 5 MIN PRN
Status: DISCONTINUED | OUTPATIENT
Start: 2021-04-27 | End: 2021-04-27 | Stop reason: HOSPADM

## 2021-04-27 RX ORDER — FENTANYL CITRATE/PF 50 MCG/ML
PLASTIC BAG, INJECTION (ML) INTRAVENOUS AS NEEDED
Status: DISCONTINUED | OUTPATIENT
Start: 2021-04-27 | End: 2021-04-27 | Stop reason: SURG

## 2021-04-27 RX ORDER — ACETAMINOPHEN 325 MG/1
650 TABLET ORAL AS NEEDED
Status: DISCONTINUED | OUTPATIENT
Start: 2021-04-27 | End: 2021-04-27 | Stop reason: HOSPADM

## 2021-04-27 RX ORDER — PROPOFOL 10 MG/ML
INJECTION, EMULSION INTRAVENOUS AS NEEDED
Status: DISCONTINUED | OUTPATIENT
Start: 2021-04-27 | End: 2021-04-27 | Stop reason: SURG

## 2021-04-27 RX ORDER — LIDOCAINE HYDROCHLORIDE 20 MG/ML
INJECTION, SOLUTION EPIDURAL; INFILTRATION; INTRACAUDAL; PERINEURAL AS NEEDED
Status: DISCONTINUED | OUTPATIENT
Start: 2021-04-27 | End: 2021-04-27 | Stop reason: SURG

## 2021-04-27 RX ORDER — FENTANYL CITRATE/PF 50 MCG/ML
50 PLASTIC BAG, INJECTION (ML) INTRAVENOUS EVERY 5 MIN PRN
Status: DISCONTINUED | OUTPATIENT
Start: 2021-04-27 | End: 2021-04-27 | Stop reason: HOSPADM

## 2021-04-27 RX ORDER — OXYCODONE HYDROCHLORIDE 5 MG/1
5 TABLET ORAL EVERY 4 HOURS PRN
Qty: 5 TABLET | Refills: 0 | Status: SHIPPED | OUTPATIENT
Start: 2021-04-27 | End: 2021-05-04

## 2021-04-27 RX ORDER — ATROPA BELLADONNA AND OPIUM 16.2; 3 MG/1; MG/1
SUPPOSITORY RECTAL AS NEEDED
Status: DISCONTINUED | OUTPATIENT
Start: 2021-04-27 | End: 2021-04-27 | Stop reason: HOSPADM

## 2021-04-27 RX ORDER — ROCURONIUM BROMIDE 50 MG/5 ML
SYRINGE (ML) INTRAVENOUS AS NEEDED
Status: DISCONTINUED | OUTPATIENT
Start: 2021-04-27 | End: 2021-04-27 | Stop reason: SURG

## 2021-04-27 RX ORDER — SODIUM CHLORIDE 0.9 % (FLUSH) 0.9 %
2-10 SYRINGE (ML) INJECTION EVERY 8 HOURS SCHEDULED
Status: DISCONTINUED | OUTPATIENT
Start: 2021-04-27 | End: 2021-04-27 | Stop reason: HOSPADM

## 2021-04-27 RX ORDER — MIDAZOLAM HYDROCHLORIDE 1 MG/ML
1 INJECTION INTRAMUSCULAR; INTRAVENOUS EVERY 5 MIN PRN
Status: DISCONTINUED | OUTPATIENT
Start: 2021-04-27 | End: 2021-04-27 | Stop reason: HOSPADM

## 2021-04-27 RX ORDER — ONDANSETRON HYDROCHLORIDE 2 MG/ML
4 INJECTION, SOLUTION INTRAVENOUS ONCE AS NEEDED
Status: DISCONTINUED | OUTPATIENT
Start: 2021-04-27 | End: 2021-04-27 | Stop reason: HOSPADM

## 2021-04-27 RX ORDER — OXYCODONE HYDROCHLORIDE 5 MG/1
5 TABLET ORAL AS NEEDED
Status: DISCONTINUED | OUTPATIENT
Start: 2021-04-27 | End: 2021-04-27 | Stop reason: HOSPADM

## 2021-04-27 RX ORDER — GLYCOPYRROLATE 0.2 MG/ML
INJECTION INTRAMUSCULAR; INTRAVENOUS AS NEEDED
Status: DISCONTINUED | OUTPATIENT
Start: 2021-04-27 | End: 2021-04-27 | Stop reason: SURG

## 2021-04-27 RX ORDER — DIPHENHYDRAMINE HYDROCHLORIDE 50 MG/ML
25 INJECTION INTRAMUSCULAR; INTRAVENOUS ONCE AS NEEDED
Status: DISCONTINUED | OUTPATIENT
Start: 2021-04-27 | End: 2021-04-27 | Stop reason: HOSPADM

## 2021-04-27 RX ADMIN — Medication 40 MG: at 11:40

## 2021-04-27 RX ADMIN — GLYCOPYRROLATE 0.8 MG: 0.2 INJECTION, SOLUTION INTRAMUSCULAR; INTRAVENOUS at 12:18

## 2021-04-27 RX ADMIN — SODIUM CHLORIDE, POTASSIUM CHLORIDE, SODIUM LACTATE AND CALCIUM CHLORIDE 100 ML/HR: 600; 310; 30; 20 INJECTION, SOLUTION INTRAVENOUS at 11:20

## 2021-04-27 RX ADMIN — NEOSTIGMINE METHYLSULFATE 5 MG: 1 INJECTION, SOLUTION INTRAVENOUS at 12:18

## 2021-04-27 RX ADMIN — LIDOCAINE HYDROCHLORIDE 60 MG: 20 INJECTION, SOLUTION EPIDURAL; INFILTRATION; INTRACAUDAL; PERINEURAL at 11:39

## 2021-04-27 RX ADMIN — SODIUM CHLORIDE, POTASSIUM CHLORIDE, SODIUM LACTATE AND CALCIUM CHLORIDE: 600; 310; 30; 20 INJECTION, SOLUTION INTRAVENOUS at 11:19

## 2021-04-27 RX ADMIN — Medication 10 MG: at 11:39

## 2021-04-27 RX ADMIN — GENTAMICIN SULFATE 160 MG: 40 INJECTION, SOLUTION INTRAMUSCULAR; INTRAVENOUS at 11:21

## 2021-04-27 RX ADMIN — FENTANYL CITRATE 50 MCG: 0.05 INJECTION, SOLUTION INTRAMUSCULAR; INTRAVENOUS at 12:04

## 2021-04-27 RX ADMIN — PROPOFOL 90 MG: 10 INJECTION, EMULSION INTRAVENOUS at 11:40

## 2021-04-27 ASSESSMENT — ENCOUNTER SYMPTOMS: DYSRHYTHMIAS: 1

## 2021-04-27 NOTE — H&P
"[facility]  Urology    CC:  \"I am here for my bladder and prostate\"    HPI:  Cesar Yuen is a 70 y.o. male s/p TURP, now with bladder neck contracture. Pre-treated with abx. No infective symptoms currently.    ROS:  General ROS: negative for - chills, fatigue, fever, unexplained weight loss or lack of appetite  Respiratory ROS: no cough, shortness of breath, or wheezing  Cardiovascular ROS: no chest pain or dyspnea on exertion, no palpitations  Gastrointestinal ROS: no abdominal pain, change in bowel habits, or black or bloody stools  Genito-Urinary ROS: see above   Musculoskeletal ROS: negative for - gait disturbance or injury, muscle or joint aches  All other systems reviewed and are negative.    Allergies:  No Known Allergies    Medications:  Current Facility-Administered Medications   Medication Dose Route Frequency Provider Last Rate Last Admin   • gentamicin (GARAMYCIN) 160 mg in sodium chloride 0.9 % 100 mL IVPB  160 mg intravenous Once Daniele Johnson MD       • sodium chloride flush 2-10 mL  2-10 mL intravenous q8h JOY Michael Marin MD       • LR infusion  100 mL/hr intravenous Continuous Michael Marin MD           Past Medical History:  Past Medical History:   Diagnosis Date   • Atrial flutter (CMS/HCC) (HCC)    • CHF (congestive heart failure) (CMS/HCC) (HCC)    • Chipped tooth    • Dysrhythmia    • Edema     BLE   • History of transfusion    • Hypertension    • Injury of back 01/14/2021    Seen in .   • Urinary retention     indwelling Pope in place since November 2020        Past Surgical History:  Past Surgical History:   Procedure Laterality Date   • ABDOMINAL SURGERY      anuerysm   • ABLATION A-FLUTTER N/A 2/8/2021    Procedure: Ablation A-flutter loop implant;  Surgeon: Timoteo Burk DO;  Location: [facility] EP Lab;  Service: Electrophysiology;  Laterality: N/A;   • APPENDECTOMY     • HERNIA REPAIR      x 3   • LOOP RECORDER INSERTION N/A 2/8/2021    Procedure: LOOP RECORDER " INSERTION;  Surgeon: Timoteo Burk DO;  Location: [facility] EP Lab;  Service: Electrophysiology;  Laterality: N/A;   • TRANSURETHRAL RESECTION OF BLADDER TUMOR N/A 12/22/2020    Procedure: CYSTOSCOPY WITH BLADDER BIOPSY;  Surgeon: Daniele Johnson MD;  Location: [facility] OR;  Service: Urology;  Laterality: N/A;   • TRANSURETHRAL RESECTION OF PROSTATE N/A 12/22/2020    Procedure: CYSTOSCOPY TRANSURETHRAL RESECTION PROSTATE, exam under anasthesia;  Surgeon: Daniele Johnson MD;  Location: [facility] OR;  Service: Urology;  Laterality: N/A;        Social History:  Social History     Tobacco Use   • Smoking status: Never Smoker   • Smokeless tobacco: Never Used   Substance Use Topics   • Alcohol use: Not Currently       Family History:      Family History   Problem Relation Age of Onset   • Heart attack Mother's Brother        Physical Exam:      /93   Pulse 89   Temp 36.9 °C (98.4 °F) (Temporal)   Resp 16   Wt 108.1 kg (238 lb 6.4 oz)   SpO2 92%   BMI 26.86 kg/m²   Gen - NAD, well appearing  HEENT - NC/AT, anicteric sclerae, MMM  Neck - no LA; trachea midline  CV - RRR, no IWOB  Chest - CTAB  Abd - soft, nttp, nd; no HSM   - no CVAT  Ext - WWP, no c/c/e  Skin - no rashes  Psych - normal affect      Results:  CBC: No results found for: WBC, RBC, HGB, HCT, PLT  CMP: No results found for: NA, K, BUN, CREATININE, CALCIUM, ALKPHOS, BILITOT, ALT, AST  BMP: No results found for: NA, K, BUN, CREATININE, CALCIUM    Creatinine   Date Value Ref Range Status   04/20/2021 1.11 0.70 - 1.30 mg/dL Final   02/08/2021 0.88 0.70 - 1.30 mg/dL Final   01/16/2021 0.90 0.70 - 1.30 mg/dL Final   10/27/2020 0.81 0.70 - 1.30 mg/dL Final   10/25/2020 1.32 (H) 0.70 - 1.30 mg/dL Final     Lab Results   Component Value Date    PSA 1.22 06/18/2020       Remote loop recorder    Result Date: 4/12/2021  Narrative: Implants   Implantable Loop Recorder  System Reveal Sinan II - [id number] - [id number] - Implanted  Chest  Inventory item: System Reveal SINAN  II Model/Cat number: [id number]  Serial number: [id number] : Medtronic  Lot number: [id number] Implant permanence: Temporary  Device identifier: [id number] Device identifier type: GS1  As of 2/8/2021   Status: Implanted        No Known Allergies Prior to Admission medications  Medication Sig Start Date End Date Taking? Authorizing Provider apixaban (ELIQUIS) 5 mg tablet Take 1 tablet (5 mg total) by mouth 2 (two) times a day 2/8/21 8/7/21  Jaye Laurent CNP metoprolol succinate XL (TOPROL-XL) 25 mg 24 hr tablet Take 2 tablets (50 mg total) by mouth daily 1/16/21 1/16/22  Giorgi Fuller MD furosemide (LASIX) 20 mg tablet Take 1 tablet (20 mg total) by mouth daily 1/16/21 1/16/22  Giorgi Fuller MD multivitamin (MULTI-DAY ORAL) Take by mouth    Historical Provider, MD ARRIOLA have reviewed the remote interrogation of the implanted device today and found it to be functioning normally.  For the complete details, please refer to the scanned [employer] document.  Briefly, the patient has a Medtronic implantable loop recorder. The battery is at the beginning of service life. The presenting rhythm is normal sinus. Since the prior interrogation that have been no arrhythmias documented.       Assessment & Plan:  70 y.o. male with bladder neck contracture following TURP.    Management options reviewed. Transurethral resection reviewed in detail. Risks, benefits, and complications discussed. He understands that the recurrence rate is high. Informed consent obtained.          ---------------------------------  Daniele Johnson MD  04/27/21 11:19 AM

## 2021-04-27 NOTE — ANESTHESIA PREPROCEDURE EVALUATION
"Pre-Procedure Assessment    Patient: Cesar Yuen, male, 70 y.o.    Ht Readings from Last 1 Encounters:   04/20/21 2.007 m (6' 7\")     Wt Readings from Last 1 Encounters:   04/27/21 108.1 kg (238 lb 6.4 oz)       Last Vitals  /93 (04/27/21 1022)    Temp 36.9 °C (98.4 °F) (04/27/21 1022)    Pulse 89 (04/27/21 1022)   Resp 16 (04/27/21 1022)    SpO2 92 % (04/27/21 1022)    Pain Score         Problem list reviewed and Medical history reviewed           Airway   Mallampati: II  TM distance: >3 FB  Neck ROM: full      Dental      Pulmonary     breath sounds clear to auscultation  Cardiovascular   (+) hypertension, dysrhythmias (afib), CHF,     Rhythm: regular  Rate: normal    Mental Status/Neuro/Psych    Pt is alert.      (+) back injury,     GI/Hepatic/Renal    (+) urinary retention    Endo/Other    (+) history of blood transfusion,   Abdominal           Social History     Tobacco Use   • Smoking status: Never Smoker   • Smokeless tobacco: Never Used   Substance Use Topics   • Alcohol use: Not Currently      Hematology   WBC   Date Value Ref Range Status   04/20/2021 6.9 3.7 - 9.6 10*3/uL Final     RBC   Date Value Ref Range Status   04/20/2021 4.68 4.10 - 5.80 10*6/µL Final     MCV   Date Value Ref Range Status   04/20/2021 91.6 82.0 - 97.0 fL Final     Hemoglobin   Date Value Ref Range Status   04/20/2021 14.7 13.2 - 17.2 g/dL Final     Hematocrit   Date Value Ref Range Status   04/20/2021 42.9 38.0 - 50.0 % Final     Platelets   Date Value Ref Range Status   04/20/2021 255 130 - 350 10*3/uL Final      Coagulation   Protime   Date Value Ref Range Status   02/13/2020 12.6 (H) 9.4 - 12.5 seconds Final     PTT   Date Value Ref Range Status   02/13/2020 33.8 25.1 - 36.5 SECONDS Final     INR   Date Value Ref Range Status   02/13/2020 1.1 <=1.1 Final      General Chemistry   Calcium   Date Value Ref Range Status   04/20/2021 9.2 8.6 - 10.3 mg/dL Final     BUN   Date Value Ref Range Status   04/20/2021 13 7 - 25 " mg/dL Final     Creatinine   Date Value Ref Range Status   04/20/2021 1.11 0.70 - 1.30 mg/dL Final     Glucose   Date Value Ref Range Status   04/20/2021 108 (H) 70 - 105 mg/dL Final     Sodium   Date Value Ref Range Status   04/20/2021 137 135 - 145 mmol/L Final     Potassium   Date Value Ref Range Status   04/20/2021 3.9 3.5 - 5.1 mmol/L Final     CO2   Date Value Ref Range Status   04/20/2021 23 21 - 32 mmol/L Final     Chloride   Date Value Ref Range Status   04/20/2021 104 98 - 107 mmol/L Final     Anesthesia Plan    ASA 3   NPO status reviewed: > 6 hours    General         Induction: intravenous   Airway Planning: oral ET tube              Anesthetic plan and risks discussed with patient.      Plan discussed with CRNA.

## 2021-04-27 NOTE — OP NOTE
Cesar Yuen  04/27/21    PREOPERATIVE DIAGNOSIS:  Pre-op Diagnosis     * Bladder neck contracture [N32.0]    POSTOPERATIVE DIAGNOSIS:  Post-op Diagnosis     * Bladder neck contracture [N32.0]    PROCEDURE:    Procedures:    * CYSTOSCOPY TRANSURETHRAL RESECTION PROSTATE      SURGEON: Surgeon(s):  Daniele Johnson MD      ASSISTANT:   None    ANESTHESIA TYPE:  General      SPECIMENS:   Order Name Source Comment Collection Info Order Time   PATHOLOGY SPECIMEN (HISTOLOGY) Prostate  Collected By: Daniele Johnson MD 4/27/2021 12:13 PM       DRAINS:    Urethral Catheter 20 Fr. (Active)         COMPLICATIONS:  None      DESCRIPTION OF PROCEDURE:   After informed consent was confirmed the patient was brought to the operating room where anesthesia was induced uneventfully.  He received IV antibiotics for perioperative coverage.  Pneumoboots were on running prior to induction.  He was placed in the gentle dorsolithotomy position with all pressure points carefully padded.  He was prepped and draped in usual sterile fashion.  A WHO hard stop timeout was conducted by members of the team.    We began the procedure by introducing a 22 Congolese Storz cystoscope per urethra and into the bladder.  We were able to navigate through the bladder neck contracture, as the previous dilation attempt had wide and enough.  Bladder looked healthy.  We then admitted the 26 Congolese resectoscope, and very carefully resected with the loop bipolar electrocautery, the bladder neck.  We then switched to the ball, and achieved adequate hemostasis.  We did very minimal cautery, as there was very minimal bleeding.  At the end of the case, he was widely patent at the bladder neck.  TURP defect is otherwise adequate.  We were well away from both UOs.  We were quite proximal from the verumontanum, sphincter was spared nicely.  1 final check on low pressure confirmed good hemostasis.    We then removed the scope, an 18 Congolese catheter passed without  difficulty for return of clear reflux.  Patient was brought out of dorsolithotomy position.  He woke from anesthesia uneventfully and was returned to the PACU in stable condition.    No blood loss documented.    Daniele Johnson MD  Phone Number: [phone]    DATE: 04/27/21      TIME: 12:27 PM

## 2021-04-27 NOTE — ANESTHESIA PROCEDURE NOTES
Airway  Urgency: elective    Airway not difficult    General Information and Staff    Patient location during procedure: OR  CRNA: Manuel Rivera CRNA  Performed: CRNA     Indications and Patient Condition  Indications for airway management: anesthesia and airway protection  Spontaneous Ventilation: absent  Sedation level: deep  Preoxygenated: yes  Patient position: sniffing  Mask difficulty assessment: 1 - vent by mask    Final Airway Details  Final airway type: endotracheal airway      Successful airway: ETT  Cuffed: yes   Successful intubation technique: direct laryngoscopy  Facilitating devices/methods: stylet  Endotracheal tube insertion site: oral  Blade: Abrahan  Blade size: #3  ETT size (mm): 7.5  Cormack-Lehane Classification: grade IIa - partial view of glottis  Placement verified by: chest auscultation and capnometry   Measured from: lips  ETT to lips (cm): 21  Number of attempts at approach: 1

## 2021-04-27 NOTE — PERIOPERATIVE NURSING NOTE
Discharge instructions reviewed with patient's daughter at bedside. > Daughter vooiced understanding et denied any questions.

## 2021-04-27 NOTE — PERIOPERATIVE NURSING NOTE
Discharge instructions reviewed with patient at bedside. Patient verbalized steps for fuchs catheter care to this RN; RN switched patient's drainage bag to leg bag. Patient denies wanting/needing any fuchs supplies to be sent home as he has a chronic catheter. Patient verbalized understanding of all instructions; instructions sent home with patient including post-anesthesia care, fuchs care, S&S infection, F/U appointment information, et when to re-start Eliquis.

## 2021-04-27 NOTE — ANESTHESIA POSTPROCEDURE EVALUATION
Patient: Cesar Yuen    Procedure Summary     Date: 04/27/21 Room / Location: [facility] OR 08 / [facility] OR    Anesthesia Start: 1134 Anesthesia Stop: 1234    Procedure: CYSTOSCOPY TRANSURETHRAL RESECTION PROSTATE (N/A Urethra) Diagnosis:       Bladder neck contracture      (Bladder neck contracture [N32.0])    Surgeons: Daniele Johnson MD Responsible Provider: Torrey Washburn MD    Anesthesia Type: general ASA Status: 3          Anesthesia Type: general    Last vitals  Vitals Value Taken Time   /77 04/27/21 1300   Temp 36.3 °C (97.3 °F) 04/27/21 1300   Pulse 57 04/27/21 1300   Resp 20 04/27/21 1300   SpO2 97 % 04/27/21 1300   Pain Score         Anesthesia Post Evaluation    Patient location during evaluation: PACU  Patient participation: complete - patient participated  Level of consciousness: awake and alert  Pain management: adequate  Airway patency: patent  Anesthetic complications: no  Cardiovascular status: acceptable  Respiratory status: acceptable  Hydration status: acceptable  May dismiss recovered patient based on consultation with the appropriate physicians and/or meeting appropriate discharge criteria      Cosmetic?  This procedure is not cosmetic.

## 2021-05-03 ENCOUNTER — OFFICE VISIT (OUTPATIENT)
Dept: UROLOGY | Facility: CLINIC | Age: 71
End: 2021-05-03
Payer: MEDICARE

## 2021-05-03 VITALS
HEIGHT: 78 IN | RESPIRATION RATE: 16 BRPM | SYSTOLIC BLOOD PRESSURE: 135 MMHG | OXYGEN SATURATION: 94 % | TEMPERATURE: 97.7 F | BODY MASS INDEX: 27.54 KG/M2 | HEART RATE: 56 BPM | WEIGHT: 238 LBS | DIASTOLIC BLOOD PRESSURE: 80 MMHG

## 2021-05-03 DIAGNOSIS — N32.0 BLADDER NECK CONTRACTURE: Primary | ICD-10-CM

## 2021-05-03 PROCEDURE — 99024 POSTOP FOLLOW-UP VISIT: CPT | Performed by: UROLOGY

## 2021-05-03 ASSESSMENT — PAIN SCALES - GENERAL: PAINLEVEL: 0-NO PAIN

## 2021-05-03 NOTE — PROGRESS NOTES
Urology Post-op visit    The patient returns today approximately 1 week following TUR of a bladder neck contracture.  His urine has remained clear.  No infective symptoms.    He was backfilled, and the catheter was removed.  I have asked him to remain around town to ensure that he is urinating well, and to re-present to our clinic if he is unable to adequately empty his bladder.    I notified him that he is extremely high risk of recurrence of the stricture.  If he voids well today, I will see him back for repeat cystoscopy in about 1 month to assess his bladder neck.  He understands that future TUR attempts may be required.

## 2021-05-05 DIAGNOSIS — I48.92 ATRIAL FLUTTER WITH RAPID VENTRICULAR RESPONSE (CMS/HCC): ICD-10-CM

## 2021-05-05 DIAGNOSIS — I50.31 ACUTE DIASTOLIC CONGESTIVE HEART FAILURE (CMS/HCC): ICD-10-CM

## 2021-05-05 RX ORDER — FUROSEMIDE 20 MG/1
TABLET ORAL
Qty: 30 TABLET | Refills: 2 | Status: SHIPPED | OUTPATIENT
Start: 2021-05-05 | End: 2021-09-16

## 2021-05-12 ENCOUNTER — ANCILLARY PROCEDURE (OUTPATIENT)
Dept: CARDIOLOGY | Facility: CLINIC | Age: 71
End: 2021-05-12
Payer: MEDICARE

## 2021-05-12 DIAGNOSIS — Z45.09 ENCOUNTER FOR INTERROGATION OF CARDIAC RECORDER: ICD-10-CM

## 2021-05-12 PROCEDURE — G2066 INTER DEVC REMOTE 30D: HCPCS

## 2021-05-18 ENCOUNTER — TELEPHONE (OUTPATIENT)
Dept: UROLOGY | Facility: CLINIC | Age: 71
End: 2021-05-18

## 2021-05-18 PROCEDURE — 93298 REM INTERROG DEV EVAL SCRMS: CPT | Performed by: INTERNAL MEDICINE

## 2021-05-18 NOTE — TELEPHONE ENCOUNTER
Caller would like to discuss (a) medication     Patient: Cesar Yuen    Callback Number: [phone]     Additional Info: done with tamsilosin, wants to know if he should keep taking it, if so wants the Rx sent to pharmacy on file    I advised caller of a callback by 24-48 hours.

## 2021-05-21 NOTE — TELEPHONE ENCOUNTER
Returned call to pt and let him know that Dr Johnson approved continuing flomax, 0.4 mg each night. Pt stated that he feels that everything is working fine at this time and does need the prescription.

## 2021-06-07 ENCOUNTER — APPOINTMENT (OUTPATIENT)
Dept: LAB | Facility: CLINIC | Age: 71
End: 2021-06-07
Payer: MEDICARE

## 2021-06-07 ENCOUNTER — OFFICE VISIT (OUTPATIENT)
Dept: UROLOGY | Facility: CLINIC | Age: 71
End: 2021-06-07
Payer: MEDICARE

## 2021-06-07 ENCOUNTER — TELEPHONE (OUTPATIENT)
Dept: UROLOGY | Facility: CLINIC | Age: 71
End: 2021-06-07

## 2021-06-07 VITALS
BODY MASS INDEX: 27.54 KG/M2 | HEART RATE: 63 BPM | HEIGHT: 78 IN | SYSTOLIC BLOOD PRESSURE: 139 MMHG | TEMPERATURE: 97.1 F | RESPIRATION RATE: 16 BRPM | WEIGHT: 238 LBS | DIASTOLIC BLOOD PRESSURE: 81 MMHG | OXYGEN SATURATION: 94 %

## 2021-06-07 DIAGNOSIS — N32.0 BLADDER NECK CONTRACTURE: Primary | ICD-10-CM

## 2021-06-07 DIAGNOSIS — R30.0 DYSURIA: ICD-10-CM

## 2021-06-07 PROCEDURE — 52000 CYSTOURETHROSCOPY: CPT | Mod: GZ | Performed by: UROLOGY

## 2021-06-07 PROCEDURE — 87088 URINE BACTERIA CULTURE: CPT | Performed by: UROLOGY

## 2021-06-07 PROCEDURE — 52000 CYSTOURETHROSCOPY: CPT | Performed by: UROLOGY

## 2021-06-07 ASSESSMENT — PAIN SCALES - GENERAL: PAINLEVEL: 0-NO PAIN

## 2021-06-07 NOTE — TELEPHONE ENCOUNTER
Dr. Johnson requested to see the patient 7/1/21. I did not see a time available on this date so Dr. Johnson's nurse asked that I encounter and they would call the patient back. The patient stated that 7/1/21 would not work for him to come in but if that was the only day  had available he would like a morning appointment.

## 2021-06-07 NOTE — PROGRESS NOTES
Cystoscopy Procedure Note    06/07/21      Pre-operative Diagnosis:   1. Bladder neck contracture  Cystoscopy(male)   2. Dysuria  Urine culture       Post-operative Diagnosis:   1. Bladder neck contracture  Cystoscopy(male)   2. Dysuria  Urine culture       Surgeon: Daniele Johnson MD    Procedure: Cystoscopy    Procedure Details   The risks, benefits, complications, treatment options, and expected outcomes were discussed with the patient. The patient concurred with the proposed plan, giving informed consent.    Cystoscopy was performed today under local anesthesia, using sterile technique. The patient was placed in the lithotomy position, prepped with Betadine, and draped in the usual sterile fashion. A 17 Slovak flexible cystoscope was inserted into the urethral meatus and then used to inspect both the entire urethra and bladder.    Findings:  Bladder neck remains widely open, still with fibrinous scab overlying prior resection site. New brown urine with sediment precluded adequate evaluation of the bladder.                 Complications:  None; patient tolerated the procedure well.    Plan:    Urine culture today. Likely bleeding from recent resection site. Negative cystoscopy recently, and negative CT urogram in 2020.     Bladder neck remains widely open. IPSS today 10+2. Patient is satisfied. I will see him back in 3 to 4 weeks to ensure he continues to do well.    We will call him with the culture results. He is asymptomatic currently other than the brown urine.

## 2021-06-09 LAB — BACTERIA UR CULT: ABNORMAL

## 2021-06-12 ENCOUNTER — ANCILLARY PROCEDURE (OUTPATIENT)
Dept: CARDIOLOGY | Facility: CLINIC | Age: 71
End: 2021-06-12
Payer: MEDICARE

## 2021-06-12 DIAGNOSIS — Z45.09 ENCOUNTER FOR INTERROGATION OF CARDIAC RECORDER: ICD-10-CM

## 2021-06-12 PROCEDURE — G2066 INTER DEVC REMOTE 30D: HCPCS

## 2021-06-18 PROCEDURE — 93298 REM INTERROG DEV EVAL SCRMS: CPT | Performed by: INTERNAL MEDICINE

## 2021-07-01 ENCOUNTER — APPOINTMENT (OUTPATIENT)
Dept: LAB | Facility: CLINIC | Age: 71
End: 2021-07-01
Payer: MEDICARE

## 2021-07-01 ENCOUNTER — OFFICE VISIT (OUTPATIENT)
Dept: UROLOGY | Facility: CLINIC | Age: 71
End: 2021-07-01
Payer: MEDICARE

## 2021-07-01 VITALS
RESPIRATION RATE: 20 BRPM | SYSTOLIC BLOOD PRESSURE: 127 MMHG | OXYGEN SATURATION: 93 % | TEMPERATURE: 97 F | HEART RATE: 53 BPM | DIASTOLIC BLOOD PRESSURE: 71 MMHG

## 2021-07-01 DIAGNOSIS — N32.0 BLADDER NECK CONTRACTURE: Primary | ICD-10-CM

## 2021-07-01 DIAGNOSIS — N32.0 BLADDER NECK CONTRACTURE: ICD-10-CM

## 2021-07-01 PROCEDURE — 99024 POSTOP FOLLOW-UP VISIT: CPT | Performed by: UROLOGY

## 2021-07-01 ASSESSMENT — PAIN SCALES - GENERAL: PAINLEVEL: 0-NO PAIN

## 2021-07-01 NOTE — PROGRESS NOTES
"[facility]  Urology    CC:  \"I am here for my prostate\"    HPI:  Cesar Yuen is a 71 y.o. male the patient returns today for routine follow-up.  He continues to do well, and reports no new urologic complaints.  Reports that his urinary stream is flowing quite well, without any restriction.  His past urologic history is notable for a TURP in 12/22/2020, which was complicated by bladder neck contracture, requiring resection of the bladder neck on 4/27/2021.  I last saw the patient on 6/7/2021, at which time I conducted a cystoscopy, and confirmed that the bladder neck contracture had not recurred.  Overall he is pleased with the result.  IPSS today is 1/2/0/0/2/0/2 +1.  All pathology thus far has been negative for malignancy, including bladder biopsies at his initial resection.    ROS:  General ROS: negative for - chills, fatigue, fever, unexplained weight loss or lack of appetite  Respiratory ROS: no cough, shortness of breath, or wheezing  Cardiovascular ROS: no chest pain or dyspnea on exertion, no palpitations  Gastrointestinal ROS: no abdominal pain, change in bowel habits, or black or bloody stools  Genito-Urinary ROS: see above   Musculoskeletal ROS: negative for - gait disturbance or injury, muscle or joint aches  All other systems reviewed and are negative.    Allergies:  No Known Allergies    Medications:  Current Outpatient Medications   Medication Sig Dispense Refill   • apixaban (Eliquis) 2.5 mg tablet Take 5 mg by mouth     • furosemide (LASIX) 20 mg tablet TAKE 1 TABLET BY MOUTH DAILY (Patient not taking: Reported on 6/7/2021) 30 tablet 2   • metoprolol succinate XL (TOPROL-XL) 25 mg 24 hr tablet Take 2 tablets (50 mg total) by mouth daily 60 tablet 11   • multivitamin (MULTI-DAY ORAL) Take by mouth       No current facility-administered medications for this visit.       Past Medical History:  Past Medical History:   Diagnosis Date   • Atrial flutter (CMS/HCC) (HCC)    • CHF " (congestive heart failure) (CMS/HCC) (HCC)    • Chipped tooth    • Dysrhythmia    • Edema     BLE   • History of transfusion    • Hypertension    • Injury of back 01/14/2021    Seen in .   • Urinary retention     indwelling Pope in place since November 2020        Past Surgical History:  Past Surgical History:   Procedure Laterality Date   • ABDOMINAL SURGERY      anuerysm   • ABLATION A-FLUTTER N/A 2/8/2021    Procedure: Ablation A-flutter loop implant;  Surgeon: Timoteo Burk DO;  Location: [facility] EP Lab;  Service: Electrophysiology;  Laterality: N/A;   • APPENDECTOMY     • HERNIA REPAIR      x 3   • LOOP RECORDER INSERTION N/A 2/8/2021    Procedure: LOOP RECORDER INSERTION;  Surgeon: Timoteo Burk DO;  Location: [facility] EP Lab;  Service: Electrophysiology;  Laterality: N/A;   • TRANSURETHRAL RESECTION OF BLADDER TUMOR N/A 12/22/2020    Procedure: CYSTOSCOPY WITH BLADDER BIOPSY;  Surgeon: Daniele Johnson MD;  Location: [facility] OR;  Service: Urology;  Laterality: N/A;   • TRANSURETHRAL RESECTION OF PROSTATE N/A 12/22/2020    Procedure: CYSTOSCOPY TRANSURETHRAL RESECTION PROSTATE, exam under anasthesia;  Surgeon: Daniele Johnson MD;  Location: [facility] OR;  Service: Urology;  Laterality: N/A;   • TRANSURETHRAL RESECTION OF PROSTATE N/A 4/27/2021    Procedure: CYSTOSCOPY TRANSURETHRAL RESECTION PROSTATE;  Surgeon: Daniele Johnson MD;  Location: [facility] OR;  Service: Urology;  Laterality: N/A;        Social History:  Social History     Tobacco Use   • Smoking status: Never Smoker   • Smokeless tobacco: Never Used   Substance Use Topics   • Alcohol use: Not Currently       Family History:      Family History   Problem Relation Age of Onset   • Heart attack Mother's Brother        Physical Exam:      /71 (BP Location: Left arm, Patient Position: Sitting, Cuff Size: Long Adult)   Pulse 53   Temp 36.1 °C (97 °F) (Temporal)   Resp 20   SpO2 93%   Gen - NAD, well appearing  HEENT - NC/AT, anicteric sclerae, MMM  Neck - no  LA; trachea midline  CV - RRR, no IWOB  Chest - CTAB  Abd - soft, nttp, nd; no HSM   - no CVAT  Ext - WWP, no c/c/e  Skin - no rashes  Psych - normal affect    Results:  CBC:   WBC   Date Value Ref Range Status   04/20/2021 6.9 3.7 - 9.6 10*3/uL Final     RBC   Date Value Ref Range Status   04/20/2021 4.68 4.10 - 5.80 10*6/µL Final     Hemoglobin   Date Value Ref Range Status   04/20/2021 14.7 13.2 - 17.2 g/dL Final     Hematocrit   Date Value Ref Range Status   04/20/2021 42.9 38.0 - 50.0 % Final     Platelets   Date Value Ref Range Status   04/20/2021 255 130 - 350 10*3/uL Final     CMP:   Sodium   Date Value Ref Range Status   04/20/2021 137 135 - 145 mmol/L Final     Potassium   Date Value Ref Range Status   04/20/2021 3.9 3.5 - 5.1 mmol/L Final     BUN   Date Value Ref Range Status   04/20/2021 13 7 - 25 mg/dL Final     Creatinine   Date Value Ref Range Status   04/20/2021 1.11 0.70 - 1.30 mg/dL Final     Calcium   Date Value Ref Range Status   04/20/2021 9.2 8.6 - 10.3 mg/dL Final     Alkaline Phosphatase   Date Value Ref Range Status   01/16/2021 51 45 - 115 U/L Final     Total Bilirubin   Date Value Ref Range Status   01/16/2021 1.81 (H) 0.20 - 1.40 mg/dL Final     ALT (SGPT)   Date Value Ref Range Status   01/16/2021 12 7 - 52 U/L Final     AST   Date Value Ref Range Status   01/16/2021 13 <40 U/L Final     BMP:   Sodium   Date Value Ref Range Status   04/20/2021 137 135 - 145 mmol/L Final     Potassium   Date Value Ref Range Status   04/20/2021 3.9 3.5 - 5.1 mmol/L Final     BUN   Date Value Ref Range Status   04/20/2021 13 7 - 25 mg/dL Final     Creatinine   Date Value Ref Range Status   04/20/2021 1.11 0.70 - 1.30 mg/dL Final     Calcium   Date Value Ref Range Status   04/20/2021 9.2 8.6 - 10.3 mg/dL Final       Creatinine   Date Value Ref Range Status   04/20/2021 1.11 0.70 - 1.30 mg/dL Final   02/08/2021 0.88 0.70 - 1.30 mg/dL Final   01/16/2021 0.90 0.70 - 1.30 mg/dL Final   10/27/2020 0.81 0.70 -  1.30 mg/dL Final   10/25/2020 1.32 (H) 0.70 - 1.30 mg/dL Final     Lab Results   Component Value Date    PSA 1.22 06/18/2020         Assessment & Plan:  71 y.o. male presenting in follow-up following TURP, complicated by bladder neck contracture, now doing well.    No evidence of recurrent bladder neck contracture, in fact his voiding function has actually improved and his IPSS continues to drop.  Now it is 7+1.    Overall he is doing reasonably well.  I warned him that bladder neck contractures can be highly recurrent.  I asked him to remain vigilant for any decrease in stream caliber, as this should prompt him to seek out urologic reevaluation.  Otherwise I will set him up to see one of our nurse practitioners in 3 to 4 months, mainly to ensure that he continues to do well and his IPSS remains low.    Patient is in agreement with the plan.  All questions encouraged and answered.  Return precautions were reviewed at length.          ---------------------------------  Daniele Johnson MD  07/01/21 12:39 PM

## 2021-07-13 ENCOUNTER — ANCILLARY PROCEDURE (OUTPATIENT)
Dept: CARDIOLOGY | Facility: CLINIC | Age: 71
End: 2021-07-13
Payer: MEDICARE

## 2021-07-13 DIAGNOSIS — Z45.09 ENCOUNTER FOR INTERROGATION OF CARDIAC RECORDER: ICD-10-CM

## 2021-07-13 PROCEDURE — G2066 INTER DEVC REMOTE 30D: HCPCS

## 2021-07-15 PROCEDURE — 93298 REM INTERROG DEV EVAL SCRMS: CPT | Mod: NCREMOTE | Performed by: INTERNAL MEDICINE

## 2021-08-13 ENCOUNTER — ANCILLARY PROCEDURE (OUTPATIENT)
Dept: CARDIOLOGY | Facility: CLINIC | Age: 71
End: 2021-08-13
Payer: MEDICARE

## 2021-08-13 DIAGNOSIS — Z45.09 ENCOUNTER FOR INTERROGATION OF CARDIAC RECORDER: ICD-10-CM

## 2021-08-13 PROCEDURE — G2066 INTER DEVC REMOTE 30D: HCPCS

## 2021-08-18 PROCEDURE — 93298 REM INTERROG DEV EVAL SCRMS: CPT | Performed by: INTERNAL MEDICINE

## 2021-08-19 ENCOUNTER — CLINICAL SUPPORT (OUTPATIENT)
Dept: UROLOGY | Facility: CLINIC | Age: 71
End: 2021-08-19
Payer: MEDICARE

## 2021-08-19 ENCOUNTER — APPOINTMENT (OUTPATIENT)
Dept: LAB | Facility: CLINIC | Age: 71
End: 2021-08-19
Payer: MEDICARE

## 2021-08-19 ENCOUNTER — TELEPHONE (OUTPATIENT)
Dept: UROLOGY | Facility: CLINIC | Age: 71
End: 2021-08-19

## 2021-08-19 VITALS
OXYGEN SATURATION: 93 % | HEART RATE: 91 BPM | WEIGHT: 238 LBS | HEIGHT: 78 IN | SYSTOLIC BLOOD PRESSURE: 135 MMHG | TEMPERATURE: 98.6 F | DIASTOLIC BLOOD PRESSURE: 72 MMHG | BODY MASS INDEX: 27.54 KG/M2

## 2021-08-19 DIAGNOSIS — R33.9 URINARY RETENTION: Primary | ICD-10-CM

## 2021-08-19 DIAGNOSIS — R31.0 GROSS HEMATURIA: ICD-10-CM

## 2021-08-19 DIAGNOSIS — R33.9 URINARY RETENTION: ICD-10-CM

## 2021-08-19 DIAGNOSIS — R31.0 GROSS HEMATURIA: Primary | ICD-10-CM

## 2021-08-19 LAB
BACTERIA #/AREA URNS HPF: ABNORMAL /HPF
BILIRUB UR QL: ABNORMAL
CLARITY UR: ABNORMAL
COLOR UR: ABNORMAL
GLUCOSE UR QL: NEGATIVE MG/DL
HGB UR QL: ABNORMAL
KETONES UR-MCNC: NEGATIVE MG/DL
LEUKOCYTE ESTERASE UR QL STRIP: ABNORMAL
NITRITE UR QL: NEGATIVE
PH UR: 5.5 PH
PROT UR STRIP-MCNC: 30 MG/DL
RBC #/AREA URNS HPF: >100 /HPF
SP GR UR: 1.01 (ref 1–1.03)
SPECIMEN VOL ?TM UR: 977 ML
SPECIMEN VOL ?TM UR: 977 ML
SQUAMOUS #/AREA URNS HPF: ABNORMAL /HPF
UROBILINOGEN UR-MCNC: 0.2 E.U./DL
WBC #/AREA URNS HPF: ABNORMAL /HPF

## 2021-08-19 PROCEDURE — 51798 US URINE CAPACITY MEASURE: CPT | Performed by: NURSE PRACTITIONER

## 2021-08-19 PROCEDURE — 87088 URINE BACTERIA CULTURE: CPT

## 2021-08-19 PROCEDURE — 6360000200 HC RX 636 W HCPCS (ALT 250 FOR IP): Performed by: NURSE PRACTITIONER

## 2021-08-19 PROCEDURE — 81001 URINALYSIS AUTO W/SCOPE: CPT

## 2021-08-19 RX ORDER — CEPHALEXIN 500 MG/1
500 CAPSULE ORAL 2 TIMES DAILY
Qty: 20 CAPSULE | Refills: 0 | Status: SHIPPED | OUTPATIENT
Start: 2021-08-19 | End: 2021-08-29

## 2021-08-19 RX ORDER — CEFTRIAXONE SODIUM 1 G/1
1000 INJECTION, POWDER, FOR SOLUTION INTRAMUSCULAR; INTRAVENOUS ONCE
Status: COMPLETED | OUTPATIENT
Start: 2021-08-19 | End: 2021-08-19

## 2021-08-19 RX ADMIN — CEFTRIAXONE SODIUM 1000 MG: 1 INJECTION, POWDER, FOR SOLUTION INTRAMUSCULAR; INTRAVENOUS at 13:08

## 2021-08-19 ASSESSMENT — PAIN SCALES - GENERAL: PAINLEVEL: 0-NO PAIN

## 2021-08-19 NOTE — TELEPHONE ENCOUNTER
Spoke with patient, he states he has had cherry coolaid red urine last two days, and now he is unable to void, per Lalitha LAZCANO, advised that he needs to come to clinic and give a urine sample, then we will scan his bladder. He states understanding and will come in now, appt's made and lab order put in

## 2021-08-19 NOTE — TELEPHONE ENCOUNTER
Caller would like to discuss (a) unable to void     Patient: Cesar Yuen    Callback Number: [phone]    Best Availability: as soon as possible    Additional Info: Patient is calling stating that he has blood in his urine. He has been bleeding the last 2-3 days. He stated that he woke up this morning and could not void. I was able to get a hold of Liana and she took the call.     I advised caller of a callback by 24-48 hours.

## 2021-08-19 NOTE — PROGRESS NOTES
UROLOGY NURSE Pope Catheter insertion VISIT:    Cesar Yuen is a 71 y.o. male here for a nurse visit for catheter insertion. Patient called this AM to report that he has been having blood in his urine the last two days and today he can't void. Per Lalitha DNP advised the patient to come in for a UA and PVR. He arrived and gave his UA, states he emptied his bladder and PVR read 977ml and his abdomen was tender to the pressure of the bladder scanner. Per Lalitha, he needs to be catheterized and schedule a cysto for 10-14 days. This nurse attempted to place a catheter, 16fr coude, was able to get the catheter in and drained approx 1500ml of dark cloudy greenish brown urine but was unable to inflate the placement balloon without the patient having extreme pain. Called for Lalitha to help and she was able to move the catheter into place and inflate the balloon with 10ml without pain to patient, the bladder was also flushed with 60ml, it was attached to leg bag.Patient tolerated well.  A urine sample was sent for culture and order for Rocephine 1gm IM now and Keflex 500mg PO BID x10 days was sent to his pharmacy for him. A Cysto appt was scheduled for him on 8-31-21,  Patient instructed on cath care, symptoms of infection, and when to seek medical care if needed.

## 2021-08-21 LAB — BACTERIA UR CULT: NORMAL

## 2021-08-31 ENCOUNTER — OFFICE VISIT (OUTPATIENT)
Dept: UROLOGY | Facility: CLINIC | Age: 71
End: 2021-08-31
Payer: MEDICARE

## 2021-08-31 VITALS
RESPIRATION RATE: 20 BRPM | HEIGHT: 73 IN | HEART RATE: 63 BPM | WEIGHT: 238 LBS | SYSTOLIC BLOOD PRESSURE: 122 MMHG | BODY MASS INDEX: 31.54 KG/M2 | DIASTOLIC BLOOD PRESSURE: 75 MMHG | TEMPERATURE: 97.1 F | OXYGEN SATURATION: 92 %

## 2021-08-31 DIAGNOSIS — R33.9 URINARY RETENTION: ICD-10-CM

## 2021-08-31 DIAGNOSIS — N32.0 ACQUIRED CONTRACTURE OF BLADDER NECK: Primary | ICD-10-CM

## 2021-08-31 PROCEDURE — 52000 CYSTOURETHROSCOPY: CPT | Performed by: UROLOGY

## 2021-08-31 RX ORDER — CEFAZOLIN SODIUM 10 G/1
2000 INJECTION, POWDER, FOR SOLUTION INTRAVENOUS ONCE
Status: CANCELLED | OUTPATIENT
Start: 2021-09-27 | End: 2021-08-31

## 2021-08-31 ASSESSMENT — PAIN SCALES - GENERAL: PAINLEVEL: 0-NO PAIN

## 2021-08-31 NOTE — PROGRESS NOTES
Cystoscopy Procedure Note    08/31/21    Pre-operative Diagnosis:   1. Acquired contracture of bladder neck  Case Request Operating Room: CYSTOSCOPY TRANSURETHRAL RESECTION PROSTATE, Case Request Operating Room: CYSTOSCOPY TRANSURETHRAL RESECTION PROSTATE   2. Urinary retention  Cystoscopy male       Post-operative Diagnosis:   1. Acquired contracture of bladder neck  Case Request Operating Room: CYSTOSCOPY TRANSURETHRAL RESECTION PROSTATE, Case Request Operating Room: CYSTOSCOPY TRANSURETHRAL RESECTION PROSTATE   2. Urinary retention  Cystoscopy male       Surgeon: Attila Taylor MD    Procedure: Cystoscopy    Procedure Details   The risks, benefits, complications, treatment options, and expected outcomes were discussed with the patient. The patient concurred with the proposed plan, giving informed consent.    Cystoscopy was performed today under local anesthesia, using sterile technique. The patient was placed in the lithotomy position, prepped with Betadine, and draped in the usual sterile fashion. A 17 Albanian flexible cystoscope was inserted into the urethral meatus and then used to inspect both the entire urethra and bladder.    Findings:  Urethra is unremarkable other than some evidence of edema as he is carrying a catheter.  Prostatic urethra shows distal prostate tissue with a wide open mid prostate.  The bladder neck shows contracture consistent with scar formation after recent TURP.  The bladder itself is contracted and edematous consistent with indwelling catheter.  No tumors in the bladder.  No evidence of stone.                   Complications:  None; patient tolerated the procedure well.    Plan: Recommend a repeat transurethral resection of prostate and bladder neck to finally open up his prostatic urethra and allow for spontaneous voiding.  A 16 Albanian coudé catheter was reinserted today and placed to straight drainage after the balloon was filled with 10 mL.  Patient is in agreement.  The risk  of bleeding and possible mild urinary incontinence post procedure were discussed and he wishes to proceed.

## 2021-09-02 DIAGNOSIS — Z01.818 PRE-OP EVALUATION: ICD-10-CM

## 2021-09-02 DIAGNOSIS — Z11.59 SCREENING FOR VIRAL DISEASE: Primary | ICD-10-CM

## 2021-09-13 ENCOUNTER — ANCILLARY PROCEDURE (OUTPATIENT)
Dept: CARDIOLOGY | Facility: CLINIC | Age: 71
End: 2021-09-13
Payer: MEDICARE

## 2021-09-13 DIAGNOSIS — Z45.09 ENCOUNTER FOR INTERROGATION OF CARDIAC RECORDER: ICD-10-CM

## 2021-09-13 PROCEDURE — G2066 INTER DEVC REMOTE 30D: HCPCS

## 2021-09-15 DIAGNOSIS — I50.31 ACUTE DIASTOLIC CONGESTIVE HEART FAILURE (CMS/HCC): ICD-10-CM

## 2021-09-15 DIAGNOSIS — I48.92 ATRIAL FLUTTER WITH RAPID VENTRICULAR RESPONSE (CMS/HCC): ICD-10-CM

## 2021-09-15 PROCEDURE — 93298 REM INTERROG DEV EVAL SCRMS: CPT | Mod: NCREMOTE | Performed by: INTERNAL MEDICINE

## 2021-09-15 NOTE — PERIOPERATIVE NURSING NOTE
Notified Mr. Yuen that I have reached out to Urology and awaiting to hear back response r/t to his H/P request.

## 2021-09-16 ENCOUNTER — TELEPHONE (OUTPATIENT)
Dept: UROLOGY | Facility: CLINIC | Age: 71
End: 2021-09-16

## 2021-09-16 RX ORDER — FUROSEMIDE 20 MG/1
TABLET ORAL
Qty: 90 TABLET | Refills: 1 | Status: SHIPPED | OUTPATIENT
Start: 2021-09-16 | End: 2021-11-15

## 2021-09-16 NOTE — TELEPHONE ENCOUNTER
Called and spoke with patient and informed that pre-admit sent me a message and that there was some confusion on his pre-op evaluation.  Patient states that he is not able to talk right now and he will call me back later.

## 2021-09-16 NOTE — TELEPHONE ENCOUNTER
Returned call to patient and informed that we need him to get medical clearance.  Informed that I am willing to try and work with him to get these things done prior to surgery but it is also going to take some work on his end to get this done.  States that he will not be home until Monday from [place], instructed to call me when he gets home and we can try and get this worked out so we can proceed with his surgery.

## 2021-09-17 ENCOUNTER — TELEPHONE (OUTPATIENT)
Dept: FAMILY MEDICINE | Facility: CLINIC | Age: 71
End: 2021-09-17

## 2021-09-17 DIAGNOSIS — Z01.818 PREOP EXAMINATION: Primary | ICD-10-CM

## 2021-09-17 NOTE — TELEPHONE ENCOUNTER
Has surgery with Dr. Taylor on 9/27 and needs Dr. Machado to advise when to stop and restart the eliquis. None of our providers have any openings for a pre-op before then. Is wanting to know if you will order an EKG so can do it in [place] instead of [place].      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      Please call pt and advise.

## 2021-09-17 NOTE — TELEPHONE ENCOUNTER
Please schedule him for 4:00 on the 22nd.  We can place orders for labs and EKG and he can have these done at any [place] facility that works best for him.    He is offer this appointment date and time to him and if it does not work let me know.  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

## 2021-09-21 ENCOUNTER — APPOINTMENT (OUTPATIENT)
Dept: LAB | Facility: CLINIC | Age: 71
End: 2021-09-21
Payer: MEDICARE

## 2021-09-21 DIAGNOSIS — Z11.52 ENCOUNTER FOR SCREENING FOR COVID-19: ICD-10-CM

## 2021-09-21 DIAGNOSIS — Z11.59 SCREENING FOR VIRAL DISEASE: ICD-10-CM

## 2021-09-21 LAB — SARS-COV-2 RNA RESP QL NAA+PROBE: NEGATIVE

## 2021-09-21 PROCEDURE — U0005 INFEC AGEN DETEC AMPLI PROBE: HCPCS

## 2021-09-21 PROCEDURE — C9803 HOPD COVID-19 SPEC COLLECT: HCPCS | Mod: CS

## 2021-09-21 NOTE — PRE-PROCEDURE INSTRUCTIONS
Pre-Surgery Instructions:   Medication Instructions   • apixaban (Eliquis) 2.5 mg tablet pt will see PCP 9/22 and get further directions   • metoprolol succinate XL (TOPROL-XL) 25 mg 24 hr tablet states takes 2 tabs in pm   • multivitamin (MULTI-DAY ORAL) Stop taking 1 week prior to surgery/procedure   • furosemide (LASIX) 20 mg tablet Do not take morning of surgery/procedure   Med list reviewed with patient.      CHECK IN with Admissions, located just inside the main [facility] Entrance ([address]tr[address]).    Parking is FREE, Monday - Friday...5 a.m.-6 p.m.    DATE/TIME:    9/27/2021    Will be called with surgery times when available    Continue covid precautions (social distancing, wearing a mask, hand washing). Limited visitation to include 1 visitor to accompany patient to admissions/preop.  If you stay overnight in the hospital, one (1) visitor is allowed to visit in your hospital room. Visiting hours are 0730-7:30 pm.  Check the [facility] web site ([email or web address]) for the latest visitor policy. Visitors must wear a mask at all times and complete screening process upon entering [facility].     ++++++++++++++++++++++++++++++++++++++++++++++++++++++++++++++++++++++++++++++++++++++++++++++++++    To decrease bacteria/germs on your skin to prevent infection please shower as instructed:    PRE-OP Shower    Please shower the night before or morning of surgery (within 12 hours)    Do not use/apply any powders, lotions, deodorants to your skin after your showe    Bring storage case for eyeglasses.    Remove all jewelry, including piercings and leave at home.    Leave all valuables at home.    Leave all medications at home.    Bring a valid photo ID and insurance card    Do NOT eat solid foods or drink dairy products after midnight the night before surgery/procedure. Stop at 11pm if your surgery is at 7am.    _________________________    You may drink clear liquids up until 2 hours  prior to your surgery. (water, clear juices, sports drinks, clear broths, jello)    _________________________      Please make arrangements for transportation after discharge.  No driving for 24 hours after anesthesia.    If you are having an outpatient surgery or procedure, you MUST have someone to drive you home and stay with you for 24 hours after your surgery/procedure.    ++++++++++++++++++++++++++++++++++++++++++++++++++++++++++++++++++++++++++++++    Additional Information    Call your doctor as soon as possible if you get a cold, cough, fever, or have a change in your health since your last visit to your doctor.    If you would like, you may bring your Advance Directive/Living Will to the hospital to be scanned into your record.  You will receive your copy back.    [facility] Pre-Admission Department..Monday-Friday 8 a.m. to 4:30 p.m.    Please call us with questions or concerns. [phone].  Messages will be returned.    Admissions:  [phone]    Pre-op:  [phone]    [email or web address]

## 2021-09-22 ENCOUNTER — OFFICE VISIT (OUTPATIENT)
Dept: FAMILY MEDICINE | Facility: CLINIC | Age: 71
End: 2021-09-22
Payer: MEDICARE

## 2021-09-22 ENCOUNTER — APPOINTMENT (OUTPATIENT)
Dept: CARDIOLOGY | Facility: CLINIC | Age: 71
End: 2021-09-22
Payer: MEDICARE

## 2021-09-22 ENCOUNTER — APPOINTMENT (OUTPATIENT)
Dept: LAB | Facility: CLINIC | Age: 71
End: 2021-09-22
Payer: MEDICARE

## 2021-09-22 VITALS
OXYGEN SATURATION: 96 % | HEART RATE: 56 BPM | SYSTOLIC BLOOD PRESSURE: 112 MMHG | DIASTOLIC BLOOD PRESSURE: 68 MMHG | BODY MASS INDEX: 32.93 KG/M2 | TEMPERATURE: 98 F | RESPIRATION RATE: 16 BRPM | WEIGHT: 249.6 LBS

## 2021-09-22 DIAGNOSIS — N40.1 BPH WITH OBSTRUCTION/LOWER URINARY TRACT SYMPTOMS: ICD-10-CM

## 2021-09-22 DIAGNOSIS — Z01.818 PREOP EXAMINATION: ICD-10-CM

## 2021-09-22 DIAGNOSIS — Z02.4 ENCOUNTER FOR CDL (COMMERCIAL DRIVING LICENSE) EXAM: ICD-10-CM

## 2021-09-22 DIAGNOSIS — N13.8 BPH WITH OBSTRUCTION/LOWER URINARY TRACT SYMPTOMS: ICD-10-CM

## 2021-09-22 DIAGNOSIS — Z01.818 PRE-OP EXAM: Primary | ICD-10-CM

## 2021-09-22 PROBLEM — I48.92 ATRIAL FLUTTER (CMS/HCC): Status: RESOLVED | Noted: 2020-12-14 | Resolved: 2021-09-22

## 2021-09-22 PROBLEM — I48.92 ATRIAL FLUTTER WITH RAPID VENTRICULAR RESPONSE (CMS/HCC): Status: RESOLVED | Noted: 2020-11-11 | Resolved: 2021-09-22

## 2021-09-22 LAB
ALBUMIN SERPL-MCNC: 3.8 G/DL (ref 3.5–5.3)
ALP SERPL-CCNC: 49 U/L (ref 45–115)
ALT SERPL-CCNC: 11 U/L (ref 7–52)
ANION GAP SERPL CALC-SCNC: 5 MMOL/L (ref 3–11)
AST SERPL-CCNC: 14 U/L
BASOPHILS # BLD AUTO: 0.1 10*3/UL
BASOPHILS NFR BLD AUTO: 1 % (ref 0–2)
BILIRUB SERPL-MCNC: 0.6 MG/DL (ref 0.2–1.4)
BUN SERPL-MCNC: 14 MG/DL (ref 7–25)
CALCIUM ALBUM COR SERPL-MCNC: 9.5 MG/DL (ref 8.6–10.3)
CALCIUM SERPL-MCNC: 9.3 MG/DL (ref 8.6–10.3)
CHLORIDE SERPL-SCNC: 108 MMOL/L (ref 98–107)
CO2 SERPL-SCNC: 28 MMOL/L (ref 21–32)
CREAT SERPL-MCNC: 0.77 MG/DL (ref 0.7–1.3)
EOSINOPHIL # BLD AUTO: 0.2 10*3/UL
EOSINOPHIL NFR BLD AUTO: 2 % (ref 0–3)
ERYTHROCYTE [DISTWIDTH] IN BLOOD BY AUTOMATED COUNT: 13.5 % (ref 11.5–15)
GFR SERPL CREATININE-BSD FRML MDRD: 91 ML/MIN/1.73M*2
GLUCOSE SERPL-MCNC: 103 MG/DL (ref 70–105)
HCT VFR BLD AUTO: 42.8 % (ref 38–50)
HGB BLD-MCNC: 14.6 G/DL (ref 13.2–17.2)
LYMPHOCYTES # BLD AUTO: 2.1 10*3/UL
LYMPHOCYTES NFR BLD AUTO: 30 % (ref 15–47)
MCH RBC QN AUTO: 31.6 PG (ref 29–34)
MCHC RBC AUTO-ENTMCNC: 34.2 G/DL (ref 32–36)
MCV RBC AUTO: 92.6 FL (ref 82–97)
MONOCYTES # BLD AUTO: 0.8 10*3/UL
MONOCYTES NFR BLD AUTO: 11 % (ref 5–13)
NEUTROPHILS # BLD AUTO: 3.8 10*3/UL
NEUTROPHILS NFR BLD AUTO: 56 % (ref 46–70)
PLATELET # BLD AUTO: 230 10*3/UL (ref 130–350)
PMV BLD AUTO: 8 FL (ref 6.9–10.8)
POTASSIUM SERPL-SCNC: 3.9 MMOL/L (ref 3.5–5.1)
PROT SERPL-MCNC: 6.5 G/DL (ref 6–8.3)
RBC # BLD AUTO: 4.62 10*6/ΜL (ref 4.1–5.8)
SODIUM SERPL-SCNC: 141 MMOL/L (ref 135–145)
WBC # BLD AUTO: 6.9 10*3/UL (ref 3.7–9.6)

## 2021-09-22 PROCEDURE — 36415 COLL VENOUS BLD VENIPUNCTURE: CPT

## 2021-09-22 PROCEDURE — 80053 COMPREHEN METABOLIC PANEL: CPT

## 2021-09-22 PROCEDURE — 99214 OFFICE O/P EST MOD 30 MIN: CPT | Performed by: FAMILY MEDICINE

## 2021-09-22 PROCEDURE — G0463 HOSPITAL OUTPT CLINIC VISIT: HCPCS | Performed by: FAMILY MEDICINE

## 2021-09-22 PROCEDURE — 93005 ELECTROCARDIOGRAM TRACING: CPT | Performed by: FAMILY MEDICINE

## 2021-09-22 PROCEDURE — 85025 COMPLETE CBC W/AUTO DIFF WBC: CPT

## 2021-09-22 PROCEDURE — 93010 ELECTROCARDIOGRAM REPORT: CPT | Performed by: INTERNAL MEDICINE

## 2021-09-22 NOTE — H&P (VIEW-ONLY)
Subjective      HPI  Cesar Yuen is a 71 y.o. male who presents for pre op exam for surgery with urology on Monday.   This is the 2nd time he is having this procedure.  Surgery is on the 27th of September.   He is wondering about the Eliquis and when he is to stop this.     He was told 3 days but wanted to make certain if this is okay.       He is wondering about a DOT physical.   He had been using the chiropractor in [place] for this and he was only given a 3 month certificate.  He has 1 1/2 months before he needs to have this redone.        The following have been reviewed and updated as appropriate in this visit:    No Known Allergies  Current Outpatient Medications   Medication Sig Dispense Refill   • apixaban (Eliquis) 2.5 mg tablet Take 5 mg by mouth     • metoprolol succinate XL (TOPROL-XL) 25 mg 24 hr tablet Take 2 tablets (50 mg total) by mouth daily 60 tablet 11   • multivitamin (MULTI-DAY ORAL) Take 1 tablet by mouth daily       • furosemide (LASIX) 20 mg tablet TAKE 1 TABLET BY MOUTH DAILY (Patient not taking: Reported on 9/22/2021) 90 tablet 1     No current facility-administered medications for this visit.     Past Medical History:   Diagnosis Date   • Atrial flutter (CMS/HCC) ([facility])    • CHF (congestive heart failure) (CMS/HCC) ([facility])    • Chipped tooth    • Dysrhythmia    • Edema     BLE   • History of transfusion    • Hypertension    • Injury of back 01/14/2021    Seen in .   • Urinary retention     indwelling Pope in place since November 2020      Past Surgical History:   Procedure Laterality Date   • ABDOMINAL SURGERY      anuerysm   • ABLATION A-FLUTTER N/A 2/8/2021    Procedure: Ablation A-flutter loop implant;  Surgeon: Timoteo Burk DO;  Location: [facility] EP Lab;  Service: Electrophysiology;  Laterality: N/A;   • APPENDECTOMY     • HERNIA REPAIR      x 3   • LOOP RECORDER INSERTION N/A 2/8/2021    Procedure: LOOP RECORDER INSERTION;  Surgeon: Timoteo Burk DO;  Location: [facility] EP Lab;  Service:  Electrophysiology;  Laterality: N/A;   • TRANSURETHRAL RESECTION OF BLADDER TUMOR N/A 12/22/2020    Procedure: CYSTOSCOPY WITH BLADDER BIOPSY;  Surgeon: Daniele Johnson MD;  Location: [facility] OR;  Service: Urology;  Laterality: N/A;   • TRANSURETHRAL RESECTION OF PROSTATE N/A 12/22/2020    Procedure: CYSTOSCOPY TRANSURETHRAL RESECTION PROSTATE, exam under anasthesia;  Surgeon: Daniele Johnson MD;  Location: [facility] OR;  Service: Urology;  Laterality: N/A;   • TRANSURETHRAL RESECTION OF PROSTATE N/A 4/27/2021    Procedure: CYSTOSCOPY TRANSURETHRAL RESECTION PROSTATE;  Surgeon: Daniele Johnson MD;  Location: [facility] OR;  Service: Urology;  Laterality: N/A;       Review of Systems    Objective   /68 (BP Location: Left arm, Patient Position: Sitting, Cuff Size: Large Adult)   Pulse 56   Temp 36.7 °C (98 °F)   Resp 16   Wt 113.2 kg (249 lb 9.6 oz)   SpO2 96%   BMI 32.93 kg/m²     Physical Exam  Vitals and nursing note reviewed.   Constitutional:       Appearance: He is well-developed.   HENT:      Head: Normocephalic and atraumatic.      Mouth/Throat:      Mouth: Mucous membranes are moist.      Pharynx: Oropharynx is clear.   Eyes:      Conjunctiva/sclera: Conjunctivae normal.      Pupils: Pupils are equal, round, and reactive to light.   Neck:      Thyroid: No thyromegaly.   Cardiovascular:      Rate and Rhythm: Normal rate and regular rhythm.      Heart sounds: Normal heart sounds. No murmur heard.   No friction rub. No gallop.    Pulmonary:      Effort: Pulmonary effort is normal.      Breath sounds: Normal breath sounds. No wheezing or rales.   Lymphadenopathy:      Cervical: No cervical adenopathy.   Skin:     General: Skin is warm and dry.   Neurological:      Mental Status: He is alert.         Assessment/Plan      1. Pre-op exam    2. BPH with obstruction/lower urinary tract symptoms    1 - 2.   No previous issues with anesthesia and surgery.  EKG and labs look good.   Patient is medically optimized to  proceed with surgery.   Patient can stop the Eliquis 2 days prior to surgery.   Follow up after surgery as needed.      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    If patient is not able to get in before 6 weeks, to let me know.       - Ambulatory referral to Occupational Medicine; Future      Portions of this note was documented by Lorena Park as I performed the exam and collected the information from Cesar Yuen. I attest that I have reviewed the information as documented, verified the accuracy of the documentation and added additional information as needed.       FELY BAILEY MD  09/23/21

## 2021-09-22 NOTE — PROGRESS NOTES
Subjective      HPI  Cesar Yuen is a 71 y.o. male who presents for pre op exam for surgery with urology on Monday.   This is the 2nd time he is having this procedure.  Surgery is on the 27th of September.   He is wondering about the Eliquis and when he is to stop this.     He was told 3 days but wanted to make certain if this is okay.       He is wondering about a DOT physical.   He had been using the chiropractor in [place] for this and he was only given a 3 month certificate.  He has 1 1/2 months before he needs to have this redone.        The following have been reviewed and updated as appropriate in this visit:    No Known Allergies  Current Outpatient Medications   Medication Sig Dispense Refill   • apixaban (Eliquis) 2.5 mg tablet Take 5 mg by mouth     • metoprolol succinate XL (TOPROL-XL) 25 mg 24 hr tablet Take 2 tablets (50 mg total) by mouth daily 60 tablet 11   • multivitamin (MULTI-DAY ORAL) Take 1 tablet by mouth daily       • furosemide (LASIX) 20 mg tablet TAKE 1 TABLET BY MOUTH DAILY (Patient not taking: Reported on 9/22/2021) 90 tablet 1     No current facility-administered medications for this visit.     Past Medical History:   Diagnosis Date   • Atrial flutter (CMS/HCC) ([facility])    • CHF (congestive heart failure) (CMS/HCC) ([facility])    • Chipped tooth    • Dysrhythmia    • Edema     BLE   • History of transfusion    • Hypertension    • Injury of back 01/14/2021    Seen in .   • Urinary retention     indwelling Pope in place since November 2020      Past Surgical History:   Procedure Laterality Date   • ABDOMINAL SURGERY      anuerysm   • ABLATION A-FLUTTER N/A 2/8/2021    Procedure: Ablation A-flutter loop implant;  Surgeon: Timoteo Burk DO;  Location: [facility] EP Lab;  Service: Electrophysiology;  Laterality: N/A;   • APPENDECTOMY     • HERNIA REPAIR      x 3   • LOOP RECORDER INSERTION N/A 2/8/2021    Procedure: LOOP RECORDER INSERTION;  Surgeon: Timoteo Burk DO;  Location: [facility] EP Lab;  Service:  Electrophysiology;  Laterality: N/A;   • TRANSURETHRAL RESECTION OF BLADDER TUMOR N/A 12/22/2020    Procedure: CYSTOSCOPY WITH BLADDER BIOPSY;  Surgeon: Daniele Johnson MD;  Location: [facility] OR;  Service: Urology;  Laterality: N/A;   • TRANSURETHRAL RESECTION OF PROSTATE N/A 12/22/2020    Procedure: CYSTOSCOPY TRANSURETHRAL RESECTION PROSTATE, exam under anasthesia;  Surgeon: Daniele Johnson MD;  Location: [facility] OR;  Service: Urology;  Laterality: N/A;   • TRANSURETHRAL RESECTION OF PROSTATE N/A 4/27/2021    Procedure: CYSTOSCOPY TRANSURETHRAL RESECTION PROSTATE;  Surgeon: Daniele Johnson MD;  Location: [facility] OR;  Service: Urology;  Laterality: N/A;       Review of Systems    Objective   /68 (BP Location: Left arm, Patient Position: Sitting, Cuff Size: Large Adult)   Pulse 56   Temp 36.7 °C (98 °F)   Resp 16   Wt 113.2 kg (249 lb 9.6 oz)   SpO2 96%   BMI 32.93 kg/m²     Physical Exam  Vitals and nursing note reviewed.   Constitutional:       Appearance: He is well-developed.   HENT:      Head: Normocephalic and atraumatic.      Mouth/Throat:      Mouth: Mucous membranes are moist.      Pharynx: Oropharynx is clear.   Eyes:      Conjunctiva/sclera: Conjunctivae normal.      Pupils: Pupils are equal, round, and reactive to light.   Neck:      Thyroid: No thyromegaly.   Cardiovascular:      Rate and Rhythm: Normal rate and regular rhythm.      Heart sounds: Normal heart sounds. No murmur heard.   No friction rub. No gallop.    Pulmonary:      Effort: Pulmonary effort is normal.      Breath sounds: Normal breath sounds. No wheezing or rales.   Lymphadenopathy:      Cervical: No cervical adenopathy.   Skin:     General: Skin is warm and dry.   Neurological:      Mental Status: He is alert.         Assessment/Plan      1. Pre-op exam    2. BPH with obstruction/lower urinary tract symptoms    1 - 2.   No previous issues with anesthesia and surgery.  EKG and labs look good.   Patient is medically optimized to  proceed with surgery.   Patient can stop the Eliquis 2 days prior to surgery.   Follow up after surgery as needed.      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    If patient is not able to get in before 6 weeks, to let me know.       - Ambulatory referral to Occupational Medicine; Future      Portions of this note was documented by Lorena Park as I performed the exam and collected the information from Cesar Yuen. I attest that I have reviewed the information as documented, verified the accuracy of the documentation and added additional information as needed.       FELY BAILEY MD  09/23/21

## 2021-09-26 ENCOUNTER — ANESTHESIA EVENT (OUTPATIENT)
Dept: OPERATING ROOM | Facility: HOSPITAL | Age: 71
End: 2021-09-26
Payer: MEDICARE

## 2021-09-27 ENCOUNTER — HOSPITAL ENCOUNTER (EMERGENCY)
Facility: HOSPITAL | Age: 71
Discharge: HOME | End: 2021-09-27
Attending: EMERGENCY MEDICINE
Payer: MEDICARE

## 2021-09-27 ENCOUNTER — ANESTHESIA (OUTPATIENT)
Dept: OPERATING ROOM | Facility: HOSPITAL | Age: 71
End: 2021-09-27
Payer: MEDICARE

## 2021-09-27 ENCOUNTER — HOSPITAL ENCOUNTER (OUTPATIENT)
Facility: HOSPITAL | Age: 71
Setting detail: OUTPATIENT SURGERY
Discharge: HOME | End: 2021-09-27
Attending: UROLOGY | Admitting: UROLOGY
Payer: MEDICARE

## 2021-09-27 VITALS
WEIGHT: 244.2 LBS | SYSTOLIC BLOOD PRESSURE: 117 MMHG | BODY MASS INDEX: 32.37 KG/M2 | RESPIRATION RATE: 16 BRPM | DIASTOLIC BLOOD PRESSURE: 75 MMHG | HEIGHT: 73 IN | OXYGEN SATURATION: 94 % | TEMPERATURE: 97.2 F | HEART RATE: 54 BPM

## 2021-09-27 VITALS
TEMPERATURE: 98.6 F | RESPIRATION RATE: 19 BRPM | DIASTOLIC BLOOD PRESSURE: 93 MMHG | HEART RATE: 73 BPM | WEIGHT: 249.12 LBS | OXYGEN SATURATION: 96 % | BODY MASS INDEX: 33.02 KG/M2 | SYSTOLIC BLOOD PRESSURE: 143 MMHG | HEIGHT: 73 IN

## 2021-09-27 DIAGNOSIS — N13.8 BPH WITH OBSTRUCTION/LOWER URINARY TRACT SYMPTOMS: Primary | Chronic | ICD-10-CM

## 2021-09-27 DIAGNOSIS — R10.9 ABDOMINAL PAIN: Primary | ICD-10-CM

## 2021-09-27 DIAGNOSIS — T83.9XXA FOLEY CATHETER PROBLEM, INITIAL ENCOUNTER (CMS/HCC): ICD-10-CM

## 2021-09-27 DIAGNOSIS — N40.1 BPH WITH OBSTRUCTION/LOWER URINARY TRACT SYMPTOMS: Primary | Chronic | ICD-10-CM

## 2021-09-27 DIAGNOSIS — R33.8 ACUTE URINARY RETENTION: ICD-10-CM

## 2021-09-27 PROCEDURE — (BLANK) HC RECOVERY PHASE-2 1ST 1/2 HOUR ACUITY LEVEL 1: Performed by: UROLOGY

## 2021-09-27 PROCEDURE — (BLANK) HC OR LEVEL 3 PROC EACH ADDITIONAL MIN: Performed by: UROLOGY

## 2021-09-27 PROCEDURE — 2720000000 HC SUPP 272 WO HCPCS: Performed by: UROLOGY

## 2021-09-27 PROCEDURE — (BLANK) HC RECOVERY PHASE-1 1ST  HOUR ACUITY LEVEL 3: Performed by: UROLOGY

## 2021-09-27 PROCEDURE — 6360000200 HC RX 636 W HCPCS (ALT 250 FOR IP): Performed by: UROLOGY

## 2021-09-27 PROCEDURE — (BLANK) HC GENERAL ANESTHESIA FACILITY CHARGE 1ST 15 MIN: Performed by: UROLOGY

## 2021-09-27 PROCEDURE — 99282 EMERGENCY DEPT VISIT SF MDM: CPT | Performed by: EMERGENCY MEDICINE

## 2021-09-27 PROCEDURE — 51702 INSERT TEMP BLADDER CATH: CPT

## 2021-09-27 PROCEDURE — 51798 US URINE CAPACITY MEASURE: CPT

## 2021-09-27 PROCEDURE — 6360000200 HC RX 636 W HCPCS (ALT 250 FOR IP)

## 2021-09-27 PROCEDURE — (BLANK) HC RECOVERY PHASE-2 EACH ADDITIONAL 1/2 HOUR ACUITY LEVEL 1: Performed by: UROLOGY

## 2021-09-27 PROCEDURE — 2580000300 HC RX 258: Performed by: ANESTHESIOLOGY

## 2021-09-27 PROCEDURE — 99100 ANES PT EXTEME AGE<1 YR&>70: CPT | Performed by: NURSE ANESTHETIST, CERTIFIED REGISTERED

## 2021-09-27 PROCEDURE — 88305 TISSUE EXAM BY PATHOLOGIST: CPT

## 2021-09-27 PROCEDURE — (BLANK) HC GENERAL ANESTHESIA FACILITY CHARGE EACH ADDITIONAL MIN: Performed by: UROLOGY

## 2021-09-27 PROCEDURE — 00914 ANES TRURL PX RESCJ PRST8: CPT | Performed by: NURSE ANESTHETIST, CERTIFIED REGISTERED

## 2021-09-27 PROCEDURE — (BLANK) HC OR LEVEL 3 PROC 1ST 15MIN: Performed by: UROLOGY

## 2021-09-27 PROCEDURE — 52630 REMOVE PROSTATE REGROWTH: CPT | Performed by: UROLOGY

## 2021-09-27 RX ORDER — HYDROCODONE BITARTRATE AND ACETAMINOPHEN 5; 325 MG/1; MG/1
1 TABLET ORAL EVERY 6 HOURS PRN
Qty: 10 TABLET | Refills: 0 | Status: SHIPPED | OUTPATIENT
Start: 2021-09-27 | End: 2021-09-30

## 2021-09-27 RX ORDER — ACETAMINOPHEN 325 MG/1
650 TABLET ORAL AS NEEDED
Status: DISCONTINUED | OUTPATIENT
Start: 2021-09-27 | End: 2021-09-27 | Stop reason: HOSPADM

## 2021-09-27 RX ORDER — ONDANSETRON HYDROCHLORIDE 2 MG/ML
4 INJECTION, SOLUTION INTRAVENOUS ONCE AS NEEDED
Status: DISCONTINUED | OUTPATIENT
Start: 2021-09-27 | End: 2021-09-27 | Stop reason: HOSPADM

## 2021-09-27 RX ORDER — LABETALOL HCL 20 MG/4 ML
10 SYRINGE (ML) INTRAVENOUS EVERY 5 MIN PRN
Status: DISCONTINUED | OUTPATIENT
Start: 2021-09-27 | End: 2021-09-27 | Stop reason: HOSPADM

## 2021-09-27 RX ORDER — DIPHENHYDRAMINE HYDROCHLORIDE 50 MG/ML
25 INJECTION INTRAMUSCULAR; INTRAVENOUS ONCE AS NEEDED
Status: DISCONTINUED | OUTPATIENT
Start: 2021-09-27 | End: 2021-09-27 | Stop reason: HOSPADM

## 2021-09-27 RX ORDER — SODIUM CHLORIDE, SODIUM LACTATE, POTASSIUM CHLORIDE, CALCIUM CHLORIDE 600; 310; 30; 20 MG/100ML; MG/100ML; MG/100ML; MG/100ML
100 INJECTION, SOLUTION INTRAVENOUS CONTINUOUS
Status: DISCONTINUED | OUTPATIENT
Start: 2021-09-27 | End: 2021-09-27 | Stop reason: HOSPADM

## 2021-09-27 RX ORDER — CEFAZOLIN SODIUM 10 G/1
2000 INJECTION, POWDER, FOR SOLUTION INTRAVENOUS ONCE
Status: COMPLETED | OUTPATIENT
Start: 2021-09-27 | End: 2021-09-27

## 2021-09-27 RX ORDER — HYDROCODONE BITARTRATE AND ACETAMINOPHEN 5; 325 MG/1; MG/1
2 TABLET ORAL AS NEEDED
Status: DISCONTINUED | OUTPATIENT
Start: 2021-09-27 | End: 2021-09-27 | Stop reason: HOSPADM

## 2021-09-27 RX ORDER — PROPOFOL 10 MG/ML
INJECTION, EMULSION INTRAVENOUS AS NEEDED
Status: DISCONTINUED | OUTPATIENT
Start: 2021-09-27 | End: 2021-09-27 | Stop reason: SURG

## 2021-09-27 RX ORDER — ACETAMINOPHEN 500 MG
1000 TABLET ORAL ONCE
Status: COMPLETED | OUTPATIENT
Start: 2021-09-27 | End: 2021-09-27

## 2021-09-27 RX ORDER — CEPHALEXIN 500 MG/1
500 CAPSULE ORAL 2 TIMES DAILY
Qty: 10 CAPSULE | Refills: 0 | Status: SHIPPED | OUTPATIENT
Start: 2021-09-27 | End: 2021-10-02

## 2021-09-27 RX ORDER — MIDAZOLAM HYDROCHLORIDE 1 MG/ML
1 INJECTION INTRAMUSCULAR; INTRAVENOUS EVERY 5 MIN PRN
Status: DISCONTINUED | OUTPATIENT
Start: 2021-09-27 | End: 2021-09-27 | Stop reason: HOSPADM

## 2021-09-27 RX ORDER — FENTANYL CITRATE/PF 50 MCG/ML
PLASTIC BAG, INJECTION (ML) INTRAVENOUS AS NEEDED
Status: DISCONTINUED | OUTPATIENT
Start: 2021-09-27 | End: 2021-09-27 | Stop reason: SURG

## 2021-09-27 RX ORDER — LIDOCAINE HYDROCHLORIDE 20 MG/ML
INJECTION, SOLUTION EPIDURAL; INFILTRATION; INTRACAUDAL; PERINEURAL AS NEEDED
Status: DISCONTINUED | OUTPATIENT
Start: 2021-09-27 | End: 2021-09-27 | Stop reason: SURG

## 2021-09-27 RX ORDER — SODIUM CHLORIDE 0.9 % (FLUSH) 0.9 %
2-10 SYRINGE (ML) INJECTION EVERY 8 HOURS SCHEDULED
Status: DISCONTINUED | OUTPATIENT
Start: 2021-09-27 | End: 2021-09-27 | Stop reason: HOSPADM

## 2021-09-27 RX ORDER — FENTANYL CITRATE/PF 50 MCG/ML
50 PLASTIC BAG, INJECTION (ML) INTRAVENOUS EVERY 5 MIN PRN
Status: DISCONTINUED | OUTPATIENT
Start: 2021-09-27 | End: 2021-09-27 | Stop reason: HOSPADM

## 2021-09-27 RX ADMIN — PROPOFOL 150 MG: 10 INJECTION, EMULSION INTRAVENOUS at 08:07

## 2021-09-27 RX ADMIN — Medication 1000 MG: at 07:12

## 2021-09-27 RX ADMIN — Medication 2000 MG: at 08:12

## 2021-09-27 RX ADMIN — SODIUM CHLORIDE, POTASSIUM CHLORIDE, SODIUM LACTATE AND CALCIUM CHLORIDE 100 ML/HR: 600; 310; 30; 20 INJECTION, SOLUTION INTRAVENOUS at 07:15

## 2021-09-27 RX ADMIN — LIDOCAINE HYDROCHLORIDE 60 MG: 20 INJECTION, SOLUTION EPIDURAL; INFILTRATION; INTRACAUDAL; PERINEURAL at 08:07

## 2021-09-27 RX ADMIN — FENTANYL CITRATE 50 MCG: 0.05 INJECTION, SOLUTION INTRAMUSCULAR; INTRAVENOUS at 08:20

## 2021-09-27 RX ADMIN — FENTANYL CITRATE 50 MCG: 0.05 INJECTION, SOLUTION INTRAMUSCULAR; INTRAVENOUS at 08:07

## 2021-09-27 RX ADMIN — FENTANYL CITRATE 50 MCG: 0.05 INJECTION, SOLUTION INTRAMUSCULAR; INTRAVENOUS at 08:28

## 2021-09-27 ASSESSMENT — ENCOUNTER SYMPTOMS
SHORTNESS OF BREATH: 0
PALPITATIONS: 0
FEVER: 0
CHEST TIGHTNESS: 0
LIGHT-HEADEDNESS: 0
EYE DISCHARGE: 0
NAUSEA: 0
EYE PAIN: 0
DYSRHYTHMIAS: 1
COUGH: 0
ARTHRALGIAS: 0
DYSURIA: 1
ABDOMINAL PAIN: 1
DIZZINESS: 0
CHILLS: 0
HEADACHES: 0
DIFFICULTY URINATING: 1
APPETITE CHANGE: 0
VOMITING: 0

## 2021-09-27 NOTE — PERIOPERATIVE NURSING NOTE
Met patient in H, no family at bedside.  Patient stated that he did not want staff to update anyone on his behalf.  Only wants them called when he is ready to go home.  No questions at this time.

## 2021-09-27 NOTE — DISCHARGE INSTRUCTIONS
Take over-the-counter Ibuprofen and Tylenol for pain. Use narcotics sparingly and only for breakthrough pain as necessary. If you take narcotic pain medications, recommend taking an over-the-counter stool softener such as Colace and/or Miralax to prevent constipation and straining for bowel movements.     Please take your antibiotics as prescribed to help prevent infection.    Ensure you are staying adequately hydrated with water to help prevent constipation and ensure adequate flushing of your urinary tract.    Please be aware that some blood in your urine may happen. This is normal, however if you experience large amounts of bright red blood or large amounts of blood clots, please contact the Urology clinic at [phone] and let the staff know.    Bladder spasms may occur when the fuchs catheter is in place. This is a normal occurrence and light activity such as walking may help ease these.    Do not lift anything heavier than approximately 10 pounds for 2 weeks.     Do not sit or submerge in a body of water for 2 weeks, but you may shower daily.     You may restart your home medications as previously prescribed, but please wait to restart your Eliquis until Wednesday, September 29th.

## 2021-09-27 NOTE — ANESTHESIA PROCEDURE NOTES
Airway  Urgency: elective    Airway not difficult    General Information and Staff    Patient location during procedure: OR  CRNA: Paul Saini CRNA  Other anesthesia staff: LISSETH Salmeron  Performed: other anesthesia staff     Indications and Patient Condition  Indications for airway management: anesthesia  Spontaneous Ventilation: absent  Sedation level: deep  Preoxygenated: yes  Patient position: sniffing  MILS maintained throughout  Mask difficulty assessment: 0 - not attempted    Final Airway Details  Final airway type: supraglottic airway      Successful airway: unique  Size 5    Placement verified by: chest auscultation and capnometry   Number of attempts at approach: 1

## 2021-09-27 NOTE — ANESTHESIA POSTPROCEDURE EVALUATION
Patient: Cesar Yuen    Procedure Summary     Date: 09/27/21 Room / Location: [facility] OR 08 / [facility] OR    Anesthesia Start: 0803 Anesthesia Stop: 0852    Procedure: CYSTOSCOPY TRANSURETHRAL RESECTION PROSTATE (N/A Urethra) Diagnosis:       Acquired contracture of bladder neck      (Acquired contracture of bladder neck [N32.0])    Surgeons: Attila Taylor MD Responsible Provider: Michael Marin MD    Anesthesia Type: general ASA Status: 3          Anesthesia Type: general    Last vitals  Vitals Value Taken Time   /74 09/27/21 0940   Temp 36 °C (96.8 °F) 09/27/21 0940   Pulse 59 09/27/21 0930   Resp 16 09/27/21 0940   SpO2 92 % 09/27/21 0930   0-10 Pain Score 0 - No pain 09/27/21 0940       Anesthesia Post Evaluation    Patient location during evaluation: PACU  Patient participation: complete - patient participated  Level of consciousness: awake and alert  Pain management: adequate  Airway patency: patent  Anesthetic complications: no  Cardiovascular status: acceptable  Respiratory status: acceptable  Hydration status: acceptable  May dismiss recovered patient based on consultation with the appropriate physicians and/or meeting appropriate discharge criteria      Cosmetic?  This procedure is not cosmetic.

## 2021-09-27 NOTE — ANESTHESIA PREPROCEDURE EVALUATION
"Pre-Procedure Assessment    Patient: Cesar Yeun, male, 71 y.o.    Ht Readings from Last 1 Encounters:   09/27/21 1.854 m (6' 1\")     Wt Readings from Last 1 Encounters:   09/27/21 110.8 kg (244 lb 3.2 oz)       Last Vitals  /92 (09/27/21 0633)    Temp 37.1 °C (98.8 °F) (09/27/21 0633)    Pulse 77 (09/27/21 0633)   Resp 16 (09/27/21 0633)    SpO2 93 % (09/27/21 0633)    Pain Score 0 - No pain (09/27/21 0633)       Problem list reviewed and Medical history reviewed           Airway   Mallampati: II  TM distance: >3 FB  Neck ROM: full      Dental      Pulmonary     breath sounds clear to auscultation  Cardiovascular   (+) hypertension, dysrhythmias (afib ablation), CHF,     Rhythm: regular  Rate: normal    Mental Status/Neuro/Psych    Pt is alert.        GI/Hepatic/Renal    (+) urinary retention    Endo/Other    (+) history of blood transfusion,   Abdominal           Social History     Tobacco Use   • Smoking status: Never Smoker   • Smokeless tobacco: Never Used   Substance Use Topics   • Alcohol use: Not Currently      Hematology   WBC   Date Value Ref Range Status   09/22/2021 6.9 3.7 - 9.6 10*3/uL Final     RBC   Date Value Ref Range Status   09/22/2021 4.62 4.10 - 5.80 10*6/µL Final     MCV   Date Value Ref Range Status   09/22/2021 92.6 82.0 - 97.0 fL Final     Hemoglobin   Date Value Ref Range Status   09/22/2021 14.6 13.2 - 17.2 g/dL Final     Hematocrit   Date Value Ref Range Status   09/22/2021 42.8 38.0 - 50.0 % Final     Platelets   Date Value Ref Range Status   09/22/2021 230 130 - 350 10*3/uL Final      Coagulation   Protime   Date Value Ref Range Status   02/13/2020 12.6 (H) 9.4 - 12.5 seconds Final     PTT   Date Value Ref Range Status   02/13/2020 33.8 25.1 - 36.5 SECONDS Final     INR   Date Value Ref Range Status   02/13/2020 1.1 <=1.1 Final      General Chemistry   Calcium   Date Value Ref Range Status   09/22/2021 9.3 8.6 - 10.3 mg/dL Final     BUN   Date Value Ref Range Status "   09/22/2021 14 7 - 25 mg/dL Final     Creatinine   Date Value Ref Range Status   09/22/2021 0.77 0.70 - 1.30 mg/dL Final     Glucose   Date Value Ref Range Status   09/22/2021 103 70 - 105 mg/dL Final     Sodium   Date Value Ref Range Status   09/22/2021 141 135 - 145 mmol/L Final     Potassium   Date Value Ref Range Status   09/22/2021 3.9 3.5 - 5.1 mmol/L Final     CO2   Date Value Ref Range Status   09/22/2021 28 21 - 32 mmol/L Final     Chloride   Date Value Ref Range Status   09/22/2021 108 (H) 98 - 107 mmol/L Final     Anesthesia Plan    ASA 3   NPO status reviewed: > 6 hours    General         Induction: intravenous   Airway Planning: LMA and oral ET tube              Anesthetic plan and risks discussed with patient.      Plan discussed with CRNA.

## 2021-09-28 NOTE — DISCHARGE INSTRUCTIONS
You have been evaluated through the emergency department with urinary retention.  Keep your scheduled appointment with urology as was previously established.  Return should symptoms recur.

## 2021-09-28 NOTE — OP NOTE
Cystoscopy Procedure Note    Date of Surgery: 9/27/2021    Pre-operative Diagnosis: BPH with obstruction    Post-operative Diagnosis: same    Procedure: Transurethral resection of the prostate     Surgeon: Attila Taylor MD    OR staff: Circulator: Alaina Handley RN  Scrub Person: James Liao    Procedure Details   The risks, benefits, complications, treatment options, and expected outcomes were discussed with the patient. The patient concurred with the proposed plan, giving informed consent. The patient underwent the smooth induction of general anesthesia.  Patient was positioned in lithotomy and prepped and draped in the standard fashion. A timeout was performed and appropriate antibiotics were given.    A 26 Wolof resectoscope sheath was inserted into the bladder.  A bipolar loop electrocautery was then inserted.  The prostate was then visualized and resection began.  There was a narrow bladder neck with some scarring from prior surgery. There was a clear defect distally towards the verumontanum. Tissue from 2 o'clock to 10 o'clock was resected from the bladder neck back to the verumontanum. Care was taken to avoid injury to the ureteral orifices and the veru.  The Samanthaik evacuator was used to remove the prostate fragments.  The bladder was drained with an 18 Estonian coude fuchs and placed to straight drainage. The patient was then extubated and transferred to recovery in satisfactory condition there were no immediate complications.      Attila Taylor MD  09/28/21  12:30 PM

## 2021-09-28 NOTE — ED ATTESTATION NOTE
I performed a history and physical examination of Cesar Yuen and discussed his management with [facility] NURY in the emergency department.  I agree with the history, physical, assessment, and plan of care.  On my physical examination I find blood-tinged urine tubing of Pope catheter as nurse irrigates and continues to get 50 cc back every time she puts in 30 cc.  Advised nursing staff to hooked back up to the tubing and allow his bladder to drain I do not feel he would need a three-way catheter hopefully at this point.    MD Marcelino Castro MD  09/27/21 1812

## 2021-09-28 NOTE — ED PROVIDER NOTES
HPI:  Chief Complaint   Patient presents with   • Abdominal Pain     Patient has a urinary catheter placed and believes it is not draining.  Feels like his bladder is full and there is no urine draining into the leg bag.        HPI  Patient presents to the emergency department with complaints of dysfunctioning Pope catheter.  Patient states he had a urinary procedure this morning.  States he had scar tissue removed.  States Pope catheter was placed at that time.  States he has not had any urinary output since they placed the Pope catheter this morning.  Patient complaining of abdominal distention.  Denies any fevers or chills.  Denies any change in bowel habits.  No diarrhea or constipation.  Denies any chest pain or shortness of breath.  Has not taken any medications for symptom management prior to arrival.    HISTORY:  Past Medical History:   Diagnosis Date   • Atrial flutter (CMS/HCC) (HCC)    • CHF (congestive heart failure) (CMS/HCC) (HCC)    • Chipped tooth    • Dysrhythmia    • Edema     BLE   • History of transfusion    • Hypertension    • Injury of back 01/14/2021    Seen in .   • Urinary retention     indwelling Pope in place since November 2020        Past Surgical History:   Procedure Laterality Date   • ABDOMINAL SURGERY      anuerysm   • ABLATION A-FLUTTER N/A 2/8/2021    Procedure: Ablation A-flutter loop implant;  Surgeon: Timoteo Burk DO;  Location: [facility] EP Lab;  Service: Electrophysiology;  Laterality: N/A;   • APPENDECTOMY     • HERNIA REPAIR      x 3   • LOOP RECORDER INSERTION N/A 2/8/2021    Procedure: LOOP RECORDER INSERTION;  Surgeon: Timoteo Burk DO;  Location: [facility] EP Lab;  Service: Electrophysiology;  Laterality: N/A;   • TRANSURETHRAL RESECTION OF BLADDER TUMOR N/A 12/22/2020    Procedure: CYSTOSCOPY WITH BLADDER BIOPSY;  Surgeon: Daniele Johnson MD;  Location: [facility] OR;  Service: Urology;  Laterality: N/A;   • TRANSURETHRAL RESECTION OF PROSTATE N/A 12/22/2020    Procedure:  CYSTOSCOPY TRANSURETHRAL RESECTION PROSTATE, exam under anasthesia;  Surgeon: Daniele Johnson MD;  Location: [facility] OR;  Service: Urology;  Laterality: N/A;   • TRANSURETHRAL RESECTION OF PROSTATE N/A 4/27/2021    Procedure: CYSTOSCOPY TRANSURETHRAL RESECTION PROSTATE;  Surgeon: Daniele Johnson MD;  Location: [facility] OR;  Service: Urology;  Laterality: N/A;       Family History   Problem Relation Age of Onset   • Alzheimer's disease Mother    • Diabetes Father    • Heart attack Mother's Brother        Social History     Tobacco Use   • Smoking status: Never Smoker   • Smokeless tobacco: Never Used   Vaping Use   • Vaping Use: Never used   Substance Use Topics   • Alcohol use: Not Currently   • Drug use: Never       ROS:  Review of Systems   Constitutional: Negative for appetite change, chills and fever.   HENT: Negative for ear discharge and ear pain.    Eyes: Negative for pain and discharge.   Respiratory: Negative for cough, chest tightness and shortness of breath.    Cardiovascular: Negative for chest pain, palpitations and leg swelling.   Gastrointestinal: Positive for abdominal pain. Negative for nausea and vomiting.   Genitourinary: Positive for decreased urine volume, difficulty urinating and dysuria.   Musculoskeletal: Negative for arthralgias.   Skin: Negative.    Neurological: Negative for dizziness, light-headedness and headaches.   Psychiatric/Behavioral: Negative for behavioral problems.       PE:  ED Triage Vitals [09/27/21 1946]   Temp Heart Rate Resp BP SpO2   37 °C (98.6 °F) 73 19 143/93 96 %      Temp Source Heart Rate Source Patient Position BP Location FiO2 (%)   Oral -- -- -- --       Physical Exam  Vitals and nursing note reviewed.   Constitutional:       General: He is not in acute distress.     Appearance: He is well-developed. He is not ill-appearing, toxic-appearing or diaphoretic.   HENT:      Head: Normocephalic.   Eyes:      Conjunctiva/sclera: Conjunctivae normal.      Pupils: Pupils are  equal, round, and reactive to light.   Cardiovascular:      Rate and Rhythm: Normal rate and regular rhythm.      Heart sounds: Normal heart sounds.   Pulmonary:      Effort: Pulmonary effort is normal.      Breath sounds: Normal breath sounds.   Abdominal:      General: Bowel sounds are normal.      Palpations: Abdomen is soft.      Tenderness: There is abdominal tenderness in the suprapubic area.   Genitourinary:     Penis: Normal.    Musculoskeletal:         General: Normal range of motion.      Cervical back: Normal range of motion.   Skin:     General: Skin is warm and dry.      Capillary Refill: Capillary refill takes less than 2 seconds.   Neurological:      Mental Status: He is alert and oriented to person, place, and time.   Psychiatric:         Mood and Affect: Mood is anxious.         ED LABS:  Labs Reviewed - No data to display    ED IMAGES:  No orders to display       Remote loop recorder    Result Date: 9/15/2021  Narrative: Implants   Implantable Loop Recorder  System Reveal Linq II - [id number] - [id number] - Implanted  Chest  Inventory item: System Reveal LINQ II Model/Cat number: [id number]  Serial number: [id number] : [employer]  Lot number: [id number] Implant permanence: Temporary  Device identifier: [id number] Device identifier type: GS1  As of 2/8/2021   Status: Implanted        No Known Allergies Prior to Admission medications  Medication Sig Start Date End Date Taking? Authorizing Provider apixaban (ELIQUIS) 5 mg tablet Take 1 tablet (5 mg total) by mouth 2 (two) times a day 2/8/21 8/7/21  Jaye Laurent CNP metoprolol succinate XL (TOPROL-XL) 25 mg 24 hr tablet Take 2 tablets (50 mg total) by mouth daily 1/16/21 1/16/22  Giorgi Fuller MD furosemide (LASIX) 20 mg tablet Take 1 tablet (20 mg total) by mouth daily 1/16/21 1/16/22  Giorgi Fuller MD multivitamin (MULTI-DAY ORAL) Take by mouth    Historical Provider, MD I have reviewed the remote interrogation of the implanted device today and  found it to be functioning normally.  For the complete details, please refer to the scanned [employer] document.  Briefly, the patient has a Medtronic implantable loop recorder. The battery is at the beginning of service life. The presenting rhythm is normal sinus. Since the prior interrogation that have been no arrhythmias documented.       ED COURSE:       MDM:  MDM  71-year-old male patient presents to the emerged part with urinary retention.  Pope placed after urinary procedure this morning.  No urine output after urinary procedure.  Patient complaining of abdominal distention.    Pope irrigation.  Bladder scan did show greater than 400 retained urine.  Upon completion of Pope irrigation, patient now draining blood-tinged urine.  Pain well managed.  States he is feeling much better.  No other issues.  Demonstrating stable vital signs.  Nontoxic in appearance.  Patient states he already has a follow-up appointment with urology in 3 days at which time they plan to discontinue the Pope catheter.    Final diagnoses:   [R10.9] Abdominal pain   [R33.8] Acute urinary retention   [T83.9XXA] Pope catheter problem, initial encounter (CMS/[facility]) ([facility])       A voice recognition program was used to aid in the documentation of this record. Sometimes words are not printed exactly as they were spoken. While efforts were made to carefully edit and correct any inaccuracies, some areas may be present; please take these into context. Please contact the provider if areas are identified.      Em Zazueta, CNP  09/27/21 5714

## 2021-10-04 ENCOUNTER — CLINICAL SUPPORT (OUTPATIENT)
Dept: UROLOGY | Facility: CLINIC | Age: 71
End: 2021-10-04
Payer: MEDICARE

## 2021-10-04 VITALS
HEART RATE: 63 BPM | SYSTOLIC BLOOD PRESSURE: 113 MMHG | WEIGHT: 249 LBS | BODY MASS INDEX: 33 KG/M2 | DIASTOLIC BLOOD PRESSURE: 70 MMHG | OXYGEN SATURATION: 93 % | HEIGHT: 73 IN | TEMPERATURE: 97.2 F

## 2021-10-04 DIAGNOSIS — R33.9 URINARY RETENTION: Primary | ICD-10-CM

## 2021-10-04 ASSESSMENT — PAIN SCALES - GENERAL: PAINLEVEL: 0-NO PAIN

## 2021-10-04 NOTE — PROGRESS NOTES
UROLOGY NURSE Pope Catheter removal VISIT:    Cesar Yuen is a 71 y.o. male here for a nurse visit for catheter removal.    Urine color is yellow and clear. 20cc saline removed from balloon.  Catheter removed without difficulty, tip intact.    Patient tolerated well.  Patient instructed on voiding, symptoms of infection, need for increased water intake and when to seek medical care if needed, he states understanding

## 2021-10-14 ENCOUNTER — ANCILLARY PROCEDURE (OUTPATIENT)
Dept: CARDIOLOGY | Facility: CLINIC | Age: 71
End: 2021-10-14
Payer: MEDICARE

## 2021-10-14 DIAGNOSIS — Z45.09 ENCOUNTER FOR INTERROGATION OF CARDIAC RECORDER: ICD-10-CM

## 2021-10-14 PROCEDURE — G2066 INTER DEVC REMOTE 30D: HCPCS

## 2021-10-15 PROCEDURE — 93298 REM INTERROG DEV EVAL SCRMS: CPT | Performed by: INTERNAL MEDICINE

## 2021-10-25 ENCOUNTER — TELEPHONE (OUTPATIENT)
Dept: UROLOGY | Facility: CLINIC | Age: 71
End: 2021-10-25
Payer: MEDICARE

## 2021-10-25 NOTE — TELEPHONE ENCOUNTER
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     I advised caller of a callback by 24-48 hours.

## 2021-11-03 ENCOUNTER — OFFICE VISIT (OUTPATIENT)
Dept: UROLOGY | Facility: CLINIC | Age: 71
End: 2021-11-03
Payer: MEDICARE

## 2021-11-03 ENCOUNTER — APPOINTMENT (OUTPATIENT)
Dept: LAB | Facility: CLINIC | Age: 71
End: 2021-11-03
Payer: MEDICARE

## 2021-11-03 VITALS
TEMPERATURE: 97.8 F | WEIGHT: 249 LBS | DIASTOLIC BLOOD PRESSURE: 75 MMHG | SYSTOLIC BLOOD PRESSURE: 142 MMHG | HEIGHT: 73 IN | HEART RATE: 87 BPM | RESPIRATION RATE: 14 BRPM | OXYGEN SATURATION: 92 % | BODY MASS INDEX: 33 KG/M2

## 2021-11-03 DIAGNOSIS — R33.9 URINARY RETENTION: Primary | ICD-10-CM

## 2021-11-03 DIAGNOSIS — Z90.79 S/P TURP (STATUS POST TRANSURETHRAL RESECTION OF PROSTATE): ICD-10-CM

## 2021-11-03 DIAGNOSIS — N40.1 BENIGN PROSTATIC HYPERPLASIA WITH URINARY OBSTRUCTION: ICD-10-CM

## 2021-11-03 DIAGNOSIS — R33.9 URINARY RETENTION: ICD-10-CM

## 2021-11-03 DIAGNOSIS — N13.8 BENIGN PROSTATIC HYPERPLASIA WITH URINARY OBSTRUCTION: ICD-10-CM

## 2021-11-03 LAB
ANION GAP SERPL CALC-SCNC: 11 MMOL/L (ref 3–11)
BACTERIA #/AREA URNS HPF: ABNORMAL /HPF
BILIRUB UR QL: NEGATIVE
BUN SERPL-MCNC: 17 MG/DL (ref 7–25)
CALCIUM SERPL-MCNC: 9.6 MG/DL (ref 8.6–10.3)
CHLORIDE SERPL-SCNC: 106 MMOL/L (ref 98–107)
CLARITY UR: ABNORMAL
CO2 SERPL-SCNC: 27 MMOL/L (ref 21–32)
COLOR UR: YELLOW
CREAT SERPL-MCNC: 0.99 MG/DL (ref 0.7–1.3)
GFR SERPL CREATININE-BSD FRML MDRD: 76 ML/MIN/1.73M*2
GLUCOSE SERPL-MCNC: 126 MG/DL (ref 70–105)
GLUCOSE UR QL: NEGATIVE MG/DL
HGB UR QL: ABNORMAL
KETONES UR-MCNC: NEGATIVE MG/DL
LEUKOCYTE ESTERASE UR QL STRIP: ABNORMAL
NITRITE UR QL: NEGATIVE
PH UR: 6 PH
POTASSIUM SERPL-SCNC: 4.1 MMOL/L (ref 3.5–5.1)
PROT UR STRIP-MCNC: ABNORMAL MG/DL
RBC #/AREA URNS HPF: >100 /HPF
SODIUM SERPL-SCNC: 144 MMOL/L (ref 135–145)
SP GR UR: 1.02 (ref 1–1.03)
SPECIMEN VOL ?TM UR: 313 ML
SQUAMOUS #/AREA URNS HPF: ABNORMAL /HPF
UROBILINOGEN UR-MCNC: 0.2 E.U./DL
WBC #/AREA URNS HPF: >100 /HPF

## 2021-11-03 PROCEDURE — 80048 BASIC METABOLIC PNL TOTAL CA: CPT

## 2021-11-03 PROCEDURE — 36415 COLL VENOUS BLD VENIPUNCTURE: CPT

## 2021-11-03 PROCEDURE — 81001 URINALYSIS AUTO W/SCOPE: CPT

## 2021-11-03 PROCEDURE — G0463 HOSPITAL OUTPT CLINIC VISIT: HCPCS | Performed by: NURSE PRACTITIONER

## 2021-11-03 PROCEDURE — 51798 US URINE CAPACITY MEASURE: CPT | Performed by: NURSE PRACTITIONER

## 2021-11-03 PROCEDURE — 99024 POSTOP FOLLOW-UP VISIT: CPT | Performed by: NURSE PRACTITIONER

## 2021-11-03 PROCEDURE — 87088 URINE BACTERIA CULTURE: CPT

## 2021-11-03 ASSESSMENT — PAIN SCALES - GENERAL: PAINLEVEL: 0-NO PAIN

## 2021-11-05 LAB — BACTERIA UR CULT: ABNORMAL

## 2021-11-14 ENCOUNTER — ANCILLARY PROCEDURE (OUTPATIENT)
Dept: CARDIOLOGY | Facility: CLINIC | Age: 71
End: 2021-11-14
Payer: MEDICARE

## 2021-11-14 DIAGNOSIS — Z45.09 ENCOUNTER FOR INTERROGATION OF CARDIAC RECORDER: ICD-10-CM

## 2021-11-14 PROCEDURE — G2066 INTER DEVC REMOTE 30D: HCPCS

## 2021-11-15 ENCOUNTER — OFFICE VISIT (OUTPATIENT)
Dept: CARDIOLOGY | Facility: CLINIC | Age: 71
End: 2021-11-15
Payer: MEDICARE

## 2021-11-15 VITALS
HEART RATE: 90 BPM | OXYGEN SATURATION: 95 % | HEIGHT: 73 IN | DIASTOLIC BLOOD PRESSURE: 82 MMHG | BODY MASS INDEX: 32.87 KG/M2 | SYSTOLIC BLOOD PRESSURE: 130 MMHG | WEIGHT: 248 LBS

## 2021-11-15 DIAGNOSIS — Z95.818 STATUS POST PLACEMENT OF IMPLANTABLE LOOP RECORDER: ICD-10-CM

## 2021-11-15 DIAGNOSIS — I48.92 ATRIAL FLUTTER, UNSPECIFIED TYPE (CMS/HCC): Primary | ICD-10-CM

## 2021-11-15 PROCEDURE — 99202 OFFICE O/P NEW SF 15 MIN: CPT | Performed by: PHYSICIAN ASSISTANT

## 2021-11-15 PROCEDURE — G0463 HOSPITAL OUTPT CLINIC VISIT: HCPCS | Performed by: PHYSICIAN ASSISTANT

## 2021-11-15 RX ORDER — FUROSEMIDE 20 MG/1
20 TABLET ORAL DAILY
COMMUNITY
End: 2023-03-10 | Stop reason: SDUPTHER

## 2021-11-15 NOTE — LETTER
11/15/21      [facility] CARDIOLOGY  [address] [place] [address]  [phone]  Dept: [phone]      To whom it may concern;     I evaluated Cesar Yuen WENDY 48, today in my clinic. Since his procedure in February he has done quite well. He has been maintaining sinus rhythm on current medications. 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 No further testing warranted at this time.         Sincerely,         Gisela Alba PA-C

## 2021-11-15 NOTE — PROGRESS NOTES
Urology Progress Note  11/3/2021    Chief Complaint:  Chief Complaint   Patient presents with   • Follow-up     TURP     History of Present Illness:  Patient is a 71 year old male presenting to Urology Clinic today in follow-up after undergoing a TURP with Dr. Taylor on 9/27/2021.  Pope catheter was removed on 10/4/2021.  Patient states that since his procedure, he has been doing relatively well, however does acknowledge that his urinary stream is rather weak at times.  He also acknowledges a sensation of incomplete bladder emptying.  He states that when he has a sensation of incomplete bladder emptying, he had noticed that his urinary stream was split.  He states that he has continued gross hematuria that comes and goes, is unable to identify any causative factor.  He acknowledges nocturia of 2 times per night.  He denies any recent fevers, chills, sweats, flank pain, nausea, vomiting, episodes of incontinence, dribbling or leaking of urine.  Is not on any medications for his prostate.    Surgical pathology reviewed with patient at time of clinic visit, prostate chips consistent with benign stromal and glandular hyperplasia with acute and chronic inflammation.    Past Medical History:  Past Medical History:   Diagnosis Date   • Atrial flutter (CMS/HCC) (HCC)    • CHF (congestive heart failure) (CMS/HCC) (HCC)    • Chipped tooth    • Dysrhythmia    • Edema     BLE   • History of transfusion    • Hypertension    • Injury of back 01/14/2021    Seen in .   • Urinary retention     indwelling Pope in place since November 2020      Past Surgical History:  Past Surgical History:   Procedure Laterality Date   • ABDOMINAL SURGERY      anuerysm   • ABLATION A-FLUTTER N/A 2/8/2021    Procedure: Ablation A-flutter loop implant;  Surgeon: Timoteo Burk DO;  Location: [facility] EP Lab;  Service: Electrophysiology;  Laterality: N/A;   • APPENDECTOMY     • HERNIA REPAIR      x 3   • LOOP RECORDER INSERTION N/A 2/8/2021     Procedure: LOOP RECORDER INSERTION;  Surgeon: Timoteo Burk DO;  Location: [facility] EP Lab;  Service: Electrophysiology;  Laterality: N/A;   • TRANSURETHRAL RESECTION OF BLADDER TUMOR N/A 12/22/2020    Procedure: CYSTOSCOPY WITH BLADDER BIOPSY;  Surgeon: Daniele Johnson MD;  Location: [facility] OR;  Service: Urology;  Laterality: N/A;   • TRANSURETHRAL RESECTION OF PROSTATE N/A 12/22/2020    Procedure: CYSTOSCOPY TRANSURETHRAL RESECTION PROSTATE, exam under anasthesia;  Surgeon: Daniele Johnson MD;  Location: [facility] OR;  Service: Urology;  Laterality: N/A;   • TRANSURETHRAL RESECTION OF PROSTATE N/A 4/27/2021    Procedure: CYSTOSCOPY TRANSURETHRAL RESECTION PROSTATE;  Surgeon: Daniele Johnson MD;  Location: [facility] OR;  Service: Urology;  Laterality: N/A;   • TRANSURETHRAL RESECTION OF PROSTATE N/A 9/27/2021    Procedure: CYSTOSCOPY TRANSURETHRAL RESECTION PROSTATE;  Surgeon: Attila Taylor MD;  Location: [facility] OR;  Service: Urology;  Laterality: N/A;  NOT EARLY MORNING      Social History:  Social History     Tobacco Use   • Smoking status: Never Smoker   • Smokeless tobacco: Never Used   Substance Use Topics   • Alcohol use: Not Currently     Family History:      Family History   Problem Relation Age of Onset   • Alzheimer's disease Mother    • Diabetes Father    • Heart attack Mother's Brother      Allergies:  No Known Allergies  Medications:  Current Outpatient Medications   Medication Sig Dispense Refill   • apixaban (Eliquis) 2.5 mg tablet Take 5 mg by mouth     • metoprolol succinate XL (TOPROL-XL) 25 mg 24 hr tablet Take 2 tablets (50 mg total) by mouth daily 60 tablet 11   • multivitamin (MULTI-DAY ORAL) Take 1 tablet by mouth daily       • furosemide (LASIX) 20 mg tablet Take 20 mg by mouth daily       No current facility-administered medications for this visit.     REVIEW OF SYSTEMS:  GENERAL/CONSTITUTIONAL:  No change in appetite.  Denies chills.  Energy level is good.  Denies fevers.  RESPIRATORY:  No shortness  "of breath.  CARDIOVASCULAR:  No chest pain.  GASTROINTESTINAL:  No constipation, diarrhea, nausea, or vomiting.  GENITOURINARY:  See HPI for complete details.    VITAL SIGNS:   height is 1.854 m (6' 1\") and weight is 112.9 kg (249 lb). His temporal temperature is 36.6 °C (97.8 °F). His blood pressure is 142/75 and his pulse is 87. His respiration is 14 and oxygen saturation is 92%.     PHYSICAL EXAMINATION:  GENERAL APPEARANCE:  Alert and oriented x3, pleasant, well nourished, well developed, in no acute distress male.  HEAD:  Normocephalic, atraumatic.  NECK:  Neck supple, trachea midline  LYMPH NODES:  No cervical or supracervical adenopathy.  SKIN:  Normal, warm and dry.  HEART:  Regular rate and rhythm, no murmurs.  LUNGS:  Respirations even and unlabored, clear bilaterally.  ABDOMEN:  Soft, nontender, nondistended, bowel sounds present.   BACK:  No costovertebral angle tenderness, spine nontender to palpation.  MUSCULOSKELETAL:  Normal.  EXTREMITIES:  No edema.  NEUROLOGIC:  Gait normal.    Lab Results   Component Value Date    PSA 1.22 06/18/2020     Lab Results   Component Value Date    COLORU Yellow 11/03/2021    CLARITYU Cloudy (A) 11/03/2021    SPECGRAVU 1.025 11/03/2021    LEUKOCYTESU Large (A) 11/03/2021    NITRITEU Negative 11/03/2021    PROTUR Trace (A) 11/03/2021    KETONESU Negative 11/03/2021    BILIRUBINU Negative 11/03/2021    BLOODU Large (A) 11/03/2021    GLUCOSEU Negative 11/03/2021    SEBASTIÁN 6.0 11/03/2021    WBC 6.9 09/22/2021    RBC 4.62 09/22/2021     Assessment and Plan:   Csear was seen today for follow-up.    Diagnoses and all orders for this visit:    Urinary retention  -     POCT Bladder Scan  -     Basic metabolic panel Blood, Venous; Future    Benign prostatic hyperplasia with urinary obstruction    S/P TURP (status post transurethral resection of prostate)      Results for orders placed or performed in visit on 11/03/21   POCT Bladder Scan   Result Value Ref Range    Urine Volume 313 " mL     71-year-old male in urology clinic today in follow-up after undergoing a TURP with Dr. Prieto on 9/27/2021.  Patient with complaints of weak and splitting urinary stream, sensation of incomplete bladder emptying, episodic gross hematuria and nocturia of 2 times per night.  Postvoid residual amount of 313 mL at today's visit.  Due to the aforementioned symptoms, we will have the patient undergo an in clinic cystoscopy with Dr. Prieto for further evaluation.  The patient was instructed to seek immediate medical attention for any further episodes of gross hematuria, inability to void, fevers, chills, sweats, flank pain.  There is encouraged contact the urology clinic with any questions or concerns prior to next appointment.   Lalitha Patino, DNP

## 2021-11-15 NOTE — PROGRESS NOTES
[facility] [place]  [address] [place] [address]                                           Cardiology Outpatient Follow-up Note    Subjective    Patient ID: Cesar Yuen is a 71 y.o. male.      HPI  This is a very pleasant 71-year-old gentleman who is here today for DOT clearance and risk stratification.  He is a patient of Dr. Burk.  Was last evaluated in February of this year when he underwent atrial flutter ablation and loop recorder implant.    Patient is doing well.  He denies any specific cardiac concerns or symptoms.  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  Seems to be tolerating medications without problems or adverse side effects.  He does have some urinary issues which occasionally cause some visible blood in his urine.  This happens every 3-4 months.  No other adverse bleeding including melena or frequent epistaxis.  He denies chest pain or angina, shortness of breath or dyspnea, PND, orthopnea, edema, weight gain, palpitations, problems bleeding, TIA or strokelike symptoms, claudication, allergies, near-syncope or syncope.  Does not engage in regular physical exercise, but no limitations per se.  He does have some pain in his joints.    Cardiology Problem List:      No specialty comments available.    Past Medical History:   Diagnosis Date   • Atrial flutter (CMS/HCC) (HCC)    • CHF (congestive heart failure) (CMS/HCC) (Formerly [facility])    • Chipped tooth    • Dysrhythmia    • Edema     BLE   • History of transfusion    • Hypertension    • Injury of back 01/14/2021    Seen in .   • Urinary retention     indwelling Pope in place since November 2020        Past Surgical History:   Procedure Laterality Date   • ABDOMINAL SURGERY      anuerysm   • ABLATION A-FLUTTER N/A 2/8/2021    Procedure: Ablation A-flutter loop implant;  Surgeon: Timoteo Burk DO;  Location: [facility] EP Lab;  Service: Electrophysiology;  Laterality: N/A;    • APPENDECTOMY     • HERNIA REPAIR      x 3   • LOOP RECORDER INSERTION N/A 2/8/2021    Procedure: LOOP RECORDER INSERTION;  Surgeon: Timoteo Burk DO;  Location: [facility] EP Lab;  Service: Electrophysiology;  Laterality: N/A;   • TRANSURETHRAL RESECTION OF BLADDER TUMOR N/A 12/22/2020    Procedure: CYSTOSCOPY WITH BLADDER BIOPSY;  Surgeon: Daniele Johnson MD;  Location: [facility] OR;  Service: Urology;  Laterality: N/A;   • TRANSURETHRAL RESECTION OF PROSTATE N/A 12/22/2020    Procedure: CYSTOSCOPY TRANSURETHRAL RESECTION PROSTATE, exam under anasthesia;  Surgeon: Daniele Johnson MD;  Location: [facility] OR;  Service: Urology;  Laterality: N/A;   • TRANSURETHRAL RESECTION OF PROSTATE N/A 4/27/2021    Procedure: CYSTOSCOPY TRANSURETHRAL RESECTION PROSTATE;  Surgeon: Daniele Johnson MD;  Location: [facility] OR;  Service: Urology;  Laterality: N/A;   • TRANSURETHRAL RESECTION OF PROSTATE N/A 9/27/2021    Procedure: CYSTOSCOPY TRANSURETHRAL RESECTION PROSTATE;  Surgeon: Attila Taylor MD;  Location: [facility] OR;  Service: Urology;  Laterality: N/A;  NOT EARLY MORNING       Allergies as of 11/15/2021   • (No Known Allergies)       Current Outpatient Medications   Medication Sig Dispense Refill   • furosemide (LASIX) 20 mg tablet Take 20 mg by mouth daily     • apixaban (Eliquis) 2.5 mg tablet Take 5 mg by mouth     • metoprolol succinate XL (TOPROL-XL) 25 mg 24 hr tablet Take 2 tablets (50 mg total) by mouth daily 60 tablet 11   • multivitamin (MULTI-DAY ORAL) Take 1 tablet by mouth daily         No current facility-administered medications for this visit.       Family History   Problem Relation Age of Onset   • Alzheimer's disease Mother    • Diabetes Father    • Heart attack Mother's Brother        Social History     Tobacco Use   • Smoking status: Never Smoker   • Smokeless tobacco: Never Used   Vaping Use   • Vaping Use: Never used   Substance Use Topics   • Alcohol use: Not Currently   • Drug use: Never       Review of Systems    Musculoskeletal: Positive for joint pain.   All other systems reviewed and are negative.      Objective     Vitals:    11/15/21 0900   BP: 130/82   Pulse: 90   SpO2: 95%     Weight: 112.5 kg (248 lb)  Physical Exam    Constitutional: Well built, nourished, no acute distress.   HEENT: Head is normocephalic and atraumatic. Sclera anicteric.   Neck: Supple. No carotid bruits.  No JVD.  Lungs: Effort normal. Breath sounds are clear without wheezes or rales. No respiratory distress.   Heart: S1-S2, Regular rate and rhythm without murmur.   Extremities: Without any cyanosis, clubbing, lesions or edema. Radial pulses 2+.  Musculoskeletal: Normal ROM.    Neurologic: No focal deficits.  Psychiartic: cooperative, alert and orientated X 3.  Skin: warm, dry, intact.      Data Review:   Sodium   Date Value Ref Range Status   11/03/2021 144 135 - 145 mmol/L Final     Potassium   Date Value Ref Range Status   11/03/2021 4.1 3.5 - 5.1 MMOL/L Final     Chloride   Date Value Ref Range Status   11/03/2021 106 98 - 107 mmol/L Final     CO2   Date Value Ref Range Status   11/03/2021 27 21 - 32 mmol/L Final     BUN   Date Value Ref Range Status   11/03/2021 17 7 - 25 mg/dL Final     Creatinine   Date Value Ref Range Status   11/03/2021 0.99 0.70 - 1.30 mg/dL Final     Glucose   Date Value Ref Range Status   11/03/2021 126 (H) 70 - 105 mg/dL Final     Calcium   Date Value Ref Range Status   11/03/2021 9.6 8.6 - 10.3 mg/dL Final     hsTnI 0 Hour   Date Value Ref Range Status   01/16/2021 4.2 <=19.8 pg/mL Final     BNP   Date Value Ref Range Status   01/16/2021 67 0 - 100 pg/mL Final     WBC   Date Value Ref Range Status   09/22/2021 6.9 3.7 - 9.6 10*3/uL Final     Hemoglobin   Date Value Ref Range Status   09/22/2021 14.6 13.2 - 17.2 g/dL Final     Hematocrit   Date Value Ref Range Status   09/22/2021 42.8 38.0 - 50.0 % Final     MCV   Date Value Ref Range Status   09/22/2021 92.6 82.0 - 97.0 fL Final     Platelets   Date Value Ref Range  Status   09/22/2021 230 130 - 350 10*3/uL Final     No results found for: CHOL, TRIG, HDL, LDLDIRECT, LDLCALC  No results found for: TSH    Assessment/Plan   Diagnoses and all orders for this visit:    Atrial flutter, unspecified type (CMS/HCC) (HCC)    Status post placement of implantable loop recorder      Patient has a history of atrial flutter on return in February 2021 as well as implantation of a loop recorder.  He has done well since procedure, maintaining sinus rhythm according to interrogation and documentation of his device.  He remains on metoprolol and Eliquis.  Has had some hematuria associated with his urinary issues.  Will continue for now.  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  Follow-up with Dr. Burk as previously scheduled    There are no Patient Instructions on file for this visit.    No follow-ups on file.    The patient verbalized correct understanding and agreement to today's instructions and plan.  The patient verbalized that all questions have been addressed.    On this date of service 22 minutes of total time was spent on this encounter.    Electronically signed by: NENITA Balderas  11/15/2021  9:23 AM    A voice recognition program was used to aid in documentation of this record. Sometimes words are not printed exactly as they were spoken. While efforts were made to carefully edit and correct any inaccuracies, some errors may be present; please take these into context. Please contact the provider if errors are identified.

## 2021-11-16 LAB — BSA FOR ECHO PROCEDURE: 2.41 M2

## 2021-11-16 PROCEDURE — 93298 REM INTERROG DEV EVAL SCRMS: CPT | Performed by: INTERNAL MEDICINE

## 2021-12-06 ENCOUNTER — OFFICE VISIT (OUTPATIENT)
Dept: URGENT CARE | Facility: CLINIC | Age: 71
End: 2021-12-06
Payer: MEDICARE

## 2021-12-06 VITALS
DIASTOLIC BLOOD PRESSURE: 82 MMHG | HEART RATE: 76 BPM | TEMPERATURE: 97.7 F | RESPIRATION RATE: 16 BRPM | SYSTOLIC BLOOD PRESSURE: 130 MMHG | OXYGEN SATURATION: 93 %

## 2021-12-06 DIAGNOSIS — R73.09 ELEVATED GLUCOSE: ICD-10-CM

## 2021-12-06 DIAGNOSIS — R21 RASH OF FOOT: ICD-10-CM

## 2021-12-06 DIAGNOSIS — R20.2 PARESTHESIAS: Primary | ICD-10-CM

## 2021-12-06 LAB
ANION GAP SERPL CALC-SCNC: 8 MMOL/L (ref 3–11)
BUN SERPL-MCNC: 14 MG/DL (ref 7–25)
CALCIUM SERPL-MCNC: 9.8 MG/DL (ref 8.6–10.3)
CHLORIDE SERPL-SCNC: 106 MMOL/L (ref 98–107)
CO2 SERPL-SCNC: 28 MMOL/L (ref 21–32)
CREAT SERPL-MCNC: 0.86 MG/DL (ref 0.7–1.3)
EST. AVERAGE GLUCOSE BLD GHB EST-MCNC: 125.5 MG/DL
GFR SERPL CREATININE-BSD FRML MDRD: 87 ML/MIN/1.73M*2
GLUCOSE SERPL-MCNC: 94 MG/DL (ref 70–105)
HBA1C MFR BLD: 6 % (ref 4–6)
POTASSIUM SERPL-SCNC: 3.8 MMOL/L (ref 3.5–5.1)
SODIUM SERPL-SCNC: 142 MMOL/L (ref 135–145)

## 2021-12-06 PROCEDURE — 80048 BASIC METABOLIC PNL TOTAL CA: CPT | Performed by: PHYSICIAN ASSISTANT

## 2021-12-06 PROCEDURE — 36415 COLL VENOUS BLD VENIPUNCTURE: CPT | Performed by: PHYSICIAN ASSISTANT

## 2021-12-06 PROCEDURE — 99024 POSTOP FOLLOW-UP VISIT: CPT | Performed by: PHYSICIAN ASSISTANT

## 2021-12-06 PROCEDURE — G0463 HOSPITAL OUTPT CLINIC VISIT: HCPCS | Performed by: PHYSICIAN ASSISTANT

## 2021-12-06 PROCEDURE — 83036 HEMOGLOBIN GLYCOSYLATED A1C: CPT | Performed by: PHYSICIAN ASSISTANT

## 2021-12-06 ASSESSMENT — ENCOUNTER SYMPTOMS
CHILLS: 0
ABDOMINAL PAIN: 0
NECK PAIN: 0
FEVER: 0
NAUSEA: 0
NUMBNESS: 0
LIGHT-HEADEDNESS: 0
SHORTNESS OF BREATH: 0
CHEST TIGHTNESS: 0
NECK STIFFNESS: 1
DIZZINESS: 0

## 2021-12-06 ASSESSMENT — PAIN SCALES - GENERAL: PAINLEVEL: 0-NO PAIN

## 2021-12-06 NOTE — Clinical Note
Please review note - no etiology discovered to answer his tingling in bilateral upper extremities and chest / abdomen region.

## 2021-12-06 NOTE — PROGRESS NOTES
Subjective      Cesar Yuen is a 71 y.o. male who presents for   Chief Complaint   Patient presents with   • Numbness     Tingling in his hands and feet that started Saturday cortes, only present now in his hands. No neuro changes or deficits.   • Rash     Right foot, ongoing for two years. Has been applying an anti-fungal.   • Rib Pain     Rolled over in bed Shai morning and felt a pain/pull in his right rib cage. Denies any pain now.         Cesar presents due to tingling in hands and feet that started on Saturday evening.  Now only present in the bilateral hands / arms / front of abdomen - states hard time sleeping due to the sensation.  Also had a pulling sensation in right rib cage when he was rolling over in bed on Shai morning - no current pain.  So main complaint today is tingling in hands.  Also noted a rash on his right foot.  HE has been applying an antifungal cream to this area.  The rash has been present intermittently for 2 years.            The following have been reviewed and updated as appropriate in this visit:        No Known Allergies  Current Outpatient Medications   Medication Sig Dispense Refill   • furosemide (LASIX) 20 mg tablet Take 20 mg by mouth daily     • apixaban (Eliquis) 2.5 mg tablet Take 5 mg by mouth     • metoprolol succinate XL (TOPROL-XL) 25 mg 24 hr tablet Take 2 tablets (50 mg total) by mouth daily 60 tablet 11   • multivitamin (MULTI-DAY ORAL) Take 1 tablet by mouth daily         No current facility-administered medications for this visit.     Past Medical History:   Diagnosis Date   • Atrial flutter (CMS/HCC) ([facility])    • CHF (congestive heart failure) (CMS/HCC) (Formerly [facility])    • Chipped tooth    • Dysrhythmia    • Edema     BLE   • History of transfusion    • Hypertension    • Injury of back 01/14/2021    Seen in .   • Urinary retention     indwelling Pope in place since November 2020      Past Surgical History:   Procedure Laterality Date   • ABDOMINAL SURGERY       anuerysm   • ABLATION A-FLUTTER N/A 2/8/2021    Procedure: Ablation A-flutter loop implant;  Surgeon: Timoteo Burk DO;  Location: [facility] EP Lab;  Service: Electrophysiology;  Laterality: N/A;   • APPENDECTOMY     • HERNIA REPAIR      x 3   • LOOP RECORDER INSERTION N/A 2/8/2021    Procedure: LOOP RECORDER INSERTION;  Surgeon: Timoteo Burk DO;  Location: [facility] EP Lab;  Service: Electrophysiology;  Laterality: N/A;   • TRANSURETHRAL RESECTION OF BLADDER TUMOR N/A 12/22/2020    Procedure: CYSTOSCOPY WITH BLADDER BIOPSY;  Surgeon: Daniele Johnson MD;  Location: [facility] OR;  Service: Urology;  Laterality: N/A;   • TRANSURETHRAL RESECTION OF PROSTATE N/A 12/22/2020    Procedure: CYSTOSCOPY TRANSURETHRAL RESECTION PROSTATE, exam under anasthesia;  Surgeon: Daniele Johnson MD;  Location: [facility] OR;  Service: Urology;  Laterality: N/A;   • TRANSURETHRAL RESECTION OF PROSTATE N/A 4/27/2021    Procedure: CYSTOSCOPY TRANSURETHRAL RESECTION PROSTATE;  Surgeon: Daniele Johnson MD;  Location: [facility] OR;  Service: Urology;  Laterality: N/A;   • TRANSURETHRAL RESECTION OF PROSTATE N/A 9/27/2021    Procedure: CYSTOSCOPY TRANSURETHRAL RESECTION PROSTATE;  Surgeon: Attila Taylor MD;  Location: [facility] OR;  Service: Urology;  Laterality: N/A;  NOT EARLY MORNING     Family History   Problem Relation Age of Onset   • Alzheimer's disease Mother    • Diabetes Father    • Heart attack Mother's Brother      Social History     Occupational History   • Occupation:    Tobacco Use   • Smoking status: Never Smoker   • Smokeless tobacco: Never Used   Vaping Use   • Vaping Use: Never used   Substance and Sexual Activity   • Alcohol use: Not Currently   • Drug use: Never   • Sexual activity: Defer   Social History Narrative   • Not on file       Review of Systems   Constitutional: Negative for chills and fever.   Respiratory: Negative for chest tightness and shortness of breath.    Gastrointestinal: Negative for abdominal pain and nausea.    Musculoskeletal: Positive for neck stiffness (mildly in back of neck). Negative for neck pain.   Skin: Positive for rash (scaling noted to dorsum of left foot - edema (chronic)).   Neurological: Negative for dizziness, light-headedness and numbness (describes tingling sensation).       Objective   Vitals:    12/06/21 0759   BP: 130/82   Temp: 36.5 °C (97.7 °F)   TempSrc: Temporal   Pulse: 76   Resp: 16   SpO2: 93%   PainSc: 0-No pain        Physical Exam  Vitals and nursing note reviewed.   Constitutional:       General: He is not in acute distress.     Appearance: He is well-developed. He is not diaphoretic.   Cardiovascular:      Rate and Rhythm: Normal rate and regular rhythm.      Heart sounds: Normal heart sounds. No murmur heard.  No friction rub. No gallop.    Pulmonary:      Effort: Pulmonary effort is normal.      Breath sounds: Normal breath sounds. No wheezing or rales.   Abdominal:      General: There is distension (distension noted diffusely - more prominent on right side; no pain to palpation of pressure).      Tenderness: There is no abdominal tenderness.      Comments: Long vertical surgical scar from previous abdominal surgery.   Musculoskeletal:      Cervical back: Normal range of motion.      Right lower leg: Edema (chronic) present.      Comments: Equal strength noted to bilateral    Lymphadenopathy:      Cervical: No cervical adenopathy.   Skin:     General: Skin is warm and dry.      Findings: Erythema (large area of scaling erythema noted to dorsum of right foot - probable fungal in nature) present.   Neurological:      Mental Status: He is alert and oriented to person, place, and time.   Psychiatric:         Behavior: Behavior normal.         Recent Results (from the past 24 hour(s))   Basic metabolic panel Blood, Venous    Collection Time: 12/06/21  8:56 AM   Result Value Ref Range    Sodium 142 135 - 145 mmol/L    Potassium 3.8 3.5 - 5.1 MMOL/L    Chloride 106 98 - 107 mmol/L    CO2  28 21 - 32 mmol/L    BUN 14 7 - 25 mg/dL    Creatinine 0.86 0.70 - 1.30 mg/dL    Glucose 94 70 - 105 mg/dL    Calcium 9.8 8.6 - 10.3 mg/dL    Anion Gap 8 3 - 11 mmol/L    eGFR 87 >60 mL/min/1.73m*2   Hemoglobin A1c (glycosylated) Blood, Venous    Collection Time: 12/06/21  8:56 AM   Result Value Ref Range    Hemoglobin A1C 6.0 4.0 - 6.0 %    Estimated Average Glucose 125.5 mg/dL       Assessment/Plan   Diagnoses and all orders for this visit:    Paresthesias  -     Basic metabolic panel Blood, Venous  -     Hemoglobin A1c (glycosylated) Blood, Venous    Elevated glucose  -     Basic metabolic panel Blood, Venous  -     Hemoglobin A1c (glycosylated) Blood, Venous    Rash of foot  Comments:  right dorsum        Patient Instructions   A1C - 6.0  BMP - Normal electrolytes and kidney function.    Advised to purchase an over-the-counter antifungal cream, such as, terbinafine to apply twice daily to right foot.    Distension noted in abdomen, yet it is not painful.  Likely due to previous abdominal surgery.  Advised to continue to monitor the area and follow up if it becomes painful.    No specific etiology noted as cause for tingling sensation. Office note will be forwarded to your PCP, Dr. Machado, for review.      NENITA RICHTER

## 2021-12-06 NOTE — PATIENT INSTRUCTIONS
A1C - 6.0  BMP - Normal electrolytes and kidney function.    Advised to purchase an over-the-counter antifungal cream, such as, terbinafine to apply twice daily to right foot.    Distension noted in abdomen, yet it is not painful.  Likely due to previous abdominal surgery.  Advised to continue to monitor the area and follow up if it becomes painful.    No specific etiology noted as cause for tingling sensation. Office note will be forwarded to your PCP, Dr. Machado, for review.

## 2021-12-07 NOTE — PROGRESS NOTES
"12/7/2021 0931 Marco stated he is feeling much better and  \"The tingling is better and everything is going to be alright\"  He will call if he has any further issues.  "

## 2021-12-14 ENCOUNTER — HOSPITAL ENCOUNTER (EMERGENCY)
Facility: HOSPITAL | Age: 71
Discharge: HOME | End: 2021-12-14
Attending: STUDENT IN AN ORGANIZED HEALTH CARE EDUCATION/TRAINING PROGRAM
Payer: MEDICARE

## 2021-12-14 ENCOUNTER — OFFICE VISIT (OUTPATIENT)
Dept: URGENT CARE | Facility: CLINIC | Age: 71
End: 2021-12-14
Payer: MEDICARE

## 2021-12-14 VITALS
BODY MASS INDEX: 33.13 KG/M2 | WEIGHT: 250 LBS | HEIGHT: 73 IN | TEMPERATURE: 98.2 F | HEART RATE: 87 BPM | OXYGEN SATURATION: 91 % | DIASTOLIC BLOOD PRESSURE: 79 MMHG | SYSTOLIC BLOOD PRESSURE: 127 MMHG

## 2021-12-14 VITALS
DIASTOLIC BLOOD PRESSURE: 70 MMHG | RESPIRATION RATE: 20 BRPM | OXYGEN SATURATION: 93 % | SYSTOLIC BLOOD PRESSURE: 122 MMHG | TEMPERATURE: 98.4 F | HEART RATE: 82 BPM

## 2021-12-14 DIAGNOSIS — R10.30 LOWER ABDOMINAL PAIN: Primary | ICD-10-CM

## 2021-12-14 DIAGNOSIS — K43.9 HERNIA OF ANTERIOR ABDOMINAL WALL: Primary | ICD-10-CM

## 2021-12-14 PROCEDURE — 36415 COLL VENOUS BLD VENIPUNCTURE: CPT | Performed by: STUDENT IN AN ORGANIZED HEALTH CARE EDUCATION/TRAINING PROGRAM

## 2021-12-14 PROCEDURE — 99283 EMERGENCY DEPT VISIT LOW MDM: CPT | Performed by: STUDENT IN AN ORGANIZED HEALTH CARE EDUCATION/TRAINING PROGRAM

## 2021-12-14 NOTE — PROGRESS NOTES
Pt present to UC with c/o abdominal pain to right side.  Pt states felt something tear and now has swelling to that area.  Pt triaged to ED for further evaluation and care and imaging if needed.  VS obtained-see flow sheet-report given to Marcelino

## 2021-12-14 NOTE — ED PROVIDER NOTES
"Emergency Room Provider Note    ID/CC:  Chief Complaint   Patient presents with   • Hernia     pt felt a \"rip\" 4-5 days ago and feels a bulge now at this point, no pain        HISTORY OF PRESENT ILLNESS:  Patient is a 71 y.o. male who presents to the ED for abdominal discomfort.  Patient reports over the last several days he has felt like his abdominal wall has been tearing there is a surgical line.  Since that time is noticed some increased bulge across his abdomen.  No excruciating pain noted in the area.  Abdominal hernia is reducible.  No nausea or vomiting.  No fevers or chills.  Patient was inappropriately sent over from the urgent care due to concerns of ripping abdominal pain.      PAST MEDICAL/SURGICAL HISTORY:  Past Medical History:   Diagnosis Date   • Atrial flutter (CMS/HCC) (HCC)    • CHF (congestive heart failure) (CMS/HCC) (HCC)    • Chipped tooth    • Dysrhythmia    • Edema     BLE   • History of transfusion    • Hypertension    • Injury of back 01/14/2021    Seen in .   • Urinary retention     indwelling Pope in place since November 2020        Past Surgical History:   Procedure Laterality Date   • ABDOMINAL SURGERY      anuerysm   • ABLATION A-FLUTTER N/A 2/8/2021    Procedure: Ablation A-flutter loop implant;  Surgeon: Timoteo Burk DO;  Location: [facility] EP Lab;  Service: Electrophysiology;  Laterality: N/A;   • APPENDECTOMY     • HERNIA REPAIR      x 3   • LOOP RECORDER INSERTION N/A 2/8/2021    Procedure: LOOP RECORDER INSERTION;  Surgeon: Timoteo Burk DO;  Location: [facility] EP Lab;  Service: Electrophysiology;  Laterality: N/A;   • TRANSURETHRAL RESECTION OF BLADDER TUMOR N/A 12/22/2020    Procedure: CYSTOSCOPY WITH BLADDER BIOPSY;  Surgeon: Daniele Johnson MD;  Location: [facility] OR;  Service: Urology;  Laterality: N/A;   • TRANSURETHRAL RESECTION OF PROSTATE N/A 12/22/2020    Procedure: CYSTOSCOPY TRANSURETHRAL RESECTION PROSTATE, exam under anasthesia;  Surgeon: Daniele Johnson MD;  Location: " [facility] OR;  Service: Urology;  Laterality: N/A;   • TRANSURETHRAL RESECTION OF PROSTATE N/A 4/27/2021    Procedure: CYSTOSCOPY TRANSURETHRAL RESECTION PROSTATE;  Surgeon: Daniele Johnson MD;  Location: [facility] OR;  Service: Urology;  Laterality: N/A;   • TRANSURETHRAL RESECTION OF PROSTATE N/A 9/27/2021    Procedure: CYSTOSCOPY TRANSURETHRAL RESECTION PROSTATE;  Surgeon: Attila Taylor MD;  Location: [facility] OR;  Service: Urology;  Laterality: N/A;  NOT EARLY MORNING       MEDICATIONS:    Current Outpatient Medications:   •  furosemide (LASIX) 20 mg tablet, Take 20 mg by mouth daily, Disp: , Rfl:   •  apixaban (Eliquis) 2.5 mg tablet, Take 5 mg by mouth, Disp: , Rfl:   •  metoprolol succinate XL (TOPROL-XL) 25 mg 24 hr tablet, Take 2 tablets (50 mg total) by mouth daily, Disp: 60 tablet, Rfl: 11  •  multivitamin (MULTI-DAY ORAL), Take 1 tablet by mouth daily  , Disp: , Rfl:      ALLERGIES:   No Known Allergies    FAMILY HISTORY:  Family History   Problem Relation Age of Onset   • Alzheimer's disease Mother    • Diabetes Father    • Heart attack Mother's Brother        REVIEW OF SYSTEMS:  ROS performed otherwise negative except was noted in the [facility]    VITALS  ED Triage Vitals [12/14/21 1324]   Temp Heart Rate Resp BP SpO2   36.8 °C (98.2 °F) 87 -- 127/79 91 %      Temp Source Heart Rate Source Patient Position BP Location FiO2 (%)   Oral -- -- -- --          PHYSICAL EXAM  GENERAL: Patient is a 71 y.o. old male who appears to be in no distress  HEENT: EOMI. no facial deformities.  CARDIO: RRR. No murmurs appreciated  RESP: No respiratory distress.  No wheezing noted.  GI: Normoactive bowel sounds. Abdomen soft, nontender, and nondistended.  Abdominal wall hernias easily reducible and overall stable  NEURO: Patient alert and awake.  No facial droop noted.  Facial musculature intact.  MSK: No bilateral pitting edema in lower limbs.  Patient moving limbs without difficulty.   DERM: Skin warm and dry.   PSYCH: Patient does  not appear anxious on exam    LABS  Labs Reviewed - No data to display    IMAGING  No orders to display       MEDICATIONS GIVEN  Medications - No data to display    PROCEDURES  Procedures         MDM:  Patient is a 71 y.o. male who presented to the ED and the patients medical record and history was reviewed.  Reassurance was provided as patient is having ongoing hernia secondary to a previous abdominal wall incision.  No imaging required at this time as the hernia was easily reducible and patient does not have any other systemic symptoms or concerns.  Patient will be discharged home with recommendations to follow-up with general surgery.    I did advise the patient that I do not expect that there symptoms to worsen.  If they do or change in any way, or if they have any concerns that they need to come back to the emergency room for further evaluation.  I also strongly encouraged outpatient follow-up.    CLINICAL IMPRESSION  Final diagnoses:   [K43.9] Hernia of anterior abdominal wall                  Yoandy Told, DO  12/14/21 4046

## 2021-12-15 ENCOUNTER — ANCILLARY PROCEDURE (OUTPATIENT)
Dept: CARDIOLOGY | Facility: CLINIC | Age: 71
End: 2021-12-15
Payer: MEDICARE

## 2021-12-15 DIAGNOSIS — Z45.09 ENCOUNTER FOR INTERROGATION OF CARDIAC RECORDER: ICD-10-CM

## 2021-12-15 PROCEDURE — 93298 REM INTERROG DEV EVAL SCRMS: CPT | Mod: NCREMOTE | Performed by: INTERNAL MEDICINE

## 2021-12-15 PROCEDURE — G2066 INTER DEVC REMOTE 30D: HCPCS

## 2021-12-15 NOTE — PROGRESS NOTES
Subjective      Cesar Yuen is a 71 y.o. male who presents for   Chief Complaint   Patient presents with   • Abdominal Pain     per pt felt someing tear in the right side of the abdomen.  Occured earlier today.          HPI    The following have been reviewed and updated as appropriate in this visit:        No Known Allergies  Current Outpatient Medications   Medication Sig Dispense Refill   • furosemide (LASIX) 20 mg tablet Take 20 mg by mouth daily     • apixaban (Eliquis) 2.5 mg tablet Take 5 mg by mouth     • metoprolol succinate XL (TOPROL-XL) 25 mg 24 hr tablet Take 2 tablets (50 mg total) by mouth daily 60 tablet 11   • multivitamin (MULTI-DAY ORAL) Take 1 tablet by mouth daily         No current facility-administered medications for this visit.     Past Medical History:   Diagnosis Date   • Atrial flutter (CMS/HCC) (HCC)    • CHF (congestive heart failure) (CMS/HCC) (HCC)    • Chipped tooth    • Dysrhythmia    • Edema     BLE   • History of transfusion    • Hypertension    • Injury of back 01/14/2021    Seen in .   • Urinary retention     indwelling Pope in place since November 2020      Past Surgical History:   Procedure Laterality Date   • ABDOMINAL SURGERY      anuerysm   • ABLATION A-FLUTTER N/A 2/8/2021    Procedure: Ablation A-flutter loop implant;  Surgeon: Timoteo Burk DO;  Location: [facility] EP Lab;  Service: Electrophysiology;  Laterality: N/A;   • APPENDECTOMY     • HERNIA REPAIR      x 3   • LOOP RECORDER INSERTION N/A 2/8/2021    Procedure: LOOP RECORDER INSERTION;  Surgeon: Timoteo Burk DO;  Location: [facility] EP Lab;  Service: Electrophysiology;  Laterality: N/A;   • TRANSURETHRAL RESECTION OF BLADDER TUMOR N/A 12/22/2020    Procedure: CYSTOSCOPY WITH BLADDER BIOPSY;  Surgeon: Daniele Johnson MD;  Location: [facility] OR;  Service: Urology;  Laterality: N/A;   • TRANSURETHRAL RESECTION OF PROSTATE N/A 12/22/2020    Procedure: CYSTOSCOPY TRANSURETHRAL RESECTION PROSTATE, exam under anasthesia;   Surgeon: Daniele Johnson MD;  Location: [facility] OR;  Service: Urology;  Laterality: N/A;   • TRANSURETHRAL RESECTION OF PROSTATE N/A 4/27/2021    Procedure: CYSTOSCOPY TRANSURETHRAL RESECTION PROSTATE;  Surgeon: Daniele Johnson MD;  Location: [facility] OR;  Service: Urology;  Laterality: N/A;   • TRANSURETHRAL RESECTION OF PROSTATE N/A 9/27/2021    Procedure: CYSTOSCOPY TRANSURETHRAL RESECTION PROSTATE;  Surgeon: Attila Taylor MD;  Location: [facility] OR;  Service: Urology;  Laterality: N/A;  NOT EARLY MORNING     Family History   Problem Relation Age of Onset   • Alzheimer's disease Mother    • Diabetes Father    • Heart attack Mother's Brother      Social History     Occupational History   • Occupation:    Tobacco Use   • Smoking status: Never Smoker   • Smokeless tobacco: Never Used   Vaping Use   • Vaping Use: Never used   Substance and Sexual Activity   • Alcohol use: Not Currently   • Drug use: Never   • Sexual activity: Defer   Social History Narrative   • Not on file       Review of Systems    Objective   Vitals:    12/14/21 1310   BP: 122/70   Temp: 36.9 °C (98.4 °F)   TempSrc: Temporal   Pulse: 82   Resp: 20   SpO2: 93%   PainSc: Comment: uncomfortable-        Physical Exam    No results found for this or any previous visit (from the past 24 hour(s)).    Assessment/Plan   Diagnoses and all orders for this visit:    Lower abdominal pain        Patient Instructions   Triaged to ER by nursing staff      NENITA RICHTER

## 2022-01-03 ENCOUNTER — OFFICE VISIT (OUTPATIENT)
Dept: URGENT CARE | Facility: CLINIC | Age: 72
End: 2022-01-03
Payer: COMMERCIAL

## 2022-01-03 ENCOUNTER — NURSE TRIAGE (OUTPATIENT)
Dept: FAMILY MEDICINE | Facility: CLINIC | Age: 72
End: 2022-01-03
Payer: COMMERCIAL

## 2022-01-03 VITALS
WEIGHT: 253 LBS | BODY MASS INDEX: 33.38 KG/M2 | DIASTOLIC BLOOD PRESSURE: 72 MMHG | OXYGEN SATURATION: 96 % | TEMPERATURE: 99.3 F | HEART RATE: 73 BPM | SYSTOLIC BLOOD PRESSURE: 122 MMHG | RESPIRATION RATE: 18 BRPM

## 2022-01-03 DIAGNOSIS — I89.0 LYMPHEDEMA: ICD-10-CM

## 2022-01-03 DIAGNOSIS — U07.1 COVID-19: Primary | ICD-10-CM

## 2022-01-03 DIAGNOSIS — R05.9 COUGH: ICD-10-CM

## 2022-01-03 LAB
INFLUENZA A (AMB): NEGATIVE
INFLUENZA B (AMB): NEGATIVE
INTERNAL QC PASS?: YES
SARS-COV-2 RDRP RESP QL NAA+PROBE: POSITIVE

## 2022-01-03 PROCEDURE — 99213 OFFICE O/P EST LOW 20 MIN: CPT | Mod: CS | Performed by: FAMILY MEDICINE

## 2022-01-03 PROCEDURE — G0463 HOSPITAL OUTPT CLINIC VISIT: HCPCS | Performed by: FAMILY MEDICINE

## 2022-01-03 PROCEDURE — C9803 HOPD COVID-19 SPEC COLLECT: HCPCS | Performed by: FAMILY MEDICINE

## 2022-01-03 PROCEDURE — 87804 INFLUENZA ASSAY W/OPTIC: CPT | Mod: QW | Performed by: FAMILY MEDICINE

## 2022-01-03 NOTE — PROGRESS NOTES
Subjective     CHIEF COMPLAINT / REASON FOR VISIT  Cesar Yuen is a 71 y.o. male who presents for evaluation of Cough (started 26th, no COVID or Flu Vaccinations) and swollen right foot (discolored bruised type appearance).    HISTORY OF PRESENT ILLNESS  Has had a cough since morning of 12/26.  Been at home since then.  No SOB.  Eating and drinking ok.      Foot swallon in right lower ext will go down a lot when he sleeps.  But the swelling has been worse then normal.      The following portions of the patient's history were reviewed and updated as appropriate: allergies, current medications, past medical history, past social history and problem list.    REVIEW OF SYSTEMS  Review of Systems    Negative expect for what was stated in the HPI.    Current Outpatient Medications   Medication Sig Dispense Refill   • furosemide (LASIX) 20 mg tablet Take 20 mg by mouth daily     • apixaban (Eliquis) 2.5 mg tablet Take 5 mg by mouth     • metoprolol succinate XL (TOPROL-XL) 25 mg 24 hr tablet Take 2 tablets (50 mg total) by mouth daily 60 tablet 11   • multivitamin (MULTI-DAY ORAL) Take 1 tablet by mouth daily         No current facility-administered medications for this visit.       Medication changes today:  No orders of the defined types were placed in this encounter.      There are no discontinued medications.    No Known Allergies    Objective     PHYSICAL EXAM  Vitals:    01/03/22 1225   BP: 122/72   Temp: 37.4 °C (99.3 °F)   Pulse: 73   Resp: 18   SpO2: 96%   Weight: 114.8 kg (253 lb)     Body mass index is 33.38 kg/m².  Physical Exam  Vitals and nursing note reviewed.   Constitutional:       Appearance: Normal appearance.   Cardiovascular:      Rate and Rhythm: Normal rate and regular rhythm.   Pulmonary:      Effort: Pulmonary effort is normal.      Breath sounds: Normal breath sounds.   Musculoskeletal:      Right lower leg: Edema present.      Comments: Edema in right lower ext not erythematous but skin is  definitely stretched.  No significant warmth or tenderness.   Neurological:      Mental Status: He is alert.         DIAGNOSTICS:      Assessment/Plan      ASSESSMENT/PLAN:  Problem List Items Addressed This Visit        Infectious Disease    COVID-19 - Primary    Relevant Orders    Ambulatory Referral to Infusion Therapy       Symptoms and Signs    Lymphedema      Other Visit Diagnoses     Cough        Relevant Orders    POCT Influenza A/B (Completed)    POCT COVID-19, IDNOW PCR (Completed)          Patient is COVID-19 positive.  I did send an order for infusion of monoclonal antibody since he is out of the 5-day window.  He is on Eliquis so I do not think he has a blood clot.  We did discuss using his Ace wrap so she has at home to help get the edema out of his foot.  I did encourage him to follow-up with his PCP in the next 2 weeks.  Patient has been Covid positive now for 7 days.  I did encourage him to stay home for 3 more days he does have an upcoming appointment for general surgery which I think he should reschedule.    Divine Escalante, DO

## 2022-01-03 NOTE — TELEPHONE ENCOUNTER
"[facility] Nurse Triage Note    INITIAL REQUIRED QUESTIONS:    Are you currently in [place]? Yes    Are you currently driving?: No      CHIEF COMPLAINT AND HISTORY:                                               Reason for call:  Right foot pain, swelling and bruising    Do you follow with a specialist for this condition: No    Onset: 8 days ago    Duration: CONSTANT    Severity: Rash/Bruise/Swelling size states bruise is \"quite large\" and foot is \"swollen like a balloon\"  Denies any foot injury, but does report he had a \"hole on the top of the same foot\" that was treated for infection recently.     Pain Level Reported: 5    Location:  right foot    Associated Symptoms: reports bruising, swelling, states foot is red and painful    Are symptoms interfering with Activities of Daily Living?: reports no, is able to get around but is difficult to get foot into his shoe    History of similar symptoms: reports yes    Aggravating/Relieving Factors: denies    LMP (for females ages 10-60): N/A     Allergies - Patient reported: not reviewed      CARE ADVICE:   Please review Encounters in Chart review for Specific Care Advice given by Triage RN.       DISPOSITION:   See Physician Within 24 Hours, Lab Test Only Within 24 Hours     Pt states he will go into  to be seen.       PCP COMMUNICATION:   Was the patient's PCP contacted?: Route encounter to PCP as FYI,PCP unavailable, Urgent Care recommended         Shaista Paul RN  January 3, 2022 10:28 AM    Reason for Disposition  • Patient has COVID-19 symptoms per CDC guidelines.  • [1] Swollen foot AND [2] no fever  (Exceptions: localized bump from bunions, calluses, insect bite, sting)    Protocols used: COVID-19 TRIAGE PROTOCOL [facility], FOOT PAIN-A-AH      "

## 2022-01-04 ENCOUNTER — HOME MONITORING (OUTPATIENT)
Dept: FAMILY MEDICINE | Facility: CLINIC | Age: 72
End: 2022-01-04
Payer: COMMERCIAL

## 2022-01-04 NOTE — PROGRESS NOTES
1st attempt to reach patient for [facility] program.  LMTCB, and will also try to reach patient tomorrow.

## 2022-01-05 ENCOUNTER — HOME MONITORING (OUTPATIENT)
Dept: FAMILY MEDICINE | Facility: CLINIC | Age: 72
End: 2022-01-05
Payer: COMMERCIAL

## 2022-01-05 NOTE — PROGRESS NOTES
Pt dx with Covid on 1/3, sx started, per chart review, on 12/26 which is out of the 10 day window per protocol.for enrollment in the [facility] Program. Pt to f/u with PCP as scheduled.

## 2022-01-06 ENCOUNTER — APPOINTMENT (OUTPATIENT)
Dept: CT IMAGING | Facility: HOSPITAL | Age: 72
End: 2022-01-06
Payer: COMMERCIAL

## 2022-01-06 ENCOUNTER — HOSPITAL ENCOUNTER (EMERGENCY)
Facility: HOSPITAL | Age: 72
Discharge: HOME | End: 2022-01-06
Attending: STUDENT IN AN ORGANIZED HEALTH CARE EDUCATION/TRAINING PROGRAM
Payer: COMMERCIAL

## 2022-01-06 VITALS
RESPIRATION RATE: 18 BRPM | TEMPERATURE: 98.1 F | SYSTOLIC BLOOD PRESSURE: 140 MMHG | DIASTOLIC BLOOD PRESSURE: 90 MMHG | HEIGHT: 73 IN | BODY MASS INDEX: 34.46 KG/M2 | WEIGHT: 260 LBS | HEART RATE: 70 BPM

## 2022-01-06 DIAGNOSIS — R10.9 ABDOMINAL PAIN: Primary | ICD-10-CM

## 2022-01-06 LAB
ANION GAP SERPL CALC-SCNC: 9 MMOL/L (ref 3–11)
BUN SERPL-MCNC: 12 MG/DL (ref 7–25)
CALCIUM SERPL-MCNC: 8.4 MG/DL (ref 8.6–10.3)
CHLORIDE SERPL-SCNC: 102 MMOL/L (ref 98–107)
CO2 SERPL-SCNC: 26 MMOL/L (ref 21–32)
CREAT SERPL-MCNC: 0.85 MG/DL (ref 0.7–1.3)
ERYTHROCYTE [DISTWIDTH] IN BLOOD BY AUTOMATED COUNT: 13.3 % (ref 11.5–15)
GFR SERPL CREATININE-BSD FRML MDRD: 87 ML/MIN/1.73M*2
GLUCOSE SERPL-MCNC: 106 MG/DL (ref 70–105)
HCT VFR BLD AUTO: 42.3 % (ref 38–50)
HGB BLD-MCNC: 14.4 G/DL (ref 13.2–17.2)
MCH RBC QN AUTO: 30.7 PG (ref 29–34)
MCHC RBC AUTO-ENTMCNC: 34 G/DL (ref 32–36)
MCV RBC AUTO: 90.5 FL (ref 82–97)
PLATELET # BLD AUTO: 200 10*3/UL (ref 130–350)
PMV BLD AUTO: 7.6 FL (ref 6.9–10.8)
POTASSIUM SERPL-SCNC: 3.5 MMOL/L (ref 3.5–5.1)
RBC # BLD AUTO: 4.68 10*6/ΜL (ref 4.1–5.8)
SODIUM SERPL-SCNC: 137 MMOL/L (ref 135–145)
WBC # BLD AUTO: 6.1 10*3/UL (ref 3.7–9.6)

## 2022-01-06 PROCEDURE — 74177 CT ABD & PELVIS W/CONTRAST: CPT | Mod: ME

## 2022-01-06 PROCEDURE — 99282 EMERGENCY DEPT VISIT SF MDM: CPT | Performed by: STUDENT IN AN ORGANIZED HEALTH CARE EDUCATION/TRAINING PROGRAM

## 2022-01-06 PROCEDURE — G1004 CDSM NDSC: HCPCS | Performed by: RADIOLOGY

## 2022-01-06 PROCEDURE — 36415 COLL VENOUS BLD VENIPUNCTURE: CPT | Performed by: STUDENT IN AN ORGANIZED HEALTH CARE EDUCATION/TRAINING PROGRAM

## 2022-01-06 PROCEDURE — 80048 BASIC METABOLIC PNL TOTAL CA: CPT | Performed by: STUDENT IN AN ORGANIZED HEALTH CARE EDUCATION/TRAINING PROGRAM

## 2022-01-06 PROCEDURE — 2550000100 HC RX 255: Performed by: STUDENT IN AN ORGANIZED HEALTH CARE EDUCATION/TRAINING PROGRAM

## 2022-01-06 PROCEDURE — 74177 CT ABD & PELVIS W/CONTRAST: CPT | Mod: 26,ME | Performed by: RADIOLOGY

## 2022-01-06 PROCEDURE — 85027 COMPLETE CBC AUTOMATED: CPT | Performed by: STUDENT IN AN ORGANIZED HEALTH CARE EDUCATION/TRAINING PROGRAM

## 2022-01-06 PROCEDURE — 99283 EMERGENCY DEPT VISIT LOW MDM: CPT | Performed by: STUDENT IN AN ORGANIZED HEALTH CARE EDUCATION/TRAINING PROGRAM

## 2022-01-06 RX ORDER — ONDANSETRON HYDROCHLORIDE 2 MG/ML
4 INJECTION, SOLUTION INTRAVENOUS ONCE
Status: DISCONTINUED | OUTPATIENT
Start: 2022-01-06 | End: 2022-01-06 | Stop reason: HOSPADM

## 2022-01-06 RX ORDER — FENTANYL CITRATE/PF 50 MCG/ML
50 PLASTIC BAG, INJECTION (ML) INTRAVENOUS ONCE
Status: DISCONTINUED | OUTPATIENT
Start: 2022-01-06 | End: 2022-01-06 | Stop reason: HOSPADM

## 2022-01-06 RX ORDER — IOPAMIDOL 755 MG/ML
130 INJECTION, SOLUTION INTRAVASCULAR ONCE
Status: COMPLETED | OUTPATIENT
Start: 2022-01-06 | End: 2022-01-06

## 2022-01-06 RX ADMIN — IOPAMIDOL 130 ML: 755 INJECTION, SOLUTION INTRAVENOUS at 14:10

## 2022-01-06 NOTE — DISCHARGE INSTRUCTIONS
-Recommend heat, ibuprofen and abdominal binder.  At this time I believe that you are having some pain due to muscle tearing from coughing.  At this point in time and symptomatic care.  -If you begin developing urinary symptoms please follow-up with your primary care doctor for analysis    -Thanks for letting me participate in your health care today.  -I do not expect your overall condition to worsen.  So if you begin experiencing any worsening symptoms, or other significant changes please seek medical re-evaluation.    -I recommend outpatient follow-up with your PCP, or another local provider.  -Thank you again and have a great day.

## 2022-01-06 NOTE — ED PROVIDER NOTES
Emergency Room Provider Note    ID/CC:  Chief Complaint   Patient presents with   • Abdominal Pain     states he has pain in mid center abdomen much worse with some activity and cough, has a hernia       HISTORY OF PRESENT ILLNESS:  Patient is a 71 y.o. male who presents to the ED for abdominal pain.  Patient recently was diagnosed with COVID-19 and has had excess amount of coughing.  Does have a known hernia across his abdomen from previous abdominal surgery.  However he reports that today he started having excruciating mid abdominal periumbilical pain particularly to palpation.  Patient reports that this prompted him come in today.  No nausea or vomiting reported.  No other new symptoms reported.      PAST MEDICAL/SURGICAL HISTORY:  Past Medical History:   Diagnosis Date   • Atrial flutter (CMS/HCC) (HCC)    • CHF (congestive heart failure) (CMS/HCC) (HCC)    • Chipped tooth    • Dysrhythmia    • Edema     BLE   • History of transfusion    • Hypertension    • Injury of back 01/14/2021    Seen in .   • Urinary retention     indwelling Pope in place since November 2020        Past Surgical History:   Procedure Laterality Date   • ABDOMINAL SURGERY      anuerysm   • ABLATION A-FLUTTER N/A 2/8/2021    Procedure: Ablation A-flutter loop implant;  Surgeon: Timoteo Burk DO;  Location: [facility] EP Lab;  Service: Electrophysiology;  Laterality: N/A;   • APPENDECTOMY     • HERNIA REPAIR      x 3   • LOOP RECORDER INSERTION N/A 2/8/2021    Procedure: LOOP RECORDER INSERTION;  Surgeon: Timoteo Burk DO;  Location: [facility] EP Lab;  Service: Electrophysiology;  Laterality: N/A;   • TRANSURETHRAL RESECTION OF BLADDER TUMOR N/A 12/22/2020    Procedure: CYSTOSCOPY WITH BLADDER BIOPSY;  Surgeon: Daniele Johnson MD;  Location: [facility] OR;  Service: Urology;  Laterality: N/A;   • TRANSURETHRAL RESECTION OF PROSTATE N/A 12/22/2020    Procedure: CYSTOSCOPY TRANSURETHRAL RESECTION PROSTATE, exam under anasthesia;  Surgeon: Daniele Johnson  MD;  Location: [facility] OR;  Service: Urology;  Laterality: N/A;   • TRANSURETHRAL RESECTION OF PROSTATE N/A 4/27/2021    Procedure: CYSTOSCOPY TRANSURETHRAL RESECTION PROSTATE;  Surgeon: Daniele Johnson MD;  Location: [facility] OR;  Service: Urology;  Laterality: N/A;   • TRANSURETHRAL RESECTION OF PROSTATE N/A 9/27/2021    Procedure: CYSTOSCOPY TRANSURETHRAL RESECTION PROSTATE;  Surgeon: Attila Taylor MD;  Location: [facility] OR;  Service: Urology;  Laterality: N/A;  NOT EARLY MORNING       MEDICATIONS:    Current Outpatient Medications:   •  furosemide (LASIX) 20 mg tablet, Take 20 mg by mouth daily, Disp: , Rfl:   •  apixaban (Eliquis) 2.5 mg tablet, Take 5 mg by mouth, Disp: , Rfl:   •  metoprolol succinate XL (TOPROL-XL) 25 mg 24 hr tablet, Take 2 tablets (50 mg total) by mouth daily, Disp: 60 tablet, Rfl: 11  •  multivitamin (MULTI-DAY ORAL), Take 1 tablet by mouth daily  , Disp: , Rfl:      ALLERGIES:   No Known Allergies    FAMILY HISTORY:  Family History   Problem Relation Age of Onset   • Alzheimer's disease Mother    • Diabetes Father    • Heart attack Mother's Brother        REVIEW OF SYSTEMS:  ROS performed otherwise negative except was noted in the [facility]    VITALS  ED Triage Vitals   Temp Pulse Resp BP SpO2   -- -- -- -- --      Temp src Heart Rate Source Patient Position BP Location FiO2 (%)   -- -- -- -- --          PHYSICAL EXAM  GENERAL: Patient is a 71 y.o. old male who appears to be in mild distress  HEENT: EOMI. no facial deformities.  CARDIO: RRR. No murmurs appreciated  RESP: No respiratory distress.  No wheezing noted.  GI: Normoactive bowel sounds.  Abdomen soft and nondistended.  However there is tenderness to palpation particular around the umbilical area along his surgical incision.  Questionable palpated mass near the umbilicus that is especially tender.  Concern for possible underlying incarcerated bowel  NEURO: Patient alert and awake.  No facial droop noted.  Facial musculature intact.  MSK:   Patient moving limbs without difficulty.   DERM: Skin warm and dry.   PSYCH: Patient does not appear anxious on exam    LABS  Labs Reviewed   BASIC METABOLIC PANEL - Abnormal       Result Value    Sodium 137      Potassium 3.5      Chloride 102      CO2 26      BUN 12      Creatinine 0.85      Glucose 106 (*)     Calcium 8.4 (*)     Anion Gap 9      eGFR 87      Narrative:     Estimated GFR calculated using the 2009 CKD-EPI creatinine equation.   CBC - Normal    WBC 6.1      RBC 4.68      Hemoglobin 14.4      Hematocrit 42.3      MCV 90.5      MCH 30.7      MCHC 34.0      RDW 13.3      Platelets 200      MPV 7.6         IMAGING  CT ABDOMEN PELVIS W IV CONTRAST   Final Result   Impression:   1. Patchy groundglass opacities within the bilateral lungs concerning for a multifocal pneumonia such as COVID-19.   2. Multiple bladder diverticulum. There is mild wall thickening of the bladder. This could relate to a mild cystitis.   3. Hepatic steatosis.          MEDICATIONS GIVEN  Medications   fentaNYL citrate (PF) 50 mcg/mL injection 50 mcg (has no administration in time range)   ondansetron (ZOFRAN) injection 4 mg (has no administration in time range)   iopamidoL (ISOVUE-370) 76 % injection 130 mL (130 mL intravenous Given 1/6/22 1410)       PROCEDURES  Procedures    ED Course as of 01/06/22 1437   Thu Jan 06, 2022   1435 CBC and BMP overall unconcerned [JT]   1435 CT showed no signs of ventral wall hernia or intestinal incarceration [JT]      ED Course User Index  [JT] Yoandy Stock DO       MDM:  Patient is a 71 y.o. male who presented to the ED and the patients medical record and history was reviewed.  Patient was worked up in emergency department and overall had reassuring findings.  I believe that due to the patient having COVID-19 having is significantly excessive cough that this is causing pain across his abdominal wall from some muscle tearing.  I did advise him to use abdominal wall binder, heat packs, and  symptomatic care.  Urinalysis was deferred as patient was unable to give us a sample as he just urinated prior.  Patient denies any urinary symptoms but does report that he is had a lot of UTIs in the past.  This was likely the cause of the patient's finding on CT scan recommended outpatient follow-up particular if he has urinary symptoms.  He expressed understanding to this.  Patient was otherwise stable did not require any further work-up at this point time.    I did advise the patient that I do not expect that there symptoms to worsen.  If they do or change in any way, or if they have any concerns that they need to come back to the emergency room for further evaluation.  I also strongly encouraged outpatient follow-up.    CLINICAL IMPRESSION  Final diagnoses:   [R10.9] Abdominal pain                  Yoandy Stock,   01/06/22 4080

## 2022-01-12 ENCOUNTER — TELEPHONE (OUTPATIENT)
Dept: UROLOGY | Facility: CLINIC | Age: 72
End: 2022-01-12
Payer: COMMERCIAL

## 2022-01-15 ENCOUNTER — ANCILLARY PROCEDURE (OUTPATIENT)
Dept: CARDIOLOGY | Facility: CLINIC | Age: 72
End: 2022-01-15
Payer: COMMERCIAL

## 2022-01-15 DIAGNOSIS — Z45.09 ENCOUNTER FOR INTERROGATION OF CARDIAC RECORDER: ICD-10-CM

## 2022-01-15 PROCEDURE — G2066 INTER DEVC REMOTE 30D: HCPCS

## 2022-01-19 PROCEDURE — 93298 REM INTERROG DEV EVAL SCRMS: CPT | Performed by: INTERNAL MEDICINE

## 2022-01-28 ENCOUNTER — TELEPHONE (OUTPATIENT)
Dept: UROLOGY | Facility: CLINIC | Age: 72
End: 2022-01-28
Payer: COMMERCIAL

## 2022-01-28 NOTE — TELEPHONE ENCOUNTER
Caller would like to discuss RS Appointment     Patient: Cesar Yuen    Callback Number: [phone]    Best Availability: as soon as possible    Initial Appt: 2/1/2022 @ 3:15 pm     Provider: Dr. Taylor     Reason for rs: Patient is going to be out of town next week.    Additional Info: Needs to reschedule above appt.     I advised caller of a callback by 24-48 hours.

## 2022-02-15 ENCOUNTER — ANCILLARY PROCEDURE (OUTPATIENT)
Dept: CARDIOLOGY | Facility: CLINIC | Age: 72
End: 2022-02-15
Payer: COMMERCIAL

## 2022-02-15 DIAGNOSIS — Z45.09 ENCOUNTER FOR INTERROGATION OF CARDIAC RECORDER: ICD-10-CM

## 2022-02-15 PROCEDURE — 93298 REM INTERROG DEV EVAL SCRMS: CPT | Mod: NCREMOTE | Performed by: INTERNAL MEDICINE

## 2022-02-15 PROCEDURE — G2066 INTER DEVC REMOTE 30D: HCPCS

## 2022-02-16 ENCOUNTER — APPOINTMENT (OUTPATIENT)
Dept: LAB | Facility: CLINIC | Age: 72
End: 2022-02-16
Payer: COMMERCIAL

## 2022-02-16 ENCOUNTER — TELEPHONE (OUTPATIENT)
Dept: UROLOGY | Facility: CLINIC | Age: 72
End: 2022-02-16
Payer: COMMERCIAL

## 2022-02-16 ENCOUNTER — CLINICAL SUPPORT (OUTPATIENT)
Dept: UROLOGY | Facility: CLINIC | Age: 72
End: 2022-02-16
Payer: COMMERCIAL

## 2022-02-16 VITALS — WEIGHT: 260 LBS | HEIGHT: 73 IN | BODY MASS INDEX: 34.46 KG/M2

## 2022-02-16 DIAGNOSIS — R33.9 URINARY RETENTION: ICD-10-CM

## 2022-02-16 DIAGNOSIS — R31.0 GROSS HEMATURIA: ICD-10-CM

## 2022-02-16 DIAGNOSIS — R31.0 GROSS HEMATURIA: Primary | ICD-10-CM

## 2022-02-16 LAB
BACTERIA #/AREA URNS HPF: ABNORMAL /HPF
BILIRUB UR QL: ABNORMAL
CLARITY UR: ABNORMAL
COLOR UR: ABNORMAL
GLUCOSE UR QL: NEGATIVE MG/DL
HGB UR QL: ABNORMAL
KETONES UR-MCNC: ABNORMAL MG/DL
LEUKOCYTE ESTERASE UR QL STRIP: NEGATIVE
NITRITE UR QL: NEGATIVE
PH UR: 5.5 PH
PROT UR STRIP-MCNC: 100 MG/DL
RBC #/AREA URNS HPF: >100 /HPF
SP GR UR: 1.02 (ref 1–1.03)
SQUAMOUS #/AREA URNS HPF: NEGATIVE /HPF
UROBILINOGEN UR-MCNC: 0.2 E.U./DL
WBC #/AREA URNS HPF: ABNORMAL /HPF

## 2022-02-16 PROCEDURE — 87088 URINE BACTERIA CULTURE: CPT

## 2022-02-16 PROCEDURE — 81001 URINALYSIS AUTO W/SCOPE: CPT

## 2022-02-16 RX ORDER — SULFAMETHOXAZOLE AND TRIMETHOPRIM 800; 160 MG/1; MG/1
1 TABLET ORAL 2 TIMES DAILY
Qty: 14 TABLET | Refills: 0 | Status: SHIPPED | OUTPATIENT
Start: 2022-02-16 | End: 2022-02-23

## 2022-02-16 NOTE — TELEPHONE ENCOUNTER
Caller would like to discuss (a) unable to void      Patient: Cesar Yuen    Callback Number: [phone]    Best Availability: as soon as possible    Additional Info: Patient is calling stating that he has noticed blood in his urine when he can void. He stated that he has not been able to successfully void for a little while.    He stated that he thinks that he might just need a cath, and he will be in [place] today.     I called the urgent line and spoke with Liana. She was able to take the call.    I advised caller of a callback by 24-48 hours.

## 2022-02-16 NOTE — PROGRESS NOTES
Patient called, states he has been unable to get urine out, just drops and some incontinence, states he is hurting, he is in town now, can he come in. Advised, yes, we can check a UA and do a PVR. Patient presents, was unable to give a sample, only a drop of urine in cup, scannned his bladder for 1182ml's but this was painful for patient to put any pressure on his low abd. Took verbal order from Ynes PAREDES CNP to place catheter and drain bladder. This was done, 16 fr 10cc balloon catheter was inserted, drained 1800ml's from bladder, patient states great relief, urine sample was collected from catheter drainage, urine was dark brown.  Catheter was connected to leg bag and patient states he is glad to have catheter, he has upcoming appt, will be happy to keep it until that appt. As patient was in such distress when he came in only a temp was collected for vitals, 98.4 temporal.  Patient left the clinic. Per Ynes PAREDES CNP start patient on bactrim DS bid for 7 days. Per NENITA Kemp, patient needs to be scheduled for a cysto, he is already scheduled with NENO Patino on 3-9-22 and a cysto on 3-29-22.  Called patient to let him know and he wants the script sent to the [facility] in [place], script sent there

## 2022-02-18 LAB — BACTERIA UR CULT: NORMAL

## 2022-03-09 ENCOUNTER — APPOINTMENT (OUTPATIENT)
Dept: UROLOGY | Facility: CLINIC | Age: 72
End: 2022-03-09
Payer: COMMERCIAL

## 2022-03-09 ENCOUNTER — OFFICE VISIT (OUTPATIENT)
Dept: UROLOGY | Facility: CLINIC | Age: 72
End: 2022-03-09
Payer: COMMERCIAL

## 2022-03-09 VITALS
HEIGHT: 73 IN | BODY MASS INDEX: 34.46 KG/M2 | OXYGEN SATURATION: 91 % | TEMPERATURE: 97.4 F | RESPIRATION RATE: 20 BRPM | HEART RATE: 64 BPM | DIASTOLIC BLOOD PRESSURE: 74 MMHG | WEIGHT: 260 LBS | SYSTOLIC BLOOD PRESSURE: 133 MMHG

## 2022-03-09 DIAGNOSIS — R33.9 URINARY RETENTION: Primary | ICD-10-CM

## 2022-03-09 DIAGNOSIS — R33.9 URINARY RETENTION: ICD-10-CM

## 2022-03-09 DIAGNOSIS — Z90.79 S/P TURP (STATUS POST TRANSURETHRAL RESECTION OF PROSTATE): ICD-10-CM

## 2022-03-09 DIAGNOSIS — N13.8 BENIGN PROSTATIC HYPERPLASIA WITH URINARY OBSTRUCTION: ICD-10-CM

## 2022-03-09 DIAGNOSIS — N40.1 BENIGN PROSTATIC HYPERPLASIA WITH URINARY OBSTRUCTION: ICD-10-CM

## 2022-03-09 PROCEDURE — 99214 OFFICE O/P EST MOD 30 MIN: CPT | Performed by: NURSE PRACTITIONER

## 2022-03-09 PROCEDURE — G0463 HOSPITAL OUTPT CLINIC VISIT: HCPCS | Performed by: NURSE PRACTITIONER

## 2022-03-09 ASSESSMENT — PAIN SCALES - GENERAL: PAINLEVEL: 0-NO PAIN

## 2022-03-09 NOTE — PROGRESS NOTES
Patient would like a voiding trial to see if he can go without the catheter.  This nurse does back fill bladder with 350mL of sterile water.  Patient is unable to void and would like to go get something to eat with his daughter, drink some water, walk around and will come back to the clinic before he leaves town for us to do a PVR.  Instructed that he gets uncomfortable and is unable to void that needs to come right back and agrees to this information.

## 2022-03-16 NOTE — PROGRESS NOTES
Urology Progress Note  3/9/2022    Chief Complaint:  Chief Complaint   Patient presents with   • Follow-up     Post TURP DOS:9/27/21     History of Present Illness:  Patient is a 71 year old male presenting to Urology Clinic today in follow-up after undergoing a TURP with Dr. Taylor on 9/27/2021.  Pope catheter was removed postoperatively, and patient was last seen in Urology Clinic on 11/3/2021 at which time he acknowledged a weakened urinary stream and a sensation of incomplete bladder emptying.    Patient did contact the urology clinic on 2/16/2022 due to gross hematuria, and also nausea that he was not able to successfully void.  Patient presented to the urology clinic on same day, and Pope catheter was placed.  Patient is presenting today for consideration of removal of this.  Patient denies any further episodes of gross hematuria, denies any recent or current fevers, chills, sweats, flank pain, sense of urgency.     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    Patient is already scheduled for a cystoscopy in clinic with Dr. Prieto on 3/29/2022.    Past Medical History:  Past Medical History:   Diagnosis Date   • Atrial flutter (CMS/HCC) (HCC)    • CHF (congestive heart failure) (CMS/HCC) (HCC)    • Chipped tooth    • Dysrhythmia    • Edema     BLE   • History of transfusion    • Hypertension    • Injury of back 01/14/2021    Seen in .   • Urinary retention     indwelling Pope in place since November 2020      Past Surgical History:  Past Surgical History:   Procedure Laterality Date   • ABDOMINAL SURGERY      anuerysm   • ABLATION A-FLUTTER N/A 2/8/2021    Procedure: Ablation A-flutter loop implant;  Surgeon: Timoteo Burk DO;  Location: [facility] EP Lab;  Service: Electrophysiology;  Laterality: N/A;   • APPENDECTOMY     • HERNIA REPAIR      x 3   • LOOP RECORDER  INSERTION N/A 2/8/2021    Procedure: LOOP RECORDER INSERTION;  Surgeon: Timoteo Burk DO;  Location: [facility] EP Lab;  Service: Electrophysiology;  Laterality: N/A;   • TRANSURETHRAL RESECTION OF BLADDER TUMOR N/A 12/22/2020    Procedure: CYSTOSCOPY WITH BLADDER BIOPSY;  Surgeon: Daniele Johnson MD;  Location: [facility] OR;  Service: Urology;  Laterality: N/A;   • TRANSURETHRAL RESECTION OF PROSTATE N/A 12/22/2020    Procedure: CYSTOSCOPY TRANSURETHRAL RESECTION PROSTATE, exam under anasthesia;  Surgeon: Daniele Johnson MD;  Location: [facility] OR;  Service: Urology;  Laterality: N/A;   • TRANSURETHRAL RESECTION OF PROSTATE N/A 4/27/2021    Procedure: CYSTOSCOPY TRANSURETHRAL RESECTION PROSTATE;  Surgeon: Daniele Johnson MD;  Location: [facility] OR;  Service: Urology;  Laterality: N/A;   • TRANSURETHRAL RESECTION OF PROSTATE N/A 9/27/2021    Procedure: CYSTOSCOPY TRANSURETHRAL RESECTION PROSTATE;  Surgeon: Attila Taylor MD;  Location: [facility] OR;  Service: Urology;  Laterality: N/A;  NOT EARLY MORNING      Social History:  Social History     Tobacco Use   • Smoking status: Never Smoker   • Smokeless tobacco: Never Used   Substance Use Topics   • Alcohol use: Not Currently     Family History:      Family History   Problem Relation Age of Onset   • Alzheimer's disease Mother    • Diabetes Father    • Heart attack Mother's Brother      Allergies:  No Known Allergies  Medications:  Current Outpatient Medications   Medication Sig Dispense Refill   • furosemide (LASIX) 20 mg tablet Take 20 mg by mouth daily     • apixaban (ELIQUIS) 2.5 mg tablet Take 5 mg by mouth     • multivitamin (MULTI-DAY ORAL) Take 1 tablet by mouth daily       • metoprolol succinate XL (TOPROL-XL) 25 mg 24 hr tablet Take 2 tablets (50 mg total) by mouth daily 60 tablet 11     No current facility-administered medications for this visit.     REVIEW OF SYSTEMS:  GENERAL/CONSTITUTIONAL:  No change in appetite.  Denies chills.  Energy level is good.  Denies  "fevers.  RESPIRATORY:  No shortness of breath.  CARDIOVASCULAR:  No chest pain.  GASTROINTESTINAL:  No constipation, diarrhea, nausea, or vomiting.  GENITOURINARY:  See HPI for complete details.    VITAL SIGNS:   height is 1.854 m (6' 1\") and weight is 117.9 kg (260 lb). His tympanic temperature is 36.3 °C (97.4 °F). His blood pressure is 133/74 and his pulse is 64. His respiration is 20 and oxygen saturation is 91%.     PHYSICAL EXAMINATION:  GENERAL APPEARANCE:  Alert and oriented x3, pleasant, well nourished, well developed, in no acute distress male.  HEAD:  Normocephalic, atraumatic.  NECK:  Neck supple, trachea midline  LYMPH NODES:  No cervical or supracervical adenopathy.  SKIN:  Normal, warm and dry.  HEART:  Regular rate and rhythm, no murmurs.  LUNGS:  Respirations even and unlabored, clear bilaterally.  ABDOMEN:  Soft, nontender, nondistended, bowel sounds present.   BACK:  No costovertebral angle tenderness, spine nontender to palpation.  GENITOURINARY: Pope catheter draining concentrated yellow urine  MUSCULOSKELETAL:  Normal.  EXTREMITIES:  No edema.  NEUROLOGIC:  Gait normal.    Lab Results   Component Value Date    PSA 1.22 06/18/2020     Lab Results   Component Value Date    COLORU Brown (A) 02/16/2022    CLARITYU Cloudy (A) 02/16/2022    SPECGRAVU 1.025 02/16/2022    LEUKOCYTESU Negative 02/16/2022    NITRITEU Negative 02/16/2022    PROTUR 100  (A) 02/16/2022    KETONESU Trace (A) 02/16/2022    BILIRUBINU Small (A) 02/16/2022    BLOODU Large (A) 02/16/2022    GLUCOSEU Negative 02/16/2022    SEBASTIÁN 5.5 02/16/2022    WBC 6.1 01/06/2022    RBC 4.68 01/06/2022     Assessment and Plan:   Cesar was seen today for follow-up.    Diagnoses and all orders for this visit:    Urinary retention  -     Cystoscopy(male); Future    Benign prostatic hyperplasia with urinary obstruction    S/P TURP (status post transurethral resection of prostate)    71 year old male in urology clinic today in follow-up after " undergoing a TURP with Dr. Prieto on 9/27/2021.  Patient had Pope catheter reinserted on 2/16/2022 due to episode of urinary retention and gross hematuria.  At the time of today's visit, the patient was offered a voiding trial, however would like to have catheter removed, go have lunch with his family member, and then return to clinic if he is unable to void prior to returning to [facility].  Per patient request, we will do so, and the patient was explicitly instructed to contact and return to the urology clinic immediately if he were to experience lower abdominal distention, pain, and if so, he will likely need to have catheter reinserted if he is unable to void.  The patient stated understanding of the instructions, and was explicitly instructed to contact urology clinic prior to returning to [facility] and preferably before 3:00 pm.    Lalitha Patino, DNP

## 2022-03-18 PROCEDURE — 93298 REM INTERROG DEV EVAL SCRMS: CPT | Performed by: INTERNAL MEDICINE

## 2022-03-18 PROCEDURE — G2066 INTER DEVC REMOTE 30D: HCPCS | Performed by: INTERNAL MEDICINE

## 2022-03-29 ENCOUNTER — OFFICE VISIT (OUTPATIENT)
Dept: UROLOGY | Facility: CLINIC | Age: 72
End: 2022-03-29
Payer: COMMERCIAL

## 2022-03-29 VITALS
HEART RATE: 81 BPM | RESPIRATION RATE: 16 BRPM | OXYGEN SATURATION: 92 % | BODY MASS INDEX: 34.46 KG/M2 | HEIGHT: 73 IN | SYSTOLIC BLOOD PRESSURE: 138 MMHG | TEMPERATURE: 98 F | WEIGHT: 260 LBS | DIASTOLIC BLOOD PRESSURE: 81 MMHG

## 2022-03-29 DIAGNOSIS — N32.0 ACQUIRED CONTRACTURE OF BLADDER NECK: ICD-10-CM

## 2022-03-29 PROCEDURE — 52000 CYSTOURETHROSCOPY: CPT | Performed by: UROLOGY

## 2022-03-29 ASSESSMENT — PAIN SCALES - GENERAL: PAINLEVEL: 0-NO PAIN

## 2022-03-29 NOTE — PROGRESS NOTES
Patient presented for a Cystoscopy, approximately 300ml of Fluid instilled into bladder via cystoscope. Patient was given the option to try and void or to replace the catheter. Patient chose the option to attempt voiding on his own. Patient was unable to void in clinic and asked to leave and return. Patient was advised to return around 12pm today for a bladder scan whether he was able to urinate or not. Patient stated he would be back by approximately 12pm to have a bladder scan.

## 2022-03-30 RX ORDER — CEFAZOLIN SODIUM 10 G/1
2000 INJECTION, POWDER, FOR SOLUTION INTRAVENOUS ONCE
Status: CANCELLED | OUTPATIENT
Start: 2022-04-28 | End: 2022-03-30

## 2022-03-31 NOTE — PROGRESS NOTES
Cystoscopy Procedure Note    3/29/22    Pre-operative Diagnosis:   1. Acquired contracture of bladder neck  Cystoscopy male, Case Request Operating Room: LASER PROSTATECTOMY, Case Request Operating Room: LASER PROSTATECTOMY       Post-operative Diagnosis:   1. Acquired contracture of bladder neck  Cystoscopy male, Case Request Operating Room: LASER PROSTATECTOMY, Case Request Operating Room: LASER PROSTATECTOMY       Surgeon: Attila Taylor MD    Procedure: Cystoscopy    Procedure Details   The risks, benefits, complications, treatment options, and expected outcomes were discussed with the patient. The patient concurred with the proposed plan, giving informed consent.    Cystoscopy was performed today under local anesthesia, using sterile technique. The patient was placed in the lithotomy position, prepped with Betadine, and draped in the usual sterile fashion. A 17 English flexible cystoscope was inserted into the urethral meatus and then used to inspect both the entire urethra and bladder.    Findings:  The anterior urethra and meatus are unremarkable.  In the membranous urethra there is an obvious narrowing or stricture however the scope passes easily into the bladder.  The patient has had an indwelling catheter now for some time and undoubtedly this is caused some dilation of his prior stricture.  Prostate reveals a fairly wide open prostatic fossa with evidence of prior TURP and without a median lobe component. In the bladder there is no evidence of cancer, and there is no evidence of stone.  There is grade 2-3 trabeculation throughout the bladder.  Both ureteral orifices seen effluxing clear urine.  No diverticula noted.                  Complications:  None; patient tolerated the procedure well.    Plan: I recommend he undergo a repeat cystoscopy with laser incision of the bladder neck using the thulium laser.  He came in today wearing a catheter and wanted to attempt a voiding trial.  Given the  appearance of the urethra and bladder neck on today's study I thought it reasonable to let him try and void.  He understands will be here till 5 PM should he not be able to urinate I maximiliano urged him to come back around 3:00 or after to have the catheter reinserted.  I have told him if he does not urinate over a 12-hour period he needs to come to the urgent care or the emergency department.

## 2022-04-04 ENCOUNTER — TELEPHONE (OUTPATIENT)
Dept: UROLOGY | Facility: CLINIC | Age: 72
End: 2022-04-04
Payer: COMMERCIAL

## 2022-04-04 DIAGNOSIS — Z01.818 PRE-OP EVALUATION: Primary | ICD-10-CM

## 2022-04-04 DIAGNOSIS — Z11.59 SCREENING FOR VIRAL DISEASE: ICD-10-CM

## 2022-04-04 NOTE — TELEPHONE ENCOUNTER
Called and spoke with patient about getting him scheduled for surgery.  Informed that my first available surgery date is April 28, 2022, and states that this will work.  Informed that I am going to place a packet of information into the mail to him and once he receives this that he should review the information and to call with any questions or concerns.  Informed that I highlight the things that I want him to pay close attention to.  Informed that will need to get in to see his PCP within 30 days of surgery to make sure that he/she is medically optimized for surgery, agrees to this information. Informed that our current policy is that all surgery patients have to be tested within 7-10 days of surgery, agrees to this information.  Denies any questions or concerns at this time.

## 2022-04-06 ENCOUNTER — TELEPHONE (OUTPATIENT)
Dept: FAMILY MEDICINE | Facility: CLINIC | Age: 72
End: 2022-04-06

## 2022-04-06 DIAGNOSIS — Z01.818 PREOP EXAMINATION: Primary | ICD-10-CM

## 2022-04-06 NOTE — TELEPHONE ENCOUNTER
Patient returned call to clinic. I scheduled him for a preop tomorrow morning with you for laser prostatectomy with Claudia on 3/28. Do you need an EKG for this? It looks like he had one 7 months ago and has a loop recorder in place.    CMP and CBC ordered to be done prior to appt.

## 2022-04-06 NOTE — TELEPHONE ENCOUNTER
Patient has a surgry to remove some scar tissue that is building up in the inside on 4/28 with Dr. Taylor. He would like Dr. Machado but your soonest day in 4/29 as of right now. He was wondering if he could get squeezed in any where sooner then that. He would like to be seen by Dr. Machado if possible. Please call and advice.

## 2022-04-07 ENCOUNTER — APPOINTMENT (OUTPATIENT)
Dept: LAB | Facility: CLINIC | Age: 72
End: 2022-04-07
Payer: COMMERCIAL

## 2022-04-07 ENCOUNTER — OFFICE VISIT (OUTPATIENT)
Dept: FAMILY MEDICINE | Facility: CLINIC | Age: 72
End: 2022-04-07
Payer: COMMERCIAL

## 2022-04-07 VITALS
OXYGEN SATURATION: 94 % | WEIGHT: 239 LBS | DIASTOLIC BLOOD PRESSURE: 78 MMHG | SYSTOLIC BLOOD PRESSURE: 130 MMHG | HEART RATE: 56 BPM | TEMPERATURE: 98.2 F | BODY MASS INDEX: 31.53 KG/M2

## 2022-04-07 DIAGNOSIS — Z01.818 PRE-OP EXAM: Primary | ICD-10-CM

## 2022-04-07 DIAGNOSIS — Z01.818 PREOP EXAMINATION: ICD-10-CM

## 2022-04-07 DIAGNOSIS — N13.8 BPH WITH OBSTRUCTION/LOWER URINARY TRACT SYMPTOMS: ICD-10-CM

## 2022-04-07 DIAGNOSIS — N40.1 BPH WITH OBSTRUCTION/LOWER URINARY TRACT SYMPTOMS: ICD-10-CM

## 2022-04-07 LAB
ALBUMIN SERPL-MCNC: 4 G/DL (ref 3.5–5.3)
ALP SERPL-CCNC: 50 U/L (ref 45–115)
ALT SERPL-CCNC: 12 U/L (ref 7–52)
ANION GAP SERPL CALC-SCNC: 5 MMOL/L (ref 3–11)
AST SERPL-CCNC: 15 U/L
BASOPHILS # BLD AUTO: 0.1 10*3/UL
BASOPHILS NFR BLD AUTO: 1 % (ref 0–2)
BILIRUB SERPL-MCNC: 0.58 MG/DL (ref 0.2–1.4)
BUN SERPL-MCNC: 15 MG/DL (ref 7–25)
CALCIUM ALBUM COR SERPL-MCNC: 9.5 MG/DL (ref 8.6–10.3)
CALCIUM SERPL-MCNC: 9.5 MG/DL (ref 8.6–10.3)
CHLORIDE SERPL-SCNC: 103 MMOL/L (ref 98–107)
CO2 SERPL-SCNC: 31 MMOL/L (ref 21–32)
CREAT SERPL-MCNC: 0.84 MG/DL (ref 0.7–1.3)
EOSINOPHIL # BLD AUTO: 0.2 10*3/UL
EOSINOPHIL NFR BLD AUTO: 3 % (ref 0–3)
ERYTHROCYTE [DISTWIDTH] IN BLOOD BY AUTOMATED COUNT: 14.9 % (ref 11.5–15)
GFR SERPL CREATININE-BSD FRML MDRD: 93 ML/MIN/1.73M*2
GLUCOSE SERPL-MCNC: 88 MG/DL (ref 70–105)
HCT VFR BLD AUTO: 45.1 % (ref 38–50)
HGB BLD-MCNC: 14.9 G/DL (ref 13.2–17.2)
LYMPHOCYTES # BLD AUTO: 1.9 10*3/UL
LYMPHOCYTES NFR BLD AUTO: 28 % (ref 15–47)
MCH RBC QN AUTO: 30.2 PG (ref 29–34)
MCHC RBC AUTO-ENTMCNC: 33 G/DL (ref 32–36)
MCV RBC AUTO: 91.5 FL (ref 82–97)
MONOCYTES # BLD AUTO: 0.8 10*3/UL
MONOCYTES NFR BLD AUTO: 11 % (ref 5–13)
NEUTROPHILS # BLD AUTO: 3.9 10*3/UL
NEUTROPHILS NFR BLD AUTO: 57 % (ref 46–70)
PLATELET # BLD AUTO: 244 10*3/UL (ref 130–350)
PMV BLD AUTO: 7.5 FL (ref 6.9–10.8)
POTASSIUM SERPL-SCNC: 3.6 MMOL/L (ref 3.5–5.1)
PROT SERPL-MCNC: 6.9 G/DL (ref 6–8.3)
RBC # BLD AUTO: 4.93 10*6/ΜL (ref 4.1–5.8)
SODIUM SERPL-SCNC: 139 MMOL/L (ref 135–145)
WBC # BLD AUTO: 6.8 10*3/UL (ref 3.7–9.6)

## 2022-04-07 PROCEDURE — 80053 COMPREHEN METABOLIC PANEL: CPT

## 2022-04-07 PROCEDURE — 36415 COLL VENOUS BLD VENIPUNCTURE: CPT

## 2022-04-07 PROCEDURE — 85025 COMPLETE CBC W/AUTO DIFF WBC: CPT

## 2022-04-07 PROCEDURE — G0463 HOSPITAL OUTPT CLINIC VISIT: HCPCS | Performed by: FAMILY MEDICINE

## 2022-04-07 PROCEDURE — 99214 OFFICE O/P EST MOD 30 MIN: CPT | Performed by: FAMILY MEDICINE

## 2022-04-07 ASSESSMENT — ENCOUNTER SYMPTOMS
SHORTNESS OF BREATH: 0
COUGH: 0
CHILLS: 0
PALPITATIONS: 0
FATIGUE: 0

## 2022-04-07 NOTE — PROGRESS NOTES
Subjective      HPI  Cesar Yuen is a 71 y.o. male who presents for  Pre op exam today.   He is having surgery on the 28th with Dr Taylor because of scar tissue build up in the prostate bed preventing him from adequately emptying his bladder.        The following have been reviewed and updated as appropriate in this visit:    No Known Allergies  Current Outpatient Medications   Medication Sig Dispense Refill   • furosemide (LASIX) 20 mg tablet Take 20 mg by mouth daily     • apixaban (ELIQUIS) 2.5 mg tablet Take 5 mg by mouth     • multivitamin (MULTI-DAY ORAL) Take 1 tablet by mouth daily       • metoprolol succinate XL (TOPROL-XL) 25 mg 24 hr tablet Take 2 tablets (50 mg total) by mouth daily (Patient not taking: Reported on 4/7/2022) 60 tablet 11     No current facility-administered medications for this visit.     Past Medical History:   Diagnosis Date   • Atrial flutter (CMS/HCC) (HCC)    • CHF (congestive heart failure) (CMS/HCC) (HCC)    • Chipped tooth    • Dysrhythmia    • Edema     BLE   • History of transfusion    • Hypertension    • Injury of back 01/14/2021    Seen in .   • Urinary retention     indwelling Pope in place since November 2020      Past Surgical History:   Procedure Laterality Date   • ABDOMINAL SURGERY      anuerysm   • ABLATION A-FLUTTER N/A 2/8/2021    Procedure: Ablation A-flutter loop implant;  Surgeon: Timoteo Burk DO;  Location: [facility] EP Lab;  Service: Electrophysiology;  Laterality: N/A;   • APPENDECTOMY     • HERNIA REPAIR      x 3   • LOOP RECORDER INSERTION N/A 2/8/2021    Procedure: LOOP RECORDER INSERTION;  Surgeon: Timoteo Burk DO;  Location: [facility] EP Lab;  Service: Electrophysiology;  Laterality: N/A;   • TRANSURETHRAL RESECTION OF BLADDER TUMOR N/A 12/22/2020    Procedure: CYSTOSCOPY WITH BLADDER BIOPSY;  Surgeon: Daniele Johnson MD;  Location: [facility] OR;  Service: Urology;  Laterality: N/A;   • TRANSURETHRAL RESECTION OF PROSTATE N/A 12/22/2020    Procedure:  CYSTOSCOPY TRANSURETHRAL RESECTION PROSTATE, exam under anasthesia;  Surgeon: Daniele Johnson MD;  Location: [facility] OR;  Service: Urology;  Laterality: N/A;   • TRANSURETHRAL RESECTION OF PROSTATE N/A 4/27/2021    Procedure: CYSTOSCOPY TRANSURETHRAL RESECTION PROSTATE;  Surgeon: Daniele Johnson MD;  Location: [facility] OR;  Service: Urology;  Laterality: N/A;   • TRANSURETHRAL RESECTION OF PROSTATE N/A 9/27/2021    Procedure: CYSTOSCOPY TRANSURETHRAL RESECTION PROSTATE;  Surgeon: Attila Taylor MD;  Location: [facility] OR;  Service: Urology;  Laterality: N/A;  NOT EARLY MORNING       Review of Systems   Constitutional: Negative for chills and fatigue.   Respiratory: Negative for cough and shortness of breath.    Cardiovascular: Negative for chest pain and palpitations.       Objective   /78 (BP Location: Left arm, Patient Position: Sitting, Cuff Size: Regular Adult)   Pulse 56   Temp 36.8 °C (98.2 °F) (Temporal)   Wt 108.4 kg (239 lb)   SpO2 94%   BMI 31.53 kg/m²     Physical Exam  Vitals and nursing note reviewed.   Constitutional:       Appearance: He is well-developed.   HENT:      Head: Normocephalic and atraumatic.      Right Ear: Tympanic membrane and ear canal normal.      Left Ear: Tympanic membrane and ear canal normal.      Mouth/Throat:      Mouth: Mucous membranes are moist.      Pharynx: Oropharynx is clear.   Eyes:      Conjunctiva/sclera: Conjunctivae normal.   Neck:      Thyroid: No thyromegaly.   Cardiovascular:      Rate and Rhythm: Normal rate and regular rhythm.      Heart sounds: Normal heart sounds. No murmur heard.    No friction rub. No gallop.   Pulmonary:      Effort: Pulmonary effort is normal.      Breath sounds: Normal breath sounds. No wheezing or rales.   Musculoskeletal:      Right lower leg: No edema.      Left lower leg: No edema.   Lymphadenopathy:      Cervical: No cervical adenopathy.   Skin:     General: Skin is warm and dry.   Neurological:      Mental Status: He is alert.          Assessment/Plan     1. Pre-op exam    2. BPH with obstruction/lower urinary tract symptoms    1 -2.  Labs are within normal limits today.  EKG from September was reviewed and unremarkable.    Patient is able to perform 4 METS without symptoms.  I expect he will need to stop his Eliquis 2 days prior to surgery, unless Dr Taylor directs otherwise.    Patient is medically optimized to proceed with surgery.          Portions of this note was documented by Lorena Park as I performed the exam and collected the information from Cesar Yuen. I attest that I have reviewed the information as documented, verified the accuracy of the documentation and added additional information as needed.       FELY BAILEY MD  04/07/22

## 2022-04-07 NOTE — H&P (VIEW-ONLY)
Subjective      HPI  Cesar Yuen is a 71 y.o. male who presents for  Pre op exam today.   He is having surgery on the 28th with Dr Taylor because of scar tissue build up in the prostate bed preventing him from adequately emptying his bladder.        The following have been reviewed and updated as appropriate in this visit:    No Known Allergies  Current Outpatient Medications   Medication Sig Dispense Refill   • furosemide (LASIX) 20 mg tablet Take 20 mg by mouth daily     • apixaban (ELIQUIS) 2.5 mg tablet Take 5 mg by mouth     • multivitamin (MULTI-DAY ORAL) Take 1 tablet by mouth daily       • metoprolol succinate XL (TOPROL-XL) 25 mg 24 hr tablet Take 2 tablets (50 mg total) by mouth daily (Patient not taking: Reported on 4/7/2022) 60 tablet 11     No current facility-administered medications for this visit.     Past Medical History:   Diagnosis Date   • Atrial flutter (CMS/HCC) (HCC)    • CHF (congestive heart failure) (CMS/HCC) (HCC)    • Chipped tooth    • Dysrhythmia    • Edema     BLE   • History of transfusion    • Hypertension    • Injury of back 01/14/2021    Seen in .   • Urinary retention     indwelling Pope in place since November 2020      Past Surgical History:   Procedure Laterality Date   • ABDOMINAL SURGERY      anuerysm   • ABLATION A-FLUTTER N/A 2/8/2021    Procedure: Ablation A-flutter loop implant;  Surgeon: Timoteo Burk DO;  Location: [facility] EP Lab;  Service: Electrophysiology;  Laterality: N/A;   • APPENDECTOMY     • HERNIA REPAIR      x 3   • LOOP RECORDER INSERTION N/A 2/8/2021    Procedure: LOOP RECORDER INSERTION;  Surgeon: Timoteo Burk DO;  Location: [facility] EP Lab;  Service: Electrophysiology;  Laterality: N/A;   • TRANSURETHRAL RESECTION OF BLADDER TUMOR N/A 12/22/2020    Procedure: CYSTOSCOPY WITH BLADDER BIOPSY;  Surgeon: Daniele Johnson MD;  Location: [facility] OR;  Service: Urology;  Laterality: N/A;   • TRANSURETHRAL RESECTION OF PROSTATE N/A 12/22/2020    Procedure:  CYSTOSCOPY TRANSURETHRAL RESECTION PROSTATE, exam under anasthesia;  Surgeon: Daniele Johnson MD;  Location: [facility] OR;  Service: Urology;  Laterality: N/A;   • TRANSURETHRAL RESECTION OF PROSTATE N/A 4/27/2021    Procedure: CYSTOSCOPY TRANSURETHRAL RESECTION PROSTATE;  Surgeon: Daniele Johnson MD;  Location: [facility] OR;  Service: Urology;  Laterality: N/A;   • TRANSURETHRAL RESECTION OF PROSTATE N/A 9/27/2021    Procedure: CYSTOSCOPY TRANSURETHRAL RESECTION PROSTATE;  Surgeon: Attila Taylor MD;  Location: [facility] OR;  Service: Urology;  Laterality: N/A;  NOT EARLY MORNING       Review of Systems   Constitutional: Negative for chills and fatigue.   Respiratory: Negative for cough and shortness of breath.    Cardiovascular: Negative for chest pain and palpitations.       Objective   /78 (BP Location: Left arm, Patient Position: Sitting, Cuff Size: Regular Adult)   Pulse 56   Temp 36.8 °C (98.2 °F) (Temporal)   Wt 108.4 kg (239 lb)   SpO2 94%   BMI 31.53 kg/m²     Physical Exam  Vitals and nursing note reviewed.   Constitutional:       Appearance: He is well-developed.   HENT:      Head: Normocephalic and atraumatic.      Right Ear: Tympanic membrane and ear canal normal.      Left Ear: Tympanic membrane and ear canal normal.      Mouth/Throat:      Mouth: Mucous membranes are moist.      Pharynx: Oropharynx is clear.   Eyes:      Conjunctiva/sclera: Conjunctivae normal.   Neck:      Thyroid: No thyromegaly.   Cardiovascular:      Rate and Rhythm: Normal rate and regular rhythm.      Heart sounds: Normal heart sounds. No murmur heard.    No friction rub. No gallop.   Pulmonary:      Effort: Pulmonary effort is normal.      Breath sounds: Normal breath sounds. No wheezing or rales.   Musculoskeletal:      Right lower leg: No edema.      Left lower leg: No edema.   Lymphadenopathy:      Cervical: No cervical adenopathy.   Skin:     General: Skin is warm and dry.   Neurological:      Mental Status: He is alert.          Assessment/Plan     1. Pre-op exam    2. BPH with obstruction/lower urinary tract symptoms    1 -2.  Labs are within normal limits today.  EKG from September was reviewed and unremarkable.    Patient is able to perform 4 METS without symptoms.  I expect he will need to stop his Eliquis 2 days prior to surgery, unless Dr Taylor directs otherwise.    Patient is medically optimized to proceed with surgery.          Portions of this note was documented by Lorena Park as I performed the exam and collected the information from Cesar Yuen. I attest that I have reviewed the information as documented, verified the accuracy of the documentation and added additional information as needed.       FELY BAILEY MD  04/07/22

## 2022-04-26 NOTE — PRE-PROCEDURE INSTRUCTIONS
Pre-Surgery Instructions:   Medication Instructions   • furosemide (LASIX) 20 mg tablet Do not take morning of surgery/procedure   • apixaban (ELIQUIS) 2.5 mg tablet Patient advised by MD to stop on 4/26/22 (date)   • multivitamin (MULTI-DAY ORAL) Do not take morning of surgery/procedure   • metoprolol succinate XL (TOPROL-XL) 25 mg 24 hr tablet Not taking           • Leave all medications at home.    • Please check in at Admissions on Thursday 4/28/22 at  3:30 pm. Admissions is on the south side of the building.  Access is from the [address] entrance. [facility] parking is available from 5:00 am to 6:00 pm Monday through Friday.  The [facility] service is free of charge.     • Please bring a valid photo ID and your insurance card with you.    • Your surgery / procedure is scheduled to start at 5:30 pm.    • One person may accompany you when you check in and remain with you until you go for your surgery / procedure.  At that time your support person will be asked to wait in the waiting room.  We will get their phone number so that we can give them updates.  The doctor will visit with them when the surgery / procedure is finished.      • After you are finished in the recovery room and are ready for the recliner recovery area, your support person may join you again and stay with you until you are ready to go home.  You must have a  and an adult to stay with you for 24 hours following anesthesia or sedation.    • After you are finished in the recovery room and are transferred to your room on the floor, you may have two (2) visitors per 24 hours.  Current visiting hours are 7:30 a.m. to 7:30 p.m.  Any visitor must use Covid precautions: wear a mask at all times, maintain social distancing, wash hands frequently.    • After midnight, no more food or solids or dairy products. You may, however, continue to have clear liquids until 1:30 pm the day of your surgery/procedure.  Examples of clear liquids: water, apple juice,  cranberry juice, Gatorade / Powerade, soda pop, tea, black coffee, plain gelatin.  The color of the fluid doesn't matter as long as you can see through it in a diluted state. The more fluids you drink, the better you will feel and the easier it will be to start your IV.    • Please shower the night before surgery.    • After you dry off from your shower, use the Theraworx wipes as shown on the back of the package.  All 8 wipes should be used.  ALL body surfaces should be wiped.  Allow to air-dry.    • After your shower, please do not use any lotions, sprays, powder or makeup on your skin.    • You may brush your teeth the morning of surgery / procedure.    • Leave all jewelry, money, credit cards, and valuables at home.      • All piercings must be removed.    • Bring your CPAP mask or headgear with you; no tubing or machine.     • Please bring cases for your glasses or contacts, dentures, &/or hearing aids.      • If you get a cough, cold, fever, etc before your surgery / procedure, notify your doctor's office as soon as possible.    • If you'd like, you may bring your Advance Directive (Living Will, Power of  for healthcare).  We can scan it into your chart, then return the original back to you.        For any other questions or concerns, please call the Surgery Pre-Admissions department at [phone].  Messages will be returned.

## 2022-04-27 ENCOUNTER — APPOINTMENT (OUTPATIENT)
Dept: LAB | Facility: CLINIC | Age: 72
End: 2022-04-27
Payer: COMMERCIAL

## 2022-04-27 ENCOUNTER — NURSE TRIAGE (OUTPATIENT)
Dept: FAMILY MEDICINE | Facility: CLINIC | Age: 72
End: 2022-04-27
Payer: COMMERCIAL

## 2022-04-27 DIAGNOSIS — Z11.52 ENCOUNTER FOR SCREENING FOR COVID-19: ICD-10-CM

## 2022-04-27 DIAGNOSIS — Z11.59 SCREENING FOR VIRAL DISEASE: ICD-10-CM

## 2022-04-27 LAB — SARS-COV-2 RDRP RESP QL NAA+PROBE: NEGATIVE

## 2022-04-27 PROCEDURE — C9803 HOPD COVID-19 SPEC COLLECT: HCPCS

## 2022-04-27 PROCEDURE — 87635 SARS-COV-2 COVID-19 AMP PRB: CPT

## 2022-04-27 NOTE — TELEPHONE ENCOUNTER
COVID-19 SCREENING ASSESSMENT     CRITERIA FOR TESTING  Is patient symptomatic: No:   ASYMPTOMATIC TESTING GROUP:  COVID ASYMPTOMATIC TESTING GROUP: presurgical        Reason for Disposition  • Patient meets criteria or  agreements for asymptomatic testing.    Protocols used: COVID-19 TRIAGE PROTOCOL [employer]UMENT [employer]ALTH

## 2022-04-28 ENCOUNTER — HOSPITAL ENCOUNTER (OUTPATIENT)
Facility: HOSPITAL | Age: 72
Setting detail: OUTPATIENT SURGERY
Discharge: HOME | End: 2022-04-28
Attending: UROLOGY | Admitting: UROLOGY
Payer: COMMERCIAL

## 2022-04-28 ENCOUNTER — ANESTHESIA (OUTPATIENT)
Dept: OPERATING ROOM | Facility: HOSPITAL | Age: 72
End: 2022-04-28
Payer: COMMERCIAL

## 2022-04-28 ENCOUNTER — ANESTHESIA EVENT (OUTPATIENT)
Dept: OPERATING ROOM | Facility: HOSPITAL | Age: 72
End: 2022-04-28
Payer: COMMERCIAL

## 2022-04-28 VITALS
SYSTOLIC BLOOD PRESSURE: 117 MMHG | BODY MASS INDEX: 31.59 KG/M2 | RESPIRATION RATE: 12 BRPM | TEMPERATURE: 97.2 F | HEIGHT: 73 IN | WEIGHT: 238.32 LBS | HEART RATE: 58 BPM | OXYGEN SATURATION: 91 % | DIASTOLIC BLOOD PRESSURE: 72 MMHG

## 2022-04-28 DIAGNOSIS — N40.1 BPH WITH OBSTRUCTION/LOWER URINARY TRACT SYMPTOMS: Primary | Chronic | ICD-10-CM

## 2022-04-28 DIAGNOSIS — N13.8 BPH WITH OBSTRUCTION/LOWER URINARY TRACT SYMPTOMS: Primary | Chronic | ICD-10-CM

## 2022-04-28 PROCEDURE — (BLANK) HC GENERAL ANESTHESIA FACILITY CHARGE EACH ADDITIONAL MIN: Performed by: UROLOGY

## 2022-04-28 PROCEDURE — (BLANK) HC OR LEVEL 3 PROC EACH ADDITIONAL MIN: Performed by: UROLOGY

## 2022-04-28 PROCEDURE — (BLANK) HC RECOVERY PHASE-2 EACH ADDITIONAL 1/2 HOUR ACUITY LEVEL 1: Performed by: UROLOGY

## 2022-04-28 PROCEDURE — (BLANK) HC GENERAL ANESTHESIA FACILITY CHARGE 1ST 15 MIN: Performed by: UROLOGY

## 2022-04-28 PROCEDURE — 6360000200 HC RX 636 W HCPCS (ALT 250 FOR IP): Performed by: NURSE ANESTHETIST, CERTIFIED REGISTERED

## 2022-04-28 PROCEDURE — 99100 ANES PT EXTEME AGE<1 YR&>70: CPT | Performed by: NURSE ANESTHETIST, CERTIFIED REGISTERED

## 2022-04-28 PROCEDURE — 6360000200 HC RX 636 W HCPCS (ALT 250 FOR IP): Performed by: UROLOGY

## 2022-04-28 PROCEDURE — (BLANK) HC RECOVERY PHASE-1 1ST  HOUR ACUITY LEVEL 3: Performed by: UROLOGY

## 2022-04-28 PROCEDURE — 52648 LASER SURGERY OF PROSTATE: CPT | Performed by: UROLOGY

## 2022-04-28 PROCEDURE — 6370000100 HC RX 637 (ALT 250 FOR IP): Performed by: UROLOGY

## 2022-04-28 PROCEDURE — (BLANK) HC OR LEVEL 3 PROC 1ST 15MIN: Performed by: UROLOGY

## 2022-04-28 PROCEDURE — 00914 ANES TRURL PX RESCJ PRST8: CPT | Performed by: NURSE ANESTHETIST, CERTIFIED REGISTERED

## 2022-04-28 PROCEDURE — 2580000300 HC RX 258: Performed by: NURSE ANESTHETIST, CERTIFIED REGISTERED

## 2022-04-28 PROCEDURE — (BLANK) HC RECOVERY PHASE-2 1ST 1/2 HOUR ACUITY LEVEL 1: Performed by: UROLOGY

## 2022-04-28 RX ORDER — CEPHALEXIN 500 MG/1
500 CAPSULE ORAL 2 TIMES DAILY
Qty: 10 CAPSULE | Refills: 0 | Status: SHIPPED | OUTPATIENT
Start: 2022-04-28 | End: 2022-05-03

## 2022-04-28 RX ORDER — HYDROCODONE BITARTRATE AND ACETAMINOPHEN 5; 325 MG/1; MG/1
1 TABLET ORAL EVERY 6 HOURS PRN
Qty: 10 TABLET | Refills: 0 | Status: SHIPPED | OUTPATIENT
Start: 2022-04-28 | End: 2022-05-01

## 2022-04-28 RX ORDER — LIDOCAINE HYDROCHLORIDE 20 MG/ML
INJECTION, SOLUTION EPIDURAL; INFILTRATION; INTRACAUDAL; PERINEURAL AS NEEDED
Status: DISCONTINUED | OUTPATIENT
Start: 2022-04-28 | End: 2022-04-28 | Stop reason: SURG

## 2022-04-28 RX ORDER — SODIUM CHLORIDE, SODIUM LACTATE, POTASSIUM CHLORIDE, CALCIUM CHLORIDE 600; 310; 30; 20 MG/100ML; MG/100ML; MG/100ML; MG/100ML
INJECTION, SOLUTION INTRAVENOUS CONTINUOUS PRN
Status: DISCONTINUED | OUTPATIENT
Start: 2022-04-28 | End: 2022-04-28 | Stop reason: SURG

## 2022-04-28 RX ORDER — ACETAMINOPHEN 325 MG/1
650 TABLET ORAL AS NEEDED
Status: CANCELLED | OUTPATIENT
Start: 2022-04-28

## 2022-04-28 RX ORDER — HYDROCODONE BITARTRATE AND ACETAMINOPHEN 5; 325 MG/1; MG/1
2 TABLET ORAL AS NEEDED
Status: CANCELLED | OUTPATIENT
Start: 2022-04-28

## 2022-04-28 RX ORDER — PROPOFOL 10 MG/ML
INJECTION, EMULSION INTRAVENOUS AS NEEDED
Status: DISCONTINUED | OUTPATIENT
Start: 2022-04-28 | End: 2022-04-28 | Stop reason: SURG

## 2022-04-28 RX ORDER — ATROPA BELLADONNA AND OPIUM 16.2; 3 MG/1; MG/1
SUPPOSITORY RECTAL AS NEEDED
Status: DISCONTINUED | OUTPATIENT
Start: 2022-04-28 | End: 2022-04-28 | Stop reason: HOSPADM

## 2022-04-28 RX ORDER — IBUPROFEN 800 MG/1
800 TABLET ORAL AS NEEDED
Status: CANCELLED | OUTPATIENT
Start: 2022-04-28

## 2022-04-28 RX ORDER — FENTANYL CITRATE/PF 50 MCG/ML
PLASTIC BAG, INJECTION (ML) INTRAVENOUS AS NEEDED
Status: DISCONTINUED | OUTPATIENT
Start: 2022-04-28 | End: 2022-04-28 | Stop reason: SURG

## 2022-04-28 RX ORDER — CEFAZOLIN SODIUM 10 G/1
2000 INJECTION, POWDER, FOR SOLUTION INTRAVENOUS ONCE
Status: COMPLETED | OUTPATIENT
Start: 2022-04-28 | End: 2022-04-28

## 2022-04-28 RX ADMIN — PROPOFOL 50 MG: 10 INJECTION, EMULSION INTRAVENOUS at 18:13

## 2022-04-28 RX ADMIN — LIDOCAINE HYDROCHLORIDE 100 MG: 20 INJECTION, SOLUTION EPIDURAL; INFILTRATION; INTRACAUDAL; PERINEURAL at 18:01

## 2022-04-28 RX ADMIN — SODIUM CHLORIDE, POTASSIUM CHLORIDE, SODIUM LACTATE AND CALCIUM CHLORIDE: 600; 310; 30; 20 INJECTION, SOLUTION INTRAVENOUS at 17:59

## 2022-04-28 RX ADMIN — FENTANYL CITRATE 50 MCG: 50 INJECTION, SOLUTION INTRAMUSCULAR; INTRAVENOUS at 18:32

## 2022-04-28 RX ADMIN — Medication 2000 MG: at 18:12

## 2022-04-28 RX ADMIN — PROPOFOL 100 MG: 10 INJECTION, EMULSION INTRAVENOUS at 18:01

## 2022-04-28 RX ADMIN — FENTANYL CITRATE 50 MCG: 50 INJECTION, SOLUTION INTRAMUSCULAR; INTRAVENOUS at 18:13

## 2022-04-28 RX ADMIN — FENTANYL CITRATE 50 MCG: 50 INJECTION, SOLUTION INTRAMUSCULAR; INTRAVENOUS at 18:17

## 2022-04-28 RX ADMIN — FENTANYL CITRATE 50 MCG: 50 INJECTION, SOLUTION INTRAMUSCULAR; INTRAVENOUS at 18:15

## 2022-04-28 ASSESSMENT — ENCOUNTER SYMPTOMS: DYSRHYTHMIAS: 1

## 2022-04-28 NOTE — DISCHARGE INSTRUCTIONS
Take over-the-counter Ibuprofen and Tylenol for pain. Use narcotics sparingly and only for breakthrough pain as necessary. If you take narcotic pain medications, recommend taking an over-the-counter stool softener such as Colace and/or Miralax to prevent constipation and straining for bowel movements.     Please take your antibiotics as prescribed to help prevent infection.    Ensure you are staying adequately hydrated with water to help prevent constipation and ensure adequate flushing of your urinary tract.    Please be aware that some blood in your urine may happen. This is normal, however if you experience large amounts of bright red blood or large amounts of blood clots, please contact the Urology clinic at [phone] and let the staff know.    Bladder spasms may occur when the fuchs catheter is in place. This is a normal occurrence and light activity such as walking may help ease these.    Do not lift anything heavier than approximately 10 pounds for 2 to 3 weeks.    Do not sit or submerge in a body of water for 2 to 3 weeks.  You may shower daily as usual.    Do not engage in strenuous activity for 2 to 3 weeks, however light activity such as walking is highly encouraged.

## 2022-04-28 NOTE — ANESTHESIA PREPROCEDURE EVALUATION
"Pre-Procedure Assessment    Patient: Cesar Yuen, male, 71 y.o.    Ht Readings from Last 1 Encounters:   04/28/22 1.854 m (6' 1\")     Wt Readings from Last 1 Encounters:   04/28/22 108.1 kg (238 lb 5.1 oz)       Last Vitals  BP      Temp      Pulse     Resp      SpO2      Pain Score         Problem list reviewed and Medical history reviewed           Airway   Mallampati: II  TM distance: >3 FB  Neck ROM: full      Dental      Pulmonary     breath sounds clear to auscultation  Cardiovascular   (+) hypertension, dysrhythmias, CHF,     Rhythm: regular  Rate: normal  ROS comment: [facility] [phone]  [email or web address] [phone]      Interpretation Summary    Normal left ventricular size and systolic function with no regional wall motion abnormalities.  Ejection fraction 55%.  No left ventricular hypertrophy.  Unable to assess for diastolic function due to atrial fibrillation.  Normal right ventricular size and function.  Moderate left atrial dilation.  No large PFO detected by saline contrast.  Right atrial dilation.  Mild ascending aorta dilation, 4.2 cm.  Mild mitral regurgitation.  Normal estimated pulmonary pressure, RVSP 30 mmHg.  No pericardial effusion.         Mental Status/Neuro/Psych    Pt is alert.        GI/Hepatic/Renal    (+) urinary retention    Endo/Other    (+) history of blood transfusion,   Abdominal           Social History     Tobacco Use   • Smoking status: Never Smoker   • Smokeless tobacco: Never Used   Substance Use Topics   • Alcohol use: Yes     Comment: rarely      Hematology   WBC   Date Value Ref Range Status   04/07/2022 6.8 3.7 - 9.6 10*3/uL Final     RBC   Date Value Ref Range Status   04/07/2022 4.93 4.10 - 5.80 10*6/µL Final     MCV   Date Value Ref Range Status   04/07/2022 91.5 82.0 - 97.0 fL Final     Hemoglobin   Date Value Ref Range Status   04/07/2022 14.9 13.2 - 17.2 g/dL Final     Hematocrit   Date Value Ref Range Status   04/07/2022 45.1 38.0 - 50.0 % " Final     Platelets   Date Value Ref Range Status   04/07/2022 244 130 - 350 10*3/uL Final      Coagulation   Protime   Date Value Ref Range Status   02/13/2020 12.6 (H) 9.4 - 12.5 seconds Final     PTT   Date Value Ref Range Status   02/13/2020 33.8 25.1 - 36.5 SECONDS Final     INR   Date Value Ref Range Status   02/13/2020 1.1 <=1.1 Final      General Chemistry   Calcium   Date Value Ref Range Status   04/07/2022 9.5 8.6 - 10.3 mg/dL Final     BUN   Date Value Ref Range Status   04/07/2022 15 7 - 25 mg/dL Final     Creatinine   Date Value Ref Range Status   04/07/2022 0.84 0.70 - 1.30 mg/dL Final     Glucose   Date Value Ref Range Status   04/07/2022 88 70 - 105 mg/dL Final     Sodium   Date Value Ref Range Status   04/07/2022 139 135 - 145 mmol/L Final     Potassium   Date Value Ref Range Status   04/07/2022 3.6 3.5 - 5.1 MMOL/L Final     CO2   Date Value Ref Range Status   04/07/2022 31 21 - 32 mmol/L Final     Chloride   Date Value Ref Range Status   04/07/2022 103 98 - 107 mmol/L Final     Anesthesia Plan    ASA 3   NPO status reviewed: > 6 hours    General         Induction: intravenous   Airway Planning: LMA              Anesthetic plan and risks discussed with patient.      Plan discussed with CRNA.

## 2022-04-29 NOTE — ANESTHESIA PROCEDURE NOTES
Airway  Date/Time: 4/28/2022 6:03 PM  Urgency: elective    Airway not difficult    General Information and Staff    Patient location during procedure: OR  Anesthesiologist: Michael Marin MD  CRNA: Juan Moreno CRNA  Performed: CRNA     Indications and Patient Condition  Indications for airway management: anesthesia  Spontaneous Ventilation: absent  Sedation level: deep  Preoxygenated: yes  MILS maintained throughout  Mask difficulty assessment: 1 - vent by mask    Final Airway Details  Final airway type: supraglottic airway      Successful airway: unique  Size 5  Airway Seal Pressure (cm H2O): 18     Placement verified by: chest auscultation, capnometry and palpation of cuff   Number of attempts at approach: 1

## 2022-04-29 NOTE — ANESTHESIA POSTPROCEDURE EVALUATION
Patient: Cesar Yuen    Procedure Summary     Date: 04/28/22 Room / Location: [facility] OR 08 / [facility] OR    Anesthesia Start: 1759 Anesthesia Stop: 1839    Procedure: REVOLIX LASER PROSTATECTOMY (N/A ) Diagnosis:       Acquired contracture of bladder neck      (Acquired contracture of bladder neck [N32.0])    Surgeons: Attila Taylor MD Responsible Provider: Michael Marin MD    Anesthesia Type: general ASA Status: 3          Anesthesia Type: general    Last vitals  Vitals Value Taken Time   /72 04/28/22 1915   Temp     Pulse 58 04/28/22 1915   Resp 12 04/28/22 1915   SpO2 91 % 04/28/22 1915   0-10 Pain Score 0 - No pain 04/28/22 1915       Anesthesia Post Evaluation    Patient location during evaluation: PACU  Patient participation: complete - patient participated  Level of consciousness: awake and alert  Pain management: adequate  Airway patency: patent  Anesthetic complications: no  Cardiovascular status: acceptable  Respiratory status: acceptable  Hydration status: acceptable  May dismiss recovered patient based on consultation with the appropriate physicians and/or meeting appropriate discharge criteria      Cosmetic?  This procedure is not cosmetic.

## 2022-04-29 NOTE — OP NOTE
Urology Operative Note    Date of Surgery: 04/28/22    Pre-Operative Diagnosis: BPH with obstruction    Post-Operative Diagnosis: same    Procedure: Revolix laser prostate ablation    Surgeon: Attila Taylor MD    Anesthesia: general    OR staff: Circulator: Alok Sanchez RN  Scrub Person: Anisa Templeton    Drains: 18 Croatian coude fuchs catheter    Complications: none    Specimen: none    Description of procedure:    The patient was identified, underwent the induction of anesthesia and was then position in the lithotomy position.  His genitalia were prepped and draped in the standard fashion.  A pause for a timeout was performed.  Appropriate antibiotics were given.  A 26 Croatian cystoscope sheath was inserted into the bladder, with the laser bridge and the thulium laser fiber.  Initial power setting of 100 gore were used.  Power was then increased to 130 Gore.  Ablation began at the bladder neck and then I worked my way back to the verumontanum.  Care was taken to not injure the sphincter active portion of the urethra.  A total of 70,000 Joules of energy was given.  The resulting field was hemostatic and the prostate fossa was widely patent.  There had been some bladder neck contracture noted.  The bladder was drained with an 18 [place] fuchs and placed to straight drainage.  The patient was then extubated and transferred to recovery in satisfactory condition.  There were no immediate complications.    Attila Taylor MD  04/28/22  6:30 PM

## 2022-05-04 ENCOUNTER — CLINICAL SUPPORT (OUTPATIENT)
Dept: UROLOGY | Facility: CLINIC | Age: 72
End: 2022-05-04
Payer: COMMERCIAL

## 2022-05-04 VITALS
WEIGHT: 238.32 LBS | HEART RATE: 60 BPM | DIASTOLIC BLOOD PRESSURE: 75 MMHG | RESPIRATION RATE: 18 BRPM | TEMPERATURE: 97.7 F | HEIGHT: 73 IN | BODY MASS INDEX: 31.59 KG/M2 | SYSTOLIC BLOOD PRESSURE: 131 MMHG | OXYGEN SATURATION: 93 %

## 2022-05-04 DIAGNOSIS — Z48.89 POSTOPERATIVE VISIT: Primary | ICD-10-CM

## 2022-05-04 PROCEDURE — 99024 POSTOP FOLLOW-UP VISIT: CPT | Performed by: NURSE PRACTITIONER

## 2022-05-04 ASSESSMENT — PAIN SCALES - GENERAL: PAINLEVEL: 0-NO PAIN

## 2022-05-04 NOTE — PROGRESS NOTES
UROLOGY NURSE Pope Catheter removal VISIT:    Cesar Yuen is a 71 y.o. male here for a nurse visit for catheter removal.    Urine color is clear. 18cc saline removed from balloon.  Catheter removed without difficulty, tip intact.    Patient tolerated well.  Patient instructed on voiding, symptoms of infection, and when to seek medical care if needed.

## 2022-05-07 ENCOUNTER — OFFICE VISIT (OUTPATIENT)
Dept: URGENT CARE | Facility: CLINIC | Age: 72
End: 2022-05-07
Payer: COMMERCIAL

## 2022-05-07 VITALS
SYSTOLIC BLOOD PRESSURE: 114 MMHG | OXYGEN SATURATION: 93 % | DIASTOLIC BLOOD PRESSURE: 67 MMHG | HEART RATE: 82 BPM | BODY MASS INDEX: 31.27 KG/M2 | TEMPERATURE: 99 F | WEIGHT: 237 LBS

## 2022-05-07 DIAGNOSIS — N30.01 ACUTE CYSTITIS WITH HEMATURIA: Primary | ICD-10-CM

## 2022-05-07 DIAGNOSIS — R33.9 URINARY RETENTION: ICD-10-CM

## 2022-05-07 DIAGNOSIS — R35.0 URINARY FREQUENCY: ICD-10-CM

## 2022-05-07 LAB
BILIRUBIN, POC: NEGATIVE
BLOOD URINE, POC: ABNORMAL
GLUCOSE URINE, POC: NEGATIVE MG/DL
KETONES, POC: NEGATIVE MG/DL
LEUKOCYTE EST, POC: ABNORMAL
NITRITE, POC: NEGATIVE
POC PH URINE: 6 PH (ref 5–9)
POC SPECIFIC GRAVITY: 1.02 (ref 1–1.03)
PROTEIN, POC: ABNORMAL MG/DL
SPECIMEN VOL ?TM UR: 200 ML
SPECIMEN VOL ?TM UR: 200 ML
UROBILINOGEN, POC: 0.2

## 2022-05-07 PROCEDURE — 51798 US URINE CAPACITY MEASURE: CPT | Performed by: PHYSICIAN ASSISTANT

## 2022-05-07 PROCEDURE — G0463 HOSPITAL OUTPT CLINIC VISIT: HCPCS | Performed by: PHYSICIAN ASSISTANT

## 2022-05-07 PROCEDURE — 99213 OFFICE O/P EST LOW 20 MIN: CPT | Mod: 24 | Performed by: PHYSICIAN ASSISTANT

## 2022-05-07 PROCEDURE — 87186 SC STD MICRODIL/AGAR DIL: CPT | Performed by: PHYSICIAN ASSISTANT

## 2022-05-07 PROCEDURE — 81002 URINALYSIS NONAUTO W/O SCOPE: CPT | Performed by: PHYSICIAN ASSISTANT

## 2022-05-07 RX ORDER — CIPROFLOXACIN 500 MG/1
500 TABLET ORAL 2 TIMES DAILY
Qty: 14 TABLET | Refills: 0 | Status: SHIPPED | OUTPATIENT
Start: 2022-05-07 | End: 2022-05-07 | Stop reason: SDUPTHER

## 2022-05-07 RX ORDER — CIPROFLOXACIN 500 MG/1
500 TABLET ORAL 2 TIMES DAILY
Qty: 14 TABLET | Refills: 0 | Status: SHIPPED | OUTPATIENT
Start: 2022-05-07 | End: 2022-05-14

## 2022-05-07 ASSESSMENT — ENCOUNTER SYMPTOMS: FREQUENCY: 1

## 2022-05-07 NOTE — PROGRESS NOTES
"Subjective      Cesar Yuen is a 71 y.o. male who presents for urinary frequency and dysuria.    Patient is a pleasant 71-year-old male who does have a history of a urology procedure performed about a week and a half ago he did have a catheter which removed about 5 days ago he states he was doing good but now is having a little bit of urinary retention and stating that he does not frequency and little bit of burning with urination.  He has concerned about a urinary infection.  He did have what sounds like a laser urethral stricture removal.  Patient denies any fever or chills.  He denies any blood in his urine that he notices but he does state his urine has been cloudier.  Patient does have follow-up with his urologist next week.          The following have been reviewed and updated as appropriate in this visit:   Allergies  Meds         No Known Allergies  Current Outpatient Medications   Medication Sig Dispense Refill   • furosemide (LASIX) 20 mg tablet Take 20 mg by mouth daily     • apixaban (ELIQUIS) 2.5 mg tablet Take 5 mg by mouth     • multivitamin (MULTI-DAY ORAL) Take 1 tablet by mouth daily       • ciprofloxacin (Cipro) 500 mg tablet Take 1 tablet (500 mg total) by mouth 2 (two) times a day for 7 days 14 tablet 0   • metoprolol succinate XL (TOPROL-XL) 25 mg 24 hr tablet Take 2 tablets (50 mg total) by mouth daily (Patient not taking: Reported on 4/7/2022) 60 tablet 11     No current facility-administered medications for this visit.     Past Medical History:   Diagnosis Date   • Atrial flutter (CMS/HCC) ([facility])    • CHF (congestive heart failure) (CMS/HCC) ([facility])    • Chipped tooth     \"cracked tooth\" Back right upper, left upper back   • Dysrhythmia    • Edema     BLE   • History of transfusion    • Hypertension    • Injury of back 01/14/2021    Seen in .   • Urinary retention     indwelling Pope in place since November 2020      Past Surgical History:   Procedure Laterality Date   • ABDOMINAL " SURGERY      anuerysm   • ABLATION A-FLUTTER N/A 2/8/2021    Procedure: Ablation A-flutter loop implant;  Surgeon: Timoteo Burk DO;  Location: [facility] EP Lab;  Service: Electrophysiology;  Laterality: N/A;   • APPENDECTOMY     • HERNIA REPAIR      x 3   • LOOP RECORDER INSERTION N/A 2/8/2021    Procedure: LOOP RECORDER INSERTION;  Surgeon: Timoteo Burk DO;  Location: [facility] EP Lab;  Service: Electrophysiology;  Laterality: N/A;   • PROSTATECTOMY N/A 4/28/2022    Procedure: REVOLIX LASER PROSTATECTOMY;  Surgeon: Attila Taylor MD;  Location: [facility] OR;  Service: Urology;  Laterality: N/A;   • TRANSURETHRAL RESECTION OF BLADDER TUMOR N/A 12/22/2020    Procedure: CYSTOSCOPY WITH BLADDER BIOPSY;  Surgeon: Daniele Johnson MD;  Location: [facility] OR;  Service: Urology;  Laterality: N/A;   • TRANSURETHRAL RESECTION OF PROSTATE N/A 12/22/2020    Procedure: CYSTOSCOPY TRANSURETHRAL RESECTION PROSTATE, exam under anasthesia;  Surgeon: Daniele Johnson MD;  Location: [facility] OR;  Service: Urology;  Laterality: N/A;   • TRANSURETHRAL RESECTION OF PROSTATE N/A 4/27/2021    Procedure: CYSTOSCOPY TRANSURETHRAL RESECTION PROSTATE;  Surgeon: Daniele Johnson MD;  Location: [facility] OR;  Service: Urology;  Laterality: N/A;   • TRANSURETHRAL RESECTION OF PROSTATE N/A 9/27/2021    Procedure: CYSTOSCOPY TRANSURETHRAL RESECTION PROSTATE;  Surgeon: Attila Taylor MD;  Location: [facility] OR;  Service: Urology;  Laterality: N/A;  NOT EARLY MORNING     Family History   Problem Relation Age of Onset   • Alzheimer's disease Mother    • Diabetes Father    • Heart attack Mother's Brother      Social History     Socioeconomic History   • Marital status: Single   • Number of children: 1   Occupational History   • Occupation:    Tobacco Use   • Smoking status: Never Smoker   • Smokeless tobacco: Never Used   Vaping Use   • Vaping Use: Never used   Substance and Sexual Activity   • Alcohol use: Yes     Comment: rarely   • Drug use: Never   • Sexual  activity: Defer     Social Determinants of Health     Tobacco Use: Low Risk    • Smoking Tobacco Use: Never Smoker   • Smokeless Tobacco Use: Never Used       Review of Systems   Genitourinary: Positive for frequency.   All other systems reviewed and are negative.      Objective   /67 (BP Location: Right arm, Patient Position: Sitting, Cuff Size: Regular Adult)   Pulse 82   Temp 37.2 °C (99 °F) (Temporal)   Wt 107.5 kg (237 lb)   SpO2 93%   BMI 31.27 kg/m²     Physical Exam  Vitals and nursing note reviewed.   Constitutional:       Appearance: Normal appearance. He is normal weight.   HENT:      Head: Normocephalic and atraumatic.      Nose: Nose normal.      Mouth/Throat:      Mouth: Mucous membranes are moist.   Eyes:      Conjunctiva/sclera: Conjunctivae normal.   Pulmonary:      Effort: Pulmonary effort is normal.      Breath sounds: Normal breath sounds.   Abdominal:      General: Abdomen is flat.      Palpations: Abdomen is soft.   Musculoskeletal:         General: Normal range of motion.      Cervical back: Neck supple.   Neurological:      Mental Status: He is alert.       Recent Results (from the past 24 hour(s))   POCT urinalysis, dipstick manual    Collection Time: 05/07/22  3:18 PM   Result Value Ref Range    Glucose, UA Negative negative - negative mg/dL    Bilirubin, UA Negative negative - negative    Ketones, UA Negative mg/dL    Spec Grav, UA 1.020 1.001 - 1.035    Blood, UA Large negative - negative    pH, UA 6.0 5 - 9 PH    Protein, UA  negative - negative mg/dL     SIMV/PC- Synchronized intermittent mandatory ventilation/pressure controlled    Urobilinogen, UA 0.2     Nitrite, UA Negative negative - negative    Leukocytes, UA Moderate negative - negative   POCT Bladder Scan    Collection Time: 05/07/22  3:19 PM   Result Value Ref Range    Urine Volume 200 mL    Urine Volume 200 mL       Assessment/Plan   Diagnoses and all orders for this visit:    Acute cystitis with hematuria  -      ciprofloxacin (Cipro) 500 mg tablet; Take 1 tablet (500 mg total) by mouth 2 (two) times a day for 7 days    Urinary frequency  -     POCT urinalysis, dipstick manual  -     Urine culture  -     POCT Bladder Scan    Urinary retention    I did go ahead and check a urine on him which did show moderate amount of leukocytes and large amount of blood.  This definitely could be from his procedure but his urine definitely appear to be kind of turbid and cloudy I believe he might have a little bit of a UTI starting.  We did do a bladder scan which did show a large amount of residual urine at 850 cc.  Patient denies any discomfort over the bladder area does not feel like he is full.  I did discuss possibly putting a catheter back and he like to wait and try some antibiotics.  I will go ahead and start him on some Cipro 500 mg twice daily for 7 days he needs to contact his urologist next week and discuss his symptoms.  I did discuss if he continues to retain a lot of urine we will go ahead and have to have the catheter replaced.  I did send the urine for culture we will contact him with the results and adjust antibiotics if needed.    Voice recognition program was used to aid in documentation of this record.  Sometimes words are not printed exactly as they were spoken.  While efforts were made to carefully edit and correct any inaccuracies, some errors may be present, please take these into context.    Sarbjit Roland PA-C

## 2022-05-09 ENCOUNTER — TELEPHONE (OUTPATIENT)
Dept: UROLOGY | Facility: CLINIC | Age: 72
End: 2022-05-09
Payer: COMMERCIAL

## 2022-05-09 LAB — BACTERIA UR CULT: ABNORMAL

## 2022-05-09 NOTE — TELEPHONE ENCOUNTER
Called and spoke with veena as to where to schedule pt.  Veena stated she would contact pt to get r/s

## 2022-05-09 NOTE — TELEPHONE ENCOUNTER
Caller would like to discuss RS Appointment     Patient: Cesar Yuen    Callback Number: [phone]    Best Availability: callback anytime, for appts: mons or tues are the best days and in the mornings     Initial Appt: 5/12/22    Provider: bruening    Reason for rs: short staff as he is a     PAS attempted to contact FD? yes     Additional Info: Needs to reschedule above appt. pt called to cancel appt as he is a  and thursday they are short staff, he does want to r/s to the beginning of the week, i have forward enc for a callback    I advised caller of a callback by 24-48 hours.

## 2022-05-10 ENCOUNTER — TELEPHONE (OUTPATIENT)
Dept: UROLOGY | Facility: CLINIC | Age: 72
End: 2022-05-10
Payer: COMMERCIAL

## 2022-05-10 NOTE — TELEPHONE ENCOUNTER
Attempted to contact patient to discuss symptoms that he is having.  Did leave a message that I think that he should go back to urgent care as it sounds like he was retaining a large amount of urine when he was seen over the weekend and he is probably needing to have a catheter placed.  Did inform that I do not have any sooner appointments but if he has any questions that he can return a call to us.

## 2022-05-10 NOTE — TELEPHONE ENCOUNTER
Pt called to see if there was anyway to be seen sooner.  Pt is stating he is vomiting etc today as of this am.  After ED/[facility] [place] visit, pt states he is having issues and thinks he needs to be seen sooner than tomorrow.  Pt states he has an infection and has already taken 5 pills.  Pt states he has a lot of pressure on stomach.  Very is very bloated.    Please call pt at [phone].

## 2022-05-11 ENCOUNTER — APPOINTMENT (OUTPATIENT)
Dept: LAB | Facility: CLINIC | Age: 72
End: 2022-05-11
Payer: COMMERCIAL

## 2022-05-11 ENCOUNTER — OFFICE VISIT (OUTPATIENT)
Dept: UROLOGY | Facility: CLINIC | Age: 72
End: 2022-05-11
Payer: COMMERCIAL

## 2022-05-11 VITALS
WEIGHT: 237 LBS | RESPIRATION RATE: 18 BRPM | TEMPERATURE: 98.2 F | BODY MASS INDEX: 31.41 KG/M2 | HEART RATE: 78 BPM | OXYGEN SATURATION: 94 % | HEIGHT: 73 IN | SYSTOLIC BLOOD PRESSURE: 119 MMHG | DIASTOLIC BLOOD PRESSURE: 70 MMHG

## 2022-05-11 DIAGNOSIS — R31.0 GROSS HEMATURIA: ICD-10-CM

## 2022-05-11 DIAGNOSIS — Z48.89 POSTOPERATIVE VISIT: Primary | ICD-10-CM

## 2022-05-11 LAB
BACTERIA #/AREA URNS HPF: ABNORMAL /HPF
BILIRUB UR QL: ABNORMAL
CLARITY UR: ABNORMAL
COLOR UR: ABNORMAL
GLUCOSE UR QL: NEGATIVE MG/DL
HGB UR QL: ABNORMAL
KETONES UR-MCNC: ABNORMAL MG/DL
LEUKOCYTE ESTERASE UR QL STRIP: ABNORMAL
NITRITE UR QL: POSITIVE
PH UR: 6.5 PH
PROT UR STRIP-MCNC: >=300 MG/DL
RBC #/AREA URNS HPF: ABNORMAL /HPF
SP GR UR: 1.02 (ref 1–1.03)
SQUAMOUS #/AREA URNS HPF: ABNORMAL /HPF
UROBILINOGEN UR-MCNC: 2 E.U./DL
WBC #/AREA URNS HPF: ABNORMAL /HPF

## 2022-05-11 PROCEDURE — 51798 US URINE CAPACITY MEASURE: CPT | Performed by: UROLOGY

## 2022-05-11 PROCEDURE — 52000 CYSTOURETHROSCOPY: CPT | Performed by: UROLOGY

## 2022-05-11 PROCEDURE — 81001 URINALYSIS AUTO W/SCOPE: CPT | Performed by: NURSE PRACTITIONER

## 2022-05-11 PROCEDURE — G0463 HOSPITAL OUTPT CLINIC VISIT: HCPCS | Performed by: UROLOGY

## 2022-05-11 PROCEDURE — 52000 CYSTOURETHROSCOPY: CPT | Mod: 78 | Performed by: UROLOGY

## 2022-05-11 PROCEDURE — 87088 URINE BACTERIA CULTURE: CPT | Performed by: NURSE PRACTITIONER

## 2022-05-11 ASSESSMENT — PAIN SCALES - GENERAL: PAINLEVEL: 0-NO PAIN

## 2022-05-11 NOTE — PROGRESS NOTES
PROCEDURE:  Catheterized bladder:  REMOVAL: N/A  PREP:  Area cleansed with betadine swabs  PROCEDURE: 20fr Slovak Coude catheter with 30 cc balloon inserted into the urethra, using sterile technique.   POST PROCEDURE: Patient tolerated Indwelling Pope catheter change well.    Jaye Cali RN

## 2022-05-11 NOTE — TELEPHONE ENCOUNTER
Called pt, he states today he is feeling much better, he had several BM through the day yesterday and he is taking antibiotic the  ordered for him. Pt refused to have his bladder drained at the , states he wanted to see if the antibiotic would work. Pt has an appt with our clinic today. He states he will wait to see what the provider decides.    Jaye Cali RN

## 2022-05-11 NOTE — PROGRESS NOTES
"Urology Progress Note  5/11/2022    Chief Complaint:  Chief Complaint   Patient presents with   • Post-op     Revolix DOS: 4-28-22     History of Present Illness:  Patient is a 71 year old male presenting to Urology Clinic today in follow-up after undergoing a Revolix laser prostate ablation on 4/28/2022 with Dr. Taylor.    Patient was seen in [place]     Past Medical History:  Past Medical History:   Diagnosis Date   • Atrial flutter (CMS/HCC) (HCC)    • CHF (congestive heart failure) (CMS/HCC) (HCC)    • Chipped tooth     \"cracked tooth\" Back right upper, left upper back   • Dysrhythmia    • Edema     BLE   • History of transfusion    • Hypertension    • Injury of back 01/14/2021    Seen in .   • Urinary retention     indwelling Pope in place since November 2020      Past Surgical History:  Past Surgical History:   Procedure Laterality Date   • ABDOMINAL SURGERY      anuerysm   • ABLATION A-FLUTTER N/A 2/8/2021    Procedure: Ablation A-flutter loop implant;  Surgeon: Timoteo Burk DO;  Location: [facility] EP Lab;  Service: Electrophysiology;  Laterality: N/A;   • APPENDECTOMY     • HERNIA REPAIR      x 3   • LOOP RECORDER INSERTION N/A 2/8/2021    Procedure: LOOP RECORDER INSERTION;  Surgeon: Timoteo Burk DO;  Location: [facility] EP Lab;  Service: Electrophysiology;  Laterality: N/A;   • PROSTATECTOMY N/A 4/28/2022    Procedure: REVOLIX LASER PROSTATECTOMY;  Surgeon: Attila Taylor MD;  Location: [facility] OR;  Service: Urology;  Laterality: N/A;   • TRANSURETHRAL RESECTION OF BLADDER TUMOR N/A 12/22/2020    Procedure: CYSTOSCOPY WITH BLADDER BIOPSY;  Surgeon: Daniele Johnson MD;  Location: [facility] OR;  Service: Urology;  Laterality: N/A;   • TRANSURETHRAL RESECTION OF PROSTATE N/A 12/22/2020    Procedure: CYSTOSCOPY TRANSURETHRAL RESECTION PROSTATE, exam under anasthesia;  Surgeon: Daniele Johnson MD;  Location: [facility] OR;  Service: Urology;  Laterality: N/A;   • TRANSURETHRAL RESECTION OF PROSTATE N/A 4/27/2021    " "Procedure: CYSTOSCOPY TRANSURETHRAL RESECTION PROSTATE;  Surgeon: Daniele Johnson MD;  Location: [facility] OR;  Service: Urology;  Laterality: N/A;   • TRANSURETHRAL RESECTION OF PROSTATE N/A 9/27/2021    Procedure: CYSTOSCOPY TRANSURETHRAL RESECTION PROSTATE;  Surgeon: Attila Taylor MD;  Location: [facility] OR;  Service: Urology;  Laterality: N/A;  NOT EARLY MORNING      Social History:  Social History     Tobacco Use   • Smoking status: Never Smoker   • Smokeless tobacco: Never Used   Substance Use Topics   • Alcohol use: Yes     Comment: rarely     Family History:      Family History   Problem Relation Age of Onset   • Alzheimer's disease Mother    • Diabetes Father    • Heart attack Mother's Brother      Allergies:  No Known Allergies  Medications:  Current Outpatient Medications   Medication Sig Dispense Refill   • ciprofloxacin (Cipro) 500 mg tablet Take 1 tablet (500 mg total) by mouth 2 (two) times a day for 7 days 14 tablet 0   • furosemide (LASIX) 20 mg tablet Take 20 mg by mouth daily     • apixaban (ELIQUIS) 2.5 mg tablet Take 5 mg by mouth     • metoprolol succinate XL (TOPROL-XL) 25 mg 24 hr tablet Take 2 tablets (50 mg total) by mouth daily (Patient not taking: Reported on 4/7/2022) 60 tablet 11   • multivitamin (MULTI-DAY ORAL) Take 1 tablet by mouth daily         No current facility-administered medications for this visit.       REVIEW OF SYSTEMS:  GENERAL/CONSTITUTIONAL:  No change in appetite.  Denies chills.  Energy level is good.  Denies fevers.  RESPIRATORY:  No shortness of breath.  CARDIOVASCULAR:  No chest pain.  GASTROINTESTINAL:  No constipation, diarrhea, nausea, or vomiting.  GENITOURINARY:  See HPI for complete details.    VITAL SIGNS:   height is 1.854 m (6' 1\") and weight is 107.5 kg (237 lb). His respiration is 18.     PHYSICAL EXAMINATION:  GENERAL APPEARANCE:  Alert and oriented x3, pleasant, well nourished, well developed, in no acute distress male.  HEAD:  Normocephalic, " atraumatic.  NECK:  Neck supple, trachea midline  LYMPH NODES:  No cervical or supracervical adenopathy.  SKIN:  Normal, warm and dry.  HEART:  Regular rate and rhythm, no murmurs.  LUNGS:  Respirations even and unlabored, clear bilaterally.  ABDOMEN:  Soft, nontender, nondistended, bowel sounds present.   BACK:  No costovertebral angle tenderness, spine nontender to palpation.  GENITOURINARY:   MUSCULOSKELETAL:  Normal.  EXTREMITIES:  No edema.  NEUROLOGIC:  Gait normal.    Lab Results   Component Value Date    PSA 1.22 06/18/2020     No results found for: TESTOSTERONE    Lab Results   Component Value Date    COLORU Brown (A) 02/16/2022    CLARITYU Cloudy (A) 02/16/2022    SPECGRAVU 1.025 02/16/2022    LEUKOCYTESU Negative 02/16/2022    NITRITEU Negative 02/16/2022    PROTUR 100  (A) 02/16/2022    KETONESU Negative 05/07/2022    BILIRUBINU Small (A) 02/16/2022    BLOODU Large (A) 02/16/2022    GLUCOSEU Negative 02/16/2022    SEBASTIÁN 5.5 02/16/2022    WBC 6.8 04/07/2022    RBC 4.93 04/07/2022           No results found.    Assessment and Plan:     There are no diagnoses linked to this encounter.    Results for orders placed or performed in visit on 05/07/22   POCT Bladder Scan   Result Value Ref Range    Urine Volume 200 mL    Urine Volume 200 mL       Lalitha Patino, DNP

## 2022-05-12 DIAGNOSIS — N30.01 ACUTE CYSTITIS WITH HEMATURIA: Primary | ICD-10-CM

## 2022-05-12 RX ORDER — CIPROFLOXACIN 500 MG/1
500 TABLET ORAL 2 TIMES DAILY
Qty: 14 TABLET | Refills: 0 | Status: SHIPPED | OUTPATIENT
Start: 2022-05-12 | End: 2022-05-12

## 2022-05-12 NOTE — TELEPHONE ENCOUNTER
Caller would like to request: Return Call    Patient: Cesar Yuen    Callback Number: [phone]    Best call Availability: as soon as possible    Return call to: Jaye     Additional Info: Patient is returning a call to Jaye. He stated that he is not sure what it is regarding.     PAS attempted to reach the Nurse? yes    If so, did Nurse accept call? No, not available     I advised caller of a callback by 24-48 hours.

## 2022-05-13 LAB — BACTERIA UR CULT: NORMAL

## 2022-05-16 LAB — SPECIMEN VOL ?TM UR: 313 ML

## 2022-05-17 ENCOUNTER — OFFICE VISIT (OUTPATIENT)
Dept: UROLOGY | Facility: CLINIC | Age: 72
End: 2022-05-17
Payer: COMMERCIAL

## 2022-05-17 VITALS
TEMPERATURE: 97 F | OXYGEN SATURATION: 95 % | BODY MASS INDEX: 31.41 KG/M2 | HEART RATE: 66 BPM | WEIGHT: 237 LBS | HEIGHT: 73 IN | RESPIRATION RATE: 18 BRPM | SYSTOLIC BLOOD PRESSURE: 116 MMHG | DIASTOLIC BLOOD PRESSURE: 67 MMHG

## 2022-05-17 DIAGNOSIS — R39.12 BENIGN PROSTATIC HYPERPLASIA WITH WEAK URINARY STREAM: Primary | ICD-10-CM

## 2022-05-17 DIAGNOSIS — N40.1 BENIGN PROSTATIC HYPERPLASIA WITH WEAK URINARY STREAM: Primary | ICD-10-CM

## 2022-05-17 PROCEDURE — 99024 POSTOP FOLLOW-UP VISIT: CPT | Performed by: UROLOGY

## 2022-05-17 ASSESSMENT — ENCOUNTER SYMPTOMS
BACK PAIN: 0
FATIGUE: 0
COUGH: 0
ARTHRALGIAS: 0
FREQUENCY: 0
FEVER: 0
UNEXPECTED WEIGHT CHANGE: 0
ABDOMINAL PAIN: 0
SINUS PAIN: 0
DYSURIA: 0
CHILLS: 0
HEMATURIA: 0
SINUS PRESSURE: 0
ACTIVITY CHANGE: 0
SHORTNESS OF BREATH: 0
NAUSEA: 0
CONSTIPATION: 0
VOMITING: 0
NECK PAIN: 0
DIARRHEA: 0
HEADACHES: 0

## 2022-05-17 ASSESSMENT — PAIN SCALES - GENERAL: PAINLEVEL: 0-NO PAIN

## 2022-05-17 NOTE — PROGRESS NOTES
"History of Present Illness  Cesar Yuen is a 71 y.o. male who presents in follow-up after undergoing RevoLix laser prostatectomy on 4/28/2022.  At his follow-up appointment on 5/11/2022 he was noted to have continued grossly bloody urine and was unable to void following Pope catheter removal.  He was found to have a culture proven Klebsiella pneumonia a UTI at that time. Cystoscopy was free of any active bleeding, and today patient states that gross hematuria improved approximately 2 days after that appointment.  He denies any fevers, chills, or flank pain.    Past Medical History:   Diagnosis Date   • Atrial flutter (CMS/HCC) (HCC)    • CHF (congestive heart failure) (CMS/HCC) (HCC)    • Chipped tooth     \"cracked tooth\" Back right upper, left upper back   • Dysrhythmia    • Edema     BLE   • History of transfusion    • Hypertension    • Injury of back 01/14/2021    Seen in .   • Urinary retention     indwelling Pope in place since November 2020        Past Surgical History:   Procedure Laterality Date   • ABDOMINAL SURGERY      anuerysm   • ABLATION A-FLUTTER N/A 2/8/2021    Procedure: Ablation A-flutter loop implant;  Surgeon: Timoteo Burk DO;  Location: [facility] EP Lab;  Service: Electrophysiology;  Laterality: N/A;   • APPENDECTOMY     • HERNIA REPAIR      x 3   • LOOP RECORDER INSERTION N/A 2/8/2021    Procedure: LOOP RECORDER INSERTION;  Surgeon: Timoteo Burk DO;  Location: [facility] EP Lab;  Service: Electrophysiology;  Laterality: N/A;   • PROSTATECTOMY N/A 4/28/2022    Procedure: REVOLIX LASER PROSTATECTOMY;  Surgeon: Attila Taylor MD;  Location: [facility] OR;  Service: Urology;  Laterality: N/A;   • TRANSURETHRAL RESECTION OF BLADDER TUMOR N/A 12/22/2020    Procedure: CYSTOSCOPY WITH BLADDER BIOPSY;  Surgeon: Daniele Johnson MD;  Location: [facility] OR;  Service: Urology;  Laterality: N/A;   • TRANSURETHRAL RESECTION OF PROSTATE N/A 12/22/2020    Procedure: CYSTOSCOPY TRANSURETHRAL RESECTION PROSTATE, exam " under anasthesia;  Surgeon: Daniele Johnson MD;  Location: [facility] OR;  Service: Urology;  Laterality: N/A;   • TRANSURETHRAL RESECTION OF PROSTATE N/A 4/27/2021    Procedure: CYSTOSCOPY TRANSURETHRAL RESECTION PROSTATE;  Surgeon: Daniele Johnson MD;  Location: [facility] OR;  Service: Urology;  Laterality: N/A;   • TRANSURETHRAL RESECTION OF PROSTATE N/A 9/27/2021    Procedure: CYSTOSCOPY TRANSURETHRAL RESECTION PROSTATE;  Surgeon: Attila Taylor MD;  Location: [facility] OR;  Service: Urology;  Laterality: N/A;  NOT EARLY MORNING       Family History   Problem Relation Age of Onset   • Alzheimer's disease Mother    • Diabetes Father    • Heart attack Mother's Brother        Social History     Tobacco Use   • Smoking status: Never Smoker   • Smokeless tobacco: Never Used   Substance Use Topics   • Alcohol use: Yes     Comment: rarely       Medications:   Current Outpatient Medications   Medication Sig Dispense Refill   • furosemide (LASIX) 20 mg tablet Take 20 mg by mouth daily     • apixaban (ELIQUIS) 2.5 mg tablet Take 5 mg by mouth     • multivitamin (MULTI-DAY ORAL) Take 1 tablet by mouth daily       • metoprolol succinate XL (TOPROL-XL) 25 mg 24 hr tablet Take 2 tablets (50 mg total) by mouth daily (Patient not taking: Reported on 4/7/2022) 60 tablet 11     No current facility-administered medications for this visit.       Allergies:  No Known Allergies    Review of Systems   Constitutional: Negative for activity change, chills, fatigue, fever and unexpected weight change.   HENT: Negative for sinus pressure and sinus pain.    Eyes: Negative for visual disturbance.   Respiratory: Negative for cough and shortness of breath.    Cardiovascular: Negative for chest pain.   Gastrointestinal: Negative for abdominal pain, constipation, diarrhea, nausea and vomiting.   Endocrine: Negative for cold intolerance and heat intolerance.   Genitourinary: Negative for dysuria, frequency, hematuria and urgency.   Musculoskeletal:  "Negative for arthralgias, back pain and neck pain.   Skin: Negative for rash.   Neurological: Negative for headaches.       Reviewed by provider during this visit:   Tobacco  Allergies  Meds  Problems  Med Hx  Surg Hx  Fam Hx           Vital signs:  /67 (BP Location: Right arm, Patient Position: Sitting, Cuff Size: Regular Adult)   Pulse 66   Temp 36.1 °C (97 °F) (Temporal)   Resp 18   Ht 1.854 m (6' 1\")   Wt 107.5 kg (237 lb)   SpO2 95%   BMI 31.27 kg/m²       Physical Exam   Constitutional: Pt appears well-developed and well-nourished.   Neck: Full range of motion, supple. Thyroid is normal in size, no nodules  Cardiovascular: RRR, normal carotid pulse  Pulmonary/Chest: Effort normal; lungs are clear to auscultation bilaterally   Abdominal: Soft. Nontender, no masses, no HSM; normal bowel sounds  Extremity: no peripheral edema or digital cyanosis   Skin: Skin is warm and dry, no rashes  Psychiatric: Alert and oriented to person, place and time  Genitourinary: Pope catheter in place draining clear yellow urine into very full leg bag.    Assessment/Plan      Diagnoses and all orders for this visit:    Benign prostatic hyperplasia with weak urinary stream       Plan: Voiding trial to be completed today.  Return precautions to include recurrence of gross hematuria or inability to void for an extended period of time reviewed with patient in detail.     Follow-up: Return in about 4 weeks (around 6/14/2022) for Recheck - In Office.    ---------------------------------    "

## 2022-05-19 PROCEDURE — 93298 REM INTERROG DEV EVAL SCRMS: CPT | Performed by: INTERNAL MEDICINE

## 2022-05-19 PROCEDURE — G2066 INTER DEVC REMOTE 30D: HCPCS | Performed by: INTERNAL MEDICINE

## 2022-05-23 ENCOUNTER — TELEPHONE (OUTPATIENT)
Dept: UROLOGY | Facility: CLINIC | Age: 72
End: 2022-05-23
Payer: COMMERCIAL

## 2022-05-23 NOTE — TELEPHONE ENCOUNTER
Caller would like to discuss (a) appointment     Patient: Cesar Yuen    Callback Number: [phone]    Best Availability: as soon as possible    PAS attempted to call the FD? Yes, not available     Additional Info: Patient is calling asking if his appt could be an earlier time.     I advised caller of a callback by 24-48 hours.

## 2022-05-23 NOTE — TELEPHONE ENCOUNTER
Caller would like to request: Return Call      Patient: Cesar Yuen    Callback Number: [phone]    Best call Availability: as soon as possible    Return call to: Kai    Additional Info: tried fd busy    I advised caller of a callback by 24-48 hours.

## 2022-06-05 NOTE — DISCHARGE INSTRUCTIONS
-At this time it appears you have an abdominal wall hernia secondary to previous abdominal surgery.  Recommend following up with general surgery.    -Thanks for letting me participate in your health care today.  -I do not expect your overall condition to worsen.  So if you begin experiencing any worsening symptoms, or other significant changes please seek medical re-evaluation.    -I recommend outpatient follow-up with your PCP, or another local provider.  -Thank you again and have a great day.    
Alert and oriented to person, place and time. Normal mood and affect, no apparent risk to self or others

## 2022-06-20 PROCEDURE — 93298 REM INTERROG DEV EVAL SCRMS: CPT | Performed by: INTERNAL MEDICINE

## 2022-06-20 PROCEDURE — G2066 INTER DEVC REMOTE 30D: HCPCS | Performed by: INTERNAL MEDICINE

## 2022-07-21 PROCEDURE — G2066 INTER DEVC REMOTE 30D: HCPCS | Performed by: INTERNAL MEDICINE

## 2022-07-21 PROCEDURE — 93298 REM INTERROG DEV EVAL SCRMS: CPT | Performed by: INTERNAL MEDICINE

## 2022-07-25 ENCOUNTER — APPOINTMENT (OUTPATIENT)
Dept: LAB | Facility: CLINIC | Age: 72
End: 2022-07-25
Payer: COMMERCIAL

## 2022-07-25 ENCOUNTER — OFFICE VISIT (OUTPATIENT)
Dept: UROLOGY | Facility: CLINIC | Age: 72
End: 2022-07-25
Payer: COMMERCIAL

## 2022-07-25 VITALS
DIASTOLIC BLOOD PRESSURE: 78 MMHG | RESPIRATION RATE: 18 BRPM | SYSTOLIC BLOOD PRESSURE: 135 MMHG | HEIGHT: 73 IN | OXYGEN SATURATION: 95 % | BODY MASS INDEX: 31.41 KG/M2 | HEART RATE: 57 BPM | WEIGHT: 237 LBS | TEMPERATURE: 97.7 F

## 2022-07-25 DIAGNOSIS — R33.9 URINARY RETENTION: Primary | ICD-10-CM

## 2022-07-25 DIAGNOSIS — R33.9 URINARY RETENTION: ICD-10-CM

## 2022-07-25 LAB
BACTERIA #/AREA URNS HPF: ABNORMAL /HPF
BILIRUB UR QL: NEGATIVE
CLARITY UR: CLEAR
COLOR UR: ABNORMAL
GLUCOSE UR QL: NEGATIVE MG/DL
HGB UR QL: ABNORMAL
KETONES UR-MCNC: NEGATIVE MG/DL
LEUKOCYTE ESTERASE UR QL STRIP: ABNORMAL
NITRITE UR QL: NEGATIVE
PH UR: 6 PH
PROT UR STRIP-MCNC: NEGATIVE MG/DL
RBC #/AREA URNS HPF: ABNORMAL /HPF
SP GR UR: 1.01 (ref 1–1.03)
SPECIMEN VOL ?TM UR: 537 ML
SQUAMOUS #/AREA URNS HPF: ABNORMAL /HPF
UROBILINOGEN UR-MCNC: 0.2 E.U./DL
WBC #/AREA URNS HPF: ABNORMAL /HPF

## 2022-07-25 PROCEDURE — 51798 US URINE CAPACITY MEASURE: CPT | Performed by: NURSE PRACTITIONER

## 2022-07-25 PROCEDURE — 99024 POSTOP FOLLOW-UP VISIT: CPT | Performed by: NURSE PRACTITIONER

## 2022-07-25 PROCEDURE — G0463 HOSPITAL OUTPT CLINIC VISIT: HCPCS | Performed by: NURSE PRACTITIONER

## 2022-07-25 PROCEDURE — 81001 URINALYSIS AUTO W/SCOPE: CPT

## 2022-07-25 PROCEDURE — 87088 URINE BACTERIA CULTURE: CPT

## 2022-07-25 RX ORDER — TAMSULOSIN HYDROCHLORIDE 0.4 MG/1
0.4 CAPSULE ORAL
Qty: 90 CAPSULE | Refills: 3 | Status: SHIPPED | OUTPATIENT
Start: 2022-07-25 | End: 2022-10-17 | Stop reason: ALTCHOICE

## 2022-07-25 ASSESSMENT — PAIN SCALES - GENERAL: PAINLEVEL: 0-NO PAIN

## 2022-07-25 NOTE — PROGRESS NOTES
"Urology Progress Note  7/25/2022    Chief Complaint:  Chief Complaint   Patient presents with   • Post-op     S/p Revolix 4-28-22.  Post op retention FUV     History of Present Illness:  Patient is a 72 year old male presenting to Urology Clinic today in follow-up after undergoing a TURP with Dr. Taylor on 9/27/2021.  Pope catheter was removed postoperatively, and patient was last seen in Urology Clinic on 5/17/2022 for follow-up after a cystoscopy with Dr. Taylor for which he underwent a voiding trial.    Patient states that since he was last seen, he has been doing well.  He states that his symptoms are well controlled, he takes tamsulosin 0.4 mg daily.  Current AUA SS 7.  Patient denies any sensation of incomplete bladder emptying, lower abdominal pain/pressure, states overall he feels well.    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    Past Medical History:  Past Medical History:   Diagnosis Date   • Atrial flutter (CMS/HCC) (HCC)    • CHF (congestive heart failure) (CMS/HCC) (HCC)    • Chipped tooth     \"cracked tooth\" Back right upper, left upper back   • Dysrhythmia    • Edema     BLE   • History of transfusion    • Hypertension    • Injury of back 01/14/2021    Seen in .   • Urinary retention     indwelling Pope in place since November 2020      Past Surgical History:  Past Surgical History:   Procedure Laterality Date   • ABDOMINAL SURGERY      anuerysm   • ABLATION A-FLUTTER N/A 2/8/2021    Procedure: Ablation A-flutter loop implant;  Surgeon: Timoteo Burk DO;  Location: [facility] EP Lab;  Service: Electrophysiology;  Laterality: N/A;   • APPENDECTOMY     • HERNIA REPAIR      x 3   • LOOP RECORDER INSERTION N/A 2/8/2021    Procedure: LOOP RECORDER INSERTION;  Surgeon: Timoteo Burk DO;  Location: [facility] EP Lab;  Service: Electrophysiology;  Laterality: N/A;   • " PROSTATECTOMY N/A 4/28/2022    Procedure: REVOLIX LASER PROSTATECTOMY;  Surgeon: Attila Taylor MD;  Location: [facility] OR;  Service: Urology;  Laterality: N/A;   • TRANSURETHRAL RESECTION OF BLADDER TUMOR N/A 12/22/2020    Procedure: CYSTOSCOPY WITH BLADDER BIOPSY;  Surgeon: Daniele Johnson MD;  Location: [facility] OR;  Service: Urology;  Laterality: N/A;   • TRANSURETHRAL RESECTION OF PROSTATE N/A 12/22/2020    Procedure: CYSTOSCOPY TRANSURETHRAL RESECTION PROSTATE, exam under anasthesia;  Surgeon: Daniele Johnson MD;  Location: [facility] OR;  Service: Urology;  Laterality: N/A;   • TRANSURETHRAL RESECTION OF PROSTATE N/A 4/27/2021    Procedure: CYSTOSCOPY TRANSURETHRAL RESECTION PROSTATE;  Surgeon: Daniele Johnson MD;  Location: [facility] OR;  Service: Urology;  Laterality: N/A;   • TRANSURETHRAL RESECTION OF PROSTATE N/A 9/27/2021    Procedure: CYSTOSCOPY TRANSURETHRAL RESECTION PROSTATE;  Surgeon: Attila Taylor MD;  Location: [facility] OR;  Service: Urology;  Laterality: N/A;  NOT EARLY MORNING      Social History:  Social History     Tobacco Use   • Smoking status: Never Smoker   • Smokeless tobacco: Never Used   Substance Use Topics   • Alcohol use: Yes     Comment: rarely     Family History:      Family History   Problem Relation Age of Onset   • Alzheimer's disease Mother    • Diabetes Father    • Heart attack Mother's Brother      Allergies:  No Known Allergies  Medications:  Current Outpatient Medications   Medication Sig Dispense Refill   • furosemide (LASIX) 20 mg tablet Take 20 mg by mouth daily     • apixaban (ELIQUIS) 2.5 mg tablet Take 5 mg by mouth     • multivitamin (MULTI-DAY ORAL) Take 1 tablet by mouth daily       • tamsulosin (FLOMAX) 0.4 mg capsule Take 1 capsule (0.4 mg total) by mouth daily with dinner 90 capsule 3     No current facility-administered medications for this visit.     REVIEW OF SYSTEMS:  GENERAL/CONSTITUTIONAL:  No change in appetite.  Denies chills.  Energy level is good.  Denies  "fevers.  RESPIRATORY:  No shortness of breath.  CARDIOVASCULAR:  No chest pain.  GASTROINTESTINAL:  No constipation, diarrhea, nausea, or vomiting.  GENITOURINARY:  See HPI for complete details.    VITAL SIGNS:   height is 1.854 m (6' 1\") and weight is 107.5 kg (237 lb). His temporal temperature is 36.5 °C (97.7 °F). His blood pressure is 135/78 and his pulse is 57. His respiration is 18 and oxygen saturation is 95%.     PHYSICAL EXAMINATION:  GENERAL APPEARANCE:  Alert and oriented x3, pleasant, well nourished, well developed, in no acute distress male.  HEAD:  Normocephalic, atraumatic.  NECK:  Neck supple, trachea midline  LYMPH NODES:  No cervical or supracervical adenopathy.  SKIN:  Normal, warm and dry.  HEART:  Regular rate and rhythm, no murmurs.  LUNGS:  Respirations even and unlabored, clear bilaterally.  ABDOMEN:  Soft, nontender, nondistended, bowel sounds present.   BACK:  No costovertebral angle tenderness, spine nontender to palpation.  MUSCULOSKELETAL:  Normal.  EXTREMITIES:  No edema.  NEUROLOGIC:  Gait normal.    Lab Results   Component Value Date    PSA 1.22 06/18/2020     Lab Results   Component Value Date    COLORU Light Yellow 07/25/2022    CLARITYU Clear 07/25/2022    SPECGRAVU 1.015 07/25/2022    LEUKOCYTESU Trace (A) 07/25/2022    NITRITEU Negative 07/25/2022    PROTUR Negative 07/25/2022    KETONESU Negative 07/25/2022    BILIRUBINU Negative 07/25/2022    BLOODU Moderate (A) 07/25/2022    GLUCOSEU Negative 07/25/2022    SEBASTIÁN 6.0 07/25/2022    WBC 6.8 04/07/2022    RBC 4.93 04/07/2022     Assessment and Plan:   Cesar was seen today for post-op.    Diagnoses and all orders for this visit:    Urinary retention  -     Urinalysis w/microscopic, reflex culture Urine, Clean Catch; Future  -     tamsulosin (FLOMAX) 0.4 mg capsule; Take 1 capsule (0.4 mg total) by mouth daily with dinner  -     POCT Bladder Scan  -     Urodynamics; Future      Results for orders placed or performed in visit on " 07/25/22   POCT Bladder Scan   Result Value Ref Range    Urine Volume 537 537 mL   71-year-old male with persistent urinary retention despite Revolix laser prostate ablation and medication therapy with Flomax.  Patient with large postvoid residual volume again today of approximately 500 cc, and patient is asymptomatic with this.  Therefore, patient to be set up for Urodynamics to determine if he has any element of neurogenic bladder, and could consider Axonics for urinary retention.  The patient was instructed to seek immediate medical attention for any fevers, chills, sweats, inability to void, or episodes of gross hematuria, and he was encouraged to contact the urology clinic with any questions or concerns prior next appointment.    Lalitha Patino, DNP

## 2022-07-25 NOTE — LETTER
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    Sincerely,        Lalitha Patino DNP

## 2022-07-27 LAB — BACTERIA UR CULT: NORMAL

## 2022-08-03 ENCOUNTER — TELEPHONE (OUTPATIENT)
Dept: FAMILY MEDICINE | Facility: CLINIC | Age: 72
End: 2022-08-03

## 2022-08-03 NOTE — TELEPHONE ENCOUNTER
Patient is calling in and states that he would like to talk to a nurse regarding some medications he had stopped taking. Please call and advice.

## 2022-08-03 NOTE — TELEPHONE ENCOUNTER
Marco has only been taking eliquis for the last few months.  He has stopped his metoprolol and lasix.  When he was seen by urology they told him he should see his pcp and should probably be taking his medications.  Physical exam scheduled for patient.

## 2022-08-15 ENCOUNTER — TELEPHONE (OUTPATIENT)
Dept: UROLOGY | Facility: CLINIC | Age: 72
End: 2022-08-15
Payer: COMMERCIAL

## 2022-08-15 DIAGNOSIS — Z87.440 HISTORY OF UTI: Primary | ICD-10-CM

## 2022-08-15 DIAGNOSIS — R82.998 DARK URINE: ICD-10-CM

## 2022-08-15 NOTE — TELEPHONE ENCOUNTER
Returned call to patient and asked what kind of symptoms that he is having and states that his urine is pretty dark.  No other symptoms.  Asked how much water he is drinking and states maybe 40 ounces a day.  Informed that needs to increase his water to at least 64 ounces of water a day and we would like it closer to 80 ounces.  Informed that I would like him to do this for the next few days and then report back to us.  Informed that we really can not justify doing a urine where he is having no others symptoms.  Agrees to this information and understands the plan.

## 2022-08-15 NOTE — TELEPHONE ENCOUNTER
Caller would like to request: ORDER    Patient: Cesar Yuen    Callback Number: [phone]    Best call Availability: as soon as possible    Order Type: UA    Symptoms: urinating really really dark urine, denies burning, frequency, urgency or pain    Additional Info: would like order sent to the [facility] clinic, please call pt once sent and it is ok to leave a detailed message as the patient does have difficulty hearing    I advised caller of a callback by 24-48 hours.

## 2022-08-17 ENCOUNTER — APPOINTMENT (OUTPATIENT)
Dept: LAB | Facility: CLINIC | Age: 72
End: 2022-08-17
Payer: COMMERCIAL

## 2022-08-17 DIAGNOSIS — Z87.440 HISTORY OF UTI: ICD-10-CM

## 2022-08-17 DIAGNOSIS — R82.998 DARK URINE: ICD-10-CM

## 2022-08-17 LAB
BACTERIA #/AREA URNS HPF: ABNORMAL /HPF
BILIRUB UR QL: NEGATIVE
CLARITY UR: CLEAR
COLOR UR: YELLOW
GLUCOSE UR QL: NEGATIVE MG/DL
HGB UR QL: ABNORMAL
KETONES UR-MCNC: NEGATIVE MG/DL
LEUKOCYTE ESTERASE UR QL STRIP: NEGATIVE
NITRITE UR QL: NEGATIVE
PH UR: 5.5 PH
PROT UR STRIP-MCNC: NEGATIVE MG/DL
RBC #/AREA URNS HPF: ABNORMAL /HPF
SP GR UR: 1.01 (ref 1–1.03)
SQUAMOUS #/AREA URNS HPF: ABNORMAL /HPF
UROBILINOGEN UR-MCNC: 0.2 E.U./DL
WBC #/AREA URNS HPF: ABNORMAL /HPF

## 2022-08-17 PROCEDURE — 81001 URINALYSIS AUTO W/SCOPE: CPT

## 2022-08-17 NOTE — TELEPHONE ENCOUNTER
Called patient to see how he is getting along and states that his urine is much clearer.  Informed that we did speak with Lalitha yesterday and she would like him to have a UA done just to make sure that he does not have an infection.  Informed that I am putting the order in right now and that he can have this done at any time today, agrees to this information.

## 2022-08-21 PROCEDURE — 93298 REM INTERROG DEV EVAL SCRMS: CPT | Performed by: INTERNAL MEDICINE

## 2022-08-21 PROCEDURE — G2066 INTER DEVC REMOTE 30D: HCPCS | Performed by: INTERNAL MEDICINE

## 2022-08-22 DIAGNOSIS — Z13.220 NEED FOR LIPID SCREENING: ICD-10-CM

## 2022-08-22 DIAGNOSIS — I10 PRIMARY HYPERTENSION: Primary | ICD-10-CM

## 2022-08-22 DIAGNOSIS — Z12.5 SCREENING FOR PROSTATE CANCER: ICD-10-CM

## 2022-09-21 PROCEDURE — G2066 INTER DEVC REMOTE 30D: HCPCS | Performed by: INTERNAL MEDICINE

## 2022-09-21 PROCEDURE — 93298 REM INTERROG DEV EVAL SCRMS: CPT | Performed by: INTERNAL MEDICINE

## 2022-09-22 ENCOUNTER — PROCEDURE VISIT (OUTPATIENT)
Dept: UROLOGY | Facility: CLINIC | Age: 72
End: 2022-09-22
Payer: COMMERCIAL

## 2022-09-22 ENCOUNTER — APPOINTMENT (OUTPATIENT)
Dept: LAB | Facility: CLINIC | Age: 72
End: 2022-09-22
Payer: COMMERCIAL

## 2022-09-22 VITALS
BODY MASS INDEX: 31.41 KG/M2 | DIASTOLIC BLOOD PRESSURE: 84 MMHG | HEART RATE: 69 BPM | SYSTOLIC BLOOD PRESSURE: 126 MMHG | TEMPERATURE: 98.8 F | WEIGHT: 237 LBS | OXYGEN SATURATION: 95 % | HEIGHT: 73 IN

## 2022-09-22 DIAGNOSIS — Z87.440 HISTORY OF UTI: ICD-10-CM

## 2022-09-22 DIAGNOSIS — R82.71 BACTERIURIA: ICD-10-CM

## 2022-09-22 DIAGNOSIS — R33.9 URINARY RETENTION: ICD-10-CM

## 2022-09-22 LAB
BACTERIA #/AREA URNS HPF: ABNORMAL /HPF
BILIRUB UR QL: NEGATIVE
CLARITY UR: CLEAR
COLOR UR: YELLOW
GLUCOSE UR QL: NEGATIVE MG/DL
HGB UR QL: ABNORMAL
KETONES UR-MCNC: NEGATIVE MG/DL
LEUKOCYTE ESTERASE UR QL STRIP: NEGATIVE
NITRITE UR QL: NEGATIVE
PH UR: 6 PH
PROT UR STRIP-MCNC: NEGATIVE MG/DL
RBC #/AREA URNS HPF: ABNORMAL /HPF
SP GR UR: <=1.005 (ref 1–1.03)
SQUAMOUS #/AREA URNS HPF: NEGATIVE /HPF
UROBILINOGEN UR-MCNC: 0.2 MG/DL
WBC #/AREA URNS HPF: ABNORMAL /HPF

## 2022-09-22 PROCEDURE — 51729 CYSTOMETROGRAM W/VP&UP: CPT | Mod: 26 | Performed by: UROLOGY

## 2022-09-22 PROCEDURE — 51784 ANAL/URINARY MUSCLE STUDY: CPT | Mod: 26,51 | Performed by: UROLOGY

## 2022-09-22 PROCEDURE — 51741 ELECTRO-UROFLOWMETRY FIRST: CPT | Performed by: UROLOGY

## 2022-09-22 PROCEDURE — 87088 URINE BACTERIA CULTURE: CPT

## 2022-09-22 PROCEDURE — 51741 ELECTRO-UROFLOWMETRY FIRST: CPT | Mod: 26,51 | Performed by: UROLOGY

## 2022-09-22 PROCEDURE — 51729 CYSTOMETROGRAM W/VP&UP: CPT | Performed by: UROLOGY

## 2022-09-22 PROCEDURE — 51797 INTRAABDOMINAL PRESSURE TEST: CPT | Performed by: UROLOGY

## 2022-09-22 PROCEDURE — 51797 INTRAABDOMINAL PRESSURE TEST: CPT | Mod: 26 | Performed by: UROLOGY

## 2022-09-22 PROCEDURE — 81001 URINALYSIS AUTO W/SCOPE: CPT

## 2022-09-22 PROCEDURE — 51784 ANAL/URINARY MUSCLE STUDY: CPT | Performed by: UROLOGY

## 2022-09-22 PROCEDURE — G0463 HOSPITAL OUTPT CLINIC VISIT: HCPCS | Mod: NC | Performed by: UROLOGY

## 2022-09-22 ASSESSMENT — PAIN SCALES - GENERAL: PAINLEVEL: 0-NO PAIN

## 2022-09-22 NOTE — PROGRESS NOTES
Urodynamics    Uroflow  Max flow: 1353 ml/sec  Average flow:  0 ml/sec  Volume: 700 ml   Pattern:  PVR:   590 ml      CMG  First sensation:  377 ml  Moderate sensation: 500 ml  Strong sensation: ml  Maximum detrusor pressure:  3 cmH2O  Valsalva leak point pressure: none at 125 cmH2O      EMG:  Normal during filling and voiding phases      Pressure Flow  Max flow:  42 ml/sec  Average flow:  1.1 ml/sec  Voided volume:  192  ml  Peak detrusor pressure:   73 cmH2O

## 2022-09-24 LAB — BACTERIA UR CULT: NORMAL

## 2022-10-14 NOTE — PROGRESS NOTES
"[facility]  [address] [place] [address]                                           Cardiology Outpatient Follow-up Note    Subjective    Patient ID: Cesar Yuen is a 72 y.o. male.      HPI    This is a 72-year-old gentleman who is a patient of Dr. Burk.  I last evaluated him 1 year ago.  Please see problems below outlining cardiac history.    Marco is here today for routine follow-up.  He also needs CDL clearance through the DOT.  He continues to drive a charter bus, almost daily.  States that he was only home for about 3 nights in the last several months.  Continues to travel long distances, usually to [place], [place], [place], [place], etc.  Denies any issues traveling.  No exertional symptoms or limitations.  I did review his most recent device checks, no recurrent atrial flutter that has been noted.  No adverse life-threatening bleeding on Eliquis.  He occasionally will have problems with urination as he has had prior biopsy of his prostate which then causes scar tissue.      Cardiology Problem List:      Atrial flutter  Atrial flutter ablation 2/8/2021, loop recorder implant  Anticoagulated on Eliquis    Past Medical History:   Diagnosis Date    Atrial flutter (CMS/HCC) (HCC)     CHF (congestive heart failure) (CMS/HCC) (HCC)     Chipped tooth     \"cracked tooth\" Back right upper, left upper back    Dysrhythmia     Edema     BLE    History of transfusion     Hypertension     Injury of back 01/14/2021    Seen in .    Urinary retention     indwelling Pope in place since November 2020        Past Surgical History:   Procedure Laterality Date    ABDOMINAL SURGERY      anuerysm    ABLATION A-FLUTTER N/A 2/8/2021    Procedure: Ablation A-flutter loop implant;  Surgeon: Timoteo Burk DO;  Location: [facility] EP Lab;  Service: Electrophysiology;  Laterality: N/A;    APPENDECTOMY      HERNIA REPAIR      x 3    LOOP RECORDER INSERTION N/A 2/8/2021    Procedure: LOOP RECORDER " INSERTION;  Surgeon: Timoteo Burk DO;  Location: [facility] EP Lab;  Service: Electrophysiology;  Laterality: N/A;    PROSTATECTOMY N/A 4/28/2022    Procedure: REVOLIX LASER PROSTATECTOMY;  Surgeon: Attila Taylor MD;  Location: [facility] OR;  Service: Urology;  Laterality: N/A;    TRANSURETHRAL RESECTION OF BLADDER TUMOR N/A 12/22/2020    Procedure: CYSTOSCOPY WITH BLADDER BIOPSY;  Surgeon: Daniele Johnson MD;  Location: [facility] OR;  Service: Urology;  Laterality: N/A;    TRANSURETHRAL RESECTION OF PROSTATE N/A 12/22/2020    Procedure: CYSTOSCOPY TRANSURETHRAL RESECTION PROSTATE, exam under anasthesia;  Surgeon: Daniele Johnson MD;  Location: [facility] OR;  Service: Urology;  Laterality: N/A;    TRANSURETHRAL RESECTION OF PROSTATE N/A 4/27/2021    Procedure: CYSTOSCOPY TRANSURETHRAL RESECTION PROSTATE;  Surgeon: Daniele Johnson MD;  Location: [facility] OR;  Service: Urology;  Laterality: N/A;    TRANSURETHRAL RESECTION OF PROSTATE N/A 9/27/2021    Procedure: CYSTOSCOPY TRANSURETHRAL RESECTION PROSTATE;  Surgeon: Attila Taylor MD;  Location: [facility] OR;  Service: Urology;  Laterality: N/A;  NOT EARLY MORNING       Allergies as of 10/17/2022    (No Known Allergies)       Current Outpatient Medications   Medication Sig Dispense Refill    furosemide (LASIX) 20 mg tablet Take 20 mg by mouth daily      apixaban (ELIQUIS) 2.5 mg tablet Take 5 mg by mouth       No current facility-administered medications for this visit.       Family History   Problem Relation Age of Onset    Alzheimer's disease Mother     Diabetes Father     Heart attack Mother's Brother        Social History     Tobacco Use    Smoking status: Never    Smokeless tobacco: Never   Vaping Use    Vaping Use: Never used   Substance Use Topics    Alcohol use: Yes     Comment: rarely    Drug use: Never       Review of Systems   All other systems reviewed and are negative.    Objective     Vitals:    10/17/22 1050   BP: 138/80   Pulse: 80   SpO2: 95%     Weight: 106.1 kg (234  lb)  Physical Exam    Constitutional: Well built, nourished, no acute distress.   HEENT: Head is normocephalic and atraumatic. Sclera anicteric.   Neck: Supple. No carotid bruits.  No JVD.  Lungs: Effort normal. Breath sounds are clear without wheezes or rales. No respiratory distress.   Heart: S1-S2, Regular rate and rhythm without murmur.   Extremities: Without any cyanosis, clubbing, lesions or edema. Radial pulses 2+.  Musculoskeletal: Normal ROM.    Neurologic: No focal deficits.  Psychiartic: cooperative, alert and orientated X 3.  Skin: warm, dry, intact.      Data Review:   Sodium   Date Value Ref Range Status   04/07/2022 139 135 - 145 mmol/L Final     Potassium   Date Value Ref Range Status   04/07/2022 3.6 3.5 - 5.1 MMOL/L Final     Chloride   Date Value Ref Range Status   04/07/2022 103 98 - 107 mmol/L Final     CO2   Date Value Ref Range Status   04/07/2022 31 21 - 32 mmol/L Final     BUN   Date Value Ref Range Status   04/07/2022 15 7 - 25 mg/dL Final     Creatinine   Date Value Ref Range Status   04/07/2022 0.84 0.70 - 1.30 mg/dL Final     Glucose   Date Value Ref Range Status   04/07/2022 88 70 - 105 mg/dL Final     Calcium   Date Value Ref Range Status   04/07/2022 9.5 8.6 - 10.3 mg/dL Final     hsTnI 0 Hour   Date Value Ref Range Status   01/16/2021 4.2 <=19.8 pg/mL Final     BNP   Date Value Ref Range Status   01/16/2021 67 0 - 100 pg/mL Final     WBC   Date Value Ref Range Status   04/07/2022 6.8 3.7 - 9.6 10*3/uL Final     Hemoglobin   Date Value Ref Range Status   04/07/2022 14.9 13.2 - 17.2 g/dL Final     Hematocrit   Date Value Ref Range Status   04/07/2022 45.1 38.0 - 50.0 % Final     MCV   Date Value Ref Range Status   04/07/2022 91.5 82.0 - 97.0 fL Final     Platelets   Date Value Ref Range Status   04/07/2022 244 130 - 350 10*3/uL Final     No results found for: CHOL, TRIG, HDL, LDLDIRECT, LDLCALC  No results found for: TSH    Assessment/Plan   Diagnoses and all orders for this  visit:    Atrial flutter, unspecified type (CMS/HCC) (HCC)    Status post placement of implantable loop recorder      Patient has a history of atrial flutter status post atrial flutter ablation with a loop recorder implanted in February 2021.  According to device interrogations he has had no recurrent atrial flutter.  Remains anticoagulated on Eliquis.  No adverse or life-threatening bleeding.  No exertional symptoms or limitations.  No signs of heart failure including fluid overloaded on exam today.  Normotensive.  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  No further testing warranted.  Provided him with a written letter today.    There are no Patient Instructions on file for this visit.    No follow-ups on file.    The patient verbalized correct understanding and agreement to today's instructions and plan.  The patient verbalized that all questions have been addressed.    On this date of service 18 minutes of total time was spent on this encounter.    Electronically signed by: NENITA Balderas  10/17/2022  11:11 AM    A voice recognition program was used to aid in documentation of this record. Sometimes words are not printed exactly as they were spoken. While efforts were made to carefully edit and correct any inaccuracies, some errors may be present; please take these into context. Please contact the provider if errors are identified.

## 2022-10-17 ENCOUNTER — OFFICE VISIT (OUTPATIENT)
Dept: CARDIOLOGY | Facility: CLINIC | Age: 72
End: 2022-10-17
Payer: COMMERCIAL

## 2022-10-17 VITALS
BODY MASS INDEX: 31.01 KG/M2 | HEIGHT: 73 IN | DIASTOLIC BLOOD PRESSURE: 80 MMHG | SYSTOLIC BLOOD PRESSURE: 138 MMHG | OXYGEN SATURATION: 95 % | HEART RATE: 80 BPM | WEIGHT: 234 LBS

## 2022-10-17 DIAGNOSIS — Z95.818 STATUS POST PLACEMENT OF IMPLANTABLE LOOP RECORDER: ICD-10-CM

## 2022-10-17 DIAGNOSIS — I48.92 ATRIAL FLUTTER, UNSPECIFIED TYPE (CMS/HCC): Primary | ICD-10-CM

## 2022-10-17 PROCEDURE — 99212 OFFICE O/P EST SF 10 MIN: CPT | Performed by: PHYSICIAN ASSISTANT

## 2022-10-17 PROCEDURE — G0463 HOSPITAL OUTPT CLINIC VISIT: HCPCS | Performed by: PHYSICIAN ASSISTANT

## 2022-10-17 ASSESSMENT — PAIN SCALES - GENERAL: PAINLEVEL: 0-NO PAIN

## 2022-10-17 NOTE — LETTER
10/17/22      [facility]  [address] [place] [address]  [phone]  Dept: [phone]      To whom it may concern,     I evaluated Marco in the clinic today. He continues to do well from a cardiac standpoint. Reviewed recent testing, including interrogations of his implantable loop recorder. He has had no recurrence of his arrhythmia since his ablation. 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 Please reach out if you have any concerns or questions.     Thanks,       Gisela Alba PA-C

## 2022-10-19 ENCOUNTER — APPOINTMENT (OUTPATIENT)
Dept: LAB | Facility: CLINIC | Age: 72
End: 2022-10-19
Payer: COMMERCIAL

## 2022-10-19 ENCOUNTER — OFFICE VISIT (OUTPATIENT)
Dept: UROLOGY | Facility: CLINIC | Age: 72
End: 2022-10-19
Payer: COMMERCIAL

## 2022-10-19 VITALS
BODY MASS INDEX: 31.01 KG/M2 | HEIGHT: 73 IN | SYSTOLIC BLOOD PRESSURE: 123 MMHG | TEMPERATURE: 97.3 F | OXYGEN SATURATION: 95 % | HEART RATE: 84 BPM | DIASTOLIC BLOOD PRESSURE: 68 MMHG | WEIGHT: 234 LBS

## 2022-10-19 DIAGNOSIS — R33.9 URINARY RETENTION: ICD-10-CM

## 2022-10-19 DIAGNOSIS — Z87.440 HISTORY OF UTI: ICD-10-CM

## 2022-10-19 LAB
ANION GAP SERPL CALC-SCNC: 11 MMOL/L (ref 3–11)
BACTERIA #/AREA URNS HPF: ABNORMAL /HPF
BILIRUB UR QL: NEGATIVE
BUN SERPL-MCNC: 15 MG/DL (ref 7–25)
CALCIUM SERPL-MCNC: 8.9 MG/DL (ref 8.6–10.3)
CHLORIDE SERPL-SCNC: 105 MMOL/L (ref 98–107)
CLARITY UR: ABNORMAL
CO2 SERPL-SCNC: 25 MMOL/L (ref 21–32)
COLOR UR: YELLOW
CREAT SERPL-MCNC: 1.07 MG/DL (ref 0.7–1.3)
GFR SERPL CREATININE-BSD FRML MDRD: 73 ML/MIN/1.73M*2
GLUCOSE SERPL-MCNC: 202 MG/DL (ref 70–105)
GLUCOSE UR QL: NEGATIVE MG/DL
HGB UR QL: ABNORMAL
KETONES UR-MCNC: NEGATIVE MG/DL
LEUKOCYTE ESTERASE UR QL STRIP: ABNORMAL
MUCOUS THREADS #/AREA URNS HPF: PRESENT /[HPF]
NITRITE UR QL: NEGATIVE
PH UR: 6 PH
POTASSIUM SERPL-SCNC: 4.1 MMOL/L (ref 3.5–5.1)
PROT UR STRIP-MCNC: ABNORMAL MG/DL
RBC #/AREA URNS HPF: ABNORMAL /HPF
SODIUM SERPL-SCNC: 141 MMOL/L (ref 135–145)
SP GR UR: 1.02 (ref 1–1.03)
SPECIMEN VOL ?TM UR: 426 ML
SQUAMOUS #/AREA URNS HPF: ABNORMAL /HPF
UROBILINOGEN UR-MCNC: 0.2 MG/DL
WBC #/AREA URNS HPF: ABNORMAL /HPF

## 2022-10-19 PROCEDURE — 99213 OFFICE O/P EST LOW 20 MIN: CPT | Performed by: NURSE PRACTITIONER

## 2022-10-19 PROCEDURE — 87088 URINE BACTERIA CULTURE: CPT

## 2022-10-19 PROCEDURE — 51798 US URINE CAPACITY MEASURE: CPT | Performed by: NURSE PRACTITIONER

## 2022-10-19 PROCEDURE — 80048 BASIC METABOLIC PNL TOTAL CA: CPT

## 2022-10-19 PROCEDURE — G0463 HOSPITAL OUTPT CLINIC VISIT: HCPCS | Performed by: NURSE PRACTITIONER

## 2022-10-19 PROCEDURE — 36415 COLL VENOUS BLD VENIPUNCTURE: CPT

## 2022-10-19 PROCEDURE — 81001 URINALYSIS AUTO W/SCOPE: CPT

## 2022-10-19 ASSESSMENT — PAIN SCALES - GENERAL: PAINLEVEL: 0-NO PAIN

## 2022-10-19 NOTE — LETTER
[facility]  [address] [place] [id number]  [phone]  Dept: [phone]  November 2, 2022     Patient: Cesar Yuen   YOB: 1950   Date of Visit: 10/19/2022       To Whom It May Concern:    It is my medical opinion that, from a urological stand point,  Cesar Yuen may return to light duty immediately with the following restrictions: Primary Care Physician approval to return to work..    If you have any questions or concerns in recommendations, please have Cesar contact Critical access hospital or work directly with employer’s internal employee health to address specific needs.            Sincerely,        Lalitha Patino DNP    Electronically signed by Lalitha Patino DNP at 8:48 AM    CC:   No Recipients

## 2022-10-20 LAB — BACTERIA UR CULT: NORMAL

## 2022-10-22 PROCEDURE — 93298 REM INTERROG DEV EVAL SCRMS: CPT | Performed by: INTERNAL MEDICINE

## 2022-10-22 PROCEDURE — G2066 INTER DEVC REMOTE 30D: HCPCS | Performed by: INTERNAL MEDICINE

## 2022-10-24 ENCOUNTER — HOSPITAL ENCOUNTER (OUTPATIENT)
Dept: ULTRASOUND IMAGING | Facility: HOSPITAL | Age: 72
Discharge: HOME | End: 2022-10-24
Payer: COMMERCIAL

## 2022-10-24 DIAGNOSIS — R33.9 URINARY RETENTION: ICD-10-CM

## 2022-10-24 PROCEDURE — 76770 US EXAM ABDO BACK WALL COMP: CPT

## 2022-10-24 PROCEDURE — 76770 US EXAM ABDO BACK WALL COMP: CPT | Mod: 26 | Performed by: RADIOLOGY

## 2022-10-25 ENCOUNTER — HOSPITAL ENCOUNTER (EMERGENCY)
Facility: HOSPITAL | Age: 72
Discharge: HOME | End: 2022-10-25
Attending: EMERGENCY MEDICINE
Payer: COMMERCIAL

## 2022-10-25 VITALS
DIASTOLIC BLOOD PRESSURE: 80 MMHG | TEMPERATURE: 98.2 F | OXYGEN SATURATION: 94 % | SYSTOLIC BLOOD PRESSURE: 138 MMHG | HEART RATE: 91 BPM | HEIGHT: 73 IN | RESPIRATION RATE: 18 BRPM | WEIGHT: 234 LBS | BODY MASS INDEX: 31.01 KG/M2

## 2022-10-25 DIAGNOSIS — M10.9 GOUT: Primary | ICD-10-CM

## 2022-10-25 LAB
ALBUMIN SERPL-MCNC: 3.9 G/DL (ref 3.5–5.3)
ALP SERPL-CCNC: 52 U/L (ref 45–115)
ALT SERPL-CCNC: 14 U/L (ref 7–52)
ANION GAP SERPL CALC-SCNC: 9 MMOL/L (ref 3–11)
AST SERPL-CCNC: 19 U/L
BASOPHILS # BLD AUTO: 0.1 10*3/UL
BASOPHILS NFR BLD AUTO: 0.6 % (ref 0–2)
BILIRUB SERPL-MCNC: 0.7 MG/DL (ref 0.2–1.4)
BUN SERPL-MCNC: 20 MG/DL (ref 7–25)
CALCIUM ALBUM COR SERPL-MCNC: 9.2 MG/DL (ref 8.6–10.3)
CALCIUM SERPL-MCNC: 9.1 MG/DL (ref 8.6–10.3)
CHLORIDE SERPL-SCNC: 102 MMOL/L (ref 98–107)
CO2 SERPL-SCNC: 25 MMOL/L (ref 21–32)
CREAT SERPL-MCNC: 0.86 MG/DL (ref 0.7–1.3)
EOSINOPHIL # BLD AUTO: 0.1 10*3/UL
EOSINOPHIL NFR BLD AUTO: 0.7 % (ref 0–3)
ERYTHROCYTE [DISTWIDTH] IN BLOOD BY AUTOMATED COUNT: 13.9 % (ref 11.5–15)
GFR SERPL CREATININE-BSD FRML MDRD: 91 ML/MIN/1.73M*2
GLUCOSE SERPL-MCNC: 123 MG/DL (ref 70–105)
HCT VFR BLD AUTO: 44.1 % (ref 38–50)
HGB BLD-MCNC: 14.6 G/DL (ref 13.2–17.2)
LYMPHOCYTES # BLD AUTO: 1.2 10*3/UL
LYMPHOCYTES NFR BLD AUTO: 9 % (ref 15–47)
MCH RBC QN AUTO: 30.3 PG (ref 29–34)
MCHC RBC AUTO-ENTMCNC: 33.2 G/DL (ref 32–36)
MCV RBC AUTO: 91.4 FL (ref 82–97)
MONOCYTES # BLD AUTO: 1.3 10*3/UL
MONOCYTES NFR BLD AUTO: 9.7 % (ref 5–13)
NEUTROPHILS # BLD AUTO: 10.6 10*3/UL
NEUTROPHILS NFR BLD AUTO: 80 % (ref 46–70)
PLATELET # BLD AUTO: 238 10*3/UL (ref 130–350)
PMV BLD AUTO: 8.3 FL (ref 6.9–10.8)
POTASSIUM SERPL-SCNC: 4.1 MMOL/L (ref 3.5–5.1)
PROT SERPL-MCNC: 7.1 G/DL (ref 6–8.3)
RBC # BLD AUTO: 4.82 10*6/ΜL (ref 4.1–5.8)
SODIUM SERPL-SCNC: 136 MMOL/L (ref 135–145)
URATE SERPL-MCNC: 5.4 MG/DL (ref 4.4–7.6)
WBC # BLD AUTO: 13.3 10*3/UL (ref 3.7–9.6)

## 2022-10-25 PROCEDURE — 80053 COMPREHEN METABOLIC PANEL: CPT | Performed by: EMERGENCY MEDICINE

## 2022-10-25 PROCEDURE — 99284 EMERGENCY DEPT VISIT MOD MDM: CPT | Performed by: EMERGENCY MEDICINE

## 2022-10-25 PROCEDURE — 85025 COMPLETE CBC W/AUTO DIFF WBC: CPT | Performed by: EMERGENCY MEDICINE

## 2022-10-25 PROCEDURE — 96372 THER/PROPH/DIAG INJ SC/IM: CPT

## 2022-10-25 PROCEDURE — 36415 COLL VENOUS BLD VENIPUNCTURE: CPT | Performed by: EMERGENCY MEDICINE

## 2022-10-25 PROCEDURE — 99282 EMERGENCY DEPT VISIT SF MDM: CPT | Performed by: EMERGENCY MEDICINE

## 2022-10-25 PROCEDURE — 84550 ASSAY OF BLOOD/URIC ACID: CPT | Performed by: EMERGENCY MEDICINE

## 2022-10-25 PROCEDURE — 6360000200 HC RX 636 W HCPCS (ALT 250 FOR IP): Performed by: EMERGENCY MEDICINE

## 2022-10-25 RX ORDER — PREDNISONE 10 MG/1
1 TABLET ORAL ONCE
Status: COMPLETED | OUTPATIENT
Start: 2022-10-25 | End: 2022-10-25

## 2022-10-25 RX ORDER — KETOROLAC TROMETHAMINE 30 MG/ML
30 INJECTION, SOLUTION INTRAMUSCULAR; INTRAVENOUS ONCE
Status: COMPLETED | OUTPATIENT
Start: 2022-10-25 | End: 2022-10-25

## 2022-10-25 RX ADMIN — KETOROLAC TROMETHAMINE 30 MG: 30 INJECTION, SOLUTION INTRAMUSCULAR; INTRAVENOUS at 01:18

## 2022-10-25 RX ADMIN — PREDNISONE 1 PACKAGE: 10 TABLET ORAL at 01:15

## 2022-10-25 ASSESSMENT — ENCOUNTER SYMPTOMS
ACTIVITY CHANGE: 1
RESPIRATORY NEGATIVE: 1
NEUROLOGICAL NEGATIVE: 1
ARTHRALGIAS: 1
JOINT SWELLING: 1
PSYCHIATRIC NEGATIVE: 1
CHILLS: 0
GASTROINTESTINAL NEGATIVE: 1
CARDIOVASCULAR NEGATIVE: 1
FEVER: 0

## 2022-10-25 NOTE — DISCHARGE INSTRUCTIONS
You were given a take-home of prednisone.  This is a steroid that is treatment for acute gout attack.  Take 3 tablets daily for 7 to 10 days.  He can stop taking this medication when you are pain has significantly improved.  Follow-up with your primary care provider for reevaluation as needed.  Return to the emergency department if you develop fevers or sudden worsening of symptoms.

## 2022-10-25 NOTE — ED PROVIDER NOTES
"    HPI:  Chief Complaint   Patient presents with    Wrist Pain     Left wrist pain starting today and getting worse. Denies injury.        72-year-old gentleman presents emergency department for evaluation of left wrist pain.  It started earlier this afternoon is gotten worse throughout the day.  He has not had any injury.  He does feel warm and tender.  It hurts with flexion or extension.  He tried Tylenol at home with no relief.        HISTORY:  Past Medical History:   Diagnosis Date    Atrial flutter (CMS/HCC) (HCC)     CHF (congestive heart failure) (CMS/HCC) (HCC)     Chipped tooth     \"cracked tooth\" Back right upper, left upper back    Dysrhythmia     Edema     BLE    History of transfusion     Hypertension     Injury of back 01/14/2021    Seen in .    Urinary retention     indwelling Pope in place since November 2020        Past Surgical History:   Procedure Laterality Date    ABDOMINAL SURGERY      anuerysm    ABLATION A-FLUTTER N/A 2/8/2021    Procedure: Ablation A-flutter loop implant;  Surgeon: Timoteo Burk DO;  Location: [facility] EP Lab;  Service: Electrophysiology;  Laterality: N/A;    APPENDECTOMY      HERNIA REPAIR      x 3    LOOP RECORDER INSERTION N/A 2/8/2021    Procedure: LOOP RECORDER INSERTION;  Surgeon: Timoteo Burk DO;  Location: [facility] EP Lab;  Service: Electrophysiology;  Laterality: N/A;    PROSTATECTOMY N/A 4/28/2022    Procedure: REVOLIX LASER PROSTATECTOMY;  Surgeon: Attila Taylor MD;  Location: [facility] OR;  Service: Urology;  Laterality: N/A;    TRANSURETHRAL RESECTION OF BLADDER TUMOR N/A 12/22/2020    Procedure: CYSTOSCOPY WITH BLADDER BIOPSY;  Surgeon: Daniele Johnson MD;  Location: [facility] OR;  Service: Urology;  Laterality: N/A;    TRANSURETHRAL RESECTION OF PROSTATE N/A 12/22/2020    Procedure: CYSTOSCOPY TRANSURETHRAL RESECTION PROSTATE, exam under anasthesia;  Surgeon: Daniele Johnson MD;  Location: [facility] OR;  Service: Urology;  Laterality: N/A;    TRANSURETHRAL RESECTION OF " PROSTATE N/A 4/27/2021    Procedure: CYSTOSCOPY TRANSURETHRAL RESECTION PROSTATE;  Surgeon: Daniele Johnson MD;  Location: [facility] OR;  Service: Urology;  Laterality: N/A;    TRANSURETHRAL RESECTION OF PROSTATE N/A 9/27/2021    Procedure: CYSTOSCOPY TRANSURETHRAL RESECTION PROSTATE;  Surgeon: Attila Taylor MD;  Location: [facility] OR;  Service: Urology;  Laterality: N/A;  NOT EARLY MORNING       Family History   Problem Relation Age of Onset    Alzheimer's disease Mother     Diabetes Father     Heart attack Mother's Brother        Social History     Tobacco Use    Smoking status: Never    Smokeless tobacco: Never   Vaping Use    Vaping Use: Never used   Substance Use Topics    Alcohol use: Yes     Comment: rarely    Drug use: Never         ROS:  Review of Systems   Constitutional:  Positive for activity change. Negative for chills and fever.   HENT: Negative.     Respiratory: Negative.     Cardiovascular: Negative.    Gastrointestinal: Negative.    Musculoskeletal:  Positive for arthralgias and joint swelling.   Neurological: Negative.    Psychiatric/Behavioral: Negative.       PE:  ED Triage Vitals [10/25/22 0049]   Temp Heart Rate Resp BP SpO2   36.8 °C (98.2 °F) 91 18 138/80 94 %      Mean BP (mmHg) Temp Source Heart Rate Source Patient Position BP Location   -- Tympanic -- -- --      FiO2 (%)       --           Physical Exam  Vitals and nursing note reviewed.   Constitutional:       General: He is in acute distress (mild).   HENT:      Head: Normocephalic and atraumatic.      Mouth/Throat:      Mouth: Mucous membranes are moist.   Eyes:      Extraocular Movements: Extraocular movements intact.      Pupils: Pupils are equal, round, and reactive to light.   Cardiovascular:      Rate and Rhythm: Normal rate. Rhythm irregular.   Pulmonary:      Effort: Pulmonary effort is normal.      Breath sounds: Normal breath sounds.   Abdominal:      Palpations: Abdomen is soft.      Tenderness: There is no abdominal tenderness.    Musculoskeletal:         General: Swelling and tenderness present. No deformity or signs of injury.      Cervical back: Neck supple.      Comments: Left wrist, warm, mildly edematous with tenderness with range of motion.     Skin:     General: Skin is warm.      Findings: No erythema or rash.   Neurological:      Mental Status: He is alert and oriented to person, place, and time.   Psychiatric:         Mood and Affect: Mood normal.       ED LABS:  Labs Reviewed - No data to display      ED IMAGES:  No orders to display       ED PROCEDURES:  Procedures    ED COURSE:     72-year-old gentleman with left wrist pain.  His symptoms are consistent with gout.  He does not have a history of gout.  He was given Toradol 30 mg IM and started on prednisone 30 mg daily for 7 to 10 days.  He will stop his steroids 24 hours after seeing them to have significantly improved.  Labs including CBC, uric acid and CMP were obtained in the emergency department.       Sepsis Quality Bundle         MDM:  MDM  Number of Diagnoses or Management Options  Gout: new and requires workup     Amount and/or Complexity of Data Reviewed  Clinical lab tests: ordered and reviewed    Risk of Complications, Morbidity, and/or Mortality  Presenting problems: moderate  Management options: moderate    Patient Progress  Patient progress: stable      Final diagnoses:   [M10.9] Gout     10/25/2022  1:19 AM                   Mary Jane Meza DO  10/25/22 0120

## 2022-10-31 DIAGNOSIS — I48.3 TYPICAL ATRIAL FLUTTER (CMS/HCC): Primary | ICD-10-CM

## 2022-10-31 NOTE — TELEPHONE ENCOUNTER
No new care gaps identified.  [facility] Embedded Care Gaps. Reference number: [id number]. 10/31/2022 1:55:19 PM DARLING

## 2022-11-01 NOTE — TELEPHONE ENCOUNTER
Medication refill request:  Medication(s):  Eliquis not filled due to (route to Nurse Pool) Historical Provider listed, please update to PCP if appropriate to fill

## 2022-11-02 LAB
SPECIMEN VOL ?TM UR: 426 ML
SPECIMEN VOL ?TM UR: 426 ML

## 2022-11-02 NOTE — PROGRESS NOTES
"Urology Progress Note  10/19/2022    Chief Complaint:  Chief Complaint   Patient presents with    Follow-up     S/p TURP, had a UDS with Dr Anderson     History of Present Illness:  Patient is a 72 year old male presenting to Urology Clinic today in follow-up for history of BPH, status-post TURP on 9/27/2021 and Revolix laser prostatectomy on 4/28/2022 with Dr. Taylor, last seen in Urology Clinic on 9/22/2022 by Dr. Anderson for Urodynamics, however no interpretation of results was reported.    Patient states that since he was last seen in clinic, he feels it takes him quite a bit to empty his bladder, especially if he had to hold it for too long. He continues to take 0.4 mg daily of Flomax. He denies any pain/burning with urination or further episodes of gross hematuria, and feels over all \"things are fine\". Patient denies any sensation of incomplete bladder emptying, lower abdominal pain/pressure. Current AUA SS 9.     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    Past Medical History:  Past Medical History:   Diagnosis Date    Atrial flutter (CMS/HCC) (HCC)     CHF (congestive heart failure) (CMS/HCC) (HCC)     Chipped tooth     \"cracked tooth\" Back right upper, left upper back    Dysrhythmia     Edema     BLE    History of transfusion     Hypertension     Injury of back 01/14/2021    Seen in .    Urinary retention     indwelling Pope in place since November 2020      Past Surgical History:  Past Surgical History:   Procedure Laterality Date    ABDOMINAL SURGERY      anuerysm    ABLATION A-FLUTTER N/A 2/8/2021    Procedure: Ablation A-flutter loop implant;  Surgeon: Timoteo Burk DO;  Location: [facility] EP Lab;  Service: Electrophysiology;  Laterality: N/A;    APPENDECTOMY      HERNIA REPAIR      x 3    LOOP RECORDER INSERTION N/A 2/8/2021    Procedure: LOOP RECORDER INSERTION;  " Surgeon: Timoteo Burk DO;  Location: [facility] EP Lab;  Service: Electrophysiology;  Laterality: N/A;    PROSTATECTOMY N/A 4/28/2022    Procedure: REVOLIX LASER PROSTATECTOMY;  Surgeon: Attila Taylor MD;  Location: [facility] OR;  Service: Urology;  Laterality: N/A;    TRANSURETHRAL RESECTION OF BLADDER TUMOR N/A 12/22/2020    Procedure: CYSTOSCOPY WITH BLADDER BIOPSY;  Surgeon: Daniele Johnson MD;  Location: [facility] OR;  Service: Urology;  Laterality: N/A;    TRANSURETHRAL RESECTION OF PROSTATE N/A 12/22/2020    Procedure: CYSTOSCOPY TRANSURETHRAL RESECTION PROSTATE, exam under anasthesia;  Surgeon: Daniele Johnson MD;  Location: [facility] OR;  Service: Urology;  Laterality: N/A;    TRANSURETHRAL RESECTION OF PROSTATE N/A 4/27/2021    Procedure: CYSTOSCOPY TRANSURETHRAL RESECTION PROSTATE;  Surgeon: Daniele Johnson MD;  Location: [facility] OR;  Service: Urology;  Laterality: N/A;    TRANSURETHRAL RESECTION OF PROSTATE N/A 9/27/2021    Procedure: CYSTOSCOPY TRANSURETHRAL RESECTION PROSTATE;  Surgeon: Attila Taylor MD;  Location: [facility] OR;  Service: Urology;  Laterality: N/A;  NOT EARLY MORNING      Social History:  Social History     Tobacco Use    Smoking status: Never    Smokeless tobacco: Never   Substance Use Topics    Alcohol use: Yes     Comment: rarely     Family History:      Family History   Problem Relation Age of Onset    Alzheimer's disease Mother     Diabetes Father     Heart attack Mother's Brother      Allergies:  No Known Allergies  Medications:  Current Outpatient Medications   Medication Sig Dispense Refill    furosemide (LASIX) 20 mg tablet Take 20 mg by mouth daily      apixaban (ELIQUIS) 2.5 mg tablet Take 2 tablets (5 mg total) by mouth 2 (two) times a day 360 tablet 2     No current facility-administered medications for this visit.     REVIEW OF SYSTEMS:  GENERAL/CONSTITUTIONAL:  No change in appetite.  Denies chills.  Energy level is good.  Denies fevers.  RESPIRATORY:  No shortness of  "breath.  CARDIOVASCULAR:  No chest pain.  GASTROINTESTINAL:  No constipation, diarrhea, nausea, or vomiting.  GENITOURINARY:  See HPI for complete details.    VITAL SIGNS:   height is 1.854 m (6' 1\") and weight is 106.1 kg (234 lb). His temporal temperature is 36.3 °C (97.3 °F). His blood pressure is 123/68 and his pulse is 84. His oxygen saturation is 95%.     PHYSICAL EXAMINATION:  GENERAL APPEARANCE:  Alert and oriented x3, pleasant, well nourished, well developed, in no acute distress male.  HEAD:  Normocephalic, atraumatic.  NECK:  Neck supple, trachea midline  LYMPH NODES:  No cervical or supracervical adenopathy.  SKIN:  Normal, warm and dry.  HEART:  Regular rate and rhythm, no murmurs.  LUNGS:  Respirations even and unlabored, clear bilaterally.  ABDOMEN:  Soft, nontender, nondistended, bowel sounds present.   BACK:  No costovertebral angle tenderness, spine nontender to palpation.  MUSCULOSKELETAL:  Normal.  EXTREMITIES:  No edema.  NEUROLOGIC:  Gait normal.    Lab Results   Component Value Date    PSA 1.22 06/18/2020     Lab Results   Component Value Date    COLORU Yellow 10/19/2022    CLARITYU Cloudy (A) 10/19/2022    SPECGRAVU 1.020 10/19/2022    LEUKOCYTESU Large (A) 10/19/2022    NITRITEU Negative 10/19/2022    PROTUR Trace (A) 10/19/2022    KETONESU Negative 10/19/2022    BILIRUBINU Negative 10/19/2022    BLOODU Large (A) 10/19/2022    GLUCOSEU Negative 10/19/2022    SEBASTIÁN 6.0 10/19/2022    WBC 13.3 (H) 10/25/2022    RBC 4.82 10/25/2022     Assessment and Plan:   Cesar was seen today for follow-up.    Diagnoses and all orders for this visit:    Urinary retention  -     POCT Bladder Scan  -     POCT Bladder Scan  -     US renal with bladder; Future  -     Basic metabolic panel Blood, Venous; Future    Results for orders placed or performed in visit on 10/19/22   POCT Bladder Scan   Result Value Ref Range    Urine Volume 426 mL   72 year old male with persistent urinary retention despite TURP, Revolix " laser prostate ablation and medication therapy with Flomax 0.4 mg daily.  Patient with large postvoid residual volume again today of approximately 426 cc, however patient is asymptomatic with this.  Therefore, we will have the patient obtain a renal ultrasound to ensure no hydronephrosis, and will obtain a BMP to ensure stability of renal function.  The patient was instructed to seek immediate medical attention for any fevers, chills, sweats, inability to void, or episodes of gross hematuria, and he was encouraged to contact the urology clinic with any questions or concerns prior next appointment.    Lalitha Patino, DNP

## 2022-11-22 PROCEDURE — 93298 REM INTERROG DEV EVAL SCRMS: CPT | Performed by: INTERNAL MEDICINE

## 2022-11-22 PROCEDURE — G2066 INTER DEVC REMOTE 30D: HCPCS | Performed by: INTERNAL MEDICINE

## 2022-12-20 DIAGNOSIS — Z13.220 NEED FOR LIPID SCREENING: ICD-10-CM

## 2022-12-20 DIAGNOSIS — Z12.5 SCREENING FOR PROSTATE CANCER: ICD-10-CM

## 2022-12-20 DIAGNOSIS — Z11.59 NEED FOR HEPATITIS C SCREENING TEST: ICD-10-CM

## 2022-12-20 DIAGNOSIS — I48.92 ATRIAL FLUTTER, UNSPECIFIED TYPE (CMS/HCC): Primary | ICD-10-CM

## 2022-12-23 PROCEDURE — G2066 INTER DEVC REMOTE 30D: HCPCS | Performed by: INTERNAL MEDICINE

## 2022-12-23 PROCEDURE — 93298 REM INTERROG DEV EVAL SCRMS: CPT | Performed by: INTERNAL MEDICINE

## 2023-01-23 PROCEDURE — G2066 INTER DEVC REMOTE 30D: HCPCS | Performed by: INTERNAL MEDICINE

## 2023-01-23 PROCEDURE — 93298 REM INTERROG DEV EVAL SCRMS: CPT | Performed by: INTERNAL MEDICINE

## 2023-01-24 ENCOUNTER — OFFICE VISIT (OUTPATIENT)
Dept: FAMILY MEDICINE | Facility: CLINIC | Age: 73
End: 2023-01-24
Payer: MEDICARE

## 2023-01-24 ENCOUNTER — LAB (OUTPATIENT)
Dept: LAB | Facility: CLINIC | Age: 73
End: 2023-01-24
Payer: MEDICARE

## 2023-01-24 VITALS
WEIGHT: 243.2 LBS | RESPIRATION RATE: 20 BRPM | DIASTOLIC BLOOD PRESSURE: 64 MMHG | OXYGEN SATURATION: 92 % | TEMPERATURE: 97.2 F | BODY MASS INDEX: 32.23 KG/M2 | HEART RATE: 85 BPM | HEIGHT: 73 IN | SYSTOLIC BLOOD PRESSURE: 126 MMHG

## 2023-01-24 DIAGNOSIS — I10 PRIMARY HYPERTENSION: ICD-10-CM

## 2023-01-24 DIAGNOSIS — I48.3 TYPICAL ATRIAL FLUTTER (CMS/HCC): ICD-10-CM

## 2023-01-24 DIAGNOSIS — Z11.59 NEED FOR HEPATITIS C SCREENING TEST: ICD-10-CM

## 2023-01-24 DIAGNOSIS — R60.9 EDEMA, UNSPECIFIED TYPE: ICD-10-CM

## 2023-01-24 DIAGNOSIS — I48.92 ATRIAL FLUTTER, UNSPECIFIED TYPE (CMS/HCC): ICD-10-CM

## 2023-01-24 DIAGNOSIS — Z00.00 MEDICARE ANNUAL WELLNESS VISIT, SUBSEQUENT: Primary | ICD-10-CM

## 2023-01-24 DIAGNOSIS — Z13.220 NEED FOR LIPID SCREENING: ICD-10-CM

## 2023-01-24 DIAGNOSIS — Z12.5 SCREENING FOR PROSTATE CANCER: ICD-10-CM

## 2023-01-24 DIAGNOSIS — R73.01 ELEVATED FASTING GLUCOSE: ICD-10-CM

## 2023-01-24 PROBLEM — U07.1 COVID-19: Status: RESOLVED | Noted: 2022-01-03 | Resolved: 2023-01-24

## 2023-01-24 LAB
ALBUMIN SERPL-MCNC: 3.9 G/DL (ref 3.5–5.3)
ALP SERPL-CCNC: 50 U/L (ref 45–115)
ALT SERPL-CCNC: 11 U/L (ref 7–52)
ANION GAP SERPL CALC-SCNC: 8 MMOL/L (ref 3–11)
AST SERPL-CCNC: 15 U/L
BILIRUB SERPL-MCNC: 0.85 MG/DL (ref 0.2–1.4)
BUN SERPL-MCNC: 14 MG/DL (ref 7–25)
CALCIUM ALBUM COR SERPL-MCNC: 9.4 MG/DL (ref 8.6–10.3)
CALCIUM SERPL-MCNC: 9.3 MG/DL (ref 8.6–10.3)
CHLORIDE SERPL-SCNC: 105 MMOL/L (ref 98–107)
CHOLEST SERPL-MCNC: 161 MG/DL (ref 0–199)
CO2 SERPL-SCNC: 28 MMOL/L (ref 21–32)
CREAT SERPL-MCNC: 0.92 MG/DL (ref 0.7–1.3)
ERYTHROCYTE [DISTWIDTH] IN BLOOD BY AUTOMATED COUNT: 14.7 % (ref 11.5–15)
FASTING STATUS PATIENT QL REPORTED: NO
GFR SERPL CREATININE-BSD FRML MDRD: 88 ML/MIN/1.73M*2
GLUCOSE SERPL-MCNC: 126 MG/DL (ref 70–105)
HCT VFR BLD AUTO: 44 % (ref 38–50)
HCV AB SER QL: NORMAL
HDLC SERPL-MCNC: 33 MG/DL
HGB BLD-MCNC: 14.5 G/DL (ref 13.2–17.2)
LDLC SERPL CALC-MCNC: 98 MG/DL (ref 20–99)
MCH RBC QN AUTO: 30.2 PG (ref 29–34)
MCHC RBC AUTO-ENTMCNC: 32.9 G/DL (ref 32–36)
MCV RBC AUTO: 91.7 FL (ref 82–97)
PLATELET # BLD AUTO: 216 10*3/UL (ref 130–350)
PMV BLD AUTO: 8.3 FL (ref 6.9–10.8)
POTASSIUM SERPL-SCNC: 4.1 MMOL/L (ref 3.5–5.1)
PROT SERPL-MCNC: 6.6 G/DL (ref 6–8.3)
PSA SERPL-MCNC: 0.29 NG/ML (ref 0–4)
RBC # BLD AUTO: 4.8 10*6/ΜL (ref 4.1–5.8)
SODIUM SERPL-SCNC: 141 MMOL/L (ref 135–145)
TRIGL SERPL-MCNC: 149 MG/DL
WBC # BLD AUTO: 7.1 10*3/UL (ref 3.7–9.6)

## 2023-01-24 PROCEDURE — 86803 HEPATITIS C AB TEST: CPT

## 2023-01-24 PROCEDURE — 83036 HEMOGLOBIN GLYCOSYLATED A1C: CPT

## 2023-01-24 PROCEDURE — 80061 LIPID PANEL: CPT

## 2023-01-24 PROCEDURE — 85027 COMPLETE CBC AUTOMATED: CPT

## 2023-01-24 PROCEDURE — 36415 COLL VENOUS BLD VENIPUNCTURE: CPT

## 2023-01-24 PROCEDURE — G0439 PPPS, SUBSEQ VISIT: HCPCS | Performed by: FAMILY MEDICINE

## 2023-01-24 PROCEDURE — 80053 COMPREHEN METABOLIC PANEL: CPT

## 2023-01-24 PROCEDURE — 84153 ASSAY OF PSA TOTAL: CPT

## 2023-01-24 PROCEDURE — 96160 PT-FOCUSED HLTH RISK ASSMT: CPT | Performed by: FAMILY MEDICINE

## 2023-01-24 RX ORDER — APIXABAN 5 MG/1
5 TABLET, FILM COATED ORAL 2 TIMES DAILY
COMMUNITY
Start: 2022-11-01 | End: 2023-01-24 | Stop reason: SDUPTHER

## 2023-01-24 RX ORDER — APIXABAN 5 MG/1
5 TABLET, FILM COATED ORAL 2 TIMES DAILY
Qty: 180 TABLET | Refills: 4 | Status: SHIPPED | OUTPATIENT
Start: 2023-01-24 | End: 2023-01-24

## 2023-01-24 ASSESSMENT — ENCOUNTER SYMPTOMS
UNEXPECTED WEIGHT CHANGE: 1
FREQUENCY: 1
ARTHRALGIAS: 1

## 2023-01-24 NOTE — PROGRESS NOTES
"Subjective     Cesar Yuen is a 72 y.o. male who presents for {Medicare Exam Types:91909}    Screenings    Depression Screening       Fall Risk Assessment       Diet and Activity       Activities of Daily Living & Health Risk Assessment        Durable Medical Equipment       Patient Care Team:  Balbir Machado MD as PCP - General (Family Medicine)        SLUMS (if blank, screening not completed)                            Mini-Mental Exam (if blank, screening not completed)                                Comprehensive Medical and Social History  Past Medical History:   Diagnosis Date   • Atrial flutter (CMS/HCC) (HCC)    • CHF (congestive heart failure) (CMS/HCC) (HCC)    • Chipped tooth     \"cracked tooth\" Back right upper, left upper back   • Dysrhythmia    • Edema     BLE   • History of transfusion    • Hypertension    • Injury of back 01/14/2021    Seen in .   • Urinary retention     indwelling Pope in place since November 2020      Past Surgical History:   Procedure Laterality Date   • ABDOMINAL SURGERY      anuerysm   • ABLATION A-FLUTTER N/A 2/8/2021    Procedure: Ablation A-flutter loop implant;  Surgeon: Timoteo Burk DO;  Location: [facility] EP Lab;  Service: Electrophysiology;  Laterality: N/A;   • APPENDECTOMY     • HERNIA REPAIR      x 3   • LOOP RECORDER INSERTION N/A 2/8/2021    Procedure: LOOP RECORDER INSERTION;  Surgeon: Timoteo Burk DO;  Location: [facility] EP Lab;  Service: Electrophysiology;  Laterality: N/A;   • PROSTATECTOMY N/A 4/28/2022    Procedure: REVOLIX LASER PROSTATECTOMY;  Surgeon: Attila Taylor MD;  Location: [facility] OR;  Service: Urology;  Laterality: N/A;   • TRANSURETHRAL RESECTION OF BLADDER TUMOR N/A 12/22/2020    Procedure: CYSTOSCOPY WITH BLADDER BIOPSY;  Surgeon: Daniele Johnson MD;  Location: [facility] OR;  Service: Urology;  Laterality: N/A;   • TRANSURETHRAL RESECTION OF PROSTATE N/A 12/22/2020    Procedure: CYSTOSCOPY TRANSURETHRAL RESECTION PROSTATE, exam under anasthesia;  " "Surgeon: Daniele Johnson MD;  Location: [facility] OR;  Service: Urology;  Laterality: N/A;   • TRANSURETHRAL RESECTION OF PROSTATE N/A 4/27/2021    Procedure: CYSTOSCOPY TRANSURETHRAL RESECTION PROSTATE;  Surgeon: Daniele Johnson MD;  Location: [facility] OR;  Service: Urology;  Laterality: N/A;   • TRANSURETHRAL RESECTION OF PROSTATE N/A 9/27/2021    Procedure: CYSTOSCOPY TRANSURETHRAL RESECTION PROSTATE;  Surgeon: Attila Taylor MD;  Location: [facility] OR;  Service: Urology;  Laterality: N/A;  NOT EARLY MORNING     Social History     Socioeconomic History   • Marital status: Single   • Number of children: 1   Occupational History   • Occupation:    Tobacco Use   • Smoking status: Never   • Smokeless tobacco: Never   Vaping Use   • Vaping Use: Never used   Substance and Sexual Activity   • Alcohol use: Yes     Comment: rarely   • Drug use: Never   • Sexual activity: Defer     Social Determinants of Health     Tobacco Use: Low Risk    • Smoking Tobacco Use: Never   • Smokeless Tobacco Use: Never     Family History   Problem Relation Age of Onset   • Alzheimer's disease Mother    • Diabetes Father    • Heart attack Mother's Brother      No Known Allergies  Current Outpatient Medications   Medication Sig Dispense Refill   • Eliquis 5 mg tablet Take 5 mg by mouth 2 (two) times a day     • furosemide (LASIX) 20 mg tablet Take 20 mg by mouth daily       No current facility-administered medications for this visit.       The following have been reviewed and updated as appropriate in this visit:   Allergies  Meds  Problems  Med Hx  Surg Hx  Fam Hx         Review of Systems  Review of Systems    Objective     Vitals:    01/24/23 1423   BP: 126/64   Temp: 36.2 °C (97.2 °F)   TempSrc: Temporal   Pulse: 85   Resp: 20   SpO2: 92%   Height: 1.854 m (6' 1\")   Weight: 110.3 kg (243 lb 3.2 oz)   Patient Position: Sitting     Body mass index is 32.09 kg/m².    Physical Exam    Assessment/Plan     There are no diagnoses linked " to this encounter.    During the course of the visit the patient was educated and counseled about appropriate screening and preventive services including:   {plan:80327}    Written screening schedule individualized for the patient based on current USPSTF, age-appropriate medicare services and ACIP as described above was given and viewable in Patient Instructions.    Mental health conditions and/or risk factors are as listed (if any) in the ROS above as specific to this patient.  Direct cognitive impairment was assessed.      Patient's lifestyle was evaluated to promote wellness in terms of but not limited to: fall prevention, nutrition, physical activity, tobacco/alcohol if present and weight management.  These were addressed specifically with this patient as listed within the social history, discussion notes, and above.

## 2023-01-24 NOTE — PATIENT INSTRUCTIONS
Preventative Care for Women    Patient’s Personalized Health Advice and 5-10 Year Plan:     Physical Activity:   Physical activity can help you maintain a healthy weight, prevent or control illness, reduce stress, and sleep better. It can also help you improve your balance to avoid falls. Try to build up to and maintain a total of 30 minutes of activity each day. If you are able, try walking, doing yard or housework, and taking the stairs more often. You can also strengthen your muscles with exercises done while sitting or lying down.   Emotional Health:   Feeling “down in the dumps” or anxious every now and then is a natural part of life. If this feeling lasts for a few weeks or more, talk with me as soon as possible. It could be a sign of a problem that needs treatment. There are many types of treatment available.   Falls:   You can reduce your risk of falling by making changes in your home. Remove items that may cause tripping, improve lighting, and consider installing grab bars.   Talk with me if you have problems with balance and walking. To prevent falls, you may need your vision, hearing, or blood pressure checked. Exercises to improve your strength and balance, or using a cane or walker, may help.   Review your medicines with me at every visit because some can affect balance. Please be sure to let me know if you fall or are fearful you may fall.   Pain:   We all have aches and pains at times, but chronic pain can change how you feel and live every day. Please talk with me about any symptoms of chronic pain so that we can determine how best to treat.   Sleep:   Getting a good night’s sleep is vital to your health and well-being and can help prevent or manage health problems. Often, sleep can be improved by changing behaviors, including when you go to bed and what you do before bed. Sleep apnea can cause problems such as struggling to stay awake during the day. Please let me know if you would like to learn  more about improving your sleep and/or think you may have sleep apnea.   Home Safety:   You may benefit from a safety check to identify hazards in your home.   Seat Belt:   Please remember to wear a seat belt when driving or riding in a vehicle. It is one of the most important things you can do to stay safe in a car.   Nutrition:   Remember to eat plenty of fruits, vegetables, whole grains, and dairy. Drink at least 64 ounces (8 full glasses) of water a day unless you have been advised to limit fluids.   Alcohol:   Alcohol can have a greater effect on older people, who may feel its effects at a lower amount. Older people should limit alcoholic drinks (no more than one a day for women and no more than two a day for men). Please let me know if alcohol use becomes a problem.   Tobacco:   Not smoking or using other forms of tobacco is one of the most important things you can do for your health.      Additional Support:   Sometimes it can be challenging to manage all aspects of daily life. Finding the right support can help you maintain or improve your health and independence. Please let me know if you would like to talk further about finding resources to assist you.   Advance Directives:   There may come a time when medical decisions need to be made on your behalf. Please talk with your family and with me about your wishes. It is important to provide information about your decisions and any formal advance directives for your medical record.    SCREENINGS:  What Coverage & Frequency Why Previously Done   EKG One-time covered benefit during Welcome To Medicare Physical (IPPE) Check heart rate and rhythm for baseline. Date of Last ECG Order    Last order of ECG 12-LEAD was found on 9/22/2021 from Appointment on 9/22/2021            Lung Cancer Screening via Low Dos Chest CT Medicare covers annually if history of smoking with no signs or symptoms of lung cancer.  Screening for lung cancer.  Provider will need to advise of  the importance of quitting/avoiding smoking and provide smoking cessation services. Date of Last Chest CT Order      No order of CT CHEST LUNG CANCER LOW DOSE SCREENING is found.             AAA Screen Ultrasound for Abdominal Aortic Aneurysm One-time benefit with Welcome to Medicare Visit for men age 65-75 with history of smoking or positive family history of AAA or has smoked 100 or more cigarettes in his lifetime.   For early detection of abdominal aortic aneurysm. Date of Last AAA US Ordered      No order of US ABDOMINAL AORTA ANEURYSM SCREENING is found.             Osteoporosis Screening Every two years after the age of 65 for those at risk. Women who are estrogen deficient, have vertebral abnormalities on x- ray, or have received daily steroid for more than 3 months have increased risk for bone loss. Date of Last Dexa Ordered      No order of DXA BONE DENSITY is found.             Heart Disease Screening  Covers blood tests for all Medicare beneficiaries every 5 years to test for: Cholesterol Levels To check for high cholesterol, which can increase your risk of heart attack, stroke and other problems. Most Recent Cholesterol Results    No results found for: CHOL No results found for: HDL No results found for: LDLCALC No results found for: TRIG   Colon cancer screening Age 50-75 Medicare covers colonoscopies once every 10 years or every 2 years if at high risk for colorectal cancer, Cologuard Stool test every 3 years for asymptomatic patients between 50- 85 years old, sigmoidoscopy every 5 years or as recommended based on previous test results. Ordered as a screening to detect colon cancer before there are symptoms, so it can be more effectively treated. Last Colon Screening(s) Ordered     No order of COLOGUARD CANCER SCREENING KIT is found.            Last Referral To General Surgery    Last order of AMB REFERRAL TO GENERAL SURGERY was found on 12/16/2021 from Ancillary Orders on 12/21/2021        Diabetes  screening Covered once yearly if you have hypertension, dyslipidemia, previous elevated glucose tolerance test, have a BMI over 30, have a history of gestational diabetes. Covered twice yearly if you have a history of pre-diabetes.  To identify diabetes that may not have symptoms. Last Fasting Glucose and A1C    No results found for: GLUF    Lab Results   Component Value Date    HGBA1C 6.0 12/06/2021          Mammogram Age 50-75 every 1-2 years depending on previous results. At age 75+, discuss whether needed based on desires and overall health. To detect breast cancer, for early treatment. Date Last Mammogram Ordered                Pap Smears, Pelvic Exams, Breast Exams At age 65+: not recommended if previous negative screenings. Every 3-5 years based on age, type of test and previous results. Once every year if considered high risk for cervical or vaginal cancer. To detect cervical or breast cancer early, so that it can be more effectively treated. Date of Last Pap Smear Order        Sexually Transmitted Disease Screening Consider if sexually active: Gonorrhea / Chlamydia - consider based on risk factors. HIV - At least once, continue if risk factors.  To detect infections that may not have symptoms, to prevent complications.     Testing for Hepatitis C Covered Medicare benefit once per lifetime - based on risk factors. If born between 8[birth year], had a blood transfusion before 1992, or considered high risk due to history of drug abuse. To detect hepatitis C infection that may have no symptoms, to prevent complications. Last Hepatitis C Screening Result    No results found for: HEPCAB   Glaucoma Screening  Covered benefit if you are at risk (annually if in high risk group): Patients with diabetes, family history of glaucoma, - Americans age 50 and over, or  Americans age 65 and up.  Screen for glaucoma and prevent complications.  Date of Last Referral Placed to Ophthalmology    No order of AMB  REFERRAL TO OPHTHALMOLOGY is found.                  Health Maintenance:    Health Maintenance Due   Topic Date Due   • Colorectal Cancer Screening  Never done   • Hepatitis C Screening  Never done   • COVID-19 Vaccine (1) Never done   • Zoster Vaccines Series (1 of 2) Never done   • Pneumococcal 65+ (1 - PCV) Never done   • Influenza Vaccine (1) Never done             IMMUNIZATIONS:   Immunizations you have received:   Immunization History   Administered Date(s) Administered   • Tdap 06/13/2020       See grid below for the immunization recommendations.    What Age How Often Why   Flu Vaccine   Age 18 and older Every year Protects against flu virus and its complications.   Pneumonia Vaccine Age 65+  < 65 based on risk factors. Medicare covers an initial pneumonia vaccine and a booster pneumonia vaccine at least one year after the first vaccine. Protects against common types of pneumonia.   Shingles (Zoster) Vaccine Age 60 Not a covered benefit under Medicare.  Once in your lifetime Protects against shingles.   Tetanus Vaccine Age 18 and older Every 10 years (at least one-time Tdap) Protects against tetanus infection.   Hepatitis B Vaccine Age 18 and older based on risk factors Covered if you are at medium to high risk for hepatitis.  Your risk increases if you have End Stage Renal Disease (ESRD), diabetes, living with someone who has Hep B, or if health care worker. Protects against hepatitis, which can cause illness and complications.                         Preventative Care    Patient’s Personalized Health Advice and 5-10 Year Plan:     Physical Activity:   Physical activity can help you maintain a healthy weight, prevent or control illness, reduce stress, and sleep better. It can also help you improve your balance to avoid falls. Try to build up to and maintain a total of 30 minutes of activity each day. If you are able, try walking, doing yard or housework, and taking the stairs more often. You can also  strengthen your muscles with exercises done while sitting or lying down.   Emotional Health:   Feeling “down in the dumps” or anxious every now and then is a natural part of life. If this feeling lasts for a few weeks or more, talk with me as soon as possible. It could be a sign of a problem that needs treatment. There are many types of treatment available.   Falls:   You can reduce your risk of falling by making changes in your home. Remove items that may cause tripping, improve lighting, and consider installing grab bars.   Talk with me if you have problems with balance and walking. To prevent falls, you may need your vision, hearing, or blood pressure checked. Exercises to improve your strength and balance, or using a cane or walker, may help.   Review your medicines with me at every visit because some can affect balance. Please be sure to let me know if you fall or are fearful you may fall.   Pain:   We all have aches and pains at times, but chronic pain can change how you feel and live every day. Please talk with me about any symptoms of chronic pain so that we can determine how best to treat.   Sleep:   Getting a good night’s sleep is vital to your health and well-being and can help prevent or manage health problems. Often, sleep can be improved by changing behaviors, including when you go to bed and what you do before bed. Sleep apnea can cause problems such as struggling to stay awake during the day. Please let me know if you would like to learn more about improving your sleep and/or think you may have sleep apnea.   Home Safety:   You may benefit from a safety check to identify hazards in your home.   Seat Belt:   Please remember to wear a seat belt when driving or riding in a vehicle. It is one of the most important things you can do to stay safe in a car.   Nutrition:   Remember to eat plenty of fruits, vegetables, whole grains, and dairy. Drink at least 64 ounces (8 full glasses) of water a day unless  you have been advised to limit fluids.   Alcohol:   Alcohol can have a greater effect on older people, who may feel its effects at a lower amount. Older people should limit alcoholic drinks (no more than one a day for women and no more than two a day for men). Please let me know if alcohol use becomes a problem.   Tobacco:   Not smoking or using other forms of tobacco is one of the most important things you can do for your health.      Additional Support:   Sometimes it can be challenging to manage all aspects of daily life. Finding the right support can help you maintain or improve your health and independence. Please let me know if you would like to talk further about finding resources to assist you.   Advance Directives:   There may come a time when medical decisions need to be made on your behalf. Please talk with your family and with me about your wishes. It is important to provide information about your decisions and any formal advance directives for your medical record.    SCREENINGS:  What Coverage & Frequency Why Previously Done   EKG One-time covered benefit during Welcome To Medicare Physical (IPPE) Check heart rate and rhythm for baseline. Date of Last ECG Order    Last order of ECG 12-LEAD was found on 9/22/2021 from Appointment on 9/22/2021            Lung Cancer Screening via Low Dos Chest CT Medicare covers annually if history of smoking with no signs or symptoms of lung cancer.  Screening for lung cancer.  Provider will need to advise of the importance of quitting/avoiding smoking and provide smoking cessation services. Date of Last Chest CT Order      No order of CT CHEST LUNG CANCER LOW DOSE SCREENING is found.             AAA Screen Ultrasound for Abdominal Aortic Aneurysm One-time benefit with Welcome to Medicare Visit for men age 65-75 with history of smoking or positive family history of AAA or has smoked 100 or more cigarettes in his lifetime.   For early detection of abdominal aortic  aneurysm. Date of Last AAA US Ordered      No order of US ABDOMINAL AORTA ANEURYSM SCREENING is found.             Osteoporosis Screening Every two years after the age of 65 for those at risk. Women who are estrogen deficient, have vertebral abnormalities on x- ray, or have received daily steroid for more than 3 months have increased risk for bone loss. Date of Last Dexa Ordered      No order of DXA BONE DENSITY is found.             Heart Disease Screening  Covers blood tests for all Medicare beneficiaries every 5 years to test for: Cholesterol Levels To check for high cholesterol, which can increase your risk of heart attack, stroke and other problems. Most Recent Cholesterol Results    No results found for: CHOL No results found for: HDL No results found for: LDLCALC No results found for: TRIG   Colon cancer screening Age 50-75 Medicare covers colonoscopies once every 10 years or every 2 years if at high risk for colorectal cancer, Cologuard Stool test every 3 years for asymptomatic patients between 50- 85 years old, sigmoidoscopy every 5 years or as recommended based on previous test results. Ordered as a screening to detect colon cancer before there are symptoms, so it can be more effectively treated. Last Colon Screening(s) Ordered     No order of COLOGUARD CANCER SCREENING KIT is found.            Last Referral To General Surgery    Last order of AMB REFERRAL TO GENERAL SURGERY was found on 12/16/2021 from Ancillary Orders on 12/21/2021        Diabetes screening Covered once yearly if you have hypertension, dyslipidemia, previous elevated glucose tolerance test, have a BMI over 30, have a history of gestational diabetes. Covered twice yearly if you have a history of pre-diabetes.  To identify diabetes that may not have symptoms. Last Fasting Glucose and A1C    No results found for: GLUF    Lab Results   Component Value Date    HGBA1C 6.0 12/06/2021      Sexually Transmitted Disease Screening Consider if  sexually active: Gonorrhea / Chlamydia - consider based on risk factors. HIV - At least once, continue if risk factors. To detect infections that may not have symptoms, to prevent complications.    Testing for Hepatitis C Covered Medicare benefit once per lifetime - based on risk factors. If born between 2446-9282, had a blood transfusion before 1992, or considered high risk due to history of drug abuse. To detect hepatitis C infection that may have no symptoms, to prevent complications. Last Hepatitis C Screening Result    No results found for: HEPCAB   Glaucoma Screening  Covered benefit if you are at risk (annually if in high risk group): Patients with diabetes, family history of glaucoma, - Americans age 50 and over, or  Americans age 65 and up.  Screen for glaucoma and prevent complications.  Date of Last Referral Placed to Ophthalmology    No order of AMB REFERRAL TO OPHTHALMOLOGY is found.        Prostate Cancer Screening Covers PSA blood test for all male patients 50 and older once every twelve months.   Detect risk for prostate cancer. Date of Last PSA Ordered     Last order of PSA SCREEN was found on 12/20/2022 from Orders Only on 12/20/2022         Last PSA Result   No results found for this or any previous visit.         Health Maintenance:    Health Maintenance Due   Topic Date Due   • Colorectal Cancer Screening  Never done   • Hepatitis C Screening  Never done   • COVID-19 Vaccine (1) Never done   • Zoster Vaccines Series (1 of 2) Never done   • Pneumococcal 65+ (1 - PCV) Never done   • Influenza Vaccine (1) Never done         IMMUNIZATIONS:   Immunizations you have received:   Immunization History   Administered Date(s) Administered   • Tdap 06/13/2020       See grid below for the immunization recommendations.    What Age How Often Why   Flu Vaccine   Age 18 and older Every year Protects against flu virus and its complications.   Pneumonia Vaccine Age 65+  < 65 based on risk factors.  Medicare covers an initial pneumonia vaccine and a booster pneumonia vaccine at least one year after the first vaccine. Protects against common types of pneumonia.   Shingles (Zoster) Vaccine Age 60 Not a covered benefit under Medicare.  Once in your lifetime Protects against shingles.   Tetanus Vaccine Age 18 and older Every 10 years (at least one-time Tdap) Protects against tetanus infection.   Hepatitis B Vaccine Age 18 and older based on risk factors Covered if you are at medium to high risk for hepatitis.  Your risk increases if you have End Stage Renal Disease (ESRD), diabetes, living with someone who has Hep B, or if health care worker. Protects against hepatitis, which can cause illness and complications.

## 2023-01-24 NOTE — PROGRESS NOTES
"Subjective     Cesar Yuen is a 72 y.o. 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       He has an appointment coming up to get new hearing aides.   He lost one and the other one got wet and no longer functions.  He continues to be quite hard of hearing.    He continues to have issue with his bladder and the scar tissue building up.    He has had this repaired several times and it keeps building up.         Comprehensive Medical and Social History  Patient Active Problem List   Diagnosis    Gross hematuria    Lymphedema    Bladder diverticulum    Atrial flutter (CMS/[facility]) ([facility])    BPH with obstruction/lower urinary tract symptoms    Status post placement of implantable loop recorder     Past Medical History:   Diagnosis Date    Atrial flutter (CMS/[facility]) ([facility])     CHF (congestive heart failure) (CMS/[facility]) ([facility])     Chipped tooth     \"cracked tooth\" Back right upper, left upper back    Dysrhythmia     Edema     BLE    History of transfusion     Hypertension     Injury of back 01/14/2021    Seen in .    Urinary retention     indwelling Pope in place since November 2020      Past Surgical History:   Procedure Laterality Date    ABDOMINAL SURGERY      anuerysm    ABLATION A-FLUTTER N/A 2/8/2021    Procedure: Ablation A-flutter loop implant;  Surgeon: Timoteo Burk DO;  Location: [facility] EP Lab;  Service: Electrophysiology;  Laterality: N/A;    APPENDECTOMY      HERNIA REPAIR      x 3    LOOP RECORDER INSERTION N/A 2/8/2021    Procedure: LOOP RECORDER INSERTION;  Surgeon: Timoteo Burk DO;  Location: [facility] EP Lab;  Service: Electrophysiology;  Laterality: N/A;    PROSTATECTOMY N/A 4/28/2022    Procedure: REVOLIX LASER PROSTATECTOMY;  Surgeon: Attila Taylor MD;  Location: [facility] OR;  Service: Urology;  Laterality: N/A;    TRANSURETHRAL RESECTION OF BLADDER TUMOR N/A 12/22/2020    Procedure: CYSTOSCOPY WITH BLADDER BIOPSY;  Surgeon: Daniele LEONARD" MD Alex;  Location: [facility] OR;  Service: Urology;  Laterality: N/A;    TRANSURETHRAL RESECTION OF PROSTATE N/A 12/22/2020    Procedure: CYSTOSCOPY TRANSURETHRAL RESECTION PROSTATE, exam under anasthesia;  Surgeon: Daniele Johnson MD;  Location: [facility] OR;  Service: Urology;  Laterality: N/A;    TRANSURETHRAL RESECTION OF PROSTATE N/A 4/27/2021    Procedure: CYSTOSCOPY TRANSURETHRAL RESECTION PROSTATE;  Surgeon: Daniele Johnson MD;  Location: [facility] OR;  Service: Urology;  Laterality: N/A;    TRANSURETHRAL RESECTION OF PROSTATE N/A 9/27/2021    Procedure: CYSTOSCOPY TRANSURETHRAL RESECTION PROSTATE;  Surgeon: Attila Taylor MD;  Location: [facility] OR;  Service: Urology;  Laterality: N/A;  NOT EARLY MORNING     No Known Allergies  Current Outpatient Medications   Medication Sig Dispense Refill    Eliquis 5 mg tablet Take 1 tablet (5 mg total) by mouth 2 (two) times a day 180 tablet 4    furosemide (LASIX) 20 mg tablet Take 20 mg by mouth daily       No current facility-administered medications for this visit.     Social History     Socioeconomic History    Marital status: Single    Number of children: 1   Occupational History    Occupation:    Tobacco Use    Smoking status: Never    Smokeless tobacco: Never   Vaping Use    Vaping Use: Never used   Substance and Sexual Activity    Alcohol use: Yes     Comment: rarely    Drug use: Never    Sexual activity: Defer     Social Determinants of Health     Tobacco Use: Low Risk     Smoking Tobacco Use: Never    Smokeless Tobacco Use: Never     Family History   Problem Relation Age of Onset    Alzheimer's disease Mother     Diabetes Father     Heart attack Mother's Brother        Activities of Daily Living  In your present state of health, do you have any difficulty performing the following activities?:   Preparing food and eating?: No  Bathing yourself: No  Getting dressed: No  Using the toilet:No  Moving around from place to place: No  Housekeeping: No  Shopping:  "No  Managing finances: No  Managing medications: No  In the past year have you fallen or had a near fall?:No    Depression Screen    Q1: Over the past two weeks, have you felt down, depressed or hopeless? no  Q2: Over the past two weeks, have you felt little interest or pleasure in doing things? no    Current exercise habits: The patient does not participate in regular exercise at present.   Dietary issues discussed:    Hearing difficulties/changes: Yes  Vision problems/changes: No  Safe in current home environment: yes      Review of Systems  Review of Systems   Constitutional:  Positive for unexpected weight change (winter weight gain).   HENT:  Positive for hearing loss (wears hearing aides).    Genitourinary:  Positive for frequency.        Wakes up 2 - 3 times a night.      Musculoskeletal:  Positive for arthralgias.   Skin:  Positive for rash (ankles).   All other systems reviewed and are negative.    Patient Care Team:  Balbir Machado MD as PCP - General (Family Medicine)     Objective     /64 (BP Location: Left arm, Patient Position: Sitting, Cuff Size: Regular Adult)   Pulse 85   Temp 36.2 °C (97.2 °F) (Temporal)   Resp 20   Ht 1.854 m (6' 1\")   Wt 110.3 kg (243 lb 3.2 oz)   SpO2 92%   BMI 32.09 kg/m²   Body mass index is 32.09 kg/m².    Physical Exam  Vitals and nursing note reviewed.   Constitutional:       General: He is not in acute distress.     Appearance: Normal appearance. He is well-developed. He is not diaphoretic.   HENT:      Head: Normocephalic and atraumatic.      Right Ear: Tympanic membrane and ear canal normal.      Left Ear: Tympanic membrane and ear canal normal.      Nose: Nose normal.      Mouth/Throat:      Mouth: Mucous membranes are moist.      Pharynx: No oropharyngeal exudate or posterior oropharyngeal erythema.   Eyes:      General: No scleral icterus.     Conjunctiva/sclera: Conjunctivae normal.      Pupils: Pupils are equal, round, and reactive to light.   Neck:     "  Thyroid: No thyromegaly.      Vascular: No carotid bruit.      Trachea: No tracheal deviation.   Cardiovascular:      Rate and Rhythm: Normal rate and regular rhythm.      Heart sounds: Normal heart sounds. No murmur heard.    No friction rub. No gallop.   Pulmonary:      Effort: Pulmonary effort is normal.      Breath sounds: Normal breath sounds. No wheezing or rales.   Abdominal:      General: There is no distension.      Palpations: Abdomen is soft. There is no mass.      Tenderness: There is no abdominal tenderness. There is no guarding.      Hernia: No hernia is present.   Musculoskeletal:         General: Normal range of motion.      Cervical back: Neck supple.      Right lower leg: Edema (1+) present.      Left lower leg: Edema (1+) present.   Lymphadenopathy:      Cervical: No cervical adenopathy.   Skin:     General: Skin is warm and dry.   Neurological:      Mental Status: He is alert.      Motor: No abnormal muscle tone.   Psychiatric:         Behavior: Behavior normal.         Judgment: Judgment normal.       Assessment/Plan     1. Medicare annual wellness visit, subsequent   Immunizations reviewed and discussed, patient declined.   Medicare paperwork filled out.   No records of colonoscopy and patient is not certain if he has had one in the past.   Labs today, notify of results when available.   Follow up in 1 year for annual physical and fasting labs.        2. Typical atrial flutter (CMS/HCC) (HCC)  -   Marco continues to follow-up with cardiology.  His loop recorder has shown no events.  He remains on Eliquis as below.  Medication refilled for 1 year.      - Eliquis 5 mg tablet; Take 1 tablet (5 mg total) by mouth 2 (two) times a day  Dispense: 180 tablet; Refill: 4    3. Edema, unspecified type  -    Discussed support stockings to help with edema his swelling will improve.  He will no longer be stuck with his feet in a dependent position for prolonged periods of time but will also be able to walk  more which should also help with venous and lymphatic return.         During the course of the visit the patient was educated and counseled about appropriate screening and preventive services including:   Influenza vaccine:  Reviewed in seasonally appropriate and patient agreeable  Pneumococcal vaccine:  Reviewed and discussed with patient  Tdap:  Reviewed and discussed with patient  Shingles vaccine:  Reviewed and discussed with patient  Cholesterol screening discussed  Prostate cancer screening:  Options with current data discussed with patient  Colorectal cancer screening  Diabetes screening    Written screening schedule individualized for the patient as above based on current USPSTF, age-appropriate medicare services and ACIP as described above was given.    Mental health conditions and/or risk factors are as listed (if any) in the ROS above as specific to this patient.  Direct cognitive impairment was assessed.  Throughout the interview there were no psychosocial or behavioral risks noted.    Patient's lifestyle was evaluated to promote wellness in terms of but not limited to: fall prevention, nutrition, physical activity, tobacco/alcohol if present and weight management.  These were addressed specifically with this patient as listed within the social history, discussion notes, and above.      Portions of this note was documented by Lorena Park as I performed the exam and collected the information from Cesar Yuen. I attest that I have reviewed the information as documented, verified the accuracy of the documentation and added additional information as needed.     FELY BAILEY MD  01/24/23

## 2023-01-25 DIAGNOSIS — R73.01 ELEVATED FASTING GLUCOSE: Primary | ICD-10-CM

## 2023-01-25 LAB
EST. AVERAGE GLUCOSE BLD GHB EST-MCNC: 125.5 MG/DL
HBA1C MFR BLD: 6 % (ref 4–6)

## 2023-02-20 ENCOUNTER — TELEPHONE (OUTPATIENT)
Dept: UROLOGY | Facility: CLINIC | Age: 73
End: 2023-02-20
Payer: MEDICARE

## 2023-02-20 NOTE — TELEPHONE ENCOUNTER
Caller would like to discuss (a) appointment     Patient: Cesar Yuen    Callback Number: [phone]    Best Availability: as soon as possible    Additional Info: Patient is calling regarding his appt on 2/22/2023. He would like to reschedule due to the incoming bad weather.    I advised caller of a callback by 24-48 hours.

## 2023-02-21 NOTE — TELEPHONE ENCOUNTER
VM at 9:11 am on 2/21/2023    Caller would like to discuss (a) appointment     Patient: Cesar Yuen    Callback Number: [phone]    Additional Info: Patient is calling to reschedule their appt with uro due to the weather.

## 2023-02-22 NOTE — TELEPHONE ENCOUNTER
Caller would like to discuss (a) appointment     Patient: Cesar Yuen    Callback Number: [phone]    Best Availability: as soon as possible    Was there an extension left: yes, but was not available     Additional Info: Patient is returning a call to Barbara to reschedule his appt.     I advised caller of a callback by 24-48 hours.

## 2023-02-23 PROCEDURE — G2066 INTER DEVC REMOTE 30D: HCPCS | Performed by: INTERNAL MEDICINE

## 2023-02-23 PROCEDURE — 93298 REM INTERROG DEV EVAL SCRMS: CPT | Performed by: INTERNAL MEDICINE

## 2023-02-24 NOTE — TELEPHONE ENCOUNTER
Returned call and left a message that my next available appointment with Lalitha is April 6th at 200.  Informed that I am going to schedule this appointment at this time and if it does not work to please return a call to the clinic and we will reschedule this.  Informed that I will place a remind into the mail.

## 2023-03-10 ENCOUNTER — APPOINTMENT (OUTPATIENT)
Dept: RADIOLOGY | Facility: HOSPITAL | Age: 73
DRG: 871 | End: 2023-03-10
Payer: MEDICARE

## 2023-03-10 ENCOUNTER — HOSPITAL ENCOUNTER (EMERGENCY)
Facility: HOSPITAL | Age: 73
Discharge: HOME | DRG: 871 | End: 2023-03-10
Attending: EMERGENCY MEDICINE
Payer: MEDICARE

## 2023-03-10 VITALS
HEART RATE: 85 BPM | BODY MASS INDEX: 31.14 KG/M2 | OXYGEN SATURATION: 91 % | SYSTOLIC BLOOD PRESSURE: 120 MMHG | DIASTOLIC BLOOD PRESSURE: 66 MMHG | HEIGHT: 73 IN | WEIGHT: 235 LBS | TEMPERATURE: 97.2 F | RESPIRATION RATE: 16 BRPM

## 2023-03-10 DIAGNOSIS — M54.6 ACUTE MIDLINE THORACIC BACK PAIN: Primary | ICD-10-CM

## 2023-03-10 DIAGNOSIS — R60.0 BILATERAL LOWER EXTREMITY EDEMA: ICD-10-CM

## 2023-03-10 DIAGNOSIS — I48.3 TYPICAL ATRIAL FLUTTER (CMS/HCC): ICD-10-CM

## 2023-03-10 LAB
ALBUMIN SERPL-MCNC: 3.7 G/DL (ref 3.5–5.3)
ALP SERPL-CCNC: 43 U/L (ref 45–115)
ALT SERPL-CCNC: 11 U/L (ref 7–52)
ANION GAP SERPL CALC-SCNC: 6 MMOL/L (ref 3–11)
AST SERPL-CCNC: 16 U/L
BASOPHILS # BLD AUTO: 0.1 10*3/UL
BASOPHILS NFR BLD AUTO: 0.8 % (ref 0–2)
BILIRUB SERPL-MCNC: 0.78 MG/DL (ref 0.2–1.4)
BNP SERPL-MCNC: 38 PG/ML (ref 0–100)
BUN SERPL-MCNC: 16 MG/DL (ref 7–25)
CALCIUM ALBUM COR SERPL-MCNC: 9.1 MG/DL (ref 8.6–10.3)
CALCIUM SERPL-MCNC: 8.9 MG/DL (ref 8.6–10.3)
CHLORIDE SERPL-SCNC: 107 MMOL/L (ref 98–107)
CO2 SERPL-SCNC: 27 MMOL/L (ref 21–32)
CREAT SERPL-MCNC: 0.86 MG/DL (ref 0.7–1.3)
EOSINOPHIL # BLD AUTO: 0.2 10*3/UL
EOSINOPHIL NFR BLD AUTO: 1.8 % (ref 0–3)
ERYTHROCYTE [DISTWIDTH] IN BLOOD BY AUTOMATED COUNT: 15.2 % (ref 11.5–15)
GFR SERPL CREATININE-BSD FRML MDRD: 91 ML/MIN/1.73M*2
GLUCOSE SERPL-MCNC: 96 MG/DL (ref 70–105)
HCT VFR BLD AUTO: 41.4 % (ref 38–50)
HGB BLD-MCNC: 14 G/DL (ref 13.2–17.2)
LYMPHOCYTES # BLD AUTO: 1.9 10*3/UL
LYMPHOCYTES NFR BLD AUTO: 21.7 % (ref 15–47)
MCH RBC QN AUTO: 30.7 PG (ref 29–34)
MCHC RBC AUTO-ENTMCNC: 33.9 G/DL (ref 32–36)
MCV RBC AUTO: 90.5 FL (ref 82–97)
MONOCYTES # BLD AUTO: 1 10*3/UL
MONOCYTES NFR BLD AUTO: 11.4 % (ref 5–13)
NEUTROPHILS # BLD AUTO: 5.6 10*3/UL
NEUTROPHILS NFR BLD AUTO: 64.3 % (ref 46–70)
PLATELET # BLD AUTO: 221 10*3/UL (ref 130–350)
PMV BLD AUTO: 8.2 FL (ref 6.9–10.8)
POTASSIUM SERPL-SCNC: 3.7 MMOL/L (ref 3.5–5.1)
PROT SERPL-MCNC: 6.3 G/DL (ref 6–8.3)
RBC # BLD AUTO: 4.57 10*6/ΜL (ref 4.1–5.8)
SODIUM SERPL-SCNC: 140 MMOL/L (ref 135–145)
TROPONIN I SERPL-MCNC: 7.1 PG/ML
WBC # BLD AUTO: 8.7 10*3/UL (ref 3.7–9.6)

## 2023-03-10 PROCEDURE — 85025 COMPLETE CBC W/AUTO DIFF WBC: CPT | Performed by: EMERGENCY MEDICINE

## 2023-03-10 PROCEDURE — 36415 COLL VENOUS BLD VENIPUNCTURE: CPT | Performed by: EMERGENCY MEDICINE

## 2023-03-10 PROCEDURE — 83880 ASSAY OF NATRIURETIC PEPTIDE: CPT | Performed by: EMERGENCY MEDICINE

## 2023-03-10 PROCEDURE — 84484 ASSAY OF TROPONIN QUANT: CPT | Performed by: EMERGENCY MEDICINE

## 2023-03-10 PROCEDURE — 71046 X-RAY EXAM CHEST 2 VIEWS: CPT

## 2023-03-10 PROCEDURE — 93005 ELECTROCARDIOGRAM TRACING: CPT | Performed by: EMERGENCY MEDICINE

## 2023-03-10 PROCEDURE — 99284 EMERGENCY DEPT VISIT MOD MDM: CPT | Performed by: EMERGENCY MEDICINE

## 2023-03-10 PROCEDURE — 80053 COMPREHEN METABOLIC PANEL: CPT | Performed by: EMERGENCY MEDICINE

## 2023-03-10 PROCEDURE — 71046 X-RAY EXAM CHEST 2 VIEWS: CPT | Mod: 26,CMS | Performed by: RADIOLOGY

## 2023-03-10 PROCEDURE — 99283 EMERGENCY DEPT VISIT LOW MDM: CPT | Performed by: EMERGENCY MEDICINE

## 2023-03-10 RX ORDER — HYDROCODONE BITARTRATE AND ACETAMINOPHEN 5; 325 MG/1; MG/1
1 TABLET ORAL EVERY 6 HOURS PRN
Qty: 8 TABLET | Refills: 0 | Status: SHIPPED | OUTPATIENT
Start: 2023-03-10 | End: 2023-03-22 | Stop reason: ALTCHOICE

## 2023-03-10 RX ORDER — ACETAMINOPHEN 500 MG
1000 TABLET ORAL ONCE
Status: COMPLETED | OUTPATIENT
Start: 2023-03-10 | End: 2023-03-10

## 2023-03-10 RX ORDER — FUROSEMIDE 20 MG/1
20 TABLET ORAL DAILY
Qty: 30 TABLET | Refills: 0 | Status: SHIPPED | OUTPATIENT
Start: 2023-03-10 | End: 2023-03-15 | Stop reason: HOSPADM

## 2023-03-10 RX ADMIN — Medication 1000 MG: at 05:30

## 2023-03-10 ASSESSMENT — ENCOUNTER SYMPTOMS
ARTHRALGIAS: 0
SHORTNESS OF BREATH: 0
ABDOMINAL PAIN: 0
SORE THROAT: 0
FEVER: 0
COLOR CHANGE: 0
EYE PAIN: 0
VOMITING: 0
PALPITATIONS: 0
DYSURIA: 0
BACK PAIN: 1
HEMATURIA: 0
CHILLS: 0
COUGH: 0
SEIZURES: 0

## 2023-03-10 NOTE — DISCHARGE INSTRUCTIONS
You were seen in the urgency department today because of back pain.  I believe this is a muscle and bone problem.  You are quite tender when I push on your back.  I will prescribe you some pain medication.  You may use a hot pack and cold pack.  If your symptoms are worsening, with chest pain, or other worrisome symptoms, you must return to the ER.    The swelling in your legs has gotten worse since you have not been taking your diuretic.  I will refill this for you, I want you to see your doctor this week.  Return to the emergency department if your symptoms are worsening in any way.

## 2023-03-10 NOTE — ED PROVIDER NOTES
"    HPI:  Chief Complaint   Patient presents with    Back Pain     Pt c/o pain between shoulders radiating up into neck and down left arm for 3-4 hours       This is a 72-year-old male who comes to the emergency department today reporting that he was woke up at 1:00 in the morning by back pain.  The pain is in the midline, and it radiates up his neck.  He reports that he is also having some pain in his left arm which felt tingly, but now feels better.    Patient reports he has not had pain like this in his back before.  It woke him from sleep and he was unable to go back to sleep.  He reports no chest pain, no shortness of breath, no abdominal pain.  Moving his head around does not make any of the neck pain worse.  Palpation of the back makes the pain worse.  Patient reports that he gained 5 pounds yesterday.  He reports that his legs have been very swollen, he reports that he used to have Lasix but the prescription ran out and he has not seen his doctor to ask for more.  He does not feel short of breath at all.      HISTORY:  Past Medical History:   Diagnosis Date    Atrial flutter (CMS/HCC) (HCC)     CHF (congestive heart failure) (CMS/HCC) (HCC)     Chipped tooth     \"cracked tooth\" Back right upper, left upper back    Dysrhythmia     Edema     BLE    History of transfusion     Hypertension     Injury of back 01/14/2021    Seen in .    Urinary retention     indwelling Pope in place since November 2020        Past Surgical History:   Procedure Laterality Date    ABDOMINAL SURGERY      anuerysm    ABLATION A-FLUTTER N/A 2/8/2021    Procedure: Ablation A-flutter loop implant;  Surgeon: Timoteo Burk DO;  Location: [facility] EP Lab;  Service: Electrophysiology;  Laterality: N/A;    APPENDECTOMY      HERNIA REPAIR      x 3    LOOP RECORDER INSERTION N/A 2/8/2021    Procedure: LOOP RECORDER INSERTION;  Surgeon: Timoteo Burk DO;  Location: [facility] EP Lab;  Service: Electrophysiology;  Laterality: N/A;    PROSTATECTOMY N/A " 4/28/2022    Procedure: REVOLIX LASER PROSTATECTOMY;  Surgeon: Attila Taylor MD;  Location: [facility] OR;  Service: Urology;  Laterality: N/A;    TRANSURETHRAL RESECTION OF BLADDER TUMOR N/A 12/22/2020    Procedure: CYSTOSCOPY WITH BLADDER BIOPSY;  Surgeon: Daniele Johnson MD;  Location: [facility] OR;  Service: Urology;  Laterality: N/A;    TRANSURETHRAL RESECTION OF PROSTATE N/A 12/22/2020    Procedure: CYSTOSCOPY TRANSURETHRAL RESECTION PROSTATE, exam under anasthesia;  Surgeon: Daniele Johnson MD;  Location: [facility] OR;  Service: Urology;  Laterality: N/A;    TRANSURETHRAL RESECTION OF PROSTATE N/A 4/27/2021    Procedure: CYSTOSCOPY TRANSURETHRAL RESECTION PROSTATE;  Surgeon: Daniele Johnson MD;  Location: [facility] OR;  Service: Urology;  Laterality: N/A;    TRANSURETHRAL RESECTION OF PROSTATE N/A 9/27/2021    Procedure: CYSTOSCOPY TRANSURETHRAL RESECTION PROSTATE;  Surgeon: Attila Taylor MD;  Location: [facility] OR;  Service: Urology;  Laterality: N/A;  NOT EARLY MORNING       Family History   Problem Relation Age of Onset    Alzheimer's disease Mother     Diabetes Father     Heart attack Mother's Brother        Social History     Tobacco Use    Smoking status: Never    Smokeless tobacco: Never   Vaping Use    Vaping Use: Never used   Substance Use Topics    Alcohol use: Yes     Comment: rarely    Drug use: Never         ROS:  Review of Systems   Constitutional:  Negative for chills and fever.   HENT:  Negative for ear pain and sore throat.    Eyes:  Negative for pain and visual disturbance.   Respiratory:  Negative for cough and shortness of breath.    Cardiovascular:  Positive for leg swelling. Negative for chest pain and palpitations.   Gastrointestinal:  Negative for abdominal pain and vomiting.   Genitourinary:  Negative for dysuria and hematuria.   Musculoskeletal:  Positive for back pain. Negative for arthralgias.   Skin:  Negative for color change and rash.   Neurological:  Negative for seizures and syncope.   All  other systems reviewed and are negative.    PE:  ED Triage Vitals   Temp Heart Rate Resp BP SpO2   03/10/23 0511 03/10/23 0511 03/10/23 0511 03/10/23 0511 03/10/23 0511   36.2 °C (97.2 °F) 95 16 138/94 94 %      Mean BP (mmHg) Temp Source Heart Rate Source Patient Position BP Location   03/10/23 0530 03/10/23 0511 -- -- --   96 Temporal         FiO2 (%)       --                  Physical Exam  Vitals and nursing note reviewed.   Constitutional:       General: He is not in acute distress.     Appearance: He is well-developed.   HENT:      Head: Normocephalic and atraumatic.   Eyes:      Conjunctiva/sclera: Conjunctivae normal.   Cardiovascular:      Rate and Rhythm: Normal rate and regular rhythm.      Heart sounds: No murmur heard.  Pulmonary:      Effort: Pulmonary effort is normal. No respiratory distress.      Breath sounds: Normal breath sounds.   Abdominal:      Palpations: Abdomen is soft.      Tenderness: There is no abdominal tenderness.   Musculoskeletal:         General: No swelling.        Arms:       Cervical back: Neck supple.      Comments: Tenderness to palpation from mid thoracic spine, up into the cervical spine.  No midline tenderness of the cervical spine.  No paraspinal tenderness.    Patient's reported left arm pain is gone, no paresthesias in the arm.  Pulse motor and sensory are intact.   Skin:     General: Skin is warm and dry.      Capillary Refill: Capillary refill takes less than 2 seconds.   Neurological:      Mental Status: He is alert.   Psychiatric:         Mood and Affect: Mood normal.       ED LABS:  Labs Reviewed   CBC WITH AUTO DIFFERENTIAL - Abnormal       Result Value    WBC 8.7      RBC 4.57      Hemoglobin 14.0      Hematocrit 41.4      MCV 90.5      MCH 30.7      MCHC 33.9      RDW 15.2 (*)     Platelets 221      MPV 8.2      Neutrophils% 64.3      Lymphocytes% 21.7      Monocytes% 11.4      Eosinophils% 1.8      Basophils% 0.8      ANC (auto diff) 5.60      Lymphocytes  Absolute 1.90      Monocytes Absolute 1.00      Eosinophils Absolute 0.20      Basophils Absolute 0.10     COMPREHENSIVE METABOLIC PANEL - Abnormal    Sodium 140      Potassium 3.7      Chloride 107      CO2 27      Anion Gap 6      BUN 16      Creatinine 0.86      Glucose 96      Calcium 8.9      AST 16      ALT (SGPT) 11      Alkaline Phosphatase 43 (*)     Total Protein 6.3      Albumin 3.7      Total Bilirubin 0.78      Corrected Calcium 9.1      eGFR 91      Narrative:     Calculation based on the 2021 Chronic Kidney Disease Epidemiology Collaboration (CKD-EPI) equation refit without adjustment for race.   B-TYPE NATRIURETIC PEPTIDE (BNP) - Normal    BNP 38     HIGH SENSITIVE TROPONIN I - Normal    hsTnI 0 Hour 7.1     HIGH SENSITIVE TROPONIN I PANEL (0HR, 1HR, 2HR)    Narrative:     The following orders were created for panel order HS Troponin I Panel (0HR, 1HR, 2HR) Blood, Venous.  Procedure                               Abnormality         Status                     ---------                               -----------         ------                     HS Troponin I[[id number]]                 Normal              Final result               1HR High Sensitive Trop I[[id number]]                                                    2HR High Sensitive Tropon...[[id number]]                                                   Please view results for these tests on the individual orders.   HIGH SENSITIVE TROPONIN I, 1 HOUR   HIGH SENSITIVE TROPONIN I, 2 HOUR         ED IMAGES:  X-ray chest 2 views   ED Interpretation   No pneumonia, no pneumothorax.  Poor inspiratory effort.  No acute fracture of the thoracic spine          ED PROCEDURES:  ECG 12 lead    Date/Time: 3/10/2023 5:29 AM  Performed by: Mary Henson MD  Authorized by: Mary Henson MD     Comments:      Rate is 89.  Rhythm is regular.  Intervals within normal limits.  ST segments are within normal limits.  No arrhythmia, no ectopy, no STEMI.  Overall  impression is a sinus rhythm.  When compared with previous EKG, there is no significant morphologic change.    ED COURSE:          Sepsis Quality Bundle           MDM:  Medical Decision Making  This is a 72-year-old male who comes to the emergency department today reporting back pain.  Patient reports it woke him up from sleep, he reports initially it hurt up into his neck, into his left shoulder and into his left arm.  He reports that the left shoulder and left arm pain went away, but it still hurts up into his neck and into his head.  He reports he was uncomfortable and could not sleep.  He denies any chest pain or shortness of breath.  On physical examination he has significant edema of his lower extremities which she reports is chronic, but worsening.  He had a prescription for Lasix, but this was not refilled, and he has not seen his doctor, and he has noticed that his legs have been swelling a lot more.  He also reports that he has been very indiscriminate with his diet, drinking a lot more fluids, more sugar, and more caffeine.    Physical examination shows tenderness along the posterior midline spine.  He denies any injury.  His pain is quite reproducible with palpation.  Given his age, and the pain into his left arm, this was concerning for the possibility of a variant of anginal pain.  EKG and heart enzyme testing were done which were normal.  I do not think this is anginal as it is quite reproducible.  Chest x-ray was performed, he does not have significant fluid overload in his lungs.  He has no pneumonia.  Looking at his thoracic midline spine, he has some significant curvature, but I do not see any acute compression fracture.    He is on Eliquis we cannot do NSAIDs, he was given Tylenol in the emergency department which did not significantly improve his symptoms.  I will give him some hydrocodone for pain at home.  I am also going to refill his Lasix, as he has significant pedal edema.  His legs are  not tender, I do not think this is DVT, PE.  I do not think that this is ACS, or thoracic aortic emergency.    Pain is significantly worsened with range of motion, and palpation and I believe this to be musculoskeletal in origin.  However I have given him very strict return precautions if he develops any worsening symptoms, symptoms in his arm again, chest pain or shortness of breath.  Patient is comfortable with this plan.  He will also follow-up with his primary care provider to discuss an ongoing prescription for Lasix.  He will elevate his legs.  He was discharged home in good condition with stable vital signs.        Final diagnoses:   [M54.6] Acute midline thoracic back pain   [R60.0] Bilateral lower extremity edema     3/10/2023  6:22 AM                      Mary Henson MD  03/10/23 0634

## 2023-03-11 ENCOUNTER — HOSPITAL ENCOUNTER (INPATIENT)
Facility: HOSPITAL | Age: 73
LOS: 4 days | Discharge: HOME | DRG: 871 | End: 2023-03-15
Attending: FAMILY MEDICINE | Admitting: NURSE PRACTITIONER
Payer: MEDICARE

## 2023-03-11 ENCOUNTER — APPOINTMENT (OUTPATIENT)
Dept: CT IMAGING | Facility: HOSPITAL | Age: 73
DRG: 871 | End: 2023-03-11
Payer: MEDICARE

## 2023-03-11 DIAGNOSIS — M54.9 BACK PAIN: ICD-10-CM

## 2023-03-11 DIAGNOSIS — J18.9 PNEUMONIA: Primary | ICD-10-CM

## 2023-03-11 DIAGNOSIS — E87.70 HYPERVOLEMIA, UNSPECIFIED HYPERVOLEMIA TYPE: ICD-10-CM

## 2023-03-11 PROBLEM — J96.01 ACUTE RESPIRATORY FAILURE WITH HYPOXIA (CMS/HCC): Status: ACTIVE | Noted: 2023-03-11

## 2023-03-11 PROBLEM — A41.9 SEPSIS (CMS/HCC): Status: ACTIVE | Noted: 2023-03-11

## 2023-03-11 PROBLEM — Z79.01 ANTICOAGULATED: Status: ACTIVE | Noted: 2023-03-11

## 2023-03-11 LAB
ALBUMIN SERPL-MCNC: 4.2 G/DL (ref 3.5–5.3)
ALP SERPL-CCNC: 60 U/L (ref 45–115)
ALT SERPL-CCNC: 13 U/L (ref 7–52)
ANION GAP SERPL CALC-SCNC: 12 MMOL/L (ref 3–11)
AST SERPL-CCNC: 16 U/L
BASOPHILS # BLD AUTO: 0 10*3/UL
BASOPHILS NFR BLD AUTO: 0.3 % (ref 0–2)
BILIRUB SERPL-MCNC: 1.51 MG/DL (ref 0.2–1.4)
BNP SERPL-MCNC: 44 PG/ML (ref 0–100)
BUN SERPL-MCNC: 11 MG/DL (ref 7–25)
CALCIUM ALBUM COR SERPL-MCNC: 9.2 MG/DL (ref 8.6–10.3)
CALCIUM SERPL-MCNC: 9.4 MG/DL (ref 8.6–10.3)
CHLORIDE SERPL-SCNC: 100 MMOL/L (ref 98–107)
CO2 SERPL-SCNC: 24 MMOL/L (ref 21–32)
CREAT SERPL-MCNC: 0.95 MG/DL (ref 0.7–1.3)
CRP SERPL-MCNC: 91.1 MG/L
DELTA HIGH SENSITIVITY TROPONIN I, 1 HOUR: -0.3
EOSINOPHIL # BLD AUTO: 0 10*3/UL
EOSINOPHIL NFR BLD AUTO: 0 % (ref 0–3)
ERYTHROCYTE [DISTWIDTH] IN BLOOD BY AUTOMATED COUNT: 14.8 % (ref 11.5–15)
ERYTHROCYTE [SEDIMENTATION RATE] IN BLOOD: 28 MM/HR
FLUAV RNA NPH QL NAA+NON-PROBE: NEGATIVE
FLUBV RNA NPH QL NAA+NON-PROBE: NEGATIVE
GFR SERPL CREATININE-BSD FRML MDRD: 85 ML/MIN/1.73M*2
GLUCOSE SERPL-MCNC: 153 MG/DL (ref 70–105)
HCT VFR BLD AUTO: 45.7 % (ref 38–50)
HGB BLD-MCNC: 15.4 G/DL (ref 13.2–17.2)
LACTATE BLDV-SCNC: 0.84 MMOL/L (ref 0.5–1.99)
LACTATE BLDV-SCNC: 2.55 MMOL/L (ref 0.5–1.99)
LYMPHOCYTES # BLD AUTO: 1.1 10*3/UL
LYMPHOCYTES NFR BLD AUTO: 8.6 % (ref 15–47)
MAGNESIUM SERPL-MCNC: 1.9 MG/DL (ref 1.8–2.4)
MCH RBC QN AUTO: 30.8 PG (ref 29–34)
MCHC RBC AUTO-ENTMCNC: 33.7 G/DL (ref 32–36)
MCV RBC AUTO: 91.2 FL (ref 82–97)
MONOCYTES # BLD AUTO: 1.7 10*3/UL
MONOCYTES NFR BLD AUTO: 12.8 % (ref 5–13)
NEUTROPHILS # BLD AUTO: 10.4 10*3/UL
NEUTROPHILS NFR BLD AUTO: 78.3 % (ref 46–70)
PLATELET # BLD AUTO: 254 10*3/UL (ref 130–350)
PMV BLD AUTO: 8.8 FL (ref 6.9–10.8)
POTASSIUM SERPL-SCNC: 3.5 MMOL/L (ref 3.5–5.1)
PROT SERPL-MCNC: 7.3 G/DL (ref 6–8.3)
RBC # BLD AUTO: 5.01 10*6/ΜL (ref 4.1–5.8)
RSV RNA NPH QL NAA+NON-PROBE: NEGATIVE
SARS-COV-2 RNA RESP QL NAA+PROBE: NEGATIVE
SODIUM SERPL-SCNC: 136 MMOL/L (ref 135–145)
TROPONIN I SERPL-MCNC: 15.9 PG/ML
TROPONIN I SERPL-MCNC: 16.2 PG/ML
WBC # BLD AUTO: 13.3 10*3/UL (ref 3.7–9.6)

## 2023-03-11 PROCEDURE — 83605 ASSAY OF LACTIC ACID: CPT | Performed by: NURSE PRACTITIONER

## 2023-03-11 PROCEDURE — 83735 ASSAY OF MAGNESIUM: CPT | Performed by: FAMILY MEDICINE

## 2023-03-11 PROCEDURE — 99223 1ST HOSP IP/OBS HIGH 75: CPT | Mod: AI | Performed by: NURSE PRACTITIONER

## 2023-03-11 PROCEDURE — 74174 CTA ABD&PLVS W/CONTRAST: CPT | Mod: 26,MG | Performed by: INTERNAL MEDICINE

## 2023-03-11 PROCEDURE — 71275 CT ANGIOGRAPHY CHEST: CPT | Mod: MG

## 2023-03-11 PROCEDURE — 99285 EMERGENCY DEPT VISIT HI MDM: CPT | Mod: CS | Performed by: FAMILY MEDICINE

## 2023-03-11 PROCEDURE — 99285 EMERGENCY DEPT VISIT HI MDM: CPT | Performed by: FAMILY MEDICINE

## 2023-03-11 PROCEDURE — 96375 TX/PRO/DX INJ NEW DRUG ADDON: CPT

## 2023-03-11 PROCEDURE — 87040 BLOOD CULTURE FOR BACTERIA: CPT | Performed by: FAMILY MEDICINE

## 2023-03-11 PROCEDURE — 1110000100 HC ROOM PRIVATE W TELE

## 2023-03-11 PROCEDURE — 86140 C-REACTIVE PROTEIN: CPT | Performed by: FAMILY MEDICINE

## 2023-03-11 PROCEDURE — 87637 SARSCOV2&INF A&B&RSV AMP PRB: CPT | Performed by: FAMILY MEDICINE

## 2023-03-11 PROCEDURE — 80053 COMPREHEN METABOLIC PANEL: CPT | Performed by: FAMILY MEDICINE

## 2023-03-11 PROCEDURE — 2550000100 HC RX 255: Performed by: FAMILY MEDICINE

## 2023-03-11 PROCEDURE — 83605 ASSAY OF LACTIC ACID: CPT | Performed by: FAMILY MEDICINE

## 2023-03-11 PROCEDURE — 93010 ELECTROCARDIOGRAM REPORT: CPT | Mod: GZ | Performed by: FAMILY MEDICINE

## 2023-03-11 PROCEDURE — 93005 ELECTROCARDIOGRAM TRACING: CPT | Performed by: FAMILY MEDICINE

## 2023-03-11 PROCEDURE — 83880 ASSAY OF NATRIURETIC PEPTIDE: CPT | Performed by: FAMILY MEDICINE

## 2023-03-11 PROCEDURE — 2580000300 HC RX 258: Performed by: FAMILY MEDICINE

## 2023-03-11 PROCEDURE — G1004 CDSM NDSC: HCPCS | Performed by: INTERNAL MEDICINE

## 2023-03-11 PROCEDURE — 85025 COMPLETE CBC W/AUTO DIFF WBC: CPT | Performed by: FAMILY MEDICINE

## 2023-03-11 PROCEDURE — 85652 RBC SED RATE AUTOMATED: CPT | Performed by: FAMILY MEDICINE

## 2023-03-11 PROCEDURE — 84484 ASSAY OF TROPONIN QUANT: CPT | Performed by: FAMILY MEDICINE

## 2023-03-11 PROCEDURE — 36415 COLL VENOUS BLD VENIPUNCTURE: CPT | Performed by: FAMILY MEDICINE

## 2023-03-11 PROCEDURE — 2580000300 HC RX 258: Performed by: NURSE PRACTITIONER

## 2023-03-11 PROCEDURE — 6360000200 HC RX 636 W HCPCS (ALT 250 FOR IP): Performed by: FAMILY MEDICINE

## 2023-03-11 PROCEDURE — 93010 ELECTROCARDIOGRAM REPORT: CPT | Performed by: EMERGENCY MEDICINE

## 2023-03-11 PROCEDURE — 71275 CT ANGIOGRAPHY CHEST: CPT | Mod: 26,MG | Performed by: INTERNAL MEDICINE

## 2023-03-11 PROCEDURE — 6360000200 HC RX 636 W HCPCS (ALT 250 FOR IP): Performed by: NURSE PRACTITIONER

## 2023-03-11 PROCEDURE — 96374 THER/PROPH/DIAG INJ IV PUSH: CPT

## 2023-03-11 RX ORDER — ONDANSETRON HYDROCHLORIDE 2 MG/ML
4 INJECTION, SOLUTION INTRAVENOUS EVERY 6 HOURS PRN
Status: DISCONTINUED | OUTPATIENT
Start: 2023-03-11 | End: 2023-03-14

## 2023-03-11 RX ORDER — ACETAMINOPHEN 325 MG/1
650 TABLET ORAL EVERY 6 HOURS PRN
Status: DISCONTINUED | OUTPATIENT
Start: 2023-03-11 | End: 2023-03-15 | Stop reason: HOSPADM

## 2023-03-11 RX ORDER — FENTANYL CITRATE/PF 50 MCG/ML
50 PLASTIC BAG, INJECTION (ML) INTRAVENOUS
Status: DISCONTINUED | OUTPATIENT
Start: 2023-03-11 | End: 2023-03-15 | Stop reason: HOSPADM

## 2023-03-11 RX ORDER — ALBUTEROL SULFATE 0.83 MG/ML
2.5 SOLUTION RESPIRATORY (INHALATION) EVERY 4 HOURS PRN
Status: DISCONTINUED | OUTPATIENT
Start: 2023-03-11 | End: 2023-03-15 | Stop reason: HOSPADM

## 2023-03-11 RX ORDER — KETOROLAC TROMETHAMINE 15 MG/ML
10 INJECTION, SOLUTION INTRAMUSCULAR; INTRAVENOUS ONCE
Status: COMPLETED | OUTPATIENT
Start: 2023-03-11 | End: 2023-03-11

## 2023-03-11 RX ORDER — SODIUM CHLORIDE 9 MG/ML
500 INJECTION, SOLUTION INTRAVENOUS ONCE
Status: COMPLETED | OUTPATIENT
Start: 2023-03-11 | End: 2023-03-11

## 2023-03-11 RX ORDER — CEFTRIAXONE 1 G/1
1000 INJECTION, POWDER, FOR SOLUTION INTRAMUSCULAR; INTRAVENOUS ONCE
Status: COMPLETED | OUTPATIENT
Start: 2023-03-11 | End: 2023-03-11

## 2023-03-11 RX ORDER — FUROSEMIDE 10 MG/ML
20 INJECTION INTRAMUSCULAR; INTRAVENOUS
Status: DISCONTINUED | OUTPATIENT
Start: 2023-03-11 | End: 2023-03-12

## 2023-03-11 RX ORDER — IOPAMIDOL 755 MG/ML
100 INJECTION, SOLUTION INTRAVASCULAR ONCE
Status: COMPLETED | OUTPATIENT
Start: 2023-03-11 | End: 2023-03-11

## 2023-03-11 RX ORDER — BISACODYL 5 MG
5 TABLET, DELAYED RELEASE (ENTERIC COATED) ORAL DAILY PRN
Status: DISCONTINUED | OUTPATIENT
Start: 2023-03-11 | End: 2023-03-15 | Stop reason: HOSPADM

## 2023-03-11 RX ORDER — NAPROXEN SODIUM 220 MG/1
324 TABLET, FILM COATED ORAL ONCE
Status: COMPLETED | OUTPATIENT
Start: 2023-03-11 | End: 2023-03-11

## 2023-03-11 RX ORDER — ADHESIVE BANDAGE
30 BANDAGE TOPICAL DAILY PRN
Status: DISCONTINUED | OUTPATIENT
Start: 2023-03-11 | End: 2023-03-15 | Stop reason: HOSPADM

## 2023-03-11 RX ADMIN — FENTANYL CITRATE 50 MCG: 50 INJECTION, SOLUTION INTRAMUSCULAR; INTRAVENOUS at 16:46

## 2023-03-11 RX ADMIN — NAPROXEN SODIUM 324 MG: 220 TABLET, FILM COATED ORAL at 17:29

## 2023-03-11 RX ADMIN — IOPAMIDOL 100 ML: 755 INJECTION, SOLUTION INTRAVENOUS at 16:55

## 2023-03-11 RX ADMIN — KETOROLAC TROMETHAMINE 10.5 MG: 15 INJECTION, SOLUTION INTRAMUSCULAR; INTRAVENOUS at 18:12

## 2023-03-11 RX ADMIN — FUROSEMIDE 20 MG: 10 INJECTION, SOLUTION INTRAMUSCULAR; INTRAVENOUS at 21:18

## 2023-03-11 RX ADMIN — AZITHROMYCIN 500 MG: 500 INJECTION, POWDER, LYOPHILIZED, FOR SOLUTION INTRAVENOUS at 21:22

## 2023-03-11 RX ADMIN — SODIUM CHLORIDE 500 ML: 9 INJECTION, SOLUTION INTRAVENOUS at 18:54

## 2023-03-11 RX ADMIN — CEFTRIAXONE SODIUM 1000 MG: 1 INJECTION, POWDER, FOR SOLUTION INTRAMUSCULAR; INTRAVENOUS at 18:54

## 2023-03-11 ASSESSMENT — ENCOUNTER SYMPTOMS
ABDOMINAL PAIN: 1
SEIZURES: 0
SHORTNESS OF BREATH: 0
DIARRHEA: 0
WOUND: 0
DYSURIA: 0
VOMITING: 0
COUGH: 1
FREQUENCY: 0
ACTIVITY CHANGE: 1
CHILLS: 0
DIFFICULTY URINATING: 0
BACK PAIN: 1
PSYCHIATRIC NEGATIVE: 1
CHEST TIGHTNESS: 1
LEG PAIN: 0
ABDOMINAL DISTENTION: 1
ARTHRALGIAS: 0
HEADACHES: 0
EYE PAIN: 0
SORE THROAT: 0
HEMATURIA: 0
COLOR CHANGE: 0
PALPITATIONS: 0
LIGHT-HEADEDNESS: 0
NUMBNESS: 0
PALPITATIONS: 1
NAUSEA: 0
FEVER: 0
COUGH: 0
WEAKNESS: 1
DIZZINESS: 0
HEADACHES: 1
SHORTNESS OF BREATH: 1

## 2023-03-11 ASSESSMENT — ACTIVITIES OF DAILY LIVING (ADL): ADEQUATE_TO_COMPLETE_ADL: YES

## 2023-03-11 NOTE — ED PROVIDER NOTES
"    HPI:  Chief Complaint   Patient presents with    Back Pain       72-year-old male presents with severe back pain.  He states he has had back pain for the last couple days.  He was initially seen early yesterday morning for back pain.  He was discharged home after he was feeling better.  States pain is continued since then.  He states it is worse when he lies down.  It became worsening this afternoon.  He took a hot shower and states the pain got even worse.  He states it is hard to breathe.  States it is hard to lie down.  He denies any trauma or injury.  He denies any fevers or chills.  He denies any cough or shortness of breath other than due to pain.      History provided by:  Patient   used: No    Back Pain  Location:  Thoracic spine  Quality:  Shooting, stabbing and cramping  Radiates to:  R shoulder and L shoulder  Pain severity:  Severe  Pain is:  Same all the time  Onset quality:  Gradual  Duration:  24 hours  Timing:  Constant  Progression:  Worsening  Chronicity:  Recurrent  Context: not falling, not MCA, not MVA and not twisting    Relieved by:  None tried  Worsened by:  Nothing  Ineffective treatments:  None tried  Associated symptoms: abdominal pain    Associated symptoms: no chest pain, no dysuria, no fever, no headaches, no leg pain and no numbness    Risk factors: no vascular disease      HISTORY:  Past Medical History:   Diagnosis Date    Atrial flutter (CMS/HCC) ([facility])     CHF (congestive heart failure) (CMS/HCC) ([facility])     Chipped tooth     \"cracked tooth\" Back right upper, left upper back    Dysrhythmia     Edema     BLE    History of transfusion     Hypertension     Injury of back 01/14/2021    Seen in .    Pneumonia 3/11/2023    Urinary retention     indwelling Pope in place since November 2020        Past Surgical History:   Procedure Laterality Date    ABDOMINAL SURGERY      anuerysm    ABLATION A-FLUTTER N/A 2/8/2021    Procedure: Ablation A-flutter loop implant;  " Surgeon: Timoteo Burk DO;  Location: [facility] EP Lab;  Service: Electrophysiology;  Laterality: N/A;    APPENDECTOMY      HERNIA REPAIR      x 3    LOOP RECORDER INSERTION N/A 2/8/2021    Procedure: LOOP RECORDER INSERTION;  Surgeon: Timoteo Burk DO;  Location: [facility] EP Lab;  Service: Electrophysiology;  Laterality: N/A;    PROSTATECTOMY N/A 4/28/2022    Procedure: REVOLIX LASER PROSTATECTOMY;  Surgeon: Attila Taylor MD;  Location: [facility] OR;  Service: Urology;  Laterality: N/A;    TRANSURETHRAL RESECTION OF BLADDER TUMOR N/A 12/22/2020    Procedure: CYSTOSCOPY WITH BLADDER BIOPSY;  Surgeon: Daniele Johnson MD;  Location: [facility] OR;  Service: Urology;  Laterality: N/A;    TRANSURETHRAL RESECTION OF PROSTATE N/A 12/22/2020    Procedure: CYSTOSCOPY TRANSURETHRAL RESECTION PROSTATE, exam under anasthesia;  Surgeon: Daniele Johnson MD;  Location: [facility] OR;  Service: Urology;  Laterality: N/A;    TRANSURETHRAL RESECTION OF PROSTATE N/A 4/27/2021    Procedure: CYSTOSCOPY TRANSURETHRAL RESECTION PROSTATE;  Surgeon: Daniele Johnson MD;  Location: [facility] OR;  Service: Urology;  Laterality: N/A;    TRANSURETHRAL RESECTION OF PROSTATE N/A 9/27/2021    Procedure: CYSTOSCOPY TRANSURETHRAL RESECTION PROSTATE;  Surgeon: Attila Taylor MD;  Location: [facility] OR;  Service: Urology;  Laterality: N/A;  NOT EARLY MORNING       Family History   Problem Relation Age of Onset    Alzheimer's disease Mother     Diabetes Father     Heart attack Mother's Brother        Social History     Tobacco Use    Smoking status: Never    Smokeless tobacco: Never   Vaping Use    Vaping Use: Never used   Substance Use Topics    Alcohol use: Yes     Comment: rarely    Drug use: Never         ROS:  Review of Systems   Constitutional:  Negative for chills and fever.   HENT:  Negative for ear pain and sore throat.    Eyes:  Negative for pain and visual disturbance.   Respiratory:  Negative for cough and shortness of breath.    Cardiovascular:  Negative for chest  pain and palpitations.   Gastrointestinal:  Positive for abdominal pain. Negative for vomiting.   Genitourinary:  Negative for dysuria and hematuria.   Musculoskeletal:  Positive for back pain. Negative for arthralgias.   Skin:  Negative for color change and rash.   Neurological:  Negative for seizures, syncope, numbness and headaches.   All other systems reviewed and are negative.    PE:  ED Triage Vitals   Temp Heart Rate Resp BP SpO2   03/11/23 1637 03/11/23 1637 03/11/23 1637 03/11/23 1637 03/11/23 1641   36.4 °C (97.5 °F) 121 20 (!) 149/104 92 %      Mean BP (mmHg) Temp Source Heart Rate Source Patient Position BP Location   03/11/23 1637 03/11/23 1637 -- -- 03/11/23 1637   130 Temporal   Left arm      FiO2 (%)       --                  Physical Exam  Vitals and nursing note reviewed.   Constitutional:       Appearance: He is well-developed.   HENT:      Head: Normocephalic and atraumatic.      Nose: Nose normal.      Mouth/Throat:      Mouth: Mucous membranes are moist.   Eyes:      Conjunctiva/sclera: Conjunctivae normal.      Pupils: Pupils are equal, round, and reactive to light.   Cardiovascular:      Rate and Rhythm: Normal rate and regular rhythm.      Heart sounds: Normal heart sounds. No murmur heard.  Pulmonary:      Effort: Pulmonary effort is normal. No respiratory distress.      Breath sounds: Normal breath sounds.   Abdominal:      General: Bowel sounds are normal.      Palpations: Abdomen is soft.      Tenderness: There is no abdominal tenderness.   Musculoskeletal:         General: Normal range of motion.      Right shoulder: Tenderness present.      Left shoulder: Tenderness present.        Arms:       Cervical back: Normal range of motion and neck supple.   Lymphadenopathy:      Cervical: No cervical adenopathy.   Skin:     General: Skin is warm and dry.      Capillary Refill: Capillary refill takes less than 2 seconds.   Neurological:      General: No focal deficit present.      Mental  Status: He is alert and oriented to person, place, and time.   Psychiatric:         Mood and Affect: Mood normal.         Behavior: Behavior normal.       ED LABS:  Labs Reviewed   CBC WITH AUTO DIFFERENTIAL - Abnormal       Result Value    WBC 13.3 (*)     RBC 5.01      Hemoglobin 15.4      Hematocrit 45.7      MCV 91.2      MCH 30.8      MCHC 33.7      RDW 14.8      Platelets 254      MPV 8.8      Neutrophils% 78.3 (*)     Lymphocytes% 8.6 (*)     Monocytes% 12.8      Eosinophils% 0.0      Basophils% 0.3      ANC (auto diff) 10.40      Lymphocytes Absolute 1.10      Monocytes Absolute 1.70      Eosinophils Absolute 0.00      Basophils Absolute 0.00     COMPREHENSIVE METABOLIC PANEL - Abnormal    Sodium 136      Potassium 3.5      Chloride 100      CO2 24      Anion Gap 12 (*)     BUN 11      Creatinine 0.95      Glucose 153 (*)     Calcium 9.4      AST 16      ALT (SGPT) 13      Alkaline Phosphatase 60      Total Protein 7.3      Albumin 4.2      Total Bilirubin 1.51 (*)     Corrected Calcium 9.2      eGFR 85      Narrative:     Calculation based on the 2021 Chronic Kidney Disease Epidemiology Collaboration (CKD-EPI) equation refit without adjustment for race.   C-REACTIVE PROTEIN - Abnormal    CRP 91.1 (*)    POCT LACTIC ACID (LACTATE) - Abnormal    POC Lactate 2.55 (*)    SEDIMENTATION RATE, AUTOMATED - Abnormal    Sed Rate 28 (*)    MAGNESIUM - Normal    Magnesium 1.9     B-TYPE NATRIURETIC PEPTIDE (BNP) - Normal    BNP 44     HIGH SENSITIVE TROPONIN I - Normal    hsTnI 0 Hour 16.2     HIGH SENSITIVE TROPONIN I, 1 HOUR - Normal    hsTnI 1 hr 15.9      Delta from 0 Hour -0.3     BLOOD CULTURE   SARS/INFLUENZA/RSV QUADRUPLEX PCR   HIGH SENSITIVE TROPONIN I PANEL (0HR, 1HR, 2HR)    Narrative:     The following orders were created for panel order HS Troponin I Panel (0HR, 1HR, 2HR) Blood, Venous.  Procedure                               Abnormality         Status                     ---------                                -----------         ------                     HS Troponin I[[id number]]                 Normal              Final result               1HR High Sensitive Trop I[[id number]]     Normal              Final result               2HR High Sensitive Tropon...[[id number]]                                                   Please view results for these tests on the individual orders.   URINALYSIS WITH MICROSCOPIC, REFLEX CULTURE    Narrative:     The following orders were created for panel order Urinalysis w/microscopic, reflex culture Urine, Clean Catch.  Procedure                               Abnormality         Status                     ---------                               -----------         ------                     Urinalysis, Dip (part 1 o...[[id number]]                                                 Urinalysis, Micro (part 2...[[id number]]                                                   Please view results for these tests on the individual orders.   URINALYSIS DIPSTICK REFLEX TO CULTURE FOR USE WITH MICROSCOPIC PANEL   URINALYSIS MICROSCOPIC, REFLEX CULTURE   POCT LACTIC ACID (LACTATE)         ED IMAGES:  CT angiogram chest abdomen pelvis with IV contrast   Final Result   Impression:   1.  No evidence of aortic aneurysm, dissection, or hemodynamically significant stenosis.   2.  Small pericardial effusion.   3.  Urinary bladder trabeculation with numerous diverticula which could be seen in setting of chronic outlet obstruction or neurogenic bladder.   4.  Midline ventral abdominal wall hernia defect containing nonobstructed bowel loops.      On-call imaging was performed and a preliminary report was provided by Celi following the procedure. Agree with preliminary report.             ED PROCEDURES:  Procedures    ED COURSE:  ED Course as of 03/11/23 1921   Sat Mar 11, 2023   1735 HS Troponin I Panel (0HR, 1HR, 2HR) Blood, Venous:    hsTnI 0 Hour 16.2 [LB]   1738 EKG had some ST elevation.  It was noted on some  anterior leads and lateral.  I did have Dr. Tidwell, cardiology reviewed the EKG.  He was not horribly concerned.  He wanted EKG repeated in 15 minutes and will review at that time. [LB]   1848 Repeat EKG called and discussed with provider.  He is feels that this is not a STEMI that may be touch of pericarditis.  We did review the EKG.  Patient was feeling much better after fentanyl and Toradol.  Troponin is 16.2 and 15.9 at 0 and 1 hour respectively. [LB]   1901 Repeat EKG was unchanged.  This was reviewed by Sosa. [LB]      ED Course User Index  [LB] Raj Houston MD     72-year-old male presents with severe back pain.  Does have a little bit of tenderness in the trapezius and upper thoracic paraspinal muscles.  Patient is seen upon arrival and he is very uncomfortable.  Blood pressures in the 140s systolic bilaterally.  IV is established.  He is given IV fentanyl.  CTA of the chest and pelvis are given due to his severe pain and concern for aneurysm.    CTA was negative.  There is infiltrates at the bases bilaterally suggestive of atelectasis versus pneumonia.  Clinically I think it is pneumonia.  He has an elevated white count, elevated lactic and he is hypoxic.  He is requiring 3 L to keep his sats in the 92%.  Troponins were stable.  EKGs are stable I think this is more pneumonia.  May be a touch of pericarditis.  I did call discussed this with Jhoana Keith CNP from hospitalist service.  She did see the patient in the ER.  She is agrees to admit him.  He is given Rocephin in the ER.  Patient is comfortable staying overnight.  He left the ER in stable condition.         Sepsis Quality Bundle           MDM:  Medical Decision Making      Final diagnoses:   [J18.9] Pneumonia   [M54.9] Back pain     3/11/2023  7:21 PM                      Raj Houston MD  03/11/23 1921

## 2023-03-12 LAB
ANION GAP SERPL CALC-SCNC: 5 MMOL/L (ref 3–11)
ANION GAP SERPL CALC-SCNC: 7 MMOL/L (ref 3–11)
BACTERIA #/AREA URNS HPF: ABNORMAL /HPF
BASOPHILS # BLD AUTO: 0.1 10*3/UL
BASOPHILS NFR BLD AUTO: 0.6 % (ref 0–2)
BILIRUB UR QL: NEGATIVE
BUN SERPL-MCNC: 14 MG/DL (ref 7–25)
BUN SERPL-MCNC: 18 MG/DL (ref 7–25)
CALCIUM SERPL-MCNC: 8.7 MG/DL (ref 8.6–10.3)
CALCIUM SERPL-MCNC: 8.8 MG/DL (ref 8.6–10.3)
CHLORIDE SERPL-SCNC: 100 MMOL/L (ref 98–107)
CHLORIDE SERPL-SCNC: 102 MMOL/L (ref 98–107)
CLARITY UR: CLEAR
CO2 SERPL-SCNC: 28 MMOL/L (ref 21–32)
CO2 SERPL-SCNC: 30 MMOL/L (ref 21–32)
COLOR UR: ABNORMAL
CREAT SERPL-MCNC: 1.04 MG/DL (ref 0.7–1.3)
CREAT SERPL-MCNC: 1.11 MG/DL (ref 0.7–1.3)
DELTA HIGH SENSITIVITY TROPONIN I, 1 HOUR: 0
EOSINOPHIL # BLD AUTO: 0.1 10*3/UL
EOSINOPHIL NFR BLD AUTO: 0.7 % (ref 0–3)
EPITHELIAL CELLS ON REPIRATORY GRAM STAIN /LPF: ABNORMAL /LPF
ERYTHROCYTE [DISTWIDTH] IN BLOOD BY AUTOMATED COUNT: 15.2 % (ref 11.5–15)
GFR SERPL CREATININE-BSD FRML MDRD: 70 ML/MIN/1.73M*2
GFR SERPL CREATININE-BSD FRML MDRD: 76 ML/MIN/1.73M*2
GLUCOSE SERPL-MCNC: 115 MG/DL (ref 70–105)
GLUCOSE SERPL-MCNC: 126 MG/DL (ref 70–105)
GLUCOSE UR QL: NEGATIVE MG/DL
HCT VFR BLD AUTO: 39.9 % (ref 38–50)
HGB BLD-MCNC: 13.4 G/DL (ref 13.2–17.2)
HGB UR QL: NEGATIVE
HYALINE CASTS #/AREA URNS AUTO: ABNORMAL /LPF
KETONES UR-MCNC: NEGATIVE MG/DL
LEUKOCYTE ESTERASE UR QL STRIP: NEGATIVE
LYMPHOCYTES # BLD AUTO: 1.5 10*3/UL
LYMPHOCYTES NFR BLD AUTO: 15.6 % (ref 15–47)
MAGNESIUM SERPL-MCNC: 2.1 MG/DL (ref 1.8–2.4)
MCH RBC QN AUTO: 30.7 PG (ref 29–34)
MCHC RBC AUTO-ENTMCNC: 33.5 G/DL (ref 32–36)
MCV RBC AUTO: 91.7 FL (ref 82–97)
MONOCYTES # BLD AUTO: 1.8 10*3/UL
MONOCYTES NFR BLD AUTO: 18.2 % (ref 5–13)
MUCOUS THREADS #/AREA URNS HPF: PRESENT /[HPF]
MUCUS PRESENCE IN REPIRATORY GRAM STAIN: ABNORMAL
NEUTROPHILS # BLD AUTO: 6.4 10*3/UL
NEUTROPHILS NFR BLD AUTO: 64.9 % (ref 46–70)
NITRITE UR QL: NEGATIVE
ORGANISM PREDOMINANCE IN REPIRATORY GRAM STAIN: ABNORMAL
OVERALL GRADE OF RESPIRATORY GRAM STAIN: ABNORMAL
PH UR: 5 PH
PLATELET # BLD AUTO: 217 10*3/UL (ref 130–350)
PMNS ON RESPIRATORY GRAM STAIN /LPF: ABNORMAL /LPF
PMV BLD AUTO: 8.7 FL (ref 6.9–10.8)
POTASSIUM SERPL-SCNC: 3.8 MMOL/L (ref 3.5–5.1)
POTASSIUM SERPL-SCNC: 4 MMOL/L (ref 3.5–5.1)
PROCALCITONIN SERPL-MCNC: 0.15 NG/ML
PROT UR STRIP-MCNC: NEGATIVE MG/DL
RBC # BLD AUTO: 4.35 10*6/ΜL (ref 4.1–5.8)
RBC #/AREA URNS HPF: ABNORMAL /HPF
REPIRATORY GRAM STAIN INTERP: ABNORMAL
SODIUM SERPL-SCNC: 135 MMOL/L (ref 135–145)
SODIUM SERPL-SCNC: 137 MMOL/L (ref 135–145)
SP GR UR: 1.01 (ref 1–1.03)
SQUAMOUS #/AREA URNS HPF: ABNORMAL /HPF
TROPONIN I SERPL-MCNC: 9.6 PG/ML
TROPONIN I SERPL-MCNC: 9.6 PG/ML
UROBILINOGEN UR-MCNC: 0.2 MG/DL
WBC # BLD AUTO: 9.8 10*3/UL (ref 3.7–9.6)
WBC #/AREA URNS HPF: ABNORMAL /HPF

## 2023-03-12 PROCEDURE — 81001 URINALYSIS AUTO W/SCOPE: CPT | Performed by: NURSE PRACTITIONER

## 2023-03-12 PROCEDURE — 83735 ASSAY OF MAGNESIUM: CPT | Performed by: NURSE PRACTITIONER

## 2023-03-12 PROCEDURE — 2580000300 HC RX 258: Performed by: NURSE PRACTITIONER

## 2023-03-12 PROCEDURE — 84484 ASSAY OF TROPONIN QUANT: CPT | Performed by: INTERNAL MEDICINE

## 2023-03-12 PROCEDURE — 80048 BASIC METABOLIC PNL TOTAL CA: CPT | Performed by: NURSE PRACTITIONER

## 2023-03-12 PROCEDURE — 36415 COLL VENOUS BLD VENIPUNCTURE: CPT | Performed by: NURSE PRACTITIONER

## 2023-03-12 PROCEDURE — 1110000100 HC ROOM PRIVATE W TELE

## 2023-03-12 PROCEDURE — 87070 CULTURE OTHR SPECIMN AEROBIC: CPT | Performed by: NURSE PRACTITIONER

## 2023-03-12 PROCEDURE — 84145 PROCALCITONIN (PCT): CPT | Performed by: NURSE PRACTITIONER

## 2023-03-12 PROCEDURE — 93005 ELECTROCARDIOGRAM TRACING: CPT | Performed by: INTERNAL MEDICINE

## 2023-03-12 PROCEDURE — 85025 COMPLETE CBC W/AUTO DIFF WBC: CPT | Performed by: NURSE PRACTITIONER

## 2023-03-12 PROCEDURE — 87205 SMEAR GRAM STAIN: CPT | Performed by: NURSE PRACTITIONER

## 2023-03-12 PROCEDURE — 6360000200 HC RX 636 W HCPCS (ALT 250 FOR IP): Performed by: INTERNAL MEDICINE

## 2023-03-12 PROCEDURE — 94799 UNLISTED PULMONARY SVC/PX: CPT

## 2023-03-12 PROCEDURE — 6370000100 HC RX 637 (ALT 250 FOR IP): Performed by: INTERNAL MEDICINE

## 2023-03-12 PROCEDURE — 80048 BASIC METABOLIC PNL TOTAL CA: CPT | Performed by: INTERNAL MEDICINE

## 2023-03-12 PROCEDURE — 6360000200 HC RX 636 W HCPCS (ALT 250 FOR IP): Performed by: NURSE PRACTITIONER

## 2023-03-12 PROCEDURE — 99233 SBSQ HOSP IP/OBS HIGH 50: CPT | Performed by: INTERNAL MEDICINE

## 2023-03-12 RX ORDER — FUROSEMIDE 10 MG/ML
40 INJECTION INTRAMUSCULAR; INTRAVENOUS
Status: DISCONTINUED | OUTPATIENT
Start: 2023-03-12 | End: 2023-03-12

## 2023-03-12 RX ORDER — FUROSEMIDE 10 MG/ML
80 INJECTION INTRAMUSCULAR; INTRAVENOUS
Status: DISCONTINUED | OUTPATIENT
Start: 2023-03-12 | End: 2023-03-15 | Stop reason: HOSPADM

## 2023-03-12 RX ADMIN — APIXABAN 5 MG: 5 TABLET, FILM COATED ORAL at 21:29

## 2023-03-12 RX ADMIN — FUROSEMIDE 80 MG: 10 INJECTION, SOLUTION INTRAMUSCULAR; INTRAVENOUS at 16:01

## 2023-03-12 RX ADMIN — AZITHROMYCIN 500 MG: 500 INJECTION, POWDER, LYOPHILIZED, FOR SOLUTION INTRAVENOUS at 21:29

## 2023-03-12 RX ADMIN — CEFTRIAXONE SODIUM 1000 MG: 1 INJECTION, POWDER, FOR SOLUTION INTRAMUSCULAR; INTRAVENOUS at 19:44

## 2023-03-12 RX ADMIN — FUROSEMIDE 40 MG: 10 INJECTION, SOLUTION INTRAMUSCULAR; INTRAVENOUS at 08:01

## 2023-03-12 ASSESSMENT — ENCOUNTER SYMPTOMS
NAUSEA: 0
WHEEZING: 0
COUGH: 0
SHORTNESS OF BREATH: 0
CHILLS: 0
VOMITING: 0
FEVER: 0
CONSTIPATION: 0
DIARRHEA: 0

## 2023-03-12 NOTE — PLAN OF CARE
Problem: Infection Control  Goal: MINIMIZE THE ACQUISITION AND TRANSMISSION OF INFECTIOUS AGENTS  Description: INTERVENTIONS:  1. Isolate patient with suspected/diagnosed communicable disease  2. Place on designated isolation precautions  3. Maintain isolation techniques  4. Perform hand hygiene before and after each patient care activity  5. [place] universal precautions  6. Wear PPE as directed for type of isolation  7. Administer antibiotic therapy as ordered  8. Clean the environment appropriately after each patient use  9. Clean patient care equipment after each patient use as it leaves the room  10. Limit number of visitors, as appropriate  Outcome: Progressing     Problem: Pain - Adult  Goal: Verbalizes/displays adequate comfort level or baseline comfort level  Description: INTERVENTIONS:  1. Encourage patient to monitor pain and request interventions  2. Assess pain using the appropriate pain scale  3. Administer analgesics based on type and severity of pain and evaluate response  4. Educate/Implement non-pharmacological measures as appropriate and evaluate response  5. Consider cultural, developmental and social influences on pain and pain management  6. Notify Provider if interventions unsuccessful or patient reports new pain  Outcome: Progressing     Problem: Infection - Adult  Goal: Absence of infection during hospitalization  Description: INTERVENTIONS:  1. Assess and monitor for signs and symptoms of infection  2. Monitor lab/diagnostic results  3. Monitor all insertion sites/wounds/incisions  4. Monitor secretions for changes in amount and color  5. Administer medications as ordered  6. Educate and encourage patient and family to use good hand hygiene technique  7. Identify and educate in appropriate isolation precautions for identified infection/condition  Outcome: Progressing     Problem: Safety Adult  Goal: Patient will remain safe during hospitalization  Description: INTERVENTIONS    1. Assess  patient for fall risk and implement interventions if needed  2. Use safe transport techniques  3. Assess patient using the appropriate Ricardo skin assessment scale  4. Assess patient for risk of aspiration  5. Assess patient for risk of elopement  6. Assess patient for risk of suicide  Outcome: Progressing     Problem: Daily Care  Goal: Daily care needs are met  Description: INTERVENTIONS:   1. Assess and monitor skin integrity  2. Identify patients at risk for skin breakdown on admission and per policy  3. Assess and monitor ability to perform self care and identify potential discharge needs  4. Assess skin integrity/risk for skin breakdown  5. Assist patient with activities of daily living as needed  6. Encourage independent activity per ability   7. Provide mouth care   8. Include patient/family/caregiver in decisions related to daily care   Outcome: Progressing     Problem: Knowledge Deficit  Goal: Patient/family/caregiver demonstrates understanding of disease process, treatment plan, medications, and discharge instructions  Description: INTERVENTIONS:   1. Complete learning assessment and assess knowledge base  2. Provide teaching at level of understanding   3. Provide teaching via preferred learning methods  Outcome: Progressing     Problem: Discharge Barriers  Goal: Patient's discharge needs are met  Description: INTERVENTIONS:  1. Assess patient for self-management skills  2. Encourage participation in management  3. Identify potential discharge barriers on admission and throughout hospital stay  4. Involve patient/family/caregiver in discharge planning process  5. Collaborate with case management/ for discharge needs  Outcome: Progressing

## 2023-03-12 NOTE — PROGRESS NOTES
Progress Note    Patient:  Cesar Yuen 72 y.o. male MRN: [id number]     Date of Service: 3/12/2023     CC: had concerns including Back Pain.  Hospital Day: 2     Overnight events: Nursing noted ST segment elevation on telemetry patient asymptomatic stat EKG done discussed with cardiology 2:50 PM 3/12/2023     Assessment and Plan     Cesar Yuen is a 72 y.o. male With a past with history of atrial fibrillation anticoagulated on Eliquis now admitted for pneumonia     1.Sepssi w/o shock Community-acquired pneumonia   - responding well to Rocephin and azithromycin.  - Currently needing 4 L of oxygen respiratory cultures pending  - blood cult negative to date from 3/11    2.Acute hypoxemia without respiratory failure  - Secondary to #1 wean as tolerated to keep saturations 90 to 92%    3.Atrial fibrillation continue Eliquis and Currently rhythm controlled with previous ablation    4.ST segment elevation on EKG.  - Patient had abnormal ST segments as seen on telemetry.  EKG was done to be able to assess his ST segment elevation.  He was to a somewhat larger degree than his previous EKGs in lead II, V5 and V6.  Still roughly 1 mm in size similar in appearance to previous EKGs patient completely asymptomatic first troponin negative I did call cardiology discussed the case before his first troponin resulted and they recommended following serial troponins since he was asymptomatic.    5.Volume overload secondary to noncompliance with Lasix as an outpatient.  - Patient ran out of refills and did not call to have more refills sent.  Minimal response to IV Lasix will increase dose and follow urine output this afternoon      PT/OT evaluation:See notes  DVT prophylaxis: eliquis  Disposition: home when off O2   Family communication: Pt did not request a family update today.     Subjective     Patient reports no chest pain no difficulty breathing no chest pressure or radiation into his arm or jaw.Tolerating his diet reports  "his difficulty breathing from his admission is much improved overnight.  Review of Systems   Constitutional:  Negative for chills and fever.   Respiratory:  Negative for cough, shortness of breath and wheezing.    Cardiovascular:  Negative for chest pain and leg swelling.   Gastrointestinal:  Negative for constipation, diarrhea, nausea and vomiting.    Objective     Physical Exam:  Vitals: /66 (BP Location: Right arm, Patient Position: Sitting, Cuff Size: Long Adult)   Pulse 93   Temp 36.6 °C (97.9 °F) (Oral)   Resp 16   Ht 1.854 m (6' 1\")   Wt 109.3 kg (241 lb)   SpO2 92%   BMI 31.80 kg/m²     I & O - 24hr: I/O this shift:  In: 750 [P.O.:750]  Out: 1050 [Urine:1050]   Physical Exam  Constitutional:       General: He is awake. He is not in acute distress.  Neck:      Vascular: No JVD.   Cardiovascular:      Rate and Rhythm: Normal rate and regular rhythm.      Heart sounds: Normal heart sounds, S1 normal and S2 normal. No murmur heard.    No S3 or S4 sounds.   Pulmonary:      Effort: Pulmonary effort is normal.      Breath sounds: Normal breath sounds. No rhonchi or rales.   Abdominal:      Palpations: Abdomen is soft.      Tenderness: There is no abdominal tenderness. There is no guarding or rebound.   Musculoskeletal:      Right lower leg: 3+ Edema present.      Left lower leg: 3+ Edema present.   Neurological:      Mental Status: He is alert and oriented to person, place, and time.   Psychiatric:         Mood and Affect: Mood and affect normal.         Pertinent Labs & Imaging Studies     Admission on 03/11/2023   Component Date Value Ref Range Status    WBC 03/11/2023 13.3 (H)  3.7 - 9.6 10*3/uL Final    RBC 03/11/2023 5.01  4.10 - 5.80 10*6/µL Final    Hemoglobin 03/11/2023 15.4  13.2 - 17.2 g/dL Final    Hematocrit 03/11/2023 45.7  38.0 - 50.0 % Final    MCV 03/11/2023 91.2  82.0 - 97.0 fL Final    MCH 03/11/2023 30.8  29.0 - 34.0 pg Final    MCHC 03/11/2023 33.7  32.0 - 36.0 g/dL Final    RDW " 03/11/2023 14.8  11.5 - 15.0 % Final    Platelets 03/11/2023 254  130 - 350 10*3/uL Final    MPV 03/11/2023 8.8  6.9 - 10.8 fL Final    Neutrophils% 03/11/2023 78.3 (H)  46.0 - 70.0 % Final    Lymphocytes% 03/11/2023 8.6 (L)  15.0 - 47.0 % Final    Monocytes% 03/11/2023 12.8  5.0 - 13.0 % Final    Eosinophils% 03/11/2023 0.0  0.0 - 3.0 % Final    Basophils% 03/11/2023 0.3  0.0 - 2.0 % Final    ANC (auto diff) 03/11/2023 10.40  10*3/UL Final    Lymphocytes Absolute 03/11/2023 1.10  10*3/uL Final    Monocytes Absolute 03/11/2023 1.70  10*3/uL Final    Eosinophils Absolute 03/11/2023 0.00  10*3/uL Final    Basophils Absolute 03/11/2023 0.00  10*3/uL Final    Sodium 03/11/2023 136  135 - 145 mmol/L Final    Potassium 03/11/2023 3.5  3.5 - 5.1 MMOL/L Final    Chloride 03/11/2023 100  98 - 107 mmol/L Final    CO2 03/11/2023 24  21 - 32 mmol/L Final    Anion Gap 03/11/2023 12 (H)  3 - 11 mmol/L Final    BUN 03/11/2023 11  7 - 25 mg/dL Final    Creatinine 03/11/2023 0.95  0.70 - 1.30 mg/dL Final    Glucose 03/11/2023 153 (H)  70 - 105 mg/dL Final    Calcium 03/11/2023 9.4  8.6 - 10.3 mg/dL Final    AST 03/11/2023 16  <40 U/L Final    ALT (SGPT) 03/11/2023 13  7 - 52 U/L Final    Alkaline Phosphatase 03/11/2023 60  45 - 115 U/L Final    Total Protein 03/11/2023 7.3  6.0 - 8.3 g/dL Final    Albumin 03/11/2023 4.2  3.5 - 5.3 g/dL Final    Total Bilirubin 03/11/2023 1.51 (H)  0.20 - 1.40 mg/dL Final    Corrected Calcium 03/11/2023 9.2  8.6 - 10.3 mg/dL Final    eGFR 03/11/2023 85  >60 mL/min/1.73m*2 Final    Magnesium 03/11/2023 1.9  1.8 - 2.4 mg/dL Final    BNP 03/11/2023 44  0 - 100 pg/mL Final    CRP 03/11/2023 91.1 (H)  <=10.0 MG/L Final    POC Lactate 03/11/2023 2.55 (HH)  0.50 - 1.99 MMOL/L Final    hsTnI 0 Hour 03/11/2023 16.2  <=19.8 pg/mL Final    hsTnI 1 hr 03/11/2023 15.9  <=19.8 pg/mL Final    Delta from 0 Hour 03/11/2023 -0.3  -11.9 to 11.9 Final    Blood Culture 03/11/2023 No Growth 12 hours   Preliminary    Sed  Rate 03/11/2023 28 (H)  <=20 mm/hr Final    Influenza A Screen by PCR 03/11/2023 Negative  Negative Final    Influenza B Screen by PCR 03/11/2023 Negative  Negative Final    COVID-19 PCR 03/11/2023 Negative  Negative Final    Respiratory Syncytial Virus Screen* 03/11/2023 Negative  Negative Final    Color, Urine 03/12/2023 Dark Yellow (A)  Yellow Final    Clarity, Urine 03/12/2023 Clear  Clear Final    pH, Urine 03/12/2023 5.0  5.0 - 8.0 PH Final    Specific Gravity, Urine 03/12/2023 1.010  1.003 - 1.030 Final    Protein, Urine 03/12/2023 Negative  Negative MG/DL Final    Glucose, Urine 03/12/2023 Negative  Negative MG/DL Final    Ketones, Urine 03/12/2023 Negative  Negative MG/DL Final    Blood, Urine 03/12/2023 Negative  Negative Final    Nitrite, Urine 03/12/2023 Negative  Negative Final    Bilirubin, Urine 03/12/2023 Negative  Negative Final    Leukocytes, Urine 03/12/2023 Negative  Negative Final    Urobilinogen, Urine 03/12/2023 0.2  <2.0 mg/dL Final    RBC, Urine 03/12/2023 0-2  None seen, 0-2, Negative /HPF Final    WBC, Urine 03/12/2023 5-9 (A)  0 - 4 /HPF Final    Squamous Epithelial, Urine 03/12/2023 0-4  None Seen-9 /HPF Final    Bacteria, Urine 03/12/2023 None seen  None seen, Few /HPF Final    Hyaline Casts, Urine 03/12/2023 0-4  0 - 4 /LPF Final    Mucus, Urine 03/12/2023 Present   Final    POC Lactate 03/11/2023 0.84  0.50 - 1.99 MMOL/L Final    PMNs 03/12/2023 >25 PMN's (+3)  /LPF Final    Epithelial cells 03/12/2023 10-24 Epithelia cells (-2)  /LPF Final    Mucus 03/12/2023 No Mucus present (0)   Final    Organism Predominance 03/12/2023 2-3 different organisms present (0)   Final    OVERALL GRADE OF RESPIRATORY GRAM * 03/12/2023 -1 to +1, Borderline specimen. Culture performed. Interpret culture with caution (A)  >=+2, Acceptable specimen. Culture performed. Final    Gram Stain Interpretation 03/12/2023 Gram positive bacilli, large  Gram positive cocci in pairs  Gram negative bacilli   Final     WBC 03/12/2023 9.8 (H)  3.7 - 9.6 10*3/uL Final    RBC 03/12/2023 4.35  4.10 - 5.80 10*6/µL Final    Hemoglobin 03/12/2023 13.4  13.2 - 17.2 g/dL Final    Hematocrit 03/12/2023 39.9  38.0 - 50.0 % Final    MCV 03/12/2023 91.7  82.0 - 97.0 fL Final    MCH 03/12/2023 30.7  29.0 - 34.0 pg Final    MCHC 03/12/2023 33.5  32.0 - 36.0 g/dL Final    RDW 03/12/2023 15.2 (H)  11.5 - 15.0 % Final    Platelets 03/12/2023 217  130 - 350 10*3/uL Final    MPV 03/12/2023 8.7  6.9 - 10.8 fL Final    Neutrophils% 03/12/2023 64.9  46.0 - 70.0 % Final    Lymphocytes% 03/12/2023 15.6  15.0 - 47.0 % Final    Monocytes% 03/12/2023 18.2 (H)  5.0 - 13.0 % Final    Eosinophils% 03/12/2023 0.7  0.0 - 3.0 % Final    Basophils% 03/12/2023 0.6  0.0 - 2.0 % Final    ANC (auto diff) 03/12/2023 6.40  10*3/UL Final    Lymphocytes Absolute 03/12/2023 1.50  10*3/uL Final    Monocytes Absolute 03/12/2023 1.80  10*3/uL Final    Eosinophils Absolute 03/12/2023 0.10  10*3/uL Final    Basophils Absolute 03/12/2023 0.10  10*3/uL Final    Sodium 03/12/2023 137  135 - 145 mmol/L Final    Potassium 03/12/2023 4.0  3.5 - 5.1 MMOL/L Final    Chloride 03/12/2023 102  98 - 107 mmol/L Final    CO2 03/12/2023 30  21 - 32 mmol/L Final    BUN 03/12/2023 14  7 - 25 mg/dL Final    Creatinine 03/12/2023 1.11  0.70 - 1.30 mg/dL Final    Glucose 03/12/2023 126 (H)  70 - 105 mg/dL Final    Calcium 03/12/2023 8.8  8.6 - 10.3 mg/dL Final    Anion Gap 03/12/2023 5  3 - 11 mmol/L Final    eGFR 03/12/2023 70  >60 mL/min/1.73m*2 Final    Magnesium 03/12/2023 2.1  1.8 - 2.4 mg/dL Final    Procalcitonin 03/12/2023 0.15  <0.50 ng/mL Final       CT angiogram chest abdomen pelvis with IV contrast   Final Result   Impression:   1.  No evidence of aortic aneurysm, dissection, or hemodynamically significant stenosis.   2.  Small pericardial effusion.   3.  Urinary bladder trabeculation with numerous diverticula which could be seen in setting of chronic outlet obstruction or neurogenic  bladder.   4.  Midline ventral abdominal wall hernia defect containing nonobstructed bowel loops.      On-call imaging was performed and a preliminary report was provided by [facility] following the procedure. Agree with preliminary report.         US Echo complete    (Results Pending)      I have personally and independently reviewed the above imaging and given my own interpretation.  I agree with the above radiologist interpretation unless otherwise specified below.     Scheduled Meds:furosemide, 80 mg, intravenous, 2x daily diuretic  cefTRIAXone, 1,000 mg, intravenous, q24h   And  azithromycin, 500 mg, intravenous, q24h      Continuous Infusions:   PRN Meds:.  fentaNYL citrate (PF)    acetaminophen    ondansetron    bisacodyL    magnesium hydroxide    albuterol      Alok Mcdonald DO  3/12/2023 2:50 PM

## 2023-03-12 NOTE — H&P
History and Physical    Patient:  Cesar Yuen 72 y.o. male MRN: [id number]     Date of Service: 3/11/2023      Chief complaint: had concerns including Back Pain.    History of Present Illness   The patient is a 72 y.o. male with past medical history of atrial flutter status post ablation anticoagulated on Eliquis, history of hypertension not on any medications currently, presented to the emergency department with complaints of back pain.  Patient was also seen in the ER yesterday, 3/10/2023, for similar complaints.  A work-up was performed at that time and patient was sent home with Norco for pain as well as started back on Lasix due to fairly significant bilateral peripheral edema.  Patient stated that his back pain was getting worse today, he was unable to lay down flat even after taking pain medication so he came back in to be reevaluated.  He denied having fevers or chills, he has had a productive cough, has had some chest and epigastric abdominal discomfort, pain seems to be worse with deep inspiration or coughing.  Patient has been able to eat and drink, denies nausea vomiting or diarrhea, denies any urinary complaints.      ED Course: In the ER, labs were obtained, patient has an elevated white blood cell count today of 13.3 with slight elevated neutrophils, lactic acid was 2.55.  BNP was normal, sed rate and CRP were elevated, CMP was unremarkable.  Patient had a CT angiogram chest abdomen pelvis which showed no evidence of aortic aneurysm, dissection, or hemodynamically significant stenosis; small pericardial effusion; urinary bladder trabeculation with numerous diverticula; and a midline ventral abdominal wall hernia defect containing nonobstructed bowel loops.  Patient was given 500 mL IV fluid bolus due to elevated lactic acid, was not given a full sepsis fluid bolus due to his fluid overload and hypoxia.  Patient did meet criteria for sepsis evaluation due to hypoxia and tachycardia, elevated white  "blood cell count and lactic acid level.  Questionable right lower lobe pneumonia, especially with patient's productive cough and elevated white blood cell count.   Cardiac work-up was also performed including troponin level x2 which were negative, EKG showed sinus tachycardia.  On-call cardiologist did review the EKG and agreed that there was no evidence of acute ischemia.  Patient was given 1 g of IV Rocephin in the ER.  Request for admission to hospitalist service was made.    Past Medical History:  Past Medical History:   Diagnosis Date   • Atrial flutter (CMS/HCC) (HCC)    • CHF (congestive heart failure) (CMS/HCC) (HCC)    • Chipped tooth     \"cracked tooth\" Back right upper, left upper back   • Dysrhythmia    • Edema     BLE   • History of transfusion    • Hypertension    • Injury of back 01/14/2021    Seen in .   • Pneumonia 3/11/2023   • Urinary retention     indwelling Pope in place since November 2020         Past Surgical History:    Past Surgical History:   Procedure Laterality Date   • ABDOMINAL SURGERY      anuerysm   • ABLATION A-FLUTTER N/A 2/8/2021    Procedure: Ablation A-flutter loop implant;  Surgeon: Timoteo Burk DO;  Location: [facility] EP Lab;  Service: Electrophysiology;  Laterality: N/A;   • APPENDECTOMY     • HERNIA REPAIR      x 3   • LOOP RECORDER INSERTION N/A 2/8/2021    Procedure: LOOP RECORDER INSERTION;  Surgeon: Timoteo Burk DO;  Location: [facility] EP Lab;  Service: Electrophysiology;  Laterality: N/A;   • PROSTATECTOMY N/A 4/28/2022    Procedure: REVOLIX LASER PROSTATECTOMY;  Surgeon: Attila Taylor MD;  Location: [facility] OR;  Service: Urology;  Laterality: N/A;   • TRANSURETHRAL RESECTION OF BLADDER TUMOR N/A 12/22/2020    Procedure: CYSTOSCOPY WITH BLADDER BIOPSY;  Surgeon: Daneile Johnson MD;  Location: [facility] OR;  Service: Urology;  Laterality: N/A;   • TRANSURETHRAL RESECTION OF PROSTATE N/A 12/22/2020    Procedure: CYSTOSCOPY TRANSURETHRAL RESECTION PROSTATE, exam under anasthesia;  " Surgeon: Daniele Johnson MD;  Location: [facility] OR;  Service: Urology;  Laterality: N/A;   • TRANSURETHRAL RESECTION OF PROSTATE N/A 4/27/2021    Procedure: CYSTOSCOPY TRANSURETHRAL RESECTION PROSTATE;  Surgeon: Daniele Johnson MD;  Location: [facility] OR;  Service: Urology;  Laterality: N/A;   • TRANSURETHRAL RESECTION OF PROSTATE N/A 9/27/2021    Procedure: CYSTOSCOPY TRANSURETHRAL RESECTION PROSTATE;  Surgeon: Attila Taylor MD;  Location: [facility] OR;  Service: Urology;  Laterality: N/A;  NOT EARLY MORNING        Medications Prior to Admission:    Prior to Admission medications    Medication Sig Start Date End Date Taking? Authorizing Provider   HYDROcodone-acetaminophen (NORCO) 5-325 mg per tablet Take 1 tablet by mouth every 6 (six) hours as needed for pain scale 8-10/10 for up to 8 doses Max Daily Amount: 4 tablets 3/10/23   Mary Henson MD   furosemide (LASIX) 20 mg tablet Take 1 tablet (20 mg total) by mouth daily 3/10/23 4/9/23  Mary Henson MD   Eliquis 5 mg tablet Take 1 tablet (5 mg total) by mouth 2 (two) times a day 1/24/23 1/24/24  Balbir Machado MD       Allergies:  Patient has no known allergies.    Social History:   TOBACCO:   reports that he has never smoked. He has never used smokeless tobacco.  ETOH:   reports current alcohol use.  OCCUPATION:     Family History:   Family History   Problem Relation Age of Onset   • Alzheimer's disease Mother    • Diabetes Father    • Heart attack Mother's Brother         I have reviewed all pertinent PMHx, PSHx, FamHx, SocialHx, medications, and allergies and updated history as appropriate.  I have personally reviewed the patients medication list 3/11/2023    REVIEW OF SYSTEMS:    Review of Systems   Constitutional:  Positive for activity change. Negative for chills and fever.   HENT: Negative.     Respiratory:  Positive for cough, chest tightness and shortness of breath.    Cardiovascular:  Positive for chest pain, palpitations and leg swelling.    Gastrointestinal:  Positive for abdominal distention and abdominal pain. Negative for diarrhea, nausea and vomiting.   Genitourinary:  Negative for difficulty urinating, dysuria, frequency and urgency.   Musculoskeletal:  Positive for back pain.   Skin:  Negative for rash and wound.   Neurological:  Positive for weakness and headaches. Negative for dizziness and light-headedness.   Psychiatric/Behavioral: Negative.        Physical Exam   Vitals: /73   Pulse 98   Temp 36.4 °C (97.5 °F) (Temporal)   Resp 12   SpO2 92%     Physical Exam  Vitals and nursing note reviewed.   Constitutional:       General: He is not in acute distress.  HENT:      Head: Normocephalic and atraumatic.      Mouth/Throat:      Mouth: Mucous membranes are moist.      Pharynx: Oropharynx is clear.   Eyes:      Extraocular Movements: Extraocular movements intact.      Pupils: Pupils are equal, round, and reactive to light.   Cardiovascular:      Rate and Rhythm: Regular rhythm. Tachycardia present.      Heart sounds: No murmur heard.  Pulmonary:      Effort: Pulmonary effort is normal.      Breath sounds: Examination of the right-middle field reveals rales. Examination of the right-lower field reveals rhonchi and rales. Rhonchi and rales present. No wheezing.   Abdominal:      General: Bowel sounds are normal.      Palpations: Abdomen is soft.      Tenderness: There is abdominal tenderness in the epigastric area.      Hernia: A hernia is present. Hernia is present in the ventral area.   Musculoskeletal:      Cervical back: Neck supple.      Right lower leg: Edema present.      Left lower leg: Edema present.      Comments: 3+ pitting edema in BLE   Skin:     General: Skin is warm and dry.      Capillary Refill: Capillary refill takes less than 2 seconds.   Neurological:      Mental Status: He is alert and oriented to person, place, and time.   Psychiatric:         Mood and Affect: Mood normal.         Speech: Speech normal.       Labs  and Imaging Studies     Admission on 03/11/2023   Component Date Value Ref Range Status   • WBC 03/11/2023 13.3 (H)  3.7 - 9.6 10*3/uL Final   • RBC 03/11/2023 5.01  4.10 - 5.80 10*6/µL Final   • Hemoglobin 03/11/2023 15.4  13.2 - 17.2 g/dL Final   • Hematocrit 03/11/2023 45.7  38.0 - 50.0 % Final   • MCV 03/11/2023 91.2  82.0 - 97.0 fL Final   • MCH 03/11/2023 30.8  29.0 - 34.0 pg Final   • MCHC 03/11/2023 33.7  32.0 - 36.0 g/dL Final   • RDW 03/11/2023 14.8  11.5 - 15.0 % Final   • Platelets 03/11/2023 254  130 - 350 10*3/uL Final   • MPV 03/11/2023 8.8  6.9 - 10.8 fL Final   • Neutrophils% 03/11/2023 78.3 (H)  46.0 - 70.0 % Final   • Lymphocytes% 03/11/2023 8.6 (L)  15.0 - 47.0 % Final   • Monocytes% 03/11/2023 12.8  5.0 - 13.0 % Final   • Eosinophils% 03/11/2023 0.0  0.0 - 3.0 % Final   • Basophils% 03/11/2023 0.3  0.0 - 2.0 % Final   • ANC (auto diff) 03/11/2023 10.40  10*3/UL Final   • Lymphocytes Absolute 03/11/2023 1.10  10*3/uL Final   • Monocytes Absolute 03/11/2023 1.70  10*3/uL Final   • Eosinophils Absolute 03/11/2023 0.00  10*3/uL Final   • Basophils Absolute 03/11/2023 0.00  10*3/uL Final   • Sodium 03/11/2023 136  135 - 145 mmol/L Final   • Potassium 03/11/2023 3.5  3.5 - 5.1 MMOL/L Final   • Chloride 03/11/2023 100  98 - 107 mmol/L Final   • CO2 03/11/2023 24  21 - 32 mmol/L Final   • Anion Gap 03/11/2023 12 (H)  3 - 11 mmol/L Final   • BUN 03/11/2023 11  7 - 25 mg/dL Final   • Creatinine 03/11/2023 0.95  0.70 - 1.30 mg/dL Final   • Glucose 03/11/2023 153 (H)  70 - 105 mg/dL Final   • Calcium 03/11/2023 9.4  8.6 - 10.3 mg/dL Final   • AST 03/11/2023 16  <40 U/L Final   • ALT (SGPT) 03/11/2023 13  7 - 52 U/L Final   • Alkaline Phosphatase 03/11/2023 60  45 - 115 U/L Final   • Total Protein 03/11/2023 7.3  6.0 - 8.3 g/dL Final   • Albumin 03/11/2023 4.2  3.5 - 5.3 g/dL Final   • Total Bilirubin 03/11/2023 1.51 (H)  0.20 - 1.40 mg/dL Final   • Corrected Calcium 03/11/2023 9.2  8.6 - 10.3 mg/dL Final    • eGFR 03/11/2023 85  >60 mL/min/1.73m*2 Final   • Magnesium 03/11/2023 1.9  1.8 - 2.4 mg/dL Final   • BNP 03/11/2023 44  0 - 100 pg/mL Final   • CRP 03/11/2023 91.1 (H)  <=10.0 MG/L Final   • POC Lactate 03/11/2023 2.55 (HH)  0.50 - 1.99 MMOL/L Final   • hsTnI 0 Hour 03/11/2023 16.2  <=19.8 pg/mL Final   • hsTnI 1 hr 03/11/2023 15.9  <=19.8 pg/mL Final   • Delta from 0 Hour 03/11/2023 -0.3  -11.9 to 11.9 Final   • Sed Rate 03/11/2023 28 (H)  <=20 mm/hr Final       Imaging Studies:  X-ray chest 2 views    Result Date: 3/10/2023  Narrative: EXAMINATION: DX CHEST 2 VW EXAMINATION DATE: 03/10/2023 HISTORY:  pain COMPARISON: 1/16/2021 TECHNIQUE: Chest 2 view FINDINGS: The cardiac size is within normal limits. There is chronic scarring or atelectasis within the lung bases. There is no pneumothorax or pleural effusion. There are degenerative changes of the thoracic spine.     Impression: IMPRESSION: 1. Chronic changes of the chest without acute process.     CT angiogram chest abdomen pelvis with IV contrast    Result Date: 3/11/2023  Narrative: Exam: CT ANGIOGRAM CHEST ABDOMEN PELVIS W IV CONTRAST from 03/11/2023 Clinical History:  Thoracic aortic aneurysm (TAA) suspected; Abdominal pain  aortic dissection suspected Comparison(s): none Technique: Axial imaging was performed through the chest, abdomen and pelvis after the administration of 100 cc of Isovue-370 intravenous contrast. Multiplanar reformations were constructed and reviewed. Dose Reduction Technique: Dose reduction technique utilized; automatic/anatomic modulation of X-ray tube current (Auto mA). Findings: Chest: There is bilateral dependent subsegmental atelectasis. No focal consolidations or pleural effusions. No pneumothorax. Large airways are patent. Normal heart size. Small pericardial effusion. The thoracic aorta is normal in caliber without evidence of aneurysm, dissection, or hemodynamically significant stenosis. No axillary, hilar, or mediastinal  lymphadenopathy. The visualized thyroid gland appears normal. Abdomen/pelvis: The liver, gallbladder, spleen, pancreas, bilateral adrenal glands, and bilateral kidneys appear normal. No hydronephrosis or urolithiasis. A simple left renal cyst is noted. There are surgical changes of prostatectomy. There is elongation of the urinary bladder with trabeculation and numerous urinary bladder diverticula, the largest of which arises from the right posterior wall and measures up to 11.4 x 8 cm. No ascites. There is colonic diverticulosis without evidence of diverticulitis. Loops of large and small bowel otherwise appear normal without evidence of obstruction. The appendix is reportedly surgically absent. No mesenteric, retroperitoneal, or inguinal lymphadenopathy. There are mild atherosclerotic vascular calcifications though the abdominal aorta and bilateral iliac systems remain normal in caliber without evidence of aneurysm. There is diastases of the rectus abdominis musculature with a midline. Hernia defect containing nonobstructed bowel loops. There are surgical changes of right inguinal hernia repair with recurrence of a small fat-containing hernia. A small fat-containing left inguinal hernia is also noted. Bones: No aggressive or destructive osseous lesions.     Impression: Impression: 1.  No evidence of aortic aneurysm, dissection, or hemodynamically significant stenosis. 2.  Small pericardial effusion. 3.  Urinary bladder trabeculation with numerous diverticula which could be seen in setting of chronic outlet obstruction or neurogenic bladder. 4.  Midline ventral abdominal wall hernia defect containing nonobstructed bowel loops. On-call imaging was performed and a preliminary report was provided by [facility] following the procedure. Agree with preliminary report.     I have personally reviewed the above radiology images today and agree with the interpretation given by the radiologist.    EKG: sinus  tachycardia    Scheduled Meds:sodium chloride 0.9 %, 500 mL, intravenous, Once      Continuous Infusions:   PRN Meds:.•  fentaNYL citrate (PF)     Assessment and Plan     Cesar Yuen is a 72 y.o. male    1.  Acute respiratory failure with hypoxia  Fluid overload  -Patient requiring 2 to 3 L of oxygen per nasal cannula  -Does not wear oxygen at baseline  -CT angiogram of the chest did not show significant pleural effusions or pneumonia consolidation, however patient reports 5 pound weight gain in a 24-hour period, has significant peripheral edema, has orthopnea and dyspnea on exertion  -CT angiogram did show small pericardial effusion  -Patient also was formerly on Lasix daily and has not been taking his Lasix for the last 4 months as he states that his prescription ran out and he never requested a refill  -Will start patient on some gentle diuresis with Lasix  -2000 mL fluid restriction per 24 hours, low-sodium diet  -Telemetry monitoring and continuous pulse oximetry  -Plan to get echocardiogram on Monday when available  -Patient last had echocardiogram done on 11/5/2020 which showed:   Interpretation Summary    Normal left ventricular size and systolic function with no regional wall motion abnormalities.  Ejection fraction 55%.  No left ventricular hypertrophy.  Unable to assess for diastolic function due to atrial fibrillation.  Normal right ventricular size and function.  Moderate left atrial dilation.  No large PFO detected by saline contrast.  Right atrial dilation.  Mild ascending aorta dilation, 4.2 cm.  Mild mitral regurgitation.  Normal estimated pulmonary pressure, RVSP 30 mmHg.  No pericardial effusion.     2.  Pneumonia  Leukocytosis  Sepsis  -Suspected pneumonia based on symptoms; patient having a productive cough with thick sputum, has leukocytosis of 13,000, having back pain  -Covering with Rocephin and Zithromax  -Met sepsis criteria with tachycardia, hypoxia, leukocytosis, suspicion for  pneumonia as source  -Blood cultures pending, check procalcitonin  -Initial lactic acid was elevated at 2.55; he was given a small amount of IV fluids, will repeat and trend lactic acid per protocol  -We will check urinalysis as well  -Quadra Plex PCR was negative    3.  Atrial flutter  Anticoagulated on Eliquis  -Patient has a history of atrial flutter, had an ablation procedure on 2/8/2021  -Anticoagulated on Eliquis, not on any rate control medications  -Does follow with cardiology on an outpatient basis  -EKG tonight shows sinus tachycardia, no evidence of any ectopy              Code status: FULL CODE  Surrogate: Bradford Yuen,brother  DVT prophylaxis: on Eliquis  Disposition: I expect the patient to stay 2 midnights or more for the following hospital services:  oxygen therapy, antibiotics for pneumonia treatment, telemetry monitoring and diuresis for fluid overload.    Jhoana Keith, CNP,   3/11/2023 7:06 PM

## 2023-03-12 NOTE — PLAN OF CARE
Problem: Infection Control  Goal: MINIMIZE THE ACQUISITION AND TRANSMISSION OF INFECTIOUS AGENTS  Description: INTERVENTIONS:  1. Isolate patient with suspected/diagnosed communicable disease  2. Place on designated isolation precautions  3. Maintain isolation techniques  4. Perform hand hygiene before and after each patient care activity  5. [place] universal precautions  6. Wear PPE as directed for type of isolation  7. Administer antibiotic therapy as ordered  8. Clean the environment appropriately after each patient use  9. Clean patient care equipment after each patient use as it leaves the room  10. Limit number of visitors, as appropriate  Outcome: Progressing     Problem: Pain - Adult  Goal: Verbalizes/displays adequate comfort level or baseline comfort level  Description: INTERVENTIONS:  1. Encourage patient to monitor pain and request interventions  2. Assess pain using the appropriate pain scale  3. Administer analgesics based on type and severity of pain and evaluate response  4. Educate/Implement non-pharmacological measures as appropriate and evaluate response  5. Consider cultural, developmental and social influences on pain and pain management  6. Notify Provider if interventions unsuccessful or patient reports new pain  Outcome: Progressing     Problem: Infection - Adult  Goal: Absence of infection during hospitalization  Description: INTERVENTIONS:  1. Assess and monitor for signs and symptoms of infection  2. Monitor lab/diagnostic results  3. Monitor all insertion sites/wounds/incisions  4. Monitor secretions for changes in amount and color  5. Administer medications as ordered  6. Educate and encourage patient and family to use good hand hygiene technique  7. Identify and educate in appropriate isolation precautions for identified infection/condition  Outcome: Progressing     Problem: Safety Adult  Goal: Patient will remain safe during hospitalization  Description: INTERVENTIONS    1. Assess  patient for fall risk and implement interventions if needed  2. Use safe transport techniques  3. Assess patient using the appropriate Ricardo skin assessment scale  4. Assess patient for risk of aspiration  5. Assess patient for risk of elopement  6. Assess patient for risk of suicide  Outcome: Progressing     Problem: Daily Care  Goal: Daily care needs are met  Description: INTERVENTIONS:   1. Assess and monitor skin integrity  2. Identify patients at risk for skin breakdown on admission and per policy  3. Assess and monitor ability to perform self care and identify potential discharge needs  4. Assess skin integrity/risk for skin breakdown  5. Assist patient with activities of daily living as needed  6. Encourage independent activity per ability   7. Provide mouth care   8. Include patient/family/caregiver in decisions related to daily care   Outcome: Progressing     Problem: Knowledge Deficit  Goal: Patient/family/caregiver demonstrates understanding of disease process, treatment plan, medications, and discharge instructions  Description: INTERVENTIONS:   1. Complete learning assessment and assess knowledge base  2. Provide teaching at level of understanding   3. Provide teaching via preferred learning methods  Outcome: Progressing     Problem: Discharge Barriers  Goal: Patient's discharge needs are met  Description: INTERVENTIONS:  1. Assess patient for self-management skills  2. Encourage participation in management  3. Identify potential discharge barriers on admission and throughout hospital stay  4. Involve patient/family/caregiver in discharge planning process  5. Collaborate with case management/ for discharge needs  Outcome: Progressing

## 2023-03-13 ENCOUNTER — APPOINTMENT (OUTPATIENT)
Dept: ULTRASOUND IMAGING | Facility: HOSPITAL | Age: 73
DRG: 871 | End: 2023-03-13
Payer: MEDICARE

## 2023-03-13 LAB
ANION GAP SERPL CALC-SCNC: 4 MMOL/L (ref 3–11)
ASCENDING AORTA: 4.1 CM
AV LVOT PEAK GRADIENT: 7.5 MMHG
AV MEAN GRADIENT: 6.8 MMHG
AV PEAK GRADIENT: 11.6 MMHG
BASOPHILS # BLD AUTO: 0 10*3/UL
BASOPHILS NFR BLD AUTO: 0.7 % (ref 0–2)
BSA FOR ECHO PROCEDURE: 2.37 M2
BUN SERPL-MCNC: 16 MG/DL (ref 7–25)
CALCIUM SERPL-MCNC: 8.6 MG/DL (ref 8.6–10.3)
CHLORIDE SERPL-SCNC: 101 MMOL/L (ref 98–107)
CO2 SERPL-SCNC: 32 MMOL/L (ref 21–32)
CREAT SERPL-MCNC: 0.95 MG/DL (ref 0.7–1.3)
DOP CALC AO PEAK VEL: 1.71 METER PER SECOND
DOP CALC AO VTI: 31.5 CM
DOP CALC LVOT AREA: 5.03 SQUARE CENTIMETER
DOP CALC LVOT DIAMETER: 2.5 CM
DOP CALC LVOT PEAK VEL: 1.37 METER PER SECOND
DOP CALC LVOT PEAK VEL: 1.37 METER PER SECOND
DOP CALC MV VTI: 24 CENTIMETER
DOP CALC RVOT PEAK VEL: 0.8 M/S
DOP CALCLVOT PEAK VEL VTI: 26.6 CENTIMETER
E/A RATIO: 1.3
E/E' RATIO: 8.9
EJECTION FRACTION: 66 %
EOSINOPHIL # BLD AUTO: 0.2 10*3/UL
EOSINOPHIL NFR BLD AUTO: 2.9 % (ref 0–3)
ERAP: 5 MMHG
ERYTHROCYTE [DISTWIDTH] IN BLOOD BY AUTOMATED COUNT: 14.7 % (ref 11.5–15)
FRACTIONAL SHORTENING: 40 % (ref 28–44)
GFR SERPL CREATININE-BSD FRML MDRD: 85 ML/MIN/1.73M*2
GLUCOSE SERPL-MCNC: 95 MG/DL (ref 70–105)
HCT VFR BLD AUTO: 38.3 % (ref 38–50)
HGB BLD-MCNC: 13 G/DL (ref 13.2–17.2)
INTERVENTRICULAR SEPTUM: 1.1 CM (ref 0.6–1.1)
IVC PROX: 1.31 CENTIMETER
LEFT ATRIUM SIZE: 3.53 CENTIMETER
LEFT ATRIUM VOLUME INDEX: 33 ML/M2
LEFT INTERNAL DIMENSION IN SYSTOLE: 2.6 CM (ref 4.29–6.5)
LEFT VENTRICLE DIASTOLIC VOLUME: 81 ML
LEFT VENTRICLE SYSTOLIC VOLUME: 36 ML
LEFT VENTRICULAR INTERNAL DIMENSION IN DIASTOLE: 4.4 CM (ref 7.42–10.31)
LVOT MG: 4.33 MILLIMETERS OF MERCURY
LVOT MV: 98.4 CENTIMETER PER SECOND
LYMPHOCYTES # BLD AUTO: 1.6 10*3/UL
LYMPHOCYTES NFR BLD AUTO: 22.3 % (ref 15–47)
MAGNESIUM SERPL-MCNC: 2.1 MG/DL (ref 1.8–2.4)
MCH RBC QN AUTO: 30.7 PG (ref 29–34)
MCHC RBC AUTO-ENTMCNC: 34 G/DL (ref 32–36)
MCV RBC AUTO: 90.4 FL (ref 82–97)
MONOCYTES # BLD AUTO: 1.1 10*3/UL
MONOCYTES NFR BLD AUTO: 15.4 % (ref 5–13)
MR VTI: 93.4 CM/S
MV DT: 0.3 S
MV MEAN GRADIENT: 2.1 MMHG
MV PEAK A VEL: 65.5 CM/S
MV PEAK E VEL: 86.6 CM/S
MV STENOSIS PRESSURE HALF TIME: 64.2 MILLISECOND
MV VALVE AREA P 1/2 METHOD: 3.4 CM2
NEUTROPHILS # BLD AUTO: 4.1 10*3/UL
NEUTROPHILS NFR BLD AUTO: 58.7 % (ref 46–70)
P VEIN A: 37.6 CM/S
PHOSPHATE SERPL-MCNC: 2.8 MG/DL (ref 2.5–4.9)
PLATELET # BLD AUTO: 214 10*3/UL (ref 130–350)
PMV BLD AUTO: 8.1 FL (ref 6.9–10.8)
POSTERIOR WALL: 1.2 CM (ref 0.6–1.1)
POTASSIUM SERPL-SCNC: 3.3 MMOL/L (ref 3.5–5.1)
PULM VEIN S/D RATIO: 1.4
PV PEAK D VEL: 32.8 CM/S
PV PEAK GRADIENT: 4.2 MMHG
PV PEAK S VEL: 44.9 CM/S
PV VMAX: 1.02 M/S
RA AREA: 17.7 CM2
RBC # BLD AUTO: 4.24 10*6/ΜL (ref 4.1–5.8)
RH CV ECHO AV VALVE AREA VEL: 4.1 CM2
RH CV ECHO AV VALVE AREA VTI: 4.3 CM2
RH LVOT PEAK VELOCITY REST: 1.4 M/S
RIGHT VENTRICULAR INTERNAL DIMENSION IN DIASTOLE: 3.9 CM
RV AP4 BASE: 3.1 CM
RVOT PG: 2.79 MILLIMETERS OF MERCURY
S': 16.1 CM/S
SINUS: 3.9 CM
SODIUM SERPL-SCNC: 137 MMOL/L (ref 135–145)
STJ: 3.3 CM
TDI: 9.7 CM/S
TDILATERAL: 10.6 CM/S
TR MAX PG: 17.3 MMHG
TRICUSPID ANNULAR PLANE SYSTOLIC EXCURSION: 2.1 CM
TRICUSPID VALVE PEAK REGURGITATION VELOCITY: 2.08 M/S
TV REST PULMONARY ARTERY PRESSURE: 22 MMHG
WBC # BLD AUTO: 6.9 10*3/UL (ref 3.7–9.6)
Z-SCORE OF LEFT VENTRICULAR DIMENSION IN END DIASTOLE: -6.87
Z-SCORE OF LEFT VENTRICULAR DIMENSION IN END SYSTOLE: -5.6

## 2023-03-13 PROCEDURE — 2580000300 HC RX 258: Performed by: NURSE PRACTITIONER

## 2023-03-13 PROCEDURE — 84100 ASSAY OF PHOSPHORUS: CPT | Performed by: INTERNAL MEDICINE

## 2023-03-13 PROCEDURE — 36415 COLL VENOUS BLD VENIPUNCTURE: CPT | Performed by: NURSE PRACTITIONER

## 2023-03-13 PROCEDURE — 83735 ASSAY OF MAGNESIUM: CPT | Performed by: NURSE PRACTITIONER

## 2023-03-13 PROCEDURE — 6360000200 HC RX 636 W HCPCS (ALT 250 FOR IP): Performed by: NURSE PRACTITIONER

## 2023-03-13 PROCEDURE — 85025 COMPLETE CBC W/AUTO DIFF WBC: CPT | Performed by: NURSE PRACTITIONER

## 2023-03-13 PROCEDURE — 93010 ELECTROCARDIOGRAM REPORT: CPT | Performed by: INTERNAL MEDICINE

## 2023-03-13 PROCEDURE — 6360000200 HC RX 636 W HCPCS (ALT 250 FOR IP): Performed by: INTERNAL MEDICINE

## 2023-03-13 PROCEDURE — 93306 TTE W/DOPPLER COMPLETE: CPT

## 2023-03-13 PROCEDURE — 93306 TTE W/DOPPLER COMPLETE: CPT | Mod: 26 | Performed by: INTERNAL MEDICINE

## 2023-03-13 PROCEDURE — 1110000100 HC ROOM PRIVATE W TELE

## 2023-03-13 PROCEDURE — 99232 SBSQ HOSP IP/OBS MODERATE 35: CPT | Performed by: INTERNAL MEDICINE

## 2023-03-13 PROCEDURE — 80048 BASIC METABOLIC PNL TOTAL CA: CPT | Performed by: NURSE PRACTITIONER

## 2023-03-13 PROCEDURE — 6370000100 HC RX 637 (ALT 250 FOR IP): Performed by: INTERNAL MEDICINE

## 2023-03-13 RX ORDER — POTASSIUM CHLORIDE 750 MG/1
40 TABLET, FILM COATED, EXTENDED RELEASE ORAL ONCE
Status: COMPLETED | OUTPATIENT
Start: 2023-03-13 | End: 2023-03-13

## 2023-03-13 RX ADMIN — APIXABAN 5 MG: 5 TABLET, FILM COATED ORAL at 08:37

## 2023-03-13 RX ADMIN — CEFTRIAXONE SODIUM 1000 MG: 1 INJECTION, POWDER, FOR SOLUTION INTRAMUSCULAR; INTRAVENOUS at 19:53

## 2023-03-13 RX ADMIN — APIXABAN 5 MG: 5 TABLET, FILM COATED ORAL at 20:35

## 2023-03-13 RX ADMIN — AZITHROMYCIN 500 MG: 500 INJECTION, POWDER, LYOPHILIZED, FOR SOLUTION INTRAVENOUS at 20:38

## 2023-03-13 RX ADMIN — FUROSEMIDE 80 MG: 10 INJECTION, SOLUTION INTRAMUSCULAR; INTRAVENOUS at 08:37

## 2023-03-13 RX ADMIN — FUROSEMIDE 80 MG: 10 INJECTION, SOLUTION INTRAMUSCULAR; INTRAVENOUS at 15:37

## 2023-03-13 RX ADMIN — POTASSIUM CHLORIDE 40 MEQ: 750 TABLET, FILM COATED, EXTENDED RELEASE ORAL at 13:29

## 2023-03-13 ASSESSMENT — ENCOUNTER SYMPTOMS
FEVER: 0
VOMITING: 0
WHEEZING: 0
SHORTNESS OF BREATH: 0
CHILLS: 0
DIARRHEA: 0
NAUSEA: 0
CONSTIPATION: 0
COUGH: 0

## 2023-03-13 NOTE — INTERDISCIPLINARY/THERAPY
Case Management Admission Note    Phone # [phone]    Living Situation: Alone Private residence            ADLs: Independent  Stairs: No    HME/CPAP: None      Oxygen: No      Home Health:No     Current Resources: None      Diabetes/supplies: Do you have Diabetes?: No  PCP: FELY BAILEY MD  Funding: [employer]  Pharmacy:[facility] - [place], [place] - [address]    Support Person: Primary Emergency Contact: Bradford Yuen, Home Phone: [phone], Mobile Phone: [phone], Relation: Brother  Advance Directives (For Healthcare)  Advance Directive: (P) Patient has advance directive, copy not in chart  Advance Directive not in Chart: (P) Copy requested from family  Have you reviewed your Advance Directive and is it valid for this stay?: (P) No  Information Provided on Healthcare Directives: (P) No  Patient Requests Assistance: (P) No  Needs transportation assistance at DC: No     Discharge Needs/Barriers:    Narrative: Plans return home as before, feels manages fine and denies need for additional resources.  Currently on O2 which doesn't use at home, RT will try to wean and follow for possible need at home.  Will arrange follow-ups at discharge.  Dispo: home

## 2023-03-13 NOTE — PLAN OF CARE
Problem: Infection Control  Goal: MINIMIZE THE ACQUISITION AND TRANSMISSION OF INFECTIOUS AGENTS  Description: INTERVENTIONS:  1. Isolate patient with suspected/diagnosed communicable disease  2. Place on designated isolation precautions  3. Maintain isolation techniques  4. Perform hand hygiene before and after each patient care activity  5. [place] universal precautions  6. Wear PPE as directed for type of isolation  7. Administer antibiotic therapy as ordered  8. Clean the environment appropriately after each patient use  9. Clean patient care equipment after each patient use as it leaves the room  10. Limit number of visitors, as appropriate  Outcome: Progressing     Problem: Pain - Adult  Goal: Verbalizes/displays adequate comfort level or baseline comfort level  Description: INTERVENTIONS:  1. Encourage patient to monitor pain and request interventions  2. Assess pain using the appropriate pain scale  3. Administer analgesics based on type and severity of pain and evaluate response  4. Educate/Implement non-pharmacological measures as appropriate and evaluate response  5. Consider cultural, developmental and social influences on pain and pain management  6. Notify Provider if interventions unsuccessful or patient reports new pain  Outcome: Progressing     Problem: Infection - Adult  Goal: Absence of infection during hospitalization  Description: INTERVENTIONS:  1. Assess and monitor for signs and symptoms of infection  2. Monitor lab/diagnostic results  3. Monitor all insertion sites/wounds/incisions  4. Monitor secretions for changes in amount and color  5. Administer medications as ordered  6. Educate and encourage patient and family to use good hand hygiene technique  7. Identify and educate in appropriate isolation precautions for identified infection/condition  Outcome: Progressing     Problem: Safety Adult  Goal: Patient will remain safe during hospitalization  Description: INTERVENTIONS    1. Assess  patient for fall risk and implement interventions if needed  2. Use safe transport techniques  3. Assess patient using the appropriate Ricardo skin assessment scale  4. Assess patient for risk of aspiration  5. Assess patient for risk of elopement  6. Assess patient for risk of suicide  Outcome: Progressing     Problem: Daily Care  Goal: Daily care needs are met  Description: INTERVENTIONS:   1. Assess and monitor skin integrity  2. Identify patients at risk for skin breakdown on admission and per policy  3. Assess and monitor ability to perform self care and identify potential discharge needs  4. Assess skin integrity/risk for skin breakdown  5. Assist patient with activities of daily living as needed  6. Encourage independent activity per ability   7. Provide mouth care   8. Include patient/family/caregiver in decisions related to daily care   Outcome: Progressing     Problem: Knowledge Deficit  Goal: Patient/family/caregiver demonstrates understanding of disease process, treatment plan, medications, and discharge instructions  Description: INTERVENTIONS:   1. Complete learning assessment and assess knowledge base  2. Provide teaching at level of understanding   3. Provide teaching via preferred learning methods  Outcome: Progressing     Problem: Discharge Barriers  Goal: Patient's discharge needs are met  Description: INTERVENTIONS:  1. Assess patient for self-management skills  2. Encourage participation in management  3. Identify potential discharge barriers on admission and throughout hospital stay  4. Involve patient/family/caregiver in discharge planning process  5. Collaborate with case management/ for discharge needs  Outcome: Progressing

## 2023-03-13 NOTE — PLAN OF CARE
Problem: Infection Control  Goal: MINIMIZE THE ACQUISITION AND TRANSMISSION OF INFECTIOUS AGENTS  Description: INTERVENTIONS:  1. Isolate patient with suspected/diagnosed communicable disease  2. Place on designated isolation precautions  3. Maintain isolation techniques  4. Perform hand hygiene before and after each patient care activity  5. [place] universal precautions  6. Wear PPE as directed for type of isolation  7. Administer antibiotic therapy as ordered  8. Clean the environment appropriately after each patient use  9. Clean patient care equipment after each patient use as it leaves the room  10. Limit number of visitors, as appropriate  Outcome: Progressing     Problem: Pain - Adult  Goal: Verbalizes/displays adequate comfort level or baseline comfort level  Description: INTERVENTIONS:  1. Encourage patient to monitor pain and request interventions  2. Assess pain using the appropriate pain scale  3. Administer analgesics based on type and severity of pain and evaluate response  4. Educate/Implement non-pharmacological measures as appropriate and evaluate response  5. Consider cultural, developmental and social influences on pain and pain management  6. Notify Provider if interventions unsuccessful or patient reports new pain  Outcome: Progressing     Problem: Infection - Adult  Goal: Absence of infection during hospitalization  Description: INTERVENTIONS:  1. Assess and monitor for signs and symptoms of infection  2. Monitor lab/diagnostic results  3. Monitor all insertion sites/wounds/incisions  4. Monitor secretions for changes in amount and color  5. Administer medications as ordered  6. Educate and encourage patient and family to use good hand hygiene technique  7. Identify and educate in appropriate isolation precautions for identified infection/condition  Outcome: Progressing     Problem: Safety Adult  Goal: Patient will remain safe during hospitalization  Description: INTERVENTIONS    1. Assess  patient for fall risk and implement interventions if needed  2. Use safe transport techniques  3. Assess patient using the appropriate Ricardo skin assessment scale  4. Assess patient for risk of aspiration  5. Assess patient for risk of elopement  6. Assess patient for risk of suicide  Outcome: Progressing     Problem: Daily Care  Goal: Daily care needs are met  Description: INTERVENTIONS:   1. Assess and monitor skin integrity  2. Identify patients at risk for skin breakdown on admission and per policy  3. Assess and monitor ability to perform self care and identify potential discharge needs  4. Assess skin integrity/risk for skin breakdown  5. Assist patient with activities of daily living as needed  6. Encourage independent activity per ability   7. Provide mouth care   8. Include patient/family/caregiver in decisions related to daily care   Outcome: Progressing     Problem: Knowledge Deficit  Goal: Patient/family/caregiver demonstrates understanding of disease process, treatment plan, medications, and discharge instructions  Description: INTERVENTIONS:   1. Complete learning assessment and assess knowledge base  2. Provide teaching at level of understanding   3. Provide teaching via preferred learning methods  Outcome: Progressing     Problem: Discharge Barriers  Goal: Patient's discharge needs are met  Description: INTERVENTIONS:  1. Assess patient for self-management skills  2. Encourage participation in management  3. Identify potential discharge barriers on admission and throughout hospital stay  4. Involve patient/family/caregiver in discharge planning process  5. Collaborate with case management/ for discharge needs  Outcome: Progressing

## 2023-03-13 NOTE — INTERDISCIPLINARY/THERAPY
03/13/23 1138   Oxygen   Oxygen Therapy None (Room air)   SpO2 92 %   Patient Activity At rest   Medical Gas - Oxygen Comment Pt continues to use IS to help improve w/ oxygenation. Pt continues to tolerate RA at this time.

## 2023-03-13 NOTE — PROGRESS NOTES
"  Progress Note    Patient:  Cesar Yuen 72 y.o. male MRN: [id number]     Date of Service: 3/13/2023     CC: had concerns including Back Pain.  Hospital Day: 3     Overnight events: stable on 4L today wean as tolerated.      Assessment and Plan     Cesar Yuen is a 72 y.o. male With a past with history of atrial fibrillation anticoagulated on Eliquis now admitted for pneumonia     1.Sepssi w/o shock Community-acquired pneumonia   - responding well to Rocephin and azithromycin.  - Currently needing 4 L of oxygen respiratory cultures pending  - blood cult negative to date from 3/11    2.Acute hypoxemia without respiratory failure  - Secondary to #1 wean as tolerated to keep saturations 90 to 92%    3.Atrial fibrillation continue Eliquis and Currently rhythm controlled with previous ablation    4.ST segment elevation on EKG.  - trops negative no further work up likely chronic changes.     5.Volume overload secondary to noncompliance with Lasix as an outpatient.  - Patient ran out of refills and did not call to have more refills sent.   - improved lasix response to 80 bid - down 4 kg's from admit.     PT/OT evaluation:See notes  DVT prophylaxis: eliquis  Disposition: home when off O2   Family communication: Pt did not request a family update today.     Subjective     No chest pain no nausea no vomiting tolerating diet feeling his shortness of breath has slowly improved.  Review of Systems   Constitutional:  Negative for chills and fever.   Respiratory:  Negative for cough, shortness of breath and wheezing.    Cardiovascular:  Negative for chest pain and leg swelling.   Gastrointestinal:  Negative for constipation, diarrhea, nausea and vomiting.    Objective     Physical Exam:  Vitals: /71 (BP Location: Right arm, Patient Position: Sitting, Cuff Size: Long Adult)   Pulse 97   Temp 36.7 °C (98.1 °F) (Oral)   Resp 16   Ht 1.854 m (6' 1\")   Wt 105.5 kg (232 lb 8 oz)   SpO2 95%   BMI 30.67 kg/m²     I & O - " 24hr: I/O this shift:  In: 540 [P.O.:540]  Out: 2675 [Urine:2675]   Physical Exam  Constitutional:       General: He is awake. He is not in acute distress.  Neck:      Vascular: No JVD.   Cardiovascular:      Rate and Rhythm: Normal rate and regular rhythm.      Heart sounds: Normal heart sounds, S1 normal and S2 normal. No murmur heard.    No S3 or S4 sounds.   Pulmonary:      Effort: Pulmonary effort is normal.      Breath sounds: Normal breath sounds. No rhonchi or rales.   Abdominal:      Palpations: Abdomen is soft.      Tenderness: There is no abdominal tenderness. There is no guarding or rebound.   Musculoskeletal:      Right lower leg: 3+ Edema present.      Left lower leg: 3+ Edema present.   Neurological:      Mental Status: He is alert and oriented to person, place, and time.   Psychiatric:         Mood and Affect: Mood and affect normal.         Pertinent Labs & Imaging Studies     No results displayed because visit has over 200 results.          US Echo complete         CT angiogram chest abdomen pelvis with IV contrast   Final Result   Impression:   1.  No evidence of aortic aneurysm, dissection, or hemodynamically significant stenosis.   2.  Small pericardial effusion.   3.  Urinary bladder trabeculation with numerous diverticula which could be seen in setting of chronic outlet obstruction or neurogenic bladder.   4.  Midline ventral abdominal wall hernia defect containing nonobstructed bowel loops.      On-call imaging was performed and a preliminary report was provided by Celi following the procedure. Agree with preliminary report.            I have personally and independently reviewed the above imaging and given my own interpretation.  I agree with the above radiologist interpretation unless otherwise specified below.     Scheduled Meds:furosemide, 80 mg, intravenous, 2x daily diuretic  apixaban, 5 mg, oral, 2x daily  cefTRIAXone, 1,000 mg, intravenous, q24h   And  azithromycin, 500 mg,  intravenous, q24h    Continuous Infusions:   PRN Meds:.  fentaNYL citrate (PF)    acetaminophen    ondansetron    bisacodyL    magnesium hydroxide    albuterol      Alok Mcdonald DO  3/13/2023 4:32 PM

## 2023-03-14 LAB
ANION GAP SERPL CALC-SCNC: 6 MMOL/L (ref 3–11)
BACTERIA ISLT CULT: NORMAL
BUN SERPL-MCNC: 19 MG/DL (ref 7–25)
CALCIUM SERPL-MCNC: 8.7 MG/DL (ref 8.6–10.3)
CHLORIDE SERPL-SCNC: 103 MMOL/L (ref 98–107)
CO2 SERPL-SCNC: 32 MMOL/L (ref 21–32)
CREAT SERPL-MCNC: 0.97 MG/DL (ref 0.7–1.3)
GFR SERPL CREATININE-BSD FRML MDRD: 82 ML/MIN/1.73M*2
GLUCOSE SERPL-MCNC: 93 MG/DL (ref 70–105)
MAGNESIUM SERPL-MCNC: 2.2 MG/DL (ref 1.8–2.4)
POTASSIUM SERPL-SCNC: 3.3 MMOL/L (ref 3.5–5.1)
SODIUM SERPL-SCNC: 141 MMOL/L (ref 135–145)

## 2023-03-14 PROCEDURE — 36415 COLL VENOUS BLD VENIPUNCTURE: CPT | Performed by: INTERNAL MEDICINE

## 2023-03-14 PROCEDURE — 6360000200 HC RX 636 W HCPCS (ALT 250 FOR IP): Performed by: NURSE PRACTITIONER

## 2023-03-14 PROCEDURE — 80048 BASIC METABOLIC PNL TOTAL CA: CPT | Performed by: INTERNAL MEDICINE

## 2023-03-14 PROCEDURE — 99232 SBSQ HOSP IP/OBS MODERATE 35: CPT | Performed by: INTERNAL MEDICINE

## 2023-03-14 PROCEDURE — 2580000300 HC RX 258: Performed by: NURSE PRACTITIONER

## 2023-03-14 PROCEDURE — 6370000100 HC RX 637 (ALT 250 FOR IP): Performed by: INTERNAL MEDICINE

## 2023-03-14 PROCEDURE — 83735 ASSAY OF MAGNESIUM: CPT | Performed by: INTERNAL MEDICINE

## 2023-03-14 PROCEDURE — 6360000200 HC RX 636 W HCPCS (ALT 250 FOR IP): Performed by: INTERNAL MEDICINE

## 2023-03-14 PROCEDURE — 1110000100 HC ROOM PRIVATE W TELE

## 2023-03-14 RX ORDER — AZITHROMYCIN 250 MG/1
250 TABLET, FILM COATED ORAL DAILY
Status: COMPLETED | OUTPATIENT
Start: 2023-03-14 | End: 2023-03-15

## 2023-03-14 RX ORDER — LANOLIN ALCOHOL/MO/W.PET/CERES
400 CREAM (GRAM) TOPICAL 2 TIMES DAILY
Status: COMPLETED | OUTPATIENT
Start: 2023-03-14 | End: 2023-03-14

## 2023-03-14 RX ORDER — POTASSIUM CHLORIDE 750 MG/1
40 TABLET, FILM COATED, EXTENDED RELEASE ORAL 2 TIMES DAILY WITH MEALS
Status: DISCONTINUED | OUTPATIENT
Start: 2023-03-14 | End: 2023-03-15 | Stop reason: HOSPADM

## 2023-03-14 RX ORDER — POTASSIUM CHLORIDE 750 MG/1
40 TABLET, FILM COATED, EXTENDED RELEASE ORAL ONCE
Status: COMPLETED | OUTPATIENT
Start: 2023-03-14 | End: 2023-03-14

## 2023-03-14 RX ORDER — GUAIFENESIN 600 MG/1
600 TABLET, EXTENDED RELEASE ORAL 2 TIMES DAILY
Status: DISCONTINUED | OUTPATIENT
Start: 2023-03-14 | End: 2023-03-15 | Stop reason: HOSPADM

## 2023-03-14 RX ADMIN — FUROSEMIDE 80 MG: 10 INJECTION, SOLUTION INTRAMUSCULAR; INTRAVENOUS at 15:05

## 2023-03-14 RX ADMIN — MAGNESIUM OXIDE 400 MG: 400 TABLET ORAL at 19:46

## 2023-03-14 RX ADMIN — POTASSIUM CHLORIDE 40 MEQ: 750 TABLET, FILM COATED, EXTENDED RELEASE ORAL at 08:29

## 2023-03-14 RX ADMIN — APIXABAN 5 MG: 5 TABLET, FILM COATED ORAL at 19:46

## 2023-03-14 RX ADMIN — CEFTRIAXONE SODIUM 1000 MG: 1 INJECTION, POWDER, FOR SOLUTION INTRAMUSCULAR; INTRAVENOUS at 19:17

## 2023-03-14 RX ADMIN — MAGNESIUM OXIDE 400 MG: 400 TABLET ORAL at 08:29

## 2023-03-14 RX ADMIN — FUROSEMIDE 80 MG: 10 INJECTION, SOLUTION INTRAMUSCULAR; INTRAVENOUS at 08:29

## 2023-03-14 RX ADMIN — POTASSIUM CHLORIDE 40 MEQ: 750 TABLET, EXTENDED RELEASE ORAL at 17:45

## 2023-03-14 RX ADMIN — GUAIFENESIN 600 MG: 600 TABLET, EXTENDED RELEASE ORAL at 19:46

## 2023-03-14 RX ADMIN — AZITHROMYCIN MONOHYDRATE 250 MG: 250 TABLET ORAL at 11:51

## 2023-03-14 RX ADMIN — APIXABAN 5 MG: 5 TABLET, FILM COATED ORAL at 08:29

## 2023-03-14 RX ADMIN — GUAIFENESIN 600 MG: 600 TABLET, EXTENDED RELEASE ORAL at 09:53

## 2023-03-14 ASSESSMENT — ENCOUNTER SYMPTOMS
CONSTIPATION: 0
COUGH: 0
SHORTNESS OF BREATH: 0
CHILLS: 0
NAUSEA: 0
VOMITING: 0
DIARRHEA: 0
WHEEZING: 0
FEVER: 0

## 2023-03-14 NOTE — PLAN OF CARE
Problem: Infection Control  Goal: MINIMIZE THE ACQUISITION AND TRANSMISSION OF INFECTIOUS AGENTS  Description: INTERVENTIONS:  1. Isolate patient with suspected/diagnosed communicable disease  2. Place on designated isolation precautions  3. Maintain isolation techniques  4. Perform hand hygiene before and after each patient care activity  5. [place] universal precautions  6. Wear PPE as directed for type of isolation  7. Administer antibiotic therapy as ordered  8. Clean the environment appropriately after each patient use  9. Clean patient care equipment after each patient use as it leaves the room  10. Limit number of visitors, as appropriate  Outcome: Progressing     Problem: Pain - Adult  Goal: Verbalizes/displays adequate comfort level or baseline comfort level  Description: INTERVENTIONS:  1. Encourage patient to monitor pain and request interventions  2. Assess pain using the appropriate pain scale  3. Administer analgesics based on type and severity of pain and evaluate response  4. Educate/Implement non-pharmacological measures as appropriate and evaluate response  5. Consider cultural, developmental and social influences on pain and pain management  6. Notify Provider if interventions unsuccessful or patient reports new pain  Outcome: Progressing     Problem: Infection - Adult  Goal: Absence of infection during hospitalization  Description: INTERVENTIONS:  1. Assess and monitor for signs and symptoms of infection  2. Monitor lab/diagnostic results  3. Monitor all insertion sites/wounds/incisions  4. Monitor secretions for changes in amount and color  5. Administer medications as ordered  6. Educate and encourage patient and family to use good hand hygiene technique  7. Identify and educate in appropriate isolation precautions for identified infection/condition  Outcome: Progressing     Problem: Safety Adult  Goal: Patient will remain safe during hospitalization  Description: INTERVENTIONS    1. Assess  patient for fall risk and implement interventions if needed  2. Use safe transport techniques  3. Assess patient using the appropriate Ricardo skin assessment scale  4. Assess patient for risk of aspiration  5. Assess patient for risk of elopement  6. Assess patient for risk of suicide  Outcome: Progressing     Problem: Daily Care  Goal: Daily care needs are met  Description: INTERVENTIONS:   1. Assess and monitor skin integrity  2. Identify patients at risk for skin breakdown on admission and per policy  3. Assess and monitor ability to perform self care and identify potential discharge needs  4. Assess skin integrity/risk for skin breakdown  5. Assist patient with activities of daily living as needed  6. Encourage independent activity per ability   7. Provide mouth care   8. Include patient/family/caregiver in decisions related to daily care   Outcome: Progressing     Problem: Knowledge Deficit  Goal: Patient/family/caregiver demonstrates understanding of disease process, treatment plan, medications, and discharge instructions  Description: INTERVENTIONS:   1. Complete learning assessment and assess knowledge base  2. Provide teaching at level of understanding   3. Provide teaching via preferred learning methods  Outcome: Progressing     Problem: Discharge Barriers  Goal: Patient's discharge needs are met  Description: INTERVENTIONS:  1. Assess patient for self-management skills  2. Encourage participation in management  3. Identify potential discharge barriers on admission and throughout hospital stay  4. Involve patient/family/caregiver in discharge planning process  5. Collaborate with case management/ for discharge needs  Outcome: Progressing

## 2023-03-14 NOTE — PLAN OF CARE
Problem: Infection Control  Goal: MINIMIZE THE ACQUISITION AND TRANSMISSION OF INFECTIOUS AGENTS  Description: INTERVENTIONS:  1. Isolate patient with suspected/diagnosed communicable disease  2. Place on designated isolation precautions  3. Maintain isolation techniques  4. Perform hand hygiene before and after each patient care activity  5. [place] universal precautions  6. Wear PPE as directed for type of isolation  7. Administer antibiotic therapy as ordered  8. Clean the environment appropriately after each patient use  9. Clean patient care equipment after each patient use as it leaves the room  10. Limit number of visitors, as appropriate  Outcome: Progressing     Problem: Pain - Adult  Goal: Verbalizes/displays adequate comfort level or baseline comfort level  Description: INTERVENTIONS:  1. Encourage patient to monitor pain and request interventions  2. Assess pain using the appropriate pain scale  3. Administer analgesics based on type and severity of pain and evaluate response  4. Educate/Implement non-pharmacological measures as appropriate and evaluate response  5. Consider cultural, developmental and social influences on pain and pain management  6. Notify Provider if interventions unsuccessful or patient reports new pain  Outcome: Progressing     Problem: Infection - Adult  Goal: Absence of infection during hospitalization  Description: INTERVENTIONS:  1. Assess and monitor for signs and symptoms of infection  2. Monitor lab/diagnostic results  3. Monitor all insertion sites/wounds/incisions  4. Monitor secretions for changes in amount and color  5. Administer medications as ordered  6. Educate and encourage patient and family to use good hand hygiene technique  7. Identify and educate in appropriate isolation precautions for identified infection/condition  Outcome: Progressing     Problem: Safety Adult  Goal: Patient will remain safe during hospitalization  Description: INTERVENTIONS    1. Assess  patient for fall risk and implement interventions if needed  2. Use safe transport techniques  3. Assess patient using the appropriate Ricardo skin assessment scale  4. Assess patient for risk of aspiration  5. Assess patient for risk of elopement  6. Assess patient for risk of suicide  Outcome: Progressing     Problem: Daily Care  Goal: Daily care needs are met  Description: INTERVENTIONS:   1. Assess and monitor skin integrity  2. Identify patients at risk for skin breakdown on admission and per policy  3. Assess and monitor ability to perform self care and identify potential discharge needs  4. Assess skin integrity/risk for skin breakdown  5. Assist patient with activities of daily living as needed  6. Encourage independent activity per ability   7. Provide mouth care   8. Include patient/family/caregiver in decisions related to daily care   Outcome: Progressing     Problem: Knowledge Deficit  Goal: Patient/family/caregiver demonstrates understanding of disease process, treatment plan, medications, and discharge instructions  Description: INTERVENTIONS:   1. Complete learning assessment and assess knowledge base  2. Provide teaching at level of understanding   3. Provide teaching via preferred learning methods  Outcome: Progressing     Problem: Discharge Barriers  Goal: Patient's discharge needs are met  Description: INTERVENTIONS:  1. Assess patient for self-management skills  2. Encourage participation in management  3. Identify potential discharge barriers on admission and throughout hospital stay  4. Involve patient/family/caregiver in discharge planning process  5. Collaborate with case management/ for discharge needs  Outcome: Progressing

## 2023-03-14 NOTE — INTERDISCIPLINARY/THERAPY
Spiritual Care Services:     03/14/23 1017   Clinical Encounter Type   Referral By:  (Other)   Visited With Patient   Visit Type Initial   Jew Affilation   Affiliated with Muslim/Amber Group Yes   Current Muslim/Amber Group Affiliation Voodoo   Spiritual/Emotional Distress   Level Moderate   Plan of Care   Spiritual Care Plan Initiated Provide supportive presence   Plan of Care Comments and emotional/spiritual support   Spiritual Assessment   Completed by    [place] Beliefs Believes in a Higher Power   Spiritual Interventions   Spiritual/Jew Interventions Prayer provided   Emotional/Spiritual Interventions Empathic and Reflective listening provided     Provide supportive presence; emotional/spiritual support; empathetic/reflective listening; prayer

## 2023-03-14 NOTE — PROGRESS NOTES
"  Progress Note    Patient:  Cesar Yuen 72 y.o. male MRN: [id number]     Date of Service: 3/14/2023     CC: had concerns including Back Pain.  Hospital Day: 4     Overnight events: improved to 2L today wean as tolerated.      Assessment and Plan     Cesar Yuen is a 72 y.o. male With a past with history of atrial fibrillation anticoagulated on Eliquis now admitted for pneumonia     1.Sepssi w/o shock Community-acquired pneumonia   - responding well to Rocephin and azithromycin.  - Currently needing 2 L of oxygen respiratory cultures normal oral charles  - blood cult negative to date from 3/11    2.Acute hypoxemia without respiratory failure  - Secondary to #1 wean as tolerated to keep saturations 90 to 92%    3.Atrial fibrillation continue Eliquis and Currently rhythm controlled with previous ablation    4.ST segment elevation on EKG.  - trops negative no further work up likely chronic changes.     5.Volume overload secondary to noncompliance with Lasix as an outpatient.  - Patient ran out of refills and did not call to have more refills sent.   - improved lasix response to 80 bid - down 5 kg's from admit.   - K bid for hypokalemia    PT/OT evaluation:See notes  DVT prophylaxis: eliquis  Disposition: home when off O2   Family communication: Pt did not request a family update today.     Subjective     No chest pain no nausea no vomiting tolerating diet feeling his shortness of breath has slowly improved.  Review of Systems   Constitutional:  Negative for chills and fever.   Respiratory:  Negative for cough, shortness of breath and wheezing.    Cardiovascular:  Negative for chest pain and leg swelling.   Gastrointestinal:  Negative for constipation, diarrhea, nausea and vomiting.    Objective     Physical Exam:  Vitals: /67 (BP Location: Right arm, Cuff Size: Long Adult)   Pulse 62   Temp 36.7 °C (98 °F) (Oral)   Resp 18   Ht 1.854 m (6' 1\")   Wt 104.8 kg (231 lb)   SpO2 92%   BMI 30.48 kg/m²     I & " O - 24hr: I/O this shift:  In: 340 [P.O.:340]  Out: 600 [Urine:600]   Physical Exam  Constitutional:       General: He is awake. He is not in acute distress.  Neck:      Vascular: No JVD.   Cardiovascular:      Rate and Rhythm: Normal rate and regular rhythm.      Heart sounds: Normal heart sounds, S1 normal and S2 normal. No murmur heard.    No S3 or S4 sounds.   Pulmonary:      Effort: Pulmonary effort is normal.      Breath sounds: Normal breath sounds. No rhonchi or rales.   Abdominal:      Palpations: Abdomen is soft.      Tenderness: There is no abdominal tenderness. There is no guarding or rebound.   Musculoskeletal:      Right lower le+ Pitting Edema present.      Left lower le+ Pitting Edema present.   Neurological:      Mental Status: He is alert and oriented to person, place, and time.   Psychiatric:         Mood and Affect: Mood and affect normal.         Pertinent Labs & Imaging Studies     No results displayed because visit has over 200 results.          US Echo complete   Final Result      CT angiogram chest abdomen pelvis with IV contrast   Final Result   Impression:   1.  No evidence of aortic aneurysm, dissection, or hemodynamically significant stenosis.   2.  Small pericardial effusion.   3.  Urinary bladder trabeculation with numerous diverticula which could be seen in setting of chronic outlet obstruction or neurogenic bladder.   4.  Midline ventral abdominal wall hernia defect containing nonobstructed bowel loops.      On-call imaging was performed and a preliminary report was provided by [facility] following the procedure. Agree with preliminary report.            I have personally and independently reviewed the above imaging and given my own interpretation.  I agree with the above radiologist interpretation unless otherwise specified below.     Scheduled Meds:magnesium oxide, 400 mg, oral, 2x daily  guaiFENesin, 600 mg, oral, 2x daily  furosemide, 80 mg, intravenous, 2x daily  diuretic  apixaban, 5 mg, oral, 2x daily  cefTRIAXone, 1,000 mg, intravenous, q24h   And  azithromycin, 500 mg, intravenous, q24h    Continuous Infusions:   PRN Meds:.  fentaNYL citrate (PF)    acetaminophen    ondansetron    bisacodyL    magnesium hydroxide    albuterol      Alok Mcdonald DO  3/14/2023 11:14 AM

## 2023-03-15 VITALS
RESPIRATION RATE: 18 BRPM | BODY MASS INDEX: 30.22 KG/M2 | SYSTOLIC BLOOD PRESSURE: 120 MMHG | WEIGHT: 228 LBS | DIASTOLIC BLOOD PRESSURE: 64 MMHG | HEIGHT: 73 IN | OXYGEN SATURATION: 91 % | HEART RATE: 76 BPM | TEMPERATURE: 97.8 F

## 2023-03-15 PROBLEM — A41.9 SEPSIS (CMS/HCC): Status: RESOLVED | Noted: 2023-03-11 | Resolved: 2023-03-15

## 2023-03-15 PROBLEM — E87.70 FLUID OVERLOAD: Status: RESOLVED | Noted: 2023-03-11 | Resolved: 2023-03-15

## 2023-03-15 PROBLEM — J96.01 ACUTE RESPIRATORY FAILURE WITH HYPOXIA (CMS/HCC): Status: RESOLVED | Noted: 2023-03-11 | Resolved: 2023-03-15

## 2023-03-15 LAB
ANION GAP SERPL CALC-SCNC: 7 MMOL/L (ref 3–11)
BUN SERPL-MCNC: 22 MG/DL (ref 7–25)
CALCIUM SERPL-MCNC: 9 MG/DL (ref 8.6–10.3)
CHLORIDE SERPL-SCNC: 102 MMOL/L (ref 98–107)
CO2 SERPL-SCNC: 31 MMOL/L (ref 21–32)
CREAT SERPL-MCNC: 0.96 MG/DL (ref 0.7–1.3)
ERYTHROCYTE [DISTWIDTH] IN BLOOD BY AUTOMATED COUNT: 14.3 % (ref 11.5–15)
GFR SERPL CREATININE-BSD FRML MDRD: 83 ML/MIN/1.73M*2
GLUCOSE SERPL-MCNC: 97 MG/DL (ref 70–105)
HCT VFR BLD AUTO: 39.5 % (ref 38–50)
HGB BLD-MCNC: 13.3 G/DL (ref 13.2–17.2)
MAGNESIUM SERPL-MCNC: 2.2 MG/DL (ref 1.8–2.4)
MCH RBC QN AUTO: 30.6 PG (ref 29–34)
MCHC RBC AUTO-ENTMCNC: 33.8 G/DL (ref 32–36)
MCV RBC AUTO: 90.6 FL (ref 82–97)
PLATELET # BLD AUTO: 278 10*3/UL (ref 130–350)
PMV BLD AUTO: 8 FL (ref 6.9–10.8)
POTASSIUM SERPL-SCNC: 3.5 MMOL/L (ref 3.5–5.1)
RBC # BLD AUTO: 4.36 10*6/ΜL (ref 4.1–5.8)
SODIUM SERPL-SCNC: 140 MMOL/L (ref 135–145)
WBC # BLD AUTO: 6.3 10*3/UL (ref 3.7–9.6)

## 2023-03-15 PROCEDURE — 2580000300 HC RX 258: Performed by: INTERNAL MEDICINE

## 2023-03-15 PROCEDURE — 80048 BASIC METABOLIC PNL TOTAL CA: CPT | Performed by: INTERNAL MEDICINE

## 2023-03-15 PROCEDURE — 85027 COMPLETE CBC AUTOMATED: CPT | Performed by: INTERNAL MEDICINE

## 2023-03-15 PROCEDURE — 6360000200 HC RX 636 W HCPCS (ALT 250 FOR IP): Performed by: INTERNAL MEDICINE

## 2023-03-15 PROCEDURE — 99239 HOSP IP/OBS DSCHRG MGMT >30: CPT | Performed by: INTERNAL MEDICINE

## 2023-03-15 PROCEDURE — 6370000100 HC RX 637 (ALT 250 FOR IP): Performed by: INTERNAL MEDICINE

## 2023-03-15 PROCEDURE — 83735 ASSAY OF MAGNESIUM: CPT | Performed by: INTERNAL MEDICINE

## 2023-03-15 PROCEDURE — 94761 N-INVAS EAR/PLS OXIMETRY MLT: CPT

## 2023-03-15 PROCEDURE — 36415 COLL VENOUS BLD VENIPUNCTURE: CPT | Performed by: INTERNAL MEDICINE

## 2023-03-15 RX ORDER — GUAIFENESIN 600 MG/1
600 TABLET, EXTENDED RELEASE ORAL 2 TIMES DAILY
Qty: 14 TABLET | Refills: 0 | Status: SHIPPED | OUTPATIENT
Start: 2023-03-15 | End: 2023-03-22

## 2023-03-15 RX ORDER — POTASSIUM CHLORIDE 7.45 MG/ML
10 INJECTION INTRAVENOUS
Status: DISCONTINUED | OUTPATIENT
Start: 2023-03-15 | End: 2023-03-15 | Stop reason: HOSPADM

## 2023-03-15 RX ORDER — FUROSEMIDE 80 MG/1
80 TABLET ORAL 2 TIMES DAILY
Qty: 60 TABLET | Refills: 0 | Status: SHIPPED | OUTPATIENT
Start: 2023-03-15 | End: 2023-04-19 | Stop reason: SDUPTHER

## 2023-03-15 RX ORDER — POTASSIUM CHLORIDE 20 MEQ/1
40 TABLET, EXTENDED RELEASE ORAL 2 TIMES DAILY WITH MEALS
Qty: 120 TABLET | Refills: 0 | Status: SHIPPED | OUTPATIENT
Start: 2023-03-15 | End: 2023-03-15

## 2023-03-15 RX ADMIN — AZITHROMYCIN MONOHYDRATE 250 MG: 250 TABLET ORAL at 09:11

## 2023-03-15 RX ADMIN — FUROSEMIDE 80 MG: 10 INJECTION, SOLUTION INTRAMUSCULAR; INTRAVENOUS at 09:11

## 2023-03-15 RX ADMIN — APIXABAN 5 MG: 5 TABLET, FILM COATED ORAL at 09:11

## 2023-03-15 RX ADMIN — POTASSIUM CHLORIDE 40 MEQ: 750 TABLET, EXTENDED RELEASE ORAL at 09:11

## 2023-03-15 RX ADMIN — CEFTRIAXONE SODIUM 1000 MG: 1 INJECTION, POWDER, FOR SOLUTION INTRAMUSCULAR; INTRAVENOUS at 09:11

## 2023-03-15 RX ADMIN — GUAIFENESIN 600 MG: 600 TABLET, EXTENDED RELEASE ORAL at 09:11

## 2023-03-15 NOTE — DISCHARGE SUMMARY
Discharge Summary    PCP: FELY BAILEY MD    Principal Diagnosis:   Pneumonia    Admit Date:3/11/2023  Discharge Date: 3/15/2023      Admission Diagnosis:   Present on Admission:   Atrial flutter (CMS/HCC) (HCC)   (Resolved) Acute respiratory failure with hypoxia (CMS/HCC) (HCC)   Pneumonia   (Resolved) Sepsis (CMS/HCC) (HCC)   (Resolved) Fluid overload       Discharge Diagnosis:  Patient Active Problem List   Diagnosis    Gross hematuria    Lymphedema    Bladder diverticulum    Atrial flutter (CMS/HCC) (HCC)    BPH with obstruction/lower urinary tract symptoms    Status post placement of implantable loop recorder    Pneumonia    Anticoagulated        Hospital Course: 72-year-old male with a past with history of atrial fibrillation anticoagulated on Eliquis now admitted for [facility] pneumonia complicated by volume overload.  Patient had sepsis without shock secondary to community-acquired pneumonia respiratory cultures were normal oral charles and he responded nicely to Rocephin and azithromycin. He reported that he chronically takes Lasix however he did not have this refilled when he ran out and he was up significantly in weight.  After a few days of IV diuresis as well as treatment of his community-acquired pneumonia he was able to return to room air which was his baseline to be discharged home with oral antibiotics and follow-up with his primary care physician.  Of note at one time during his hospitalization there was noted to be a significant elevation in his ST segments he was asymptomatic with chest pain repeat EKG's and troponins were negative Stable and not consistent with acute ischemia I did discuss this with cardiology and they agreed no further work-up would be recommended.    Physical Exam  Constitutional:       General: He is awake. He is not in acute distress.  Neck:      Vascular: No JVD.   Cardiovascular:      Rate and Rhythm: Normal rate and regular rhythm.      Heart sounds: Normal heart  sounds, S1 normal and S2 normal. No murmur heard.    No S3 or S4 sounds.   Pulmonary:      Effort: Pulmonary effort is normal.      Breath sounds: Normal breath sounds. No rhonchi or rales.   Abdominal:      Palpations: Abdomen is soft.      Tenderness: There is no abdominal tenderness. There is no guarding or rebound.   Musculoskeletal:      Right lower leg: No edema.      Left lower leg: No edema.   Neurological:      Mental Status: He is alert and oriented to person, place, and time.   Psychiatric:         Mood and Affect: Mood and affect normal.           Pending test results: None    Consults:  None      Procedures:  None    Condition at discharge: Stable    Disposition: home    Discharge Medications:     Your medication list        START taking these medications        Instructions Last Dose Given Next Dose Due   guaiFENesin 600 mg 12 hr tablet  Commonly known as: MUCINEX      Take 1 tablet (600 mg total) by mouth 2 (two) times a day for 7 days       potassium chloride 20 mEq CR tablet  Commonly known as: KLOR-CON M20      Take 2 tablets (40 mEq total) by mouth 2 (two) times a day with meals              CHANGE how you take these medications        Instructions Last Dose Given Next Dose Due   furosemide 80 mg tablet  Commonly known as: LASIX  What changed:   medication strength  how much to take  when to take this      Take 1 tablet (80 mg total) by mouth 2 (two) times a day              CONTINUE taking these medications        Instructions Last Dose Given Next Dose Due   Eliquis 5 mg tablet  Generic drug: apixaban      Take 1 tablet (5 mg total) by mouth 2 (two) times a day       HYDROcodone-acetaminophen 5-325 mg per tablet  Commonly known as: NORCO      Take 1 tablet by mouth every 6 (six) hours as needed for pain scale 8-10/10 for up to 8 doses Max Daily Amount: 4 tablets                 Where to Get Your Medications        These medications were sent to [facility] - [facility]  - [address]  [address] [place], [place] [address] [id number]      Phone: [phone]   furosemide 80 mg tablet  guaiFENesin 600 mg 12 hr tablet  potassium chloride 20 mEq CR tablet          Activity: activity as tolerated  Diet: cardiac diet    Follow-up post discharge:   Follow-up with primary care physician continue take your Lasix repeat BMP in 1 week check on renal and potassium balance.    35 minutes face-to-face discussion, medication reconciliation, orders etc. were required for their discharge, performed by me today.    Alok Mcdonald DO    8:47 AM 3/15/2023    Labs from this hospital stay  Results for orders placed or performed during the hospital encounter of 03/11/23   Blood culture x 1 set    Specimen: Blood, Venous   Result Value Ref Range    Blood Culture No growth at 72 hours    SARS/INFLUENZA/RSV QUADRUPLEX PCR    Specimen: Nasopharynx; Swab   Result Value Ref Range    Influenza A Screen by PCR Negative Negative    Influenza B Screen by PCR Negative Negative    COVID-19 PCR Negative Negative    Respiratory Syncytial Virus Screen by PCR Negative Negative   Respiratory culture w/stain    Specimen: Expectorated; Sputum   Result Value Ref Range    PMNs >25 PMN's (+3) /LPF    Epithelial cells 10-24 Epithelia cells (-2) /LPF    Mucus No Mucus present (0)     Organism Predominance 2-3 different organisms present (0)     OVERALL GRADE OF RESPIRATORY GRAM STAIN (A) >=+2, Acceptable specimen. Culture performed.     -1 to +1, Borderline specimen. Culture performed. Interpret culture with caution    Gram Stain Interpretation       Gram positive bacilli, large  Gram positive cocci in pairs  Gram negative bacilli   Respiratory culture    Specimen: Expectorated; Sputum   Result Value Ref Range    Culture       Normal Respiratory Zeina. No significant pathogens isolated. Respiratory cultures examined for significant quantities of potential pathogens including Staphylococcus aureus and Pseudomonas aeruginosa.    CBC w/auto differential Blood, Venous   Result Value Ref Range    WBC 13.3 (H) 3.7 - 9.6 10*3/uL    RBC 5.01 4.10 - 5.80 10*6/µL    Hemoglobin 15.4 13.2 - 17.2 g/dL    Hematocrit 45.7 38.0 - 50.0 %    MCV 91.2 82.0 - 97.0 fL    MCH 30.8 29.0 - 34.0 pg    MCHC 33.7 32.0 - 36.0 g/dL    RDW 14.8 11.5 - 15.0 %    Platelets 254 130 - 350 10*3/uL    MPV 8.8 6.9 - 10.8 fL    Neutrophils% 78.3 (H) 46.0 - 70.0 %    Lymphocytes% 8.6 (L) 15.0 - 47.0 %    Monocytes% 12.8 5.0 - 13.0 %    Eosinophils% 0.0 0.0 - 3.0 %    Basophils% 0.3 0.0 - 2.0 %    ANC (auto diff) 10.40 10*3/UL    Lymphocytes Absolute 1.10 10*3/uL    Monocytes Absolute 1.70 10*3/uL    Eosinophils Absolute 0.00 10*3/uL    Basophils Absolute 0.00 10*3/uL   Comprehensive metabolic panel Blood, Venous   Result Value Ref Range    Sodium 136 135 - 145 mmol/L    Potassium 3.5 3.5 - 5.1 MMOL/L    Chloride 100 98 - 107 mmol/L    CO2 24 21 - 32 mmol/L    Anion Gap 12 (H) 3 - 11 mmol/L    BUN 11 7 - 25 mg/dL    Creatinine 0.95 0.70 - 1.30 mg/dL    Glucose 153 (H) 70 - 105 mg/dL    Calcium 9.4 8.6 - 10.3 mg/dL    AST 16 <40 U/L    ALT (SGPT) 13 7 - 52 U/L    Alkaline Phosphatase 60 45 - 115 U/L    Total Protein 7.3 6.0 - 8.3 g/dL    Albumin 4.2 3.5 - 5.3 g/dL    Total Bilirubin 1.51 (H) 0.20 - 1.40 mg/dL    Corrected Calcium 9.2 8.6 - 10.3 mg/dL    eGFR 85 >60 mL/min/1.73m*2   Magnesium Blood, Venous   Result Value Ref Range    Magnesium 1.9 1.8 - 2.4 mg/dL   B-type natriuretic peptide (BNP) Blood, Venous   Result Value Ref Range    BNP 44 0 - 100 pg/mL   C-reactive protein (Inflammation) Blood, Venous   Result Value Ref Range    CRP 91.1 (H) <=10.0 MG/L   POCT lactic acid (lactate) Blood, Venous   Result Value Ref Range    POC Lactate 2.55 (HH) 0.50 - 1.99 MMOL/L   HS Troponin I   Result Value Ref Range    hsTnI 0 Hour 16.2 <=19.8 pg/mL   1HR High Sensitive Trop I   Result Value Ref Range    hsTnI 1 hr 15.9 <=19.8 pg/mL    Delta from 0 Hour -0.3 -11.9 to 11.9    Sedimentation rate, automated Blood, Venous   Result Value Ref Range    Sed Rate 28 (H) <=20 mm/hr   Urinalysis, Dip (part 1 of panel) Urine, Clean Catch    Specimen: Urine, Clean Catch   Result Value Ref Range    Color, Urine Dark Yellow (A) Yellow    Clarity, Urine Clear Clear    pH, Urine 5.0 5.0 - 8.0 PH    Specific Gravity, Urine 1.010 1.003 - 1.030    Protein, Urine Negative Negative MG/DL    Glucose, Urine Negative Negative MG/DL    Ketones, Urine Negative Negative MG/DL    Blood, Urine Negative Negative    Nitrite, Urine Negative Negative    Bilirubin, Urine Negative Negative    Leukocytes, Urine Negative Negative    Urobilinogen, Urine 0.2 <2.0 mg/dL   Urinalysis, Micro (part 2 of panel) Urine, Clean Catch    Specimen: Urine, Clean Catch   Result Value Ref Range    RBC, Urine 0-2 None seen, 0-2, Negative /HPF    WBC, Urine 5-9 (A) 0 - 4 /HPF    Squamous Epithelial, Urine 0-4 None Seen-9 /HPF    Bacteria, Urine None seen None seen, Few /HPF    Hyaline Casts, Urine 0-4 0 - 4 /LPF    Mucus, Urine Present    POCT lactic acid (lactate) Blood, Venous   Result Value Ref Range    POC Lactate 0.84 0.50 - 1.99 MMOL/L   CBC w/auto differential Blood, Venous   Result Value Ref Range    WBC 9.8 (H) 3.7 - 9.6 10*3/uL    RBC 4.35 4.10 - 5.80 10*6/µL    Hemoglobin 13.4 13.2 - 17.2 g/dL    Hematocrit 39.9 38.0 - 50.0 %    MCV 91.7 82.0 - 97.0 fL    MCH 30.7 29.0 - 34.0 pg    MCHC 33.5 32.0 - 36.0 g/dL    RDW 15.2 (H) 11.5 - 15.0 %    Platelets 217 130 - 350 10*3/uL    MPV 8.7 6.9 - 10.8 fL    Neutrophils% 64.9 46.0 - 70.0 %    Lymphocytes% 15.6 15.0 - 47.0 %    Monocytes% 18.2 (H) 5.0 - 13.0 %    Eosinophils% 0.7 0.0 - 3.0 %    Basophils% 0.6 0.0 - 2.0 %    ANC (auto diff) 6.40 10*3/UL    Lymphocytes Absolute 1.50 10*3/uL    Monocytes Absolute 1.80 10*3/uL    Eosinophils Absolute 0.10 10*3/uL    Basophils Absolute 0.10 10*3/uL   Basic metabolic panel Blood, Venous   Result Value Ref Range    Sodium 137 135 - 145 mmol/L     Potassium 4.0 3.5 - 5.1 MMOL/L    Chloride 102 98 - 107 mmol/L    CO2 30 21 - 32 mmol/L    BUN 14 7 - 25 mg/dL    Creatinine 1.11 0.70 - 1.30 mg/dL    Glucose 126 (H) 70 - 105 mg/dL    Calcium 8.8 8.6 - 10.3 mg/dL    Anion Gap 5 3 - 11 mmol/L    eGFR 70 >60 mL/min/1.73m*2   Magnesium Blood, Venous   Result Value Ref Range    Magnesium 2.1 1.8 - 2.4 mg/dL   Procalcitonin Test Blood, Venous   Result Value Ref Range    Procalcitonin 0.15 <0.50 ng/mL   HS Troponin I   Result Value Ref Range    hsTnI 0 Hour 9.6 <=19.8 pg/mL   1HR High Sensitive Trop I   Result Value Ref Range    hsTnI 1 hr 9.6 <=19.8 pg/mL    Delta from 0 Hour 0 -11.9 to 11.9   Basic metabolic panel Blood, Venous   Result Value Ref Range    Sodium 135 135 - 145 mmol/L    Potassium 3.8 3.5 - 5.1 MMOL/L    Chloride 100 98 - 107 mmol/L    CO2 28 21 - 32 mmol/L    BUN 18 7 - 25 mg/dL    Creatinine 1.04 0.70 - 1.30 mg/dL    Glucose 115 (H) 70 - 105 mg/dL    Calcium 8.7 8.6 - 10.3 mg/dL    Anion Gap 7 3 - 11 mmol/L    eGFR 76 >60 mL/min/1.73m*2   CBC w/auto differential Blood, Venous   Result Value Ref Range    WBC 6.9 3.7 - 9.6 10*3/uL    RBC 4.24 4.10 - 5.80 10*6/µL    Hemoglobin 13.0 (L) 13.2 - 17.2 g/dL    Hematocrit 38.3 38.0 - 50.0 %    MCV 90.4 82.0 - 97.0 fL    MCH 30.7 29.0 - 34.0 pg    MCHC 34.0 32.0 - 36.0 g/dL    RDW 14.7 11.5 - 15.0 %    Platelets 214 130 - 350 10*3/uL    MPV 8.1 6.9 - 10.8 fL    Neutrophils% 58.7 46.0 - 70.0 %    Lymphocytes% 22.3 15.0 - 47.0 %    Monocytes% 15.4 (H) 5.0 - 13.0 %    Eosinophils% 2.9 0.0 - 3.0 %    Basophils% 0.7 0.0 - 2.0 %    ANC (auto diff) 4.10 10*3/UL    Lymphocytes Absolute 1.60 10*3/uL    Monocytes Absolute 1.10 10*3/uL    Eosinophils Absolute 0.20 10*3/uL    Basophils Absolute 0.00 10*3/uL   Basic metabolic panel Blood, Venous   Result Value Ref Range    Sodium 137 135 - 145 mmol/L    Potassium 3.3 (L) 3.5 - 5.1 MMOL/L    Chloride 101 98 - 107 mmol/L    CO2 32 21 - 32 mmol/L    BUN 16 7 - 25 mg/dL     Creatinine 0.95 0.70 - 1.30 mg/dL    Glucose 95 70 - 105 mg/dL    Calcium 8.6 8.6 - 10.3 mg/dL    Anion Gap 4 3 - 11 mmol/L    eGFR 85 >60 mL/min/1.73m*2   Magnesium Blood, Venous   Result Value Ref Range    Magnesium 2.1 1.8 - 2.4 mg/dL   Phosphorus Blood, Venous   Result Value Ref Range    Phosphorus 2.8 2.5 - 4.9 mg/dL   Basic metabolic panel Blood, Venous   Result Value Ref Range    Sodium 141 135 - 145 mmol/L    Potassium 3.3 (L) 3.5 - 5.1 MMOL/L    Chloride 103 98 - 107 mmol/L    CO2 32 21 - 32 mmol/L    BUN 19 7 - 25 mg/dL    Creatinine 0.97 0.70 - 1.30 mg/dL    Glucose 93 70 - 105 mg/dL    Calcium 8.7 8.6 - 10.3 mg/dL    Anion Gap 6 3 - 11 mmol/L    eGFR 82 >60 mL/min/1.73m*2   Magnesium Blood, Venous   Result Value Ref Range    Magnesium 2.2 1.8 - 2.4 mg/dL   Basic metabolic panel Blood, Venous   Result Value Ref Range    Sodium 140 135 - 145 mmol/L    Potassium 3.5 3.5 - 5.1 MMOL/L    Chloride 102 98 - 107 mmol/L    CO2 31 21 - 32 mmol/L    BUN 22 7 - 25 mg/dL    Creatinine 0.96 0.70 - 1.30 mg/dL    Glucose 97 70 - 105 mg/dL    Calcium 9.0 8.6 - 10.3 mg/dL    Anion Gap 7 3 - 11 mmol/L    eGFR 83 >60 mL/min/1.73m*2   CBC Blood, Venous   Result Value Ref Range    WBC 6.3 3.7 - 9.6 10*3/uL    RBC 4.36 4.10 - 5.80 10*6/µL    Hemoglobin 13.3 13.2 - 17.2 g/dL    Hematocrit 39.5 38.0 - 50.0 %    MCV 90.6 82.0 - 97.0 fL    MCH 30.6 29.0 - 34.0 pg    MCHC 33.8 32.0 - 36.0 g/dL    RDW 14.3 11.5 - 15.0 %    Platelets 278 130 - 350 10*3/uL    MPV 8.0 6.9 - 10.8 fL   Magnesium Blood, Venous   Result Value Ref Range    Magnesium 2.2 1.8 - 2.4 mg/dL   US Echo complete   Result Value Ref Range    LV Systolic Volume 36 ml    LV Diastolic Volume 81 ml    LVOT peak VTI 26.6 Centimeter    Left Ventricular Outflow Tract Mean Velocity 98.4 Centimeter Per Second    TDI 9.7 cm/s    LVOT diameter 2.5 cm    Left Ventricular Outflow Tract Mean Gradient 4.33 Millimeters Of Mercury    LVOT peak shana 1.37 Meter Per Second    LVOT peak  "johnny 1.37 Meter Per Second    TDI Lateral 10.6 cm/s    PW 1.2 (A) 0.6 - 1.1 cm    AV LVOT peak gradient 7.5 mmHg    IVS 1.1 0.6 - 1.1 cm    LVIDD 4.4 7.42 - 10.31 cm    Biplane EF 66 %    LVOT area 5.03 Square Centimeter    FS 40 28 - 44 %    E/E' ratio 8.9     RVIDD 3.9 cm    Right Vent Outflow PG 2.79 Millimeters Of Mercury    RVOT peak johnny 0.8 m/s    LA size 3.53 Centimeter    AV peak gradient 11.6 mmHg    Ao peak johnny 1.71 Meter Per Second    AV mean gradient 6.8 mmHg    Ao VTI 31.5 cm    AV Valve area VTI 4.3 cm2    AV Valve area johnny 4.1 cm2    MV mean gradient 2.1 mmHg    MV Peak E Johnny 86.6 cm/s    Mitral Regurgitant Velocity Time Integral 93.4 cm/s    MV stenosis pressure 1/2 time 64.2 Millisecond    MV VTI 24.0 Centimeter    Mitral Valve Deceleration Time 0.3 s    MV Peak A Johnny 65.5 cm/s    MV valve area p 1/2 method 3.40 cm2    E/A ratio 1.3     PV peak gradient 4.2 mmHg    Pulmonic Valve Max Velocity 1.02 m/s    Tricuspid valve peak regurgitation velocity 2.08 m/s    Triscuspid Valve Regurgitation Peak Gradient 17.3 mmHg    IVC proximal 1.31 Centimeter    Pulmonary Vein \"A\" Wave 37.6 cm/s    PV Peak S Johnny 44.9 cm/s    PV Peak D Johnny 32.8 cm/s    Pulm vein S/D ratio 1.40     BSA 2.37 m2    LVOT peak velocity rest 1.4 m/s    RA area 17.7 cm2    Estimated Right Atrial Pressure 5 mmHg    Lateral Tricuspid Annular Systolic Velocity 16.1 cm/s    LVIDS 2.6 4.29 - 6.50 cm    Sinus 3.9 cm    STJ 3.3 cm    Ao-asc 4.1 cm    Tricuspid annular plane systolic excursion 2.1 cm    RV base 3.1 CM    Rest RVSP 22 mmHg    LA Volume Index 33 mL/m2    ZLVIDD -6.87     ZLVIDS -5.60        Radiology  X-ray chest 2 views    Result Date: 3/10/2023  Narrative: EXAMINATION: DX CHEST 2 VW EXAMINATION DATE: 03/10/2023 HISTORY:  pain COMPARISON: 1/16/2021 TECHNIQUE: Chest 2 view FINDINGS: The cardiac size is within normal limits. There is chronic scarring or atelectasis within the lung bases. There is no pneumothorax or pleural effusion. " There are degenerative changes of the thoracic spine.     Impression: IMPRESSION: 1. Chronic changes of the chest without acute process.     US Echo complete    Result Date: 3/13/2023  Narrative: · Normal left ventricular size, wall thickness and systolic function. EF 66%. · The left ventricular wall motion is normal. · Normal left ventricular diastolic function. · The left atrium is normal in size. · Right ventricle is normal in size and systolic function. · The right atrium is normal in size. · There is no evidence of pulmonary hypertension. Estimated RVSP at 22 mmHg. · Mild regurgitation of the mitral valve is noted. · Normal central venous pressure. · No pericardial effusion. · The ascending aorta is mildly dilated at 4.1 cm.      CT angiogram chest abdomen pelvis with IV contrast    Result Date: 3/11/2023  Narrative: Exam: CT ANGIOGRAM CHEST ABDOMEN PELVIS W IV CONTRAST from 03/11/2023 Clinical History:  Thoracic aortic aneurysm (TAA) suspected; Abdominal pain  aortic dissection suspected Comparison(s): none Technique: Axial imaging was performed through the chest, abdomen and pelvis after the administration of 100 cc of Isovue-370 intravenous contrast. Multiplanar reformations were constructed and reviewed. Dose Reduction Technique: Dose reduction technique utilized; automatic/anatomic modulation of X-ray tube current (Auto mA). Findings: Chest: There is bilateral dependent subsegmental atelectasis. No focal consolidations or pleural effusions. No pneumothorax. Large airways are patent. Normal heart size. Small pericardial effusion. The thoracic aorta is normal in caliber without evidence of aneurysm, dissection, or hemodynamically significant stenosis. No axillary, hilar, or mediastinal lymphadenopathy. The visualized thyroid gland appears normal. Abdomen/pelvis: The liver, gallbladder, spleen, pancreas, bilateral adrenal glands, and bilateral kidneys appear normal. No hydronephrosis or urolithiasis. A simple  left renal cyst is noted. There are surgical changes of prostatectomy. There is elongation of the urinary bladder with trabeculation and numerous urinary bladder diverticula, the largest of which arises from the right posterior wall and measures up to 11.4 x 8 cm. No ascites. There is colonic diverticulosis without evidence of diverticulitis. Loops of large and small bowel otherwise appear normal without evidence of obstruction. The appendix is reportedly surgically absent. No mesenteric, retroperitoneal, or inguinal lymphadenopathy. There are mild atherosclerotic vascular calcifications though the abdominal aorta and bilateral iliac systems remain normal in caliber without evidence of aneurysm. There is diastases of the rectus abdominis musculature with a midline. Hernia defect containing nonobstructed bowel loops. There are surgical changes of right inguinal hernia repair with recurrence of a small fat-containing hernia. A small fat-containing left inguinal hernia is also noted. Bones: No aggressive or destructive osseous lesions.     Impression: Impression: 1.  No evidence of aortic aneurysm, dissection, or hemodynamically significant stenosis. 2.  Small pericardial effusion. 3.  Urinary bladder trabeculation with numerous diverticula which could be seen in setting of chronic outlet obstruction or neurogenic bladder. 4.  Midline ventral abdominal wall hernia defect containing nonobstructed bowel loops. On-call imaging was performed and a preliminary report was provided by [facility] following the procedure. Agree with preliminary report.

## 2023-03-15 NOTE — PLAN OF CARE
Problem: Infection Control  Goal: MINIMIZE THE ACQUISITION AND TRANSMISSION OF INFECTIOUS AGENTS  Description: INTERVENTIONS:  1. Isolate patient with suspected/diagnosed communicable disease  2. Place on designated isolation precautions  3. Maintain isolation techniques  4. Perform hand hygiene before and after each patient care activity  5. [place] universal precautions  6. Wear PPE as directed for type of isolation  7. Administer antibiotic therapy as ordered  8. Clean the environment appropriately after each patient use  9. Clean patient care equipment after each patient use as it leaves the room  10. Limit number of visitors, as appropriate  Outcome: Adequate for Discharge     Problem: Pain - Adult  Goal: Verbalizes/displays adequate comfort level or baseline comfort level  Description: INTERVENTIONS:  1. Encourage patient to monitor pain and request interventions  2. Assess pain using the appropriate pain scale  3. Administer analgesics based on type and severity of pain and evaluate response  4. Educate/Implement non-pharmacological measures as appropriate and evaluate response  5. Consider cultural, developmental and social influences on pain and pain management  6. Notify Provider if interventions unsuccessful or patient reports new pain  Outcome: Adequate for Discharge     Problem: Infection - Adult  Goal: Absence of infection during hospitalization  Description: INTERVENTIONS:  1. Assess and monitor for signs and symptoms of infection  2. Monitor lab/diagnostic results  3. Monitor all insertion sites/wounds/incisions  4. Monitor secretions for changes in amount and color  5. Administer medications as ordered  6. Educate and encourage patient and family to use good hand hygiene technique  7. Identify and educate in appropriate isolation precautions for identified infection/condition  Outcome: Adequate for Discharge     Problem: Safety Adult  Goal: Patient will remain safe during  hospitalization  Description: INTERVENTIONS    1. Assess patient for fall risk and implement interventions if needed  2. Use safe transport techniques  3. Assess patient using the appropriate Ricardo skin assessment scale  4. Assess patient for risk of aspiration  5. Assess patient for risk of elopement  6. Assess patient for risk of suicide  Outcome: Adequate for Discharge     Problem: Daily Care  Goal: Daily care needs are met  Description: INTERVENTIONS:   1. Assess and monitor skin integrity  2. Identify patients at risk for skin breakdown on admission and per policy  3. Assess and monitor ability to perform self care and identify potential discharge needs  4. Assess skin integrity/risk for skin breakdown  5. Assist patient with activities of daily living as needed  6. Encourage independent activity per ability   7. Provide mouth care   8. Include patient/family/caregiver in decisions related to daily care   Outcome: Adequate for Discharge     Problem: Knowledge Deficit  Goal: Patient/family/caregiver demonstrates understanding of disease process, treatment plan, medications, and discharge instructions  Description: INTERVENTIONS:   1. Complete learning assessment and assess knowledge base  2. Provide teaching at level of understanding   3. Provide teaching via preferred learning methods  Outcome: Adequate for Discharge     Problem: Discharge Barriers  Goal: Patient's discharge needs are met  Description: INTERVENTIONS:  1. Assess patient for self-management skills  2. Encourage participation in management  3. Identify potential discharge barriers on admission and throughout hospital stay  4. Involve patient/family/caregiver in discharge planning process  5. Collaborate with case management/ for discharge needs  Outcome: Adequate for Discharge

## 2023-03-15 NOTE — DISCHARGE INSTRUCTIONS
Please continue take the Lasix every day you should consider weighing yourself every day and if you gain more than 3 pounds in a day or 5 pounds in a week you should call your regular doctor to see if your Lasix dose needs increased.  Please get your blood work done in 1 week to check your potassium and kidney function to make sure that these are stable while you take the Lasix.

## 2023-03-15 NOTE — INTERDISCIPLINARY/THERAPY
Case Management Discharge Note    Phone # [phone]    Discharge Disposition: home     Needs transportation assistance at DC: per patient    Specialty Referrals:          Active Ambulatory Referrals   (From admission, onward)                 Start     Ordered    03/15/23 0000  Ambulatory referral to [facility]         03/15/23 0908                    Support System Notified: per patient    Narrative: Plan to dc to home.  Patient denies need for further assistance and verbalizes plan of care.

## 2023-03-16 ENCOUNTER — PATIENT OUTREACH (OUTPATIENT)
Dept: FAMILY MEDICINE | Facility: HOSPITAL | Age: 73
End: 2023-03-16
Payer: MEDICARE

## 2023-03-16 LAB — BACTERIA BLD CULT: NORMAL

## 2023-03-16 NOTE — PROGRESS NOTES
Cesar Yuen was contacted following recent hospitalization at [facility]. The patient was discharged from the hospital on 3/15/2023 .The Discharge Summary and After Visit Summary were reviewed. The patient's main diagnosis during the hospitalization was Pneumonia.  Followup appointment: 3/22/23 with Dr. Valadez. Any additional testing and labs will be discussed at the patient's upcoming post-hospital follow up appointment.    Transitional Care Management Follow Up (most recent)       Transitional Care Management - 03/16/23 1213          OTHER    Date of post hospital outreach 03/16/23     Contact Status Contact done     Speaking with the Patient or Patient's Caregiver? Patient     Is the patient on the Diabetes registry? N     Were patient's home medications changed or did they have any new medications added during the hospitalization? Y     Did someone go over the discharge summary with the patient or the patient's caregiver and discuss the medications listed on it? Y     Does the patient or patient's caregiver have any questions about the medication changes? N     Patient verbalized understanding of when to contact health care provider or when to get help right away? Y     Discharge instructions reviewed with patient or patient's caregiver and all questions answered? Y     Is there a Home Health/DME Plan being enacted? Please note name of HH agency, DME vendor, contacted/received N     Does patient have psychosocial issues that might impact their health status? None     Does patient have financial barriers to care? None                      Yvrose Box RN  March 16, 2023 12:13 PM

## 2023-03-20 NOTE — FAX COVER SHEET
Fax Transmission  ----------------------------------------------------------------------------------------------------------------------  [facility]  Mailing address: [address] [place] [address], Zip: [place] [address]      3/20/2023  2:34 PM      Attention: NURSE      From: Linda Samano RN  Office:  [phone]  Fax:  [phone]    Comments:     Auth/Cert Number: [id number]      Please call with any questions.    Thanks       This facsimile message is CONFIDENTIAL and may contain -privileged information and/or Protected Health Information (PHI) as defined in the federal Health Insurance Portability and Accountability Act, as amended.  This facsimile is  intended ONLY for the use of the individual or company named.  If the reader is NOT the intended recipient, or the employee or agent responsible to deliver it to the intended recipient, you are hereby notified that any dissemination, distribution, or copying of this communication is prohibited.  If you have received this communication in error, please immediately notify us by telephone so that we may arrange for the return of the original message.

## 2023-03-22 ENCOUNTER — LAB (OUTPATIENT)
Dept: LAB | Facility: CLINIC | Age: 73
End: 2023-03-22
Payer: MEDICARE

## 2023-03-22 ENCOUNTER — OFFICE VISIT (OUTPATIENT)
Dept: FAMILY MEDICINE | Facility: CLINIC | Age: 73
End: 2023-03-22
Payer: MEDICARE

## 2023-03-22 VITALS
OXYGEN SATURATION: 92 % | HEART RATE: 84 BPM | WEIGHT: 235.1 LBS | BODY MASS INDEX: 31.02 KG/M2 | DIASTOLIC BLOOD PRESSURE: 68 MMHG | SYSTOLIC BLOOD PRESSURE: 132 MMHG | TEMPERATURE: 97.5 F | RESPIRATION RATE: 16 BRPM

## 2023-03-22 DIAGNOSIS — M25.532 LEFT WRIST PAIN: Primary | ICD-10-CM

## 2023-03-22 DIAGNOSIS — J18.9 PNEUMONIA: ICD-10-CM

## 2023-03-22 DIAGNOSIS — E87.6 HYPOKALEMIA: ICD-10-CM

## 2023-03-22 LAB
ANION GAP SERPL CALC-SCNC: 10 MMOL/L (ref 3–11)
BUN SERPL-MCNC: 21 MG/DL (ref 7–25)
CALCIUM SERPL-MCNC: 9.5 MG/DL (ref 8.6–10.3)
CHLORIDE SERPL-SCNC: 99 MMOL/L (ref 98–107)
CO2 SERPL-SCNC: 30 MMOL/L (ref 21–32)
CREAT SERPL-MCNC: 1.01 MG/DL (ref 0.7–1.3)
GFR SERPL CREATININE-BSD FRML MDRD: 79 ML/MIN/1.73M*2
GLUCOSE SERPL-MCNC: 108 MG/DL (ref 70–105)
POTASSIUM SERPL-SCNC: 3.6 MMOL/L (ref 3.5–5.1)
SODIUM SERPL-SCNC: 139 MMOL/L (ref 135–145)

## 2023-03-22 PROCEDURE — 99495 TRANSJ CARE MGMT MOD F2F 14D: CPT | Performed by: STUDENT IN AN ORGANIZED HEALTH CARE EDUCATION/TRAINING PROGRAM

## 2023-03-22 PROCEDURE — 80048 BASIC METABOLIC PNL TOTAL CA: CPT

## 2023-03-22 PROCEDURE — 36415 COLL VENOUS BLD VENIPUNCTURE: CPT

## 2023-03-22 RX ORDER — MULTIVITAMIN
1 TABLET ORAL DAILY
COMMUNITY

## 2023-03-22 ASSESSMENT — ENCOUNTER SYMPTOMS
ABDOMINAL PAIN: 0
EYE PAIN: 0
SORE THROAT: 0
DYSURIA: 0
SHORTNESS OF BREATH: 0
PALPITATIONS: 0
BACK PAIN: 0
FEVER: 0
COUGH: 0
VOMITING: 0
CHILLS: 0
HEMATURIA: 0
COLOR CHANGE: 0
SEIZURES: 0
ARTHRALGIAS: 0

## 2023-03-22 ASSESSMENT — PAIN SCALES - GENERAL: PAINLEVEL: 0-NO PAIN

## 2023-03-22 NOTE — PROGRESS NOTES
"Subjective      HPI  Cesar Yuen is a 72 y.o. male who presents for TCM visit following recent hospitalization.    Patient has known history of CHF, hypertension, and edema who was admitted for community-acquired pneumonia.  Patient was also found to have elevation in his weight fluid accumulation of which he tolerated IV diuresis well and returned to his baseline room oxygen, some concerns on EKG reading earlier but repeat cardiac testing within normal limits.    It was noted that patient had some concerns with electrolyte abnormalities started on potassium chloride and recommendations for repeat BMP during today's visit.    Patient reports that overall his symptoms have improved, denying any cough chest discomfort or shortness of breath or any symptoms of concern otherwise.      The following have been reviewed and updated as appropriate in this visit:    No Known Allergies  Current Outpatient Medications   Medication Sig Dispense Refill    multivitamin (Daily Multi-Vitamin) tablet tablet Take 1 tablet by mouth daily      furosemide (LASIX) 80 mg tablet Take 1 tablet (80 mg total) by mouth 2 (two) times a day 60 tablet 0    guaiFENesin (MUCINEX) 600 mg 12 hr tablet Take 1 tablet (600 mg total) by mouth 2 (two) times a day for 7 days 14 tablet 0    potassium chloride (KLOR-CON M20) 20 mEq CR tablet Take 2 tablets (40 mEq total) by mouth 2 (two) times a day with meals 120 tablet 0    Eliquis 5 mg tablet Take 1 tablet (5 mg total) by mouth 2 (two) times a day 180 tablet 4     No current facility-administered medications for this visit.     Past Medical History:   Diagnosis Date    Atrial flutter (CMS/[facility]) ([facility])     CHF (congestive heart failure) (CMS/[facility]) ([facility])     Chipped tooth     \"cracked tooth\" Back right upper, left upper back    Dysrhythmia     Edema     BLE    History of transfusion     Hypertension     Injury of back 01/14/2021    Seen in .    Pneumonia 03/11/2023    Urinary retention     indwelling " Pope in place since November 2020      Past Surgical History:   Procedure Laterality Date    ABDOMINAL SURGERY      anuerysm    ABLATION A-FLUTTER N/A 2/8/2021    Procedure: Ablation A-flutter loop implant;  Surgeon: Timoteo Burk DO;  Location: [facility] EP Lab;  Service: Electrophysiology;  Laterality: N/A;    APPENDECTOMY      HERNIA REPAIR      x 3    LOOP RECORDER INSERTION N/A 2/8/2021    Procedure: LOOP RECORDER INSERTION;  Surgeon: Timoteo Burk DO;  Location: [facility] EP Lab;  Service: Electrophysiology;  Laterality: N/A;    PROSTATECTOMY N/A 4/28/2022    Procedure: REVOLIX LASER PROSTATECTOMY;  Surgeon: Attila Taylor MD;  Location: [facility] OR;  Service: Urology;  Laterality: N/A;    TRANSURETHRAL RESECTION OF BLADDER TUMOR N/A 12/22/2020    Procedure: CYSTOSCOPY WITH BLADDER BIOPSY;  Surgeon: Daniele Johnson MD;  Location: [facility] OR;  Service: Urology;  Laterality: N/A;    TRANSURETHRAL RESECTION OF PROSTATE N/A 12/22/2020    Procedure: CYSTOSCOPY TRANSURETHRAL RESECTION PROSTATE, exam under anasthesia;  Surgeon: Daniele Johnson MD;  Location: [facility] OR;  Service: Urology;  Laterality: N/A;    TRANSURETHRAL RESECTION OF PROSTATE N/A 4/27/2021    Procedure: CYSTOSCOPY TRANSURETHRAL RESECTION PROSTATE;  Surgeon: Daniele Johnson MD;  Location: [facility] OR;  Service: Urology;  Laterality: N/A;    TRANSURETHRAL RESECTION OF PROSTATE N/A 9/27/2021    Procedure: CYSTOSCOPY TRANSURETHRAL RESECTION PROSTATE;  Surgeon: Attila Taylor MD;  Location: [facility] OR;  Service: Urology;  Laterality: N/A;  NOT EARLY MORNING       Review of Systems   Constitutional:  Negative for chills and fever.   HENT:  Negative for ear pain and sore throat.    Eyes:  Negative for pain and visual disturbance.   Respiratory:  Negative for cough and shortness of breath.    Cardiovascular:  Negative for chest pain and palpitations.   Gastrointestinal:  Negative for abdominal pain and vomiting.   Genitourinary:  Negative for dysuria and hematuria.    Musculoskeletal:  Negative for arthralgias and back pain.   Skin:  Negative for color change and rash.   Neurological:  Negative for seizures and syncope.   All other systems reviewed and are negative.    Objective   /68 (BP Location: Left arm, Patient Position: Sitting, Cuff Size: Large Adult)   Pulse 84   Temp 36.4 °C (97.5 °F) (Temporal)   Resp 16   Wt 106.6 kg (235 lb 1.6 oz)   SpO2 92%   BMI 31.02 kg/m²     Physical Exam  Vitals and nursing note reviewed.   Constitutional:       General: He is not in acute distress.     Appearance: He is well-developed.   HENT:      Head: Normocephalic and atraumatic.      Right Ear: External ear normal.      Left Ear: External ear normal.      Nose: Nose normal. No congestion.      Mouth/Throat:      Mouth: Mucous membranes are moist.      Pharynx: Oropharynx is clear.   Eyes:      General: No scleral icterus.     Extraocular Movements: Extraocular movements intact.   Neck:      Vascular: No JVD.      Trachea: No tracheal deviation.   Cardiovascular:      Rate and Rhythm: Normal rate and regular rhythm.      Heart sounds: Normal heart sounds. No murmur heard.  Pulmonary:      Effort: Pulmonary effort is normal. No respiratory distress.      Breath sounds: Normal breath sounds. No wheezing or rales.   Abdominal:      General: There is no distension.   Musculoskeletal:      Cervical back: Normal range of motion.      Right lower leg: No edema.      Left lower leg: No edema.   Skin:     Findings: No rash.   Neurological:      Mental Status: He is alert and oriented to person, place, and time.      Motor: No abnormal muscle tone.   Psychiatric:         Mood and Affect: Mood normal.         Behavior: Behavior normal.       Assessment/Plan   Diagnoses and all orders for this visit:      Pneumonia  -     Ambulatory referral to Family Practice  -     Reviewed discharge summary and planning therein    Hypokalemia  -     Basic metabolic panel Blood, Venous;  Future      Patient to follow-up with primary care provider      Patient's questions answered he voiced understanding      Gregg Valadez DO  03/22/23

## 2023-03-26 PROCEDURE — 93298 REM INTERROG DEV EVAL SCRMS: CPT | Performed by: INTERNAL MEDICINE

## 2023-03-26 PROCEDURE — G2066 INTER DEVC REMOTE 30D: HCPCS | Performed by: INTERNAL MEDICINE

## 2023-04-06 ENCOUNTER — OFFICE VISIT (OUTPATIENT)
Dept: UROLOGY | Facility: CLINIC | Age: 73
End: 2023-04-06
Payer: MEDICARE

## 2023-04-06 ENCOUNTER — LAB (OUTPATIENT)
Dept: LAB | Facility: CLINIC | Age: 73
End: 2023-04-06
Payer: MEDICARE

## 2023-04-06 VITALS
OXYGEN SATURATION: 92 % | WEIGHT: 235 LBS | HEART RATE: 91 BPM | SYSTOLIC BLOOD PRESSURE: 130 MMHG | BODY MASS INDEX: 31.14 KG/M2 | HEIGHT: 73 IN | TEMPERATURE: 98.1 F | DIASTOLIC BLOOD PRESSURE: 76 MMHG

## 2023-04-06 DIAGNOSIS — R33.9 URINARY RETENTION: Primary | ICD-10-CM

## 2023-04-06 DIAGNOSIS — Z87.440 HISTORY OF UTI: ICD-10-CM

## 2023-04-06 LAB
BACTERIA #/AREA URNS HPF: ABNORMAL /HPF
BILIRUB UR QL: NEGATIVE
CLARITY UR: CLEAR
COLOR UR: YELLOW
GLUCOSE UR QL: NEGATIVE MG/DL
HGB UR QL: ABNORMAL
HYALINE CASTS #/AREA URNS AUTO: ABNORMAL /LPF
KETONES UR-MCNC: NEGATIVE MG/DL
LEUKOCYTE ESTERASE UR QL STRIP: NEGATIVE
NITRITE UR QL: NEGATIVE
PH UR: 6 PH
PROT UR STRIP-MCNC: NEGATIVE MG/DL
RBC #/AREA URNS HPF: ABNORMAL /HPF
SP GR UR: 1.01 (ref 1–1.03)
SPECIMEN VOL ?TM UR: 621 ML
SQUAMOUS #/AREA URNS HPF: ABNORMAL /HPF
UROBILINOGEN UR-MCNC: 0.2 MG/DL
WBC #/AREA URNS HPF: ABNORMAL /HPF

## 2023-04-06 PROCEDURE — 81001 URINALYSIS AUTO W/SCOPE: CPT

## 2023-04-06 PROCEDURE — G0463 HOSPITAL OUTPT CLINIC VISIT: HCPCS | Performed by: NURSE PRACTITIONER

## 2023-04-06 PROCEDURE — 51798 US URINE CAPACITY MEASURE: CPT | Performed by: NURSE PRACTITIONER

## 2023-04-06 PROCEDURE — 99213 OFFICE O/P EST LOW 20 MIN: CPT | Performed by: NURSE PRACTITIONER

## 2023-04-06 RX ORDER — TAMSULOSIN HYDROCHLORIDE 0.4 MG/1
0.4 CAPSULE ORAL DAILY
Qty: 30 CAPSULE | Refills: 11 | Status: SHIPPED | OUTPATIENT
Start: 2023-04-06 | End: 2023-04-06

## 2023-04-06 ASSESSMENT — PAIN SCALES - GENERAL: PAINLEVEL: 0-NO PAIN

## 2023-04-06 NOTE — PROGRESS NOTES
"Urology Progress Note  4/6/2023    Chief Complaint:  Chief Complaint   Patient presents with    Follow-up     Urinary Retention   TURP and USD with Dr Anderson     History of Present Illness:  Patient is a 72 year old male presenting to Urology Clinic today in follow-up for history of urinary retention, status post TURPon 9/27/2021 and Revolix laser prostatectomy on 4/28/2022 with Dr. Taylor, last seen in Urology Clinic on 10/19/2022 at which time he stated it felt he took too long to empty his bladder, patient was previously seen on 9/22/2022 by Dr. Anderson for Urodynamics, however no interpretation of results was reported.     Patient states that since he was last seen in clinic, he feels he is doing just fine.  He does not know if he is still taking Flomax or not as he has run out of multiple prescriptions, and has not refilled them. He denies any pain/burning with urination or further episodes of gross hematuria, and feels over all \"things are fine\". Patient denies any sensation of incomplete bladder emptying, lower abdominal pain/pressure. Current AUA SS 6.  Most recent creatinine on 3/22/2023 1.01.  Most recent renal ultrasound on 10/20/2022 without hydronephrosis.     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    Past Medical History:  Past Medical History:   Diagnosis Date    Atrial flutter (CMS/HCC)     CHF (congestive heart failure) (CMS/HCC)     Chipped tooth     \"cracked tooth\" Back right upper, left upper back    Dysrhythmia     Edema     BLE    History of transfusion     Hypertension     Injury of back 01/14/2021    Seen in .    Pneumonia 03/11/2023    Urinary retention     indwelling Pope in place since November 2020      Past Surgical History:  Past Surgical History:   Procedure Laterality Date    ABDOMINAL SURGERY      anuerysm    ABLATION A-FLUTTER N/A " 2/8/2021    Procedure: Ablation A-flutter loop implant;  Surgeon: Timoteo Burk DO;  Location: [facility] EP Lab;  Service: Electrophysiology;  Laterality: N/A;    APPENDECTOMY      HERNIA REPAIR      x 3    LOOP RECORDER INSERTION N/A 2/8/2021    Procedure: LOOP RECORDER INSERTION;  Surgeon: Timoteo Burk DO;  Location: [facility] EP Lab;  Service: Electrophysiology;  Laterality: N/A;    PROSTATECTOMY N/A 4/28/2022    Procedure: REVOLIX LASER PROSTATECTOMY;  Surgeon: Attila Taylor MD;  Location: [facility] OR;  Service: Urology;  Laterality: N/A;    TRANSURETHRAL RESECTION OF BLADDER TUMOR N/A 12/22/2020    Procedure: CYSTOSCOPY WITH BLADDER BIOPSY;  Surgeon: Daniele Johnson MD;  Location: [facility] OR;  Service: Urology;  Laterality: N/A;    TRANSURETHRAL RESECTION OF PROSTATE N/A 12/22/2020    Procedure: CYSTOSCOPY TRANSURETHRAL RESECTION PROSTATE, exam under anasthesia;  Surgeon: Daniele Johnson MD;  Location: [facility] OR;  Service: Urology;  Laterality: N/A;    TRANSURETHRAL RESECTION OF PROSTATE N/A 4/27/2021    Procedure: CYSTOSCOPY TRANSURETHRAL RESECTION PROSTATE;  Surgeon: Daniele Johnson MD;  Location: [facility] OR;  Service: Urology;  Laterality: N/A;    TRANSURETHRAL RESECTION OF PROSTATE N/A 9/27/2021    Procedure: CYSTOSCOPY TRANSURETHRAL RESECTION PROSTATE;  Surgeon: Attila Taylor MD;  Location: [facility] OR;  Service: Urology;  Laterality: N/A;  NOT EARLY MORNING      Social History:  Social History     Tobacco Use    Smoking status: Never    Smokeless tobacco: Never   Substance Use Topics    Alcohol use: Yes     Comment: rarely     Family History:      Family History   Problem Relation Age of Onset    Alzheimer's disease Mother     Diabetes Father     Heart attack Mother's Brother      Allergies:  No Known Allergies  Medications:  Current Outpatient Medications   Medication Sig Dispense Refill    multivitamin (THERAGRAN) tablet tablet Take 1 tablet by mouth daily      furosemide (LASIX) 80 mg tablet Take 1 tablet (80 mg  "total) by mouth 2 (two) times a day 60 tablet 0    potassium chloride (KLOR-CON M20) 20 mEq CR tablet Take 2 tablets (40 mEq total) by mouth 2 (two) times a day with meals 120 tablet 0    Eliquis 5 mg tablet Take 1 tablet (5 mg total) by mouth 2 (two) times a day 180 tablet 4    tamsulosin (FLOMAX) 0.4 mg capsule Take 1 capsule (0.4 mg total) by mouth daily 30 capsule 11     No current facility-administered medications for this visit.     REVIEW OF SYSTEMS:  GENERAL/CONSTITUTIONAL:  No change in appetite.  Denies chills.  Energy level is good.  Denies fevers.  RESPIRATORY:  No shortness of breath.  CARDIOVASCULAR:  No chest pain.  GASTROINTESTINAL:  No constipation, diarrhea, nausea, or vomiting.  GENITOURINARY:  See HPI for complete details.    VITAL SIGNS:   height is 1.854 m (6' 1\") and weight is 106.6 kg (235 lb). His temporal temperature is 36.7 °C (98.1 °F). His blood pressure is 130/76 and his pulse is 91. His oxygen saturation is 92%.     PHYSICAL EXAMINATION:  GENERAL APPEARANCE:  Alert and oriented x3, pleasant, well nourished, well developed, in no acute distress male.  HEAD:  Normocephalic, atraumatic.  NECK:  Neck supple, trachea midline  LYMPH NODES:  No cervical or supracervical adenopathy.  SKIN:  Normal, warm and dry.  HEART:  Regular rate and rhythm, no murmurs.  LUNGS:  Respirations even and unlabored, clear bilaterally.  ABDOMEN:  Soft, nontender, nondistended, bowel sounds present.   BACK:  No costovertebral angle tenderness, spine nontender to palpation.  MUSCULOSKELETAL:  Normal.  EXTREMITIES:  No edema.  NEUROLOGIC:  Gait normal.    Lab Results   Component Value Date    PSA 0.29 01/24/2023    PSA 1.22 06/18/2020     Lab Results   Component Value Date    COLORU Yellow 04/06/2023    CLARITYU Clear 04/06/2023    SPECGRAVU 1.015 04/06/2023    LEUKOCYTESU Negative 04/06/2023    NITRITEU Negative 04/06/2023    PROTUR Negative 04/06/2023    KETONESU Negative 04/06/2023    BILIRUBINU Negative " 04/06/2023    BLOODU Trace-intact (A) 04/06/2023    GLUCOSEU Negative 04/06/2023    SEBASTIÁN 6.0 04/06/2023    WBC 6.3 03/15/2023    RBC 4.36 03/15/2023     Assessment and Plan:   Cesar was seen today for follow-up.    Diagnoses and all orders for this visit:    Urinary retention  -     POCT Bladder Scan  -     tamsulosin (FLOMAX) 0.4 mg capsule; Take 1 capsule (0.4 mg total) by mouth daily      Results for orders placed or performed in visit on 04/06/23   POCT Bladder Scan   Result Value Ref Range    Urine Volume 621 mL     71-year-old male in urology clinic today in follow-up for history of urinary retention, status post TURP.  Patient with significant postvoid volume of 621 mL today, however patient does not feel this is an issue.  Patient would like to restart his Flomax to see if this helps him, therefore prescription for Flomax 0.4 mg was sent for the patient per request.  We will have the patient also go renal ultrasound to ensure no hydronephrosis.  Patient was counseled that if he continues to have these significant postvoid amounts, we will have to reconsider an in office cystoscopy for further evaluation and consideration of repeat surgical intervention.  The patient was explicitly instructed to seek immediate medical attention for any fevers, chills, sweats, flank pain, inability to void, or episodes of gross hematuria, and he was encouraged to contact urology clinic with questions or concerns prior to next appointment.  Lalitha Patino, DNP

## 2023-04-19 DIAGNOSIS — E87.70 HYPERVOLEMIA, UNSPECIFIED HYPERVOLEMIA TYPE: ICD-10-CM

## 2023-04-19 NOTE — TELEPHONE ENCOUNTER
Medication refill request:  Medication(s):  lasix not filled due to (route to Nurse Pool) Request has not been filled by current PCP listed, please review

## 2023-04-19 NOTE — TELEPHONE ENCOUNTER
No new care gaps identified.  [facility] Embedded Care Gaps. Reference number: [id number]. 4/19/2023 3:41:06 PM [place]

## 2023-04-20 RX ORDER — FUROSEMIDE 80 MG/1
80 TABLET ORAL 2 TIMES DAILY
Qty: 180 TABLET | Refills: 1 | Status: SHIPPED | OUTPATIENT
Start: 2023-04-20 | End: 2023-04-20

## 2023-04-26 PROCEDURE — 93298 REM INTERROG DEV EVAL SCRMS: CPT | Performed by: INTERNAL MEDICINE

## 2023-04-26 PROCEDURE — G2066 INTER DEVC REMOTE 30D: HCPCS | Performed by: INTERNAL MEDICINE

## 2023-05-03 ENCOUNTER — OFFICE VISIT (OUTPATIENT)
Dept: URGENT CARE | Facility: CLINIC | Age: 73
End: 2023-05-03
Payer: MEDICARE

## 2023-05-03 ENCOUNTER — ANCILLARY PROCEDURE (OUTPATIENT)
Dept: RADIOLOGY | Facility: CLINIC | Age: 73
End: 2023-05-03
Payer: MEDICARE

## 2023-05-03 VITALS
DIASTOLIC BLOOD PRESSURE: 72 MMHG | WEIGHT: 233 LBS | BODY MASS INDEX: 30.74 KG/M2 | HEART RATE: 98 BPM | RESPIRATION RATE: 18 BRPM | SYSTOLIC BLOOD PRESSURE: 120 MMHG | TEMPERATURE: 97.3 F | OXYGEN SATURATION: 92 %

## 2023-05-03 DIAGNOSIS — M17.12 OSTEOARTHRITIS OF LEFT KNEE, UNSPECIFIED OSTEOARTHRITIS TYPE: ICD-10-CM

## 2023-05-03 DIAGNOSIS — M25.562 ACUTE PAIN OF LEFT KNEE: Primary | ICD-10-CM

## 2023-05-03 DIAGNOSIS — M25.462 EFFUSION OF LEFT KNEE: ICD-10-CM

## 2023-05-03 PROCEDURE — 99212 OFFICE O/P EST SF 10 MIN: CPT

## 2023-05-03 PROCEDURE — 73562 X-RAY EXAM OF KNEE 3: CPT | Mod: 26,LT | Performed by: RADIOLOGY

## 2023-05-03 PROCEDURE — 73562 X-RAY EXAM OF KNEE 3: CPT | Mod: LT

## 2023-05-03 PROCEDURE — G0463 HOSPITAL OUTPT CLINIC VISIT: HCPCS
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
## 2023-05-03 NOTE — PATIENT INSTRUCTIONS
Use compression wraps to reduce swelling in the knee.     Elevate.    Ice 20 minutes at a time multiple times per day.

## 2023-05-09 ENCOUNTER — OFFICE VISIT (OUTPATIENT)
Dept: ORTHOPEDIC SURGERY | Facility: CLINIC | Age: 73
End: 2023-05-09
Payer: MEDICARE

## 2023-05-09 VITALS — DIASTOLIC BLOOD PRESSURE: 64 MMHG | SYSTOLIC BLOOD PRESSURE: 120 MMHG

## 2023-05-09 DIAGNOSIS — M25.562 ACUTE PAIN OF LEFT KNEE: ICD-10-CM

## 2023-05-09 DIAGNOSIS — M17.12 PRIMARY OSTEOARTHRITIS OF LEFT KNEE: ICD-10-CM

## 2023-05-09 DIAGNOSIS — M25.562 LEFT KNEE PAIN, UNSPECIFIED CHRONICITY: Primary | ICD-10-CM

## 2023-05-09 PROCEDURE — G0463 HOSPITAL OUTPT CLINIC VISIT: HCPCS | Mod: PO | Performed by: ORTHOPAEDIC SURGERY

## 2023-05-09 PROCEDURE — 99204 OFFICE O/P NEW MOD 45 MIN: CPT | Performed by: ORTHOPAEDIC SURGERY

## 2023-05-09 ASSESSMENT — ENCOUNTER SYMPTOMS
BACK PAIN: 0
COLOR CHANGE: 0
ABDOMINAL PAIN: 0
VOMITING: 0
JOINT SWELLING: 1
CHILLS: 0
PALPITATIONS: 0
COUGH: 0
SORE THROAT: 0
SEIZURES: 0
HEMATURIA: 0
FEVER: 0
DYSURIA: 0
EYE PAIN: 0
ARTHRALGIAS: 1
SHORTNESS OF BREATH: 0

## 2023-05-09 NOTE — PROGRESS NOTES
"Chief Complaint   Patient presents with    Left Knee - Pain     Pain for a while. Pain with traversing stairs. Ambulates with cane    Pain     2/10 at worst. Denies pain relievers       Subjective     HPI    Patient is a 72 y.o. male presented with a history of pain in the left knee. Onset of symptoms was gradual starting several months ago with rapidly worsening course since that time. The patient noted no past surgery on the left knee. Prior procedures on the knee include 0. Patient has been treated conservatively with over-the-counter NSAIDs and activity modification. Patient currently rates pain in the knee at 6 out of 10 with activity. There is pain at night.    Past Medical History:   Diagnosis Date    Atrial flutter (CMS/HCC)     CHF (congestive heart failure) (CMS/HCC)     Chipped tooth     \"cracked tooth\" Back right upper, left upper back    Dysrhythmia     Edema     BLE    History of transfusion     Hypertension     Injury of back 01/14/2021    Seen in .    Pneumonia 03/11/2023    Urinary retention     indwelling Pope in place since November 2020      Past Surgical History:   Procedure Laterality Date    ABDOMINAL SURGERY      anuerysm    ABLATION A-FLUTTER N/A 2/8/2021    Procedure: Ablation A-flutter loop implant;  Surgeon: Timoteo Burk DO;  Location: [facility] EP Lab;  Service: Electrophysiology;  Laterality: N/A;    APPENDECTOMY      HERNIA REPAIR      x 3    LOOP RECORDER INSERTION N/A 2/8/2021    Procedure: LOOP RECORDER INSERTION;  Surgeon: Timoteo Burk DO;  Location: [facility] EP Lab;  Service: Electrophysiology;  Laterality: N/A;    PROSTATECTOMY N/A 4/28/2022    Procedure: REVOLIX LASER PROSTATECTOMY;  Surgeon: Attila Taylor MD;  Location: [facility] OR;  Service: Urology;  Laterality: N/A;    TRANSURETHRAL RESECTION OF BLADDER TUMOR N/A 12/22/2020    Procedure: CYSTOSCOPY WITH BLADDER BIOPSY;  Surgeon: Daniele Johnson MD;  Location: [facility] OR;  Service: Urology;  Laterality: N/A;    TRANSURETHRAL " RESECTION OF PROSTATE N/A 12/22/2020    Procedure: CYSTOSCOPY TRANSURETHRAL RESECTION PROSTATE, exam under anasthesia;  Surgeon: Daniele Johnson MD;  Location: [facility] OR;  Service: Urology;  Laterality: N/A;    TRANSURETHRAL RESECTION OF PROSTATE N/A 4/27/2021    Procedure: CYSTOSCOPY TRANSURETHRAL RESECTION PROSTATE;  Surgeon: Daniele Johnson MD;  Location: [facility] OR;  Service: Urology;  Laterality: N/A;    TRANSURETHRAL RESECTION OF PROSTATE N/A 9/27/2021    Procedure: CYSTOSCOPY TRANSURETHRAL RESECTION PROSTATE;  Surgeon: Attila Taylor MD;  Location: [facility] OR;  Service: Urology;  Laterality: N/A;  NOT EARLY MORNING     Prior to Admission medications    Medication Sig Start Date End Date Taking? Authorizing Provider   furosemide (LASIX) 80 mg tablet Take 1 tablet (80 mg total) by mouth 2 (two) times a day 4/20/23   Balbir Machado MD   tamsulosin (FLOMAX) 0.4 mg capsule Take 1 capsule (0.4 mg total) by mouth daily 4/6/23 4/5/24  Lalitha Patino DNP   multivitamin (THERAGRAN) tablet tablet Take 1 tablet by mouth daily    Historical Provider, MD   potassium chloride (KLOR-CON M20) 20 mEq CR tablet Take 2 tablets (40 mEq total) by mouth 2 (two) times a day with meals 3/15/23 4/14/23  Alok Mcdonald DO   Eliquis 5 mg tablet Take 1 tablet (5 mg total) by mouth 2 (two) times a day 1/24/23 1/24/24  Balbir Machado MD     No Known Allergies  Social History     Socioeconomic History    Marital status: Single     Spouse name: Not on file    Number of children: 1    Years of education: Not on file    Highest education level: Not on file   Occupational History    Occupation:    Tobacco Use    Smoking status: Never    Smokeless tobacco: Never   Vaping Use    Vaping Use: Never used   Substance and Sexual Activity    Alcohol use: Yes     Comment: rarely    Drug use: Never    Sexual activity: Defer   Other Topics Concern    Not on file   Social History Narrative    Not on file     Social Determinants of Health      Financial Resource Strain: Not on file   Food Insecurity: Not on file   Transportation Needs: Not on file   Physical Activity: Not on file   Stress: Not on file   Social Connections: Not on file   Intimate Partner Violence: Not on file   Housing Stability: Not on file     Family History   Problem Relation Age of Onset    Alzheimer's disease Mother     Diabetes Father     Heart attack Mother's Brother          Review of Systems  Review of Systems   Constitutional:  Negative for chills and fever.   HENT:  Negative for ear pain and sore throat.    Eyes:  Negative for pain and visual disturbance.   Respiratory:  Negative for cough and shortness of breath.    Cardiovascular:  Negative for chest pain and palpitations.   Gastrointestinal:  Negative for abdominal pain and vomiting.   Genitourinary:  Negative for dysuria and hematuria.   Musculoskeletal:  Positive for arthralgias, gait problem and joint swelling. Negative for back pain.   Skin:  Negative for color change and rash.   Neurological:  Negative for seizures and syncope.   All other systems reviewed and are negative.      Vital signs in last 24 hours:  BP: (120)/(64) 120/64    Physical Exam  Vitals and nursing note reviewed.   Constitutional:       Appearance: Normal appearance. He is normal weight.   HENT:      Head: Normocephalic and atraumatic.      Nose: Nose normal. No congestion.      Mouth/Throat:      Mouth: Mucous membranes are moist.      Pharynx: Oropharynx is clear. No oropharyngeal exudate.   Eyes:      General:         Right eye: No discharge.         Left eye: No discharge.   Cardiovascular:      Rate and Rhythm: Normal rate and regular rhythm.      Pulses: Normal pulses.   Pulmonary:      Effort: Pulmonary effort is normal. No respiratory distress.   Musculoskeletal:         General: Swelling, tenderness and deformity present.      Cervical back: Normal range of motion and neck supple. No rigidity.   Skin:     General: Skin is warm and dry.       Capillary Refill: Capillary refill takes less than 2 seconds.   Neurological:      General: No focal deficit present.      Mental Status: He is alert and oriented to person, place, and time. Mental status is at baseline.   Psychiatric:         Mood and Affect: Mood normal.         Behavior: Behavior normal.         Thought Content: Thought content normal.         Judgment: Judgment normal.     Ortho Exam  Decreased range of motion left knee, painful range of motion left knee, crepitus range of motion left knee, large palpable osteophytes left knee, moderate effusion left knee, x-rays show severe bone-on-bone joint space narrowing  Objective     Imaging Review  Plain radiographs demonstrate severe degenerative joint disease of the left knee. The overall alignment is mild varus. The bone quality appears to be excellent for age and reported activity level.    Assessment/Plan     End stage arthritis, left knee     The patient history, physical examination and imaging studies are consistent with advanced degenerative joint disease of the left knee. The treatment options including medical management, injection therapy arthroscopy and arthroplasty were discussed at length. Defers injection. Unable to perform adl's. Home exercise program makes pain worse.   The risks and benefits of total knee arthroplasty were presented and reviewed. The risks due to aseptic loosening, infection, stiffness, patella tracking problems, thromboembolic complications among others were discussed. The patient acknowledged the explanation, agreed to proceed with the plan and a consent was signed. Patient is admitted for left total knee arthroplasty.

## 2023-05-17 ENCOUNTER — TELEPHONE (OUTPATIENT)
Dept: ORTHOPEDIC SURGERY | Facility: CLINIC | Age: 73
End: 2023-05-17
Payer: MEDICARE

## 2023-05-17 NOTE — TELEPHONE ENCOUNTER
I attempted to transfer patient to authorization department, gave number for him to contact them later today. I did let him know we dont have any dates in May or June to move him up to.

## 2023-05-17 NOTE — TELEPHONE ENCOUNTER
Pt called and is concerned that Dr. Saez is not in his insurance coverage.  Pt is also wanting to reschedule his sx for earlier if possible due to his pain.  Please call Pt back to advise. Thank you.

## 2023-05-27 PROCEDURE — 93298 REM INTERROG DEV EVAL SCRMS: CPT | Performed by: INTERNAL MEDICINE

## 2023-05-27 PROCEDURE — G2066 INTER DEVC REMOTE 30D: HCPCS | Performed by: INTERNAL MEDICINE

## 2023-06-07 ENCOUNTER — CLINICAL SUPPORT (OUTPATIENT)
Dept: ORTHOPEDIC SURGERY | Facility: CLINIC | Age: 73
End: 2023-06-07
Payer: MEDICARE

## 2023-06-07 DIAGNOSIS — Z01.818 PREOP EXAMINATION: Primary | ICD-10-CM

## 2023-06-07 NOTE — PROGRESS NOTES
Nurse visit: In person  All surgery instructions discussed with patient. Patient has surgery packet.  Dr. Garrett Saez  [facility]  Surgery date: July 24,2023    Cesar Yuen  72 y.o.  male  1950   [id number]     Procedure: LEFT TOTAL KNEE ARTHROPLASTY WITH ROBOTIC ORTHOPEDIC SURGICAL ASSISTANT  SAME DAY total joint:No  Rep needed: [employer] ([place])  Special equipment (list if needed):   Pre-op and date: 7/11 Dr. Machado  Special requests (list if needed): Referral sent to Cardiology - hx of CHF and has loop recorder  Patient has guidebook/handouts (DAVID and TKA): yes

## 2023-06-16 ENCOUNTER — TELEPHONE (OUTPATIENT)
Dept: ORTHOPEDIC SURGERY | Facility: CLINIC | Age: 73
End: 2023-06-16
Payer: MEDICARE

## 2023-06-16 NOTE — TELEPHONE ENCOUNTER
Pt called to ask questions about a referral sent to cardiology.  Please call back to advise. Thank you.

## 2023-06-16 NOTE — TELEPHONE ENCOUNTER
Patient is concerned about the cost of a cardiology appointment. He is also concerned about having to be seen in [place]. I will adjust the referral to the [place] office with the provider who saw him most recently. I explained the reasoning behind needing the cardiac clearance and that we leave it up to the providers on if the patient needs to be seen in clinic or not. The patient verbalized understanding to all and will proceed as discussed.

## 2023-06-21 ENCOUNTER — TELEPHONE (OUTPATIENT)
Dept: SOCIAL WORK | Facility: HOSPITAL | Age: 73
End: 2023-06-21
Payer: MEDICARE

## 2023-06-21 NOTE — TELEPHONE ENCOUNTER
Discussed upcoming joint surgery, home situation and plans for discharge. Patient indicated they received the guidebook, pre-op information packet, and information regarding the virtual joint class from the Orthopedic clinic. Patient requests outpatient PT at  [facility]. He is aware surgery will call regarding his arrival time. No other questions at this time

## 2023-06-22 NOTE — PROGRESS NOTES
"    Cardiology Outpatient Follow-up Note    Subjective    Patient ID: Cesra Yuen is a 73 y.o. male.    Chief Complaint   Patient presents with    Pre-op Exam        HPI    Patient is a 73-year-old gentleman followed by Dr. Burk.  His history is significant for atrial flutter ablation in February 2021 with loop recorder implantation.  Additional history is significant for urinary retention, hypertension, and mild valvular disease.  Echocardiogram which was obtained in March 2023 demonstrated preserved LVEF 66%, normal biatrial size, normal diastolic dysfunction, no evidence of pulmonary hypertension and mild MR.    He is here today for preoperative cardiovascular risk ratification for upcoming left total knee arthroplasty with Dr. Saez scheduled for 7/24/2023.  He is doing well from a cardiovascular standpoint.  He is able to complete 4 METS of activity.  He does have orthopedic limitations limiting his activity however, he is able to ambulate well with his cane.  He denies any recurrence of his atrial flutter.  He denies any bleeding concerns on his Eliquis therapy.  He is on furosemide however, he will occasionally forget his potassium supplement related to difficulty with swallowing.  He has not had any complications with anesthesia in the past.  He was last evaluated by cardiology in October 2022 and was doing well at that time without any concerns.    Cardiology Problem List:      Atrial flutter  Atrial flutter ablation 2/8/2021, loop recorder implant  Anticoagulated on Eliquis    Past Medical History:   Diagnosis Date    Atrial flutter (CMS/HCC)     CHF (congestive heart failure) (CMS/HCC)     Chipped tooth     \"cracked tooth\" Back right upper, left upper back    Dysrhythmia     Edema     BLE    History of transfusion     Hypertension     Injury of back 01/14/2021    Seen in .    Pneumonia 03/11/2023    Urinary retention     indwelling Pope in place since November 2020        Past Surgical History: "   Procedure Laterality Date    ABDOMINAL SURGERY      anuerysm    ABLATION A-FLUTTER N/A 2/8/2021    Procedure: Ablation A-flutter loop implant;  Surgeon: Timoteo Burk DO;  Location: [facility] EP Lab;  Service: Electrophysiology;  Laterality: N/A;    APPENDECTOMY      HERNIA REPAIR      x 3    LOOP RECORDER INSERTION N/A 2/8/2021    Procedure: LOOP RECORDER INSERTION;  Surgeon: Timoteo Burk DO;  Location: [facility] EP Lab;  Service: Electrophysiology;  Laterality: N/A;    PROSTATECTOMY N/A 4/28/2022    Procedure: REVOLIX LASER PROSTATECTOMY;  Surgeon: Attila Taylor MD;  Location: [facility] OR;  Service: Urology;  Laterality: N/A;    TRANSURETHRAL RESECTION OF BLADDER TUMOR N/A 12/22/2020    Procedure: CYSTOSCOPY WITH BLADDER BIOPSY;  Surgeon: Daniele Johnson MD;  Location: [facility] OR;  Service: Urology;  Laterality: N/A;    TRANSURETHRAL RESECTION OF PROSTATE N/A 12/22/2020    Procedure: CYSTOSCOPY TRANSURETHRAL RESECTION PROSTATE, exam under anasthesia;  Surgeon: Daniele Johnson MD;  Location: [facility] OR;  Service: Urology;  Laterality: N/A;    TRANSURETHRAL RESECTION OF PROSTATE N/A 4/27/2021    Procedure: CYSTOSCOPY TRANSURETHRAL RESECTION PROSTATE;  Surgeon: Daniele Johnson MD;  Location: [facility] OR;  Service: Urology;  Laterality: N/A;    TRANSURETHRAL RESECTION OF PROSTATE N/A 9/27/2021    Procedure: CYSTOSCOPY TRANSURETHRAL RESECTION PROSTATE;  Surgeon: Attila Taylor MD;  Location: [facility] OR;  Service: Urology;  Laterality: N/A;  NOT EARLY MORNING       Allergies as of 06/28/2023    (No Known Allergies)       Current Outpatient Medications   Medication Sig Dispense Refill    furosemide (LASIX) 80 mg tablet TAKE 1 TABLET BY MOUTH 2 TIMES DAILY (Patient taking differently: Take 1 tablet (80 mg total) by mouth 2 (two) times a day) 180 tablet 1    multivitamin (THERAGRAN) tablet tablet Take 1 tablet by mouth daily      potassium chloride (KLOR-CON M20) 20 mEq CR tablet TAKE 2 TABLETS BY MOUTH 2 TIMES DAILY WITH MEALS (Patient  taking differently: Take 2 tablets (40 mEq total) by mouth 2 (two) times a day) 120 tablet 0    Eliquis 5 mg tablet TAKE 1 TABLET BY MOUTH 2 TIMES DAILY (Patient taking differently: Take 1 tablet (5 mg total) by mouth 2 (two) times a day) 180 tablet 4    tamsulosin (FLOMAX) 0.4 mg capsule TAKE 1 CAPSULE BY MOUTH DAILY (Patient taking differently: Take 1 capsule (0.4 mg total) by mouth daily with dinner) 30 capsule 11     No current facility-administered medications for this visit.       Family History   Problem Relation Age of Onset    Alzheimer's disease Mother     Diabetes Father     Heart attack Mother's Brother        Social History     Tobacco Use    Smoking status: Never    Smokeless tobacco: Never   Vaping Use    Vaping Use: Never used   Substance Use Topics    Alcohol use: Yes     Comment: rarely    Drug use: Never       The following have been reviewed and updated as appropriate in this visit  Tobacco  Allergies  Meds  Problems  Med Hx  Surg Hx  Fam Hx      .    Review of Systems   Cardiovascular:  Negative for chest pain, dyspnea on exertion, irregular heartbeat, leg swelling and palpitations.   Respiratory:  Negative for shortness of breath and snoring.    Hematologic/Lymphatic: Does not bruise/bleed easily.   Musculoskeletal:  Positive for joint pain and joint swelling.   Genitourinary:  Positive for incomplete emptying.   Neurological:  Negative for dizziness and light-headedness.   All other systems reviewed and are negative.      Objective     Vitals:    06/28/23 1402   BP: 126/70   Pulse: 80   SpO2: 93%     Weight:   Weights (Current Encounter Only) (last 180 days)       Date/Time Weight    06/28/23 1402 104.8 kg (231 lb)             Physical Exam    Constitutional: Appears older than stated age.  No acute distress.   HEENT: Head is normocephalic and atraumatic. Sclera anicteric.   Neck: Supple. No carotid bruits.  No JVD.  Lungs: Effort normal. Breath sounds are clear without wheezes or  rales. No respiratory distress.   Heart: S1-S2, Regular rate and rhythm without murmur.   Extremities: Without edema. Radial pulses 2+.  Musculoskeletal: Moves all 4 extremities spontaneously  Neurologic: No focal or gross deficits.  Psychiartic: cooperative, alert and orientated X 3.  Skin: warm, dry, intact.      Data Review:     Sodium   Date Value Ref Range Status   03/22/2023 139 135 - 145 mmol/L Final     Potassium   Date Value Ref Range Status   03/22/2023 3.6 3.5 - 5.1 MMOL/L Final     Chloride   Date Value Ref Range Status   03/22/2023 99 98 - 107 mmol/L Final     CO2   Date Value Ref Range Status   03/22/2023 30 21 - 32 mmol/L Final     BUN   Date Value Ref Range Status   03/22/2023 21 7 - 25 mg/dL Final     Creatinine   Date Value Ref Range Status   03/22/2023 1.01 0.70 - 1.30 mg/dL Final     Glucose   Date Value Ref Range Status   03/22/2023 108 (H) 70 - 105 mg/dL Final     Calcium   Date Value Ref Range Status   03/22/2023 9.5 8.6 - 10.3 mg/dL Final      hsTnI 0 Hour   Date Value Ref Range Status   03/12/2023 9.6 <=19.8 pg/mL Final     BNP   Date Value Ref Range Status   03/11/2023 44 0 - 100 pg/mL Final      WBC   Date Value Ref Range Status   03/15/2023 6.3 3.7 - 9.6 10*3/uL Final     Hemoglobin   Date Value Ref Range Status   03/15/2023 13.3 13.2 - 17.2 g/dL Final     Hematocrit   Date Value Ref Range Status   03/15/2023 39.5 38.0 - 50.0 % Final     MCV   Date Value Ref Range Status   03/15/2023 90.6 82.0 - 97.0 fL Final     Platelets   Date Value Ref Range Status   03/15/2023 278 130 - 350 10*3/uL Final      Cholesterol   Date Value Ref Range Status   01/24/2023 161 0 - 199 mg/dL Final     Triglycerides   Date Value Ref Range Status   01/24/2023 149 <=149 mg/dL Final     HDL   Date Value Ref Range Status   01/24/2023 33 (L) >=40 mg/dL Final     LDL Calculated   Date Value Ref Range Status   01/24/2023 98 20 - 99 mg/dL Final        Electrocardiogram: 6/28/2023  Sinus rhythm 78      Assessment/Plan      1. Preop cardiovascular exam  Patient is here today for preoperative cardiovascular restratification for upcoming left total knee arthroplasty.  This will be at [facility].  Date 7/24/2023.  Patient does have a [employer] implantable loop recorder.  EKG today demonstrates normal sinus rhythm, heart rate 78 bpm.  He is able to complete 4 METS of activity.  According to the Revised Cardiac Risk Index (Raj Criteria) the patient's estimated risk of adverse outcome with noncardiac surgery is considered low. Estimated rate of MI, pulmonary edema, ventricular fibrillation, cardiac arrest, or complete heart block is 0.4 percent.  Patient may hold his Eliquis for 2 days prior to surgical date.  No further cardiovascular testing needed in order to proceed with indicated surgery above.  - ECG 12 lead -Normal, Today    2. Atrial flutter, unspecified type (CMS/HCC)  Patient has history of atrial flutter status post ablation February 2021 with no evidence of recurrence.  Anticoagulated with Eliquis for CHADS2 VASC score greater than 2.  Hold Eliquis therapy 2 days prior to surgery date.       Patient Instructions   - Hold Eliquis two days prior to surgery date    No follow-ups on file.    The patient verbalized correct understanding and agreement to today's instructions and plan.  The patient verbalized that all questions have been addressed.    Kitty Partida CNP      A voice recognition program was used to aid in documentation of this record. Sometimes words are not printed exactly as they were spoken. While efforts were made to carefully edit and correct any inaccuracies, some errors may be present; please take these into context. Please contact the provider if errors are identified.

## 2023-06-27 PROCEDURE — G2066 INTER DEVC REMOTE 30D: HCPCS | Performed by: INTERNAL MEDICINE

## 2023-06-27 PROCEDURE — 93298 REM INTERROG DEV EVAL SCRMS: CPT | Performed by: INTERNAL MEDICINE

## 2023-06-28 ENCOUNTER — OFFICE VISIT (OUTPATIENT)
Dept: CARDIOLOGY | Facility: CLINIC | Age: 73
End: 2023-06-28
Payer: MEDICARE

## 2023-06-28 VITALS
SYSTOLIC BLOOD PRESSURE: 126 MMHG | HEIGHT: 73 IN | DIASTOLIC BLOOD PRESSURE: 70 MMHG | WEIGHT: 231 LBS | HEART RATE: 80 BPM | BODY MASS INDEX: 30.62 KG/M2 | OXYGEN SATURATION: 93 %

## 2023-06-28 DIAGNOSIS — I48.92 ATRIAL FLUTTER, UNSPECIFIED TYPE (CMS/HCC): ICD-10-CM

## 2023-06-28 DIAGNOSIS — Z01.810 PREOP CARDIOVASCULAR EXAM: Primary | ICD-10-CM

## 2023-06-28 PROCEDURE — 99213 OFFICE O/P EST LOW 20 MIN: CPT | Performed by: NURSE PRACTITIONER

## 2023-06-28 PROCEDURE — G0463 HOSPITAL OUTPT CLINIC VISIT: HCPCS | Performed by: NURSE PRACTITIONER

## 2023-06-28 PROCEDURE — 93005 ELECTROCARDIOGRAM TRACING: CPT | Performed by: NURSE PRACTITIONER

## 2023-06-28 PROCEDURE — 93010 ELECTROCARDIOGRAM REPORT: CPT | Performed by: INTERNAL MEDICINE

## 2023-06-28 ASSESSMENT — ENCOUNTER SYMPTOMS
SNORING: 0
PALPITATIONS: 0
IRREGULAR HEARTBEAT: 0
DIZZINESS: 0
LIGHT-HEADEDNESS: 0
DYSPNEA ON EXERTION: 0
BRUISES/BLEEDS EASILY: 0
JOINT SWELLING: 1
SHORTNESS OF BREATH: 0

## 2023-06-28 NOTE — LETTER
[facility]  [address] [facility] [place] [address] [phone]  Dept: [phone]  06/28/23      Garrett Saez DO  [address] [facility] [place] [address]        To: Garrett Saez DO      Thank you for referring your patient, Cesar Yuen, to receive care in my office. I have enclosed a summary of the care provided to Cesar on 06/28/23.    Please contact me with any questions you may have regarding the visit.    Sincerely,         Kitty Partida, CNP  [address] [place] [id number] [phone]    CC: Balbir Machado MD

## 2023-06-28 NOTE — LETTER
"[facility]  [address] [place] [address] [phone]  Dept: [phone]    June 28, 2023     Balbir Machado MD  [address]   [place] [address]    Patient: Cesar Yuen   YOB: 1950   Date of Visit: 6/28/2023       Dear Dr. Machado:    Thank you for referring Cesar Yuen to me for evaluation. Below are my notes for this consultation.    If you have questions, please do not hesitate to call me. I look forward to following your patient along with you.         Sincerely,        Kitty Partida CNP        CC: DO Jaquan Regalado Shayla, CNP  6/23/2023  4:35 PM  Sign when Signing Visit      Cardiology Outpatient Follow-up Note    Subjective   Patient ID: Cesar Yuen is a 73 y.o. male.    No chief complaint on file.       HPI    Patient is a 73-year-old gentleman followed by Dr. Burk.  His history is significant for atrial flutter ablation in February 2021 with loop recorder implantation.  Additional history is significant for urinary retention, hypertension, and mild valvular disease.  Echocardiogram which was obtained in March 2023 demonstrated preserved LVEF 66%, normal biatrial size, normal diastolic dysfunction, no evidence of pulmonary hypertension and mild MR.    He is here today for preoperative cardiovascular risk ratification for upcoming left total knee arthroplasty with Dr. Saez scheduled for 7/24/2023. ***    Cardiology Problem List:      Atrial flutter  Atrial flutter ablation 2/8/2021, loop recorder implant  Anticoagulated on Eliquis    Past Medical History:   Diagnosis Date   • Atrial flutter (CMS/HCC)    • CHF (congestive heart failure) (CMS/HCC)    • Chipped tooth     \"cracked tooth\" Back right upper, left upper back   • Dysrhythmia    • Edema     BLE   • History of transfusion    • Hypertension    • Injury of back 01/14/2021    Seen in .   • Pneumonia 03/11/2023   • Urinary retention     indwelling Pope in " place since November 2020        Past Surgical History:   Procedure Laterality Date   • ABDOMINAL SURGERY      anuerysm   • ABLATION A-FLUTTER N/A 2/8/2021    Procedure: Ablation A-flutter loop implant;  Surgeon: Timoteo Burk DO;  Location: [facility] EP Lab;  Service: Electrophysiology;  Laterality: N/A;   • APPENDECTOMY     • HERNIA REPAIR      x 3   • LOOP RECORDER INSERTION N/A 2/8/2021    Procedure: LOOP RECORDER INSERTION;  Surgeon: Timoteo Burk DO;  Location: [facility] EP Lab;  Service: Electrophysiology;  Laterality: N/A;   • PROSTATECTOMY N/A 4/28/2022    Procedure: REVOLIX LASER PROSTATECTOMY;  Surgeon: Attila Taylor MD;  Location: [facility] OR;  Service: Urology;  Laterality: N/A;   • TRANSURETHRAL RESECTION OF BLADDER TUMOR N/A 12/22/2020    Procedure: CYSTOSCOPY WITH BLADDER BIOPSY;  Surgeon: Daniele Johnson MD;  Location: [facility] OR;  Service: Urology;  Laterality: N/A;   • TRANSURETHRAL RESECTION OF PROSTATE N/A 12/22/2020    Procedure: CYSTOSCOPY TRANSURETHRAL RESECTION PROSTATE, exam under anasthesia;  Surgeon: Daniele Johnson MD;  Location: [facility] OR;  Service: Urology;  Laterality: N/A;   • TRANSURETHRAL RESECTION OF PROSTATE N/A 4/27/2021    Procedure: CYSTOSCOPY TRANSURETHRAL RESECTION PROSTATE;  Surgeon: Daniele Johnson MD;  Location: [facility] OR;  Service: Urology;  Laterality: N/A;   • TRANSURETHRAL RESECTION OF PROSTATE N/A 9/27/2021    Procedure: CYSTOSCOPY TRANSURETHRAL RESECTION PROSTATE;  Surgeon: Attila Taylor MD;  Location: [facility] OR;  Service: Urology;  Laterality: N/A;  NOT EARLY MORNING       Allergies as of 06/28/2023   • (No Known Allergies)       Current Outpatient Medications   Medication Sig Dispense Refill   • furosemide (LASIX) 80 mg tablet Take 1 tablet (80 mg total) by mouth 2 (two) times a day 180 tablet 1   • tamsulosin (FLOMAX) 0.4 mg capsule Take 1 capsule (0.4 mg total) by mouth daily 30 capsule 11   • multivitamin (THERAGRAN) tablet tablet Take 1 tablet by mouth daily     • potassium  chloride (KLOR-CON M20) 20 mEq CR tablet Take 2 tablets (40 mEq total) by mouth 2 (two) times a day with meals 120 tablet 0   • Eliquis 5 mg tablet Take 1 tablet (5 mg total) by mouth 2 (two) times a day 180 tablet 4     No current facility-administered medications for this visit.       Family History   Problem Relation Age of Onset   • Alzheimer's disease Mother    • Diabetes Father    • Heart attack Mother's Brother        Social History     Tobacco Use   • Smoking status: Never   • Smokeless tobacco: Never   Vaping Use   • Vaping Use: Never used   Substance Use Topics   • Alcohol use: Yes     Comment: rarely   • Drug use: Never       The following have been reviewed and updated as appropriate in this visit      .    Review of Systems   Cardiovascular:  Negative for chest pain, dyspnea on exertion, irregular heartbeat, leg swelling and palpitations.   Respiratory:  Negative for shortness of breath and snoring.    Neurological:  Negative for dizziness and light-headedness.     Objective    There were no vitals filed for this visit.  Weight:   Weights (Current Encounter Only) (last 180 days)       None             Physical Exam    Constitutional: Well built, nourished, no acute distress.   HEENT: Head is normocephalic and atraumatic. Sclera anicteric.   Neck: Supple. No carotid bruits.  No JVD.  Lungs: Effort normal. Breath sounds are clear without wheezes or rales. No respiratory distress.   Heart: S1-S2, Regular rate and rhythm without murmur.   Extremities: Without any cyanosis, clubbing, lesions or edema. Radial pulses 2+.  Musculoskeletal: Normal ROM.    Neurologic: No focal deficits.  Psychiartic: cooperative, alert and orientated X 3.  Skin: warm, dry, intact.      Data Review:     Sodium   Date Value Ref Range Status   03/22/2023 139 135 - 145 mmol/L Final     Potassium   Date Value Ref Range Status   03/22/2023 3.6 3.5 - 5.1 MMOL/L Final     Chloride   Date Value Ref Range Status   03/22/2023 99 98 - 107  mmol/L Final     CO2   Date Value Ref Range Status   03/22/2023 30 21 - 32 mmol/L Final     BUN   Date Value Ref Range Status   03/22/2023 21 7 - 25 mg/dL Final     Creatinine   Date Value Ref Range Status   03/22/2023 1.01 0.70 - 1.30 mg/dL Final     Glucose   Date Value Ref Range Status   03/22/2023 108 (H) 70 - 105 mg/dL Final     Calcium   Date Value Ref Range Status   03/22/2023 9.5 8.6 - 10.3 mg/dL Final      hsTnI 0 Hour   Date Value Ref Range Status   03/12/2023 9.6 <=19.8 pg/mL Final     BNP   Date Value Ref Range Status   03/11/2023 44 0 - 100 pg/mL Final      WBC   Date Value Ref Range Status   03/15/2023 6.3 3.7 - 9.6 10*3/uL Final     Hemoglobin   Date Value Ref Range Status   03/15/2023 13.3 13.2 - 17.2 g/dL Final     Hematocrit   Date Value Ref Range Status   03/15/2023 39.5 38.0 - 50.0 % Final     MCV   Date Value Ref Range Status   03/15/2023 90.6 82.0 - 97.0 fL Final     Platelets   Date Value Ref Range Status   03/15/2023 278 130 - 350 10*3/uL Final      Cholesterol   Date Value Ref Range Status   01/24/2023 161 0 - 199 mg/dL Final     Triglycerides   Date Value Ref Range Status   01/24/2023 149 <=149 mg/dL Final     HDL   Date Value Ref Range Status   01/24/2023 33 (L) >=40 mg/dL Final     LDL Calculated   Date Value Ref Range Status   01/24/2023 98 20 - 99 mg/dL Final        Electrocardiogram: 6/22/2023  ***      Assessment/Plan        There are no Patient Instructions on file for this visit.    No follow-ups on file.    The patient verbalized correct understanding and agreement to today's instructions and plan.  The patient verbalized that all questions have been addressed.  Kitty Partida CNP      A voice recognition program was used to aid in documentation of this record. Sometimes words are not printed exactly as they were spoken. While efforts were made to carefully edit and correct any inaccuracies, some errors may be present; please take these into context. Please contact the provider if  errors are identified.

## 2023-07-10 DIAGNOSIS — R11.2 POST-OPERATIVE NAUSEA AND VOMITING: Primary | ICD-10-CM

## 2023-07-10 DIAGNOSIS — Z98.890 POST-OPERATIVE NAUSEA AND VOMITING: Primary | ICD-10-CM

## 2023-07-10 RX ORDER — ONDANSETRON 4 MG/1
TABLET, ORALLY DISINTEGRATING ORAL
Qty: 9 TABLET | Refills: 0 | Status: SHIPPED | OUTPATIENT
Start: 2023-07-10 | End: 2023-10-25 | Stop reason: ALTCHOICE

## 2023-07-11 ENCOUNTER — LAB (OUTPATIENT)
Dept: LAB | Facility: CLINIC | Age: 73
End: 2023-07-11
Payer: MEDICARE

## 2023-07-11 ENCOUNTER — OFFICE VISIT (OUTPATIENT)
Dept: FAMILY MEDICINE | Facility: CLINIC | Age: 73
End: 2023-07-11
Payer: MEDICARE

## 2023-07-11 VITALS
RESPIRATION RATE: 20 BRPM | OXYGEN SATURATION: 94 % | SYSTOLIC BLOOD PRESSURE: 111 MMHG | WEIGHT: 231 LBS | BODY MASS INDEX: 30.48 KG/M2 | TEMPERATURE: 97.6 F | DIASTOLIC BLOOD PRESSURE: 56 MMHG | HEART RATE: 74 BPM

## 2023-07-11 DIAGNOSIS — E87.70 HYPERVOLEMIA, UNSPECIFIED HYPERVOLEMIA TYPE: ICD-10-CM

## 2023-07-11 DIAGNOSIS — Z01.818 PRE-OP EXAM: Primary | ICD-10-CM

## 2023-07-11 DIAGNOSIS — I48.3 TYPICAL ATRIAL FLUTTER (CMS/HCC): ICD-10-CM

## 2023-07-11 DIAGNOSIS — Z01.818 PREOP EXAMINATION: ICD-10-CM

## 2023-07-11 DIAGNOSIS — R33.9 URINARY RETENTION: ICD-10-CM

## 2023-07-11 DIAGNOSIS — M17.12 PRIMARY OSTEOARTHRITIS OF LEFT KNEE: ICD-10-CM

## 2023-07-11 PROBLEM — J18.9 PNEUMONIA: Status: RESOLVED | Noted: 2023-03-11 | Resolved: 2023-07-11

## 2023-07-11 PROBLEM — R31.0 GROSS HEMATURIA: Status: RESOLVED | Noted: 2020-02-18 | Resolved: 2023-07-11

## 2023-07-11 LAB
ALBUMIN SERPL-MCNC: 3.9 G/DL (ref 3.5–5.3)
ALP SERPL-CCNC: 49 U/L (ref 45–115)
ALT SERPL-CCNC: 15 U/L (ref 7–52)
ANION GAP SERPL CALC-SCNC: 9 MMOL/L (ref 3–11)
AST SERPL-CCNC: 19 U/L
BASOPHILS # BLD AUTO: 0.1 10*3/UL
BASOPHILS NFR BLD AUTO: 0.8 % (ref 0–2)
BILIRUB SERPL-MCNC: 0.77 MG/DL (ref 0.2–1.4)
BUN SERPL-MCNC: 18 MG/DL (ref 7–25)
CALCIUM ALBUM COR SERPL-MCNC: 9.3 MG/DL (ref 8.6–10.3)
CALCIUM SERPL-MCNC: 9.2 MG/DL (ref 8.6–10.3)
CHLORIDE SERPL-SCNC: 105 MMOL/L (ref 98–107)
CO2 SERPL-SCNC: 27 MMOL/L (ref 21–32)
CREAT SERPL-MCNC: 0.91 MG/DL (ref 0.7–1.3)
EOSINOPHIL # BLD AUTO: 0.1 10*3/UL
EOSINOPHIL NFR BLD AUTO: 1.4 % (ref 0–3)
ERYTHROCYTE [DISTWIDTH] IN BLOOD BY AUTOMATED COUNT: 15.5 % (ref 11.5–15)
GFR SERPL CREATININE-BSD FRML MDRD: 88 ML/MIN/1.73M*2
GLUCOSE SERPL-MCNC: 87 MG/DL (ref 70–105)
HCT VFR BLD AUTO: 41 % (ref 38–50)
HGB BLD-MCNC: 13.9 G/DL (ref 13.2–17.2)
LYMPHOCYTES # BLD AUTO: 1.7 10*3/UL
LYMPHOCYTES NFR BLD AUTO: 27.6 % (ref 15–47)
MCH RBC QN AUTO: 30.6 PG (ref 29–34)
MCHC RBC AUTO-ENTMCNC: 33.9 G/DL (ref 32–36)
MCV RBC AUTO: 90.4 FL (ref 82–97)
MONOCYTES # BLD AUTO: 0.7 10*3/UL
MONOCYTES NFR BLD AUTO: 12 % (ref 5–13)
NEUTROPHILS # BLD AUTO: 3.6 10*3/UL
NEUTROPHILS NFR BLD AUTO: 58.2 % (ref 46–70)
PLATELET # BLD AUTO: 249 10*3/UL (ref 130–350)
PMV BLD AUTO: 7.8 FL (ref 6.9–10.8)
POTASSIUM SERPL-SCNC: 3.6 MMOL/L (ref 3.5–5.1)
PROT SERPL-MCNC: 6.6 G/DL (ref 6–8.3)
RBC # BLD AUTO: 4.53 10*6/ΜL (ref 4.1–5.8)
SODIUM SERPL-SCNC: 141 MMOL/L (ref 135–145)
WBC # BLD AUTO: 6.1 10*3/UL (ref 3.7–9.6)

## 2023-07-11 PROCEDURE — 85025 COMPLETE CBC W/AUTO DIFF WBC: CPT

## 2023-07-11 PROCEDURE — 99214 OFFICE O/P EST MOD 30 MIN: CPT | Performed by: FAMILY MEDICINE

## 2023-07-11 PROCEDURE — G0463 HOSPITAL OUTPT CLINIC VISIT: HCPCS | Performed by: FAMILY MEDICINE

## 2023-07-11 PROCEDURE — 36415 COLL VENOUS BLD VENIPUNCTURE: CPT

## 2023-07-11 PROCEDURE — 80053 COMPREHEN METABOLIC PANEL: CPT

## 2023-07-11 PROCEDURE — 87081 CULTURE SCREEN ONLY: CPT

## 2023-07-11 RX ORDER — APIXABAN 5 MG/1
5 TABLET, FILM COATED ORAL 2 TIMES DAILY
Start: 2023-07-11 | End: 2023-10-11 | Stop reason: ALTCHOICE

## 2023-07-11 RX ORDER — TAMSULOSIN HYDROCHLORIDE 0.4 MG/1
0.4 CAPSULE ORAL
Start: 2023-07-11 | End: 2024-01-04 | Stop reason: SDUPTHER

## 2023-07-11 RX ORDER — POTASSIUM CHLORIDE 20 MEQ/1
40 TABLET, EXTENDED RELEASE ORAL 2 TIMES DAILY
Start: 2023-07-11 | End: 2023-07-28 | Stop reason: SDUPTHER

## 2023-07-11 RX ORDER — FUROSEMIDE 80 MG/1
80 TABLET ORAL 2 TIMES DAILY
Start: 2023-07-11 | End: 2023-07-28 | Stop reason: SDUPTHER

## 2023-07-11 ASSESSMENT — ENCOUNTER SYMPTOMS
FEVER: 0
COUGH: 0
CHILLS: 0

## 2023-07-11 NOTE — PROGRESS NOTES
"Subjective      HPI  Cesar Yuen is a 73 y.o. male who presents for pre op for L knee surgery on the 24th of July with Dr Saez.  He was seen by cardiology 2 weeks ago and received clearance.  He is here today for medical risk stratification.      The following have been reviewed and updated as appropriate in this visit:    No Known Allergies  Current Outpatient Medications   Medication Sig Dispense Refill    Eliquis 5 mg tablet Take 1 tablet (5 mg total) by mouth 2 (two) times a day      tamsulosin (FLOMAX) 0.4 mg capsule Take 1 capsule (0.4 mg total) by mouth daily with dinner      potassium chloride (KLOR-CON M20) 20 mEq CR tablet Take 2 tablets (40 mEq total) by mouth 2 (two) times a day      furosemide (LASIX) 80 mg tablet Take 1 tablet (80 mg total) by mouth 2 (two) times a day      ondansetron ODT (ZOFRAN-ODT) 4 mg disintegrating tablet Take 1 tablet by mouth every 6 hours as needed for nausea following surgery. 9 tablet 0    multivitamin (THERAGRAN) tablet tablet Take 1 tablet by mouth daily       No current facility-administered medications for this visit.     Past Medical History:   Diagnosis Date    Atrial flutter (CMS/HCC)     CHF (congestive heart failure) (CMS/HCC)     Chipped tooth     \"cracked tooth\" Back right upper, left upper back    Dysrhythmia     Edema     BLE    History of transfusion     Hypertension     Injury of back 01/14/2021    Seen in .    Pneumonia 03/11/2023    Urinary retention     indwelling Pope in place since November 2020      Past Surgical History:   Procedure Laterality Date    ABDOMINAL SURGERY      anuerysm    ABLATION A-FLUTTER N/A 2/8/2021    Procedure: Ablation A-flutter loop implant;  Surgeon: Tmioteo Burk DO;  Location: [facility] EP Lab;  Service: Electrophysiology;  Laterality: N/A;    APPENDECTOMY      HERNIA REPAIR      x 3    LOOP RECORDER INSERTION N/A 2/8/2021    Procedure: LOOP RECORDER INSERTION;  Surgeon: Timoteo Burk DO;  Location: [facility] EP Lab;  Service: " Electrophysiology;  Laterality: N/A;    PROSTATECTOMY N/A 4/28/2022    Procedure: REVOLIX LASER PROSTATECTOMY;  Surgeon: Attila Taylor MD;  Location: [facility] OR;  Service: Urology;  Laterality: N/A;    TRANSURETHRAL RESECTION OF BLADDER TUMOR N/A 12/22/2020    Procedure: CYSTOSCOPY WITH BLADDER BIOPSY;  Surgeon: Daniele Johnson MD;  Location: [facility] OR;  Service: Urology;  Laterality: N/A;    TRANSURETHRAL RESECTION OF PROSTATE N/A 12/22/2020    Procedure: CYSTOSCOPY TRANSURETHRAL RESECTION PROSTATE, exam under anasthesia;  Surgeon: Daniele Johnson MD;  Location: [facility] OR;  Service: Urology;  Laterality: N/A;    TRANSURETHRAL RESECTION OF PROSTATE N/A 4/27/2021    Procedure: CYSTOSCOPY TRANSURETHRAL RESECTION PROSTATE;  Surgeon: Daniele Johnson MD;  Location: [facility] OR;  Service: Urology;  Laterality: N/A;    TRANSURETHRAL RESECTION OF PROSTATE N/A 9/27/2021    Procedure: CYSTOSCOPY TRANSURETHRAL RESECTION PROSTATE;  Surgeon: Attila Taylor MD;  Location: [facility] OR;  Service: Urology;  Laterality: N/A;  NOT EARLY MORNING       Review of Systems   Constitutional:  Negative for chills and fever.   Respiratory:  Negative for cough.        Objective   /56 (BP Location: Left arm, Patient Position: Sitting, Cuff Size: Regular Adult)   Pulse 74   Temp 36.4 °C (97.6 °F) (Temporal)   Resp 20   Wt 104.8 kg (231 lb)   SpO2 94%   BMI 30.48 kg/m²     Physical Exam  Vitals and nursing note reviewed.   Constitutional:       Appearance: Normal appearance. He is well-developed.   HENT:      Head: Normocephalic and atraumatic.   Eyes:      Conjunctiva/sclera: Conjunctivae normal.      Pupils: Pupils are equal, round, and reactive to light.   Neck:      Thyroid: No thyromegaly.   Cardiovascular:      Rate and Rhythm: Normal rate and regular rhythm.      Heart sounds: Normal heart sounds. No murmur heard.     No friction rub. No gallop.   Pulmonary:      Effort: Pulmonary effort is normal.      Breath sounds: Normal breath  sounds. No wheezing or rales.   Musculoskeletal:         General: Injury: 2+.      Right lower leg: Right lower leg edema: 1+.   Lymphadenopathy:      Cervical: No cervical adenopathy.   Skin:     General: Skin is warm and dry.   Neurological:      Mental Status: He is alert.         Assessment/Plan     1. Pre-op exam    2. Primary osteoarthritis of left knee    1 - 2  Patient has already been seen and cleared by Cardiology.  No acute issues identified today.  Labs are within normal limits.  He has no previous history of anesthetic intolerance.  Patient is medically optimized to proceed with surgery on the 24th of July.   He is to take his usual medications on the morning of surgery.   Patient reminded to stop Eliqus 2 days prior to surgery.    Follow up after surgery as needed.           Portions of this note was documented by Lorena Park as I performed the exam and collected the information from Cesar Yuen. I attest that I have reviewed the information as documented, verified the accuracy of the documentation and added additional information as needed.       FELY BAILEY MD  07/12/23

## 2023-07-11 NOTE — H&P (VIEW-ONLY)
"Subjective      HPI  Cesar Yuen is a 73 y.o. male who presents for pre op for L knee surgery on the 24th of July with Dr Saez.  He was seen by cardiology 2 weeks ago and received clearance.  He is here today for medical risk stratification.      The following have been reviewed and updated as appropriate in this visit:    No Known Allergies  Current Outpatient Medications   Medication Sig Dispense Refill    Eliquis 5 mg tablet Take 1 tablet (5 mg total) by mouth 2 (two) times a day      tamsulosin (FLOMAX) 0.4 mg capsule Take 1 capsule (0.4 mg total) by mouth daily with dinner      potassium chloride (KLOR-CON M20) 20 mEq CR tablet Take 2 tablets (40 mEq total) by mouth 2 (two) times a day      furosemide (LASIX) 80 mg tablet Take 1 tablet (80 mg total) by mouth 2 (two) times a day      ondansetron ODT (ZOFRAN-ODT) 4 mg disintegrating tablet Take 1 tablet by mouth every 6 hours as needed for nausea following surgery. 9 tablet 0    multivitamin (THERAGRAN) tablet tablet Take 1 tablet by mouth daily       No current facility-administered medications for this visit.     Past Medical History:   Diagnosis Date    Atrial flutter (CMS/HCC)     CHF (congestive heart failure) (CMS/HCC)     Chipped tooth     \"cracked tooth\" Back right upper, left upper back    Dysrhythmia     Edema     BLE    History of transfusion     Hypertension     Injury of back 01/14/2021    Seen in .    Pneumonia 03/11/2023    Urinary retention     indwelling Pope in place since November 2020      Past Surgical History:   Procedure Laterality Date    ABDOMINAL SURGERY      anuerysm    ABLATION A-FLUTTER N/A 2/8/2021    Procedure: Ablation A-flutter loop implant;  Surgeon: Timoteo Burk DO;  Location: [facility] EP [facility];  Service: Electrophysiology;  Laterality: N/A;    APPENDECTOMY      HERNIA REPAIR      x 3    LOOP RECORDER INSERTION N/A 2/8/2021    Procedure: LOOP RECORDER INSERTION;  Surgeon: Timoteo Burk DO;  Location: [facility] EP [facility];  Service: " Electrophysiology;  Laterality: N/A;    PROSTATECTOMY N/A 4/28/2022    Procedure: REVOLIX LASER PROSTATECTOMY;  Surgeon: Attila Taylor MD;  Location: [facility] OR;  Service: Urology;  Laterality: N/A;    TRANSURETHRAL RESECTION OF BLADDER TUMOR N/A 12/22/2020    Procedure: CYSTOSCOPY WITH BLADDER BIOPSY;  Surgeon: Daniele Johnson MD;  Location: [facility] OR;  Service: Urology;  Laterality: N/A;    TRANSURETHRAL RESECTION OF PROSTATE N/A 12/22/2020    Procedure: CYSTOSCOPY TRANSURETHRAL RESECTION PROSTATE, exam under anasthesia;  Surgeon: Daniele Johnson MD;  Location: [facility] OR;  Service: Urology;  Laterality: N/A;    TRANSURETHRAL RESECTION OF PROSTATE N/A 4/27/2021    Procedure: CYSTOSCOPY TRANSURETHRAL RESECTION PROSTATE;  Surgeon: Daniele Johnson MD;  Location: [facility] OR;  Service: Urology;  Laterality: N/A;    TRANSURETHRAL RESECTION OF PROSTATE N/A 9/27/2021    Procedure: CYSTOSCOPY TRANSURETHRAL RESECTION PROSTATE;  Surgeon: Attila Taylor MD;  Location: [facility] OR;  Service: Urology;  Laterality: N/A;  NOT EARLY MORNING       Review of Systems   Constitutional:  Negative for chills and fever.   Respiratory:  Negative for cough.        Objective   /56 (BP Location: Left arm, Patient Position: Sitting, Cuff Size: Regular Adult)   Pulse 74   Temp 36.4 °C (97.6 °F) (Temporal)   Resp 20   Wt 104.8 kg (231 lb)   SpO2 94%   BMI 30.48 kg/m²     Physical Exam  Vitals and nursing note reviewed.   Constitutional:       Appearance: Normal appearance. He is well-developed.   HENT:      Head: Normocephalic and atraumatic.   Eyes:      Conjunctiva/sclera: Conjunctivae normal.      Pupils: Pupils are equal, round, and reactive to light.   Neck:      Thyroid: No thyromegaly.   Cardiovascular:      Rate and Rhythm: Normal rate and regular rhythm.      Heart sounds: Normal heart sounds. No murmur heard.     No friction rub. No gallop.   Pulmonary:      Effort: Pulmonary effort is normal.      Breath sounds: Normal breath  sounds. No wheezing or rales.   Musculoskeletal:         General: Injury: 2+.      Right lower leg: Right lower leg edema: 1+.   Lymphadenopathy:      Cervical: No cervical adenopathy.   Skin:     General: Skin is warm and dry.   Neurological:      Mental Status: He is alert.         Assessment/Plan     1. Pre-op exam    2. Primary osteoarthritis of left knee    1 - 2  Patient has already been seen and cleared by Cardiology.  No acute issues identified today.  Labs are within normal limits.  He has no previous history of anesthetic intolerance.  Patient is medically optimized to proceed with surgery on the 24th of July.   He is to take his usual medications on the morning of surgery.   Patient reminded to stop Eliqus 2 days prior to surgery.    Follow up after surgery as needed.           Portions of this note was documented by Lorena Park as I performed the exam and collected the information from Cesar Yuen. I attest that I have reviewed the information as documented, verified the accuracy of the documentation and added additional information as needed.       FELY BAILEY MD  07/12/23

## 2023-07-12 DIAGNOSIS — Z47.89 ENCOUNTER FOR ORTHOPEDIC FOLLOW-UP CARE: Primary | ICD-10-CM

## 2023-07-12 LAB — MRSA DNA SPEC QL NAA+PROBE: NORMAL

## 2023-07-17 ENCOUNTER — TELEPHONE (OUTPATIENT)
Dept: GASTROENTEROLOGY | Facility: HOSPITAL | Age: 73
End: 2023-07-17
Payer: MEDICARE

## 2023-07-17 ASSESSMENT — ACTIVITIES OF DAILY LIVING (ADL)
ASSISTIVE_DEVICES: WALKER
ADEQUATE_TO_COMPLETE_ADL: YES

## 2023-07-17 NOTE — TELEPHONE ENCOUNTER
Informed patient that per HAKAN Partida it is okay to hold Eliquis for 3 days. Last dose on 7/20. Pt voiced understanding.

## 2023-07-17 NOTE — PRE-PROCEDURE INSTRUCTIONS
Pre-Surgery Instructions:   Medication Instructions    Eliquis 5 mg tablet Patient advised by MD to stop on 7/20 (date)    tamsulosin (FLOMAX) 0.4 mg capsule Take at bedtime    potassium chloride (KLOR-CON M20) 20 mEq CR tablet Do not take morning of surgery/procedure    furosemide (LASIX) 80 mg tablet Do not take morning of surgery/procedure    multivitamin (THERAGRAN) tablet tablet Stop taking 1 week prior to surgery/procedure    ondansetron ODT (ZOFRAN-ODT) 4 mg disintegrating tablet Do not take morning of surgery/procedure   Called patient back and advised him to hold Eliquis for 3 days, per anesthesia.    Diet instructions for surgery:  Nothing to eat after midnight and Clear liquids (no pulp) up to 2 hours before surgery.    Preop questionnaire:  Preop Questionairre  Does patient have physical restrictions or limitations?: No  Has patient had an upper respiratory tract infection in the last 2 weeks?: No  Home Oxygen: No    Preop instructions:  Pre-op Phone Call  Surgery Location Verified: Yes  Instructed to shower within 12 hours: Yes  Patient has special physician orders: Yes (wipes and drink)  Patient verbalized understanding of physician's preoperative order: Yes  Instructed to remove/not apply makeup, petroleum products, lotion, powder, scents, or deodorant on the morning of surgery: Yes  Recommend eye glasses for day of surgery, contact lenses must be removed prior to surgery:: N/A  Instructed to remove all jewelry, including piercings, and leave at home.: Yes  Instructed to leave all valuables at home: Yes  Instructed to leave all medications at home: Yes  Instructed to bring a valid photo ID and insurance card:: Yes  NPO Status Reinforced: Yes  Instruct patient that a caregiver must stay with the patient 24 hours following anesthesia:: N/A (In-pt)    Pre-op assessment and teaching done with patient. Will get a call with arrival time / NPO status from OR Department 2 business days before surgery date.  Nothing per mouth (NPO) 2 hours before arrival time, that includes no hard candy or gum. Patient voiced understanding of instruction given today.

## 2023-07-17 NOTE — TELEPHONE ENCOUNTER
Kitty villalta saw this patient for pre-op on 6/28/2023. You instructed him to hold Eliquis for 2 days. Anesthesia would like to know if he can hold it for 3 days. That way they can do spinal.    Thank you.

## 2023-07-19 ENCOUNTER — ANESTHESIA EVENT (OUTPATIENT)
Dept: GASTROENTEROLOGY | Facility: HOSPITAL | Age: 73
End: 2023-07-19
Payer: MEDICARE

## 2023-07-19 ASSESSMENT — ENCOUNTER SYMPTOMS: DYSRHYTHMIAS: 1

## 2023-07-20 NOTE — ANESTHESIA PREPROCEDURE EVALUATION
"Pre-Procedure Assessment    Patient: Cesar Yuen, male, 73 y.o.    Ht Readings from Last 1 Encounters:   06/28/23 1.854 m (6' 1\")     Wt Readings from Last 1 Encounters:   07/11/23 104.8 kg (231 lb)       Last Vitals  BP      Temp      Pulse     Resp      SpO2      Pain Score         Problem list reviewed and Medical history reviewed    No history of anesthetic complications:    No family history of anesthetic complications:      Airway       Dental      Pulmonary    (-) pneumonia, shortness of breath  Cardiovascular   Exercise tolerance: poor (<4 METS)  (+) hypertension well controlled, dysrhythmias (AFlutter s/p ablation 2021), CHF,   (-) angina    ROS comment: ECG 6/23: SR  Echo 3/11/23:  Interpretation Summary    ? Normal left ventricular size, wall thickness and systolic function. EF 66%.  ? The left ventricular wall motion is normal.  ? Normal left ventricular diastolic function.  ? The left atrium is normal in size.  ? Right ventricle is normal in size and systolic function.  ? The right atrium is normal in size.  ? There is no evidence of pulmonary hypertension. Estimated RVSP at 22 mmHg.  ? Mild regurgitation of the mitral valve is noted.  ? Normal central venous pressure.  ? No pericardial effusion.  ? The ascending aorta is mildly dilated at 4.1 cm.  Aortic valve - normal    Mental Status/Neuro/Psych          (+) arthritis, back injury,     GI/Hepatic/Renal    (+) urinary retention  (-) GERD    Endo/Other    (+) history of blood transfusion, clotting disorder,     Comments: Patient has already been seen and cleared by Cardiology.  No acute issues identified today.  Labs are within normal limits.  He has no previous history of anesthetic intolerance.  Patient is medically optimized to proceed with surgery on the 24th of July.    Dr Machado  Abdominal           Social History     Tobacco Use   • Smoking status: Never   • Smokeless tobacco: Never   Substance Use Topics   • Alcohol use: Yes     " Alcohol/week: 1.0 standard drink of alcohol     Types: 1 Standard drinks or equivalent per week     Comment: rarely      Hematology   WBC   Date Value Ref Range Status   07/11/2023 6.1 3.7 - 9.6 10*3/uL Final     RBC   Date Value Ref Range Status   07/11/2023 4.53 4.10 - 5.80 10*6/µL Final     MCV   Date Value Ref Range Status   07/11/2023 90.4 82.0 - 97.0 fL Final     Hemoglobin   Date Value Ref Range Status   07/11/2023 13.9 13.2 - 17.2 g/dL Final     Hematocrit   Date Value Ref Range Status   07/11/2023 41.0 38.0 - 50.0 % Final     Platelets   Date Value Ref Range Status   07/11/2023 249 130 - 350 10*3/uL Final      Coagulation   Protime   Date Value Ref Range Status   02/13/2020 12.6 (H) 9.4 - 12.5 seconds Final     PTT   Date Value Ref Range Status   02/13/2020 33.8 25.1 - 36.5 SECONDS Final     INR   Date Value Ref Range Status   02/13/2020 1.1 <=1.1 Final      General Chemistry   Calcium   Date Value Ref Range Status   07/11/2023 9.2 8.6 - 10.3 mg/dL Final     BUN   Date Value Ref Range Status   07/11/2023 18 7 - 25 mg/dL Final     Creatinine   Date Value Ref Range Status   07/11/2023 0.91 0.70 - 1.30 mg/dL Final     Glucose   Date Value Ref Range Status   07/11/2023 87 70 - 105 mg/dL Final     Sodium   Date Value Ref Range Status   07/11/2023 141 135 - 145 mmol/L Final     Potassium   Date Value Ref Range Status   07/11/2023 3.6 3.5 - 5.1 MMOL/L Final     Magnesium   Date Value Ref Range Status   03/15/2023 2.2 1.8 - 2.4 mg/dL Final     CO2   Date Value Ref Range Status   07/11/2023 27 21 - 32 mmol/L Final     Chloride   Date Value Ref Range Status   07/11/2023 105 98 - 107 mmol/L Final     Anesthesia Plan    ASA 3   NPO status reviewed: > 8 hours    Spinal and regional  Regional type: adductor canal block   Laterality: left           Additional Comments: Off eliquis since 7/19/23    Postoperative regional block intended.      Anesthetic plan and risks discussed with patient.  Use of blood products discussed  with patient who consented to blood products.     Plan discussed with CRNA.

## 2023-07-24 ENCOUNTER — HOSPITAL ENCOUNTER (OUTPATIENT)
Facility: HOSPITAL | Age: 73
LOS: 1 days | Discharge: HOME | End: 2023-07-25
Attending: ORTHOPAEDIC SURGERY | Admitting: ORTHOPAEDIC SURGERY
Payer: MEDICARE

## 2023-07-24 ENCOUNTER — ANCILLARY PROCEDURE (OUTPATIENT)
Dept: ULTRASOUND IMAGING | Facility: HOSPITAL | Age: 73
End: 2023-07-24
Payer: MEDICARE

## 2023-07-24 ENCOUNTER — ANESTHESIA (OUTPATIENT)
Dept: GASTROENTEROLOGY | Facility: HOSPITAL | Age: 73
End: 2023-07-24
Payer: MEDICARE

## 2023-07-24 DIAGNOSIS — M25.562 ACUTE PAIN OF LEFT KNEE: ICD-10-CM

## 2023-07-24 DIAGNOSIS — M17.12 PRIMARY OSTEOARTHRITIS OF LEFT KNEE: ICD-10-CM

## 2023-07-24 PROCEDURE — (BLANK) HC OR LEVEL 4 PROC 1ST 15MIN: Performed by: ORTHOPAEDIC SURGERY

## 2023-07-24 PROCEDURE — 6360000200 HC RX 636 W HCPCS (ALT 250 FOR IP): Performed by: ORTHOPAEDIC SURGERY

## 2023-07-24 PROCEDURE — 7100002500 HC EXTENDED RECOVERY PER HOUR

## 2023-07-24 PROCEDURE — 76942 ECHO GUIDE FOR BIOPSY: CPT | Mod: 26 | Performed by: NURSE ANESTHETIST, CERTIFIED REGISTERED

## 2023-07-24 PROCEDURE — 97162 PT EVAL MOD COMPLEX 30 MIN: CPT | Mod: GP

## 2023-07-24 PROCEDURE — 6370000100 HC RX 637 (ALT 250 FOR IP): Performed by: PHYSICIAN ASSISTANT

## 2023-07-24 PROCEDURE — 64450 NJX AA&/STRD OTHER PN/BRANCH: CPT | Performed by: NURSE ANESTHETIST, CERTIFIED REGISTERED

## 2023-07-24 PROCEDURE — 2580000300 HC RX 258: Performed by: ORTHOPAEDIC SURGERY

## 2023-07-24 PROCEDURE — 27447 TOTAL KNEE ARTHROPLASTY: CPT | Mod: AS,LT | Performed by: PHYSICIAN ASSISTANT

## 2023-07-24 PROCEDURE — 6360000200 HC RX 636 W HCPCS (ALT 250 FOR IP): Mod: JZ | Performed by: NURSE ANESTHETIST, CERTIFIED REGISTERED

## 2023-07-24 PROCEDURE — 27447 TOTAL KNEE ARTHROPLASTY: CPT | Mod: LT | Performed by: ORTHOPAEDIC SURGERY

## 2023-07-24 PROCEDURE — 99100 ANES PT EXTEME AGE<1 YR&>70: CPT | Performed by: NURSE ANESTHETIST, CERTIFIED REGISTERED

## 2023-07-24 PROCEDURE — 6370000100 HC RX 637 (ALT 250 FOR IP): Performed by: ORTHOPAEDIC SURGERY

## 2023-07-24 PROCEDURE — (BLANK) HC REGIONAL ANESTHESIA FACILITY CHARGE EACH ADDITIONAL MIN: Performed by: ORTHOPAEDIC SURGERY

## 2023-07-24 PROCEDURE — C9290 INJ, BUPIVACAINE LIPOSOME: HCPCS | Performed by: ORTHOPAEDIC SURGERY

## 2023-07-24 PROCEDURE — (BLANK) HC RECOVERY PHASE-1 1ST  HOUR ACUITY LEVEL 3: Performed by: ORTHOPAEDIC SURGERY

## 2023-07-24 PROCEDURE — 01402 ANES OPN/ARTH TOT KNE ARTHRP: CPT | Performed by: NURSE ANESTHETIST, CERTIFIED REGISTERED

## 2023-07-24 PROCEDURE — 2500000200 HC RX 250 WO HCPCS: Performed by: ORTHOPAEDIC SURGERY

## 2023-07-24 PROCEDURE — 2500000200 HC RX 250 WO HCPCS: Performed by: NURSE ANESTHETIST, CERTIFIED REGISTERED

## 2023-07-24 PROCEDURE — C1776 JOINT DEVICE (IMPLANTABLE): HCPCS | Performed by: ORTHOPAEDIC SURGERY

## 2023-07-24 PROCEDURE — 2500000200 HC RX 250 WO HCPCS: Mod: NCMED | Performed by: ORTHOPAEDIC SURGERY

## 2023-07-24 PROCEDURE — C1713 ANCHOR/SCREW BN/BN,TIS/BN: HCPCS | Performed by: ORTHOPAEDIC SURGERY

## 2023-07-24 PROCEDURE — (BLANK) HC REGIONAL ANESTHESIA FACILITY CHARGE 1ST 15 MIN: Performed by: ORTHOPAEDIC SURGERY

## 2023-07-24 PROCEDURE — (BLANK) HC OR LEVEL 4 PROC EACH ADDITIONAL MIN: Performed by: ORTHOPAEDIC SURGERY

## 2023-07-24 PROCEDURE — 2720000000 HC SUPP 272 WO HCPCS: Performed by: ORTHOPAEDIC SURGERY

## 2023-07-24 DEVICE — TIBIAL CEMENTED STM LF SZ G PERSONA: Type: IMPLANTABLE DEVICE | Site: KNEE | Status: FUNCTIONAL

## 2023-07-24 DEVICE — CEMENT BONE R 1X40 US: Type: IMPLANTABLE DEVICE | Site: KNEE | Status: FUNCTIONAL

## 2023-07-24 DEVICE — PATELLA ALL POLY CEMENTED 38MM PERSONA 9.5MM: Type: IMPLANTABLE DEVICE | Site: KNEE | Status: FUNCTIONAL

## 2023-07-24 DEVICE — IMPLANTABLE DEVICE: Type: IMPLANTABLE DEVICE | Site: KNEE | Status: FUNCTIONAL

## 2023-07-24 DEVICE — FEMUR CR CEMENTED SZ 11 LEFT STANDARD: Type: IMPLANTABLE DEVICE | Site: KNEE | Status: FUNCTIONAL

## 2023-07-24 RX ORDER — DEXMEDETOMIDINE HYDROCHLORIDE 4 UG/ML
INJECTION, SOLUTION INTRAVENOUS AS NEEDED
Status: DISCONTINUED | OUTPATIENT
Start: 2023-07-24 | End: 2023-07-24 | Stop reason: SURG

## 2023-07-24 RX ORDER — CELECOXIB 200 MG/1
200 CAPSULE ORAL
Status: DISCONTINUED | OUTPATIENT
Start: 2023-07-24 | End: 2023-07-24 | Stop reason: HOSPADM

## 2023-07-24 RX ORDER — MORPHINE SULFATE 4 MG/ML
3-4 INJECTION, SOLUTION INTRAMUSCULAR; INTRAVENOUS
Status: DISCONTINUED | OUTPATIENT
Start: 2023-07-24 | End: 2023-07-25 | Stop reason: HOSPADM

## 2023-07-24 RX ORDER — PROPOFOL 10 MG/ML
INJECTION, EMULSION INTRAVENOUS CONTINUOUS PRN
Status: DISCONTINUED | OUTPATIENT
Start: 2023-07-24 | End: 2023-07-24 | Stop reason: SURG

## 2023-07-24 RX ORDER — OXYCODONE HYDROCHLORIDE 5 MG/1
2.5 TABLET ORAL EVERY 4 HOURS PRN
Status: DISCONTINUED | OUTPATIENT
Start: 2023-07-24 | End: 2023-07-25 | Stop reason: HOSPADM

## 2023-07-24 RX ORDER — BISACODYL 10 MG/1
10 SUPPOSITORY RECTAL DAILY PRN
Status: DISCONTINUED | OUTPATIENT
Start: 2023-07-24 | End: 2023-07-25 | Stop reason: HOSPADM

## 2023-07-24 RX ORDER — TRANEXAMIC ACID 100 MG/ML
1000 INJECTION, SOLUTION INTRAVENOUS ONCE
Status: COMPLETED | OUTPATIENT
Start: 2023-07-24 | End: 2023-07-24

## 2023-07-24 RX ORDER — SODIUM CHLORIDE, SODIUM LACTATE, POTASSIUM CHLORIDE, AND CALCIUM CHLORIDE .6; .31; .03; .02 G/100ML; G/100ML; G/100ML; G/100ML
250 INJECTION, SOLUTION INTRAVENOUS ONCE AS NEEDED
Status: DISCONTINUED | OUTPATIENT
Start: 2023-07-24 | End: 2023-07-24 | Stop reason: HOSPADM

## 2023-07-24 RX ORDER — OXYCODONE HYDROCHLORIDE 10 MG/1
10 TABLET ORAL EVERY 4 HOURS PRN
Status: DISCONTINUED | OUTPATIENT
Start: 2023-07-24 | End: 2023-07-25 | Stop reason: HOSPADM

## 2023-07-24 RX ORDER — ADHESIVE BANDAGE
30 BANDAGE TOPICAL DAILY PRN
Status: DISCONTINUED | OUTPATIENT
Start: 2023-07-24 | End: 2023-07-25 | Stop reason: HOSPADM

## 2023-07-24 RX ORDER — MIDAZOLAM HYDROCHLORIDE 1 MG/ML
INJECTION INTRAMUSCULAR; INTRAVENOUS
Status: COMPLETED | OUTPATIENT
Start: 2023-07-24 | End: 2023-07-24

## 2023-07-24 RX ORDER — FENTANYL CITRATE/PF 50 MCG/ML
50 PLASTIC BAG, INJECTION (ML) INTRAVENOUS EVERY 5 MIN PRN
Status: DISCONTINUED | OUTPATIENT
Start: 2023-07-24 | End: 2023-07-24 | Stop reason: HOSPADM

## 2023-07-24 RX ORDER — ACETAMINOPHEN 500 MG
1000 TABLET ORAL EVERY 8 HOURS SCHEDULED
Status: DISCONTINUED | OUTPATIENT
Start: 2023-07-24 | End: 2023-07-25 | Stop reason: HOSPADM

## 2023-07-24 RX ORDER — OXYCODONE HYDROCHLORIDE 5 MG/1
5 TABLET ORAL EVERY 4 HOURS PRN
Status: DISCONTINUED | OUTPATIENT
Start: 2023-07-24 | End: 2023-07-25 | Stop reason: HOSPADM

## 2023-07-24 RX ORDER — DIPHENHYDRAMINE HCL 25 MG
25-50 CAPSULE ORAL EVERY 6 HOURS PRN
Status: DISCONTINUED | OUTPATIENT
Start: 2023-07-24 | End: 2023-07-25 | Stop reason: HOSPADM

## 2023-07-24 RX ORDER — ACETAMINOPHEN 500 MG
1000 TABLET ORAL
Status: DISCONTINUED | OUTPATIENT
Start: 2023-07-24 | End: 2023-07-24 | Stop reason: HOSPADM

## 2023-07-24 RX ORDER — CELECOXIB 200 MG/1
200 CAPSULE ORAL DAILY
Status: DISCONTINUED | OUTPATIENT
Start: 2023-07-25 | End: 2023-07-25 | Stop reason: HOSPADM

## 2023-07-24 RX ORDER — POLYETHYLENE GLYCOL (3350) 17 G/17G
17 POWDER, FOR SOLUTION ORAL DAILY
Status: DISCONTINUED | OUTPATIENT
Start: 2023-07-25 | End: 2023-07-25 | Stop reason: HOSPADM

## 2023-07-24 RX ORDER — SODIUM CHLORIDE, SODIUM LACTATE, POTASSIUM CHLORIDE, CALCIUM CHLORIDE 600; 310; 30; 20 MG/100ML; MG/100ML; MG/100ML; MG/100ML
100 INJECTION, SOLUTION INTRAVENOUS CONTINUOUS
Status: DISCONTINUED | OUTPATIENT
Start: 2023-07-24 | End: 2023-07-25 | Stop reason: HOSPADM

## 2023-07-24 RX ORDER — MORPHINE SULFATE 2 MG/ML
1-2 INJECTION, SOLUTION INTRAMUSCULAR; INTRAVENOUS
Status: DISCONTINUED | OUTPATIENT
Start: 2023-07-24 | End: 2023-07-25 | Stop reason: HOSPADM

## 2023-07-24 RX ORDER — BUPIVACAINE HYDROCHLORIDE 5 MG/ML
INJECTION, SOLUTION EPIDURAL; INTRACAUDAL
Status: COMPLETED | OUTPATIENT
Start: 2023-07-24 | End: 2023-07-24

## 2023-07-24 RX ORDER — NALOXONE HYDROCHLORIDE 0.4 MG/ML
0.2 INJECTION, SOLUTION INTRAMUSCULAR; INTRAVENOUS; SUBCUTANEOUS AS NEEDED
Status: DISCONTINUED | OUTPATIENT
Start: 2023-07-24 | End: 2023-07-24 | Stop reason: HOSPADM

## 2023-07-24 RX ORDER — ONDANSETRON HYDROCHLORIDE 2 MG/ML
4 INJECTION, SOLUTION INTRAVENOUS ONCE AS NEEDED
Status: DISCONTINUED | OUTPATIENT
Start: 2023-07-24 | End: 2023-07-24 | Stop reason: HOSPADM

## 2023-07-24 RX ORDER — TAMSULOSIN HYDROCHLORIDE 0.4 MG/1
0.4 CAPSULE ORAL
Status: DISCONTINUED | OUTPATIENT
Start: 2023-07-24 | End: 2023-07-25 | Stop reason: HOSPADM

## 2023-07-24 RX ORDER — SODIUM CHLORIDE, SODIUM LACTATE, POTASSIUM CHLORIDE, AND CALCIUM CHLORIDE .6; .31; .03; .02 G/100ML; G/100ML; G/100ML; G/100ML
500 INJECTION, SOLUTION INTRAVENOUS ONCE AS NEEDED
Status: DISCONTINUED | OUTPATIENT
Start: 2023-07-24 | End: 2023-07-24 | Stop reason: HOSPADM

## 2023-07-24 RX ORDER — POTASSIUM CHLORIDE 750 MG/1
40 TABLET, FILM COATED, EXTENDED RELEASE ORAL 2 TIMES DAILY WITH MEALS
Status: DISCONTINUED | OUTPATIENT
Start: 2023-07-24 | End: 2023-07-25 | Stop reason: HOSPADM

## 2023-07-24 RX ORDER — FUROSEMIDE 40 MG/1
80 TABLET ORAL 2 TIMES DAILY
Status: DISCONTINUED | OUTPATIENT
Start: 2023-07-24 | End: 2023-07-25 | Stop reason: HOSPADM

## 2023-07-24 RX ORDER — OXYCODONE HYDROCHLORIDE 5 MG/1
5 TABLET ORAL EVERY 4 HOURS PRN
Qty: 42 TABLET | Refills: 0 | Status: CANCELLED | OUTPATIENT
Start: 2023-07-24 | End: 2023-08-03

## 2023-07-24 RX ORDER — BUPIVACAINE 13.3 MG/ML
20 INJECTION, SUSPENSION, LIPOSOMAL INFILTRATION ONCE
Status: COMPLETED | OUTPATIENT
Start: 2023-07-24 | End: 2023-07-24

## 2023-07-24 RX ORDER — DIPHENHYDRAMINE HYDROCHLORIDE 50 MG/ML
25-50 INJECTION INTRAMUSCULAR; INTRAVENOUS EVERY 6 HOURS PRN
Status: DISCONTINUED | OUTPATIENT
Start: 2023-07-24 | End: 2023-07-25 | Stop reason: HOSPADM

## 2023-07-24 RX ORDER — CHLOROPROCAINE HYDROCHLORIDE 30 MG/ML
INJECTION, SOLUTION EPIDURAL; INFILTRATION; INTRACAUDAL; PERINEURAL
Status: COMPLETED | OUTPATIENT
Start: 2023-07-24 | End: 2023-07-24

## 2023-07-24 RX ORDER — DEXAMETHASONE SODIUM PHOSPHATE 10 MG/ML
INJECTION INTRAMUSCULAR; INTRAVENOUS
Status: COMPLETED | OUTPATIENT
Start: 2023-07-24 | End: 2023-07-24

## 2023-07-24 RX ORDER — KETAMINE HYDROCHLORIDE 100 MG/ML
INJECTION, SOLUTION INTRAMUSCULAR; INTRAVENOUS AS NEEDED
Status: DISCONTINUED | OUTPATIENT
Start: 2023-07-24 | End: 2023-07-24 | Stop reason: SURG

## 2023-07-24 RX ADMIN — BUPIVACAINE HYDROCHLORIDE 15 ML: 5 INJECTION, SOLUTION EPIDURAL; INTRACAUDAL at 11:14

## 2023-07-24 RX ADMIN — POTASSIUM CHLORIDE 40 MEQ: 750 TABLET, FILM COATED, EXTENDED RELEASE ORAL at 17:41

## 2023-07-24 RX ADMIN — SODIUM CHLORIDE, POTASSIUM CHLORIDE, SODIUM LACTATE AND CALCIUM CHLORIDE: 600; 310; 30; 20 INJECTION, SOLUTION INTRAVENOUS at 13:20

## 2023-07-24 RX ADMIN — CELECOXIB 200 MG: 200 CAPSULE ORAL at 11:08

## 2023-07-24 RX ADMIN — KETAMINE HYDROCHLORIDE 10 MG: 100 INJECTION, SOLUTION, CONCENTRATE INTRAMUSCULAR; INTRAVENOUS at 12:46

## 2023-07-24 RX ADMIN — TAMSULOSIN HYDROCHLORIDE 0.4 MG: 0.4 CAPSULE ORAL at 17:41

## 2023-07-24 RX ADMIN — DEXMEDETOMIDINE HYDROCHLORIDE 3 MCG: 4 INJECTION, SOLUTION INTRAVENOUS at 11:44

## 2023-07-24 RX ADMIN — Medication 1000 MG: at 11:08

## 2023-07-24 RX ADMIN — MIDAZOLAM 2 MG: 1 INJECTION INTRAMUSCULAR; INTRAVENOUS at 11:39

## 2023-07-24 RX ADMIN — TRANEXAMIC ACID 1000 MG: 1 INJECTION, SOLUTION INTRAVENOUS at 14:19

## 2023-07-24 RX ADMIN — CEFAZOLIN 2000 MG: 2 INJECTION, POWDER, FOR SOLUTION INTRAMUSCULAR; INTRAVENOUS at 11:48

## 2023-07-24 RX ADMIN — Medication 100 MCG: at 12:05

## 2023-07-24 RX ADMIN — DEXMEDETOMIDINE HYDROCHLORIDE 4 MCG: 4 INJECTION, SOLUTION INTRAVENOUS at 12:05

## 2023-07-24 RX ADMIN — KETAMINE HYDROCHLORIDE 10 MG: 100 INJECTION, SOLUTION, CONCENTRATE INTRAMUSCULAR; INTRAVENOUS at 12:07

## 2023-07-24 RX ADMIN — Medication 100 MCG: at 12:46

## 2023-07-24 RX ADMIN — Medication 100 MCG: at 12:56

## 2023-07-24 RX ADMIN — Medication 1000 MG: at 16:07

## 2023-07-24 RX ADMIN — KETAMINE HYDROCHLORIDE 10 MG: 100 INJECTION, SOLUTION, CONCENTRATE INTRAMUSCULAR; INTRAVENOUS at 12:26

## 2023-07-24 RX ADMIN — FUROSEMIDE 80 MG: 40 TABLET ORAL at 21:14

## 2023-07-24 RX ADMIN — DEXAMETHASONE SODIUM PHOSPHATE 10 MG: 10 INJECTION, SOLUTION INTRAMUSCULAR; INTRAVENOUS at 11:14

## 2023-07-24 RX ADMIN — SODIUM CHLORIDE, POTASSIUM CHLORIDE, SODIUM LACTATE AND CALCIUM CHLORIDE 100 ML/HR: 600; 310; 30; 20 INJECTION, SOLUTION INTRAVENOUS at 11:08

## 2023-07-24 RX ADMIN — CEFAZOLIN 2000 MG: 2 INJECTION, POWDER, FOR SOLUTION INTRAMUSCULAR; INTRAVENOUS at 21:38

## 2023-07-24 RX ADMIN — CHLOROPROCAINE HYDROCHLORIDE 2 ML: 30 INJECTION, SOLUTION EPIDURAL; INFILTRATION; INTRACAUDAL; PERINEURAL at 11:44

## 2023-07-24 RX ADMIN — CEFAZOLIN 2000 MG: 2 INJECTION, POWDER, FOR SOLUTION INTRAMUSCULAR; INTRAVENOUS at 14:02

## 2023-07-24 RX ADMIN — Medication 100 MCG: at 13:25

## 2023-07-24 RX ADMIN — Medication 1000 MG: at 21:13

## 2023-07-24 RX ADMIN — Medication 100 MCG: at 12:35

## 2023-07-24 RX ADMIN — PROPOFOL 35 MCG/KG/MIN: 10 INJECTION, EMULSION INTRAVENOUS at 11:48

## 2023-07-24 RX ADMIN — DEXMEDETOMIDINE HYDROCHLORIDE 8 MCG: 4 INJECTION, SOLUTION INTRAVENOUS at 11:14

## 2023-07-24 RX ADMIN — Medication 100 MCG: at 13:45

## 2023-07-24 RX ADMIN — TRANEXAMIC ACID 1000 MG: 100 INJECTION, SOLUTION INTRAVENOUS at 12:05

## 2023-07-24 RX ADMIN — Medication 100 MCG: at 12:50

## 2023-07-24 RX ADMIN — MIDAZOLAM 2 MG: 1 INJECTION INTRAMUSCULAR; INTRAVENOUS at 11:14

## 2023-07-24 ASSESSMENT — ENCOUNTER SYMPTOMS
EXERCISE TOLERANCE: POOR (<4 METS)
SHORTNESS OF BREATH: 0

## 2023-07-24 ASSESSMENT — ACTIVITIES OF DAILY LIVING (ADL): ADEQUATE_TO_COMPLETE_ADL: YES

## 2023-07-24 NOTE — PLAN OF CARE
Problem: Safety Adult - Fall  Goal: Free from fall injury  Description: INTERVENTIONS:    Inpatient - Please reference Cares/Safety Flowsheet under Kumar Fall Risk for interventions.  Pediatrics - Please reference Peds Daily Cares/Safety Flowsheet under [facility] Pediatric Fall Assessment Fall Bundle for interventions  LD/OB - Please reference OB Shift Screening Flowsheet under OB Fall Risk for interventions.  Outcome: Progressing     Problem: Pain - Adult  Goal: Verbalizes/displays adequate comfort level or baseline comfort level  Description: INTERVENTIONS:  1. Encourage patient to monitor pain and request interventions  2. Assess pain using the appropriate pain scale  3. Administer analgesics based on type and severity of pain and evaluate response  4. Educate/Implement non-pharmacological measures as appropriate and evaluate response  5. Consider cultural, developmental and social influences on pain and pain management  6. Notify Provider if interventions unsuccessful or patient reports new pain  Outcome: Progressing     Problem: Infection - Adult  Goal: Absence of infection during hospitalization  Description: INTERVENTIONS:  1. Assess and monitor for signs and symptoms of infection  2. Monitor lab/diagnostic results  3. Monitor all insertion sites/wounds/incisions  4. Monitor secretions for changes in amount and color  5. Administer medications as ordered  6. Educate and encourage patient and family to use good hand hygiene technique  7. Identify and educate in appropriate isolation precautions for identified infection/condition  Outcome: Progressing     Problem: Safety Adult  Goal: Patient will remain safe during hospitalization  Description: INTERVENTIONS    1. Assess patient for fall risk and implement interventions if needed  2. Use safe transport techniques  3. Assess patient using the appropriate Ricardo skin assessment scale  4. Assess patient for risk of aspiration  5. Assess patient for risk of  elopement  6. Assess patient for risk of suicide  Outcome: Progressing     Problem: Daily Care  Goal: Daily care needs are met  Description: INTERVENTIONS:   1. Assess and monitor skin integrity  2. Identify patients at risk for skin breakdown on admission and per policy  3. Assess and monitor ability to perform self care and identify potential discharge needs  4. Assess skin integrity/risk for skin breakdown  5. Assist patient with activities of daily living as needed  6. Encourage independent activity per ability   7. Provide mouth care   8. Include patient/family/caregiver in decisions related to daily care   Outcome: Progressing     Problem: Knowledge Deficit  Goal: Patient/family/caregiver demonstrates understanding of disease process, treatment plan, medications, and discharge instructions  Description: INTERVENTIONS:   1. Complete learning assessment and assess knowledge base  2. Provide teaching at level of understanding   3. Provide teaching via preferred learning methods  Outcome: Progressing     Problem: Discharge Barriers  Goal: Patient's discharge needs are met  Description: INTERVENTIONS:  1. Assess patient for self-management skills  2. Encourage participation in management  3. Identify potential discharge barriers on admission and throughout hospital stay  4. Involve patient/family/caregiver in discharge planning process  5. Collaborate with case management/ for discharge needs  Outcome: Progressing     Problem: Skin/Tissue Integrity - Adult  Goal: Skin integrity remains intact  Description: INTERVENTIONS  1. Assess and document risk factors for pressure ulcer development  2. Assess and document skin integrity  3. Monitor for areas of redness and/or skin breakdown  4. Initiate pressure ulcer prevention measures as indicated  Outcome: Progressing  Goal: Incisions, wounds, or drain sites healing without S/S of infection  Description: INTERVENTIONS  1. Assess and document risk factors for  skin breakdown  2. Assess and document skin integrity  3. Assess and document dressing/incision, wound bed, drain sites and surrounding tissue  4. Implement wound care per orders  Outcome: Progressing     Problem: Musculoskeletal - Adult  Goal: Return mobility to safest level of function  Description: INTERVENTIONS:  1. Assess patient stability and activity tolerance for standing, transferring and ambulating w/ or w/o assistive devices  2. Assist with transfers and ambulation using safe practices  3. Ensure adequate protection for wounds/incisions during mobilization  4. Obtain PT/OT and other consults as needed  5. Apply Continuous Passive Motion per order to increase flexion toward goal  6. Instruct patient/family in ordered activity level  Outcome: Progressing  Goal: Maintain proper alignment of affected body part  Description: INTERVENTIONS:  1. Support and protect limb and body alignment per provider order  2. Instruct and reinforce with patient and family use of appropriate assistive device and precautions (e.g. spinal or hip dislocation precautions)  Outcome: Progressing  Goal: Return ADL status to a safe level of function  Description: INTERVENTIONS:  1. Assess patient's ADL deficits and provide assistive devices as needed  2. Obtain PT/OT consults as needed  3. Assist and instruct patient to increase activity and self care  Outcome: Progressing

## 2023-07-24 NOTE — INTERDISCIPLINARY/THERAPY
Discharge Plan  Met with Patient to discuss home situation and plans for discharge. Patient lives alone in an apartment in [place] his daughter Blanquita lives in [place] and he has a friend Juan who lives in [place] they are supportive and will assist as needed. Plan is for discharge home, Blanquita will transport. Marco will continue physical therapy as an outpatient at [facility] which has been arranged. Appointment made with PT and Ortho.  Patient indicates he has a walker and a cane for home. Patient instructed to contact his PCP if he has any medical issues/questions not related to his knee surgery.

## 2023-07-24 NOTE — OP NOTE
Total knee operative narrative    Cesar Yuen  1950  [id number]  7/24/2023  Surgeon: KIKA GARCIA DO  Staff:   Circulator: Jaye Cortes RN  Scrub Person: Jennifer Guzman; Rocío Daniel    Pre-op Diagnosis     * Acute pain of left knee [M25.562]     * Primary osteoarthritis of left knee [M17.12]       Post-op Diagnosis     * Acute pain of left knee [M25.562]     * Primary osteoarthritis of left knee [M17.12]  Not responsive to conservative treatment    Procedure  Left robot/Tonya assist total knee arthroplasty using 11 standard left femur, G left tibia, 16 poly-, 38 patella  [unfilled]    Complications:  None    Assist: Carol Browne next, without her presence I would not have been able to perform this case in an efficient manner.  She was instrumental in aiding with exposure, maintaining exposure, trailing of implants, placement of implants, and wound closure.      Sponge and needle counts correct on multiple occasions    Anesthesia:  [unfilled]    Operative suite: 2    Estimated Blood Loss:  No blood loss documented.    The patient tolerated the procedure well and was transported to PACU in stable condition.      PREOPERATIVE NARRATIVE:  Cesar Yuen is a 73 y.o. male who presented to my office with complaints of right knee pain. He failed conservative treatment and wished to proceed with right total knee arthroplasty.         OPERATIVE NARRATIVE:     The patient was taken from preop to Operative suite: 2, transferred from the hospital bed to the operative bed in supine position.  All bony prominences were well-padded.  Regional anesthesia had been initiated per anesthesia.  A     well-padded tourniquet was placed about the right thigh.  The right lower     extremity was prepped and draped in a sterile fashion.  A standard anterior     total knee arthroplasty incision was marked on the skin.  The limb was     exsanguinated with Esmarch, tourniquet was insufflated to 300 mmHg.  Skin was     incised  sharply over the anterior knee as well as distal to the knee for     placement of the tibial computer indicators.  The knee joint was entered with a     standard medial parapatellar arthrotomy.  The anterior fat pad as well as the     anterior portions of the medial and lateral meniscus were removed as well as the     ACL and PCL.  The patella was measured and milled with the [employer] patella mill,     leaving enough bone to recreate the original patella depth.  A hubcap was placed     to protect the patella.             Next, the computer indicators were placed in the femur and tibia, unicortically     in the femur, bicortically in the tibia.  Next the center of the femoral head     was found, finding 14 points per protocol, as well as the axis and alignment of     the femur and tibia using a pointer and paddle per protocol.  After finding the     appropriate points, the overall alignment was checked and verified against the     preoperative long length films noted to be the same as preoperative radiographs.      Next, the spike was placed in the distal femur and distal femoral resection was     set using the computer to cut the distal femur in 3-1/2 degrees flexion, 0     degrees varus valgus.  Once the cutting guide had been pinned, a distal femoral     resection was cut and the cut was verified and noted to be a zero degree cut     with 3-1/2 degrees of flexion.  Next the sizing guide was used and verified     against what the computer had sized the femur as, and they were noted to     correlate.  The femur was drilled in 3 degrees of external rotation.      Appropriate sized 4-in-1 cutting block was placed with the medial and lateral     collaterals protected.  The anterior followed by posterior cuts were made     followed by anterior, posterior chamfer cuts and the box chisel cut.  The     cutting guide was removed.  The femur was impacted and noted to fit well.           Next, attention was turned to the tibia.   Using the computer, the tibia was     pinned for a 2 mm resection on the medial side in 3-1/2 degrees posterior slope     and 0 degrees varus valgus.  The medial and lateral collateral ligaments as well     as the patella tendon were protected.  A heavy retractor was placed posteriorly     and the tibial cut was made, cut was verified with the computer and noted to be     in 0 degrees varus and valgus with appropriate amount of slope.  The cutting     guide was removed, femur was impacted followed by placement of the tibial trial     with a poly trial.  The knee was taken through a full range of motion and noted     to achieve 0 degrees of extension and approximately 125 degrees of flexion.      Flexion gaps were checked using the computer and noted to be equal both varus     and valgus.  The patella was sized and the lollipop was used to drill peg holes     for a 3-hole button, patella button was placed and noted to track down the     femoral trochlea very well.             All trial implants were removed.  The wound was copiously irrigated with sterile     saline.  The bony cut ends were then dried.  All obvious bleeders were Bovie     cauterized.  The tibia was cemented first, followed by the femur with a trial     poly placed for interdigitation of the cement, followed by cementation of the     patella button.  All excess cement was removed.  Once the cement had cured, the     knee was taken through full range of motion, noted to achieve 0 degrees     extension, 125 degrees of flexion.  Varus and valgus and flexion gaps were     checked and noted to be equal, verified by the computer.  The trial poly was     removed.  All extra cement was removed.  The wound was copiously irrigated with     sterile saline.  Tourniquet was deflated.  All obvious bleeders were Bovie     cauterized.  The final poly was impacted.  The knee was again taken through full     range of motion and noted to have excellent motion with  excellent stability.      Wound was again copiously irrigated with sterile saline.  The arthrotomy was     closed with #1 Vicryl, subcutaneous was closed with 2-0 Vicryl and the skin was     closed with appropriate closure device.  A sterile bulky dressing was applied, anesthesia reversed.      The patient was transported to PACU in stable condition.            Garrett Saez DO  Phone Number: [phone]     Date: 7/24/2023  Time: 1:02 PM

## 2023-07-24 NOTE — DISCHARGE INSTRUCTIONS
Total Knee Replacement, care after  These instructions give you information about caring for yourself after your surgery. Your doctor may also give you more specific instructions.  CONTACT YOUR SURGEON at [phone] if:  You experience a temperature greater than 100.5 degrees F.  You experience increase pain not relieved by the narcotic pain medication.  YOU EXPERIENCE REDNESS, SWELLING, DRAINAGE, BLISTERING OR ODOR FROM INCISIONS.  You experience uncontrolled bleeding.  You have increased respiratory distress.  You are having problems with constipation.  You have any other questions regarding your surgery.   CONTACT YOUR PRIMARY CARE PHYSICIAN if:  You have a medical issue /question not related to your knee surgery.    FOR SAFETY:  DO keep your incisions clean and dry to help prevent infection.  CHANGE THE DRESSINGS 7 DAYS AFTER SURGERY. You have been given replacement dressings. Be sure to bend your knee slightly before putting on the larger dressing. This new dressings will stay in place until seen in the orthopedic clinic it is waterproof so you may shower. You will have staples or the Dermabond mesh for skin closure. If it is Dermabond mesh DO NOT remove it. DO NOT apply any creams, lotions, ointments or salves to the incision site. NO tub bathes until incision is completely healed. You may shower with the dressing in place.       DO Wear the elastic TIEN hose to prevent blood clots.    Stockings are to be worn on both legs during the day and at night. They may be removed to shower and to wash the stockings.    DO walk every hour to help prevent blood clots.    Do NOT use alcohol when taking pain medications.    Do NOT drive until you are no longer taking narcotic pain medications and/or using your walker.    Do NOT schedule dentist appointments within the next 3 months. To help prevent infections you will need to obtain an antibiotic prescription from your surgeon prior to any dental work/cleaning.      FOR  HEALING:    DO your knee exercises twice a day and as instructed by your therapist - refer to the knee guidebook.    DO eat a well- balanced diet. Good nutrition helps your body heal faster.    Do NOT smoke - It slows healing.    DO NOT PLACE A PILLOW UNDER YOUR KNEE - This can cause problems with how far you are able to extend your knee in the future.    FOR COMFORT:    DO use the ice machine for swelling and comfort.    There will be MORE swelling and bruising on days 2-3 than there is on the day after surgery. This is normal. The swelling will decrease with the anti-inflammatory medication, the ice machine, and by keeping your leg elevated when not walking. The swelling will make it more difficult to move your knee. As the swelling goes down, the movement will become easier.  Use the ice machine as much as you can for the first week following your surgery, including at night, then try to use it 4-6 times per day for 20-30 minutes. You should use it after you have had physical therapy or have done your exercises. Use it more frequently if you are having continued pain and swelling.  Always put something between the cold therapy pad and your skin.  Inspect your skin under the pad every 1-2 hours and notify Orthopedics if you experience any adverse reactions such as burning, itching, blisters, increased redness discoloration, welts or other changes in skin appearance.  Be sure the strap is not too tight; it should be as loose as possible to keep the pad in place.  Be sure to have an adequate supply of ice as you will need to refill your cooler approximately every 4 hours. Remember to ensure the correct amount of water is in the cooler to circulate. When restarting the machine allow plenty of time to ensure water has completely circulated through the pad before placing over your knee and securing the straps.   It is very important to read the instruction booklet that comes with the ice machine.     DO take Pain  medication prior to activity and exercise. You will be given a prescription at discharge.    DO walk often.     DO actively prevent constipation. Remember that narcotics cause constipation.    Drink 8-8oz glasses of fluid especially water daily and /or include more moist foods like soups and fresh fruits.  Add Fiber by eating at least 5-9 servings of fruit and vegetables and 3-4 servings of whole grains per day.  Eat 1-2 servings of yogurt with live and active cultures daily.  Get regular exercise (walking).  Use stool softeners and over the counter laxatives as needed.  Contact your provider if you have not had a bowl movement 5 days after surgery.      REMEMBER:    Recuperation takes 6-12 weeks and you may feel weak during this time.  It is normal to have some numbness around your incision.  Expect soreness, swelling and bruising. It will improve over 4-6 weeks.  You may experience a low grade fever (below 100 degrees F).  Do gradually wean yourself from prescription medications to non-prescription pain relievers such as Tylenol.  Do Transition from the walker to a cane or normal walking when able. Your Physical Therapist will help you determine when that is.

## 2023-07-24 NOTE — BRIEF OP NOTE
Brief Op Note:    Cesar Yuen  7/24/2023    Pre-op Diagnosis     * Acute pain of left knee [M25.562]     * Primary osteoarthritis of left knee [M17.12]       Post-op Diagnosis     * Acute pain of left knee [M25.562]     * Primary osteoarthritis of left knee [M17.12]    Procedure:    LEFT TOTAL KNEE ARTHROPLASTY WITH ROBOTIC ORTHOPEDIC SURGICAL ASSISTANT  CPT(R) Code:  24786 - VT TOTAL KNEE ARTHROPLASTY      Surgeon(s):  Garrett Saez DO Millis, Joanne, PA    Anesthesia:  CRNA: Wilfredo Ray CRNA  General      Pain Block:  For acute post-operative pain management    Staff:   Circulator: Jaye Cortes RN  Scrub Person: Jennifer Guzman; Rocío Daniel    Estimated Blood Loss:  No blood loss documented.    Specimens:  No specimens collected during this procedure.      Drains:  * No LDAs found *    Complications:  None        Garrett Saez DO  Phone Number: [phone]    Date: 7/24/2023  Time: 1:01 PM

## 2023-07-24 NOTE — ANESTHESIA PROCEDURE NOTES
Peripheral Block    Patient location during procedure: pre-op  Start time: 7/24/2023 11:14 AM  End time: 7/24/2023 11:18 AM    Staffing  CRNA: Wilfredo Ray CRNA  Performed: CRNA   Preanesthetic Checklist  Completed: patient identified, IV checked, site marked, risks and benefits discussed, surgical consent, monitors and equipment checked, pre-op evaluation and timeout performed  Peripheral Block  Patient position: supine  Prep: ChloraPrep  Patient monitoring: blood pressure monitoring and continuous pulse oximetry  Supplemental oxygen: nasal cannula  Block type: saphenous  Laterality: left  Injection technique: single-shot  Procedures: ultrasound guided and landmark technique  Ultrasound image added to chart: yes    Needle  Needle type: SonoPlex   Needle gauge: 21 G  Needle length: 4 in      Medications Administered  midazolam (VERSED) injection 1 mg/1 mL - Intravenous - intravenous   2 mg - 7/24/2023 11:14:00 AM  BUPivacaine PF (MARCAINE) injection 0.5% - Perpherial nerve block - Peripheral nerve block   15 mL - 7/24/2023 11:14:00 AM  dexamethasone (PF) (DECADRON) injection 10 mg/mL - Perpherial nerve block - Peripheral nerve block   10 mg - 7/24/2023 11:14:00 AM  Assessment  Injection assessment: no apparent complications, negative aspiration for heme, no paresthesia on injection, incremental injection and local visualized surrounding nerve on ultrasound  Paresthesia pain: none  Heart rate change: no  Slow fractionated injection: yes  Additional Notes  U/S to Identify landmarks.  Needle inserted with concurrent direct visualization throughout.  LA infiltrated next to artery under sartorius.  Reason for Block: At surgeon's request for acute post-op pain management

## 2023-07-24 NOTE — DISCHARGE INSTR - APPOINTMENTS
Please contact your Primary Care Physician with any medical questions/concerns not related to your knee surgery.

## 2023-07-24 NOTE — ANESTHESIA POSTPROCEDURE EVALUATION
Patient: Cesar Yuen    Procedure Summary     Date: 07/24/23 Room / Location: [facility] OR 02 / [facility] OR    Anesthesia Start: 1138 Anesthesia Stop: 1353    Procedure: LEFT TOTAL KNEE ARTHROPLASTY WITH ROBOTIC ORTHOPEDIC SURGICAL ASSISTANT (Left: Knee) Diagnosis:       Acute pain of left knee      Primary osteoarthritis of left knee      (Acute pain of left knee [M25.562])      (Primary osteoarthritis of left knee [M17.12])    Surgeons: Garrett Saez DO Responsible Provider: Wilfredo Ray CRNA    Anesthesia Type: spinal, regional ASA Status: 3          Anesthesia Type: spinal, regional    Last vitals  Vitals Value Taken Time   BP     Temp     Pulse     Resp     SpO2     0-10 Pain Score         Anesthesia Post Evaluation    Patient location during evaluation: PACU  Patient participation: complete - patient participated  Level of consciousness: awake and alert  Pain management: adequate  Airway patency: patent  Anesthetic complications: no  Cardiovascular status: acceptable  Respiratory status: acceptable  Hydration status: acceptable  May dismiss recovered patient based on consultation with the appropriate physicians and/or meeting appropriate discharge criteria      Cosmetic?  This procedure is not cosmetic.

## 2023-07-24 NOTE — ANESTHESIA PROCEDURE NOTES
Spinal Block    Patient location during procedure: OR  Reason for block: primary anesthetic    Staffing  CRNA: Wilfredo Ray CRNA  Performed: CRNA   Preanesthetic Checklist  Completed: patient identified, IV checked, site marked, risks and benefits discussed, surgical consent, monitors and equipment checked, pre-op evaluation and timeout performed  Spinal Block  Patient position: sitting  Prep: ChloraPrep  Patient monitoring: EKG, blood pressure monitoring and continuous pulse oximetry  Approach: left paramedian  Location: L2-3  Injection technique: single-shot  Procedure: negative aspiration for blood, no paresthesia and positive aspiration for clear CSF  Local infiltration: lidocaine 1%  Needle  Needle type: Fidencio   Needle gauge: 22 G  Needle length: 3.5 in  Needle introducer used: No  Epinephrine wash performed: No  Medications Administered  chloroprocaine (PF) (NESACAINE) injection 3% - Intrathecal - intrathecal   2 mL - 7/24/2023 11:44:00 AM  Assessment  Events: well tolerated  Additional Notes  Atraumatic needle placement.  CSF aspiration/ birefringence x 3.

## 2023-07-24 NOTE — INTERDISCIPLINARY/THERAPY
"  [address] [facility]  [place] [address]  [phone]           Physical Therapy Initial Evaluation     Date of Service: 07/24/23    Patient Name: Cesar Yuen  Referring Provider: GARRETT GARCIA  Medicare or Medicaid note, cosigner has been added.: Yes    Onset Date: 7/24/23  SOC Date: 07/24/23  Certification Period: 08/03/23    HICN: [id number]    Medical Diagnosis:   Acute pain of left knee [M25.562]  Primary osteoarthritis of left knee [M17.12]  Arthritis of left knee [M17.12]    Treatment Diagnoses:         Subjective   HISTORY OF CURRENT COMPLAINT:   Pt up in bed upon PT arrival requesting to get out of bed and to the chair. Pt is s/p L TKA on 7/24/23 performed by Dr. Garcia. Pt states he is not having any pain but does still have some numbness in his leg. Pt has good ankle mobility and good active DF. Pt has personal FWW in room and has SPC at home as well.    PRIOR LEVEL OF FUNCTION:   Independent with all mobility and ADLs. Occasional use of SPC due to knee pain    SOCIAL/OCCUPATIONAL/RECREATIONAL:  Pt lives alone in an apartment. Has 5 ALLY with B handrails.     Pain:  Pain Assessment Scale  Pain Scale: 0-10  0-10 Pain Score: 0 - No pain       Past Medical History:   Diagnosis Date    Atrial flutter (CMS/HCC)     CHF (congestive heart failure) (CMS/HCC)     Chipped tooth     \"cracked tooth\" Back right upper, left upper back    Clotting disorder (CMS/HCC)     Dysrhythmia     a-flutter, s/p ablation and loop recorder 2/8/2021    Edema     BLE    History of transfusion     After surgery    Hypertension     Injury of back 01/14/2021    Seen on CT    Pneumonia 03/11/2023    Urinary retention     BPH         Current Facility-Administered Medications:     LR infusion, 100 mL/hr, intravenous, Continuous, Garrett Garcia, DO, Last Rate: 100 mL/hr at 07/24/23 1320, New Bag at 07/24/23 1320    [START ON 7/25/2023] apixaban (ELIQUIS) tablet 5 mg, 5 mg, oral, 2x daily, Carol Browne PA    furosemide (LASIX) tablet 80 mg, " 80 mg, oral, 2x daily, Carol Browne PA    potassium chloride (KLOR-CON) CR tablet 40 mEq, 40 mEq, oral, 2x daily with meals, Carol Browne PA    tamsulosin (FLOMAX) 24 hr capsule 0.4 mg, 0.4 mg, oral, Daily with dinner, Carol Browne PA    ceFAZolin (ANCEF) 2,000 mg in normal saline 50 mL IVPB - MBP, 2,000 mg, intravenous, q8h, Garrett Saez DO, Stopped at 07/24/23 1418    acetaminophen (TYLENOL) tablet 1,000 mg, 1,000 mg, oral, q8h JOY, Garrett Saez DO, 1,000 mg at 07/24/23 1607    oxyCODONE (ROXICODONE) immediate release tablet 2.5 mg, 2.5 mg, oral, q4h PRN, Garrett Saez,     oxyCODONE (ROXICODONE) immediate release tablet 5 mg, 5 mg, oral, q4h PRN, Garrett Saez DO    morphine injection 1-2 mg, 1-2 mg, intravenous, q2h PRN, Garrett Saez,     oxyCODONE immediate release tablet 10 mg, 10 mg, oral, q4h PRN, Garrett Saez DO    morphine injection 3-4 mg, 3-4 mg, intravenous, q2h PRN, Garrett Saez,     [START ON 7/25/2023] celecoxib (CeleBREX) capsule 200 mg, 200 mg, oral, Daily, Garrett Saez,     diphenhydrAMINE (BENADRYL) tab/cap 25-50 mg, 25-50 mg, oral, q6h PRN **OR** diphenhydrAMINE (BENADRYL) injection 25-50 mg, 25-50 mg, intravenous, q6h PRN, Garrett Saez,     [START ON 7/25/2023] polyethylene glycol (GLYCOLAX) powder 17 g, 17 g, oral, Daily, Garrett Saez,     magnesium hydroxide (MILK OF MAGNESIA) 400 mg/5 mL oral suspension 30 mL, 30 mL, oral, Daily PRN, Garrett Saez,     bisacodyL (DULCOLAX) suppository 10 mg, 10 mg, rectal, Daily PRN, Saez, Garrett, DO    ALLERGIES:  Patient has no known allergies.    CURRENT ASSISTIVE OR ADAPTIVE EQUIPMENT:  FWW and gait belt  Kumar Fall Risk Score: 60  FALL RISK Interventions: use of AD and gait belt    DIAGNOSTIC TESTING:  NA  ACTIVITIES LIMITED BY PATIENT COMPLAINT: Standing, walking, knee ROM    PATIENT'S GOALS FOR THERAPY:   Walk and perform stairs to determine ability for safe dc home       Objective     FINDINGS:              Ambulation 1  Surface 1: Level  surface, Smooth  Device 1: Front wheeled walker, Gait belt  Assistance 1: Contact guard assist x 1  Ambulation Distance (meters): 3 meters                                                                                               EDUCATION:    Education Documentation  No documentation found.  Education Comments  No comments found.          Time Calculation  Start Time: 1640  Stop Time: 1653  Time Calculation (min): 13 min    Therapeutic Interventions (Time Spent in Minutes)  Therapeutic Activity: 5         PT Untimed Charges - Quick List (Time Spent in Minutes)  PT Eval Moderate Complexity: 8         EVALUATION:  Moderate Complexity: Expanded review of medical/therapy records, 3-5 performance deficits, and detailed assessments with several treatment options with minimal to moderate modifications needed.        INITIAL TREATMENT:  Therapeutic Activity   - Supine to sit x1, independent  - Sit to stand x1, CGA x1  - Ambulation with FWW from bed to chair x10', CGA x1       ASSESSMENT:  Patient presents with decreased mobility, ROM, strength, balance, stability, and activity tolerates as is to be expected following TKA.  This patient would benefit from skilled PT while in the hospital to address deficits to help pt return home safely. Pt up in chair with call light in reach and all questions answered.    Rehab Potential:    Good    Barriers to outcome: lives alone       GOALS:        Multi-Disciplinary Problems (from Physical Therapy)      Active Problems       Not on file                  Pt will ambulate 200 ft or more with use of FWW and will demonstrate good safety and technique.  Pt will navigate 5 stairs with use of railing while performing with good safety and technique.  Pt will be independent with transfers    PLAN:  It is recommended that the client attend rehabilitative therapy for up to 5-7 visits per week with an expected duration through Certification Period: 08/03/23.  Interventions during the course of  treatment may include any combination of the following:  Therapeutic exercise, therapeutic activity, manual therapy,, gait training, , modalities, , and neuromuscular re-education,.     Recommendation  Recommendations for Therapy: Continue skilled therapy, Safety issues - physical, Intermittent supervision  Equipment Recommended: Front wheeled walker  DME Justification (Front Wheel Walker): Patient has a mobility limitation that considerably impairs the ability to participate in MRADL in the home. The patient is able to safely use and the mobility deficit is resolved by the use of a front wheeled walker.         Thank you for allowing us to share in the care of this patient. If you have any questions, recommendations, or further concerns regarding this patient, please feel free to contact us at [address] [facility] [address]  Dept: [phone]      Signed by: Ismael Hinton, PT 7/24/2023  5:32 PM      * I have reviewed the plan of care and certify a continuing need for medically necessary services    Co-signed by:_________________________ Date and Time:________________

## 2023-07-25 VITALS
SYSTOLIC BLOOD PRESSURE: 106 MMHG | HEART RATE: 76 BPM | TEMPERATURE: 97.8 F | OXYGEN SATURATION: 94 % | RESPIRATION RATE: 16 BRPM | DIASTOLIC BLOOD PRESSURE: 63 MMHG | HEIGHT: 73 IN | WEIGHT: 231 LBS | BODY MASS INDEX: 30.62 KG/M2

## 2023-07-25 LAB
ANION GAP SERPL CALC-SCNC: 8 MMOL/L (ref 3–11)
BASOPHILS # BLD AUTO: 0 10*3/UL
BASOPHILS NFR BLD AUTO: 0.3 % (ref 0–2)
BUN SERPL-MCNC: 18 MG/DL (ref 7–25)
CALCIUM SERPL-MCNC: 9.2 MG/DL (ref 8.6–10.3)
CHLORIDE SERPL-SCNC: 102 MMOL/L (ref 98–107)
CO2 SERPL-SCNC: 27 MMOL/L (ref 21–32)
CREAT SERPL-MCNC: 0.94 MG/DL (ref 0.7–1.3)
EGFRCR SERPLBLD CKD-EPI 2021: 85 ML/MIN/1.73M*2
EOSINOPHIL # BLD AUTO: 0 10*3/UL
EOSINOPHIL NFR BLD AUTO: 0 % (ref 0–3)
ERYTHROCYTE [DISTWIDTH] IN BLOOD BY AUTOMATED COUNT: 15.3 % (ref 11.5–15)
GLUCOSE SERPL-MCNC: 142 MG/DL (ref 70–105)
HCT VFR BLD AUTO: 38.9 % (ref 38–50)
HGB BLD-MCNC: 13 G/DL (ref 13.2–17.2)
LYMPHOCYTES # BLD AUTO: 1.1 10*3/UL
LYMPHOCYTES NFR BLD AUTO: 8.4 % (ref 15–47)
MCH RBC QN AUTO: 30.3 PG (ref 29–34)
MCHC RBC AUTO-ENTMCNC: 33.6 G/DL (ref 32–36)
MCV RBC AUTO: 90.2 FL (ref 82–97)
MONOCYTES # BLD AUTO: 1.2 10*3/UL
MONOCYTES NFR BLD AUTO: 9.5 % (ref 5–13)
NEUTROPHILS # BLD AUTO: 10.7 10*3/UL
NEUTROPHILS NFR BLD AUTO: 81.8 % (ref 46–70)
PLATELET # BLD AUTO: 276 10*3/UL (ref 130–350)
PMV BLD AUTO: 7.9 FL (ref 6.9–10.8)
POTASSIUM SERPL-SCNC: 3.7 MMOL/L (ref 3.5–5.1)
RBC # BLD AUTO: 4.31 10*6/ΜL (ref 4.1–5.8)
SODIUM SERPL-SCNC: 137 MMOL/L (ref 135–145)
WBC # BLD AUTO: 13.1 10*3/UL (ref 3.7–9.6)

## 2023-07-25 PROCEDURE — 85025 COMPLETE CBC W/AUTO DIFF WBC: CPT | Performed by: ORTHOPAEDIC SURGERY

## 2023-07-25 PROCEDURE — 97116 GAIT TRAINING THERAPY: CPT | Mod: GP | Performed by: PHYSICAL THERAPIST

## 2023-07-25 PROCEDURE — 6370000100 HC RX 637 (ALT 250 FOR IP): Performed by: PHYSICIAN ASSISTANT

## 2023-07-25 PROCEDURE — 80048 BASIC METABOLIC PNL TOTAL CA: CPT | Performed by: ORTHOPAEDIC SURGERY

## 2023-07-25 PROCEDURE — 6370000100 HC RX 637 (ALT 250 FOR IP): Performed by: ORTHOPAEDIC SURGERY

## 2023-07-25 PROCEDURE — 97110 THERAPEUTIC EXERCISES: CPT | Mod: GP | Performed by: PHYSICAL THERAPIST

## 2023-07-25 PROCEDURE — 7100002500 HC EXTENDED RECOVERY PER HOUR

## 2023-07-25 PROCEDURE — 36415 COLL VENOUS BLD VENIPUNCTURE: CPT | Performed by: ORTHOPAEDIC SURGERY

## 2023-07-25 PROCEDURE — 97165 OT EVAL LOW COMPLEX 30 MIN: CPT | Mod: GO

## 2023-07-25 PROCEDURE — 2500000200 HC RX 250 WO HCPCS: Performed by: ORTHOPAEDIC SURGERY

## 2023-07-25 PROCEDURE — 97535 SELF CARE MNGMENT TRAINING: CPT | Mod: GO

## 2023-07-25 RX ORDER — OXYCODONE HYDROCHLORIDE 5 MG/1
5 TABLET ORAL EVERY 4 HOURS PRN
Qty: 42 TABLET | Refills: 0 | Status: SHIPPED | OUTPATIENT
Start: 2023-07-25 | End: 2023-08-04

## 2023-07-25 RX ADMIN — CELECOXIB 200 MG: 200 CAPSULE ORAL at 09:19

## 2023-07-25 RX ADMIN — FUROSEMIDE 80 MG: 40 TABLET ORAL at 09:19

## 2023-07-25 RX ADMIN — POTASSIUM CHLORIDE 40 MEQ: 750 TABLET, FILM COATED, EXTENDED RELEASE ORAL at 09:19

## 2023-07-25 RX ADMIN — APIXABAN 5 MG: 5 TABLET, FILM COATED ORAL at 09:19

## 2023-07-25 RX ADMIN — Medication 1000 MG: at 05:48

## 2023-07-25 RX ADMIN — DIPHENHYDRAMINE HYDROCHLORIDE 50 MG: 25 CAPSULE ORAL at 01:02

## 2023-07-25 NOTE — PLAN OF CARE
Problem: Safety Adult - Fall  Goal: Free from fall injury  Description: INTERVENTIONS:    Inpatient - Please reference Cares/Safety Flowsheet under Kumar Fall Risk for interventions.  Pediatrics - Please reference Peds Daily Cares/Safety Flowsheet under [facility] Pediatric Fall Assessment Fall Bundle for interventions  LD/OB - Please reference OB Shift Screening Flowsheet under OB Fall Risk for interventions.  Outcome: Adequate for Discharge     Problem: Pain - Adult  Goal: Verbalizes/displays adequate comfort level or baseline comfort level  Description: INTERVENTIONS:  1. Encourage patient to monitor pain and request interventions  2. Assess pain using the appropriate pain scale  3. Administer analgesics based on type and severity of pain and evaluate response  4. Educate/Implement non-pharmacological measures as appropriate and evaluate response  5. Consider cultural, developmental and social influences on pain and pain management  6. Notify Provider if interventions unsuccessful or patient reports new pain  Outcome: Adequate for Discharge     Problem: Infection - Adult  Goal: Absence of infection during hospitalization  Description: INTERVENTIONS:  1. Assess and monitor for signs and symptoms of infection  2. Monitor lab/diagnostic results  3. Monitor all insertion sites/wounds/incisions  4. Monitor secretions for changes in amount and color  5. Administer medications as ordered  6. Educate and encourage patient and family to use good hand hygiene technique  7. Identify and educate in appropriate isolation precautions for identified infection/condition  Outcome: Adequate for Discharge     Problem: Safety Adult  Goal: Patient will remain safe during hospitalization  Description: INTERVENTIONS    1. Assess patient for fall risk and implement interventions if needed  2. Use safe transport techniques  3. Assess patient using the appropriate Ricardo skin assessment scale  4. Assess patient for risk of aspiration  5.  Assess patient for risk of elopement  6. Assess patient for risk of suicide  Outcome: Adequate for Discharge     Problem: Daily Care  Goal: Daily care needs are met  Description: INTERVENTIONS:   1. Assess and monitor skin integrity  2. Identify patients at risk for skin breakdown on admission and per policy  3. Assess and monitor ability to perform self care and identify potential discharge needs  4. Assess skin integrity/risk for skin breakdown  5. Assist patient with activities of daily living as needed  6. Encourage independent activity per ability   7. Provide mouth care   8. Include patient/family/caregiver in decisions related to daily care   Outcome: Adequate for Discharge     Problem: Knowledge Deficit  Goal: Patient/family/caregiver demonstrates understanding of disease process, treatment plan, medications, and discharge instructions  Description: INTERVENTIONS:   1. Complete learning assessment and assess knowledge base  2. Provide teaching at level of understanding   3. Provide teaching via preferred learning methods  Outcome: Adequate for Discharge     Problem: Discharge Barriers  Goal: Patient's discharge needs are met  Description: INTERVENTIONS:  1. Assess patient for self-management skills  2. Encourage participation in management  3. Identify potential discharge barriers on admission and throughout hospital stay  4. Involve patient/family/caregiver in discharge planning process  5. Collaborate with case management/ for discharge needs  Outcome: Adequate for Discharge     Problem: Skin/Tissue Integrity - Adult  Goal: Skin integrity remains intact  Description: INTERVENTIONS  1. Assess and document risk factors for pressure ulcer development  2. Assess and document skin integrity  3. Monitor for areas of redness and/or skin breakdown  4. Initiate pressure ulcer prevention measures as indicated  Outcome: Adequate for Discharge  Goal: Incisions, wounds, or drain sites healing without S/S  of infection  Description: INTERVENTIONS  1. Assess and document risk factors for skin breakdown  2. Assess and document skin integrity  3. Assess and document dressing/incision, wound bed, drain sites and surrounding tissue  4. Implement wound care per orders  Outcome: Adequate for Discharge     Problem: Musculoskeletal - Adult  Goal: Return mobility to safest level of function  Description: INTERVENTIONS:  1. Assess patient stability and activity tolerance for standing, transferring and ambulating w/ or w/o assistive devices  2. Assist with transfers and ambulation using safe practices  3. Ensure adequate protection for wounds/incisions during mobilization  4. Obtain PT/OT and other consults as needed  5. Apply Continuous Passive Motion per order to increase flexion toward goal  6. Instruct patient/family in ordered activity level  Outcome: Adequate for Discharge  Goal: Maintain proper alignment of affected body part  Description: INTERVENTIONS:  1. Support and protect limb and body alignment per provider order  2. Instruct and reinforce with patient and family use of appropriate assistive device and precautions (e.g. spinal or hip dislocation precautions)  Outcome: Adequate for Discharge  Goal: Return ADL status to a safe level of function  Description: INTERVENTIONS:  1. Assess patient's ADL deficits and provide assistive devices as needed  2. Obtain PT/OT consults as needed  3. Assist and instruct patient to increase activity and self care  Outcome: Adequate for Discharge

## 2023-07-25 NOTE — INTERDISCIPLINARY/THERAPY
"  [address] [facility]  [place] [address]  [phone]           Occupational Therapy Initial Evaluation     Date of Service: (P) 07/25/23    Patient Name: Cesar Yuen  Referring Provider: GARRETT GARCIA  Medicare or Medicaid note, cosigner has been added.: Yes    Onset Date: 7/24/2023      HICN: [id number]    Medical Diagnosis:    Acute pain of left knee [M25.562]  Primary osteoarthritis of left knee [M17.12]  Arthritis of left knee [M17.12]    Treatment Diagnoses:  Problem List: (P) Decreased ADL status, Decreased IADLs, Decreased functional mobility     Subjective     HISTORY OF CURRENT COMPLAINT:   Patient presents with left TKA following arthritis of the left knee.  Patient presents in chair upon OTS arrival.  Patient was willing to participate in therapies.    PRIOR LEVEL OF FUNCTION:   Independent with all ADLs/IADLs.    SOCIAL/OCCUPATIONAL/RECREATIONAL:  Patient lives in apartment with 5 steps to enter and railings on both sides.  Patient has a tub shower with a tub bench but no grab bars and a regular toilet.  Patient lives alone but his daughter will be staying with him for a little bit.    Pain: 0/10  Pain Assessment Scale  Pain Scale: (P) 0-10  0-10 Pain Score: (P) 0 - No pain       Past Medical History:   Diagnosis Date    Atrial flutter (CMS/HCC)     CHF (congestive heart failure) (CMS/[facility])     Chipped tooth     \"cracked tooth\" Back right upper, left upper back    Clotting disorder (CMS/[facility])     Dysrhythmia     a-flutter, s/p ablation and loop recorder 2/8/2021    Edema     BLE    History of transfusion     After surgery    Hypertension     Injury of back 01/14/2021    Seen on CT    Pneumonia 03/11/2023    Urinary retention     BPH         Current Facility-Administered Medications:     LR infusion, 100 mL/hr, intravenous, Continuous, Garrett Garcia, DO, Last Rate: 100 mL/hr at 07/24/23 1320, New Bag at 07/24/23 1320    apixaban (ELIQUIS) tablet 5 mg, 5 mg, oral, 2x daily, Carol Browne PA, 5 mg at " 07/25/23 0919    furosemide (LASIX) tablet 80 mg, 80 mg, oral, 2x daily, Carol Browne PA, 80 mg at 07/25/23 0919    potassium chloride (KLOR-CON) CR tablet 40 mEq, 40 mEq, oral, 2x daily with meals, Carol Browne PA, 40 mEq at 07/25/23 0919    tamsulosin (FLOMAX) 24 hr capsule 0.4 mg, 0.4 mg, oral, Daily with dinner, Carol Browne PA, 0.4 mg at 07/24/23 1741    acetaminophen (TYLENOL) tablet 1,000 mg, 1,000 mg, oral, q8h JOY, Garrett Saez, , 1,000 mg at 07/25/23 0548    oxyCODONE (ROXICODONE) immediate release tablet 2.5 mg, 2.5 mg, oral, q4h PRN, Garrett Saez,     oxyCODONE (ROXICODONE) immediate release tablet 5 mg, 5 mg, oral, q4h PRN, Garrett Saez,     morphine injection 1-2 mg, 1-2 mg, intravenous, q2h PRN, Garrett Saez,     oxyCODONE immediate release tablet 10 mg, 10 mg, oral, q4h PRN, Garrett Saez,     morphine injection 3-4 mg, 3-4 mg, intravenous, q2h PRN, Garrett Saez,     celecoxib (CeleBREX) capsule 200 mg, 200 mg, oral, Daily, Garrett Saez, , 200 mg at 07/25/23 0919    diphenhydrAMINE (BENADRYL) tab/cap 25-50 mg, 25-50 mg, oral, q6h PRN, 50 mg at 07/25/23 0102 **OR** diphenhydrAMINE (BENADRYL) injection 25-50 mg, 25-50 mg, intravenous, q6h PRN, Garrett Saez,     polyethylene glycol (GLYCOLAX) powder 17 g, 17 g, oral, Daily, Garrett Saez,     magnesium hydroxide (MILK OF MAGNESIA) 400 mg/5 mL oral suspension 30 mL, 30 mL, oral, Daily PRN, Garrett Saez, DO    bisacodyL (DULCOLAX) suppository 10 mg, 10 mg, rectal, Daily PRN, Saez, [place], DO    ALLERGIES:  Patient has no known allergies.    CURRENT ASSISTIVE OR ADAPTIVE EQUIPMENT:  4WW    Kumar Fall Risk Score: 60  FALL RISK Interventions: Gait belt, FWW    DIAGNOSTIC TESTING:  See EMR  ACTIVITIES LIMITED BY PATIENT COMPLAINT: N/A    PATIENT'S GOALS FOR THERAPY:   To go home         Objective     Start of Care: 7/25/2023    Precautions: Left TKA      Hand Dominance: Right    ROM:   -Shoulder Flexion: L: WFL; R: WFL   -Shoulder Abduction: L:  WFL; R: WFL   -Elbow Flexion: L: WFL; R: WFL   -Elbow Extension: L: WFL; R: WFL    MMT:   -Shoulder Flexion: L: 5/5; R: 5/5   -Shoulder Abduction: L: 5/5; R: 5/5   -Shoulder Internal Rotation: L: 5/5; R: 5/5   -Shoulder External Rotation: L: 5/5; R: 5/5   -Elbow Flexion: L: 5/5; R: 5/5   -Elbow Extension L: 5/5; R: 5/5   - Strength: L: 5/5; R: 5/5        Coordination: Able to oppose thumb to all digits.      Cognition: Alert and oriented x4.       Sensation: Intact      Vision: Intact       ADLs assessed:   -UB Dressing: I   -LB Dressing: Mod I   -Grooming: NA   -Eating: NA   -Toileting: NA   -Bathing: NA    IADLs pt reported:   -Meal Preparation: I   -Medication Management: I   -Financial Management: I   -Caring for Pets/Others: I   -Grocery Shopping: I   -Laundry: I   -Cleaning/Housework: I   -Driving: I      Transfers: CGA, FWW    Education: Patient will be educated on role of occupational therapy and plan of care.  Patient was also educated on compression sock (wear time and use).  Patient was also educated on left TKA precautions and practiced a tub transfer in the therapy room.  Patient was receptive of OTS education.                                                 EDUCATION:    Education Documentation  No documentation found.  Education Comments  No comments found.            Time Calculation  Start Time: (P) 0847  Stop Time: (P) 0911  Time Calculation (min): (P) 24 min     TIME CALCULATION   Therapeutic Interventions (Time Spent in Minutes)  ADL Selfcare Home Managment: (P) 12     OT Untimed Charges - Quick List (Time Spent in Minutes)  OT Eval Low Complexity: (P) 12       EVALUATION:  Low complexity: Brief history review, 1-3 performance deficits, and problem focused assessments with limited number of treatment options, modifications not necessary.       INITIAL TREATMENT:    Transfers:  Sit to Stand (chair): CGA, FWW 2/2 trials  Stand to sit (chair): CGA, FWW 2/2 trials  Chair to therapy room: [facility],  FWW  Tub transfer: SBA, FWW  Therapy room to chair: CGA, FWW  Stand to sit (chair): CGA, FWW        ADLs:     Dressing:    Compression Socks: Don Mod I    Socks: Don Mod I    Pants: Don: Mod I    Shirt: Don: I    Gown: Doff I      Patient left in chair with legs elevated, ice pack on, call light, all needs met at end of visit.        Milagros López, OTR/L present throughout therapy session.      ASSESSMENT:  Patient presents with left TKA following arthritis of the left knee. Patient demonstrates increased independence with ADLs as noted by lower body dressing. Patient does demonstrate slight confusion with tasks, however patient is hard of hearing.  Once patient understood the task patient was able to complete independently.  Patient did demonstrate understanding and proper safety with tub transfer.  Patient also receptive to all OTS education. This patient would benefit from discontinuation of OT services due to independence with ADLs/IADLs.    Rehab Potential:    Good    Barriers to outcome:NA       GOALS:        Patient will don/doff LB clothing with Mod I. GOAL MET  Patient will don/doff compression sock with Min A.  GOAL MET  Patient will demonstrate understanding of clothing management tasks through self report. GOAL MET   Patient will perform and/or demonstrate understanding on safe shower/tub transfer with Mod I.  GOAL MET  Patient will be educated on precautions, safety strategies, and compensatory techniques during ADL tasks. GOAL MET      PLAN:  It is recommended that the client attend rehabilitative therapy for up to 1-2 visits per week with an expected duration through Certification Date: (P) 08/10/23.  Interventions during the course of treatment may include any combination of the following:  ADL retraining  Therapeutic activity  Therapeutic exercise  Endurance training  Patient/family training  Equipment evaluation/education  Compensatory technique education              Thank you for allowing us to  share in the care of this patient. If you have any questions, recommendations, or further concerns regarding this patient, please feel free to contact us at [address] [place] [address]  Dept: [phone]      Signed by: Nell Linton 7/25/2023  10:20 AM      * I have reviewed the plan of care and certify a continuing need for medically necessary services    Co-signed by:_________________________ Date and Time:________________

## 2023-07-25 NOTE — INTERDISCIPLINARY/THERAPY
[address] [place] [address]  [phone]      Physical Therapy Daily Treatment Note      Date of Service: 07/25/23  Patient Name: Cesar Yuen  Referring Provider: KIKA GARCIA  Visit Number: 2   Start of Care Date: 07/24/23  Certification Period: 08/03/23      Medical Diagnosis:  Acute pain of left knee [M25.562]  Primary osteoarthritis of left knee [M17.12]  Arthritis of left knee [M17.12]    Treatment Diagnoses:   Problem List: Decreased mobility, Orthopedic restrictions    Subjective    Patient has been doing well no pain and agreeable to physical therapy today.    FALL RISK Interventions: Gait belt and FWW  Pain:   Pain Assessment Scale  Pain Scale: 0-10  0-10 Pain Score: 0 - No pain         Objective      Therapeutic exercise: Patient completed therapeutic exercise x10 repetitions each all on the left lower extremity with verbal and tactile cues using total joint book pictures and written illustrations for reference as well.  All independent including the following: Ankle dorsiflexion, quad set, heel set, heel slide, heel prop, SAQ, glutes set, hip AB/adduction.  Stairs: Patient completed 3 stairs up and down 1 trial with FWW and additional trial with quad cane verbal and tactile cues for appropriate sequencing and safety.  Patient demonstrated correctly with no LOB.  Gait: Verbal cueing given for step sequencing heel toe gait and avoiding hip circumduction for advancing left lower extremity.  Patient able to make small corrections.  Patient ambulated 150 feet x 1 with FWW and SBA to therapy room.  Then patient wanted additional time in gait so proceeded to ambulate additional 250 feet with FWW and SBA.                                                                       Time Calculation  Start Time: 0817  Stop Time: 0845  Time Calculation (min): 28 min   Therapeutic Interventions (Time Spent in Minutes)  Gait/Mobility: 15  Therapeutic Exercise: 13                   ASSESSMENT:  Patient is  moving quite well.  Does tend to want to circumduct his hip versus toe off and swing through left lower extremity.  He did well with the exercises being able to complete them all independently.    PLAN FOR NEXT TREATMENT SESSION:  This was patient's final visit before he is discharged from the hospital.  Recommend he follow-up with outpatient physical therapy as scheduled.              Thank you for allowing us to share in the care of this patient. If you have any questions, recommendations, or further concerns regarding this patient, please feel free to contact me at [address] [facility] [address]  Dept: [phone].  Signed by: JHONNY GIL, PT  7/25/2023  1:06 PM

## 2023-07-25 NOTE — DISCHARGE SUMMARY
Inpatient Discharge Summary    BRIEF OVERVIEW  Admitting Provider: Garrett Saez DO  Discharge Provider: Garrett Saez DO  Primary Care Physician at Discharge: Balbir Machado [phone]     Admission Date: 7/24/2023     Discharge Date: 7/25/2023    Primary Discharge Diagnosis  TKA    Secondary Discharge Diagnosis      Discharge Disposition  01 - Home or Self-Care  Code Status at Discharge: full    Active Issues Requiring Follow-up      Outpatient Follow-Up  Future Appointments   Date Time Provider Department Center   7/27/2023  9:45 AM Velázquez, Nica, PT [facility]   8/1/2023  9:30 AM Velázquez, Nica, PT [facility]   8/3/2023  9:30 AM Velázquez, Nica, PT [facility]   8/8/2023  9:30 AM Velázquez, Nica, PT [facility]   8/10/2023  9:30 AM Velázquez, Nica, PT [facility]   8/11/2023 10:40 AM Carol Browne PA [facility]   8/15/2023  9:30 AM Velázquez, Nica, PT [facility]   8/17/2023  9:30 AM Velázquez, Nica, PT [facility]   8/22/2023  9:30 AM Velázquez, Nica, PT [facility]   8/24/2023  9:30 AM Velázquez, Nica, PT [facility]   8/29/2023  9:30 AM Velázquez, Nica, PT [facility]   8/31/2023  9:30 AM Velázquez, Nica, PT [facility]   10/11/2023 11:30 AM Lalitha Patino, DNP [id number]         Test Results Pending at Discharge      DETAILS OF HOSPITAL STAY    Presenting Problem/History of Present Illness  Acute pain of left knee [M25.562]  Primary osteoarthritis of left knee [M17.12]  Arthritis of left knee [M17.12]      Hospital Course  Patient admitted to the hospital for post-anesthesia care, wound care, monitoring VS, PT/OT.  VS remained stable, incision looked good, met therapy goals and achieved good pain control on oral meds.     Operative Procedures Performed  [unfilled]  Treatments: surgery: TKA  Consults:   Procedures:   Pertinent Test Results:     Physical Exam at Discharge  Discharge Condition: good  Heart Rate: 59  Resp: 18  BP: 102/62  Temp: 36.6 °C (97.8 °F)  Weight: 104.8 kg (231 lb)    General Appearance:     Alert, cooperative, no distress, appears stated age   Head:    Normocephalic, without obvious abnormality, atraumatic   Eyes:    PERRL, conjunctiva/corneas clear, EOM's intact both eyes        Ears:    Normal TM's and external ear canals, both ears   Nose:   Nares normal, septum midline, mucosa normal, no drainage   Throat:   Lips, mucosa, and tongue normal; teeth and gums normal   Neck:   Supple, symmetrical, trachea midline, no adenopathy;        thyroid:  No enlargement/tenderness/nodules   Back:     Symmetric, no curvature, ROM normal, no CVA tenderness   Lungs:     Clear to auscultation bilaterally, respirations unlabored   Chest wall:    No tenderness or deformity   Heart:    Regular rate and rhythm, S1 and S2 normal, no murmur, rub or gallop   Abdomen:     Soft, non-tender, bowel sounds active, no masses, no organomegaly           Extremities:   Extremities normal, atraumatic, no cyanosis or edema   Pulses:   2+ and symmetric all extremities   Skin:   Skin color, texture, turgor normal, no rashes or lesions   Lymph nodes:   Cervical, supraclavicular, and axillary nodes normal   Neurologic:   CNII-XII intact. Normal strength, sensation and reflexes       throughout

## 2023-07-26 ENCOUNTER — PATIENT OUTREACH (OUTPATIENT)
Dept: FAMILY MEDICINE | Facility: HOSPITAL | Age: 73
End: 2023-07-26
Payer: MEDICARE

## 2023-07-26 NOTE — PROGRESS NOTES
Cesar Yuen was contacted following recent hospitalization at [facility]. The patient was discharged from the hospital on 7/25/2023 .The Discharge Summary and After Visit Summary were reviewed. The patient's main diagnosis during the hospitalization was Arthritis of left knee.  Followup appointment: 8/11/23. Any additional testing and labs will be discussed at the patient's upcoming post-hospital follow up appointment.    Transitional Care Management Follow Up (most recent)       Transitional Care Management - 07/26/23 1457          OTHER    Date of post hospital outreach 07/26/23     Contact Status Contact done     Speaking with the Patient or Patient's Caregiver? Patient     Is the patient on the Diabetes registry? N     Were patient's home medications changed or did they have any new medications added during the hospitalization? Y     Did someone go over the discharge summary with the patient or the patient's caregiver and discuss the medications listed on it? Y     Does the patient or patient's caregiver have any questions about the medication changes? N     Patient verbalized understanding of when to contact health care provider or when to get help right away? Y     Discharge instructions reviewed with patient or patient's caregiver and all questions answered? Y     Is there a Home Health/DME Plan being enacted? Please note name of HH agency, DME vendor, contacted/received Y   [facility] [place]    Does patient have psychosocial issues that might impact their health status? None     Does patient have financial barriers to care? None                      Izabela Hudson RN  July 26, 2023 2:58 PM

## 2023-07-26 NOTE — Clinical Note
Transitional Care Management (TCM) call completed. Patient has a TCM appointment with ortho scheduled on 8/11/23.

## 2023-07-27 ENCOUNTER — HOSPITAL ENCOUNTER (OUTPATIENT)
Dept: PHYSICAL THERAPY | Facility: HOSPITAL | Age: 73
Setting detail: THERAPIES SERIES
Discharge: STILL ADMITTED OR ADMINISTRATIVE | End: 2023-07-27
Payer: MEDICARE

## 2023-07-27 DIAGNOSIS — Z74.09 IMPAIRED MOBILITY: ICD-10-CM

## 2023-07-27 DIAGNOSIS — Z96.652 S/P TOTAL KNEE ARTHROPLASTY, LEFT: Primary | ICD-10-CM

## 2023-07-27 DIAGNOSIS — R29.898 DECREASED STRENGTH INVOLVING KNEE JOINT: ICD-10-CM

## 2023-07-27 DIAGNOSIS — Z47.89 ENCOUNTER FOR ORTHOPEDIC FOLLOW-UP CARE: ICD-10-CM

## 2023-07-27 PROCEDURE — 97110 THERAPEUTIC EXERCISES: CPT | Mod: GP | Performed by: PHYSICAL THERAPIST

## 2023-07-27 PROCEDURE — 97161 PT EVAL LOW COMPLEX 20 MIN: CPT | Mod: GP | Performed by: PHYSICAL THERAPIST

## 2023-07-28 DIAGNOSIS — E87.70 HYPERVOLEMIA, UNSPECIFIED HYPERVOLEMIA TYPE: ICD-10-CM

## 2023-07-28 PROCEDURE — G2066 INTER DEVC REMOTE 30D: HCPCS | Performed by: INTERNAL MEDICINE

## 2023-07-28 PROCEDURE — 93298 REM INTERROG DEV EVAL SCRMS: CPT | Performed by: INTERNAL MEDICINE

## 2023-07-28 NOTE — TELEPHONE ENCOUNTER
Medication refill request:  Medication(s):  Klor-Con m20 and lasix not filled due to (route to Nurse Pool) Dosage clarification needed

## 2023-07-28 NOTE — TELEPHONE ENCOUNTER
No care due was identified.  Health [facility] Embedded Care Due Messages. Reference number: [id number]. 7/28/2023 2:44:41 PM MDT

## 2023-07-31 RX ORDER — POTASSIUM CHLORIDE 20 MEQ/1
40 TABLET, EXTENDED RELEASE ORAL 2 TIMES DAILY
Qty: 360 TABLET | Refills: 0 | Status: SHIPPED | OUTPATIENT
Start: 2023-07-31 | End: 2024-01-23 | Stop reason: SDUPTHER

## 2023-07-31 RX ORDER — FUROSEMIDE 80 MG/1
80 TABLET ORAL 2 TIMES DAILY
Qty: 180 TABLET | Refills: 0 | Status: SHIPPED | OUTPATIENT
Start: 2023-07-31 | End: 2023-10-25 | Stop reason: SDUPTHER

## 2023-08-01 ENCOUNTER — HOSPITAL ENCOUNTER (OUTPATIENT)
Dept: PHYSICAL THERAPY | Facility: HOSPITAL | Age: 73
Setting detail: THERAPIES SERIES
Discharge: STILL ADMITTED OR ADMINISTRATIVE | End: 2023-08-01
Payer: MEDICARE

## 2023-08-01 DIAGNOSIS — R29.898 DECREASED STRENGTH INVOLVING KNEE JOINT: ICD-10-CM

## 2023-08-01 DIAGNOSIS — Z96.652 S/P TOTAL KNEE ARTHROPLASTY, LEFT: Primary | ICD-10-CM

## 2023-08-01 DIAGNOSIS — Z74.09 IMPAIRED MOBILITY: ICD-10-CM

## 2023-08-01 PROCEDURE — 97110 THERAPEUTIC EXERCISES: CPT | Mod: GP | Performed by: PHYSICAL THERAPIST

## 2023-08-01 PROCEDURE — 97112 NEUROMUSCULAR REEDUCATION: CPT | Mod: GP | Performed by: PHYSICAL THERAPIST

## 2023-08-01 NOTE — INTERDISCIPLINARY/THERAPY
[address] [place] [address]  [phone]      Physical Therapy Daily Treatment Note       Date of Service: 08/01/23    Patient Name: Cesar Yuen  Referring Provider: Garrett Saez DO  Visit Number: 4   Start of Care Date: 07/27/23  Certification Period: 10/25/23      Medical Diagnosis listed first. Additional are Treatment Diagnoses:     1. S/P total knee arthroplasty, left    2. Impaired mobility    3. Decreased strength involving knee joint        SUBJECTIVE:  Pt arrives with his stand alone cane today. States he is doing well. Rates currently 0-1/10 pain.      Pain:   Pain Assessment Scale  Pain Scale: 0-10  0-10 Pain Score: 1    OBJECTIVE:  -intro to NuStep (seat 13-12) L1 x8 min for mobility.  -supine heel slides 3x10 (106 deg)  -supine QS with emphasis on quad control 1x20 with cues to avoid hip compensation.   -supine SAQ with emphasis on quad control 2x10.  -intro to seated LAQ with emphasis on quad control 2x10.  -intro to seated HS curls with YTB 2x10 with emphasis on control.  -intro to standing weight shifts with occasional light UE support and cues for forward gaze: s/s, h/t, staggered each way x10.  -advised pt to bring handbook to next session so we can change parameters for his home program.                                                                         Time Calculation  Start Time: 0930  Stop Time: 1009  Time Calculation (min): 39 min       Therapeutic Interventions (Time Spent in Minutes)  Neuromuscular Re-Education: 9  Therapeutic Exercise: 30                ASSESSMENT:  Pt is ambulating well with stand alone cane. He is demonstrating advanced mobility, so advised him not to be too aggressive in home program. He required min cues to isolate quad with less hip compensation.     PLAN FOR NEXT TREATMENT SESSION:  Pt is scheduled to return on 8/3/23 with mobility, strength, and balance activities as tolerated.     Thank you for allowing us to share in the care of this  patient. If you have any questions, recommendations, or further concerns regarding this patient, please feel free to contact me at [address] [facility]  [place] [address]  Dept: [phone] .  Signed by: GRETEL CAVAZOS, SANTOS  8/1/2023  10:10 AM

## 2023-08-03 ENCOUNTER — HOSPITAL ENCOUNTER (OUTPATIENT)
Dept: PHYSICAL THERAPY | Facility: HOSPITAL | Age: 73
Setting detail: THERAPIES SERIES
Discharge: STILL ADMITTED OR ADMINISTRATIVE | End: 2023-08-03
Payer: MEDICARE

## 2023-08-03 DIAGNOSIS — Z96.652 S/P TOTAL KNEE ARTHROPLASTY, LEFT: Primary | ICD-10-CM

## 2023-08-03 DIAGNOSIS — R29.898 DECREASED STRENGTH INVOLVING KNEE JOINT: ICD-10-CM

## 2023-08-03 DIAGNOSIS — Z74.09 IMPAIRED MOBILITY: ICD-10-CM

## 2023-08-03 PROCEDURE — 97112 NEUROMUSCULAR REEDUCATION: CPT | Mod: GP | Performed by: PHYSICAL THERAPIST

## 2023-08-03 PROCEDURE — 97110 THERAPEUTIC EXERCISES: CPT | Mod: GP | Performed by: PHYSICAL THERAPIST

## 2023-08-03 NOTE — INTERDISCIPLINARY/THERAPY
[address] [place] [address]  [phone]      Physical Therapy Daily Treatment Note       Date of Service: 08/03/23    Patient Name: Cesar Yuen  Referring Provider: Garrett Saez DO  Visit Number: 5   Start of Care Date: 07/27/23  Certification Period: 10/25/23      Medical Diagnosis listed first. Additional are Treatment Diagnoses:     1. S/P total knee arthroplasty, left    2. Impaired mobility    3. Decreased strength involving knee joint          SUBJECTIVE:  Pt reports he is dealing with more swelling. He took off his TIEN hose and is instead wearing black knee high compression socks because it is better for his ankle. He has been also wrapping his knee and elevating it to help with swelling above.       Pain:   Pain Assessment Scale  Pain Scale: 0-10  0-10 Pain Score: 1    OBJECTIVE:  -NuStep (seat 13-12) L1 x8 min for mobility.  -supine heel slides 3x10 (102 deg)  -supine QS with emphasis on quad control 1x20.  -supine SAQ with emphasis on quad control 2x10.  -seated LAQ with emphasis on quad control 2x10.  -seated HS curls with YTB 2x10 with emphasis on control.  -standing weight shifts with occasional light UE support and cues for forward gaze: s/s, h/t, staggered each way x10.  -standing sequence: B heel raises x10, alt HS curls x10, and mini squats x10 with UE support and min cues for proper mechanics on squats.   -filled in parameters for home exercise program in his joint camp handbook.  -pt elected to ice on his own following session.                                                                         Time Calculation  Start Time: 0935  Stop Time: 1015  Time Calculation (min): 40 min       Therapeutic Interventions (Time Spent in Minutes)  Neuromuscular Re-Education: 8  Therapeutic Exercise: 32                ASSESSMENT:  Pt tolerated therex well with min cues for proper mechanics. He does have more swelling and I advised him the importance of compression through the whole  leg extending into his thigh and he verbalized understanding. No increased warmth or redness to surrounding joint.     PLAN FOR NEXT TREATMENT SESSION:  Pt is scheduled to f/u on 8/8/23 with mobility, strength, balance, and intro to step activities as able.     Thank you for allowing us to share in the care of this patient. If you have any questions, recommendations, or further concerns regarding this patient, please feel free to contact me at [address] [facility]  [place] [address]  Dept: [phone] .  Signed by: GRETEL CAVAZOS PT  8/3/2023  11:07 AM

## 2023-08-08 ENCOUNTER — HOSPITAL ENCOUNTER (OUTPATIENT)
Dept: PHYSICAL THERAPY | Facility: HOSPITAL | Age: 73
Setting detail: THERAPIES SERIES
Discharge: STILL ADMITTED OR ADMINISTRATIVE | End: 2023-08-08
Payer: MEDICARE

## 2023-08-08 DIAGNOSIS — Z74.09 IMPAIRED MOBILITY: ICD-10-CM

## 2023-08-08 DIAGNOSIS — Z96.652 S/P TOTAL KNEE ARTHROPLASTY, LEFT: Primary | ICD-10-CM

## 2023-08-08 DIAGNOSIS — R29.898 DECREASED STRENGTH INVOLVING KNEE JOINT: ICD-10-CM

## 2023-08-08 PROCEDURE — 97116 GAIT TRAINING THERAPY: CPT | Mod: GP | Performed by: PHYSICAL THERAPIST

## 2023-08-08 PROCEDURE — 97110 THERAPEUTIC EXERCISES: CPT | Mod: GP | Performed by: PHYSICAL THERAPIST

## 2023-08-08 NOTE — INTERDISCIPLINARY/THERAPY
[address] [facility] [place] [address]  [phone]      Physical Therapy Daily Treatment Note       Date of Service: 08/08/23    Patient Name: Cesar Yuen  Referring Provider: Garrett Saez DO  Visit Number: 6   Start of Care Date: 07/27/23  Certification Period: 10/25/23      Medical Diagnosis listed first. Additional are Treatment Diagnoses:     1. S/P total knee arthroplasty, left    2. Impaired mobility    3. Decreased strength involving knee joint            SUBJECTIVE:  Pt reports his foot was cold last night and he could not warm it up. Does not think he cut circulation off or anything. States it is better today.       Pain:   Pain Assessment Scale  Pain Scale: 0-10  0-10 Pain Score: 1    OBJECTIVE:  -NuStep (seat 13-12) progress L2 x8 min for mobility.  -felt pt's foot and it did not feel cold. LLE was less swollen today and not warm. He continues to wear knee high compression sock and ace wraps his knee vs wearing thigh high TIEN hose.   -supine heel slides progress 2x15 (107 deg)  -supine QS with emphasis on quad control 1x20.  -supine SAQ with emphasis on quad control progress 2x15.  -seated LAQ with emphasis on quad control progress 2x15.  -seated HS curls with YTB progress 2x15 with emphasis on control.  -standing weight shifts with occasional light UE support and cues for forward gaze: s/s, h/t, staggered each way x10.  -standing sequence: B heel raises progress x15,  alt HS curls x10, and mini squats x10 with UE support and min cues for proper mechanics on squats.   -intro to 6 in step up/down leading with each x10 with no UE support and mild unsteadiness, more so when leading with RLE. Advised he should perform with 1UE support.  -advised pt to continue with progression of reps in HEP.   -pt elected to ice on his own following session.                                                                         Time Calculation  Start Time: 0930  Stop Time: 1012  Time Calculation (min): 42  min       Therapeutic Interventions (Time Spent in Minutes)  Gait/Mobility: 10  Neuromuscular Re-Education: 8  Therapeutic Exercise: 24                ASSESSMENT:  Pt tolerated progression well with mild fatigue. He continues to demonstrate excellent mobility at 0-107 deg. He performed step activities with no compensation; however, was mildly unsteady when leading with RLE, stating he will likely need to replace it in the future.     PLAN FOR NEXT TREATMENT SESSION:  Pt is scheduled to return on 8/10/23 with strength, balance, and step activities. Will intro to lateral steps as able.     Thank you for allowing us to share in the care of this patient. If you have any questions, recommendations, or further concerns regarding this patient, please feel free to contact me at [address] [facility]  [place] [address]  Dept: [phone] .  Signed by: GRETEL CAVAZOS PT  8/8/2023  10:13 AM

## 2023-08-10 ENCOUNTER — HOSPITAL ENCOUNTER (OUTPATIENT)
Dept: PHYSICAL THERAPY | Facility: HOSPITAL | Age: 73
Setting detail: THERAPIES SERIES
Discharge: STILL ADMITTED OR ADMINISTRATIVE | End: 2023-08-10
Payer: MEDICARE

## 2023-08-10 DIAGNOSIS — R29.898 DECREASED STRENGTH INVOLVING KNEE JOINT: ICD-10-CM

## 2023-08-10 DIAGNOSIS — Z96.652 S/P TOTAL KNEE ARTHROPLASTY, LEFT: Primary | ICD-10-CM

## 2023-08-10 DIAGNOSIS — Z74.09 IMPAIRED MOBILITY: ICD-10-CM

## 2023-08-10 PROCEDURE — 97110 THERAPEUTIC EXERCISES: CPT | Mod: GP | Performed by: PHYSICAL THERAPIST

## 2023-08-10 PROCEDURE — 97116 GAIT TRAINING THERAPY: CPT | Mod: GP | Performed by: PHYSICAL THERAPIST

## 2023-08-10 NOTE — INTERDISCIPLINARY/THERAPY
[address] [facility] [place] [address]  [phone]      Physical Therapy Daily Treatment Note       Date of Service: 08/10/23    Patient Name: Cesar Yuen  Referring Provider: Garrett Saez DO  Visit Number: 7   Start of Care Date: 07/27/23  Certification Period: 10/25/23      Medical Diagnosis listed first. Additional are Treatment Diagnoses:     1. S/P total knee arthroplasty, left    2. Impaired mobility    3. Decreased strength involving knee joint              SUBJECTIVE:  Pt reports his muscles were sore after last time, but not bad today. He went back to wearing his TIEN hose and iced this morning. He does not have a cold foot anymore.       Pain:   Pain Assessment Scale  Pain Scale: 0-10  0-10 Pain Score: 1    OBJECTIVE:  -NuStep (seat 13-12) L2 x8 min for mobility.  -supine heel slides 2x15 (107 deg)  -supine QS with emphasis on quad control 1x20.  -supine SAQ with emphasis on quad control 2x15.  -seated LAQ with emphasis on quad control 2x15.  -seated HS curls with YTB 2x15 with emphasis on control.  -standing weight shifts with occasional light UE support and cues for forward gaze: s/s, h/t, staggered each way x10.  -standing sequence: B heel raises x15,  alt HS curls x10, and mini squats x10 with UE support and min cues for proper mechanics on squats.   -6 in step up/down leading with each x10 with 1 UE support.  -intro to 6 in lateral step up/over/back x8 with 1 UE support.  -pt elected to ice on his own following session.                                                                         Time Calculation  Start Time: 0934  Stop Time: 1012  Time Calculation (min): 38 min       Therapeutic Interventions (Time Spent in Minutes)  Gait/Mobility: 12  Therapeutic Exercise: 26                ASSESSMENT:  Pt continues to demonstrate good mobility and tolerated therex well with min cues for mechanics with step activities.     PLAN FOR NEXT TREATMENT SESSION:  Pt is scheduled to f/u on 8/15/23  Hpi Title: Evaluation of Skin Lesions How Severe Are Your Spot(S)?: mild with mobility, strength, progress balance and step activities as able.     Thank you for allowing us to share in the care of this patient. If you have any questions, recommendations, or further concerns regarding this patient, please feel free to contact me at [address] [place] [address]  Dept: [phone] .  Signed by: GRETEL CAVAZOS, SANTOS  8/10/2023  10:12 AM   Have Your Spot(S) Been Treated In The Past?: has not been treated

## 2023-08-11 ENCOUNTER — ANCILLARY PROCEDURE (OUTPATIENT)
Dept: RADIOLOGY | Facility: CLINIC | Age: 73
End: 2023-08-11
Payer: MEDICARE

## 2023-08-11 ENCOUNTER — OFFICE VISIT (OUTPATIENT)
Dept: ORTHOPEDIC SURGERY | Facility: CLINIC | Age: 73
End: 2023-08-11
Payer: MEDICARE

## 2023-08-11 VITALS — TEMPERATURE: 98.2 F | DIASTOLIC BLOOD PRESSURE: 70 MMHG | SYSTOLIC BLOOD PRESSURE: 120 MMHG

## 2023-08-11 DIAGNOSIS — G89.29 CHRONIC PAIN OF RIGHT KNEE: ICD-10-CM

## 2023-08-11 DIAGNOSIS — M25.561 CHRONIC PAIN OF RIGHT KNEE: ICD-10-CM

## 2023-08-11 DIAGNOSIS — Z96.652 S/P TOTAL KNEE ARTHROPLASTY, LEFT: ICD-10-CM

## 2023-08-11 DIAGNOSIS — Z48.89 POSTOPERATIVE VISIT: Primary | ICD-10-CM

## 2023-08-11 PROCEDURE — 73562 X-RAY EXAM OF KNEE 3: CPT | Mod: LT,PO

## 2023-08-11 PROCEDURE — 73562 X-RAY EXAM OF KNEE 3: CPT | Mod: 26,RT | Performed by: RADIOLOGY

## 2023-08-11 PROCEDURE — 99024 POSTOP FOLLOW-UP VISIT: CPT | Performed by: PHYSICIAN ASSISTANT

## 2023-08-11 PROCEDURE — 73562 X-RAY EXAM OF KNEE 3: CPT | Mod: 26,LT | Performed by: RADIOLOGY

## 2023-08-11 PROCEDURE — 73562 X-RAY EXAM OF KNEE 3: CPT | Mod: RT,PO

## 2023-08-11 ASSESSMENT — ENCOUNTER SYMPTOMS
PAIN: 1
ARTHRALGIAS: 1
JOINT SWELLING: 1

## 2023-08-11 NOTE — PROGRESS NOTES
Subjective   Patient ID: Cesar Yuen is a 73 y.o. male.    Chief Complaint:  Chief Complaint   Patient presents with    Left Knee - Post-op     2w4d s/p Left Total Knee Arthroplasty With Robotic Orthopedic Surgical Assistant with [facility] 7/24/23. Reports lateral muscle pain. No dermabond was present with bandage removal.     Pain     1/10 and denies pain meds     Pain  Associated symptoms include joint swelling.    patient is seen today 2 weeks post left total knee arthroplasty.  Patient does report doing very well with minimal pain and currently not taking pain medication.  He has been wearing an Ace wrap and his own compression socks which have helped with swelling.  He does attend physical therapy and this is going quite well for him.  He is curious to know what his right knee looks like on x-ray as he does have right knee pain as well.    Social History     Occupational History    Occupation:    Tobacco Use    Smoking status: Never    Smokeless tobacco: Never   Vaping Use    Vaping Use: Never used   Substance and Sexual Activity    Alcohol use: Yes     Alcohol/week: 1.0 standard drink of alcohol     Types: 1 Standard drinks or equivalent per week     Comment: rarely    Drug use: Never    Sexual activity: Defer      Review of Systems   Musculoskeletal:  Positive for arthralgias, gait problem and joint swelling.             Objective   Ortho Exam      Patient ambulates without significant antalgic gait.  Left knee incision is well-healed.  He does have pitting edema in his left lower extremity which was present prior to his surgery.  He does have mild swelling of his left knee without erythema.  Physical therapy note does read left knee range of motion 0 to 107 degrees.    Right knee does show fairly significant varus deformity with limited range of motion and swelling present.      Assessment   Diagnoses and all orders for this visit:    Postoperative visit  -     X-ray knee 3 views left    Chronic  pain of right knee  -     X-ray knee 3 views right    S/P total knee arthroplasty, left  -     X-ray knee 3 views left              Plan    Left knee x-ray obtained today does show total knee implant.  Patient will continue with his compression socks as he does at all times and will also continue with physical therapy as he has been doing.  I will see him back between 4 and 6 weeks with repeat x-rays of left knee including hip to ankle.  As far as his right knee is concerned he will see Dr. Zimmerman to discuss the potential for total knee arthroplasty.  He will schedule this at his convenience.

## 2023-08-15 ENCOUNTER — HOSPITAL ENCOUNTER (OUTPATIENT)
Dept: PHYSICAL THERAPY | Facility: HOSPITAL | Age: 73
Setting detail: THERAPIES SERIES
Discharge: STILL ADMITTED OR ADMINISTRATIVE | End: 2023-08-15
Payer: MEDICARE

## 2023-08-15 DIAGNOSIS — R29.898 DECREASED STRENGTH INVOLVING KNEE JOINT: ICD-10-CM

## 2023-08-15 DIAGNOSIS — Z96.652 S/P TOTAL KNEE ARTHROPLASTY, LEFT: Primary | ICD-10-CM

## 2023-08-15 DIAGNOSIS — Z74.09 IMPAIRED MOBILITY: ICD-10-CM

## 2023-08-15 PROCEDURE — 97110 THERAPEUTIC EXERCISES: CPT | Mod: GP | Performed by: PHYSICAL THERAPIST

## 2023-08-15 NOTE — INTERDISCIPLINARY/THERAPY
"  [address] [place] [address]  [phone]      Physical Therapy Daily Treatment Note       Date of Service: 08/15/23    Patient Name: Cesar Yuen  Referring Provider: Garrett Saez DO  Visit Number: 8   Start of Care Date: 07/27/23  Certification Period: 10/25/23      Medical Diagnosis listed first. Additional are Treatment Diagnoses:     1. S/P total knee arthroplasty, left    2. Impaired mobility    3. Decreased strength involving knee joint          SUBJECTIVE:  Pt reports his ortho f/u went well. He was told his R knee is \"shot\" and will likely have it replaced.    Pain:   Pain Assessment Scale  Pain Scale: 0-10  0-10 Pain Score: 1    OBJECTIVE:  -NuStep (seat 12) L2 x8 min for mobility.  -supine heel slides 2x15 (107 deg)  -supine SAQ with emphasis on quad control progress 1x30.  -intro to supine SLR with emphasis on quad control 2x10. Added to HEP.  -seated LAQ with emphasis on quad control progress 1x30.  -seated HS curls with YTB progress 1x30 with emphasis on control.  -standing sequence: B heel raises x15,  alt HS curls progress x15, and mini squats progress x10 with UE support and min cues for proper mechanics on squats. Repeated 2 bouts.    -6 in step up/down leading with each x10 with 1 UE support.  -6 in lateral step up/over/back progress x10 with 1 UE support.  -advised pt to continue with changes in HEP.   -pt elected to ice on his own following session.                                                                         Time Calculation  Start Time: 0930  Stop Time: 1010  Time Calculation (min): 40 min       Therapeutic Interventions (Time Spent in Minutes)  Therapeutic Exercise: 40                ASSESSMENT:  Pt demonstrates good endurance with less rest breaks. He is able to maintain quad control with new SLR. He is doing well with LLE step activities; however, is limited with RLE, so advised he use support and limit reps.    PLAN FOR NEXT TREATMENT SESSION:  Pt is " scheduled to f/u on 8/22/23 with mobility, strength, add Airex pad for balance activities, and progress 6 in step for LLE as able.     Thank you for allowing us to share in the care of this patient. If you have any questions, recommendations, or further concerns regarding this patient, please feel free to contact me at [address] [facility]  [place] [address]  Dept: [phone] .  Signed by: GRETEL CAVAZOS, SANTOS  8/15/2023  10:11 AM

## 2023-08-22 ENCOUNTER — TELEPHONE (OUTPATIENT)
Dept: ORTHOPEDIC SURGERY | Facility: CLINIC | Age: 73
End: 2023-08-22
Payer: MEDICARE

## 2023-08-22 ENCOUNTER — HOSPITAL ENCOUNTER (OUTPATIENT)
Dept: PHYSICAL THERAPY | Facility: HOSPITAL | Age: 73
Setting detail: THERAPIES SERIES
Discharge: STILL ADMITTED OR ADMINISTRATIVE | End: 2023-08-22
Payer: MEDICARE

## 2023-08-22 DIAGNOSIS — R29.898 DECREASED STRENGTH INVOLVING KNEE JOINT: ICD-10-CM

## 2023-08-22 DIAGNOSIS — Z96.652 S/P TOTAL KNEE ARTHROPLASTY, LEFT: Primary | ICD-10-CM

## 2023-08-22 DIAGNOSIS — Z74.09 IMPAIRED MOBILITY: ICD-10-CM

## 2023-08-22 PROCEDURE — 97112 NEUROMUSCULAR REEDUCATION: CPT | Mod: GP | Performed by: PHYSICAL THERAPIST

## 2023-08-22 PROCEDURE — 97110 THERAPEUTIC EXERCISES: CPT | Mod: GP | Performed by: PHYSICAL THERAPIST

## 2023-08-22 NOTE — TELEPHONE ENCOUNTER
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 Please call patient once letter is ready to be picked up

## 2023-08-22 NOTE — INTERDISCIPLINARY/THERAPY
[address] [place] [address]  [phone]      Physical Therapy Daily Treatment Note       Date of Service: 08/22/23    Patient Name: Cesar Yuen  Referring Provider: Garrett Saez DO  Visit Number: 9   Start of Care Date: 07/27/23  Certification Period: 10/25/23      Medical Diagnosis listed first. Additional are Treatment Diagnoses:     1. S/P total knee arthroplasty, left    2. Impaired mobility    3. Decreased strength involving knee joint        SUBJECTIVE:  Pt reports he is doing well. He feels his muscles working. 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     Pain:   Pain Assessment Scale  Pain Scale: 0-10  0-10 Pain Score: 1    OBJECTIVE:  -NuStep (seat progress 10) L2 x8 min for mobility. Advised pt can get a note from orthopedics.  -supine heel slides progress x30 (110 deg)  -supine SAQ with emphasis on quad control, added 2.5# 2x15.  -supine SLR with emphasis on quad control 2x10.   -seated LAQ with emphasis on quad control, added 2.5# 2x15.   -seated HS curls with YTB 1x30 with emphasis on control.  -standing weight shifts progress on Airexpad: s/s, h/t, and staggered each way x10 with no UE support and forward gaze.  -standing sequence progress on Airexpad: B heel raises x10,  alt HS curls x10, and mini squats x10 with no support and min cues for proper mechanics on squats.  -6 in step up/down leading with LLE x10, RLE x5 with 1 UE support.  -6 in lateral step up/over/back x10 with 1 UE support.  -advised pt to continue with changes in HEP.   -pt elected to ice on his own following session.                                                                         Time Calculation  Start Time: 0930  Stop Time: 1008  Time Calculation (min): 38 min       Therapeutic Interventions (Time Spent in Minutes)  Neuromuscular Re-Education: 12  Therapeutic Exercise: 26                ASSESSMENT:  Pt demonstrates overall mobility 0-110 actively with no assist. He tolerated  adding weight and balance with Airexpad well with mild difficulty. He primarily has R knee discomfort with step activities.    PLAN FOR NEXT TREATMENT SESSION:  Pt is scheduled to return on 8/24/23 with mobility, strength, balance, and step activities. May intro to leg press machine.    Thank you for allowing us to share in the care of this patient. If you have any questions, recommendations, or further concerns regarding this patient, please feel free to contact me at [address] [facility]  [place] [address]  Dept: [phone] .  Signed by: GRETEL CAVAZOS PT  8/22/2023  10:08 AM

## 2023-08-22 NOTE — LETTER
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    If you have any questions or concerns, please contact [facility] OR[facility]OPEDICS [facility] SPORTS MEDICINE ORTHOPEDIC SURGERY or work directly with employer’s internal employee health to address specific needs.            Sincerely,        NENITA FARMER    Electronically signed by NENITA FARMER at 9:27 AM    CC: No Recipients

## 2023-08-23 NOTE — TELEPHONE ENCOUNTER
Patient following up and release to drive note, he would like this done as soon as possible. Please call him when it is ready to be picked up

## 2023-08-24 ENCOUNTER — HOSPITAL ENCOUNTER (OUTPATIENT)
Dept: PHYSICAL THERAPY | Facility: HOSPITAL | Age: 73
Setting detail: THERAPIES SERIES
Discharge: STILL ADMITTED OR ADMINISTRATIVE | End: 2023-08-24
Payer: MEDICARE

## 2023-08-24 DIAGNOSIS — Z96.652 S/P TOTAL KNEE ARTHROPLASTY, LEFT: Primary | ICD-10-CM

## 2023-08-24 DIAGNOSIS — R29.898 DECREASED STRENGTH INVOLVING KNEE JOINT: ICD-10-CM

## 2023-08-24 DIAGNOSIS — Z74.09 IMPAIRED MOBILITY: ICD-10-CM

## 2023-08-24 PROCEDURE — 97110 THERAPEUTIC EXERCISES: CPT | Mod: GP | Performed by: PHYSICAL THERAPIST

## 2023-08-24 PROCEDURE — 97112 NEUROMUSCULAR REEDUCATION: CPT | Mod: GP | Performed by: PHYSICAL THERAPIST

## 2023-08-24 NOTE — INTERDISCIPLINARY/THERAPY
[address] [place] [address]  [phone]      Physical Therapy Daily Treatment Note/10th visit Progress Note       Date of Service: 08/24/23    Patient Name: Cesar Yuen  Referring Provider: Garrett Saez DO  Visit Number: 10   Start of Care Date: 07/27/23  Certification Period: 10/25/23      Medical Diagnosis listed first. Additional are Treatment Diagnoses:     1. S/P total knee arthroplasty, left    2. Impaired mobility    3. 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     Pain:   Pain Assessment Scale  Pain Scale: 0-10  0-10 Pain Score: 1    OBJECTIVE:  -NuStep (seat progress 10) L2 x8 min for mobility.  -intro to Schwinn (seat 7) half moon each way, followed with full forward revolutions x4 min total.  -supine heel slides x30 (111 deg)  -supine SAQ with emphasis on quad control, with 2.5# 2x15.  -supine SLR with emphasis on quad control 2x10.   -seated LAQ with emphasis on quad control, with 2.5# 2x15.   -seated HS curls with YTB 1x30 with emphasis on control.  -standing weight shifts on Airexpad: s/s, h/t, and staggered each way x10 with no UE support and forward gaze.  -standing sequence on Airexpad: B heel raises progress x15,  alt HS curls x10, and mini squats x10 with no support and min cues for proper mechanics on squats.  -reviewed goals  -FOTO survey: 71/100  -pt elected to ice on his own following session.                                                                         Time Calculation  Start Time: 0930  Stop Time: 1015  Time Calculation (min): 45 min       Therapeutic Interventions (Time Spent in Minutes)  Neuromuscular Re-Education: 12  Therapeutic Exercise: 30                ASSESSMENT:  Pt tolerated intro to Schwinn well with ability to make full forward revolutions. He is progressing nicely with goals.     Progress Note   Reporting Dates  7/27/23 through 8/24/23.  Patient's treatment has focused on mobility, strength, gait/stair training, balance, and instruction in home program. Patient is demonstrating improvements in all areas. Patient has not yet reached maximal benefit from end range mobility, strength, and step activities.      Problem list:  1. Impaired end range mobility  2. Impaired strength/stability  3. Impaired balance    CURRENT GOALS:  GOALS:       The following goals were established with the patient and include:   Short Term Goals: (4-6 weeks)   Pt will be independent and compliant with home exercise program. (MET)  Pt will demonstrate L knee mobility 3-110 deg or greater. (MET)  Pt will transition from FWW to quad cane with functional ambulation distances. (MET)    Long Term Goals:  (8-12 weeks)  Pt to have a FOTO change score or greater of equal to 80/100 indicating an improvement in function. (PROGRESSING, 71/100)  Pt will demonstrate L knee mobility 3-120 deg or greater. (PROGRESSING)  Pt will demonstrate overall LLE strength 4+ to 5/5 in all planes. (PROGRESSING)  Pt will negotiate stairs with alternating gait and use of handrail as needed. (MET)    UPDATED GOALS:  Patient will continue to work to meet above goals that are in progress or unmet.    PLAN OF CARE:    It is recommended that the client attend rehabilitative therapy for up to 1-2 visits per week with an expected duration through Certification Period: 10/25/23  .  Interventions during the course of treatment will be directed toward addressing the problems and achieving the goals previously outlined.  Treatment may consist of any combination of the following services as needed:  Therapeutic exercise, therapeutic activity, manual therapy,, gait training, , and neuromuscular re-education, and instruction in home program.      PLAN FOR NEXT TREATMENT SESSION:  Pt is scheduled to return on 8/24/23 with mobility, strength, balance, and step activities. May intro to leg press  machine.    Thank you for allowing us to share in the care of this patient. If you have any questions, recommendations, or further concerns regarding this patient, please feel free to contact me at [address] [place] [address]  Dept: [phone] .  Signed by: GRETEL CAVAZOS, PT  8/24/2023  10:26 AM

## 2023-08-28 PROCEDURE — 93298 REM INTERROG DEV EVAL SCRMS: CPT | Performed by: INTERNAL MEDICINE

## 2023-08-28 PROCEDURE — G2066 INTER DEVC REMOTE 30D: HCPCS | Performed by: INTERNAL MEDICINE

## 2023-08-29 ENCOUNTER — HOSPITAL ENCOUNTER (OUTPATIENT)
Dept: PHYSICAL THERAPY | Facility: HOSPITAL | Age: 73
Setting detail: THERAPIES SERIES
Discharge: STILL ADMITTED OR ADMINISTRATIVE | End: 2023-08-29
Payer: MEDICARE

## 2023-08-29 DIAGNOSIS — R29.898 DECREASED STRENGTH INVOLVING KNEE JOINT: ICD-10-CM

## 2023-08-29 DIAGNOSIS — Z74.09 IMPAIRED MOBILITY: ICD-10-CM

## 2023-08-29 DIAGNOSIS — Z96.652 S/P TOTAL KNEE ARTHROPLASTY, LEFT: Primary | ICD-10-CM

## 2023-08-29 PROCEDURE — 97112 NEUROMUSCULAR REEDUCATION: CPT | Mod: GP | Performed by: PHYSICAL THERAPIST

## 2023-08-29 PROCEDURE — 97110 THERAPEUTIC EXERCISES: CPT | Mod: GP | Performed by: PHYSICAL THERAPIST

## 2023-08-29 NOTE — INTERDISCIPLINARY/THERAPY
[address] [place] [address]  [phone]      Physical Therapy Daily Treatment Note      Date of Service: 08/29/23    Patient Name: Cesar Yuen  Referring Provider: Garrett Saez DO  Visit Number: 11   Start of Care Date: 07/27/23  Certification Period: 10/25/23      Medical Diagnosis listed first. Additional are Treatment Diagnoses:     1. S/P total knee arthroplasty, left    2. Impaired mobility    3. Decreased strength involving knee joint          SUBJECTIVE:  Pt reports he is doing well with no new complaints.     Pain:   Pain Assessment Scale  Pain Scale: 0-10  0-10 Pain Score: 1    OBJECTIVE:  -NuStep (seat progress 10) L2 x5 min for mobility.  -Schwinn (seat 7) half moon each way, followed with full forward revolutions x6 min total.  -supine heel slides x30 (112-115 deg)  -supine SLR with emphasis on quad control progress 2x12.  -seated LAQ with emphasis on quad control, with 2.5# 2x15.   -seated HS curls progress RTB 1x20 with emphasis on control.  -standing weight shifts on Airexpad: s/s, h/t, and staggered each way x10 with no UE support and forward gaze.  -standing sequence on Airexpad: B heel raises x15,  alt HS curls x10, and mini squats x10 with no support and min cues for proper mechanics on squats. Repeated 2 rounds.   -6 in step up/down leading with LLE progress x12, RLE x5 with 1 UE support.  -6 in lateral step up/over/back progress x12 with 1 UE support.  -provided pt with RTB for HEP.                                                                        Time Calculation  Start Time: 0930  Stop Time: 1015  Time Calculation (min): 45 min       Therapeutic Interventions (Time Spent in Minutes)  Neuromuscular Re-Education: 15  Therapeutic Exercise: 30                ASSESSMENT:  Pt tolerated progression of therex well with no compensations and fatigue by end of session.       PLAN FOR NEXT TREATMENT SESSION:  Pt is scheduled to return on 8/31/23 with mobility, strength,  balance, and step activities. May intro to leg press machine.    Thank you for allowing us to share in the care of this patient. If you have any questions, recommendations, or further concerns regarding this patient, please feel free to contact me at [address] [facility]  [place] [address]  Dept: [phone] .  Signed by: GRETEL CAVAZOS, PT  8/29/2023  12:38 PM

## 2023-08-31 ENCOUNTER — HOSPITAL ENCOUNTER (OUTPATIENT)
Dept: PHYSICAL THERAPY | Facility: HOSPITAL | Age: 73
Setting detail: THERAPIES SERIES
Discharge: STILL ADMITTED OR ADMINISTRATIVE | End: 2023-08-31
Payer: MEDICARE

## 2023-08-31 DIAGNOSIS — R29.898 DECREASED STRENGTH INVOLVING KNEE JOINT: ICD-10-CM

## 2023-08-31 DIAGNOSIS — Z74.09 IMPAIRED MOBILITY: ICD-10-CM

## 2023-08-31 DIAGNOSIS — Z96.652 S/P TOTAL KNEE ARTHROPLASTY, LEFT: Primary | ICD-10-CM

## 2023-08-31 PROCEDURE — 97110 THERAPEUTIC EXERCISES: CPT | Mod: GP | Performed by: PHYSICAL THERAPIST

## 2023-08-31 NOTE — INTERDISCIPLINARY/THERAPY
[address] [place] [address]  [phone]      Physical Therapy Daily Treatment Note      Date of Service: 08/31/23    Patient Name: Cesar Yuen  Referring Provider: Garrett Saez DO  Visit Number: 12   Start of Care Date: 07/27/23  Certification Period: 10/25/23      Medical Diagnosis listed first. Additional are Treatment Diagnoses:     1. S/P total knee arthroplasty, left    2. Impaired mobility    3. Decreased strength involving knee joint            SUBJECTIVE:  Pt reports he is doing well with no new complaints.     Pain:   Pain Assessment Scale  Pain Scale: 0-10  0-10 Pain Score: 1    OBJECTIVE:  -NuStep (seat 10) progress L3 x6 min for mobility.  -Schwinn (seat 7) half moon each way, followed with full forward revolutions x6 min total.  -supine heel slides x30 (115 -118 deg)  -supine SLR with emphasis on quad control 2x12.  -seated LAQ with emphasis on quad control, with 2.5# 2x15.   -seated HS curls RTB 1x20 with emphasis on control.  -intro to Matrix leg press (foot plate 8) B 85# x20, followed with B heel raises x20. Then repeated for 2nd round.   -6 in step up/down leading with LLE x12, RLE x5 with 1 UE support.  -6 in lateral step up/down with LLE x12 with 1 UE support.  -standing alt HS curls x15 each with RUE support only.                                                                         Time Calculation  Start Time: 0927  Stop Time: 1002  Time Calculation (min): 35 min       Therapeutic Interventions (Time Spent in Minutes)  Therapeutic Exercise: 35                ASSESSMENT:  Pt tolerated intro to Matrix leg press well and is doing well with step activities with focus more on LLE than RLE. Should be ready to progress to 8 in step next week.       PLAN FOR NEXT TREATMENT SESSION:  Pt is scheduled to f/u on 9/6/23 with end range mobility, continued use of machines, and progress to 8 in step activities as able.     Thank you for allowing us to share in the care of this  patient. If you have any questions, recommendations, or further concerns regarding this patient, please feel free to contact me at [address] [facility]  [place] [address]  Dept: [phone] .  Signed by: GRETEL CAVAZOS, SANTOS  8/31/2023  10:03 AM

## 2023-09-06 ENCOUNTER — HOSPITAL ENCOUNTER (OUTPATIENT)
Dept: PHYSICAL THERAPY | Facility: HOSPITAL | Age: 73
Setting detail: THERAPIES SERIES
Discharge: STILL ADMITTED OR ADMINISTRATIVE | End: 2023-09-06
Payer: MEDICARE

## 2023-09-06 DIAGNOSIS — R29.898 DECREASED STRENGTH INVOLVING KNEE JOINT: ICD-10-CM

## 2023-09-06 DIAGNOSIS — Z96.652 S/P TOTAL KNEE ARTHROPLASTY, LEFT: Primary | ICD-10-CM

## 2023-09-06 DIAGNOSIS — Z74.09 IMPAIRED MOBILITY: ICD-10-CM

## 2023-09-06 PROCEDURE — 97110 THERAPEUTIC EXERCISES: CPT | Mod: GP | Performed by: PHYSICAL THERAPIST

## 2023-09-06 NOTE — INTERDISCIPLINARY/THERAPY
[address] [place] [address]  [phone]      Physical Therapy Daily Treatment Note      Date of Service: 09/06/23    Patient Name: Cesar Yuen  Referring Provider: Garrett Saez DO  Visit Number: 13   Start of Care Date: 07/27/23  Certification Period: 10/25/23      Medical Diagnosis listed first. Additional are Treatment Diagnoses:     1. S/P total knee arthroplasty, left    2. Impaired mobility    3. Decreased strength involving knee joint      SUBJECTIVE:  Pt reports he is doing well with no complaints. 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     Pain:   Pain Assessment Scale  Pain Scale: 0-10  0-10 Pain Score: 1    OBJECTIVE:  -NuStep (seat 10) L3 x5  min for mobility.  -Schwinn (seat 7) half moon each way, followed with full forward revolutions x5 min total.  -supine heel slides x30 (118-121 deg)  -supine SLR with emphasis on quad control progress 2x15.  -seated LAQ with emphasis on quad control, with 2.5# 2x15.   -seated HS curls RTB progress 1x30 with emphasis on control.  -progress 8 in step up/down leading with LLE x12, RLE x5 with 1 UE support.  -progress 8 in lateral step up/down with LLE x12 with 1 UE support.  -Matrix leg press (foot plate 8) B 85# progress 2x20, followed with B heel raises x20. Then intro to LLE only leg press 45# 2x20.  -intro to Matrix leg press B 45# x15, followed with LLE only with 25# x15.  -advised pt to continue with changes in HEP.                                                                      Time Calculation  Start Time: 0927  Stop Time: 1008  Time Calculation (min): 41 min       Therapeutic Interventions (Time Spent in Minutes)  Therapeutic Exercise: 41                ASSESSMENT:  Pt tolerated progression well with occasional cues to maintain control vs momentum with SLRs. He did well with increasing to 8 inch step for LLE and we continue to limit RLE due to discomfort. He also was adequately fatigued with machine  exercises.      PLAN FOR NEXT TREATMENT SESSION:  Pt is scheduled to f/u on 9/11/23 with end range stretching, intro to ecc step down, and intro to mini lunges, as able.     Thank you for allowing us to share in the care of this patient. If you have any questions, recommendations, or further concerns regarding this patient, please feel free to contact me at [address] [facility]  [place] [address]  Dept: [phone] .  Signed by: GRETEL CAVAZOS, PT  9/6/2023  10:09 AM

## 2023-09-11 ENCOUNTER — HOSPITAL ENCOUNTER (OUTPATIENT)
Dept: PHYSICAL THERAPY | Facility: HOSPITAL | Age: 73
Setting detail: THERAPIES SERIES
Discharge: STILL ADMITTED OR ADMINISTRATIVE | End: 2023-09-11
Payer: MEDICARE

## 2023-09-11 DIAGNOSIS — R29.898 DECREASED STRENGTH INVOLVING KNEE JOINT: ICD-10-CM

## 2023-09-11 DIAGNOSIS — Z74.09 IMPAIRED MOBILITY: ICD-10-CM

## 2023-09-11 DIAGNOSIS — Z96.652 S/P TOTAL KNEE ARTHROPLASTY, LEFT: Primary | ICD-10-CM

## 2023-09-11 PROCEDURE — 97110 THERAPEUTIC EXERCISES: CPT | Mod: GP | Performed by: PHYSICAL THERAPIST

## 2023-09-11 PROCEDURE — 97112 NEUROMUSCULAR REEDUCATION: CPT | Mod: GP | Performed by: PHYSICAL THERAPIST

## 2023-09-11 NOTE — INTERDISCIPLINARY/THERAPY
[address] [facility] [place] [address]  [phone]      Physical Therapy Daily Treatment Note      Date of Service: 09/11/23    Patient Name: Cesar Yuen  Referring Provider: Garrett Saez DO  Visit Number: 14   Start of Care Date: 07/27/23  Certification Period: 10/25/23      Medical Diagnosis listed first. Additional are Treatment Diagnoses:     1. S/P total knee arthroplasty, left    2. Impaired mobility    3. Decreased strength involving knee joint        SUBJECTIVE:  Pt reports he is doing well with no complaints. He will be going to [place] the end of this week.     Pain:   Pain Assessment Scale  Pain Scale: 0-10  0-10 Pain Score: 1    OBJECTIVE:  -NuStep (seat 10) L3 x5 min for mobility.  -Schwinn (seat 7, L3) full revolutions each way for total of 5 min.  -supine heel slides x30 (116-121 deg with gentle OP)  -supine SLR with emphasis on quad control progress x30.  -seated LAQ with emphasis on quad control, with 2.5# progress x30.  -seated HS curls RTB 1x30 with emphasis on control.  -8 in step up/down leading with LLE progress x15, RLE x5 with 1 UE support.  -8 in lateral step up/down with LLE progress x15 with 1 UE support.  -intro to 4 in ecc step down in LSLS 1x10 with min cues for proper mechanics. Added to HEP.   -intro to forward mini lunges, leading with LLE x10 with no UE support. Added to HEP.  -Matrix leg press (foot plate 8) B 85# 2x20, followed with B heel raises x20. Then LLE only leg press 45# 2x20.  -Matrix HS curls LLE only with 25# 2x15.  -advised pt to continue with changes in HEP.                                                                         Time Calculation  Start Time: 0843  Stop Time: 0922  Time Calculation (min): 39 min       Therapeutic Interventions (Time Spent in Minutes)  Neuromuscular Re-Education: 10  Therapeutic Exercise: 29                ASSESSMENT:  Pt tolerated therex well with no required cues for 8 in step activities and min cues for new 4 in ecc step  down. He tolerated mini lunges well leading with LLE, did not perform leading with RLE.       PLAN FOR NEXT TREATMENT SESSION:  Pt is scheduled to return on 9/13/23 to finalize progressive home program.     Thank you for allowing us to share in the care of this patient. If you have any questions, recommendations, or further concerns regarding this patient, please feel free to contact me at [address] [facility]  [place] [address]  Dept: [phone] .  Signed by: GRETEL CAVAZOS, SANTOS  9/11/2023  9:23 AM

## 2023-09-13 ENCOUNTER — HOSPITAL ENCOUNTER (OUTPATIENT)
Dept: PHYSICAL THERAPY | Facility: HOSPITAL | Age: 73
Setting detail: THERAPIES SERIES
Discharge: STILL ADMITTED OR ADMINISTRATIVE | End: 2023-09-13
Payer: MEDICARE

## 2023-09-13 DIAGNOSIS — Z74.09 IMPAIRED MOBILITY: ICD-10-CM

## 2023-09-13 DIAGNOSIS — Z96.652 S/P TOTAL KNEE ARTHROPLASTY, LEFT: Primary | ICD-10-CM

## 2023-09-13 DIAGNOSIS — R29.898 DECREASED STRENGTH INVOLVING KNEE JOINT: ICD-10-CM

## 2023-09-13 PROCEDURE — 97110 THERAPEUTIC EXERCISES: CPT | Mod: GP | Performed by: PHYSICAL THERAPIST

## 2023-09-13 NOTE — INTERDISCIPLINARY/THERAPY
[address] [facility]  [place] [address]  [phone]      Physical Therapy Discharge Note    Date of Service:  09/13/23    Patient Name: Cesar Yuen  Referring Provider: Garrett Saez DO  Visit Number: 15    Onset Date: 7/24/23  Start of Care Date: 07/27/23    Certification Period: 10/25/23      Medical Diagnosis listed first. Additional are Treatment Diagnoses:     1. S/P total knee arthroplasty, left    2. Impaired mobility    3. Decreased strength involving knee joint        Date of last visit: 9/13/23  Total number of therapy visits:  15  Treatment included: Therapeutic exercise, therapeutic activity, manual therapy,, gait training, , and neuromuscular re-education,and instruction in home program.    This patient has been discharged from Physical Therapy because: goals have been met    Patient Compliance: good    Subjective:  Pt reports he is doing well with no complaints.     Pain: Pain Assessment Scale  Pain Scale: 0-10  0-10 Pain Score: 1       Objective:   FOTO: Patient’s current functional status measure is 81/100 (compared to initial measure of 63/100) where a higher number = greater function.                                                                              Today's Treatment:  -NuStep (seat 10) L3 x5 min for mobility.  -Schwinn (seat 7, L3) full revolutions each way for total of 5 min.  -supine heel slides x30 (115-120 deg with gentle OP)  -supine SLR with emphasis on quad control x30.  -assess LE strength: overall 4+ to 5/5  -assess negotiating stairs x3 with alternating pattern and light use of handrail.   -reviewed goals  -4 in ecc step down in LSLS x10 with min cues.   -forward mini lunges leading with LLE x10 with no UE support.  -mini squats x10 with min cues to keep weight behind toes.  -FOTO survey: 81/100  -provided pt with written instructions for progressive home program.     Education:   The patient was provided education on anatomy, PT intervention, and POC.   A home  exercise program was established throughout episode of care.     Time Calculation  Start Time: 0845  Stop Time: 0924  Time Calculation (min): 39 min     Therapeutic Interventions (Time Spent in Minutes)  Therapeutic Exercise: 39             Assessment:  Prior Status: Aggravating factors: bending his knee, prolonged positions, after exercising, vehicle transfers  Current Status: met goals and functioning well      Barriers to outcome: none for LLE; R knee in need of TKA    Current Goals/Progress Towards:  CURRENT GOALS:  GOALS:       The following goals were established with the patient and include:   Short Term Goals: (4-6 weeks)   Pt will be independent and compliant with home exercise program. (MET)  Pt will demonstrate L knee mobility 3-110 deg or greater. (MET)  Pt will transition from FWW to quad cane with functional ambulation distances. (MET)    Long Term Goals:  (8-12 weeks)  Pt to have a FOTO change score or greater of equal to 80/100 indicating an improvement in function. (MET)  Pt will demonstrate L knee mobility 3-120 deg or greater. (MET)  Pt will demonstrate overall LLE strength 4+ to 5/5 in all planes. (MET)  Pt will negotiate stairs with alternating gait and use of handrail as needed. (MET)    Discharge recommendations:  D/C pt to allow him to continue with independent progressive home program. Advised pt to contact me with any questions regarding home program.       Thank you for allowing us to share in the care of this patient. If you have any questions, recommendations, or further concerns regarding this patient, please feel free to contact us at [address] [place] [address]  Dept: [phone] .      Signed by: GRETEL CAVAZOS, PT 9/13/2023 9:26 AM

## 2023-09-14 ENCOUNTER — TELEPHONE (OUTPATIENT)
Dept: FAMILY MEDICINE | Facility: CLINIC | Age: 73
End: 2023-09-14
Payer: MEDICARE

## 2023-09-14 NOTE — TELEPHONE ENCOUNTER
[facility] is requesting on behalf of patient that Dr. Balbir Machado give pt samples of elequis.

## 2023-09-15 NOTE — TELEPHONE ENCOUNTER
Advised pt that the office does not have samples of Eliquis. Verbalizes understanding. Says that [employer] is going to call clinic and then it will be covered. Pt is going on vacation, and he is going to run out. Highly advised that pt not go without his Eliquis as it could have detrimental affects. Pt says that he will work something out with the pharmacy so that he does not go without. Denies further questions or concerns.

## 2023-09-19 DIAGNOSIS — I48.3 TYPICAL ATRIAL FLUTTER (CMS/HCC): Primary | ICD-10-CM

## 2023-09-19 NOTE — PROGRESS NOTES
[employer] denied pt's Eliquis coverage. He states that he has a couple of week to get him by. He is currently on vacation in [place] but will return on Monday. Advised that  can change anticoagulant to Coumadin, but we need an INR first. Pt is agreeable and plans to come in on Monday.

## 2023-09-25 ENCOUNTER — LAB (OUTPATIENT)
Dept: LAB | Facility: CLINIC | Age: 73
End: 2023-09-25
Payer: MEDICARE

## 2023-09-25 ENCOUNTER — OFFICE VISIT (OUTPATIENT)
Dept: ORTHOPEDIC SURGERY | Facility: CLINIC | Age: 73
End: 2023-09-25
Payer: MEDICARE

## 2023-09-25 VITALS — SYSTOLIC BLOOD PRESSURE: 140 MMHG | DIASTOLIC BLOOD PRESSURE: 70 MMHG

## 2023-09-25 DIAGNOSIS — I48.3 TYPICAL ATRIAL FLUTTER (CMS/HCC): ICD-10-CM

## 2023-09-25 DIAGNOSIS — M17.11 ARTHRITIS OF KNEE, RIGHT: Primary | ICD-10-CM

## 2023-09-25 LAB
INR BLD: 1.3
PROTHROMBIN TIME: 14.4 SECONDS (ref 9.4–12.5)

## 2023-09-25 PROCEDURE — 36415 COLL VENOUS BLD VENIPUNCTURE: CPT

## 2023-09-25 PROCEDURE — 85610 PROTHROMBIN TIME: CPT

## 2023-09-25 PROCEDURE — G0463 HOSPITAL OUTPT CLINIC VISIT: HCPCS | Mod: PO | Performed by: PHYSICIAN ASSISTANT

## 2023-09-25 PROCEDURE — 99212 OFFICE O/P EST SF 10 MIN: CPT | Performed by: PHYSICIAN ASSISTANT

## 2023-09-25 ASSESSMENT — ENCOUNTER SYMPTOMS
PAIN: 1
ARTHRALGIAS: 0
JOINT SWELLING: 0

## 2023-09-25 NOTE — PROGRESS NOTES
Subjective   Patient ID: Cesar Yuen is a 73 y.o. male.    Chief Complaint:  Chief Complaint   Patient presents with    Right Knee - Pain     Pt presents because was told right knee is bone on bone. Wants evaluation and recommendation of what do do. Denies pain    Pain     Pain  Pertinent negatives include no joint swelling.    patient is seen for right knee pain told he had bone-on-bone arthritis although does not have discomfort.  He is essentially here to find out if he should do anything about his right knee if he is not having pain.  He reports his left total knee is doing well and he is finished with physical therapy, he has no concerns with his left total knee arthroplasty.    Social History     Occupational History    Occupation:    Tobacco Use    Smoking status: Never    Smokeless tobacco: Never   Vaping Use    Vaping Use: Never used   Substance and Sexual Activity    Alcohol use: Yes     Alcohol/week: 1.0 standard drink of alcohol     Types: 1 Standard drinks or equivalent per week     Comment: rarely    Drug use: Never    Sexual activity: Defer      Review of Systems   Musculoskeletal:  Negative for arthralgias, gait problem and joint swelling.             Objective   Ortho Exam      Patient ambulates without assistive device and and no antalgic gait, varus deformity present.  He has mild swelling of his right knee and no erythema.  He has limited range of motion of his right knee with flexion and is approximately 5 degrees from terminal extension of his right knee.  He has no significant palpatory pain present.  He does have pitting edema of his right and left lower extremity 1+      Assessment   Diagnoses and all orders for this visit:    Arthritis of knee, right              Plan    Right knee arthritis currently with no pain.  I did relate to Marco that nothing would need to be done with his right knee if he is not having pain and he will simply follow-up if that changes in the  future.

## 2023-09-25 NOTE — LETTER
09/25/23      [facility]  [address] [place] [address]  [phone]  Dept: [phone]        To whom it may concern:    Cesar Yuen had a left total knee arthroplasty with Dr. Garrett Saez on 7/24/23. Our recommendation is for the patient to take amoxicillin 2 grams ONE HOUR by mouth prior to all dental cleanings and procedures. We also recommend that the patient wait at least 3 months after surgery before having elective dental work done. It is recommended to take these antibiotics for a lifetime prior to these appointments. If you have any questions, do not hesitate to contact our office at [phone].    Dr. Garrett Saez

## 2023-09-26 PROBLEM — I48.3 TYPICAL ATRIAL FLUTTER (CMS/HCC): Status: ACTIVE | Noted: 2023-09-26

## 2023-09-26 NOTE — RESULT ENCOUNTER NOTE
Prefer he see anti-coag clinic here.  He is really hard of hearing and would be best to see them in person

## 2023-09-27 ENCOUNTER — ANTICOAGULATION VISIT (OUTPATIENT)
Dept: PHARMACY | Facility: HOSPITAL | Age: 73
End: 2023-09-27
Payer: MEDICARE

## 2023-09-27 DIAGNOSIS — I48.3 TYPICAL ATRIAL FLUTTER (CMS/HCC): Primary | ICD-10-CM

## 2023-09-27 RX ORDER — WARFARIN SODIUM 5 MG/1
TABLET ORAL
Qty: 30 TABLET | Refills: 0 | Status: SHIPPED | OUTPATIENT
Start: 2023-09-27 | End: 2023-10-11

## 2023-09-27 NOTE — PROGRESS NOTES
Patient will be transitioning from apixaban to warfarin due to cost of apixaban. Pharmacy was consulted to manage warfarin by Dr. Machado. Indication: hx atrial flutter. Goal INR 2-3.  I spoke with Marco over the phone. 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 He will be in [place] on Friday. I sent a prescription to the [facility] in [place] for #30 warfarin 5 mg tablets that he will be able to .   Marco thinks he has enough apixaban to get through Monday. He will start taking 1 tablet of warfarin every evening starting Saturday (9/30) evening. This is in addition to apixaban.  Patient will follow-up with the pharmacy team at the anticoagulation clinic on Tuesday 10/2 or Wednesday 10/3 for warfarin education, point of care INR, and further warfarin dosing instructions.

## 2023-09-28 PROCEDURE — G2066 INTER DEVC REMOTE 30D: HCPCS | Performed by: INTERNAL MEDICINE

## 2023-09-28 PROCEDURE — 93298 REM INTERROG DEV EVAL SCRMS: CPT | Performed by: INTERNAL MEDICINE

## 2023-10-04 ENCOUNTER — ANTICOAGULATION VISIT (OUTPATIENT)
Dept: ANTICOAGULATION | Facility: CLINIC | Age: 73
End: 2023-10-04
Payer: MEDICARE

## 2023-10-04 VITALS
WEIGHT: 233 LBS | HEIGHT: 73 IN | DIASTOLIC BLOOD PRESSURE: 70 MMHG | SYSTOLIC BLOOD PRESSURE: 140 MMHG | BODY MASS INDEX: 30.88 KG/M2

## 2023-10-04 DIAGNOSIS — I48.3 TYPICAL ATRIAL FLUTTER (CMS/HCC): Primary | ICD-10-CM

## 2023-10-04 LAB — INR (AMB): 1.4 (ref 2–3)

## 2023-10-04 PROCEDURE — 85610 PROTHROMBIN TIME: CPT | Mod: QW

## 2023-10-04 PROCEDURE — G0463 HOSPITAL OUTPT CLINIC VISIT: HCPCS

## 2023-10-04 ASSESSMENT — PAIN SCALES - GENERAL: PAINLEVEL: 0-NO PAIN

## 2023-10-04 NOTE — PROGRESS NOTES
Subjective   Cesar Yuen presents to the anticoagulation clinic for monitoring of his long term warfarin therapy - this is his initial visit. He has been taking Eliquis for atrial flutter and would like to transition to Warfarin due to cost. He has taken 4 doses of Warfarin 5 mg and has continued to bridge with Eliquis. He will be out of Eliquis in the next few days which should work out fine as his INR will continue to go up. He is a  for [employer] and has a route at the end of the week. He will stop back in 6 days for another INR. Also, he mentioned that he has a rash on his left knee. He will  OTC Hydrocortisone and give that a try.      Today Cesar reports the following:   Bleeding signs/symptoms: No   Major bleeding event: No  Thrombosis signs/symptoms: No  Thromboembolic event: No  Missed doses: No  Medication changes: No  Dietary changes: No  Bacterial/viral infection: no  Other concerns: No    Current Outpatient Medications   Medication Sig Dispense Refill    warfarin (COUMADIN) 5 mg tablet Take 1 tablet by mouth every evening starting Saturday 9/30. 30 tablet 0    apixaban (ELIQUIS) 5 mg tablet Take 1 tablet by mouth twice daily 540 tablet 0    potassium chloride (KLOR-CON M20) 20 mEq CR tablet Take 2 tablets (40 mEq total) by mouth 2 (two) times a day 360 tablet 0    furosemide (LASIX) 80 mg tablet Take 1 tablet (80 mg total) by mouth 2 (two) times a day 180 tablet 0    tamsulosin (FLOMAX) 0.4 mg capsule Take 1 capsule (0.4 mg total) by mouth daily 30 capsule 9    Eliquis 5 mg tablet Take 1 tablet (5 mg total) by mouth 2 (two) times a day      tamsulosin (FLOMAX) 0.4 mg capsule Take 1 capsule (0.4 mg total) by mouth daily with dinner      ondansetron ODT (ZOFRAN-ODT) 4 mg disintegrating tablet Take 1 tablet by mouth every 6 hours as needed for nausea following surgery. (Patient not taking: Reported on 8/11/2023) 9 tablet 0    multivitamin (THERAGRAN) tablet tablet Take 1 tablet by mouth  "daily       No current facility-administered medications for this visit.         Objective     /70 (BP Location: Left arm, Patient Position: Sitting, Cuff Size: Regular Adult)   Ht 73\" (185.4 cm)   Wt 105.7 kg (233 lb)   BMI 30.74 kg/m²     Assessment/Plan      Anticoagulation Summary  As of 10/4/2023      INR goal:  2.0-3.0   INR used for dosin.4 (10/4/2023)   Full warfarin instructions:  5 mg every day   No change documented:  Eric Johnson, PharmD   Next INR check:      Indications    Typical atrial flutter (CMS/[facility]) [I48.3]                 Anticoagulation Care Providers       Provider Role Specialty Phone number    Balbir Machado MD Referring Family Medicine [phone]            Anticoagulation therapy status: INR is stable, no dose adjustment required.  Patient verbalized understanding regarding dose, monitoring and INR.      Please reference Anticoagulation episode for additional documentation.       "

## 2023-10-11 ENCOUNTER — OFFICE VISIT (OUTPATIENT)
Dept: UROLOGY | Facility: CLINIC | Age: 73
End: 2023-10-11
Payer: MEDICARE

## 2023-10-11 ENCOUNTER — ANTICOAGULATION VISIT (OUTPATIENT)
Dept: ANTICOAGULATION | Facility: CLINIC | Age: 73
End: 2023-10-11
Payer: MEDICARE

## 2023-10-11 ENCOUNTER — TELEPHONE (OUTPATIENT)
Dept: FAMILY MEDICINE | Facility: CLINIC | Age: 73
End: 2023-10-11
Payer: MEDICARE

## 2023-10-11 ENCOUNTER — LAB (OUTPATIENT)
Dept: LAB | Facility: CLINIC | Age: 73
End: 2023-10-11
Payer: MEDICARE

## 2023-10-11 VITALS
HEART RATE: 64 BPM | WEIGHT: 240 LBS | SYSTOLIC BLOOD PRESSURE: 143 MMHG | HEIGHT: 73 IN | BODY MASS INDEX: 31.81 KG/M2 | TEMPERATURE: 97.1 F | DIASTOLIC BLOOD PRESSURE: 81 MMHG | OXYGEN SATURATION: 93 %

## 2023-10-11 VITALS — DIASTOLIC BLOOD PRESSURE: 81 MMHG | WEIGHT: 240.4 LBS | BODY MASS INDEX: 31.72 KG/M2 | SYSTOLIC BLOOD PRESSURE: 139 MMHG

## 2023-10-11 DIAGNOSIS — Z87.440 HISTORY OF UTI: ICD-10-CM

## 2023-10-11 DIAGNOSIS — I48.3 TYPICAL ATRIAL FLUTTER (CMS/HCC): Primary | ICD-10-CM

## 2023-10-11 DIAGNOSIS — R33.9 URINARY RETENTION: Primary | ICD-10-CM

## 2023-10-11 DIAGNOSIS — R33.9 URINARY RETENTION: ICD-10-CM

## 2023-10-11 LAB
ANION GAP SERPL CALC-SCNC: 12 MMOL/L (ref 3–11)
BACTERIA #/AREA URNS HPF: ABNORMAL /HPF
BILIRUB UR QL: NEGATIVE
BUN SERPL-MCNC: 25 MG/DL (ref 7–25)
CALCIUM SERPL-MCNC: 9.4 MG/DL (ref 8.6–10.3)
CHLORIDE SERPL-SCNC: 102 MMOL/L (ref 98–107)
CLARITY UR: CLEAR
CO2 SERPL-SCNC: 27 MMOL/L (ref 21–32)
COLOR UR: YELLOW
CREAT SERPL-MCNC: 1.75 MG/DL (ref 0.7–1.3)
EGFRCR SERPLBLD CKD-EPI 2021: 40 ML/MIN/1.73M*2
GLUCOSE SERPL-MCNC: 98 MG/DL (ref 70–105)
GLUCOSE UR QL: NEGATIVE MG/DL
HGB UR QL: NEGATIVE
INR (AMB): 1.7 (ref 2–3)
KETONES UR-MCNC: NEGATIVE MG/DL
LEUKOCYTE ESTERASE UR QL STRIP: ABNORMAL
NITRITE UR QL: NEGATIVE
PH UR: 6 PH
POTASSIUM SERPL-SCNC: 3.6 MMOL/L (ref 3.5–5.1)
PROT UR STRIP-MCNC: NEGATIVE MG/DL
RBC #/AREA URNS HPF: ABNORMAL /HPF
SODIUM SERPL-SCNC: 141 MMOL/L (ref 135–145)
SP GR UR: 1.01 (ref 1–1.03)
SPECIMEN VOL ?TM UR: 1297 ML
SQUAMOUS #/AREA URNS HPF: ABNORMAL /HPF
UROBILINOGEN UR-MCNC: 0.2 MG/DL
WBC #/AREA URNS HPF: ABNORMAL /HPF

## 2023-10-11 PROCEDURE — G0463 HOSPITAL OUTPT CLINIC VISIT: HCPCS

## 2023-10-11 PROCEDURE — 87088 URINE BACTERIA CULTURE: CPT

## 2023-10-11 PROCEDURE — 99213 OFFICE O/P EST LOW 20 MIN: CPT | Mod: 24 | Performed by: PHYSICIAN ASSISTANT

## 2023-10-11 PROCEDURE — 51702 INSERT TEMP BLADDER CATH: CPT | Performed by: NURSE PRACTITIONER

## 2023-10-11 PROCEDURE — 85610 PROTHROMBIN TIME: CPT | Mod: QW

## 2023-10-11 PROCEDURE — G0463 HOSPITAL OUTPT CLINIC VISIT: HCPCS | Performed by: NURSE PRACTITIONER

## 2023-10-11 PROCEDURE — 51798 US URINE CAPACITY MEASURE: CPT | Performed by: NURSE PRACTITIONER

## 2023-10-11 PROCEDURE — 51702 INSERT TEMP BLADDER CATH: CPT

## 2023-10-11 PROCEDURE — 36415 COLL VENOUS BLD VENIPUNCTURE: CPT

## 2023-10-11 PROCEDURE — 80048 BASIC METABOLIC PNL TOTAL CA: CPT

## 2023-10-11 PROCEDURE — 81001 URINALYSIS AUTO W/SCOPE: CPT

## 2023-10-11 PROCEDURE — G0463 HOSPITAL OUTPT CLINIC VISIT: HCPCS | Mod: 25 | Performed by: PHYSICIAN ASSISTANT

## 2023-10-11 RX ORDER — WARFARIN SODIUM 5 MG/1
TABLET ORAL
Start: 2023-10-11 | End: 2023-10-25 | Stop reason: SDUPTHER

## 2023-10-11 ASSESSMENT — PAIN SCALES - GENERAL
PAINLEVEL: 0-NO PAIN
PAINLEVEL: 0-NO PAIN

## 2023-10-11 NOTE — TELEPHONE ENCOUNTER
Patient needs to be seen for a physical prior to Nov.  Letter of to be ok to drive regarding heart monitor.  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

## 2023-10-11 NOTE — PROGRESS NOTES
Subjective   Cesar Yuen presents to the anticoagulation clinic for monitoring of his long term warfarin therapy.     Today Cesar reports the following:   Bleeding signs/symptoms: No   Major bleeding event: No  Thrombosis signs/symptoms: No  Thromboembolic event: No  Missed doses: No  Medication changes: No  Dietary changes: No  Bacterial/viral infection: no  Other concerns: No    Current Outpatient Medications   Medication Sig Dispense Refill    warfarin (COUMADIN) 5 mg tablet Take 1 tablet by mouth every evening starting . 30 tablet 0    potassium chloride (KLOR-CON M20) 20 mEq CR tablet Take 2 tablets (40 mEq total) by mouth 2 (two) times a day 360 tablet 0    furosemide (LASIX) 80 mg tablet Take 1 tablet (80 mg total) by mouth 2 (two) times a day 180 tablet 0    tamsulosin (FLOMAX) 0.4 mg capsule Take 1 capsule (0.4 mg total) by mouth daily 30 capsule 9    tamsulosin (FLOMAX) 0.4 mg capsule Take 1 capsule (0.4 mg total) by mouth daily with dinner      ondansetron ODT (ZOFRAN-ODT) 4 mg disintegrating tablet Take 1 tablet by mouth every 6 hours as needed for nausea following surgery. (Patient not taking: Reported on 2023) 9 tablet 0    multivitamin (THERAGRAN) tablet tablet Take 1 tablet by mouth daily       No current facility-administered medications for this visit.         Objective     /81 (BP Location: Left arm, Patient Position: Sitting, Cuff Size: Regular Adult)   Wt 109 kg (240 lb 6.4 oz)   BMI 31.72 kg/m²     Assessment/Plan      Anticoagulation Summary  As of 10/11/2023      INR goal:  2.0-3.0   INR used for dosin.7 (10/11/2023)   Full warfarin instructions:  10/11: 7.5 mg; Otherwise 7.5 mg every Mon; 5 mg all other days   Next INR check:  10/18/2023   Priority:  High    Indications    Typical atrial flutter (CMS/HCC) [I48.3]                 Anticoagulation Care Providers       Provider Role Specialty Phone number    Balbir Machado MD Referring Family Medicine  [phone]            Anticoagulation therapy status: INR is less than goal and dose adjustment required. Take 7.5 mg today then 7.5 mg on Mondays and 5 mg all other days of the week. Marco prefers different doses in the beginning of the week as it works best with his schedule.  Patient verbalized understanding regarding new dose, monitoring and INR.    Additional education : INR in 1 week. Marco has anticoag clinic appointments scheduled for 10/18 and 10/24.      Please reference Anticoagulation episode for additional documentation.

## 2023-10-11 NOTE — TELEPHONE ENCOUNTER
I just looked, as of January I think my certification is up and I do not plan to renew.  I am not sure we can get him in for a physical prior to November but could potentially see him for the letter.  He might need to double check though.  The letter might need to come from cardiology rather than me.  Not sure who told him it needed to come from PCP

## 2023-10-11 NOTE — PROGRESS NOTES
"Urology Progress Note    10/11/2023    Chief Complaint:    Chief Complaint   Patient presents with    Urinary Retention       History of Present Illness:  Patient is a 72 year old male presenting to Urology Clinic today in follow-up for history of urinary retention, status post TURPon 9/27/2021 and Revolix laser prostatectomy on 4/28/2022 with Dr. Taylor.  9/22/2022 by Dr. Anderson for Urodynamics, however no interpretation of results was reported.  Prior TURP on 9/27/2021 with pathology of benign tissue.  Last seen in clinic on 4/6/2023 by NENO Patino for follow-up for retention patient was retrialed on Flomax.    Patient is currently at this time feels that he is doing okay.  He does not feel the urge to urinate.  He has had difficulty with retention with dribbling and splitting of stream and weak stream for the last 2 months.  To assist with urination he has been \"manipulating his abdomen with pressure to help urinate\".  He has been taking his Flomax as prescribed.  Denies burning, pain, hematuria, fevers, chills, sweats, abdominal pain, flank pain.    Past Medical History:  Past Medical History:   Diagnosis Date    Atrial flutter (CMS/HCC)     CHF (congestive heart failure) (CMS/HCC)     Chipped tooth     \"cracked tooth\" Back right upper, left upper back    Clotting disorder (CMS/HCC)     Dysrhythmia     a-flutter, s/p ablation and loop recorder 2/8/2021    Edema     BLE    History of transfusion     After surgery    Hypertension     Injury of back 01/14/2021    Seen on CT    Pneumonia 03/11/2023    Urinary retention     BPH     Past Surgical History:  Past Surgical History:   Procedure Laterality Date    ABDOMINAL SURGERY      anuerysm    ABLATION A-FLUTTER N/A 2/8/2021    Procedure: Ablation A-flutter loop implant;  Surgeon: Timoteo Burk DO;  Location: [facility];  Service: Electrophysiology;  Laterality: N/A;    APPENDECTOMY      HERNIA REPAIR      x 3    LOOP RECORDER INSERTION N/A 2/8/2021    " Procedure: LOOP RECORDER INSERTION;  Surgeon: Timoteo Burk DO;  Location: [facility];  Service: Electrophysiology;  Laterality: N/A;    PROSTATECTOMY N/A 4/28/2022    Procedure: REVOLIX LASER PROSTATECTOMY;  Surgeon: Attila Taylor MD;  Location: [facility] OR;  Service: Urology;  Laterality: N/A;    TOTAL KNEE ARTHROPLASTY Left 7/24/2023    Procedure: LEFT TOTAL KNEE ARTHROPLASTY WITH ROBOTIC ORTHOPEDIC SURGICAL ASSISTANT;  Surgeon: Garrett Saez DO;  Location: [facility] OR;  Service: Orthopedics;  Laterality: Left;    TRANSURETHRAL RESECTION OF BLADDER TUMOR N/A 12/22/2020    Procedure: CYSTOSCOPY WITH BLADDER BIOPSY;  Surgeon: Daniele Johnson MD;  Location: [facility] OR;  Service: Urology;  Laterality: N/A;    TRANSURETHRAL RESECTION OF PROSTATE N/A 12/22/2020    Procedure: CYSTOSCOPY TRANSURETHRAL RESECTION PROSTATE, exam under anasthesia;  Surgeon: Daniele Johnson MD;  Location: [facility] OR;  Service: Urology;  Laterality: N/A;    TRANSURETHRAL RESECTION OF PROSTATE N/A 4/27/2021    Procedure: CYSTOSCOPY TRANSURETHRAL RESECTION PROSTATE;  Surgeon: Daniele Johnson MD;  Location: [facility] OR;  Service: Urology;  Laterality: N/A;    TRANSURETHRAL RESECTION OF PROSTATE N/A 9/27/2021    Procedure: CYSTOSCOPY TRANSURETHRAL RESECTION PROSTATE;  Surgeon: Attila Taylor MD;  Location: [facility] OR;  Service: Urology;  Laterality: N/A;  NOT EARLY MORNING      Social History:  Social History     Tobacco Use    Smoking status: Never    Smokeless tobacco: Never   Substance Use Topics    Alcohol use: Yes     Alcohol/week: 1.0 standard drink of alcohol     Types: 1 Standard drinks or equivalent per week     Comment: rarely     Family History:      Family History   Problem Relation Age of Onset    Alzheimer's disease Mother     Diabetes Father     Heart attack Mother's Brother      Allergies:  No Known Allergies  Medications:  Current Outpatient Medications   Medication Sig Dispense Refill    warfarin (COUMADIN) 5 mg tablet Take 1.5 tablets (7.5  "mg) every Monday and 1 tablet (5 mg) all other days of the week.      potassium chloride (KLOR-CON M20) 20 mEq CR tablet Take 2 tablets (40 mEq total) by mouth 2 (two) times a day 360 tablet 0    furosemide (LASIX) 80 mg tablet Take 1 tablet (80 mg total) by mouth 2 (two) times a day 180 tablet 0    tamsulosin (FLOMAX) 0.4 mg capsule Take 1 capsule (0.4 mg total) by mouth daily 30 capsule 9    ondansetron ODT (ZOFRAN-ODT) 4 mg disintegrating tablet Take 1 tablet by mouth every 6 hours as needed for nausea following surgery. 9 tablet 0    multivitamin (THERAGRAN) tablet tablet Take 1 tablet by mouth daily      tamsulosin (FLOMAX) 0.4 mg capsule Take 1 capsule (0.4 mg total) by mouth daily with dinner (Patient not taking: Reported on 10/11/2023)       No current facility-administered medications for this visit.       REVIEW OF SYSTEMS:  Please see HPI for pertinent positives and negatives    VITAL SIGNS:   height is 1.854 m (6' 1\") and weight is 108.9 kg (240 lb). His temperature is 36.2 °C (97.1 °F). His blood pressure is 143/81 and his pulse is 64. His oxygen saturation is 93%.     PHYSICAL EXAMINATION:  General: Patient is no acute distress  Respiratory: Speech is clear and appropriate, no respiratory distress  Musculoskeletal: Ambulates without difficulty  Neuro: Facial features are symmetrical, awake and alert  Integumentary: No lesions or erythema visualized on exposed skin    Lab Results   Component Value Date    PSA 0.29 01/24/2023    PSA 1.22 06/18/2020     No results found for: \"TESTOSTERONE\"    Lab Results   Component Value Date    COLORU Yellow 10/11/2023    CLARITYU Clear 10/11/2023    SPECGRAVU 1.010 10/11/2023    LEUKOCYTESU Trace (A) 10/11/2023    NITRITEU Negative 10/11/2023    PROTUR Negative 10/11/2023    KETONESU Negative 10/11/2023    BILIRUBINU Negative 10/11/2023    BLOODU Negative 10/11/2023    GLUCOSEU Negative 10/11/2023    SEBASTIÁN 6.0 10/11/2023    WBC 13.1 (H) 07/25/2023    RBC 4.31 07/25/2023 "   Microscopic results show WBCs 10-14 culture trigger    BMP resulted with creatinine elevated at 1.75 with most recent 2 months ago at 7/25/2023 at 0.94.  GFR is down to 40 from 85 in July.    Assessment and Plan:     Results for orders placed or performed in visit on 10/11/23   POCT Bladder Scan   Result Value Ref Range    Urine Volume 1,297 mL     Presents for evaluation of follow-up for urinary retention.  Patient is having worsening urinary retention with postvoid residual today of 1297.  Patient does not feel the need to urinate or sensation of full bladder.  He is describing weak splitting stream with dribbling.  We will have nursing staff place a Pope catheter and provide supplies with close follow-up with cystoscopy with Dr. Prieto for close follow-up.  We will as well obtain a BMP to evaluate kidney function.  We will obtain renal ultrasound to evaluate for hydronephrosis with significant urinary retention.    Patient is having acute kidney injury with bladder outlet obstruction, Pope was placed and patient was discharged with Pope and supplies and counseling per nursing staff.  Follow-up will be with cystoscopy.    Reviewed with patient that they needed to contact our office or present to the emergency room if they start having signs or symptoms of fevers, chills, sweats, burning with urination, hematuria or worsening flank pain.     Electronically Signed by:  NENITA Peacock 10/11/23 2:55 PM    Dragon voice recognition program was used to aid in this documentation which might generate inaccuracies despite efforts made to avoid them.  Please take into context and contact the provider if areas of conflict are identified.

## 2023-10-11 NOTE — PROGRESS NOTES
Bladder Catheterization    Date/Time: 10/11/2023 12:26 PM    Performed by: Liana Santamaria LPN  Authorized by: Lalitha Patino DNP    Consent    Verbal consent was obtained from the patient. Written consent was not obtained from the patient. Risks, benefits, and alternatives were discussed. Consent given by patient. The patient states understanding of the procedure being performed. Patient ID confirmed verbally with the patient.       Indications:     Indications: urinary retention      Retention volume (mL):  1500  Procedure Details:     Catheter insertion:  Indwelling    Catheter type:  Coude    Catheter size:  16 Fr    Complicated insertion: No      Altered anatomy: No      Volume of urine drained (mL):  1700    Urine characteristics:  Clear and yellow  Post-procedure:     Patient tolerance:  Patient tolerated the procedure well with no immediate complications

## 2023-10-12 LAB — BACTERIA UR CULT: NORMAL

## 2023-10-17 ENCOUNTER — ANCILLARY PROCEDURE (OUTPATIENT)
Dept: ULTRASOUND IMAGING | Facility: CLINIC | Age: 73
End: 2023-10-17
Payer: MEDICARE

## 2023-10-17 DIAGNOSIS — R33.9 URINARY RETENTION: ICD-10-CM

## 2023-10-17 PROCEDURE — 76770 US EXAM ABDO BACK WALL COMP: CPT

## 2023-10-18 ENCOUNTER — ANTICOAGULATION VISIT (OUTPATIENT)
Dept: PHARMACY | Facility: HOSPITAL | Age: 73
End: 2023-10-18
Payer: MEDICARE

## 2023-10-18 ENCOUNTER — LAB (OUTPATIENT)
Dept: LAB | Facility: CLINIC | Age: 73
End: 2023-10-18
Payer: MEDICARE

## 2023-10-18 DIAGNOSIS — I48.3 TYPICAL ATRIAL FLUTTER (CMS/HCC): ICD-10-CM

## 2023-10-18 DIAGNOSIS — I48.3 TYPICAL ATRIAL FLUTTER (CMS/HCC): Primary | ICD-10-CM

## 2023-10-18 LAB — INR PPP: 1.8 (ref 0.9–1.1)

## 2023-10-18 PROCEDURE — 76770 US EXAM ABDO BACK WALL COMP: CPT | Mod: 26 | Performed by: RADIOLOGY

## 2023-10-18 PROCEDURE — 85610 PROTHROMBIN TIME: CPT

## 2023-10-18 PROCEDURE — 36416 COLLJ CAPILLARY BLOOD SPEC: CPT

## 2023-10-18 NOTE — PROGRESS NOTES
Subjective   Cesar Yuen is contacted by the anticoagulation clinic for monitoring of his long term warfarin therapy. Marco missed his appointment at the antico clinic this morning so got a lab draw instead. Marco states that he only took 7.5 mg last week on the day of his appointment and 5 mg daily since then.     Today Cesar reports the following:   Bleeding signs/symptoms: No   Major bleeding event: No  Thrombosis signs/symptoms: No  Thromboembolic event: No  Missed doses: No  Medication changes: No  Dietary changes: No  Bacterial/viral infection: no  Other concerns: No    Current Outpatient Medications   Medication Sig Dispense Refill    warfarin (COUMADIN) 5 mg tablet Take 1.5 tablets (7.5 mg) every Monday and 1 tablet (5 mg) all other days of the week.      potassium chloride (KLOR-CON M20) 20 mEq CR tablet Take 2 tablets (40 mEq total) by mouth 2 (two) times a day 360 tablet 0    furosemide (LASIX) 80 mg tablet Take 1 tablet (80 mg total) by mouth 2 (two) times a day 180 tablet 0    tamsulosin (FLOMAX) 0.4 mg capsule Take 1 capsule (0.4 mg total) by mouth daily 30 capsule 9    tamsulosin (FLOMAX) 0.4 mg capsule Take 1 capsule (0.4 mg total) by mouth daily with dinner (Patient not taking: Reported on 10/11/2023)      ondansetron ODT (ZOFRAN-ODT) 4 mg disintegrating tablet Take 1 tablet by mouth every 6 hours as needed for nausea following surgery. 9 tablet 0    multivitamin (THERAGRAN) tablet tablet Take 1 tablet by mouth daily       No current facility-administered medications for this visit.         Objective     There were no vitals taken for this visit.    Assessment/Plan      Anticoagulation Summary  As of 10/18/2023      INR goal:  2.0-3.0   INR used for dosin.8 (10/18/2023)   Full warfarin instructions:  10/18: 7.5 mg; Otherwise 7.5 mg every Mon; 5 mg all other days   Next INR check:  10/24/2023   Priority:  High    Indications    Typical atrial flutter (CMS/HCC) [I48.3]                  Anticoagulation Care Providers       Provider Role Specialty Phone number    Balbir Machado MD Referring Family Medicine [phone]            Anticoagulation therapy status: INR is less than goal and dose adjustment required.  Take 7.5 mg today and then 7.5 mg on Mondays and 5 mg all other days of the week. Marco repeated back instructions Patient verbalized understanding regarding new dose, monitoring and INR. Recheck INR next week.     Please reference Anticoagulation episode for additional documentation.

## 2023-10-20 DIAGNOSIS — R79.89 ELEVATED SERUM CREATININE: ICD-10-CM

## 2023-10-20 DIAGNOSIS — R33.9 URINARY RETENTION: Primary | ICD-10-CM

## 2023-10-23 NOTE — PROGRESS NOTES
Cystoscopy Procedure Note    10/24/23    Pre-operative Diagnosis:   1. Stricture of urethral meatus in male, unspecified stricture type  Case Request Operating Room: TEMPORARY URETHRAL IMPLANT INSERTION, CYSTOSCOPY URETHRAL DILATION AND TEMPORARY URETHRAL IMPLANT INSERTION (OPTILUME), Case Request Operating Room: TEMPORARY URETHRAL IMPLANT INSERTION, CYSTOSCOPY URETHRAL DILATION AND TEMPORARY URETHRAL IMPLANT INSERTION (OPTILUME)      2. Urinary retention  Cystoscopy(male), Case Request Operating Room: TEMPORARY URETHRAL IMPLANT INSERTION, CYSTOSCOPY URETHRAL DILATION AND TEMPORARY URETHRAL IMPLANT INSERTION (OPTILUME), Case Request Operating Room: TEMPORARY URETHRAL IMPLANT INSERTION, CYSTOSCOPY URETHRAL DILATION AND TEMPORARY URETHRAL IMPLANT INSERTION (OPTILUME)          Post-operative Diagnosis:   1. Stricture of urethral meatus in male, unspecified stricture type  Case Request Operating Room: TEMPORARY URETHRAL IMPLANT INSERTION, CYSTOSCOPY URETHRAL DILATION AND TEMPORARY URETHRAL IMPLANT INSERTION (OPTILUME), Case Request Operating Room: TEMPORARY URETHRAL IMPLANT INSERTION, CYSTOSCOPY URETHRAL DILATION AND TEMPORARY URETHRAL IMPLANT INSERTION (OPTILUME)      2. Urinary retention  Cystoscopy(male), Case Request Operating Room: TEMPORARY URETHRAL IMPLANT INSERTION, CYSTOSCOPY URETHRAL DILATION AND TEMPORARY URETHRAL IMPLANT INSERTION (OPTILUME), Case Request Operating Room: TEMPORARY URETHRAL IMPLANT INSERTION, CYSTOSCOPY URETHRAL DILATION AND TEMPORARY URETHRAL IMPLANT INSERTION (OPTILUME)          Surgeon: Attila Taylor MD    Procedure: Cystoscopy    Procedure Details   The risks, benefits, complications, treatment options, and expected outcomes were discussed with the patient. The patient concurred with the proposed plan, giving informed consent.    Cystoscopy was performed today under local anesthesia, using sterile technique. The patient was placed in the lithotomy position, prepped with Betadine,  and draped in the usual sterile fashion. A 17 Nepali flexible cystoscope was inserted into the urethral meatus and then used to inspect both the entire urethra and bladder.    Findings:  The anterior urethra and meatus are unremarkable. Prostate reveals largerly resected away lateral lobe hyperplasia without a median lobe component. There is a narrow bladder neck that is the likely source of his urine retention. In the bladder there is no evidence of cancer, and there is no evidence of stone.  Both ureteral orifices seen effluxing clear urine.  Several small diverticula noted.                  Complications:  None; patient tolerated the procedure well.    Plan: Recommend a cysto with dilation and optilume catheter placement for his bladder neck contracture. Hopefully this will alleviate his need for fuchs catheter. I had his catheter reinserted today.

## 2023-10-24 ENCOUNTER — LAB (OUTPATIENT)
Dept: LAB | Facility: CLINIC | Age: 73
End: 2023-10-24
Payer: MEDICARE

## 2023-10-24 ENCOUNTER — PROCEDURE VISIT (OUTPATIENT)
Dept: UROLOGY | Facility: CLINIC | Age: 73
End: 2023-10-24
Payer: MEDICARE

## 2023-10-24 VITALS
WEIGHT: 240 LBS | HEART RATE: 94 BPM | TEMPERATURE: 97.8 F | SYSTOLIC BLOOD PRESSURE: 131 MMHG | DIASTOLIC BLOOD PRESSURE: 76 MMHG | HEIGHT: 73 IN | BODY MASS INDEX: 31.81 KG/M2 | OXYGEN SATURATION: 94 %

## 2023-10-24 DIAGNOSIS — R33.9 URINARY RETENTION: ICD-10-CM

## 2023-10-24 DIAGNOSIS — R79.89 ELEVATED SERUM CREATININE: ICD-10-CM

## 2023-10-24 DIAGNOSIS — N35.911 STRICTURE OF URETHRAL MEATUS IN MALE, UNSPECIFIED STRICTURE TYPE: Primary | ICD-10-CM

## 2023-10-24 PROBLEM — I48.3 TYPICAL ATRIAL FLUTTER (CMS/HCC): Status: RESOLVED | Noted: 2023-09-26 | Resolved: 2023-10-24

## 2023-10-24 LAB
ANION GAP SERPL CALC-SCNC: 9 MMOL/L (ref 3–11)
BUN SERPL-MCNC: 19 MG/DL (ref 7–25)
CALCIUM SERPL-MCNC: 10 MG/DL (ref 8.6–10.3)
CHLORIDE SERPL-SCNC: 99 MMOL/L (ref 98–107)
CO2 SERPL-SCNC: 31 MMOL/L (ref 21–32)
CREAT SERPL-MCNC: 1.09 MG/DL (ref 0.7–1.3)
EGFRCR SERPLBLD CKD-EPI 2021: 71 ML/MIN/1.73M*2
GLUCOSE SERPL-MCNC: 96 MG/DL (ref 70–105)
POTASSIUM SERPL-SCNC: 3.7 MMOL/L (ref 3.5–5.1)
SODIUM SERPL-SCNC: 139 MMOL/L (ref 135–145)

## 2023-10-24 PROCEDURE — 52000 CYSTOURETHROSCOPY: CPT | Performed by: UROLOGY

## 2023-10-24 PROCEDURE — 36415 COLL VENOUS BLD VENIPUNCTURE: CPT

## 2023-10-24 PROCEDURE — 52000 CYSTOURETHROSCOPY: CPT

## 2023-10-24 PROCEDURE — 80048 BASIC METABOLIC PNL TOTAL CA: CPT

## 2023-10-24 ASSESSMENT — PAIN SCALES - GENERAL: PAINLEVEL: 0-NO PAIN

## 2023-10-24 NOTE — PROGRESS NOTES
Patient in clinic, presurgical education given. Surgery is scheduled, Urethral Dilation with Optilume on 12/13/23.  Instructions provided on the following medications:    Medication list was reviewed and the following list is the most current as verified with the patient:   Current Outpatient Medications:     warfarin (COUMADIN) 5 mg tablet, Take 1.5 tablets (7.5 mg) every Monday and 1 tablet (5 mg) all other days of the week., Disp: , Rfl:     potassium chloride (KLOR-CON M20) 20 mEq CR tablet, Take 2 tablets (40 mEq total) by mouth 2 (two) times a day, Disp: 360 tablet, Rfl: 0    furosemide (LASIX) 80 mg tablet, Take 1 tablet (80 mg total) by mouth 2 (two) times a day, Disp: 180 tablet, Rfl: 0    tamsulosin (FLOMAX) 0.4 mg capsule, Take 1 capsule (0.4 mg total) by mouth daily, Disp: 30 capsule, Rfl: 9    multivitamin (THERAGRAN) tablet tablet, Take 1 tablet by mouth daily, Disp: , Rfl:     tamsulosin (FLOMAX) 0.4 mg capsule, Take 1 capsule (0.4 mg total) by mouth daily with dinner (Patient not taking: Reported on 10/24/2023), Disp: , Rfl:     ondansetron ODT (ZOFRAN-ODT) 4 mg disintegrating tablet, Take 1 tablet by mouth every 6 hours as needed for nausea following surgery. (Patient not taking: Reported on 10/24/2023), Disp: 9 tablet, Rfl: 0    Do not take any of the following medications the week prior to surgery - NSAID (ibuprofen, naproxen, meloxicam, celecoxib, diclofenac or etodolac) or aspirin. Stop all herbals, vitamins, and supplements one week prior to surgery. Tylenol is ok to take up to the day of surgey.     The patient was provided the following instructions:  Take all medications the morning of surgery with clear liquids EXCEPT oral diabetic agents and any medications as instructed above.   If you take insulin, please hold your short-acting insulin for the 6 hours prior to surgery. Instructions on intermediate or long-acting insulin will be given by hospital staff prior to surgery.   Take your blood  pressure medications the morning of surgery unless instructed otherwise.   If you take blood thinners or aspirin for cardiac stents, your primary care provider or cardiologist will need to provide instructions on when you should stop and resume these medications.     No food or drink after midnight, except to take morning medication.   Hydrate well for the two days prior to surgery.   No smoking or chewing tobacco after midnight the day of surgery.     Shower with soap and water the night before the surgery OR the morning of the surgery.   Remove all jewelry, nail polish, and artifical finger nails.   Don't wear deodorant, lotions, perfumes, or colognes the day of surgery.     Bring an ID and insurance card to surgery - leave all other belongings at home, with the exception of dentures, glasses, and hearing aides. Please remove contact lenses.  Bring any equipment needed after surgery - walkers, canes, Depends, and CPAP settings and mask if diagnosed with sleep apnea.    A pre-admissions nurse will call within the week before surgery to provide additional or updated instructions.   Surgery staff will contact patient with surgery arrival time 5-7 business days prior to surgery date.   A  must accompany the patient to the procedure and remain with the patient for 24 hours after receiving anesthesia.     If you develop a cold, flu, cough, fever, or upper respiratory infection - please call your surgeon.    Pt states understanding and will call with any questions or concerns.     A preoperative evaluation is required within 30 days of surgery to ensure you are medically optimized to undergo the procedure. The patient understands that if this isn't completed, there is a chance the surgery will be delayed or canceled.      Education packet including information sheets, surgery data sheet, bowel regimen and pain control recommendations provided to the patient.    The patient verbalizes understanding of all  instruction.    Jaye Cali RN

## 2023-10-24 NOTE — PROGRESS NOTES
"[facility] [place]  [address] [place] [address]                                           Cardiology Outpatient Follow-up Note    Subjective    Patient ID: Cesar Yuen is a 73 y.o. male.      REZA Lara is a 73-year-old gentleman who I know well.  I last evaluated him 1 year ago.  Here today for routine follow-up.  Marco denies any cardiac concerns or symptoms.  He remains anticoagulated on warfarin.  No adverse or life-threatening bleeding.  No awareness of any tachycardia or palpitations.  He denies any limitations to his activities, however states that he does not exercise regularly.  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  He is scheduled on December 13th for a urologic procedure.  He does have an indwelling Pope catheter.    Reviewed his device check of his loop recorder from August, no events or arrhythmias.  The device check from September is currently pending.    Cardiology Problem List:      Atrial flutter  Atrial flutter ablation 2/8/2021, loop recorder implant  Anticoagulated on Eliquis    Past Medical History:   Diagnosis Date    Atrial flutter (CMS/HCC)     CHF (congestive heart failure) (CMS/HCC)     Chipped tooth     \"cracked tooth\" Back right upper, left upper back    Clotting disorder (CMS/HCC)     Dysrhythmia     a-flutter, s/p ablation and loop recorder 2/8/2021    Edema     BLE    History of transfusion     After surgery    Hypertension     Injury of back 01/14/2021    Seen on CT    Pneumonia 03/11/2023    Urinary retention     BPH       Past Surgical History:   Procedure Laterality Date    ABDOMINAL SURGERY      anuerysm    ABLATION A-FLUTTER N/A 2/8/2021    Procedure: Ablation A-flutter loop implant;  Surgeon: Timoteo Burk DO;  Location: [facility] EP Lab;  Service: Electrophysiology;  Laterality: N/A;    APPENDECTOMY      HERNIA REPAIR      x 3    LOOP RECORDER INSERTION N/A 2/8/2021    Procedure: LOOP RECORDER INSERTION;  Surgeon: Timoteo Burk DO;  Location: [facility] EP Lab; "  Service: Electrophysiology;  Laterality: N/A;    PROSTATECTOMY N/A 4/28/2022    Procedure: REVOLIX LASER PROSTATECTOMY;  Surgeon: Attila Taylor MD;  Location: [facility] OR;  Service: Urology;  Laterality: N/A;    TOTAL KNEE ARTHROPLASTY Left 7/24/2023    Procedure: LEFT TOTAL KNEE ARTHROPLASTY WITH ROBOTIC ORTHOPEDIC SURGICAL ASSISTANT;  Surgeon: Garrett Saez DO;  Location: [facility] OR;  Service: Orthopedics;  Laterality: Left;    TRANSURETHRAL RESECTION OF BLADDER TUMOR N/A 12/22/2020    Procedure: CYSTOSCOPY WITH BLADDER BIOPSY;  Surgeon: Daniele Johnson MD;  Location: [facility] OR;  Service: Urology;  Laterality: N/A;    TRANSURETHRAL RESECTION OF PROSTATE N/A 12/22/2020    Procedure: CYSTOSCOPY TRANSURETHRAL RESECTION PROSTATE, exam under anasthesia;  Surgeon: Daniele Johnson MD;  Location: [facility] OR;  Service: Urology;  Laterality: N/A;    TRANSURETHRAL RESECTION OF PROSTATE N/A 4/27/2021    Procedure: CYSTOSCOPY TRANSURETHRAL RESECTION PROSTATE;  Surgeon: Daniele Johnson MD;  Location: [facility] OR;  Service: Urology;  Laterality: N/A;    TRANSURETHRAL RESECTION OF PROSTATE N/A 9/27/2021    Procedure: CYSTOSCOPY TRANSURETHRAL RESECTION PROSTATE;  Surgeon: tAtila Taylor MD;  Location: [facility] OR;  Service: Urology;  Laterality: N/A;  NOT EARLY MORNING       Allergies as of 10/25/2023    (No Known Allergies)       Current Outpatient Medications   Medication Sig Dispense Refill    warfarin (COUMADIN) 5 mg tablet Take 1.5 tablets (7.5 mg) every Monday and 1 tablet (5 mg) all other days of the week.      potassium chloride (KLOR-CON M20) 20 mEq CR tablet Take 2 tablets (40 mEq total) by mouth 2 (two) times a day 360 tablet 0    furosemide (LASIX) 80 mg tablet Take 1 tablet (80 mg total) by mouth 2 (two) times a day 180 tablet 0    tamsulosin (FLOMAX) 0.4 mg capsule Take 1 capsule (0.4 mg total) by mouth daily 30 capsule 9    tamsulosin (FLOMAX) 0.4 mg capsule Take 1 capsule (0.4 mg total) by mouth daily with dinner       multivitamin (THERAGRAN) tablet tablet Take 1 tablet by mouth daily       No current facility-administered medications for this visit.       Family History   Problem Relation Age of Onset    Alzheimer's disease Mother     Diabetes Father     Heart attack Mother's Brother        Social History     Tobacco Use    Smoking status: Never    Smokeless tobacco: Never   Vaping Use    Vaping Use: Never used   Substance Use Topics    Alcohol use: Yes     Alcohol/week: 1.0 standard drink of alcohol     Types: 1 Standard drinks or equivalent per week     Comment: rarely    Drug use: Never       Review of Systems   All other systems reviewed and are negative.      Objective     Vitals:    10/25/23 0857   BP: 118/80   Pulse: 86   SpO2: 95%     Weight: 98.9 kg (218 lb)  Physical Exam    Constitutional: Well built, nourished, no acute distress.  Hard of hearing.  HEENT: Head is normocephalic and atraumatic. Sclera anicteric.   Neck: Supple. No carotid bruits.  No JVD.  Lungs: Effort normal. Breath sounds are clear without wheezes or rales. No respiratory distress.   Heart: S1-S2, Regular rate and rhythm without murmur.   Extremities: Without any cyanosis, clubbing, lesions or edema. Radial pulses 2+.  Musculoskeletal: Normal ROM.    Neurologic: No focal deficits.  Psychiartic: cooperative, alert and orientated X 3.  Skin: warm, dry, intact.      Data Review:   Sodium   Date Value Ref Range Status   10/24/2023 139 135 - 145 mmol/L Final     Potassium   Date Value Ref Range Status   10/24/2023 3.7 3.5 - 5.1 MMOL/L Final     Chloride   Date Value Ref Range Status   10/24/2023 99 98 - 107 mmol/L Final     CO2   Date Value Ref Range Status   10/24/2023 31 21 - 32 mmol/L Final     BUN   Date Value Ref Range Status   10/24/2023 19 7 - 25 mg/dL Final     Creatinine   Date Value Ref Range Status   10/24/2023 1.09 0.70 - 1.30 mg/dL Final     Glucose   Date Value Ref Range Status   10/24/2023 96 70 - 105 mg/dL Final     Calcium   Date Value  "Ref Range Status   10/24/2023 10.0 8.6 - 10.3 mg/dL Final     hsTnI 0 Hour   Date Value Ref Range Status   03/12/2023 9.6 <=19.8 pg/mL Final     BNP   Date Value Ref Range Status   03/11/2023 44 0 - 100 pg/mL Final     WBC   Date Value Ref Range Status   07/25/2023 13.1 (H) 3.7 - 9.6 10*3/uL Final     Hemoglobin   Date Value Ref Range Status   07/25/2023 13.0 (L) 13.2 - 17.2 g/dL Final     Hematocrit   Date Value Ref Range Status   07/25/2023 38.9 38.0 - 50.0 % Final     MCV   Date Value Ref Range Status   07/25/2023 90.2 82.0 - 97.0 fL Final     Platelets   Date Value Ref Range Status   07/25/2023 276 130 - 350 10*3/uL Final     Cholesterol   Date Value Ref Range Status   01/24/2023 161 0 - 199 mg/dL Final     Triglycerides   Date Value Ref Range Status   01/24/2023 149 <=149 mg/dL Final     HDL   Date Value Ref Range Status   01/24/2023 33 (L) >=40 mg/dL Final     LDL Calculated   Date Value Ref Range Status   01/24/2023 98 20 - 99 mg/dL Final     No results found for: \"TSH\"    Assessment/Plan   Diagnoses and all orders for this visit:    Typical atrial flutter (CMS/HCC)    Status post placement of implantable loop recorder    Anticoagulated      Marco continues to do well from a cardiac standpoint.  He denies signs or symptoms consistent with angina, heart failure, or worsening dysrhythmias.  Has a history of atrial flutter as well as a implantable loop recorder.  Reviewed recent device interrogations, no recurrent atrial arrhythmias.  He remains anticoagulated on warfarin.  No adverse or life-threatening bleeding.  He is on no rate control or AV yvonne blockers.  Normotensive.  Euvolemic on exam.    Again, from a cardiac standpoint he is considered low risk as far as obtaining/renewing his [employer] license.  He is also scheduled for urologic procedure in mid December.  Okay to hold his warfarin for 5 days prior.  Resume regular dosing after surgery or when deemed safe, follow-up with anticoagulation clinic in regards to " PT/INR.      There are no Patient Instructions on file for this visit.    Return in about 1 year (around 10/25/2024).    The patient verbalized correct understanding and agreement to today's instructions and plan.  The patient verbalized that all questions have been addressed.    On this date of service 22 minutes of total time was spent on this encounter.    Electronically signed by: NENITA Balderas  10/25/2023  9:21 AM    A voice recognition program was used to aid in documentation of this record. Sometimes words are not printed exactly as they were spoken. While efforts were made to carefully edit and correct any inaccuracies, some errors may be present; please take these into context. Please contact the provider if errors are identified.

## 2023-10-25 ENCOUNTER — ANTICOAGULATION VISIT (OUTPATIENT)
Dept: ANTICOAGULATION | Facility: CLINIC | Age: 73
End: 2023-10-25
Payer: MEDICARE

## 2023-10-25 ENCOUNTER — OFFICE VISIT (OUTPATIENT)
Dept: CARDIOLOGY | Facility: CLINIC | Age: 73
End: 2023-10-25
Payer: MEDICARE

## 2023-10-25 VITALS
OXYGEN SATURATION: 95 % | DIASTOLIC BLOOD PRESSURE: 80 MMHG | HEIGHT: 73 IN | HEART RATE: 86 BPM | BODY MASS INDEX: 28.89 KG/M2 | SYSTOLIC BLOOD PRESSURE: 118 MMHG | WEIGHT: 218 LBS

## 2023-10-25 VITALS
DIASTOLIC BLOOD PRESSURE: 80 MMHG | SYSTOLIC BLOOD PRESSURE: 118 MMHG | WEIGHT: 218 LBS | HEIGHT: 73 IN | BODY MASS INDEX: 28.89 KG/M2

## 2023-10-25 DIAGNOSIS — I48.92 ATRIAL FLUTTER, UNSPECIFIED TYPE (CMS/HCC): Primary | ICD-10-CM

## 2023-10-25 DIAGNOSIS — E87.70 HYPERVOLEMIA, UNSPECIFIED HYPERVOLEMIA TYPE: ICD-10-CM

## 2023-10-25 DIAGNOSIS — I48.3 TYPICAL ATRIAL FLUTTER (CMS/HCC): Primary | ICD-10-CM

## 2023-10-25 DIAGNOSIS — I48.3 TYPICAL ATRIAL FLUTTER (CMS/HCC): ICD-10-CM

## 2023-10-25 DIAGNOSIS — Z95.818 STATUS POST PLACEMENT OF IMPLANTABLE LOOP RECORDER: ICD-10-CM

## 2023-10-25 DIAGNOSIS — Z79.01 ANTICOAGULATED: ICD-10-CM

## 2023-10-25 LAB — INR (AMB): 1.8 (ref 2–3)

## 2023-10-25 PROCEDURE — 85610 PROTHROMBIN TIME: CPT | Mod: QW

## 2023-10-25 PROCEDURE — G0463 HOSPITAL OUTPT CLINIC VISIT: HCPCS

## 2023-10-25 PROCEDURE — 99213 OFFICE O/P EST LOW 20 MIN: CPT | Performed by: PHYSICIAN ASSISTANT

## 2023-10-25 PROCEDURE — G0463 HOSPITAL OUTPT CLINIC VISIT: HCPCS | Performed by: PHYSICIAN ASSISTANT

## 2023-10-25 RX ORDER — WARFARIN SODIUM 5 MG/1
TABLET ORAL
Qty: 30 TABLET | Refills: 11 | Status: SHIPPED | OUTPATIENT
Start: 2023-10-25 | End: 2023-10-26 | Stop reason: SDUPTHER

## 2023-10-25 RX ORDER — FUROSEMIDE 80 MG/1
80 TABLET ORAL 2 TIMES DAILY
Qty: 180 TABLET | Refills: 0 | Status: CANCELLED | OUTPATIENT
Start: 2023-10-25 | End: 2024-01-23

## 2023-10-25 ASSESSMENT — PAIN SCALES - GENERAL
PAINLEVEL: 0-NO PAIN
PAINLEVEL: 0-NO PAIN

## 2023-10-25 NOTE — PROGRESS NOTES
"Subjective   Cesar Yuen presents to the anticoagulation clinic for monitoring of his long term warfarin therapy.       Today Cesar reports the following:   Bleeding signs/symptoms: No   Major bleeding event: No  Thrombosis signs/symptoms: No  Thromboembolic event: No  Missed doses: No  Medication changes: No  Dietary changes: No  Bacterial/viral infection: no  Other concerns: No    Current Outpatient Medications   Medication Sig Dispense Refill    warfarin (COUMADIN) 5 mg tablet Take 1.5 tablets (7.5 mg) every Monday and 1 tablet (5 mg) all other days of the week.      potassium chloride (KLOR-CON M20) 20 mEq CR tablet Take 2 tablets (40 mEq total) by mouth 2 (two) times a day 360 tablet 0    furosemide (LASIX) 80 mg tablet Take 1 tablet (80 mg total) by mouth 2 (two) times a day 180 tablet 0    tamsulosin (FLOMAX) 0.4 mg capsule Take 1 capsule (0.4 mg total) by mouth daily 30 capsule 9    tamsulosin (FLOMAX) 0.4 mg capsule Take 1 capsule (0.4 mg total) by mouth daily with dinner      multivitamin (THERAGRAN) tablet tablet Take 1 tablet by mouth daily       No current facility-administered medications for this visit.         Objective     /80 (BP Location: Left arm, Patient Position: Sitting, Cuff Size: Large Adult)   Ht 73\" (185.4 cm)   Wt 98.9 kg (218 lb)   BMI 28.76 kg/m²     Assessment/Plan      Anticoagulation Summary  As of 10/25/2023      INR goal:  2.0-3.0   INR used for dosin.8 (10/25/2023)   Full warfarin instructions:  7.5 mg every Mon, Fri; 5 mg all other days   Next INR check:  2023   Priority:  High    Indications    Typical atrial flutter (CMS/HCC) (Resolved) [I48.3]                 Anticoagulation Care Providers       Provider Role Specialty Phone number    Balbir Machado MD Referring Family Medicine [phone]            Anticoagulation therapy status: INR is less than goal and dose adjustment required.  Patient verbalized understanding regarding new dose, monitoring and " INR.      Please reference Anticoagulation episode for additional documentation.

## 2023-10-25 NOTE — TELEPHONE ENCOUNTER
No care due was identified.  [facility] Embedded Care Due Messages. Reference number: [id number]. 10/25/2023 2:47:32 AM [place]

## 2023-10-25 NOTE — TELEPHONE ENCOUNTER
Medication refill request:  Medication(s):  furosemide not filled due to Previous refill visit indicating specific care plan, please review for refill

## 2023-10-25 NOTE — TELEPHONE ENCOUNTER
No care due was identified.  [facility] Embedded Care Due Messages. Reference number: [id number]. 10/25/2023 9:10:48 AM [place]

## 2023-10-25 NOTE — TELEPHONE ENCOUNTER
Medication refill request:  Medication(s):  warfarin not filled due to Non-delegated medication - provider fill only

## 2023-10-26 RX ORDER — WARFARIN SODIUM 5 MG/1
TABLET ORAL
Qty: 35 TABLET | Refills: 11 | Status: SHIPPED | OUTPATIENT
Start: 2023-10-26 | End: 2023-11-07 | Stop reason: SDUPTHER

## 2023-10-26 RX ORDER — FUROSEMIDE 80 MG/1
80 TABLET ORAL 2 TIMES DAILY
Qty: 180 TABLET | Refills: 0 | Status: SHIPPED | OUTPATIENT
Start: 2023-10-26 | End: 2024-01-23 | Stop reason: SDUPTHER

## 2023-10-29 PROCEDURE — 93298 REM INTERROG DEV EVAL SCRMS: CPT | Performed by: INTERNAL MEDICINE

## 2023-11-07 ENCOUNTER — ANTICOAGULATION VISIT (OUTPATIENT)
Dept: ANTICOAGULATION | Facility: CLINIC | Age: 73
End: 2023-11-07
Payer: MEDICARE

## 2023-11-07 VITALS — DIASTOLIC BLOOD PRESSURE: 58 MMHG | WEIGHT: 224 LBS | SYSTOLIC BLOOD PRESSURE: 106 MMHG | BODY MASS INDEX: 29.55 KG/M2

## 2023-11-07 DIAGNOSIS — I48.3 TYPICAL ATRIAL FLUTTER (CMS/HCC): ICD-10-CM

## 2023-11-07 LAB — INR (AMB): 1.8 (ref 2–3)

## 2023-11-07 PROCEDURE — 85610 PROTHROMBIN TIME: CPT | Mod: QW

## 2023-11-07 PROCEDURE — G0463 HOSPITAL OUTPT CLINIC VISIT: HCPCS

## 2023-11-07 RX ORDER — WARFARIN SODIUM 5 MG/1
TABLET ORAL
Start: 2023-11-07 | End: 2023-11-21 | Stop reason: SDUPTHER

## 2023-11-07 NOTE — PROGRESS NOTES
Subjective   Cesar Yuen presents to the anticoagulation clinic for monitoring of his long term warfarin therapy. Marco confirmed 1.5 tablets on Monday/Friday and 1 tablet all other days of the week. He has 5 mg tablets.       Today Cesar reports the following:   Bleeding signs/symptoms: No   Major bleeding event: No  Thrombosis signs/symptoms: No  Thromboembolic event: No  Missed doses: No  Medication changes: No  Dietary changes: No  Bacterial/viral infection: no  Other concerns: No    Current Outpatient Medications   Medication Sig Dispense Refill    warfarin (COUMADIN) 5 mg tablet Take 1.5 tablets (7.5 mg) every Mon/Wed/Fri and 1 tablet (5 mg) all other days of the week.      furosemide (LASIX) 80 mg tablet Take 1 tablet (80 mg total) by mouth 2 (two) times a day 180 tablet 0    potassium chloride (KLOR-CON M20) 20 mEq CR tablet Take 2 tablets (40 mEq total) by mouth 2 (two) times a day 360 tablet 0    tamsulosin (FLOMAX) 0.4 mg capsule Take 1 capsule (0.4 mg total) by mouth daily 30 capsule 9    tamsulosin (FLOMAX) 0.4 mg capsule Take 1 capsule (0.4 mg total) by mouth daily with dinner      multivitamin (THERAGRAN) tablet tablet Take 1 tablet by mouth daily       No current facility-administered medications for this visit.         Objective     /58 (BP Location: Left arm, Patient Position: Sitting, Cuff Size: Large Adult)   Wt 101.6 kg (224 lb)   BMI 29.55 kg/m²     Assessment/Plan      Anticoagulation Summary  As of 2023      INR goal:  2.0-3.0   INR used for dosin.8 (2023)   Full warfarin instructions:  7.5 mg every Mon, Wed, Fri; 5 mg all other days   Next INR check:  2023   Priority:  High    Indications    Typical atrial flutter (CMS/HCC) (Resolved) [I48.3]                 Anticoagulation Care Providers       Provider Role Specialty Phone number    Balbir Machado MD Referring Family Medicine [phone]            Anticoagulation therapy status: INR is less than goal and  dose adjustment required. Increase warfarin to  7.5 mg Monday/Wednesday/Friday and 5 mg all other days of the week. Follow-up in 2 weeks.  Patient verbalized understanding regarding new dose, monitoring and INR.    Please reference Anticoagulation episode for additional documentation.

## 2023-11-08 ENCOUNTER — TELEPHONE (OUTPATIENT)
Dept: UROLOGY | Facility: CLINIC | Age: 73
End: 2023-11-08

## 2023-11-08 NOTE — TELEPHONE ENCOUNTER
"Caller would like to discuss appointment     Patient: Cesar Yeun      Callback Number: [phone]     Best Availability: anytime    Additional Info: Having issues with his cath and \"stating that he may need a new one placed\" [facility]     I advised caller of a callback by 48 hours.    "

## 2023-11-08 NOTE — TELEPHONE ENCOUNTER
Caller would like to discuss return call     Patient: Cesar Yuen    Callback Number: [phone]    Best Availability: anytime    Was there an extension left: no, there was no message advising can transfer    Additional Info: Patient returned a call to Liana    I advised caller of a callback by 48 hours.

## 2023-11-09 NOTE — TELEPHONE ENCOUNTER
Called and spoke to patient, he state he has very little out put in his collection bag and if feeling fullness/pressure in his bladder.  Reports he has been drinking good.  Advised he go to the UC or ER in [place] and have them look at the catheter, may just need flushed or new catheter, if they change it he won't need the upcoming cath change on 11-22-23, he states understanding

## 2023-11-15 DIAGNOSIS — Z01.818 PRE-OP EXAM: Primary | ICD-10-CM

## 2023-11-20 DIAGNOSIS — I48.3 TYPICAL ATRIAL FLUTTER (CMS/HCC): ICD-10-CM

## 2023-11-20 NOTE — TELEPHONE ENCOUNTER
No care due was identified.  [facility] Embedded Care Due Messages. Reference number: [id number]. 11/20/2023 3:23:50 PM MST

## 2023-11-21 ENCOUNTER — ANTICOAGULATION VISIT (OUTPATIENT)
Dept: ANTICOAGULATION | Facility: CLINIC | Age: 73
End: 2023-11-21
Payer: MEDICARE

## 2023-11-21 VITALS
DIASTOLIC BLOOD PRESSURE: 80 MMHG | WEIGHT: 229 LBS | BODY MASS INDEX: 30.35 KG/M2 | HEIGHT: 73 IN | SYSTOLIC BLOOD PRESSURE: 118 MMHG

## 2023-11-21 DIAGNOSIS — I48.3 TYPICAL ATRIAL FLUTTER (CMS/HCC): ICD-10-CM

## 2023-11-21 LAB — INR (AMB): 1.9 (ref 2–3)

## 2023-11-21 PROCEDURE — G0463 HOSPITAL OUTPT CLINIC VISIT: HCPCS

## 2023-11-21 PROCEDURE — 85610 PROTHROMBIN TIME: CPT | Mod: QW

## 2023-11-21 RX ORDER — WARFARIN SODIUM 5 MG/1
TABLET ORAL
Qty: 40 TABLET | Refills: 5 | Status: SHIPPED | OUTPATIENT
Start: 2023-11-21 | End: 2024-01-24

## 2023-11-21 ASSESSMENT — PAIN SCALES - GENERAL: PAINLEVEL: 2

## 2023-11-21 NOTE — PROGRESS NOTES
"Subjective   Cesar Yuen presents to the anticoagulation clinic for monitoring of his long term warfarin therapy.       Today Cesar reports the following:   Bleeding signs/symptoms: No   Major bleeding event: No  Thrombosis signs/symptoms: No  Thromboembolic event: No  Missed doses: No  Medication changes: No  Dietary changes: No  Bacterial/viral infection: no  Other concerns: No    Current Outpatient Medications   Medication Sig Dispense Refill    warfarin (COUMADIN) 5 mg tablet Take 1.5 tablets (7.5 mg) every Mon/Wed/Fri and 1 tablet (5 mg) all other days of the week. 40 tablet 5    furosemide (LASIX) 80 mg tablet Take 1 tablet (80 mg total) by mouth 2 (two) times a day 180 tablet 0    potassium chloride (KLOR-CON M20) 20 mEq CR tablet Take 2 tablets (40 mEq total) by mouth 2 (two) times a day 360 tablet 0    tamsulosin (FLOMAX) 0.4 mg capsule Take 1 capsule (0.4 mg total) by mouth daily 30 capsule 9    tamsulosin (FLOMAX) 0.4 mg capsule Take 1 capsule (0.4 mg total) by mouth daily with dinner      multivitamin (THERAGRAN) tablet tablet Take 1 tablet by mouth daily       No current facility-administered medications for this visit.         Objective     /80 (BP Location: Left arm, Patient Position: Sitting, Cuff Size: Regular Adult)   Ht 73\" (185.4 cm)   Wt 103.9 kg (229 lb)   BMI 30.21 kg/m²     Assessment/Plan      Anticoagulation Summary  As of 2023      INR goal:  2.0-3.0   INR used for dosin.9 (2023)   Full warfarin instructions:  7.5 mg every Mon, Wed, Fri; 5 mg all other days   No change documented:  Eric Johnson, Suri   Next INR check:  2023   Priority:  Maintenance    Indications    Typical atrial flutter (CMS/HCC) (Resolved) [I48.3]                 Anticoagulation Care Providers       Provider Role Specialty Phone number    Balbir Machado MD Referring Family Medicine [phone]            Anticoagulation therapy status: INR is stable, no dose adjustment required.  " Patient verbalized understanding regarding dose, monitoring and INR.      Please reference Anticoagulation episode for additional documentation.

## 2023-11-22 ENCOUNTER — CLINICAL SUPPORT (OUTPATIENT)
Dept: UROLOGY | Facility: CLINIC | Age: 73
End: 2023-11-22
Payer: MEDICARE

## 2023-11-22 VITALS
TEMPERATURE: 97.4 F | SYSTOLIC BLOOD PRESSURE: 127 MMHG | HEART RATE: 68 BPM | OXYGEN SATURATION: 94 % | DIASTOLIC BLOOD PRESSURE: 75 MMHG

## 2023-11-22 DIAGNOSIS — R33.9 URINARY RETENTION: Primary | ICD-10-CM

## 2023-11-22 PROCEDURE — 51702 INSERT TEMP BLADDER CATH: CPT | Performed by: NURSE PRACTITIONER

## 2023-11-22 PROCEDURE — 51702 INSERT TEMP BLADDER CATH: CPT

## 2023-11-22 RX ORDER — WARFARIN SODIUM 5 MG/1
TABLET ORAL
Qty: 35 TABLET | Refills: 11 | OUTPATIENT
Start: 2023-11-22

## 2023-11-22 NOTE — PROGRESS NOTES
Bladder Catheterization    Date/Time: 11/22/2023 8:37 AM    Performed by: Liana Santamaria LPN  Authorized by: Micaela Mac CNP    Consent    Verbal consent was obtained from the patient. Written consent was not obtained from the patient. Risks, benefits, and alternatives were discussed. Consent given by patient. The patient states understanding of the procedure being performed.       Indications:     Indications: catheter change    Procedure Details:     Preparation: Patient was prepped and draped in usual sterile fashion      Catheter insertion:  Indwelling    Catheter type:  Coude    Catheter size:  16 Fr    Complicated insertion: No      Altered anatomy: No      Number of attempts:  2    Urine characteristics:  Clear and blood-tinged  Post-procedure:     Patient tolerance:  Patient tolerated the procedure well with no immediate complications  Procedure comments: Patient is scheduled for surgery in Dec. With Dr HARKINS, has fuchs in until then.  The catheter removed was not a coude, however first attempt with regular catheter was not successful so I inserted lidogel and used a 16fr coude that did go in with good urine return.     
(3) no apparent problem

## 2023-11-29 PROCEDURE — 93298 REM INTERROG DEV EVAL SCRMS: CPT | Performed by: INTERNAL MEDICINE

## 2023-12-06 ENCOUNTER — OFFICE VISIT (OUTPATIENT)
Dept: FAMILY MEDICINE | Facility: CLINIC | Age: 73
End: 2023-12-06
Payer: MEDICARE

## 2023-12-06 ENCOUNTER — ANCILLARY PROCEDURE (OUTPATIENT)
Dept: CARDIOLOGY | Facility: CLINIC | Age: 73
End: 2023-12-06
Payer: MEDICARE

## 2023-12-06 ENCOUNTER — LAB (OUTPATIENT)
Dept: LAB | Facility: CLINIC | Age: 73
End: 2023-12-06
Payer: MEDICARE

## 2023-12-06 VITALS
TEMPERATURE: 98.5 F | OXYGEN SATURATION: 93 % | WEIGHT: 226.9 LBS | HEART RATE: 75 BPM | BODY MASS INDEX: 29.94 KG/M2 | SYSTOLIC BLOOD PRESSURE: 118 MMHG | RESPIRATION RATE: 20 BRPM | DIASTOLIC BLOOD PRESSURE: 50 MMHG

## 2023-12-06 DIAGNOSIS — I48.3 TYPICAL ATRIAL FLUTTER (CMS/HCC): ICD-10-CM

## 2023-12-06 DIAGNOSIS — Z01.818 PRE-OP EXAM: Primary | ICD-10-CM

## 2023-12-06 DIAGNOSIS — R33.9 URINARY RETENTION: ICD-10-CM

## 2023-12-06 DIAGNOSIS — Z01.818 PRE-OP EXAM: ICD-10-CM

## 2023-12-06 LAB
ANION GAP SERPL CALC-SCNC: 13 MMOL/L (ref 3–11)
BASOPHILS # BLD AUTO: 0.1 10*3/UL
BASOPHILS NFR BLD AUTO: 0.8 % (ref 0–2)
BUN SERPL-MCNC: 21 MG/DL (ref 7–25)
CALCIUM SERPL-MCNC: 9.6 MG/DL (ref 8.6–10.3)
CHLORIDE SERPL-SCNC: 101 MMOL/L (ref 98–107)
CO2 SERPL-SCNC: 30 MMOL/L (ref 21–32)
CREAT SERPL-MCNC: 1.11 MG/DL (ref 0.7–1.3)
EGFRCR SERPLBLD CKD-EPI 2021: 70 ML/MIN/1.73M*2
EOSINOPHIL # BLD AUTO: 0.2 10*3/UL
EOSINOPHIL NFR BLD AUTO: 2.7 % (ref 0–3)
ERYTHROCYTE [DISTWIDTH] IN BLOOD BY AUTOMATED COUNT: 15 % (ref 11.5–15)
GLUCOSE SERPL-MCNC: 139 MG/DL (ref 70–105)
HCT VFR BLD AUTO: 44.2 % (ref 38–50)
HGB BLD-MCNC: 14.8 G/DL (ref 13.2–17.2)
LYMPHOCYTES # BLD AUTO: 2.1 10*3/UL
LYMPHOCYTES NFR BLD AUTO: 28.4 % (ref 15–47)
MCH RBC QN AUTO: 29.3 PG (ref 29–34)
MCHC RBC AUTO-ENTMCNC: 33.3 G/DL (ref 32–36)
MCV RBC AUTO: 88 FL (ref 82–97)
MONOCYTES # BLD AUTO: 0.9 10*3/UL
MONOCYTES NFR BLD AUTO: 12 % (ref 5–13)
NEUTROPHILS # BLD AUTO: 4.1 10*3/UL
NEUTROPHILS NFR BLD AUTO: 56.1 % (ref 46–70)
PLATELET # BLD AUTO: 298 10*3/UL (ref 130–350)
PMV BLD AUTO: 7.4 FL (ref 6.9–10.8)
POTASSIUM SERPL-SCNC: 3.7 MMOL/L (ref 3.5–5.1)
RBC # BLD AUTO: 5.03 10*6/ΜL (ref 4.1–5.8)
SODIUM SERPL-SCNC: 144 MMOL/L (ref 135–145)
WBC # BLD AUTO: 7.3 10*3/UL (ref 3.7–9.6)

## 2023-12-06 PROCEDURE — G0463 HOSPITAL OUTPT CLINIC VISIT: HCPCS | Performed by: FAMILY MEDICINE

## 2023-12-06 PROCEDURE — 93005 ELECTROCARDIOGRAM TRACING: CPT | Performed by: FAMILY MEDICINE

## 2023-12-06 PROCEDURE — 36415 COLL VENOUS BLD VENIPUNCTURE: CPT

## 2023-12-06 PROCEDURE — 99213 OFFICE O/P EST LOW 20 MIN: CPT | Performed by: FAMILY MEDICINE

## 2023-12-06 PROCEDURE — 85025 COMPLETE CBC W/AUTO DIFF WBC: CPT

## 2023-12-06 PROCEDURE — 80048 BASIC METABOLIC PNL TOTAL CA: CPT

## 2023-12-06 ASSESSMENT — ENCOUNTER SYMPTOMS
COUGH: 0
SHORTNESS OF BREATH: 0
FEVER: 0
CHILLS: 0
PALPITATIONS: 0

## 2023-12-06 ASSESSMENT — PAIN SCALES - GENERAL: PAINLEVEL: 0-NO PAIN

## 2023-12-06 NOTE — PROGRESS NOTES
"Subjective      HPI  Cesar Yuen is a 73 y.o. male who presents for pre op exam for urinary retention.  This is the fourth surgery and he is hopeful that will be the last.      The following have been reviewed and updated as appropriate in this visit:    No Known Allergies  Current Outpatient Medications   Medication Sig Dispense Refill    warfarin (COUMADIN) 5 mg tablet Take 1.5 tablets (7.5 mg) by mouth every Mon/Wed/Fri and 1 tablet (5 mg) all other days of the week. 40 tablet 5    furosemide (LASIX) 80 mg tablet Take 1 tablet (80 mg total) by mouth 2 (two) times a day 180 tablet 0    potassium chloride (KLOR-CON M20) 20 mEq CR tablet Take 2 tablets (40 mEq total) by mouth 2 (two) times a day 360 tablet 0    tamsulosin (FLOMAX) 0.4 mg capsule Take 1 capsule (0.4 mg total) by mouth daily with dinner      multivitamin (THERAGRAN) tablet tablet Take 1 tablet by mouth daily       No current facility-administered medications for this visit.     Past Medical History:   Diagnosis Date    Atrial flutter (CMS/[facility])     CHF (congestive heart failure) (CMS/[facility])     Chipped tooth     \"cracked tooth\" Back right upper, left upper back    Clotting disorder (CMS/[facility])     Dysrhythmia     a-flutter, s/p ablation and loop recorder 2/8/2021    Edema     BLE    History of transfusion     After surgery    Hypertension     Injury of back 01/14/2021    Seen on CT    Pneumonia 03/11/2023    Urinary retention     BPH     Past Surgical History:   Procedure Laterality Date    ABDOMINAL SURGERY      anuerysm    ABLATION A-FLUTTER N/A 2/8/2021    Procedure: Ablation A-flutter loop implant;  Surgeon: Timoteo Burk DO;  Location: [facility] EP Lab;  Service: Electrophysiology;  Laterality: N/A;    APPENDECTOMY      HERNIA REPAIR      x 3    LOOP RECORDER INSERTION N/A 2/8/2021    Procedure: LOOP RECORDER INSERTION;  Surgeon: Timoteo Burk DO;  Location: [facility] EP Lab;  Service: Electrophysiology;  Laterality: N/A;    PROSTATECTOMY N/A 4/28/2022    " Procedure: REVOLIX LASER PROSTATECTOMY;  Surgeon: Attila Taylor MD;  Location: [facility] OR;  Service: Urology;  Laterality: N/A;    TOTAL KNEE ARTHROPLASTY Left 7/24/2023    Procedure: LEFT TOTAL KNEE ARTHROPLASTY WITH ROBOTIC ORTHOPEDIC SURGICAL ASSISTANT;  Surgeon: Garrett Saez DO;  Location: [facility] OR;  Service: Orthopedics;  Laterality: Left;    TRANSURETHRAL RESECTION OF BLADDER TUMOR N/A 12/22/2020    Procedure: CYSTOSCOPY WITH BLADDER BIOPSY;  Surgeon: Daniele Johnson MD;  Location: [facility] OR;  Service: Urology;  Laterality: N/A;    TRANSURETHRAL RESECTION OF PROSTATE N/A 12/22/2020    Procedure: CYSTOSCOPY TRANSURETHRAL RESECTION PROSTATE, exam under anasthesia;  Surgeon: Daniele Johnson MD;  Location: [facility];  Service: Urology;  Laterality: N/A;    TRANSURETHRAL RESECTION OF PROSTATE N/A 4/27/2021    Procedure: CYSTOSCOPY TRANSURETHRAL RESECTION PROSTATE;  Surgeon: Daniele Johnson MD;  Location: [facility] OR;  Service: Urology;  Laterality: N/A;    TRANSURETHRAL RESECTION OF PROSTATE N/A 9/27/2021    Procedure: CYSTOSCOPY TRANSURETHRAL RESECTION PROSTATE;  Surgeon: Attila Taylor MD;  Location: [facility] OR;  Service: Urology;  Laterality: N/A;  NOT EARLY MORNING       Review of Systems   Constitutional:  Negative for chills and fever.   Respiratory:  Negative for cough and shortness of breath.    Cardiovascular:  Negative for chest pain and palpitations.       Objective   /50 (BP Location: Left arm, Patient Position: Sitting)   Pulse 75   Temp 36.9 °C (98.5 °F) (Temporal)   Resp 20   Wt 102.9 kg (226 lb 14.4 oz)   SpO2 93%   BMI 29.94 kg/m²     Physical Exam  Vitals and nursing note reviewed.   Constitutional:       Appearance: Normal appearance. He is well-developed.   HENT:      Head: Normocephalic and atraumatic.      Mouth/Throat:      Mouth: Mucous membranes are moist.   Eyes:      Conjunctiva/sclera: Conjunctivae normal.   Neck:      Thyroid: No thyromegaly.   Cardiovascular:      Rate and  Rhythm: Normal rate and regular rhythm.      Heart sounds: Normal heart sounds. No murmur heard.     No friction rub. No gallop.   Pulmonary:      Effort: Pulmonary effort is normal.      Breath sounds: Normal breath sounds. No wheezing or rales.   Musculoskeletal:      Right lower leg: Edema (trace) present.      Left lower leg: Edema (trace) present.   Lymphadenopathy:      Cervical: No cervical adenopathy.   Skin:     General: Skin is warm and dry.   Neurological:      Mental Status: He is alert.         Assessment/Plan     1. Pre-op exam  2. Urinary retention    1 - 2.   Labs reviewed today with no concerns.  He has previously received cardiology's blessing.  Warfarin to be stopped 5 days prior to surgery (last dose will be Thursday December 7th).   He can take his pills on the morning of surgery like usual unless directed otherwise by urology.  Patient is medically optimized to proceed with surgery.      Portions of this note was documented by Lorena Park as I performed the exam and collected the information from Cesar Yuen. I attest that I have reviewed the information as documented, verified the accuracy of the documentation and added additional information as needed.       FELY BAILEY MD  12/06/23

## 2023-12-06 NOTE — H&P (VIEW-ONLY)
"Subjective      HPI  Cesar Yuen is a 73 y.o. male who presents for pre op exam for urinary retention.  This is the fourth surgery and he is hopeful that will be the last.      The following have been reviewed and updated as appropriate in this visit:    No Known Allergies  Current Outpatient Medications   Medication Sig Dispense Refill    warfarin (COUMADIN) 5 mg tablet Take 1.5 tablets (7.5 mg) by mouth every Mon/Wed/Fri and 1 tablet (5 mg) all other days of the week. 40 tablet 5    furosemide (LASIX) 80 mg tablet Take 1 tablet (80 mg total) by mouth 2 (two) times a day 180 tablet 0    potassium chloride (KLOR-CON M20) 20 mEq CR tablet Take 2 tablets (40 mEq total) by mouth 2 (two) times a day 360 tablet 0    tamsulosin (FLOMAX) 0.4 mg capsule Take 1 capsule (0.4 mg total) by mouth daily with dinner      multivitamin (THERAGRAN) tablet tablet Take 1 tablet by mouth daily       No current facility-administered medications for this visit.     Past Medical History:   Diagnosis Date    Atrial flutter (CMS/[facility])     CHF (congestive heart failure) (CMS/[facility])     Chipped tooth     \"cracked tooth\" Back right upper, left upper back    Clotting disorder (CMS/[facility])     Dysrhythmia     a-flutter, s/p ablation and loop recorder 2/8/2021    Edema     BLE    History of transfusion     After surgery    Hypertension     Injury of back 01/14/2021    Seen on CT    Pneumonia 03/11/2023    Urinary retention     BPH     Past Surgical History:   Procedure Laterality Date    ABDOMINAL SURGERY      anuerysm    ABLATION A-FLUTTER N/A 2/8/2021    Procedure: Ablation A-flutter loop implant;  Surgeon: Timoteo Burk DO;  Location: [facility] EP Lab;  Service: Electrophysiology;  Laterality: N/A;    APPENDECTOMY      HERNIA REPAIR      x 3    LOOP RECORDER INSERTION N/A 2/8/2021    Procedure: LOOP RECORDER INSERTION;  Surgeon: Timoteo Burk DO;  Location: [facility] EP Lab;  Service: Electrophysiology;  Laterality: N/A;    PROSTATECTOMY N/A 4/28/2022    " Procedure: REVOLIX LASER PROSTATECTOMY;  Surgeon: Attila Taylor MD;  Location: [facility] OR;  Service: Urology;  Laterality: N/A;    TOTAL KNEE ARTHROPLASTY Left 7/24/2023    Procedure: LEFT TOTAL KNEE ARTHROPLASTY WITH ROBOTIC ORTHOPEDIC SURGICAL ASSISTANT;  Surgeon: Garrett Saez DO;  Location: [facility] OR;  Service: Orthopedics;  Laterality: Left;    TRANSURETHRAL RESECTION OF BLADDER TUMOR N/A 12/22/2020    Procedure: CYSTOSCOPY WITH BLADDER BIOPSY;  Surgeon: Daniele Johnson MD;  Location: [facility] OR;  Service: Urology;  Laterality: N/A;    TRANSURETHRAL RESECTION OF PROSTATE N/A 12/22/2020    Procedure: CYSTOSCOPY TRANSURETHRAL RESECTION PROSTATE, exam under anasthesia;  Surgeon: Daniele Johnson MD;  Location: [facility];  Service: Urology;  Laterality: N/A;    TRANSURETHRAL RESECTION OF PROSTATE N/A 4/27/2021    Procedure: CYSTOSCOPY TRANSURETHRAL RESECTION PROSTATE;  Surgeon: Daniele Johnson MD;  Location: [facility] OR;  Service: Urology;  Laterality: N/A;    TRANSURETHRAL RESECTION OF PROSTATE N/A 9/27/2021    Procedure: CYSTOSCOPY TRANSURETHRAL RESECTION PROSTATE;  Surgeon: Attila Taylor MD;  Location: [facility] OR;  Service: Urology;  Laterality: N/A;  NOT EARLY MORNING       Review of Systems   Constitutional:  Negative for chills and fever.   Respiratory:  Negative for cough and shortness of breath.    Cardiovascular:  Negative for chest pain and palpitations.       Objective   /50 (BP Location: Left arm, Patient Position: Sitting)   Pulse 75   Temp 36.9 °C (98.5 °F) (Temporal)   Resp 20   Wt 102.9 kg (226 lb 14.4 oz)   SpO2 93%   BMI 29.94 kg/m²     Physical Exam  Vitals and nursing note reviewed.   Constitutional:       Appearance: Normal appearance. He is well-developed.   HENT:      Head: Normocephalic and atraumatic.      Mouth/Throat:      Mouth: Mucous membranes are moist.   Eyes:      Conjunctiva/sclera: Conjunctivae normal.   Neck:      Thyroid: No thyromegaly.   Cardiovascular:      Rate and  Rhythm: Normal rate and regular rhythm.      Heart sounds: Normal heart sounds. No murmur heard.     No friction rub. No gallop.   Pulmonary:      Effort: Pulmonary effort is normal.      Breath sounds: Normal breath sounds. No wheezing or rales.   Musculoskeletal:      Right lower leg: Edema (trace) present.      Left lower leg: Edema (trace) present.   Lymphadenopathy:      Cervical: No cervical adenopathy.   Skin:     General: Skin is warm and dry.   Neurological:      Mental Status: He is alert.         Assessment/Plan     1. Pre-op exam  2. Urinary retention    1 - 2.   Labs reviewed today with no concerns.  He has previously received cardiology's blessing.  Warfarin to be stopped 5 days prior to surgery (last dose will be Thursday December 7th).   He can take his pills on the morning of surgery like usual unless directed otherwise by urology.  Patient is medically optimized to proceed with surgery.      Portions of this note was documented by Lorena Park as I performed the exam and collected the information from Cesar Yuen. I attest that I have reviewed the information as documented, verified the accuracy of the documentation and added additional information as needed.       FELY BAILEY MD  12/06/23

## 2023-12-08 PROCEDURE — 93010 ELECTROCARDIOGRAM REPORT: CPT | Performed by: INTERNAL MEDICINE

## 2023-12-08 NOTE — PRE-PROCEDURE INSTRUCTIONS
Pre-Surgery Instructions:   Medication Instructions    warfarin (COUMADIN) 5 mg tablet Last dose 12/7/23    furosemide (LASIX) 80 mg tablet Do not take/administer morning of surgery/procedure    potassium chloride (KLOR-CON M20) 20 mEq CR tablet Do not take/administer morning of surgery/procedure    tamsulosin (FLOMAX) 0.4 mg capsule Continue as prescribed do not hold    multivitamin (THERAGRAN) tablet tablet Do not take/administer morning of surgery/procedure       Diet instructions for surgery:  Nothing to eat after midnight    Preop questionnaire:  Preop Questionairre  Does patient have physical restrictions or limitations?: No  Has patient had an upper respiratory tract infection in the last 2 weeks?: No  Home Oxygen: No    Preop instructions:  Pre-op Phone Call  Surgery Time Verified: Yes (12/13/23 0945)  Arrival Time Verified: Yes (12/13 0745)  Surgery Location Verified: Yes  Instructed to shower within 12 hours: Yes  Patient has special physician orders: No  Does Patient Require Bowel Prep for Procedure?: No  Instructed to remove/not apply makeup, petroleum products, lotion, powder, scents, or deodorant on the morning of surgery: Yes  Recommend eye glasses for day of surgery, contact lenses must be removed prior to surgery:: Yes  Instructed to bring CPAP mask: N/A  Instructed to bring Oxygen tank from home: N/A  Does the patient wear a glucose monitoring device?: N/A  Instructed to remove all jewelry, including piercings, and leave at home.: Yes  Instructed to leave all valuables at home: Yes  Instructed to leave all medications at home: Yes  Instructed to bring a valid photo ID, insurance card and form of payment: Yes  NPO Status Reinforced: Yes (clear liquid untill 0545)  Instruct patient to ensure transportation is available following surgery/procedure:: Yes  Consider having a caregiver stay with the patient for 24 hours following anesthesia:: Yes

## 2023-12-11 ENCOUNTER — TELEPHONE (OUTPATIENT)
Dept: UROLOGY | Facility: CLINIC | Age: 73
End: 2023-12-11
Payer: MEDICARE

## 2023-12-11 NOTE — TELEPHONE ENCOUNTER
Returned call to Marco and he is wanting to move his nurse visit from Friday to Monday as he is not available on Friday.  This is done for the patient.

## 2023-12-11 NOTE — TELEPHONE ENCOUNTER
Call Type: Voicemail    [Outgoing Only]  Left VM? N/A  VM Type? N/A    [VMs Only]  Left On (Date/Time): n/a    Caller Name: Cesar Yeun  Relation to Pt: self  PORTER?: N/A    Return Call to Pt Needed? Yes  Callback Number: [phone]    URO Provider: Dr. Taylor    Reason For Call: reschedule appt    Call Notes:   Patient has an appointment on 12/15/2023 and left a voicemail stating he is wondering if it can be rescheduled to 12/18 by chance as he is not able to make it on the 15th.    Pt Informed of 48hr Callback Policy?: N/A    [CALL CENTER INSTRUCTIONS]  On Return Call, Transfer to: Main PAS Line  Ok to Transfer During Shutdown?: No

## 2023-12-12 ENCOUNTER — ANESTHESIA EVENT (OUTPATIENT)
Dept: OPERATING ROOM | Facility: HOSPITAL | Age: 73
End: 2023-12-12
Payer: MEDICARE

## 2023-12-12 ASSESSMENT — ENCOUNTER SYMPTOMS
SHORTNESS OF BREATH: 0
DYSRHYTHMIAS: 1

## 2023-12-12 NOTE — ANESTHESIA PREPROCEDURE EVALUATION
"Pre-Procedure Assessment    Patient: Cesar Yuen, male, 73 y.o.    Ht Readings from Last 1 Encounters:   11/21/23 1.854 m (6' 1\")     Wt Readings from Last 1 Encounters:   12/13/23 103.9 kg (229 lb)       Last Vitals  /78 (12/13/23 0800)    Temp 36.3 °C (97.3 °F) (12/13/23 0800)    Pulse 67 (12/13/23 0800)   Resp 15 (12/13/23 0800)    SpO2 93 % (12/13/23 0800)    Pain Score 0 - No pain (12/13/23 0800)       Problem list reviewed and Medical history reviewed    No history of anesthetic complications:          Airway   Mallampati: II  TM distance: >3 FB  Neck ROM: full      Dental      Pulmonary     breath sounds clear to auscultation  (-) shortness of breath  Cardiovascular   (+) hypertension, dysrhythmias, CHF  (-) angina    ECG reviewed  Rhythm: regular  Rate: normal  ROS comment: ECHO 2023 Interpretation Summary    · Normal left ventricular size, wall thickness and systolic function. EF 66%.  · The left ventricular wall motion is normal.  · Normal left ventricular diastolic function.  · The left atrium is normal in size.  · Right ventricle is normal in size and systolic function.  · The right atrium is normal in size.  · There is no evidence of pulmonary hypertension. Estimated RVSP at 22 mmHg.  · Mild regurgitation of the mitral valve is noted.  · Normal central venous pressure.  · No pericardial effusion.  · The ascending aorta is mildly dilated at 4.1 cm.      Mental Status/Neuro/Psych    Pt is alert.      (+) arthritis, back injury    GI/Hepatic/Renal    (+) urinary retention    Endo/Other    (+) history of blood transfusion, clotting disorder  Abdominal      Other findings: LE edema, no different than usual       Social History     Tobacco Use    Smoking status: Never    Smokeless tobacco: Never   Substance Use Topics    Alcohol use: Yes     Alcohol/week: 1.0 standard drink of alcohol     Types: 1 Standard drinks or equivalent per week     Comment: rarely      Hematology   WBC   Date Value Ref Range " Status   12/06/2023 7.3 3.7 - 9.6 10*3/uL Final     RBC   Date Value Ref Range Status   12/06/2023 5.03 4.10 - 5.80 10*6/µL Final     MCV   Date Value Ref Range Status   12/06/2023 88.0 82.0 - 97.0 fL Final     Hemoglobin   Date Value Ref Range Status   12/06/2023 14.8 13.2 - 17.2 g/dL Final     Hematocrit   Date Value Ref Range Status   12/06/2023 44.2 38.0 - 50.0 % Final     Platelets   Date Value Ref Range Status   12/06/2023 298 130 - 350 10*3/uL Final      Coagulation   Protime   Date Value Ref Range Status   09/25/2023 14.4 (H) 9.4 - 12.5 SECONDS Final     PTT   Date Value Ref Range Status   02/13/2020 33.8 25.1 - 36.5 SECONDS Final     INR   Date Value Ref Range Status   11/21/2023 1.9 (A) 2 - 3 Final     Comment:     Lot: [id number]  Exp: 7/31/24      General Chemistry   Calcium   Date Value Ref Range Status   12/06/2023 9.6 8.6 - 10.3 mg/dL Final     BUN   Date Value Ref Range Status   12/06/2023 21 7 - 25 mg/dL Final     Creatinine   Date Value Ref Range Status   12/06/2023 1.11 0.70 - 1.30 mg/dL Final     Glucose   Date Value Ref Range Status   12/06/2023 139 (H) 70 - 105 mg/dL Final     Sodium   Date Value Ref Range Status   12/06/2023 144 135 - 145 mmol/L Final     Potassium   Date Value Ref Range Status   12/06/2023 3.7 3.5 - 5.1 MMOL/L Final     Magnesium   Date Value Ref Range Status   03/15/2023 2.2 1.8 - 2.4 mg/dL Final     CO2   Date Value Ref Range Status   12/06/2023 30 21 - 32 mmol/L Final     Chloride   Date Value Ref Range Status   12/06/2023 101 98 - 107 mmol/L Final     Anesthesia Plan    ASA 3   NPO status reviewed: > 6 hours    General         Induction: intravenous   Airway Planning: LMA    Additional Comments: 8 mg dex and 4 mg zofran intra-op    15 mg toradol intra-op if OK w surgeon          Anesthetic plan and risks discussed with patient.      Plan discussed with CRNA.

## 2023-12-13 ENCOUNTER — APPOINTMENT (OUTPATIENT)
Dept: FLUOROSCOPY | Facility: HOSPITAL | Age: 73
End: 2023-12-13
Payer: MEDICARE

## 2023-12-13 ENCOUNTER — HOSPITAL ENCOUNTER (OUTPATIENT)
Facility: HOSPITAL | Age: 73
Setting detail: OUTPATIENT SURGERY
Discharge: HOME | End: 2023-12-13
Attending: UROLOGY | Admitting: UROLOGY
Payer: MEDICARE

## 2023-12-13 ENCOUNTER — ANESTHESIA (OUTPATIENT)
Dept: OPERATING ROOM | Facility: HOSPITAL | Age: 73
End: 2023-12-13
Payer: MEDICARE

## 2023-12-13 VITALS
BODY MASS INDEX: 30.21 KG/M2 | SYSTOLIC BLOOD PRESSURE: 114 MMHG | OXYGEN SATURATION: 92 % | DIASTOLIC BLOOD PRESSURE: 73 MMHG | RESPIRATION RATE: 13 BRPM | HEART RATE: 59 BPM | WEIGHT: 229 LBS | TEMPERATURE: 97.7 F

## 2023-12-13 DIAGNOSIS — R33.9 URINARY RETENTION: Primary | ICD-10-CM

## 2023-12-13 LAB
INR BLD: 1
PROTHROMBIN TIME: 11.3 SECONDS (ref 9.4–12.5)

## 2023-12-13 PROCEDURE — 6360000200 HC RX 636 W HCPCS (ALT 250 FOR IP): Performed by: UROLOGY

## 2023-12-13 PROCEDURE — C1889 IMPLANT/INSERT DEVICE, NOC: HCPCS | Performed by: UROLOGY

## 2023-12-13 PROCEDURE — C1725 CATH, TRANSLUMIN NON-LASER: HCPCS | Performed by: UROLOGY

## 2023-12-13 PROCEDURE — (BLANK) HC OR LEVEL 3 PROC EACH ADDITIONAL MIN: Performed by: UROLOGY

## 2023-12-13 PROCEDURE — (BLANK) HC RECOVERY PHASE-2 EACH ADDITIONAL 1/2 HOUR ACUITY LEVEL 1: Performed by: UROLOGY

## 2023-12-13 PROCEDURE — 00910 ANES TRANSURETHRAL PX NOS: CPT | Performed by: NURSE ANESTHETIST, CERTIFIED REGISTERED

## 2023-12-13 PROCEDURE — (BLANK) HC GENERAL ANESTHESIA FACILITY CHARGE 1ST 15 MIN: Performed by: UROLOGY

## 2023-12-13 PROCEDURE — (BLANK) HC OR LEVEL 3 PROC 1ST 15MIN: Performed by: UROLOGY

## 2023-12-13 PROCEDURE — C1769 GUIDE WIRE: HCPCS | Performed by: UROLOGY

## 2023-12-13 PROCEDURE — 76000 FLUOROSCOPY <1 HR PHYS/QHP: CPT | Mod: NCP

## 2023-12-13 PROCEDURE — 52005 CYSTO W/URTRL CATHJ: CPT | Performed by: UROLOGY

## 2023-12-13 PROCEDURE — 85610 PROTHROMBIN TIME: CPT | Performed by: ANESTHESIOLOGY

## 2023-12-13 PROCEDURE — 99100 ANES PT EXTEME AGE<1 YR&>70: CPT | Performed by: NURSE ANESTHETIST, CERTIFIED REGISTERED

## 2023-12-13 PROCEDURE — (BLANK) HC RECOVERY PHASE-1 1ST  HOUR ACUITY LEVEL 3: Performed by: UROLOGY

## 2023-12-13 PROCEDURE — 6360000200 HC RX 636 W HCPCS (ALT 250 FOR IP): Performed by: NURSE ANESTHETIST, CERTIFIED REGISTERED

## 2023-12-13 PROCEDURE — 2580000300 HC RX 258: Performed by: UROLOGY

## 2023-12-13 PROCEDURE — 53899 UNLISTED PX URINARY SYSTEM: CPT | Mod: 51 | Performed by: UROLOGY

## 2023-12-13 PROCEDURE — (BLANK) HC RECOVERY PHASE-2 1ST 1/2 HOUR ACUITY LEVEL 1: Performed by: UROLOGY

## 2023-12-13 PROCEDURE — 2580000300 HC RX 258: Performed by: ANESTHESIOLOGY

## 2023-12-13 PROCEDURE — 52276 CYSTOSCOPY AND TREATMENT: CPT | Mod: RVUONLY | Performed by: UROLOGY

## 2023-12-13 PROCEDURE — 36415 COLL VENOUS BLD VENIPUNCTURE: CPT | Performed by: ANESTHESIOLOGY

## 2023-12-13 PROCEDURE — (BLANK) HC GENERAL ANESTHESIA FACILITY CHARGE EACH ADDITIONAL MIN: Performed by: UROLOGY

## 2023-12-13 RX ORDER — HYDROMORPHONE HYDROCHLORIDE 1 MG/ML
0.5 INJECTION, SOLUTION INTRAMUSCULAR; INTRAVENOUS; SUBCUTANEOUS EVERY 5 MIN PRN
Status: DISCONTINUED | OUTPATIENT
Start: 2023-12-13 | End: 2023-12-13 | Stop reason: HOSPADM

## 2023-12-13 RX ORDER — LIDOCAINE HYDROCHLORIDE 20 MG/ML
INJECTION, SOLUTION EPIDURAL; INFILTRATION; INTRACAUDAL; PERINEURAL AS NEEDED
Status: DISCONTINUED | OUTPATIENT
Start: 2023-12-13 | End: 2023-12-13 | Stop reason: SURG

## 2023-12-13 RX ORDER — FENTANYL CITRATE/PF 50 MCG/ML
50 PLASTIC BAG, INJECTION (ML) INTRAVENOUS EVERY 5 MIN PRN
Status: DISCONTINUED | OUTPATIENT
Start: 2023-12-13 | End: 2023-12-13 | Stop reason: HOSPADM

## 2023-12-13 RX ORDER — ACETAMINOPHEN 325 MG/1
650 TABLET ORAL AS NEEDED
Status: CANCELLED | OUTPATIENT
Start: 2023-12-13

## 2023-12-13 RX ORDER — KETOROLAC TROMETHAMINE 30 MG/ML
INJECTION, SOLUTION INTRAMUSCULAR; INTRAVENOUS AS NEEDED
Status: DISCONTINUED | OUTPATIENT
Start: 2023-12-13 | End: 2023-12-13 | Stop reason: SURG

## 2023-12-13 RX ORDER — ONDANSETRON HYDROCHLORIDE 2 MG/ML
INJECTION, SOLUTION INTRAVENOUS AS NEEDED
Status: DISCONTINUED | OUTPATIENT
Start: 2023-12-13 | End: 2023-12-13 | Stop reason: SURG

## 2023-12-13 RX ORDER — HYDROCODONE BITARTRATE AND ACETAMINOPHEN 5; 325 MG/1; MG/1
1 TABLET ORAL AS NEEDED
Status: CANCELLED | OUTPATIENT
Start: 2023-12-13

## 2023-12-13 RX ORDER — PROPOFOL 10 MG/ML
INJECTION, EMULSION INTRAVENOUS AS NEEDED
Status: DISCONTINUED | OUTPATIENT
Start: 2023-12-13 | End: 2023-12-13 | Stop reason: SURG

## 2023-12-13 RX ORDER — HYDROCODONE BITARTRATE AND ACETAMINOPHEN 5; 325 MG/1; MG/1
2 TABLET ORAL AS NEEDED
Status: CANCELLED | OUTPATIENT
Start: 2023-12-13

## 2023-12-13 RX ORDER — DEXAMETHASONE SODIUM PHOSPHATE 4 MG/ML
INJECTION, SOLUTION INTRA-ARTICULAR; INTRALESIONAL; INTRAMUSCULAR; INTRAVENOUS; SOFT TISSUE AS NEEDED
Status: DISCONTINUED | OUTPATIENT
Start: 2023-12-13 | End: 2023-12-13 | Stop reason: SURG

## 2023-12-13 RX ORDER — HYDROCODONE BITARTRATE AND ACETAMINOPHEN 5; 325 MG/1; MG/1
1-2 TABLET ORAL EVERY 6 HOURS PRN
Qty: 10 TABLET | Refills: 0 | Status: SHIPPED | OUTPATIENT
Start: 2023-12-13 | End: 2023-12-16

## 2023-12-13 RX ORDER — SODIUM CHLORIDE, SODIUM LACTATE, POTASSIUM CHLORIDE, CALCIUM CHLORIDE 600; 310; 30; 20 MG/100ML; MG/100ML; MG/100ML; MG/100ML
100 INJECTION, SOLUTION INTRAVENOUS CONTINUOUS
Status: DISCONTINUED | OUTPATIENT
Start: 2023-12-13 | End: 2023-12-13 | Stop reason: HOSPADM

## 2023-12-13 RX ORDER — FENTANYL CITRATE/PF 50 MCG/ML
PLASTIC BAG, INJECTION (ML) INTRAVENOUS AS NEEDED
Status: DISCONTINUED | OUTPATIENT
Start: 2023-12-13 | End: 2023-12-13 | Stop reason: SURG

## 2023-12-13 RX ORDER — ACETAMINOPHEN 500 MG
1000 TABLET ORAL ONCE
Status: COMPLETED | OUTPATIENT
Start: 2023-12-13 | End: 2023-12-13

## 2023-12-13 RX ORDER — CEPHALEXIN 500 MG/1
500 CAPSULE ORAL 2 TIMES DAILY
Qty: 10 CAPSULE | Refills: 0 | Status: SHIPPED | OUTPATIENT
Start: 2023-12-13 | End: 2023-12-18

## 2023-12-13 RX ADMIN — SODIUM CHLORIDE, POTASSIUM CHLORIDE, SODIUM LACTATE AND CALCIUM CHLORIDE 100 ML/HR: 600; 310; 30; 20 INJECTION, SOLUTION INTRAVENOUS at 08:43

## 2023-12-13 RX ADMIN — PROPOFOL 125 MG: 10 INJECTION, EMULSION INTRAVENOUS at 10:23

## 2023-12-13 RX ADMIN — LIDOCAINE HYDROCHLORIDE 100 MG: 20 INJECTION, SOLUTION EPIDURAL; INFILTRATION; INTRACAUDAL; PERINEURAL at 10:23

## 2023-12-13 RX ADMIN — KETOROLAC TROMETHAMINE 15 MG: 30 INJECTION, SOLUTION INTRAMUSCULAR at 10:47

## 2023-12-13 RX ADMIN — FENTANYL CITRATE 100 MCG: 50 INJECTION, SOLUTION INTRAMUSCULAR; INTRAVENOUS at 10:54

## 2023-12-13 RX ADMIN — DEXAMETHASONE SODIUM PHOSPHATE 4 MG: 4 INJECTION, SOLUTION INTRAMUSCULAR; INTRAVENOUS at 10:23

## 2023-12-13 RX ADMIN — ONDANSETRON 4 MG: 2 INJECTION INTRAMUSCULAR; INTRAVENOUS at 10:47

## 2023-12-13 RX ADMIN — CEFAZOLIN 2000 MG: 2 INJECTION, POWDER, FOR SOLUTION INTRAMUSCULAR; INTRAVENOUS at 10:26

## 2023-12-13 RX ADMIN — Medication 1000 MG: at 08:41

## 2023-12-13 RX ADMIN — PROPOFOL 50 MG: 10 INJECTION, EMULSION INTRAVENOUS at 10:54

## 2023-12-13 NOTE — DISCHARGE INSTRUCTIONS
You may restart your warfarin tomorrow if your urine is clear of blood.     Take over-the-counter Ibuprofen and Tylenol for pain. Use narcotics sparingly and only for breakthrough pain as necessary. If you take narcotic pain medications, recommend taking an over-the-counter stool softener such as Colace and/or Miralax to prevent constipation and straining for bowel movements.     Please take your antibiotics as prescribed to help prevent infection.    Ensure you are staying adequately hydrated with water to help prevent constipation and ensure adequate flushing of your urinary tract.    Please be aware that some blood in your urine may happen. This is normal, however if you experience large amounts of bright red blood or large amounts of blood clots, please contact the Urology clinic at [phone] and let the staff know.    Bladder spasms may occur when the fuchs catheter is in place. This is a normal occurrence and light activity such as walking may help ease these.    Please seek immediate medical attention if you experience any new chest pain, shortness of breath, dizziness, lightheadedness, or difficulty urinating.     Please restrict lifting to 10 lbs (1 gallon milk jug size) for the next 2-3 weeks.

## 2023-12-13 NOTE — ANESTHESIA POSTPROCEDURE EVALUATION
Patient: Cesar Yuen    Procedure Summary       Date: 12/13/23 Room / Location: [facility] OR  / [facility] OR    Anesthesia Start: 1020 Anesthesia Stop: 1122    Procedures:       TEMPORARY URETHRAL IMPLANT INSERTION (Urethra)      CYSTOSCOPY URETHRAL DILATION AND TEMPORARY URETHRAL IMPLANT INSERTION (OPTILUME) (Urethra) Diagnosis:       Urinary retention      Stricture of urethral meatus in male, unspecified stricture type      (Urinary retention [R33.9])      (Stricture of urethral meatus in male, unspecified stricture type [N35.911])    Surgeons: Attila Taylor MD Responsible Provider: Juan Avila MD    Anesthesia Type: general ASA Status: 3            Anesthesia Type: general    Last vitals  Vitals Value Taken Time   /73 12/13/23 1200   Temp 36.5 °C (97.7 °F) 12/13/23 1200   Pulse 51 12/13/23 1200   Resp 13 12/13/23 1200   SpO2 92 % 12/13/23 1200   0-10 Pain Score 0 - No pain 12/13/23 1200       Anesthesia Post Evaluation    Patient location during evaluation: PACU  Patient participation: complete - patient participated  Level of consciousness: awake and alert  Pain management: adequate  Airway patency: patent  Anesthetic complications: no  Cardiovascular status: acceptable  Respiratory status: acceptable  Hydration status: acceptable  May dismiss recovered patient based on consultation with the appropriate physicians and/or meeting appropriate discharge criteria      Cosmetic?  This procedure is not cosmetic.

## 2023-12-13 NOTE — ANESTHESIA PROCEDURE NOTES
Airway  Date/Time: 12/13/2023 10:25 AM  Urgency: elective    Airway not difficult    General Information and Staff    Patient location during procedure: OR  CRNA: Alexsander Medina CRNA  Performed: CRNA     Indications and Patient Condition  Indications for airway management: anesthesia  Spontaneous Ventilation: absent  Sedation level: deep  Preoxygenated: yes  Patient position: sniffing  MILS maintained throughout  Mask difficulty assessment: 1 - vent by mask    Final Airway Details  Final airway type: supraglottic airway      Successful airway: unique  Size 5  SGA cuff pressure (cm H2O): Minimal Occlusive pressure.   Placement verified by: chest auscultation, capnometry and palpation of cuff   Number of attempts at approach: 1

## 2023-12-13 NOTE — OP NOTE
Cystoscopy Procedure Note    Date: 12/13/23    Pre-operative Diagnosis: bladder neck contracture. urethral stricture    Post-operative Diagnosis: same    Procedure: Cystoscopy with dilation, retrograde urethrogram, optilume catheter insertion     Surgeon: Attila Taylor MD    Anesthesia: General/LMA; Anesthesiologist: Juan Avila MD  CRNA: Alexsander Medina CRNA    Surgical Staff: Circulator: Marco Avitia RN; Gustavo Miller RN  Scrub Person: Kayla Larson    Procedure Details   The risks, benefits, complications, treatment options, and expected outcomes were discussed with the patient. The patient concurred with the proposed plan, giving informed consent.    Patient underwent induction of general anesthesia, was placed in the lithotomy position, prepped with Betadine, and draped in the usual sterile fashion. A timeout was performed. A cystoscope 23 Serbian was inserted into the meatus and inspection of the urethra began. I performed a retrograde pyelogram and contrast was seen entering the bladder. The stricture was located at the bladder neck. Under cysto guidance, I placed a 0.035 sensor wire into the bladder. I then serially dilated the urethra with Dick sounds from #12 up to #24 to open up the stricture. Then, a 36  Serbian optilume was advanced over the wire and fluoroscopy was used to confirm its proper position across the stricture. Then using the pressure syringe we added 8 phil of pressure to the balloon per the 's recommendations. It was left up for 7 min and the deflated. A 14 Serbian fuchs was advanced into the bladder with a good return of urine. The bladder appeared unremarkable on cystoscopy today.    Patient was extubated and returned to recovery in satisfactory condition. EBL was 5 mL. No specimen was sent.               Complications:  None; patient tolerated the procedure well.

## 2023-12-18 ENCOUNTER — CLINICAL SUPPORT (OUTPATIENT)
Dept: UROLOGY | Facility: CLINIC | Age: 73
End: 2023-12-18
Payer: MEDICARE

## 2023-12-18 VITALS
OXYGEN SATURATION: 94 % | WEIGHT: 229 LBS | DIASTOLIC BLOOD PRESSURE: 71 MMHG | SYSTOLIC BLOOD PRESSURE: 122 MMHG | HEIGHT: 73 IN | HEART RATE: 61 BPM | BODY MASS INDEX: 30.35 KG/M2

## 2023-12-18 DIAGNOSIS — N35.911 STRICTURE OF URETHRAL MEATUS IN MALE, UNSPECIFIED STRICTURE TYPE: Primary | ICD-10-CM

## 2023-12-18 NOTE — PROGRESS NOTES
UROLOGY NURSE Pope Catheter removal VISIT:    Cesar Yuen is a 73 y.o. male here for a nurse visit for catheter removal.    Urine color is straw-yellow. 10cc saline removed from balloon.  Catheter removed without difficulty, tip intact.    Patient tolerated well.  Patient instructed on voiding, symptoms of infection, and when to seek medical care if needed.

## 2023-12-26 ENCOUNTER — TELEPHONE (OUTPATIENT)
Dept: UROLOGY | Facility: CLINIC | Age: 73
End: 2023-12-26
Payer: MEDICARE

## 2023-12-26 NOTE — TELEPHONE ENCOUNTER
..Call Type: Incoming Call      Caller Name: Marco  Relation to Pt: self  PORTER?:     Return Call to Pt Needed? N/A  Callback Number: [phone]    URO Provider: jone perez    Reason For Call: reschedule    Call Notes:   Patient stated that he isnt able to make it due to the weather this patient has been rescheduled but in the notes it states 7-10 day fu after DOS    Pt Informed of 48hr Callback Policy?: N/A    [CALL CENTER INSTRUCTIONS]  On Return Call, Transfer to: Main PAS Line  Ok to Transfer During Shutdown?: No

## 2023-12-27 ENCOUNTER — ANTICOAGULATION VISIT (OUTPATIENT)
Dept: ANTICOAGULATION | Facility: CLINIC | Age: 73
End: 2023-12-27
Payer: MEDICARE

## 2023-12-27 VITALS — DIASTOLIC BLOOD PRESSURE: 69 MMHG | BODY MASS INDEX: 30.79 KG/M2 | WEIGHT: 233.4 LBS | SYSTOLIC BLOOD PRESSURE: 120 MMHG

## 2023-12-27 DIAGNOSIS — I48.92 ATRIAL FLUTTER, UNSPECIFIED TYPE (CMS/HCC): Primary | ICD-10-CM

## 2023-12-27 LAB — INR (AMB): 2 (ref 2–3)

## 2023-12-27 PROCEDURE — 85610 PROTHROMBIN TIME: CPT | Mod: QW

## 2023-12-27 PROCEDURE — G0463 HOSPITAL OUTPT CLINIC VISIT: HCPCS

## 2023-12-27 NOTE — PROGRESS NOTES
Subjective   Cesar Yuen presents to the anticoagulation clinic for monitoring of his long term warfarin therapy. Marco confirmed 1.5 tablets (7.5 mg) on Mon/Wed/Fri and 1 tablet (5 mg) all other days of the week. He had a urology procedure in the first part of December where he held warfarin prior but resumed warfarin the day after. He also has an upcoming consult with orthopedic surgery about a possible knee replacement.    Today Cesar reports the following:   Bleeding signs/symptoms: No   Major bleeding event: No  Thrombosis signs/symptoms: No  Thromboembolic event: No  Missed doses: No  Medication changes: No  Dietary changes: No  Bacterial/viral infection: no  Other concerns: No    Current Outpatient Medications   Medication Sig Dispense Refill    warfarin (COUMADIN) 5 mg tablet Take 1.5 tablets (7.5 mg) by mouth every Mon/Wed/Fri and 1 tablet (5 mg) all other days of the week. (Patient taking differently: Take 7.5 mg by mouth daily Take 1.5 tablets (7.5 mg) by mouth every Mon/Wed/Fri and 1 tablet (5 mg) all other days of the week.) 40 tablet 5    furosemide (LASIX) 80 mg tablet Take 1 tablet (80 mg total) by mouth 2 (two) times a day 180 tablet 0    potassium chloride (KLOR-CON M20) 20 mEq CR tablet Take 2 tablets (40 mEq total) by mouth 2 (two) times a day 360 tablet 0    tamsulosin (FLOMAX) 0.4 mg capsule Take 1 capsule (0.4 mg total) by mouth daily with dinner      multivitamin (THERAGRAN) tablet tablet Take 1 tablet by mouth daily       No current facility-administered medications for this visit.         Objective     /69 (BP Location: Left arm, Patient Position: Sitting, Cuff Size: Large Adult)   Wt 105.9 kg (233 lb 6.4 oz)   BMI 30.79 kg/m²     Assessment/Plan      Anticoagulation Summary  As of 2023      INR goal:  2.0-3.0   INR used for dosin.0 (2023)   Full warfarin instructions:  7.5 mg every Mon, Wed, Fri; 5 mg all other days   No change documented:  Mendoza Dick,  PharmD   Next INR check:  1/24/2024   Priority:  Maintenance    Indications    Typical atrial flutter (CMS/HCC) (Resolved) [I48.3]                 Anticoagulation Care Providers       Provider Role Specialty Phone number    Balbir Machado MD Referring Family Medicine [phone]            Anticoagulation therapy status: INR is stable, no dose adjustment required. Continue warfarin 7.5 mg Mon/Wed/Fri and 5 mg all other days of the week. Follow up at [facility] clinic in 4 weeks. Patient verbalized understanding regarding dose, monitoring and INR.      Please reference Anticoagulation episode for additional documentation.

## 2023-12-30 PROCEDURE — 93298 REM INTERROG DEV EVAL SCRMS: CPT | Performed by: INTERNAL MEDICINE

## 2024-01-02 ENCOUNTER — OFFICE VISIT (OUTPATIENT)
Dept: UROLOGY | Facility: CLINIC | Age: 74
End: 2024-01-02
Payer: MEDICARE

## 2024-01-02 VITALS
WEIGHT: 233 LBS | HEIGHT: 73 IN | DIASTOLIC BLOOD PRESSURE: 77 MMHG | OXYGEN SATURATION: 92 % | BODY MASS INDEX: 30.88 KG/M2 | SYSTOLIC BLOOD PRESSURE: 119 MMHG | TEMPERATURE: 98.8 F | HEART RATE: 61 BPM

## 2024-01-02 DIAGNOSIS — N32.0 ACQUIRED CONTRACTURE OF BLADDER NECK: Primary | ICD-10-CM

## 2024-01-02 DIAGNOSIS — Z90.79 S/P TURP (STATUS POST TRANSURETHRAL RESECTION OF PROSTATE): ICD-10-CM

## 2024-01-02 PROCEDURE — 99024 POSTOP FOLLOW-UP VISIT: CPT | Performed by: UROLOGY

## 2024-01-02 ASSESSMENT — PAIN SCALES - GENERAL: PAINLEVEL: 0-NO PAIN

## 2024-01-03 DIAGNOSIS — N35.911 STRICTURE OF URETHRAL MEATUS IN MALE, UNSPECIFIED STRICTURE TYPE: Primary | ICD-10-CM

## 2024-01-03 DIAGNOSIS — R39.12 BENIGN PROSTATIC HYPERPLASIA WITH WEAK URINARY STREAM: ICD-10-CM

## 2024-01-03 DIAGNOSIS — N40.1 BENIGN PROSTATIC HYPERPLASIA WITH WEAK URINARY STREAM: ICD-10-CM

## 2024-01-04 RX ORDER — TAMSULOSIN HYDROCHLORIDE 0.4 MG/1
0.4 CAPSULE ORAL DAILY
Qty: 30 CAPSULE | Refills: 9 | Status: SHIPPED | OUTPATIENT
Start: 2024-01-04 | End: 2024-11-30 | Stop reason: SDUPTHER

## 2024-01-05 ASSESSMENT — ENCOUNTER SYMPTOMS
CONSTIPATION: 0
HEADACHES: 0
BLOOD IN STOOL: 0
DYSURIA: 0
FEVER: 0
SHORTNESS OF BREATH: 0
HEMATURIA: 0
AGITATION: 0
FATIGUE: 0
DIZZINESS: 0
DIARRHEA: 0
COUGH: 0
CHILLS: 0
CONFUSION: 0
ABDOMINAL PAIN: 0
WHEEZING: 0
DIFFICULTY URINATING: 0

## 2024-01-05 NOTE — PROGRESS NOTES
"History of Present Illness  Cesar Yuen is a 73 y.o. male who presents s/p optilume procedure for bladder neck contracture. His original TURP was on 12/22/2020 by Dr. Johnson. He then developed a bladder neck contracture. I took over his care on the departure of Dr. Johnson and reresected his bladder neck/prostate in 9/2021. The contracture recurred and so I took him for a laser ablation of the bladder neck on 4/28/22. His symptoms recurred, however, and so we opted to try a new approach using the new Optilume product which combines balloon dilation with a methotrexate coated balloon to help prevent scar tissue recurrence. He is now s/p that procedure on 12/13/23. He is currently voiding well and has no complaints.    Past Medical History:   Diagnosis Date    Atrial flutter (CMS/HCC)     CHF (congestive heart failure) (CMS/HCC)     Chipped tooth     \"cracked tooth\" Back right upper, left upper back    Clotting disorder (CMS/HCC)     Dysrhythmia     a-flutter, s/p ablation and loop recorder 2/8/2021    Edema     BLE    History of transfusion     After surgery    Hypertension     Injury of back 01/14/2021    Seen on CT    Pneumonia 03/11/2023    Urinary retention     BPH       Past Surgical History:   Procedure Laterality Date    ABDOMINAL SURGERY      anuerysm    ABLATION A-FLUTTER N/A 2/8/2021    Procedure: Ablation A-flutter loop implant;  Surgeon: Timoteo Burk DO;  Location: [facility] EP Lab;  Service: Electrophysiology;  Laterality: N/A;    APPENDECTOMY      HERNIA REPAIR      x 3    LOOP RECORDER INSERTION N/A 2/8/2021    Procedure: LOOP RECORDER INSERTION;  Surgeon: Timoteo Burk DO;  Location: [facility] EP Lab;  Service: Electrophysiology;  Laterality: N/A;    PROSTATECTOMY N/A 4/28/2022    Procedure: REVOLIX LASER PROSTATECTOMY;  Surgeon: Attila Taylor MD;  Location: [facility] OR;  Service: Urology;  Laterality: N/A;    TOTAL KNEE ARTHROPLASTY Left 7/24/2023    Procedure: LEFT TOTAL KNEE ARTHROPLASTY WITH ROBOTIC " ORTHOPEDIC SURGICAL ASSISTANT;  Surgeon: Garrett Saez DO;  Location: [facility];  Service: Orthopedics;  Laterality: Left;    TRANSURETHRAL RESECTION OF BLADDER TUMOR N/A 12/22/2020    Procedure: CYSTOSCOPY WITH BLADDER BIOPSY;  Surgeon: Daniele Johnson MD;  Location: [facility] OR;  Service: Urology;  Laterality: N/A;    TRANSURETHRAL RESECTION OF PROSTATE N/A 12/22/2020    Procedure: CYSTOSCOPY TRANSURETHRAL RESECTION PROSTATE, exam under anasthesia;  Surgeon: Daniele Johnson MD;  Location: [facility] OR;  Service: Urology;  Laterality: N/A;    TRANSURETHRAL RESECTION OF PROSTATE N/A 4/27/2021    Procedure: CYSTOSCOPY TRANSURETHRAL RESECTION PROSTATE;  Surgeon: Daniele Johnson MD;  Location: [facility] OR;  Service: Urology;  Laterality: N/A;    TRANSURETHRAL RESECTION OF PROSTATE N/A 9/27/2021    Procedure: CYSTOSCOPY TRANSURETHRAL RESECTION PROSTATE;  Surgeon: Attila Taylor MD;  Location: [facility] OR;  Service: Urology;  Laterality: N/A;  NOT EARLY MORNING    URETHRA SURGERY N/A 12/13/2023    Procedure: CYSTOSCOPY URETHRAL DILATION AND TEMPORARY URETHRAL IMPLANT INSERTION (OPTILUME);  Surgeon: Attila Taylor MD;  Location: [facility] OR;  Service: Urology;  Laterality: N/A;       Family History   Problem Relation Age of Onset    Alzheimer's disease Mother     Diabetes Father     Heart attack Mother's Brother        Social History     Tobacco Use    Smoking status: Never    Smokeless tobacco: Never   Substance Use Topics    Alcohol use: Yes     Alcohol/week: 1.0 standard drink of alcohol     Types: 1 Standard drinks or equivalent per week     Comment: rarely       Medications:   Current Outpatient Medications   Medication Sig Dispense Refill    warfarin (COUMADIN) 5 mg tablet Take 1.5 tablets (7.5 mg) by mouth every Mon/Wed/Fri and 1 tablet (5 mg) all other days of the week. (Patient taking differently: Take 7.5 mg by mouth daily Take 1.5 tablets (7.5 mg) by mouth every Mon/Wed/Fri and 1 tablet (5 mg) all other days of the week.) 40  "tablet 5    furosemide (LASIX) 80 mg tablet Take 1 tablet (80 mg total) by mouth 2 (two) times a day 180 tablet 0    multivitamin (THERAGRAN) tablet tablet Take 1 tablet by mouth daily      tamsulosin (FLOMAX) 0.4 mg capsule Take 1 capsule (0.4 mg total) by mouth daily 30 capsule 9    potassium chloride (KLOR-CON M20) 20 mEq CR tablet Take 2 tablets (40 mEq total) by mouth 2 (two) times a day 360 tablet 0     No current facility-administered medications for this visit.       Allergies:  No Known Allergies    Review of Systems   Constitutional:  Negative for chills, fatigue and fever.   Respiratory:  Negative for cough, shortness of breath and wheezing.    Cardiovascular:  Negative for chest pain.   Gastrointestinal:  Negative for abdominal pain, blood in stool, constipation and diarrhea.   Genitourinary:  Negative for difficulty urinating, dysuria and hematuria.   Musculoskeletal:  Negative for gait problem.   Neurological:  Negative for dizziness and headaches.   Psychiatric/Behavioral:  Negative for agitation and confusion.    All other systems reviewed and are negative.      Reviewed by provider during this visit:   Tobacco  Allergies  Meds  Problems  Med Hx  Surg Hx  Fam Hx         Vital signs:  /77 (BP Location: Left arm, Patient Position: Sitting, Cuff Size: Long Adult)   Pulse 61   Temp 37.1 °C (98.8 °F) (Temporal)   Ht 1.854 m (6' 1\")   Wt 105.7 kg (233 lb)   SpO2 92%   BMI 30.74 kg/m²       Physical Exam   Constitutional: Pt appears well-developed and well-nourished.   Neck: Full range of motion, supple. Thyroid is normal in size, no nodules  Cardiovascular: RRR, normal carotid pulse  Pulmonary/Chest: Effort normal; lungs are clear to auscultation bilaterally   Abdominal: Soft. Nontender, no masses, no HSM; normal bowel sounds  Extremity: no peripheral edema or digital cyanosis   Skin: Skin is warm and dry, no rashes  Psychiatric: Alert and oriented to person, place and " time    Assessment/Plan      Diagnoses and all orders for this visit:    Acquired contracture of bladder neck    S/P TURP (status post transurethral resection of prostate)       Plan: I recommend a repeat visit with him in 3 months, where we may repeat his cysto just to gauge the durability of this most recent procedure in keeping his bladder neck open/patent. He was instructed to call me sooner should the need arise.     Follow-up: Return in about 6 months (around 7/2/2024) for Recheck.    ---------------------------------  Attila Taylor MD  01/05/24 10:11 AM

## 2024-01-08 ENCOUNTER — OFFICE VISIT (OUTPATIENT)
Dept: ORTHOPEDIC SURGERY | Facility: CLINIC | Age: 74
End: 2024-01-08
Payer: MEDICARE

## 2024-01-08 VITALS — SYSTOLIC BLOOD PRESSURE: 112 MMHG | DIASTOLIC BLOOD PRESSURE: 70 MMHG

## 2024-01-08 DIAGNOSIS — M17.11 PRIMARY OSTEOARTHRITIS OF RIGHT KNEE: Primary | ICD-10-CM

## 2024-01-08 PROCEDURE — 99212 OFFICE O/P EST SF 10 MIN: CPT | Performed by: PHYSICIAN ASSISTANT

## 2024-01-08 PROCEDURE — G0463 HOSPITAL OUTPT CLINIC VISIT: HCPCS | Mod: PO | Performed by: PHYSICIAN ASSISTANT

## 2024-01-08 ASSESSMENT — ENCOUNTER SYMPTOMS
WEAKNESS: 1
PAIN: 1
ARTHRALGIAS: 1
JOINT SWELLING: 1

## 2024-01-08 NOTE — PROGRESS NOTES
Subjective   Patient ID: Cesar Yuen is a 73 y.o. male.    Chief Complaint:  Chief Complaint   Patient presents with    Right Knee - Pain     Pain when going up stairs, when riding his bike and after extended ambulation. Would like to discuss surgery. Pt forgot his hearing aids today and struggles to hear. Severe lower leg edema    Pain     Pt stops activity at 4/10 pain but feels it could go up much further on the pain scale.     Pain  This is a chronic problem. The current episode started more than 1 month ago. The problem occurs every several days. The problem has been gradually worsening since onset. The context of the pain is unknown. The pain is present in the right knee. The pain is medium. Exacerbated by: Prolonged walking, stair climbing, deep knee bending activities, riding his bike. Associated symptoms include joint swelling and weakness. Past treatments include prescription NSAID. The treatment provided mild relief. Swelling is present on the joints (Pitting edema).       Social History     Occupational History    Occupation:    Tobacco Use    Smoking status: Never    Smokeless tobacco: Never   Vaping Use    Vaping Use: Never used   Substance and Sexual Activity    Alcohol use: Yes     Alcohol/week: 1.0 standard drink of alcohol     Types: 1 Standard drinks or equivalent per week     Comment: rarely    Drug use: Never    Sexual activity: Defer      Review of Systems   Musculoskeletal:  Positive for arthralgias, gait problem and joint swelling.   Neurological:  Positive for weakness.             Objective   Right Knee Exam     Tenderness   The patient is experiencing tenderness in the medial joint line, lateral joint line and patella.    Range of Motion   Extension:  5   Flexion:  110     Tests   Varus: negative Valgus: negative  Drawer:  Anterior - negative    Posterior - negative    Other   Erythema: absent  Scars: absent  Sensation: normal  Pulse: present  Swelling: mild  Effusion:  effusion present    Comments:  1+ pitting edema right lower extremity with significant varus deformity of his right knee and weightbearing.                    Assessment   Diagnoses and all orders for this visit:    Primary osteoarthritis of right knee  -     Case Request Operating Room: RIGHT TOTAL KNEE ARTHROPLASTY with robotic orthopedic surgical assistant              Plan    Right knee x-ray does show severe tricompartmental osteoarthritis.  Patient would like to pursue total knee arthroplasty and will have the patient see Dr. Zimmerman for discussion of right total knee arthroplasty.  Patient is agreeable to this and will be seen as stated above.

## 2024-01-10 ENCOUNTER — TELEPHONE (OUTPATIENT)
Dept: SOCIAL WORK | Facility: HOSPITAL | Age: 74
End: 2024-01-10
Payer: MEDICARE

## 2024-01-10 NOTE — TELEPHONE ENCOUNTER
Discussed upcoming joint surgery, tentatively scheduled for February 19th , Marco is due to speak with the  on January 18th to confirm everything, he does have the guide book, pre-op information sheets and information regarding the virtual joint class. This is Marco's second TKA, we discussed home situation and plans for discharge. He is aware surgery will call with his arrival time. [place] [facility] Questions answered.

## 2024-01-18 ENCOUNTER — CLINICAL SUPPORT (OUTPATIENT)
Dept: ORTHOPEDIC SURGERY | Facility: CLINIC | Age: 74
End: 2024-01-18
Payer: MEDICARE

## 2024-01-18 DIAGNOSIS — Z01.818 PREPROCEDURAL EXAMINATION: Primary | ICD-10-CM

## 2024-01-18 NOTE — PROGRESS NOTES
Nurse visit: Via phone  All surgery instructions discussed with patient. Patient has surgery packet.  Dr. Michael Zimmerman  [facility]  Surgery date: February 19,2024    Cesar Yuen  73 y.o.  male  1950   [id number]     Procedure: RIGHT TOTAL KNEE ARTHROPLASTY WITH ROBOTIC ORTHOPEDIC SURGICAL ASSISTANT   SAME DAY total joint:No  Rep needed: [employer] ([place])  Special equipment (list if needed):   Pre-op and date: 2/6 Dr. Valadez, cardiology referral sent  Special requests (list if needed):   Patient has guidebook/handouts (DAVID and TKA): yes

## 2024-01-22 ENCOUNTER — DOCUMENTATION (OUTPATIENT)
Dept: CARDIOLOGY | Facility: CLINIC | Age: 74
End: 2024-01-22
Payer: MEDICARE

## 2024-01-22 NOTE — PROGRESS NOTES
Marco is scheduled for a right knee arthroplasty with Dr. Zimmerman on 2/19/24. I last evaluated him in October prior to a urologic procedure. He continues to do well from a cardiac standpoint, no changes in his health or functional status. Able to ambulate > 4 METS. According to RCRI or Raj criteria the patients estimated risk of adverse outcome with noncardiac surgery is considered to be low. He will be able to hold his warfarin for five days prior to scheduled surgery. Resume after surgery or when deemed safe.

## 2024-01-23 DIAGNOSIS — E87.70 HYPERVOLEMIA, UNSPECIFIED HYPERVOLEMIA TYPE: ICD-10-CM

## 2024-01-23 RX ORDER — POTASSIUM CHLORIDE 20 MEQ/1
40 TABLET, EXTENDED RELEASE ORAL 2 TIMES DAILY
Qty: 360 TABLET | Refills: 3 | Status: SHIPPED | OUTPATIENT
Start: 2024-01-23 | End: 2024-05-30 | Stop reason: SDUPTHER

## 2024-01-23 RX ORDER — FUROSEMIDE 80 MG/1
80 TABLET ORAL 2 TIMES DAILY
Qty: 180 TABLET | Refills: 1 | Status: SHIPPED | OUTPATIENT
Start: 2024-01-23 | End: 2024-05-30 | Stop reason: SDUPTHER

## 2024-01-23 NOTE — TELEPHONE ENCOUNTER
No care due was identified.  [facility] Embedded Care Due Messages. Reference number: [id number]. 1/23/2024 7:08:24 AM MST  
aching

## 2024-01-24 ENCOUNTER — ANTICOAGULATION VISIT (OUTPATIENT)
Dept: ANTICOAGULATION | Facility: CLINIC | Age: 74
End: 2024-01-24
Payer: MEDICARE

## 2024-01-24 VITALS — WEIGHT: 240 LBS | DIASTOLIC BLOOD PRESSURE: 67 MMHG | SYSTOLIC BLOOD PRESSURE: 125 MMHG | BODY MASS INDEX: 31.66 KG/M2

## 2024-01-24 DIAGNOSIS — I48.92 ATRIAL FLUTTER, UNSPECIFIED TYPE (CMS/HCC): Primary | ICD-10-CM

## 2024-01-24 DIAGNOSIS — I48.3 TYPICAL ATRIAL FLUTTER (CMS/HCC): ICD-10-CM

## 2024-01-24 LAB — INR (AMB): 1.6 (ref 2–3)

## 2024-01-24 PROCEDURE — G0463 HOSPITAL OUTPT CLINIC VISIT: HCPCS

## 2024-01-24 PROCEDURE — 85610 PROTHROMBIN TIME: CPT | Mod: QW

## 2024-01-24 RX ORDER — WARFARIN SODIUM 5 MG/1
TABLET ORAL
Start: 2024-01-24 | End: 2024-02-20 | Stop reason: HOSPADM

## 2024-01-24 NOTE — PROGRESS NOTES
Subjective   Cesar Yuen presents to the anticoagulation clinic for monitoring of his long term warfarin therapy. Macro confirmed warfarin 7.5 mg Mon/Wed/Fri and 5 mg all other days of the week. Marco will have a right knee replacement done on  - he was instructed to hold x 5 days prior. No bridging per cardiology.      Today Cesar reports the following:   Bleeding signs/symptoms: No   Major bleeding event: No  Thrombosis signs/symptoms: No  Thromboembolic event: No  Missed doses: No  Medication changes: No  Dietary changes: Yes  Eating iceberg lettuce 1-2 times per day.  Bacterial/viral infection: no  Other concerns: No    Current Outpatient Medications   Medication Sig Dispense Refill    warfarin (COUMADIN) 5 mg tablet Take 1 tablet (5 mg) by mouth every Mon/Wed/Fri and 1.5 tablet (7.5 mg) all other days of the week.      potassium chloride (KLOR-CON M20) 20 mEq CR tablet Take 2 tablets (40 mEq total) by mouth 2 (two) times a day 360 tablet 3    furosemide (LASIX) 80 mg tablet Take 1 tablet (80 mg total) by mouth 2 (two) times a day 180 tablet 1    tamsulosin (FLOMAX) 0.4 mg capsule Take 1 capsule (0.4 mg total) by mouth daily 30 capsule 9    multivitamin (THERAGRAN) tablet tablet Take 1 tablet by mouth daily       No current facility-administered medications for this visit.         Objective     /67 (BP Location: Left arm, Patient Position: Sitting, Cuff Size: Large Adult)   Wt 108.9 kg (240 lb)   BMI 31.66 kg/m²     Assessment/Plan      Anticoagulation Summary  As of 2024      INR goal:  2.0-3.0   INR used for dosin.6 (2024)   Full warfarin instructions:  : 10 mg; : Hold; 15: Hold; 16: Hold; : Hold; 18: Hold; Otherwise 5 mg every Mon, Wed, Fri; 7.5 mg all other days   Next INR check:  2024   Priority:  Maintenance    Indications    Typical atrial flutter (CMS/HCC) (Resolved) [I48.3]                 Anticoagulation Care Providers       Provider Role Specialty  Phone number    Balbir Machado MD Referring Family Medicine [phone]            Anticoagulation therapy status: INR is less than goal and dose adjustment required. Suspect INR low with increased iceberg lettuce intake. Marco plans to continue eating salads while on the road. Will increase dose - take 10 mg today and then adjust warfarin to 5 mg Mon/Wed/Fri an 7.5 mg all other days of the week. Recheck INR in 2 weeks. If Marco needs labs with pre-op clearance appointment, he can have his INR with those and we will call him with results and warfarin plan. Patient verbalized understanding regarding new dose, monitoring and INR.      Please reference Anticoagulation episode for additional documentation.

## 2024-01-30 DIAGNOSIS — Z47.89 ENCOUNTER FOR ORTHOPEDIC FOLLOW-UP CARE: Primary | ICD-10-CM

## 2024-01-30 PROCEDURE — 93298 REM INTERROG DEV EVAL SCRMS: CPT | Mod: 26 | Performed by: INTERNAL MEDICINE

## 2024-02-06 ENCOUNTER — LAB (OUTPATIENT)
Dept: LAB | Facility: CLINIC | Age: 74
End: 2024-02-06
Payer: MEDICARE

## 2024-02-06 ENCOUNTER — OFFICE VISIT (OUTPATIENT)
Dept: FAMILY MEDICINE | Facility: CLINIC | Age: 74
End: 2024-02-06
Payer: MEDICARE

## 2024-02-06 ENCOUNTER — APPOINTMENT (OUTPATIENT)
Dept: CARDIOLOGY | Facility: CLINIC | Age: 74
End: 2024-02-06
Payer: MEDICARE

## 2024-02-06 ENCOUNTER — ANTICOAGULATION VISIT (OUTPATIENT)
Dept: PHARMACY | Facility: HOSPITAL | Age: 74
End: 2024-02-06
Payer: MEDICARE

## 2024-02-06 VITALS
DIASTOLIC BLOOD PRESSURE: 68 MMHG | SYSTOLIC BLOOD PRESSURE: 124 MMHG | OXYGEN SATURATION: 94 % | HEART RATE: 83 BPM | WEIGHT: 236 LBS | BODY MASS INDEX: 31.14 KG/M2 | TEMPERATURE: 97.7 F

## 2024-02-06 DIAGNOSIS — Z79.01 ANTICOAGULATED ON COUMADIN: ICD-10-CM

## 2024-02-06 DIAGNOSIS — Z01.818 PRE-OP TESTING: ICD-10-CM

## 2024-02-06 DIAGNOSIS — Z01.818 PRE-OP TESTING: Primary | ICD-10-CM

## 2024-02-06 LAB
ALBUMIN SERPL-MCNC: 4.2 G/DL (ref 3.5–5.3)
ALP SERPL-CCNC: 51 U/L (ref 45–115)
ALT SERPL-CCNC: 14 U/L (ref 7–52)
ANION GAP SERPL CALC-SCNC: 10 MMOL/L (ref 3–11)
APTT PPP: 43.7 SECONDS (ref 25.1–36.5)
AST SERPL-CCNC: 17 U/L
BASOPHILS # BLD AUTO: 0.1 10*3/UL
BASOPHILS NFR BLD AUTO: 0.7 % (ref 0–2)
BILIRUB SERPL-MCNC: 0.69 MG/DL (ref 0.2–1.4)
BUN SERPL-MCNC: 18 MG/DL (ref 7–25)
CALCIUM ALBUM COR SERPL-MCNC: 9.1 MG/DL (ref 8.6–10.3)
CALCIUM SERPL-MCNC: 9.3 MG/DL (ref 8.6–10.3)
CHLORIDE SERPL-SCNC: 103 MMOL/L (ref 98–107)
CO2 SERPL-SCNC: 28 MMOL/L (ref 21–32)
CREAT SERPL-MCNC: 1.09 MG/DL (ref 0.7–1.3)
EGFRCR SERPLBLD CKD-EPI 2021: 71 ML/MIN/1.73M*2
EOSINOPHIL # BLD AUTO: 0.2 10*3/UL
EOSINOPHIL NFR BLD AUTO: 2.3 % (ref 0–3)
ERYTHROCYTE [DISTWIDTH] IN BLOOD BY AUTOMATED COUNT: 14.6 % (ref 11.5–15)
GLUCOSE SERPL-MCNC: 100 MG/DL (ref 70–105)
HCT VFR BLD AUTO: 45 % (ref 38–50)
HGB BLD-MCNC: 14.9 G/DL (ref 13.2–17.2)
INR BLD: 1.8
LYMPHOCYTES # BLD AUTO: 2.2 10*3/UL
LYMPHOCYTES NFR BLD AUTO: 30.5 % (ref 15–47)
MCH RBC QN AUTO: 29.8 PG (ref 29–34)
MCHC RBC AUTO-ENTMCNC: 33.2 G/DL (ref 32–36)
MCV RBC AUTO: 90 FL (ref 82–97)
MONOCYTES # BLD AUTO: 0.7 10*3/UL
MONOCYTES NFR BLD AUTO: 9.4 % (ref 5–13)
NEUTROPHILS # BLD AUTO: 4.1 10*3/UL
NEUTROPHILS NFR BLD AUTO: 57.1 % (ref 46–70)
PLATELET # BLD AUTO: 262 10*3/UL (ref 130–350)
PMV BLD AUTO: 7.9 FL (ref 6.9–10.8)
POTASSIUM SERPL-SCNC: 3.6 MMOL/L (ref 3.5–5.1)
PROT SERPL-MCNC: 7.4 G/DL (ref 6–8.3)
PROTHROMBIN TIME: 20.4 SECONDS (ref 9.4–12.5)
RBC # BLD AUTO: 5 10*6/ΜL (ref 4.1–5.8)
SODIUM SERPL-SCNC: 141 MMOL/L (ref 135–145)
WBC # BLD AUTO: 7.1 10*3/UL (ref 3.7–9.6)

## 2024-02-06 PROCEDURE — G0463 HOSPITAL OUTPT CLINIC VISIT: HCPCS | Performed by: STUDENT IN AN ORGANIZED HEALTH CARE EDUCATION/TRAINING PROGRAM

## 2024-02-06 PROCEDURE — 85025 COMPLETE CBC W/AUTO DIFF WBC: CPT

## 2024-02-06 PROCEDURE — 85730 THROMBOPLASTIN TIME PARTIAL: CPT

## 2024-02-06 PROCEDURE — 85610 PROTHROMBIN TIME: CPT

## 2024-02-06 PROCEDURE — 93005 ELECTROCARDIOGRAM TRACING: CPT | Performed by: STUDENT IN AN ORGANIZED HEALTH CARE EDUCATION/TRAINING PROGRAM

## 2024-02-06 PROCEDURE — 99214 OFFICE O/P EST MOD 30 MIN: CPT | Performed by: STUDENT IN AN ORGANIZED HEALTH CARE EDUCATION/TRAINING PROGRAM

## 2024-02-06 PROCEDURE — 80053 COMPREHEN METABOLIC PANEL: CPT

## 2024-02-06 PROCEDURE — 93010 ELECTROCARDIOGRAM REPORT: CPT | Performed by: INTERNAL MEDICINE

## 2024-02-06 PROCEDURE — 36415 COLL VENOUS BLD VENIPUNCTURE: CPT

## 2024-02-06 ASSESSMENT — ENCOUNTER SYMPTOMS
SHORTNESS OF BREATH: 0
EYE PAIN: 0
SEIZURES: 0
SORE THROAT: 0
COUGH: 0
FEVER: 0
CHILLS: 0
PALPITATIONS: 0
BACK PAIN: 0
COLOR CHANGE: 0
ABDOMINAL PAIN: 0
HEMATURIA: 0
VOMITING: 0
DYSURIA: 0
ARTHRALGIAS: 0

## 2024-02-06 NOTE — PROGRESS NOTES
Subjective      Chief Complaint   Patient presents with    Pre-op Exam     Pre-op exam for R knee replacement on 2/19/24 with Dr. Zimmerman.        Cesar Yuen is a 73 y.o. male and is here for a pre-operative risk assessment. Pre-op Exam (Pre-op exam for Right total knee arthroplasty on 2/19/24 with Dr. Zimmerman. )    Pt denies any specific concerns or complaints. Pt denies dyspnea on exertion, chest pain, or palpitations.  No hx of MI, CVA, or DVT.     Patient is not Diabetic, has a history of atrial flutter s/p implantable loop recorder and anticoagulated.     Patient reports having tolerated surgery in the past, denying issues with general anesthesia, and no general complaints during time of exam.       Review of Systems   Constitutional:  Negative for chills and fever.   HENT:  Negative for ear pain and sore throat.    Eyes:  Negative for pain and visual disturbance.   Respiratory:  Negative for cough and shortness of breath.    Cardiovascular:  Negative for chest pain and palpitations.   Gastrointestinal:  Negative for abdominal pain and vomiting.   Genitourinary:  Negative for dysuria and hematuria.   Musculoskeletal:  Negative for arthralgias and back pain.   Skin:  Negative for color change and rash.   Neurological:  Negative for seizures and syncope.   All other systems reviewed and are negative.      The following portions of the patient's history were reviewed and updated as appropriate: allergies, current medications, past family history, past medical history, past social history, past surgical history, and problem list.    Objective   /68 (BP Location: Left arm, Patient Position: Sitting, Cuff Size: Regular Adult)   Pulse 83   Temp 36.5 °C (97.7 °F) (Temporal)   Wt 107 kg (236 lb)   SpO2 94%   BMI 31.14 kg/m²     Physical Exam  Vitals and nursing note reviewed.   Constitutional:       General: He is not in acute distress.     Appearance: He is well-developed.   HENT:      Head: Normocephalic  and atraumatic.      Right Ear: External ear normal.      Left Ear: External ear normal.      Nose: Nose normal. No congestion.      Mouth/Throat:      Mouth: Mucous membranes are moist.      Pharynx: Oropharynx is clear.   Eyes:      General: No scleral icterus.     Extraocular Movements: Extraocular movements intact.   Neck:      Vascular: No JVD.      Trachea: No tracheal deviation.   Cardiovascular:      Rate and Rhythm: Normal rate and regular rhythm.      Heart sounds: Normal heart sounds. No murmur heard.  Pulmonary:      Effort: Pulmonary effort is normal. No respiratory distress.      Breath sounds: Normal breath sounds. No wheezing or rales.   Abdominal:      General: There is no distension.   Musculoskeletal:      Cervical back: Normal range of motion.      Right lower leg: No edema.      Left lower leg: No edema.   Skin:     Findings: No rash.   Neurological:      Mental Status: He is alert and oriented to person, place, and time.      Motor: No abnormal muscle tone.   Psychiatric:         Mood and Affect: Mood normal.         Behavior: Behavior normal.          Results for orders placed or performed in visit on 01/24/24   POCT INR   Result Value Ref Range    INR 1.6 (A) 2.0 - 3.0        Assessment/Plan          Diagnoses and all orders for this visit:    Pre-op testing  -     CBC w/auto differential Blood, Venous; Future  -     Comprehensive metabolic panel Blood, Venous; Future  -     ECG 12 lead-Future; Future  -     Protime-INR Blood, Venous; Future  -     PTT (activated partial thromboplastin time) Blood, Venous; Future    Anticoagulated on Coumadin  -     Protime-INR Blood, Venous; Future  -     PTT (activated partial thromboplastin time) Blood, Venous; Future          1. Revised Cardiac Risk Index: Class II, 6.0% Risk     2 . ARISCAT score: 11     3. Patient is medically optimized to proceed with upcoming surgery     4. EKG: Sinus/atrial Rhythm and recent device check report reviewed and no  concerns listed. Cardiology having already reviewed patient and stated he is optimized for upcoming surgery.     5. CBC/CMP: no acute concerns     6. Patient to hold warfarin medication 5 days prior to procedure and then resume the day after or according to surgeon's recommendations.  Advised INR check the week following with goal of 2-3.     Time spent with patient was 30  minutes with greater than 50% of that time spent in counseling and coordination regarding reviewing chart and history, discussing history with patient, discussing risk stratification and reviewing labs and EKG with patient.    Gregg Valadez, DO

## 2024-02-07 NOTE — PROGRESS NOTES
Subjective   Cesar Yuen is contacted by the anticoagulation clinic for monitoring of his long term warfarin therapy. I was not able to reach Marco today. I left a voicemail with instructions and call back number. Will have right knee replacement surgery on . Instructed to hold warfarin for 5 days prior.     Today Cesar reports the following:   Bleeding signs/symptoms: No   Major bleeding event: No  Thrombosis signs/symptoms: No  Thromboembolic event: No  Missed doses: No  Medication changes: No  Dietary changes: No  Bacterial/viral infection: no  Other concerns: No    Current Outpatient Medications   Medication Sig Dispense Refill    warfarin (COUMADIN) 5 mg tablet Take 1 tablet (5 mg) by mouth every Mon/Wed/Fri and 1.5 tablet (7.5 mg) all other days of the week.      potassium chloride (KLOR-CON M20) 20 mEq CR tablet Take 2 tablets (40 mEq total) by mouth 2 (two) times a day 360 tablet 3    furosemide (LASIX) 80 mg tablet Take 1 tablet (80 mg total) by mouth 2 (two) times a day 180 tablet 1    tamsulosin (FLOMAX) 0.4 mg capsule Take 1 capsule (0.4 mg total) by mouth daily 30 capsule 9    multivitamin (THERAGRAN) tablet tablet Take 1 tablet by mouth daily       No current facility-administered medications for this visit.         Objective     There were no vitals taken for this visit.    Assessment/Plan      Anticoagulation Summary  As of 2024      INR goal:  2.0-3.0   INR used for dosin.8 (2024)   Full warfarin instructions:  : Hold; 2/15: Hold; 16: Hold; : Hold; : Hold; Otherwise 5 mg every Mon, Wed, Fri; 7.5 mg all other days   No change documented:  Mendoza Dick, PharmD   Next INR check:  2024   Priority:  Maintenance    Indications    Typical atrial flutter (CMS/HCC) (Resolved) [I48.3]                 Anticoagulation Care Providers       Provider Role Specialty Phone number    Balbir Machado MD Referring Family Medicine [phone]            Anticoagulation  therapy status: INR is lower than goal, but increased from last visit. Since patient will start holding warfarin next week prior to surgery, we will continue same dose for now. Continue warfarin 5 mg Mon/Wed/Fri and 7.5 mg all other days of the week. Hold warfarin starting next Wednesday 2/14 until procedure. Recheck INR 1 week after discharge. Was unable to reach patient by phone today. Left voicemail with instructions and call back number.       Please reference Anticoagulation episode for additional documentation.

## 2024-02-08 ENCOUNTER — OFFICE VISIT (OUTPATIENT)
Dept: ORTHOPEDIC SURGERY | Facility: CLINIC | Age: 74
End: 2024-02-08
Payer: MEDICARE

## 2024-02-08 VITALS — DIASTOLIC BLOOD PRESSURE: 70 MMHG | SYSTOLIC BLOOD PRESSURE: 120 MMHG

## 2024-02-08 DIAGNOSIS — M17.11 PRIMARY OSTEOARTHRITIS OF RIGHT KNEE: Primary | ICD-10-CM

## 2024-02-08 PROCEDURE — G0463 HOSPITAL OUTPT CLINIC VISIT: HCPCS | Mod: PO | Performed by: ORTHOPAEDIC SURGERY

## 2024-02-08 PROCEDURE — 99214 OFFICE O/P EST MOD 30 MIN: CPT | Performed by: ORTHOPAEDIC SURGERY

## 2024-02-08 ASSESSMENT — ENCOUNTER SYMPTOMS
COLOR CHANGE: 1
WOUND: 0
CHILLS: 0
SHORTNESS OF BREATH: 0
COUGH: 0
VOMITING: 0
FATIGUE: 0
NAUSEA: 0

## 2024-02-08 ASSESSMENT — PAIN - FUNCTIONAL ASSESSMENT: PAIN_FUNCTIONAL_ASSESSMENT: 0-10

## 2024-02-08 NOTE — PROGRESS NOTES
"Subjective      Cesra Yuen is a 73 y.o. male who presents for f/u right knee.    Chief Complaint   Patient presents with    Right Knee - Pre-op Visit     Pre-op RIGHT TOTAL KNEE ARTHROPLASTY WITH ROBOTIC ORTHOPEDIC SURGICAL ASSISTANT on 2/19/24 with [facility]. Rates pain today 1/10. No questions/concerns He does have a red rash on his legs, would like to see if that is okay for surgery.        Mr. Yuen is a 73-year-old male who presents today to follow up on his right knee and is scheduled for total knee arthroplasty on February 19, 2024. He has been seen by Dr. Valadez for preoperative clearance. He is on Coumadin and will stop this 5 days prior to surgery. He does have a history of left total knee arthroplasty with Dr. Saez on July 24, 2023.       The following have been reviewed and updated as appropriate in this visit:        Past Medical History:   Diagnosis Date    Atrial flutter (CMS/HCC)     CHF (congestive heart failure) (CMS/HCC)     Chipped tooth     \"cracked tooth\" Back right upper, left upper back    Clotting disorder (CMS/HCC)     Dysrhythmia     a-flutter, s/p ablation and loop recorder 2/8/2021    Edema     BLE    History of transfusion     After surgery    Hypertension     Injury of back 01/14/2021    Seen on CT    Pneumonia 03/11/2023    Urinary retention     BPH     Past Surgical History:   Procedure Laterality Date    ABDOMINAL SURGERY      anuerysm    ABLATION A-FLUTTER N/A 2/8/2021    Procedure: Ablation A-flutter loop implant;  Surgeon: Timoteo Burk DO;  Location: [facility];  Service: Electrophysiology;  Laterality: N/A;    APPENDECTOMY      HERNIA REPAIR      x 3    LOOP RECORDER INSERTION N/A 2/8/2021    Procedure: LOOP RECORDER INSERTION;  Surgeon: Timoteo Burk DO;  Location: [facility];  Service: Electrophysiology;  Laterality: N/A;    PROSTATECTOMY N/A 4/28/2022    Procedure: REVOLIX LASER PROSTATECTOMY;  Surgeon: Attila Taylor MD;  Location: [facility] OR;  Service: Urology;  " Laterality: N/A;    TOTAL KNEE ARTHROPLASTY Left 7/24/2023    Procedure: LEFT TOTAL KNEE ARTHROPLASTY WITH ROBOTIC ORTHOPEDIC SURGICAL ASSISTANT;  Surgeon: Garrett Saez DO;  Location: [facility];  Service: Orthopedics;  Laterality: Left;    TRANSURETHRAL RESECTION OF BLADDER TUMOR N/A 12/22/2020    Procedure: CYSTOSCOPY WITH BLADDER BIOPSY;  Surgeon: Daniele Johnson MD;  Location: [facility];  Service: Urology;  Laterality: N/A;    TRANSURETHRAL RESECTION OF PROSTATE N/A 12/22/2020    Procedure: CYSTOSCOPY TRANSURETHRAL RESECTION PROSTATE, exam under anasthesia;  Surgeon: Daniele Johnson MD;  Location: [facility];  Service: Urology;  Laterality: N/A;    TRANSURETHRAL RESECTION OF PROSTATE N/A 4/27/2021    Procedure: CYSTOSCOPY TRANSURETHRAL RESECTION PROSTATE;  Surgeon: Daniele Johnson MD;  Location: [facility];  Service: Urology;  Laterality: N/A;    TRANSURETHRAL RESECTION OF PROSTATE N/A 9/27/2021    Procedure: CYSTOSCOPY TRANSURETHRAL RESECTION PROSTATE;  Surgeon: Attila Taylor MD;  Location: [facility];  Service: Urology;  Laterality: N/A;  NOT EARLY MORNING    URETHRA SURGERY N/A 12/13/2023    Procedure: CYSTOSCOPY URETHRAL DILATION AND TEMPORARY URETHRAL IMPLANT INSERTION (OPTILUME);  Surgeon: Attila Taylor MD;  Location: [facility];  Service: Urology;  Laterality: N/A;     Family History   Problem Relation Age of Onset    Alzheimer's disease Mother     Diabetes Father     Heart attack Mother's Brother      Social History     Socioeconomic History    Marital status: Single    Number of children: 1   Occupational History    Occupation:    Tobacco Use    Smoking status: Never    Smokeless tobacco: Never   Vaping Use    Vaping Use: Never used   Substance and Sexual Activity    Alcohol use: Yes     Alcohol/week: 1.0 standard drink of alcohol     Types: 1 Standard drinks or equivalent per week     Comment: rarely    Drug use: Never    Sexual activity: Defer     Social Determinants of Health     Tobacco Use: Low  Risk  (2/8/2024)    Patient History     Smoking Tobacco Use: Never     Smokeless Tobacco Use: Never   Alcohol Use: Not At Risk (12/10/2020)    AUDIT-C     Frequency of Alcohol Consumption: Monthly or less     Average Number of Drinks: 1 or 2     Frequency of Binge Drinking: Never   Financial Resource Strain: Unknown (12/10/2020)    Overall Financial Resource Strain (CARDIA)     Difficulty of Paying Living Expenses: Patient declined   Food Insecurity: Unknown (12/10/2020)    Hunger Vital Sign     Worried About Running Out of Food in the Last Year: Patient declined     Ran Out of Food in the Last Year: Patient declined   Transportation Needs: Unknown (12/10/2020)    PRAPARE - Transportation     Lack of Transportation (Medical): Patient declined     Lack of Transportation (Non-Medical): Patient declined       Review of Systems   Constitutional:  Negative for chills and fatigue.   Respiratory:  Negative for cough and shortness of breath.    Gastrointestinal:  Negative for nausea and vomiting.   Skin:  Positive for color change. Negative for wound.   All other systems reviewed and are negative.      Objective   /70 (BP Location: Left arm, Patient Position: Sitting, Cuff Size: Large Adult)     Physical Exam  Constitutional:       General: He is not in acute distress.     Appearance: Normal appearance. He is well-developed.   HENT:      Head: Normocephalic and atraumatic.   Eyes:      Extraocular Movements: Extraocular movements intact.      Pupils: Pupils are equal, round, and reactive to light.   Cardiovascular:      Rate and Rhythm: Normal rate and regular rhythm.      Heart sounds: Normal heart sounds. No murmur heard.  Pulmonary:      Effort: Pulmonary effort is normal.      Breath sounds: Normal breath sounds. No wheezing.   Musculoskeletal:      Comments: Venous stasis changes at the foot and ankle. Good pulses by Doppler.    Skin:     General: Skin is warm and dry.   Neurological:      Mental Status: He is  alert and oriented to person, place, and time.       ASSESSMENT AND PLAN     Assessment/Plan   Diagnoses and all orders for this visit:    Primary osteoarthritis of right knee      Mr. Yuen is a 73-year-old male who presents today to follow up on his right knee and is scheduled for NAVJOT total knee arthroplasty on February 19, 2024 at [facility]. The procedure and recovery were explained to him in detail. Risks, benefits and alternatives were discussed and all his questions were answered. He voiced understanding and we will proceed with surgery as planned.       Portions of this note were documented by Klarissa Jay as I performed the exam and collected the information from Cesar VANCE Alpa. I attest that I have reviewed the information as documented, verified the accuracy of the documentation and added additional information as needed.

## 2024-02-09 NOTE — PRE-PROCEDURE INSTRUCTIONS
Pre-Surgery Instructions:   Medication Instructions    warfarin (COUMADIN) 5 mg tablet Patient advised by MD to stop on 2/13 (date)    potassium chloride (KLOR-CON M20) 20 mEq CR tablet Do not take/administer morning of surgery/procedure    furosemide (LASIX) 80 mg tablet Do not take/administer morning of surgery/procedure    tamsulosin (FLOMAX) 0.4 mg capsule Continue as prescribed do not hold    multivitamin (THERAGRAN) tablet tablet Stop 1 week prior to surgery/procedure       Diet instructions for surgery:  Nothing to eat after midnight and Clear liquids (no pulp) up to 2 hours before surgery.    Preop questionnaire:  Preop Questionairre  Does patient have physical restrictions or limitations?: No  Has patient had an upper respiratory tract infection in the last 2 weeks?: No  Home Oxygen: No    Preop instructions:  Pre-op Phone Call  Surgery Location Verified: Yes  Instructed to shower within 12 hours: Yes  Patient has special physician orders: Yes (Wipes and drink)  Patient verbalized understanding of physician's preoperative order: Yes  Instructed to remove/not apply makeup, petroleum products, lotion, powder, scents, or deodorant on the morning of surgery: Yes  Recommend eye glasses for day of surgery, contact lenses must be removed prior to surgery:: N/A  Instructed to remove all jewelry, including piercings, and leave at home.: Yes  Instructed to leave all medications at home: Yes  Instructed to bring a valid photo ID, insurance card and form of payment: Yes  NPO Status Reinforced: Yes  Consider having a caregiver stay with the patient for 24 hours following anesthesia:: N/A (In-pt)    Pre-op assessment and teaching done with patient. Will get a call with arrival time / NPO status from OR Department 2 business days before surgery date. Nothing per mouth (NPO) 2 hours before arrival time, that includes no hard candy or gum. Patient voiced understanding of instruction given today.

## 2024-02-15 ENCOUNTER — ANESTHESIA EVENT (OUTPATIENT)
Dept: GASTROENTEROLOGY | Facility: HOSPITAL | Age: 74
End: 2024-02-15
Payer: MEDICARE

## 2024-02-15 ASSESSMENT — ENCOUNTER SYMPTOMS
DYSRHYTHMIAS: 0
EXERCISE TOLERANCE: GOOD (4-7 METS)

## 2024-02-15 NOTE — ANESTHESIA PREPROCEDURE EVALUATION
"Pre-Procedure Assessment    Patient: Cesar Yuen, male, 73 y.o.    Ht Readings from Last 1 Encounters:   01/02/24 1.854 m (6' 1\")     Wt Readings from Last 1 Encounters:   02/06/24 107 kg (236 lb)       Last Vitals  BP      Temp      Pulse     Resp      SpO2      Pain Score         Problem list reviewed and Medical history reviewed           Airway   Mallampati: II  TM distance: >3 FB  Neck ROM: full      Dental      Pulmonary     breath sounds clear to auscultation  Cardiovascular   Exercise tolerance: good (4-7 METS)  (-) dysrhythmias (Hx of Atrial Flutter  S/p Ablation and loop recorder)    Rhythm: regular  Rate: normal  ROS comment: Sinus or ectopic atrial rhythm  ET   Abnormal R-wave progression, early transition    Implanted loop recorder    On coumadin    Mental Status/Neuro/Psych    Pt is alert.      (+) arthritis, back injury    GI/Hepatic/Renal    (+) urinary retention    Endo/Other    (+) history of blood transfusion, clotting disorder    Comments: 1. Revised Cardiac Risk Index: Class II, 6.0% Risk      2 . ARISCAT score: 11      3. Patient is medically optimized to proceed with upcoming surgery      4. EKG: Sinus/atrial Rhythm and recent device check report reviewed and no concerns listed. Cardiology having already reviewed patient and stated he is optimized for upcoming surgery.      5. CBC/CMP: no acute concerns      6. Patient to hold warfarin medication 5 days prior to procedure and then resume the day after or according to surgeon's recommendations.  Advised INR check the week following with goal of 2-3.         Gregg Valadez DO      Stopped coumadin 5 days ago  Abdominal             Social History     Tobacco Use    Smoking status: Never    Smokeless tobacco: Never   Substance Use Topics    Alcohol use: Yes     Alcohol/week: 1.0 standard drink of alcohol     Types: 1 Standard drinks or equivalent per week     Comment: rarely      Hematology   WBC   Date Value Ref Range Status   02/06/2024 7.1 " 3.7 - 9.6 10*3/uL Final     RBC   Date Value Ref Range Status   02/06/2024 5.00 4.10 - 5.80 10*6/µL Final     MCV   Date Value Ref Range Status   02/06/2024 90.0 82.0 - 97.0 fL Final     Hemoglobin   Date Value Ref Range Status   02/06/2024 14.9 13.2 - 17.2 g/dL Final     Hematocrit   Date Value Ref Range Status   02/06/2024 45.0 38.0 - 50.0 % Final     Platelets   Date Value Ref Range Status   02/06/2024 262 130 - 350 10*3/uL Final      Coagulation   Protime   Date Value Ref Range Status   02/06/2024 20.4 (H) 9.4 - 12.5 SECONDS Final     PTT   Date Value Ref Range Status   02/06/2024 43.7 (H) 25.1 - 36.5 SECONDS Final     INR   Date Value Ref Range Status   02/06/2024 1.8 (H) <=1.1 Final   01/24/2024 1.6 (A) 2.0 - 3.0 Corrected     Comment:     Lot [id number]  Exp 7/31/24      General Chemistry   Calcium   Date Value Ref Range Status   02/06/2024 9.3 8.6 - 10.3 mg/dL Final     BUN   Date Value Ref Range Status   02/06/2024 18 7 - 25 mg/dL Final     Creatinine   Date Value Ref Range Status   02/06/2024 1.09 0.70 - 1.30 mg/dL Final     Glucose   Date Value Ref Range Status   02/06/2024 100 70 - 105 mg/dL Final     Sodium   Date Value Ref Range Status   02/06/2024 141 135 - 145 mmol/L Final     Potassium   Date Value Ref Range Status   02/06/2024 3.6 3.5 - 5.1 MMOL/L Final     Magnesium   Date Value Ref Range Status   03/15/2023 2.2 1.8 - 2.4 mg/dL Final     CO2   Date Value Ref Range Status   02/06/2024 28 21 - 32 mmol/L Final     Chloride   Date Value Ref Range Status   02/06/2024 103 98 - 107 mmol/L Final     Anesthesia Plan    ASA 3   NPO status reviewed: > 8 hours    MAC, spinal and regional  Regional type: adductor canal block   Laterality: right                     Anesthetic plan and risks discussed with patient.

## 2024-02-19 ENCOUNTER — HOSPITAL ENCOUNTER (OUTPATIENT)
Facility: HOSPITAL | Age: 74
Discharge: HOME | End: 2024-02-20
Attending: ORTHOPAEDIC SURGERY | Admitting: ORTHOPAEDIC SURGERY
Payer: MEDICARE

## 2024-02-19 ENCOUNTER — APPOINTMENT (OUTPATIENT)
Dept: RADIOLOGY | Facility: HOSPITAL | Age: 74
End: 2024-02-19
Payer: MEDICARE

## 2024-02-19 ENCOUNTER — ANESTHESIA (OUTPATIENT)
Dept: GASTROENTEROLOGY | Facility: HOSPITAL | Age: 74
End: 2024-02-19
Payer: MEDICARE

## 2024-02-19 ENCOUNTER — ANCILLARY PROCEDURE (OUTPATIENT)
Dept: ULTRASOUND IMAGING | Facility: HOSPITAL | Age: 74
End: 2024-02-19
Payer: MEDICARE

## 2024-02-19 DIAGNOSIS — M17.11 ARTHRITIS OF RIGHT KNEE: Primary | ICD-10-CM

## 2024-02-19 DIAGNOSIS — M17.11 PRIMARY OSTEOARTHRITIS OF RIGHT KNEE: ICD-10-CM

## 2024-02-19 DIAGNOSIS — I48.3 TYPICAL ATRIAL FLUTTER (CMS/HCC): ICD-10-CM

## 2024-02-19 PROCEDURE — (BLANK) HC MAC ANESTHESIA FACILITY CHARGE EACH ADDITIONAL MIN: Performed by: ORTHOPAEDIC SURGERY

## 2024-02-19 PROCEDURE — 73560 X-RAY EXAM OF KNEE 1 OR 2: CPT | Mod: RT

## 2024-02-19 PROCEDURE — 76942 ECHO GUIDE FOR BIOPSY: CPT | Mod: 26 | Performed by: NURSE ANESTHETIST, CERTIFIED REGISTERED

## 2024-02-19 PROCEDURE — 2720000000 HC SUPP 272 WO HCPCS: Performed by: ORTHOPAEDIC SURGERY

## 2024-02-19 PROCEDURE — 7100002500 HC EXTENDED RECOVERY PER HOUR

## 2024-02-19 PROCEDURE — 2500000200 HC RX 250 WO HCPCS: Performed by: PHYSICIAN ASSISTANT

## 2024-02-19 PROCEDURE — 27447 TOTAL KNEE ARTHROPLASTY: CPT | Mod: RT | Performed by: ORTHOPAEDIC SURGERY

## 2024-02-19 PROCEDURE — 6360000200 HC RX 636 W HCPCS (ALT 250 FOR IP): Performed by: ORTHOPAEDIC SURGERY

## 2024-02-19 PROCEDURE — 01402 ANES OPN/ARTH TOT KNE ARTHRP: CPT | Performed by: NURSE ANESTHETIST, CERTIFIED REGISTERED

## 2024-02-19 PROCEDURE — 97535 SELF CARE MNGMENT TRAINING: CPT | Mod: GO

## 2024-02-19 PROCEDURE — 2580000300 HC RX 258: Performed by: ORTHOPAEDIC SURGERY

## 2024-02-19 PROCEDURE — 64450 NJX AA&/STRD OTHER PN/BRANCH: CPT | Performed by: NURSE ANESTHETIST, CERTIFIED REGISTERED

## 2024-02-19 PROCEDURE — 3600001000 HC OR LEVEL 5 PROC EACH ADDITIONAL MIN: Performed by: ORTHOPAEDIC SURGERY

## 2024-02-19 PROCEDURE — 2500000200 HC RX 250 WO HCPCS: Performed by: ORTHOPAEDIC SURGERY

## 2024-02-19 PROCEDURE — C1713 ANCHOR/SCREW BN/BN,TIS/BN: HCPCS | Performed by: ORTHOPAEDIC SURGERY

## 2024-02-19 PROCEDURE — 97165 OT EVAL LOW COMPLEX 30 MIN: CPT | Mod: GO

## 2024-02-19 PROCEDURE — 97110 THERAPEUTIC EXERCISES: CPT | Mod: GP | Performed by: PHYSICAL THERAPIST

## 2024-02-19 PROCEDURE — (BLANK) HC MAC ANESTHESIA FACILITY CHARGE 1ST 15 MIN: Performed by: ORTHOPAEDIC SURGERY

## 2024-02-19 PROCEDURE — (BLANK) HC RECOVERY PHASE-1 1ST  HOUR ACUITY LEVEL 3: Performed by: ORTHOPAEDIC SURGERY

## 2024-02-19 PROCEDURE — 73560 X-RAY EXAM OF KNEE 1 OR 2: CPT | Mod: 26,RT | Performed by: RADIOLOGY

## 2024-02-19 PROCEDURE — (BLANK) HC OR LEVEL 5 PROC 1ST 15MIN: Performed by: ORTHOPAEDIC SURGERY

## 2024-02-19 PROCEDURE — 2500000200 HC RX 250 WO HCPCS: Mod: NCMED | Performed by: ORTHOPAEDIC SURGERY

## 2024-02-19 PROCEDURE — 27447 TOTAL KNEE ARTHROPLASTY: CPT | Mod: AS,RT | Performed by: PHYSICIAN ASSISTANT

## 2024-02-19 PROCEDURE — C9290 INJ, BUPIVACAINE LIPOSOME: HCPCS | Performed by: ORTHOPAEDIC SURGERY

## 2024-02-19 PROCEDURE — 97162 PT EVAL MOD COMPLEX 30 MIN: CPT | Mod: GP | Performed by: PHYSICAL THERAPIST

## 2024-02-19 PROCEDURE — 97116 GAIT TRAINING THERAPY: CPT | Mod: GP | Performed by: PHYSICAL THERAPIST

## 2024-02-19 PROCEDURE — 6360000200 HC RX 636 W HCPCS (ALT 250 FOR IP): Performed by: NURSE ANESTHETIST, CERTIFIED REGISTERED

## 2024-02-19 PROCEDURE — 6370000100 HC RX 637 (ALT 250 FOR IP): Performed by: ORTHOPAEDIC SURGERY

## 2024-02-19 PROCEDURE — C1776 JOINT DEVICE (IMPLANTABLE): HCPCS | Performed by: ORTHOPAEDIC SURGERY

## 2024-02-19 PROCEDURE — 2500000200 HC RX 250 WO HCPCS: Performed by: NURSE ANESTHETIST, CERTIFIED REGISTERED

## 2024-02-19 PROCEDURE — 99100 ANES PT EXTEME AGE<1 YR&>70: CPT | Performed by: NURSE ANESTHETIST, CERTIFIED REGISTERED

## 2024-02-19 DEVICE — IMPLANTABLE DEVICE: Type: IMPLANTABLE DEVICE | Status: FUNCTIONAL

## 2024-02-19 DEVICE — TIBIAL CEMENTED STM SZ H R PERSONA 5 DEG: Type: IMPLANTABLE DEVICE | Status: FUNCTIONAL

## 2024-02-19 DEVICE — PATELLA ALL POLY CEMENTED 35MM PERSONA: Type: IMPLANTABLE DEVICE | Status: FUNCTIONAL

## 2024-02-19 DEVICE — FEMUR CR CEMENTED SZ 11 RIGHT STANDARD: Type: IMPLANTABLE DEVICE | Status: FUNCTIONAL

## 2024-02-19 DEVICE — CEMENT BONE R 1X40 US: Type: IMPLANTABLE DEVICE | Status: FUNCTIONAL

## 2024-02-19 RX ORDER — GLYCOPYRROLATE 0.2 MG/ML
INJECTION INTRAMUSCULAR; INTRAVENOUS AS NEEDED
Status: DISCONTINUED | OUTPATIENT
Start: 2024-02-19 | End: 2024-02-19 | Stop reason: SURG

## 2024-02-19 RX ORDER — SODIUM CHLORIDE, SODIUM LACTATE, POTASSIUM CHLORIDE, CALCIUM CHLORIDE 600; 310; 30; 20 MG/100ML; MG/100ML; MG/100ML; MG/100ML
100 INJECTION, SOLUTION INTRAVENOUS CONTINUOUS
Status: DISCONTINUED | OUTPATIENT
Start: 2024-02-19 | End: 2024-02-20 | Stop reason: HOSPADM

## 2024-02-19 RX ORDER — ONDANSETRON HYDROCHLORIDE 2 MG/ML
4 INJECTION, SOLUTION INTRAVENOUS ONCE AS NEEDED
Status: DISCONTINUED | OUTPATIENT
Start: 2024-02-19 | End: 2024-02-19 | Stop reason: HOSPADM

## 2024-02-19 RX ORDER — OXYCODONE HYDROCHLORIDE 5 MG/1
5 TABLET ORAL EVERY 4 HOURS PRN
Status: DISCONTINUED | OUTPATIENT
Start: 2024-02-19 | End: 2024-02-20 | Stop reason: HOSPADM

## 2024-02-19 RX ORDER — CHLOROPROCAINE HYDROCHLORIDE 30 MG/ML
INJECTION, SOLUTION EPIDURAL; INFILTRATION; INTRACAUDAL; PERINEURAL AS NEEDED
Status: DISCONTINUED | OUTPATIENT
Start: 2024-02-19 | End: 2024-02-19 | Stop reason: SURG

## 2024-02-19 RX ORDER — DIPHENHYDRAMINE HCL 25 MG
25-50 CAPSULE ORAL EVERY 6 HOURS PRN
Status: DISCONTINUED | OUTPATIENT
Start: 2024-02-19 | End: 2024-02-20 | Stop reason: HOSPADM

## 2024-02-19 RX ORDER — DEXMEDETOMIDINE HYDROCHLORIDE 4 UG/ML
INJECTION, SOLUTION INTRAVENOUS AS NEEDED
Status: DISCONTINUED | OUTPATIENT
Start: 2024-02-19 | End: 2024-02-19 | Stop reason: SURG

## 2024-02-19 RX ORDER — CELECOXIB 200 MG/1
200 CAPSULE ORAL DAILY
Status: DISCONTINUED | OUTPATIENT
Start: 2024-02-20 | End: 2024-02-20 | Stop reason: HOSPADM

## 2024-02-19 RX ORDER — BUPIVACAINE 13.3 MG/ML
20 INJECTION, SUSPENSION, LIPOSOMAL INFILTRATION ONCE
Status: COMPLETED | OUTPATIENT
Start: 2024-02-19 | End: 2024-02-19

## 2024-02-19 RX ORDER — TRANEXAMIC ACID 100 MG/ML
1000 INJECTION, SOLUTION INTRAVENOUS ONCE
Status: COMPLETED | OUTPATIENT
Start: 2024-02-19 | End: 2024-02-19

## 2024-02-19 RX ORDER — PROPOFOL 10 MG/ML
INJECTION, EMULSION INTRAVENOUS CONTINUOUS PRN
Status: DISCONTINUED | OUTPATIENT
Start: 2024-02-19 | End: 2024-02-19 | Stop reason: SURG

## 2024-02-19 RX ORDER — ENOXAPARIN SODIUM 100 MG/ML
40 INJECTION SUBCUTANEOUS
Status: DISCONTINUED | OUTPATIENT
Start: 2024-02-19 | End: 2024-02-20 | Stop reason: HOSPADM

## 2024-02-19 RX ORDER — DIPHENHYDRAMINE HYDROCHLORIDE 50 MG/ML
25-50 INJECTION INTRAMUSCULAR; INTRAVENOUS EVERY 6 HOURS PRN
Status: DISCONTINUED | OUTPATIENT
Start: 2024-02-19 | End: 2024-02-20 | Stop reason: HOSPADM

## 2024-02-19 RX ORDER — ADHESIVE BANDAGE
30 BANDAGE TOPICAL DAILY PRN
Status: DISCONTINUED | OUTPATIENT
Start: 2024-02-19 | End: 2024-02-20 | Stop reason: HOSPADM

## 2024-02-19 RX ORDER — OXYCODONE HYDROCHLORIDE 10 MG/1
10 TABLET ORAL EVERY 4 HOURS PRN
Status: DISCONTINUED | OUTPATIENT
Start: 2024-02-19 | End: 2024-02-20 | Stop reason: HOSPADM

## 2024-02-19 RX ORDER — NALOXONE HYDROCHLORIDE 0.4 MG/ML
0.2 INJECTION, SOLUTION INTRAMUSCULAR; INTRAVENOUS; SUBCUTANEOUS AS NEEDED
Status: DISCONTINUED | OUTPATIENT
Start: 2024-02-19 | End: 2024-02-19 | Stop reason: HOSPADM

## 2024-02-19 RX ORDER — OXYCODONE HYDROCHLORIDE 5 MG/1
2.5 TABLET ORAL EVERY 4 HOURS PRN
Status: DISCONTINUED | OUTPATIENT
Start: 2024-02-19 | End: 2024-02-20 | Stop reason: HOSPADM

## 2024-02-19 RX ORDER — ACETAMINOPHEN 500 MG
1000 TABLET ORAL EVERY 8 HOURS SCHEDULED
Status: DISCONTINUED | OUTPATIENT
Start: 2024-02-19 | End: 2024-02-20 | Stop reason: HOSPADM

## 2024-02-19 RX ORDER — DEXAMETHASONE SODIUM PHOSPHATE 10 MG/ML
INJECTION INTRAMUSCULAR; INTRAVENOUS
Status: COMPLETED | OUTPATIENT
Start: 2024-02-19 | End: 2024-02-19

## 2024-02-19 RX ORDER — ONDANSETRON 4 MG/1
4 TABLET, FILM COATED ORAL EVERY 6 HOURS PRN
Status: DISCONTINUED | OUTPATIENT
Start: 2024-02-19 | End: 2024-02-20 | Stop reason: HOSPADM

## 2024-02-19 RX ORDER — ONDANSETRON HYDROCHLORIDE 2 MG/ML
4 INJECTION, SOLUTION INTRAVENOUS EVERY 6 HOURS PRN
Status: DISCONTINUED | OUTPATIENT
Start: 2024-02-19 | End: 2024-02-20 | Stop reason: HOSPADM

## 2024-02-19 RX ORDER — POTASSIUM CHLORIDE 750 MG/1
20 TABLET, FILM COATED, EXTENDED RELEASE ORAL DAILY
Status: DISCONTINUED | OUTPATIENT
Start: 2024-02-20 | End: 2024-02-20 | Stop reason: HOSPADM

## 2024-02-19 RX ORDER — BUPIVACAINE HYDROCHLORIDE 5 MG/ML
INJECTION, SOLUTION EPIDURAL; INTRACAUDAL
Status: COMPLETED | OUTPATIENT
Start: 2024-02-19 | End: 2024-02-19

## 2024-02-19 RX ORDER — FENTANYL CITRATE/PF 50 MCG/ML
PLASTIC BAG, INJECTION (ML) INTRAVENOUS AS NEEDED
Status: DISCONTINUED | OUTPATIENT
Start: 2024-02-19 | End: 2024-02-19 | Stop reason: SURG

## 2024-02-19 RX ORDER — TAMSULOSIN HYDROCHLORIDE 0.4 MG/1
0.4 CAPSULE ORAL
Status: DISCONTINUED | OUTPATIENT
Start: 2024-02-19 | End: 2024-02-20 | Stop reason: HOSPADM

## 2024-02-19 RX ORDER — NORETHINDRONE AND ETHINYL ESTRADIOL 0.5-0.035
KIT ORAL AS NEEDED
Status: DISCONTINUED | OUTPATIENT
Start: 2024-02-19 | End: 2024-02-19 | Stop reason: SURG

## 2024-02-19 RX ORDER — MIDAZOLAM HYDROCHLORIDE 1 MG/ML
INJECTION INTRAMUSCULAR; INTRAVENOUS AS NEEDED
Status: DISCONTINUED | OUTPATIENT
Start: 2024-02-19 | End: 2024-02-19 | Stop reason: SURG

## 2024-02-19 RX ORDER — CELECOXIB 200 MG/1
200 CAPSULE ORAL ONCE
Status: COMPLETED | OUTPATIENT
Start: 2024-02-19 | End: 2024-02-19

## 2024-02-19 RX ORDER — BISACODYL 10 MG/1
10 SUPPOSITORY RECTAL DAILY PRN
Status: DISCONTINUED | OUTPATIENT
Start: 2024-02-19 | End: 2024-02-20 | Stop reason: HOSPADM

## 2024-02-19 RX ORDER — FUROSEMIDE 40 MG/1
80 TABLET ORAL 2 TIMES DAILY
Status: DISCONTINUED | OUTPATIENT
Start: 2024-02-19 | End: 2024-02-20 | Stop reason: HOSPADM

## 2024-02-19 RX ORDER — ACETAMINOPHEN 500 MG
1000 TABLET ORAL ONCE
Status: COMPLETED | OUTPATIENT
Start: 2024-02-19 | End: 2024-02-19

## 2024-02-19 RX ORDER — MORPHINE SULFATE 4 MG/ML
3-4 INJECTION, SOLUTION INTRAMUSCULAR; INTRAVENOUS
Status: DISCONTINUED | OUTPATIENT
Start: 2024-02-19 | End: 2024-02-20 | Stop reason: HOSPADM

## 2024-02-19 RX ORDER — CEFUROXIME SODIUM 1.5 G/16ML
INJECTION, POWDER, FOR SOLUTION INTRAVENOUS AS NEEDED
Status: DISCONTINUED | OUTPATIENT
Start: 2024-02-19 | End: 2024-02-19 | Stop reason: HOSPADM

## 2024-02-19 RX ORDER — FENTANYL CITRATE/PF 50 MCG/ML
50 PLASTIC BAG, INJECTION (ML) INTRAVENOUS EVERY 5 MIN PRN
Status: DISCONTINUED | OUTPATIENT
Start: 2024-02-19 | End: 2024-02-19 | Stop reason: HOSPADM

## 2024-02-19 RX ORDER — MORPHINE SULFATE 2 MG/ML
1-2 INJECTION, SOLUTION INTRAMUSCULAR; INTRAVENOUS
Status: DISCONTINUED | OUTPATIENT
Start: 2024-02-19 | End: 2024-02-20 | Stop reason: HOSPADM

## 2024-02-19 RX ORDER — POLYETHYLENE GLYCOL (3350) 17 G/17G
17 POWDER, FOR SOLUTION ORAL DAILY
Status: DISCONTINUED | OUTPATIENT
Start: 2024-02-20 | End: 2024-02-20 | Stop reason: HOSPADM

## 2024-02-19 RX ORDER — SODIUM CHLORIDE, SODIUM LACTATE, POTASSIUM CHLORIDE, AND CALCIUM CHLORIDE .6; .31; .03; .02 G/100ML; G/100ML; G/100ML; G/100ML
500 INJECTION, SOLUTION INTRAVENOUS ONCE AS NEEDED
Status: DISCONTINUED | OUTPATIENT
Start: 2024-02-19 | End: 2024-02-19 | Stop reason: HOSPADM

## 2024-02-19 RX ORDER — DIPHENHYDRAMINE HYDROCHLORIDE 50 MG/ML
25 INJECTION INTRAMUSCULAR; INTRAVENOUS ONCE AS NEEDED
Status: DISCONTINUED | OUTPATIENT
Start: 2024-02-19 | End: 2024-02-19 | Stop reason: HOSPADM

## 2024-02-19 RX ADMIN — FENTANYL CITRATE 50 MCG: 50 INJECTION, SOLUTION INTRAMUSCULAR; INTRAVENOUS at 09:30

## 2024-02-19 RX ADMIN — SODIUM CHLORIDE, POTASSIUM CHLORIDE, SODIUM LACTATE AND CALCIUM CHLORIDE: 600; 310; 30; 20 INJECTION, SOLUTION INTRAVENOUS at 11:44

## 2024-02-19 RX ADMIN — DEXMEDETOMIDINE HYDROCHLORIDE 4 MCG: 4 INJECTION, SOLUTION INTRAVENOUS at 09:48

## 2024-02-19 RX ADMIN — TRANEXAMIC ACID 1000 MG: 1 INJECTION, SOLUTION INTRAVENOUS at 12:15

## 2024-02-19 RX ADMIN — FUROSEMIDE 80 MG: 40 TABLET ORAL at 21:28

## 2024-02-19 RX ADMIN — Medication 1000 MG: at 21:29

## 2024-02-19 RX ADMIN — ENOXAPARIN SODIUM 40 MG: 100 INJECTION SUBCUTANEOUS at 15:52

## 2024-02-19 RX ADMIN — MIDAZOLAM 1 MG: 1 INJECTION INTRAMUSCULAR; INTRAVENOUS at 09:43

## 2024-02-19 RX ADMIN — Medication 1000 MG: at 14:03

## 2024-02-19 RX ADMIN — TAMSULOSIN HYDROCHLORIDE 0.4 MG: 0.4 CAPSULE ORAL at 17:34

## 2024-02-19 RX ADMIN — SODIUM CHLORIDE, POTASSIUM CHLORIDE, SODIUM LACTATE AND CALCIUM CHLORIDE 100 ML/HR: 600; 310; 30; 20 INJECTION, SOLUTION INTRAVENOUS at 13:14

## 2024-02-19 RX ADMIN — SODIUM CHLORIDE, POTASSIUM CHLORIDE, SODIUM LACTATE AND CALCIUM CHLORIDE 100 ML/HR: 600; 310; 30; 20 INJECTION, SOLUTION INTRAVENOUS at 08:21

## 2024-02-19 RX ADMIN — Medication 20 MCG/MIN: at 10:02

## 2024-02-19 RX ADMIN — SODIUM CHLORIDE 2000 MG: 900 INJECTION INTRAVENOUS at 19:55

## 2024-02-19 RX ADMIN — DEXAMETHASONE SODIUM PHOSPHATE 10 MG: 10 INJECTION, SOLUTION INTRAMUSCULAR; INTRAVENOUS at 09:33

## 2024-02-19 RX ADMIN — CEFAZOLIN 2000 MG: 2 INJECTION, POWDER, FOR SOLUTION INTRAMUSCULAR; INTRAVENOUS at 09:51

## 2024-02-19 RX ADMIN — Medication 1000 MG: at 08:21

## 2024-02-19 RX ADMIN — SODIUM CHLORIDE 2000 MG: 900 INJECTION INTRAVENOUS at 12:15

## 2024-02-19 RX ADMIN — BUPIVACAINE HYDROCHLORIDE 20 ML: 5 INJECTION, SOLUTION EPIDURAL; INTRACAUDAL at 09:33

## 2024-02-19 RX ADMIN — FENTANYL CITRATE 50 MCG: 50 INJECTION, SOLUTION INTRAMUSCULAR; INTRAVENOUS at 09:43

## 2024-02-19 RX ADMIN — CELECOXIB 200 MG: 200 CAPSULE ORAL at 08:21

## 2024-02-19 RX ADMIN — MIDAZOLAM 1 MG: 1 INJECTION INTRAMUSCULAR; INTRAVENOUS at 09:30

## 2024-02-19 RX ADMIN — GLYCOPYRROLATE 0.15 MG: 0.2 INJECTION, SOLUTION INTRAMUSCULAR; INTRAVENOUS at 09:50

## 2024-02-19 RX ADMIN — TRANEXAMIC ACID 1000 MG: 100 INJECTION, SOLUTION INTRAVENOUS at 09:56

## 2024-02-19 RX ADMIN — EPHEDRINE SULFATE 30 MG: 50 INJECTION, SOLUTION INTRAVENOUS at 10:26

## 2024-02-19 RX ADMIN — PROPOFOL 75 MCG/KG/MIN: 10 INJECTION, EMULSION INTRAVENOUS at 09:51

## 2024-02-19 RX ADMIN — CHLOROPROCAINE HYDROCHLORIDE 2 ML: 30 INJECTION, SOLUTION EPIDURAL; INFILTRATION; INTRACAUDAL; PERINEURAL at 09:48

## 2024-02-19 NOTE — INTERDISCIPLINARY/THERAPY
Spiritual Care Services:     02/19/24 1418   Clinical Encounter Type   Referral By:  (Other)   Visited With Patient   Visit Type Initial;Post- Op   Nondenominational Affilation   Affiliated with Zoroastrianism/[employer]mber Group Yes   Current Zoroastrianism/[employer] Affiliation Yazidi   Spiritual/Emotional Distress   Level Moderate   Plan of Care   Spiritual Care Plan Initiated Provide supportive presence   Plan of Care Comments and emotional/spiritual support   Spiritual Assessment   Completed by    [place] Beliefs Believes in a Higher Power   Spiritual Interventions   Spiritual/Nondenominational Interventions [place]/Ritual provided;Prayer provided   Emotional/Spiritual Interventions Empathic and Reflective listening provided     Provide supportive presence; emotional/spiritual support; empathetic/reflective listening; prayer

## 2024-02-19 NOTE — ANESTHESIA POSTPROCEDURE EVALUATION
Patient: Cesar Yuen    Procedure Summary       Date: 02/19/24 Room / Location: [facility] OR 01 / [facility] OR    Anesthesia Start: 0943 Anesthesia Stop: 1213    Procedure: RIGHT TOTAL KNEE ARTHROPLASTY WITH ROBOTIC ORTHOPEDIC SURGICAL ASSISTANT (Right: Knee) Diagnosis:       Primary osteoarthritis of right knee      (Primary osteoarthritis of right knee [M17.11])    Surgeons: Michael Zimmerman MD Responsible Provider: Cesar Cavanaugh CRNA    Anesthesia Type: MAC, spinal, regional ASA Status: 2            Anesthesia Type: MAC, spinal, regional    Last vitals  Vitals Value Taken Time   BP 98/61 02/19/24 1210   Temp     Pulse 91 02/19/24 1212   Resp 15 02/19/24 1212   SpO2 88 % 02/19/24 1212   Pain Score     Vitals shown include unfiled device data.  VSS  Anesthesia Post Evaluation    Patient location during evaluation: PACU  Patient participation: complete - patient participated  Level of consciousness: awake and alert  Pain management: adequate  Airway patency: patent  Anesthetic complications: no  Cardiovascular status: acceptable  Respiratory status: acceptable  Hydration status: acceptable  May dismiss recovered patient based on consultation with the appropriate physicians and/or meeting appropriate discharge criteria      Cosmetic?  This procedure is not cosmetic.

## 2024-02-19 NOTE — INTERDISCIPLINARY/THERAPY
Discharge Plan  Met with Patient to discuss home situation and plans for discharge. Patient lives alone in his home in [place], his daughter Blanquita lives in [place], he also has a friend Juan who lives in [place], they are supportive and will assist as needed. Marco's plan is for discharge home, Juan will transport. Marco plans on continuing physical therapy as an outpatient at [facility] which has been arranged. Appointment made with PT and Ortho.  Patient indicated he has a FWW and a cane for home. Patient instructed to contact his PCP if he has any medical issues/questions not related to his knee surger

## 2024-02-19 NOTE — INTERDISCIPLINARY/THERAPY
"  [address] [place] [address]  [phone]      Inpatient Occupational Therapy Initial Evaluation Note    Date of Service: 02/19/24  Patient Name: Cesar Yuen  Referring Provider: CAM ZIMMERMAN  Medicare or Medicaid note, aleta has been added.: Yes  SOC Date: 02/19/24  Completed Visit Number: 1  Certification Date: 02/23/24    Medical Diagnosis:    Primary osteoarthritis of right knee [M17.11]  Osteoarthritis of right knee [M17.11]  Arthritis of right knee [M17.11]  Treatment Diagnoses:  Treatment Diagnosis: Decreased ADL status, Decreased functional mobility    Subjective   Family/Caregiver Present  Family/Caregiver Present: No  Subjective Comments  RN Stated patient is medically cleared for therapy: Yes  Subjective Comments: Patient is seen getting up with RN to use the restroom at this time.   Activities limited by patient complaint: ADLs, Walking/Mobility  Social/Occupational/Recreational  Lived With: Alone  Receives Help From: Family  Vocational Status: Retired  Current Assistive or Adaptive Equipment  Equipment: Extended bath bench, Front wheeled walker  Pain Assessment Scale  Pain Scale: 0-10  Pain Score: 0-No pain        Past Medical History:   Diagnosis Date    Arthritis     Atrial flutter (CMS/HCC)     Chipped tooth     \"cracked tooth\" Back right upper, left upper back    Clotting disorder (CMS/HCC)     Chronic anticoagulation, Warfarin    Dysrhythmia     Hx a-flutter, s/p ablation and loop recorder 2/8/2021    Edema     BLE    History of transfusion     After surgery    Injury of back 01/14/2021    Seen on CT    Pneumonia 03/11/2023    Urinary retention     S/p urethral stent 12/2023       Current Facility-Administered Medications:     LR infusion, 100 mL/hr, intravenous, Continuous, Cam Zimmerman MD, Last Rate: 100 mL/hr at 02/19/24 1144, New Bag at 02/19/24 1144    furosemide (LASIX) tablet 80 mg, 80 mg, oral, 2x daily, Cam Zimmerman MD    [START ON 2/20/2024] potassium " chloride (KLOR-CON) CR tablet 20 mEq, 20 mEq, oral, Daily, Michael Zimmerman MD    tamsulosin (FLOMAX) 24 hr capsule 0.4 mg, 0.4 mg, oral, Daily with dinner, Michael Zimmerman MD    LR infusion, 100 mL/hr, intravenous, Continuous, Michael Zimmerman MD, Last Rate: 100 mL/hr at 02/19/24 1314, 100 mL/hr at 02/19/24 1314    acetaminophen (TYLENOL) tablet 1,000 mg, 1,000 mg, oral, q8h JOY, Michael Zimmerman MD, 1,000 mg at 02/19/24 1403    oxyCODONE (ROXICODONE) immediate release tablet 2.5 mg, 2.5 mg, oral, q4h PRN, Michael Zimmerman MD    oxyCODONE (ROXICODONE) immediate release tablet 5 mg, 5 mg, oral, q4h PRN, Michael Zimmerman MD    morphine injection 1-2 mg, 1-2 mg, intravenous, q2h PRN, Michael Zimmerman MD    oxyCODONE immediate release tablet 10 mg, 10 mg, oral, q4h PRN, Michael Zimmerman MD    morphine injection 3-4 mg, 3-4 mg, intravenous, q2h PRN, Michael Zimmerman MD    [START ON 2/20/2024] celecoxib (CeleBREX) capsule 200 mg, 200 mg, oral, Daily, Michael Zimmerman MD    diphenhydrAMINE (BENADRYL) tab/cap 25-50 mg, 25-50 mg, oral, q6h PRN **OR** diphenhydrAMINE (BENADRYL) injection 25-50 mg, 25-50 mg, intravenous, q6h PRN, Michael Zimmerman MD    [START ON 2/20/2024] polyethylene glycol (GLYCOLAX) powder 17 g, 17 g, oral, Daily, Michael Zimmerman MD    magnesium hydroxide (MILK OF MAGNESIA) 400 mg/5 mL oral suspension 30 mL, 30 mL, oral, Daily PRN, Michael Zimmerman MD    bisacodyL (DULCOLAX) suppository 10 mg, 10 mg, rectal, Daily PRN, Michael Zimmerman MD    ondansetron (ZOFRAN) tablet 4 mg, 4 mg, oral, q6h PRN **OR** ondansetron (ZOFRAN) injection 4 mg, 4 mg, intravenous, q6h PRN, Michael Zimmerman MD    ceFAZolin (ANCEF) 2,000 mg in normal saline 50 mL IVPB - MBP, 2,000 mg, intravenous, q8h, Michael Zimmerman MD, Stopped at 02/19/24 1231    enoxaparin (LOVENOX) syringe 40 mg, 40 mg, subcutaneous, Daily at 1400, Michael Zimmerman MD  No Known Allergies       Objective    Precautions  Reinforced Precautions: Yes  Weight  Bearing Precautions: Yes  RLE Weight Bearing: Weight Bearing as Tolerated (WBAT)  Is this a Co-Treatment?: No  Precautions: WBAT, knee precautions   Patient was educated on all precautions.     Home Environment: Patient reports living in a house alone, but will have his daughter with him to help after surgery. Patient has 3-5 steps to enter his home with bilateral ascending railings. Patient reports having a tub/shower combination with a bench and a normal height toilet.      Transfers:    -Sit to Stand (Bed): CGA/FWW   -Stand to Sit (Chair): CGA/FWW   -From Chair to Bathroom: CGA/FWW   -From Bathroom to Chair: CGA/FWW   -Chair Mobility: Mod I for time   -Education: OTR educated patient on hand placement/safety during transfers on bed/armrests/stable surfaces, and patient verbalized understanding.     Toileting Tasks:   -Toileting Hygiene: I   -Clothing Management (Don/Doff): Mod I for time   -Standing: SBA/grab bar/FWW   -Education: OTR educated patient on techniques for transfers on/off the toilet by using grab bars/stable surfaces and positioning and patient verbalizes understanding.     Patient was left sitting upright in chair with ice pack applied, legs elevated, and call light in reach at end of session. Patient had no other questions/concerns for OT at this time.      Education Documentation  ADL training, taught by Milagros óLpez OTR at 2/19/2024  3:35 PM.  Learner: Patient  Readiness: Acceptance  Method: Explanation  Response: Verbalizes Understanding    Precautions, taught by Milagros López OTR at 2/19/2024  3:35 PM.  Learner: Patient  Readiness: Acceptance  Method: Explanation  Response: Verbalizes Understanding    Body mechanics, taught by Milagros López OTR at 2/19/2024  3:35 PM.  Learner: Patient  Readiness: Acceptance  Method: Explanation  Response: Verbalizes Understanding    Education Comments  No comments found.      Time Calculation  Start Time: 1500  Stop Time: 1517  Time Calculation (min): 17  min  Therapeutic Interventions (Time Spent in Minutes)  ADL Selfcare Home Managment: 8        Assessment/Plan   Assessment:  Patient presents with s/p RTKA. Patient is Mod I for self-cares for toileting and is safe throughout mobility with use of FWW. Patient will benefit from one additional occupational therapy session to address ADLs including dressing, compression socks, and showering tasks.     Level of Function and Prognosis  Prior Function Comments: Patient reports being I with functional mobility, ADLs, and IADLs.  Prognosis: Good for established goals  OT Care Plan (Active)        Occupational Therapy        Bathing       Dates: Start:  02/19/24          LTG - Patient will utilize adaptive techniques to complete bathing       Dates: Start:  02/19/24    Expected End:  02/23/24       Description: Patient will utilize adaptive techniques to complete bathing and will be educated on transfer techniques and AE use as needed.             Dressings Lower Extremities       Dates: Start:  02/19/24          LTG - Patient will utilize adaptive techniques/equipment to dress lower body       Dates: Start:  02/19/24    Expected End:  02/23/24       Description: Patient will utilize adaptive techniques/equipment to dress lower body with Mod I.              OT Misc       Dates: Start:  02/19/24          STG - Misc 1       Dates: Start:  02/19/24    Expected End:  02/23/24       Description: Patient will be educated on compression sock donning/doffing techniques, wear schedule, purpose, and care, and will verbalize understanding.              Toileting       Dates: Start:  02/19/24          LTG - Patient will utilize adaptive techniques/equipment to complete daily toileting tasks       Dates: Start:  02/19/24    Expected End:  02/23/24       Description: Patient will utilize adaptive techniques/equipment to complete daily toileting tasks with Mod I.               Plan:  Patient will benefit from one additional occupational  therapy session to enhance participation independence in ADLs including LB dressing tasks, compression sock education, and shower transfers/tasks.     Plan  Treatment Interventions: ADL retraining, Compensatory technique education, Equipment evaluation/education, Therapeutic activity, Patient/family training, Self-Care Home Management  OT Frequency: 1-2x/wk  Recommendation  Recommendations for Therapy: Continue skilled therapy  Equipment Recommended: Toilet riser  Treatment duration will be through Certification Date: 02/23/24

## 2024-02-19 NOTE — DISCHARGE INSTRUCTIONS
Self-administer Nozin in each nostril 2 times a day for 5 days post-operatively, see tip sheet for instructions.      Total Knee Replacement, care after  These instructions give you information about caring for yourself after your surgery. Your doctor may also give you more specific instructions.  CONTACT YOUR SURGEON at [phone] if:  You experience a temperature greater than 100.5 degrees F.  You experience increase pain not relieved by the narcotic pain medication.  YOU EXPERIENCE REDNESS, SWELLING, DRAINAGE, BLISTERING OR ODOR FROM INCISIONS.  You experience uncontrolled bleeding.  You have increased respiratory distress.  You are having problems with constipation.  You have any other questions regarding your surgery.   CONTACT YOUR PRIMARY CARE PHYSICIAN if:  You have a medical issue /question not related to your knee surgery.    FOR SAFETY:  DO keep your incisions clean and dry to help prevent infection. The dressing stays in place until you are seen in the Orthopedic clinic at the 2 week follow up appointment.  DO NOT apply any creams, lotions, ointments or salves to the incision site.   NO tub baths until the incision is completely healed. You may shower with the dressing in place.       DO Wear the elastic TIEN hose to prevent blood clots.    Stockings are to be worn on both legs during the day and at night. They may be removed to shower and to wash the stockings.    DO walk every hour to help prevent blood clots.    Do NOT use alcohol when taking pain medications.    Do NOT drive until you are no longer taking narcotic pain medications and/or using your walker.    Do NOT schedule dentist appointments within the next 3 months. To help prevent infections you will need to obtain an antibiotic prescription from your surgeon prior to any dental work/cleaning.      FOR HEALING:    DO your knee exercises twice a day and as instructed by your therapist - refer to the knee guidebook.    DO eat a well- balanced  diet. Good nutrition helps your body heal faster.    Do NOT smoke - It slows healing.    DO NOT PLACE A PILLOW UNDER YOUR KNEE - This can cause problems with how far you are able to extend your knee in the future.    FOR COMFORT:    DO use the ice machine for swelling and comfort.    There will be MORE swelling and bruising on days 2-3 than there is on the day after surgery. This is normal. The swelling will decrease with the anti-inflammatory medication, the ice machine, and by keeping your leg elevated when not walking. The swelling will make it more difficult to move your knee. As the swelling goes down, the movement will become easier.  Use the ice machine as much as you can for the first week following your surgery, including at night, then try to use it 4-6 times per day for 20-30 minutes. You should use it after you have had physical therapy or have done your exercises. Use it more frequently if you are having continued pain and swelling.  Always put something between the cold therapy pad and your skin.  Inspect your skin under the pad every 1-2 hours and notify Orthopedics if you experience any adverse reactions such as burning, itching, blisters, increased redness discoloration, welts or other changes in skin appearance.  Be sure the strap is not too tight; it should be as loose as possible to keep the pad in place.  Be sure to have an adequate supply of ice as you will need to refill your cooler approximately every 4 hours. Remember to ensure the correct amount of water is in the cooler to circulate. When restarting the machine allow plenty of time to ensure water has completely circulated through the pad before placing over your knee and securing the straps.   It is very important to read the instruction booklet that comes with the ice machine.     DO take Pain medication prior to activity and exercise. You will be given a prescription at discharge.    DO walk often.     DO actively prevent constipation.  Remember that narcotics cause constipation.    Drink 8-8oz glasses of fluid especially water daily and /or include more moist foods like soups and fresh fruits.  Add Fiber by eating at least 5-9 servings of fruit and vegetables and 3-4 servings of whole grains per day.  Eat 1-2 servings of yogurt with live and active cultures daily.  Get regular exercise (walking).  Use stool softeners and over the counter laxatives as needed.  Contact your provider if you have not had a bowl movement 5 days after surgery.      REMEMBER:    Recuperation takes 6-12 weeks and you may feel weak during this time.  It is normal to have some numbness around your incision.  Expect soreness, swelling and bruising. It will improve over 4-6 weeks.  You may experience a low grade fever (below 100 degrees F).  Do gradually wean yourself from prescription medications to non-prescription pain relievers such as Tylenol.  Do Transition from the walker to a cane or normal walking when able. Your Physical Therapist will help you determine when that is.

## 2024-02-19 NOTE — PLAN OF CARE
Problem: Safety Adult - Fall  Goal: Free from fall injury  Description: INTERVENTIONS:    Inpatient - Please reference Cares/Safety Flowsheet under Kumar Fall Risk for interventions.  Pediatrics - Please reference Peds Daily Cares/Safety Flowsheet under [facility] Pediatric Fall Assessment Fall Bundle for interventions  LD/OB - Please reference OB Shift Screening Flowsheet under OB Fall Risk for interventions.  Outcome: Progressing     Problem: Safety Adult - Fall  Goal: Free from fall injury  Description: INTERVENTIONS:    Inpatient - Please reference Cares/Safety Flowsheet under Kumar Fall Risk for interventions.  Pediatrics - Please reference Peds Daily Cares/Safety Flowsheet under [facility] Pediatric Fall Assessment Fall Bundle for interventions  LD/OB - Please reference OB Shift Screening Flowsheet under OB Fall Risk for interventions.  2/19/2024 1625 by Patricia Milligan RN  Outcome: Progressing  2/19/2024 1625 by Patricia Milligan RN  Outcome: Progressing     Problem: Pain - Adult  Goal: Verbalizes/displays adequate comfort level or baseline comfort level  Description: INTERVENTIONS:  1. Encourage patient to monitor pain and request interventions  2. Assess pain using the appropriate pain scale  3. Administer analgesics based on type and severity of pain and evaluate response  4. Educate/Implement non-pharmacological measures as appropriate and evaluate response  5. Consider cultural, developmental and social influences on pain and pain management  6. Notify Provider if interventions unsuccessful or patient reports new pain  Outcome: Progressing     Problem: Infection - Adult  Goal: Absence of infection during hospitalization  Description: INTERVENTIONS:  1. Assess and monitor for signs and symptoms of infection  2. Monitor lab/diagnostic results  3. Monitor all insertion sites/wounds/incisions  4. Monitor secretions for changes in amount and color  5. Administer medications as ordered  6. Educate and encourage  patient and family to use good hand hygiene technique  7. Identify and educate in appropriate isolation precautions for identified infection/condition  Outcome: Progressing     Problem: Safety Adult  Goal: Patient will remain safe during hospitalization  Description: INTERVENTIONS    1. Assess patient for fall risk and implement interventions if needed  2. Use safe transport techniques  3. Assess patient using the appropriate Ricardo skin assessment scale  4. Assess patient for risk of aspiration  5. Assess patient for risk of elopement  6. Assess patient for risk of suicide  Outcome: Progressing     Problem: Daily Care  Goal: Daily care needs are met  Description: INTERVENTIONS:   1. Assess and monitor skin integrity  2. Identify patients at risk for skin breakdown on admission and per policy  3. Assess and monitor ability to perform self care and identify potential discharge needs  4. Assess skin integrity/risk for skin breakdown  5. Assist patient with activities of daily living as needed  6. Encourage independent activity per ability   7. Provide mouth care   8. Include patient/family/caregiver in decisions related to daily care   Outcome: Progressing     Problem: Knowledge Deficit  Goal: Patient/family/caregiver demonstrates understanding of disease process, treatment plan, medications, and discharge instructions  Description: INTERVENTIONS:   1. Complete learning assessment and assess knowledge base  2. Provide teaching at level of understanding   3. Provide teaching via preferred learning methods  Outcome: Progressing     Problem: Discharge Barriers  Goal: Patient's discharge needs are met  Description: INTERVENTIONS:  1. Assess patient for self-management skills  2. Encourage participation in management  3. Identify potential discharge barriers on admission and throughout hospital stay  4. Involve patient/family/caregiver in discharge planning process  5. Collaborate with case management/ for  discharge needs  Outcome: Progressing

## 2024-02-19 NOTE — ANESTHESIA PROCEDURE NOTES
Peripheral Block    Patient location during procedure: pre-op    Staffing  CRNA: Cesar Cavanaugh CRNA  Performed: CRNA   Preanesthetic Checklist  Completed: patient identified, IV checked, site marked, risks and benefits discussed, surgical consent, monitors and equipment checked, pre-op evaluation and timeout performed  Peripheral Block  Patient position: supine  Prep: ChloraPrep  Patient monitoring: EKG, blood pressure monitoring and continuous pulse oximetry  Supplemental oxygen: nasal cannula  Block type: saphenous  Laterality: right  Injection technique: single-shot  Procedures: ultrasound guided  Ultrasound image added to chart: yes    Needle  Needle type: SonoPlex   Needle gauge: 21 G  Needle length: 4 in      Medications Administered  BUPivacaine PF (MARCAINE) injection 0.5% - Perpherial nerve block - Peripheral nerve block   20 mL - 2/19/2024 9:33:00 AM  dexamethasone (PF) (DECADRON) injection 10 mg/mL - Perpherial nerve block - Peripheral nerve block   10 mg - 2/19/2024 9:33:00 AM  Assessment  Injection assessment: no apparent complications, negative aspiration for heme, no paresthesia on injection, incremental injection and local visualized surrounding nerve on ultrasound  Paresthesia pain: none  Heart rate change: no  Slow fractionated injection: yesReason for Block: At surgeon's request for acute post-op pain management

## 2024-02-19 NOTE — OP NOTE
Cesar Yuen  02/19/24    PREOPERATIVE DIAGNOSIS:  Pre-op Diagnosis     * Primary osteoarthritis of right knee [M17.11]    POSTOPERATIVE DIAGNOSIS:  Post-op Diagnosis     * Primary osteoarthritis of right knee [M17.11]    PROCEDURES:    Procedures:    * RIGHT TOTAL KNEE ARTHROPLASTY WITH ROBOTIC ORTHOPEDIC SURGICAL ASSISTANT  2. Intraoperative computer navigation with robotic orthopedic surgical assistant (NAVJOT)    SURGEON: Surgeon(s):  Michale Zimmerman MD Millis, Joanne, PA      ASSISTANT:        ANESTHETIST:  CRNA: Cesar Cavanaugh CRNA       Pain Block: For acute post operative pain management    ANESTHESIA TYPE:  General       ESTIMATED BLOOD LOSS:  No blood loss documented.    IV FLUIDS:  Please see anesthesia notes.    SPECIMENS: No specimens collected during this procedure.    DRAINS:  * No LDAs found *    IMPLANTS:    Implant Name Type Inv. Item Serial No.  Lot No. LRB No. Used Action   Cement Bone R 1X40 Us - [id number] Implant Cement Bone R 1X40 Us  [employer] [id number] Right 2 Implanted   Femur Cr Cemented Sz 11 Right Standard - [id number] Implant Femur Cr Cemented Sz 11 Right Standard  [employer] [id number] Right 1 Implanted   Tibial Cemented Stm Sz H R Persona 5 Deg - [id number] Implant Tibial Cemented Stm Sz H R Persona 5 Deg  [employer] [id number] Right 1 Implanted   Patella All Poly Cemented 35mm Persona - [id number] Joint Patella All Poly Cemented 35mm Persona  [employer] [id number] Right 1 Implanted   Surface Artic 18mm Psn Mc XLnk 8-11 Gh - [id number] Joint Surface Artic 18mm Psn Mc XLnk 8-11 Gh  [employer] [id number] Right 1 Implanted       TOURNIQUET: He has been quite a bit and initially and ended up doing a synovectomy because he had quite a bit of synovitis throughout the knee joint.  I elected to put up the tourniquet for 58 minutes through the remainder of the case.      COMPLICATIONS:  None        NENITA Cunningham-Cwas present from the very  beginning to the very end of the case.  He assisted with retraction throughout the case cementing, cement removal, closure wounds, and application dressings.        The operative knee was prepped and draped in the usual sterile fashion.  The patient was given 2g IV Ancef and 1 g of tranexamic acid.  No tourniquet was used throughout the operative procedure.  A midline incision was made and a medial arthrotomy  was made in the usual fashion. The patella was everted and the patellar fat pad was removed to aid in visualization.  The anterior portion of the menisci and ACL were removed.  Inspection of the knee joint demonstrated severe arthritic changes throughout all 3 compartments of the knee.  Large osteophytes throughout.  I did remove a large fabella.  The global positioning pins were placed in the distal femur and in the proximal tibia in usual fashion.  From here he went through all the registration points for NAVJOT.  The robotic arm was then brought in and we aligned the distal femoral cut.  Cut guide was then attached to the distal femur and a distal femoral cut was made.  Confirmation the distal cut was then made using the GPS marker.  From here the robotic arm was brought in and we drilled both holes for the 4-in-1 cut block.  From here anterior and posterior and chamfer cuts were made.    Attention was then turned to the tibia and a perpendicular cut was made to the longitudinal axis of the tibia with the use of the robotic arm.  The collateral ligaments popliteus tendon and posterior cruciate ligament were protected throughout this cut.  The menisci medially and laterally were removed.  Osteophytes removed osteotomes and curettes.  I checked the longitudinal alignment of the tibial cut after was found to be satisfactory the tibial tray was inserted in a temporary fashion. A drill and punch were used to complete the preparation of the tibial component.  Different tibial bearings were inserted until we had a  satisfactory size in both flexion and extension. A medium sized medial release was performed until we had satisfactory varus valgus stressing at 0 and 20° of flexion.  At 90° of flexion the anterior and posterior drawer look to be appropriate without lifting off the tibial component.    From here attention was turned to the patella was measured to be 22 millimeters thick and a 35 millimeter size patellar button was chosen.  The reamer was then used to ream down the patella so the final thickness was the same as the original thickness.  From here the patella was checked for tracking and was found to be satisfactory.  The femoral component was completed.  The knee was brought out to full extension and the aqua mantis was used to cauterize the blood vessels.  The local anesthetic cocktail using Exparel was then used throughout the knee joint.  The knee was brought up to of flexion and then pulsatile lavage was used to wash knee joint.  The wound was then dried.  Bone cement that contained 1.5 grams of cefuroxime within 2 batches of bone cement was impacted in first the tibia and the tibial component was compacted.  The femoral component was then compacted followed by the patellar component. Irrecept was used multiple times throughout the surgical procedure and then for one minute during cementing.  After 15 minutes, when the cement was completely hard the bearing size was checked again.  I chose a 18 mm medial congruent E poly-bearing.  From here the wound was closed with #1 Vicryl distally.  Deep tissue was closed with 0 Vicryl and 2-0 Vicryl subcutaneous stitches.  Mesh and Dermabond glue were used to close the skin. A sterile dressing was then applied.  The patient was awakened and brought to recovery room and tolerated the  procedure well.    At the beginning of the case the mechanical alignment was found to be 10.0 varus degrees and the flexion was 8.0 degrees.  Intraoperatively the bone to bone mechanical  alignment was degrees of 7.5 degrees of flexion with 1.5 degrees of varus.  At the end the case the patient was found to be in 1.0  valgus degrees mechanical alignment and 1.0 degrees of extension.    DISPOSITION:  MAIN OR/NONE    Michael Zimmerman MD  Phone Number: [phone]    DATE: 02/19/24      TIME: 11:34 AM

## 2024-02-19 NOTE — ANESTHESIA PROCEDURE NOTES
Spinal Block    Patient location during procedure: OR  Reason for block: primary anesthetic    Staffing  CRNA: Cesar aCvanaugh CRNA  Performed: CRNA   Preanesthetic Checklist  Completed: patient identified, IV checked, site marked, risks and benefits discussed, surgical consent, monitors and equipment checked, pre-op evaluation and timeout performed  Spinal Block  Patient position: sitting  Prep: ChloraPrep  Patient monitoring: blood pressure monitoring, EKG and continuous pulse oximetry  Approach: midline  Location: L3-4  Injection technique: single-shot  Procedure: negative aspiration for blood, no paresthesia and positive aspiration for clear CSF  Local infiltration: lidocaine 1%  Needle  Needle type: Fidencio   Needle gauge: 22 G  Needle length: 3.5 in  Needle introducer used: Yes      Assessment  Events: well tolerated  Additional Notes  Sterile technique

## 2024-02-20 VITALS
DIASTOLIC BLOOD PRESSURE: 61 MMHG | SYSTOLIC BLOOD PRESSURE: 101 MMHG | TEMPERATURE: 97.7 F | RESPIRATION RATE: 20 BRPM | HEART RATE: 58 BPM | OXYGEN SATURATION: 94 %

## 2024-02-20 LAB
ANION GAP SERPL CALC-SCNC: 11 MMOL/L (ref 3–11)
BASOPHILS # BLD AUTO: 0 10*3/UL
BASOPHILS NFR BLD AUTO: 0.3 % (ref 0–2)
BUN SERPL-MCNC: 19 MG/DL (ref 7–25)
CALCIUM SERPL-MCNC: 8.5 MG/DL (ref 8.6–10.3)
CHLORIDE SERPL-SCNC: 101 MMOL/L (ref 98–107)
CO2 SERPL-SCNC: 25 MMOL/L (ref 21–32)
CREAT SERPL-MCNC: 1.05 MG/DL (ref 0.7–1.3)
EGFRCR SERPLBLD CKD-EPI 2021: 74 ML/MIN/1.73M*2
EOSINOPHIL # BLD AUTO: 0 10*3/UL
EOSINOPHIL NFR BLD AUTO: 0 % (ref 0–3)
ERYTHROCYTE [DISTWIDTH] IN BLOOD BY AUTOMATED COUNT: 14.3 % (ref 11.5–15)
GLUCOSE SERPL-MCNC: 138 MG/DL (ref 70–105)
HCT VFR BLD AUTO: 39.9 % (ref 38–50)
HGB BLD-MCNC: 13.4 G/DL (ref 13.2–17.2)
LYMPHOCYTES # BLD AUTO: 1.1 10*3/UL
LYMPHOCYTES NFR BLD AUTO: 6.7 % (ref 15–47)
MCH RBC QN AUTO: 30 PG (ref 29–34)
MCHC RBC AUTO-ENTMCNC: 33.5 G/DL (ref 32–36)
MCV RBC AUTO: 89.7 FL (ref 82–97)
MONOCYTES # BLD AUTO: 1.6 10*3/UL
MONOCYTES NFR BLD AUTO: 9.4 % (ref 5–13)
NEUTROPHILS # BLD AUTO: 13.8 10*3/UL
NEUTROPHILS NFR BLD AUTO: 83.6 % (ref 46–70)
PLATELET # BLD AUTO: 233 10*3/UL (ref 130–350)
PMV BLD AUTO: 8.1 FL (ref 6.9–10.8)
POTASSIUM SERPL-SCNC: 3.6 MMOL/L (ref 3.5–5.1)
RBC # BLD AUTO: 4.45 10*6/ΜL (ref 4.1–5.8)
SODIUM SERPL-SCNC: 137 MMOL/L (ref 135–145)
WBC # BLD AUTO: 16.5 10*3/UL (ref 3.7–9.6)

## 2024-02-20 PROCEDURE — 97535 SELF CARE MNGMENT TRAINING: CPT | Mod: GO

## 2024-02-20 PROCEDURE — 7100002500 HC EXTENDED RECOVERY PER HOUR

## 2024-02-20 PROCEDURE — 36415 COLL VENOUS BLD VENIPUNCTURE: CPT | Performed by: ORTHOPAEDIC SURGERY

## 2024-02-20 PROCEDURE — 97110 THERAPEUTIC EXERCISES: CPT | Mod: GP | Performed by: PHYSICAL THERAPIST

## 2024-02-20 PROCEDURE — 6360000200 HC RX 636 W HCPCS (ALT 250 FOR IP): Performed by: ORTHOPAEDIC SURGERY

## 2024-02-20 PROCEDURE — 97530 THERAPEUTIC ACTIVITIES: CPT | Mod: GP | Performed by: PHYSICAL THERAPIST

## 2024-02-20 PROCEDURE — 85025 COMPLETE CBC W/AUTO DIFF WBC: CPT | Performed by: ORTHOPAEDIC SURGERY

## 2024-02-20 PROCEDURE — 97116 GAIT TRAINING THERAPY: CPT | Mod: GP | Performed by: PHYSICAL THERAPIST

## 2024-02-20 PROCEDURE — 6370000100 HC RX 637 (ALT 250 FOR IP): Performed by: ORTHOPAEDIC SURGERY

## 2024-02-20 PROCEDURE — 80048 BASIC METABOLIC PNL TOTAL CA: CPT | Performed by: ORTHOPAEDIC SURGERY

## 2024-02-20 RX ORDER — ENOXAPARIN SODIUM 100 MG/ML
40 INJECTION SUBCUTANEOUS EVERY 24 HOURS
Qty: 5.6 ML | Refills: 0 | Status: SHIPPED | OUTPATIENT
Start: 2024-02-20 | End: 2024-03-05

## 2024-02-20 RX ORDER — OXYCODONE HYDROCHLORIDE 5 MG/1
5 TABLET ORAL EVERY 4 HOURS PRN
Qty: 42 TABLET | Refills: 0 | Status: SHIPPED | OUTPATIENT
Start: 2024-02-20 | End: 2024-06-05 | Stop reason: ALTCHOICE

## 2024-02-20 RX ORDER — CELECOXIB 200 MG/1
200 CAPSULE ORAL DAILY
Qty: 14 CAPSULE | Refills: 0 | Status: SHIPPED | OUTPATIENT
Start: 2024-02-20 | End: 2024-06-05 | Stop reason: ALTCHOICE

## 2024-02-20 RX ADMIN — CELECOXIB 200 MG: 200 CAPSULE ORAL at 08:13

## 2024-02-20 RX ADMIN — ENOXAPARIN SODIUM 40 MG: 100 INJECTION SUBCUTANEOUS at 10:38

## 2024-02-20 RX ADMIN — Medication 1000 MG: at 05:41

## 2024-02-20 RX ADMIN — FUROSEMIDE 80 MG: 40 TABLET ORAL at 08:11

## 2024-02-20 RX ADMIN — POTASSIUM CHLORIDE 20 MEQ: 750 TABLET, FILM COATED, EXTENDED RELEASE ORAL at 08:11

## 2024-02-20 NOTE — INTERDISCIPLINARY/THERAPY
"  [address] [facility]  [place] [address]  [phone]      Inpatient Occupational Therapy Daily Treatment Note    Date of Service: 2/20/2024  Patient Name: Cesar Yuen  Referring Provider: CAM KHAN  Completed Visit Number: 2  SOC Date: 02/19/24  Certification Date: 02/23/24    Medical Diagnosis:    Primary osteoarthritis of right knee [M17.11]  Osteoarthritis of right knee [M17.11]  Arthritis of right knee [M17.11]  Treatment Diagnoses:  Treatment Diagnosis: Decreased ADL status, Decreased functional mobility    Subjective   Family/Caregiver Present  Family/Caregiver Present: No  Subjective Comments  RN Stated patient is medically cleared for therapy: Yes  Subjective Comments: Patient reports, \"I'd like to go for a walk if that's ok.\"   Current Assistive or Adaptive Equipment  Equipment: Front wheeled walker, Grab bars  Pain Assessment Scale  Pain Scale: 0-10  Pain Score: 0-No pain           Objective    Precautions  Reinforced Precautions: Yes  Weight Bearing Precautions: Yes  RLE Weight Bearing: Weight Bearing as Tolerated (WBAT)  Is this a Co-Treatment?: No  Precautions: WBAT, knee precautions    Transfers:   -Bed Mobility: NA   -Chair Mobility: Mod I   -Sit to Stand (Bed/Chair): SBA/FWW   -Stand to Sit (Bed/Chair): SBA/FWW   -From Chair to Bathroom: SBA/FWW   -From Bathroom to ER: SBA/FWW   -From ER to Bathroom: SBA/FWW   -From Bathroom to Chair: SBA/FWW   -Stand to Sit (Chair): SBA/FWW    Toileting:   -Toileting Hygiene: I   -Clothing Management (Don/Doff Briefs): Mod I   -Sit to Stand Transfer: General supervision/grab bars   -Stand to Sit Transfer: General supervision/grab bars   -Education: OTR educated patient on use of higher toilet/toilet seat riser, and using stable surfaces (I.e. grab bars, counter/sink, etc.) for transfers on/off the toilet, and patient verbalized understanding.     Dressing:   -UB Clothing: Patient donned UB clothing while seated independently.   -LB Clothing: Patient was " able to don LB clothing with Mod I for time/adaptive strategies.   -Compression Socks: OTR doffed ACE bandage prior to tasks. Patient was able to don compression sock with set up A from OTR for use of plastic bag, but otherwise Mod I for time. OTR educated patient on compression sock donning/doffing techniques, wear schedule for 4 weeks, purpose, and care by handwashing and hang drying, and patient verbalized understanding.   -Education: Patient was educated on knee precautions and donning/doffing techniques for LB clothing by donning with surgical leg first and doffing with nonsurgical leg first, and patient verbalized understanding.     Showering:   -Transfers: NA - Patient declined practicing tub/shower combination transfers after offered opportunity by OTR this date. Patient reports feeling comfortable with all transfer/tasks.    -Education: OTR educated patient on side stepping/seated techniques, stepping in with non-surgical leg first, using grab bars and shower chair/bench, practicing transfers in/out of shower with clothing on/water off prior to functional showering tasks, and having someone present for safety purposes. Patient verbalized understanding on all of the above education provided.     Patient was left sitting upright in chair with legs elevated, ice pack applied, and call light in reach at end of visit. Patient had no other questions/concerns for OT at this time.      Time Calculation  Start Time: 0739  Stop Time: 0802  Time Calculation (min): 23 min  Therapeutic Interventions (Time Spent in Minutes)  ADL Selfcare Home Managment: 23        Assessment/Plan   Assessment:  Patient is safe throughout functional mobility tasks and uses FWW appropriately throughout. Patient demonstrates improvements with self-cares and is Mod I for LB dressing, toileting tasks, and donning compression sock on surgical leg with set up A only. Patient would have benefitted from practicing tub/shower combination transfers  "this date, but declines practicing when offered by therapist. Patient reports feeling \"comfortable\" with all shower tasks and has a seat to use if needed. Patient would benefit from having family/supports in place at home to assist with ADLs/mobility as needed.    Plan:  Patient is appropriate to DC from OT at this time as he has met all goals previously established through functional tasks/discussion. Patient will benefit from daughter/brother checking in after surgery and DC home for safety with mobility/ADLs.    Recommendation  Recommendations for Therapy: Continue skilled therapy  Equipment Recommended: Toilet riser  Treatment duration will be through Certification Date: 02/23/24         "

## 2024-02-20 NOTE — PLAN OF CARE
Problem: Safety Adult - Fall  Goal: Free from fall injury  Description: INTERVENTIONS:    Inpatient - Please reference Cares/Safety Flowsheet under Kumar Fall Risk for interventions.  Pediatrics - Please reference Peds Daily Cares/Safety Flowsheet under [facility] Pediatric Fall Assessment Fall Bundle for interventions  LD/OB - Please reference OB Shift Screening Flowsheet under OB Fall Risk for interventions.  Outcome: Adequate for Discharge     Problem: Pain - Adult  Goal: Verbalizes/displays adequate comfort level or baseline comfort level  Description: INTERVENTIONS:  1. Encourage patient to monitor pain and request interventions  2. Assess pain using the appropriate pain scale  3. Administer analgesics based on type and severity of pain and evaluate response  4. Educate/Implement non-pharmacological measures as appropriate and evaluate response  5. Consider cultural, developmental and social influences on pain and pain management  6. Notify Provider if interventions unsuccessful or patient reports new pain  Outcome: Adequate for Discharge     Problem: Infection - Adult  Goal: Absence of infection during hospitalization  Description: INTERVENTIONS:  1. Assess and monitor for signs and symptoms of infection  2. Monitor lab/diagnostic results  3. Monitor all insertion sites/wounds/incisions  4. Monitor secretions for changes in amount and color  5. Administer medications as ordered  6. Educate and encourage patient and family to use good hand hygiene technique  7. Identify and educate in appropriate isolation precautions for identified infection/condition  Outcome: Adequate for Discharge     Problem: Safety Adult  Goal: Patient will remain safe during hospitalization  Description: INTERVENTIONS    1. Assess patient for fall risk and implement interventions if needed  2. Use safe transport techniques  3. Assess patient using the appropriate Ricardo skin assessment scale  4. Assess patient for risk of aspiration  5.  Assess patient for risk of elopement  6. Assess patient for risk of suicide  Outcome: Adequate for Discharge     Problem: Daily Care  Goal: Daily care needs are met  Description: INTERVENTIONS:   1. Assess and monitor skin integrity  2. Identify patients at risk for skin breakdown on admission and per policy  3. Assess and monitor ability to perform self care and identify potential discharge needs  4. Assess skin integrity/risk for skin breakdown  5. Assist patient with activities of daily living as needed  6. Encourage independent activity per ability   7. Provide mouth care   8. Include patient/family/caregiver in decisions related to daily care   Outcome: Adequate for Discharge     Problem: Knowledge Deficit  Goal: Patient/family/caregiver demonstrates understanding of disease process, treatment plan, medications, and discharge instructions  Description: INTERVENTIONS:   1. Complete learning assessment and assess knowledge base  2. Provide teaching at level of understanding   3. Provide teaching via preferred learning methods  Outcome: Adequate for Discharge     Problem: Discharge Barriers  Goal: Patient's discharge needs are met  Description: INTERVENTIONS:  1. Assess patient for self-management skills  2. Encourage participation in management  3. Identify potential discharge barriers on admission and throughout hospital stay  4. Involve patient/family/caregiver in discharge planning process  5. Collaborate with case management/ for discharge needs  Outcome: Adequate for Discharge

## 2024-02-20 NOTE — INTERDISCIPLINARY/THERAPY
[address] [place] [address]  [phone]      Inpatient Physical Therapy Daily Treatment Note    Date of Service: 2/20/2024  Patient Name: Cesar Yuen  Referring Provider: CAM KHAN  Completed Visit Number: 2  SOC Date: 02/19/24  Certification Period: 02/21/24    Medical Diagnosis:   Primary osteoarthritis of right knee [M17.11]  Osteoarthritis of right knee [M17.11]  Arthritis of right knee [M17.11]  Treatment Diagnoses:  Treatment Diagnosis: Decreased strength, Decreased range of motion, Impaired balance, Decreased mobility    Subjective   Family/Caregiver Present  Family/Caregiver Present: No  Subjective Comments  RN Stated patient is medically cleared for therapy: Yes  Subjective Comments: Patient states he feels very discharge home and is willing to get a front wheel walker for safer mobility.   Current Assistive or Adaptive Equipment  Equipment: 4 Wheel walker  Pain Assessment Scale  Pain Scale: 0-10  Pain Score: 1-Hardly notice pain  Patient's Stated Pain Goal: 0-No pain  Has Pain Adversely Affected Your Usual Activity, Sleep, Mood or Stress in the Last 24 Hours?: Yes  How Has Pain Adversely Affected You?: Decreased mobility  Pain Type: Surgical pain  Pain Location: Knee  Pain Orientation: Right  Pain Descriptors: Aching  Pain Frequency: Intermittent  Pain Onset: Gradual  Clinical Progression: Gradually worsening  Pain Interventions: Medication (See MAR), Cold applied  Kumar Fall Risk Score: 35         Objective    Precautions  RLE Weight Bearing: Weight Bearing as Tolerated (WBAT)  Is this a Co-Treatment?: No  Treatments:     Transfers:    Sit to/from stand:  Patient able to progress to modified independent with front wheel walker.  Transfer training and assessment from wheelchair and recliner chair.     Ambulation:  Patient ambulated with front wheel walker,     Level of assistance: Mod I              Distance: 1 x 250 ft slow pace.               During ambulation, patient  demonstrated antalgic gait pattern, with decreased   hip and knee flexion.                Comments: Stair training:  Patient completed ambulation up and down 3 steps after training x2 repetitions with   1.  First repetition using sidestepping technique with both hands on single handrail modified independent level without loss of balance.      2.  Second repetition of stair mobility training using single handrail on the right and advancing walker up steps and down steps with verbal cueing at modified independent level.          Comments: Patient progressed to modified independent level for stair mobility using above techniques after education instruction and stair mobility sequencing and safety.    Balance: Patient exhibits decreased balance due postsurgical weakness and ROM.    Light touch sensation: Intact distally bilateral lower extremities    Range of motion: Right knee range of motion in supine position from 0 degrees to 100 degrees.  Right knee extension strength at 3 -/5.  Bilateral active ankle dorsiflexion and range of motion within normal limits.  Left lower extremity strength within functional limits.    Exercises: Patient instructed in postoperative therapeutic exercises for total knee replacement of quad sets, ankle pumps bilaterally, heel slides and active assistive heel slides using gait belt assist,  bilateral standing heel raises all x10 reps, assistance and verbal cueing for correct and safe exercise performance. each.  Patient should progress to independent with these exercises with good technique.                                     Ambulation Distance (meters): 76 meters                                                   Time Calculation  Start Time: 0905  Stop Time: 0945  Time Calculation (min): 40 min  Therapeutic Interventions (Time Spent in Minutes)  Gait/Mobility: 20  Therapeutic Activity: 10  Therapeutic Exercise: 10       Assessment/Plan   Assessment:    Level of Function and  Prognosis  Current Level of Function: Patient is at modified independent level for transfers and ambulation using 4 wheeled walker and or front wheel walker.  Prognosis: Good for established goals  Assessment: All initial treatment goals met at this time.  Patient prepped for discharge home using 4 wheeled walker or front wheel walker.  Recommendation  Recommendations for Therapy: Outpatient Therapy  Equipment Recommended: Front wheeled walker  Equipment Issued: None (Patient deferred being issued a front wheel walker.  Patient instructed on benefits and increased safety of front wheel walker compared to 4 wheeled walker.)  Plan:    Plan  Treatment Interventions: Therapeutic activities, Therapeutic exercises, Stair training, Functional transfer training, Gait training  Plan for next treatment comment: Patient to follow-up with outpatient therapy services  Treatment duration will be through Certification Period: 02/21/24

## 2024-02-20 NOTE — NURSING END OF SHIFT
Nursing End of Shift Summary:     Patient: Cesar Yuen  MRN: [id number]  : 1950, Age: 73 y.o.    Location: [address] [place] [address]    Nursing Goals  Clinical Goals for the Shift: comfort, safety, rest    Narrative Summary of Progress Toward Clinical Goals:  Patient walked several times before bed.  He walks the loop around ED and back past the offices.  Patient tolerates it very well.  Patient is very deaf and it is sometimes hard to communicate with him.  Patient is drinking and voiding without problems.  IV is saline locked.    Barriers to Goals/Nursing Concerns:  No    New Patient or Family Concerns/Issues:  No    Shift Summary:   Significant Events & Communications to Providers (last 12 hours)       Last 5 Values    No documentation.                 Oxygen Usage (last 12 hours)       Last 5 Values    No documentation.                 Mobility (last 12 hours)       Last 5 Values       Row Name 24 1957 24 2100 24 2148 24 0032          Mobility    Activity Ambulate in stone Ambulate in stone Ambulate in stone --     Level of Assistance Independent Minimal assist, patient does 75% or more Minimal assist, patient does 75% or more --     Length of Time in Chair (min) 90 -- -- --     Distance Ambulated (feet) -- 550 Feet 550 Feet --     Distance Ambulated (Meters Calculated) -- 167.64 Meters 167.64 Meters --     Patient Position Sitting -- -- Up in chair     Turning Turns self -- -- --     Distance Ambulated (Meters Calculated)(Do Not Use) -- 167.64 Feet 167.64 Feet --     Anti-Embolism Devices Applied Left lower extremity;AE calf pump -- -- --                   Urethral Catheter       Active Urethral Catheter       None                  Active Lines       Active Central venous catheter / Peripherally inserted central catheter / Implantable Port / Hemodialysis catheter / Midline Catheter       None                  Infusing Medications   Medication Dose Last Rate    LR  100 mL/hr 100 mL/hr (24  1144)    LR  100 mL/hr 100 mL/hr (02/19/24 1314)     PRN Medications   Medication Dose Last Admin    oxyCODONE  2.5 mg      oxyCODONE  5 mg      morphine  1-2 mg      oxyCODONE  10 mg      morphine  3-4 mg      diphenhydrAMINE  25-50 mg      Or    diphenhydrAMINE  25-50 mg      magnesium hydroxide  30 mL      bisacodyL  10 mg      ondansetron  4 mg      Or    ondansetron  4 mg

## 2024-02-20 NOTE — PLAN OF CARE
Problem: Safety Adult - Fall  Goal: Free from fall injury  Description: INTERVENTIONS:    Inpatient - Please reference Cares/Safety Flowsheet under Kumar Fall Risk for interventions.  Pediatrics - Please reference Peds Daily Cares/Safety Flowsheet under [facility] Pediatric Fall Assessment Fall Bundle for interventions  LD/OB - Please reference OB Shift Screening Flowsheet under OB Fall Risk for interventions.  Outcome: Progressing     Problem: Pain - Adult  Goal: Verbalizes/displays adequate comfort level or baseline comfort level  Description: INTERVENTIONS:  1. Encourage patient to monitor pain and request interventions  2. Assess pain using the appropriate pain scale  3. Administer analgesics based on type and severity of pain and evaluate response  4. Educate/Implement non-pharmacological measures as appropriate and evaluate response  5. Consider cultural, developmental and social influences on pain and pain management  6. Notify Provider if interventions unsuccessful or patient reports new pain  Outcome: Progressing     Problem: Infection - Adult  Goal: Absence of infection during hospitalization  Description: INTERVENTIONS:  1. Assess and monitor for signs and symptoms of infection  2. Monitor lab/diagnostic results  3. Monitor all insertion sites/wounds/incisions  4. Monitor secretions for changes in amount and color  5. Administer medications as ordered  6. Educate and encourage patient and family to use good hand hygiene technique  7. Identify and educate in appropriate isolation precautions for identified infection/condition  Outcome: Progressing     Problem: Safety Adult  Goal: Patient will remain safe during hospitalization  Description: INTERVENTIONS    1. Assess patient for fall risk and implement interventions if needed  2. Use safe transport techniques  3. Assess patient using the appropriate Ricardo skin assessment scale  4. Assess patient for risk of aspiration  5. Assess patient for risk of  elopement  6. Assess patient for risk of suicide  Outcome: Progressing

## 2024-02-20 NOTE — INTERDISCIPLINARY/THERAPY
"  [address] [facility]  [place] [address]  [phone]      Inpatient Physical Therapy Evaluation Note    Date of Service: 2/19/2024  Patient Name: Cesar Yuen  Referring Provider: CAM KHAN  Medicare or Medicaid note, aleta has been added.: Yes  Completed Visit Number: 1  SOC Date: 02/19/24  Certification Period: 02/21/24    Medical Diagnosis:   Primary osteoarthritis of right knee [M17.11]  Osteoarthritis of right knee [M17.11]  Arthritis of right knee [M17.11]  Treatment Diagnoses:  Treatment Diagnosis: Decreased strength, Decreased range of motion, Impaired balance, Decreased mobility    Subjective   Family/Caregiver Present  Family/Caregiver Present: No  Subjective Comments  RN Stated patient is medically cleared for therapy: Yes  Subjective Comments: Patient states he has several steps to enter his home and steps with his home both having handrails.   Activities Limited by Patient Complaint  Activities limited by patient complaint: Walking/Mobility  Social/Occupational/Recreational  Lived With: Alone  Receives Help From: Family, Friend(s)  Vocational Status: Retired  Current Assistive or Adaptive Equipment  Equipment: 4 Wheel walker  Pain Assessment Scale  Pain Scale: 0-10  Pain Score: 2-Notice pain, does not interfere with activities  Has Pain Adversely Affected Your Usual Activity, Sleep, Mood or Stress in the Last 24 Hours?: Yes  How Has Pain Adversely Affected You?: Decreased mobility  Pain Type: Surgical pain  Pain Location: Knee  Pain Orientation: Right  Pain Descriptors: Aching  Pain Frequency: Intermittent  Pain Onset: Gradual  Clinical Progression: Not changed  Pain Interventions: Medication (See MAR), Cold applied  Patient's Goal for Therapy: Patient hopes to discharge home tomorrow  Kumar Fall Risk Score: 45    Past Medical History:   Diagnosis Date    Arthritis     Atrial flutter (CMS/HCC)     Chipped tooth     \"cracked tooth\" Back right upper, left upper back    Clotting disorder " (CMS/HCC)     Chronic anticoagulation, Warfarin    Dysrhythmia     Hx a-flutter, s/p ablation and loop recorder 2/8/2021    Edema     BLE    History of transfusion     After surgery    Injury of back 01/14/2021    Seen on CT    Pneumonia 03/11/2023    Urinary retention     S/p urethral stent 12/2023       Current Facility-Administered Medications:     LR infusion, 100 mL/hr, intravenous, Continuous, Michael Zimmerman MD, Last Rate: 100 mL/hr at 02/19/24 1144, New Bag at 02/19/24 1144    furosemide (LASIX) tablet 80 mg, 80 mg, oral, 2x daily, Michael Zimmerman MD    [START ON 2/20/2024] potassium chloride (KLOR-CON) CR tablet 20 mEq, 20 mEq, oral, Daily, Michael Zimmerman MD    tamsulosin (FLOMAX) 24 hr capsule 0.4 mg, 0.4 mg, oral, Daily with dinner, Michael Zimmerman MD    LR infusion, 100 mL/hr, intravenous, Continuous, Michael Zimmerman MD, Last Rate: 100 mL/hr at 02/19/24 1314, 100 mL/hr at 02/19/24 1314    acetaminophen (TYLENOL) tablet 1,000 mg, 1,000 mg, oral, q8h OJY, Michael Zimmerman MD, 1,000 mg at 02/19/24 1403    oxyCODONE (ROXICODONE) immediate release tablet 2.5 mg, 2.5 mg, oral, q4h PRN, Michael Zimmerman MD    oxyCODONE (ROXICODONE) immediate release tablet 5 mg, 5 mg, oral, q4h PRN, Michael Zimmerman MD    morphine injection 1-2 mg, 1-2 mg, intravenous, q2h PRN, Michael Zimmerman MD    oxyCODONE immediate release tablet 10 mg, 10 mg, oral, q4h PRN, Michael Zimmerman MD    morphine injection 3-4 mg, 3-4 mg, intravenous, q2h PRN, Michael Zimmerman MD    [START ON 2/20/2024] celecoxib (CeleBREX) capsule 200 mg, 200 mg, oral, Daily, Michael Zimmerman MD    diphenhydrAMINE (BENADRYL) tab/cap 25-50 mg, 25-50 mg, oral, q6h PRN **OR** diphenhydrAMINE (BENADRYL) injection 25-50 mg, 25-50 mg, intravenous, q6h PRN, Michael Zimmerman MD    [START ON 2/20/2024] polyethylene glycol (GLYCOLAX) powder 17 g, 17 g, oral, Daily, Michael Zimmerman MD    magnesium hydroxide (MILK OF MAGNESIA) 400 mg/5 mL oral suspension 30 mL, 30  mL, oral, Daily PRN, Michael Zimmerman MD    bisacodyL (DULCOLAX) suppository 10 mg, 10 mg, rectal, Daily PRN, Michael Zimmerman MD    ondansetron (ZOFRAN) tablet 4 mg, 4 mg, oral, q6h PRN **OR** ondansetron (ZOFRAN) injection 4 mg, 4 mg, intravenous, q6h PRN, Michael Zimmerman MD    ceFAZolin (ANCEF) 2,000 mg in normal saline 50 mL IVPB - MBP, 2,000 mg, intravenous, q8h, Michael Zimmerman MD, Stopped at 02/19/24 1231    enoxaparin (LOVENOX) syringe 40 mg, 40 mg, subcutaneous, Daily at 1400, Michael Zimmerman MD, 40 mg at 02/19/24 1552  No Known Allergies       Objective    Precautions  RLE Weight Bearing: Weight Bearing as Tolerated (WBAT)  Is this a Co-Treatment?: No  Findings:      Transfers:  Supine to/from sit: Patient contact-guard  Sit to/from stand:  Patient requires contact-guard assist with front wheel walker.  Transfer training and assessment from bed, toilet, and recliner chair.     Ambulation:  Patient ambulated with front wheel walker,     Level of assistance: Contact-guard              Distance: 1 x 150 ft slow pace.               During ambulation, patient demonstrated antalgic gait pattern, with decreased   hip and knee flexion.                Comments:     Balance: Patient exhibits decreased balance due postsurgical weakness and ROM.    Light touch sensation: Intact distally bilateral lower extremities    Range of motion: Right knee range of motion in supine position from 10 degrees lacking full extension to   90 degrees.  Right knee extension strength at 3 -/5.  Bilateral active ankle dorsiflexion and range of motion within normal limits.  Left lower extremity strength within functional limits.    Exercises: Patient instructed in postoperative therapeutic exercises for total knee replacement of quad sets, ankle pumps bilaterally, heel slides and active assistive heel slides using gait belt assist,  bilateral standing heel raises all x10 reps, assistance and verbal cueing for correct and safe exercise  performance. each.  Patient should progress to independent with these exercises with good technique.                                                                                        Treatment:  Therapeutic exercise education instruction, transfer training, gait training, initial evaluation.  Education Documentation  Mobility training, taught by Miguel Carlos PT at 2/19/2024  5:02 PM.  Learner: Patient  Readiness: Acceptance  Method: Explanation, Demonstration  Response: Demonstrated Understanding, Verbalizes Understanding, Needs Reinforcement    Home exercise program, taught by Miguel Carlos, PT at 2/19/2024  5:02 PM.  Learner: Patient  Readiness: Acceptance  Method: Explanation, Demonstration  Response: Demonstrated Understanding, Verbalizes Understanding, Needs Reinforcement    Education Comments  No comments found.      Time Calculation  Start Time: 1305  Stop Time: 1345  Time Calculation (min): 40 min  Therapeutic Interventions (Time Spent in Minutes)  Gait/Mobility: 10  Therapeutic Exercise: 15       Assessment/Plan   Assessment:  Prior Function Comments: Patient states he was independent with mobility no assistive device but limited due to right knee pain  Current Level of Function: Patient is contact-guard standby assist for transfers and ambulation using front wheel walker  Prognosis: Good for established goals  Assessment: Projected the patient should progress to modified independent using front wheel walker for transfers ambulation stair mobility to allow for safe discharge home tomorrow.  PT Care Plan (Active)        Physical Therapy        Mobility       Dates: Start:  02/19/24          LTG - Patient will demonstrate safe mobility requirements       Dates: Start:  02/19/24    Expected End:  02/19/24         Goal note from Hospital Encounter 1/8/2024 by Miguel Carlos, PT         Goals:    Patient will demonstrate sit to stand transfers with Independent / no assistance and Front Wheel Walker while using  safe hand placement and assistive device placement.    2.   Patient will demonstrate Independent / no assistancewith ambulation on level surfaces 150 feet using Front Wheel Walker safely without loss of balance.    3.  Patient will demonstrate ascending/descending 3 steps step using  Front Wheel Walker &  hand rail with Independent with no assistance.    4.  Patient will demonstrate postoperative range of motion exercises per protocol.                           Recommendation  Recommendations for Therapy: Continue skilled therapy  Equipment Recommended: Front wheeled walker  Equipment Issued: None  Plan:  Plan  Treatment Interventions: Functional transfer training, Gait training, Stair training, Therapeutic activities, Therapeutic exercises  PT Frequency: 1-2x/day  Treatment duration will be through Certification Period: 02/21/24

## 2024-02-20 NOTE — DISCHARGE SUMMARY
Inpatient Discharge Summary    BRIEF OVERVIEW  Admitting Provider: Garrett Saez DO  Discharge Provider: Michael Zimmerman MD  Primary Care Physician at Discharge: Balbir Machado [phone]     Admission Date: 2/19/2024     Discharge Date: 2/20/2024    Primary Discharge Diagnosis  TKA    Secondary Discharge Diagnosis      Discharge Disposition  01 - Home or Self-Care  Code Status at Discharge: full    Active Issues Requiring Follow-up      Outpatient Follow-Up  Future Appointments   Date Time Provider Department Center   2/22/2024  9:30 AM VelázquezGarrisonsy, PT [facility]   2/26/2024  9:30 AM VelázquezGarrisonsy, PT [facility]   2/28/2024  9:30 AM Velázquez, Nica, PT [facility]   3/4/2024  9:30 AM Velázquez, Nica, PT [facility]   3/6/2024  9:30 AM VelázquezGarrisonsy, PT [facility]   3/6/2024 10:40 AM Carol Browne PA [facility]   3/11/2024  9:30 AM Garrison Velázquezsy, PT [facility]   3/13/2024  9:30 AM VelázquezGarrisonsy, PT [facility]   3/18/2024  9:30 AM VelázquezGarrisonsy, PT [facility]   3/20/2024  9:30 AM VelázquezGarrisonsy, PT [facility]   3/25/2024  9:30 AM Garrison Velázquezsy, PT [facility]   3/27/2024  9:30 AM Garrison Velázquezsy, PT [facility]   4/1/2024  9:30 AM Ashvin Layton, PT [facility]   4/3/2024  9:30 AM Ashvin Layton, PT [facility]   4/3/2024 10:20 AM Carol Browne PA [facility]   4/3/2024  2:30 PM Lalitha Patino, DNP [id number]   5/21/2024 10:20 AM Carol Browne PA [facility]       Referrals and Follow-ups to Schedule       Weight lifting restrictions      Maximum Weight to Lift: 5 Pounds          Test Results Pending at Discharge      DETAILS OF HOSPITAL STAY    Presenting Problem/History of Present Illness  Primary osteoarthritis of right knee [M17.11]  Osteoarthritis of right knee [M17.11]  Arthritis of right knee [M17.11]      Hospital Course  Patient admitted to the hospital for post-anesthesia care, wound care, monitoring VS, PT/OT.  VS remained stable, incision looked good, met therapy goals and  achieved good pain control on oral meds.    Operative Procedures Performed  [unfilled]  Treatments: surgery: TKA  Consults:   Procedures:   Pertinent Test Results:     Physical Exam at Discharge  Discharge Condition: good  Heart Rate: 58  Resp: 20  BP: 101/61  Temp: 36.5 °C (97.7 °F)  Weight:      General Appearance:    Alert, cooperative, no distress, appears stated age   Head:    Normocephalic, without obvious abnormality, atraumatic   Eyes:    PERRL, conjunctiva/corneas clear, EOM's intact both eyes        Ears:    Normal TM's and external ear canals, both ears   Nose:   Nares normal, septum midline, mucosa normal, no drainage   Throat:   Lips, mucosa, and tongue normal; teeth and gums normal   Neck:   Supple, symmetrical, trachea midline, no adenopathy;        thyroid:  No enlargement/tenderness/nodules   Back:     Symmetric, no curvature, ROM normal, no CVA tenderness   Lungs:     Clear to auscultation bilaterally, respirations unlabored   Chest wall:    No tenderness or deformity   Heart:    Regular rate and rhythm, S1 and S2 normal, no murmur, rub or gallop   Abdomen:     Soft, non-tender, bowel sounds active, no masses, no organomegaly           Extremities:   Extremities normal, atraumatic, no cyanosis or edema   Pulses:   2+ and symmetric all extremities   Skin:   Skin color, texture, turgor normal, no rashes or lesions   Lymph nodes:   Cervical, supraclavicular, and axillary nodes normal   Neurologic:   CNII-XII intact. Normal strength, sensation and reflexes       throughout

## 2024-02-21 ENCOUNTER — PATIENT OUTREACH (OUTPATIENT)
Dept: FAMILY MEDICINE | Facility: HOSPITAL | Age: 74
End: 2024-02-21
Payer: MEDICARE

## 2024-02-21 NOTE — PROGRESS NOTES
Cesar Yuen was contacted following recent hospitalization at [facility]. The patient was discharged from the hospital on 2/20/2024 .The Discharge Summary and After Visit Summary were reviewed. The patient's main diagnosis during the hospitalization was knee surgery.  Followup appointment: 3/8/2024 with surgeon. Any additional testing and labs will be discussed at the patient's upcoming post-hospital follow up appointment.    Transitional Care Management Follow Up (most recent)       Transitional Care Management - 02/21/24 1045          OTHER    Date of post hospital outreach 02/21/24     Contact Status Contact done     Speaking with the Patient or Patient's Caregiver? Patient     Is the patient on the Diabetes registry? N     Were patient's home medications changed or did they have any new medications added during the hospitalization? Y     Did someone go over the discharge summary with the patient or the patient's caregiver and discuss the medications listed on it? Y     Does the patient or patient's caregiver have any questions about the medication changes? N     Patient verbalized understanding of when to contact health care provider or when to get help right away? Y     Discharge instructions reviewed with patient or patient's caregiver and all questions answered? Y     Is there a Home Health/DME Plan being enacted? Please note name of HH agency, DME vendor, contacted/received N     Does patient have psychosocial issues that might impact their health status? None     Does patient have financial barriers to care? None                      Rere Menchaca RN  February 21, 2024 10:46 AM

## 2024-02-21 NOTE — Clinical Note
Transitional Care Management (TCM) call completed. No appt with PCP. Patient had surgery and has a follow-up with surgery on 3/8/2024. No follow-up with the PCP is indicated on d/c summary or AVS.

## 2024-02-22 ENCOUNTER — HOSPITAL ENCOUNTER (OUTPATIENT)
Dept: PHYSICAL THERAPY | Facility: HOSPITAL | Age: 74
Setting detail: THERAPIES SERIES
Discharge: STILL ADMITTED OR ADMINISTRATIVE | End: 2024-02-22
Payer: MEDICARE

## 2024-02-22 DIAGNOSIS — Z74.09 DECREASED STRENGTH, ENDURANCE, AND MOBILITY: ICD-10-CM

## 2024-02-22 DIAGNOSIS — R68.89 DECREASED STRENGTH, ENDURANCE, AND MOBILITY: ICD-10-CM

## 2024-02-22 DIAGNOSIS — Z96.651 S/P TOTAL KNEE ARTHROPLASTY, RIGHT: Primary | ICD-10-CM

## 2024-02-22 DIAGNOSIS — R53.1 DECREASED STRENGTH, ENDURANCE, AND MOBILITY: ICD-10-CM

## 2024-02-22 DIAGNOSIS — Z47.89 ENCOUNTER FOR ORTHOPEDIC FOLLOW-UP CARE: ICD-10-CM

## 2024-02-22 PROCEDURE — 97110 THERAPEUTIC EXERCISES: CPT | Mod: GP | Performed by: PHYSICAL THERAPIST

## 2024-02-22 PROCEDURE — 97161 PT EVAL LOW COMPLEX 20 MIN: CPT | Mod: GP | Performed by: PHYSICAL THERAPIST

## 2024-02-22 NOTE — INTERDISCIPLINARY/THERAPY
[address] [place] [address]  [phone]       Outpatient Physical Therapy Initial Evaluation    Date of Service: 24  Patient Name: Cesar Yuen  : 1950  Referring Provider: Michael Zimmerman MD  Medicare or Medicaid note, cosigner has been added.: Yes  Onset Date: 2024  SOC Date: 2024  Certification Period: 2024  HICN: [id number]  Medical Diagnosis listed first. Additional are Treatment Diagnoses:  1. S/P total knee arthroplasty, right    2. Encounter for orthopedic follow-up care  -     Ambulatory referral to Physical Therapy; Standing  -     Ambulatory referral to Physical Therapy    3. Decreased strength, endurance, and mobility        Subjective   Subjective Comments: Pt is a 74 yo male presenting to PT 3 days s/p R TKA. He arrives using stand alone cane in [facility], ambulating with step through pattern. He transfers independently from chair and plinth. He reports overdoing his exercises yesterday because his muscles feel really tight and stiff today. He rates currently 4/10 for muscle soreness, but not really having knee pain. He has an ortho f/u on 3/6/24.  Activities Limited by Patient Complaint  Comment: Pt initially states no aggravating factors, then states sometime with transitions.  Social/Occupational/Recreational  Lived With: Alone  Vocational Status: Part time employment  Vocational Type:  for school system  Patient's Goal for Therapy: Return to prior activity level with no pain  STEADI Fall Risk  Have you fallen in the past year? : No  Do you feel unsteady when standing or walking?: No  Do you worry about falling? : No  Therapy Fall Interventions  Fall Interventions: Ensure proper use of assistive devices   Pain Assessment Scale  Pain Scale: 0-10  Pain Score: 4-Distracts me, can do usual activities  Pain Location: Knee  Pain Orientation: Right  Pain Descriptors: Tightness, Aching  Pain Interventions: Cold applied, Home medication, Rest  Past  "Medical History:   Diagnosis Date    Arthritis     Atrial flutter (CMS/HCC)     Chipped tooth     \"cracked tooth\" Back right upper, left upper back    Clotting disorder (CMS/HCC)     Chronic anticoagulation, Warfarin    Dysrhythmia     Hx a-flutter, s/p ablation and loop recorder 2/8/2021    Edema     BLE    History of transfusion     After surgery    Injury of back 01/14/2021    Seen on CT    Pneumonia 03/11/2023    Urinary retention     S/p urethral stent 12/2023       Current Outpatient Medications:     celecoxib (CeleBREX) 200 mg capsule, Take 1 capsule (200 mg total) by mouth daily, Disp: 14 capsule, Rfl: 0    oxyCODONE (ROXICODONE) 5 mg immediate release tablet, Take 1 tablet (5 mg total) by mouth every 4 (four) hours as needed for pain scale 8-10/10. Max Daily Amount: 30 mg, Disp: 42 tablet, Rfl: 0    enoxaparin (LOVENOX) 40 mg/0.4 mL syringe, Inject 0.4 mL (40 mg total) under the skin daily for 14 days, Disp: 5.6 mL, Rfl: 0    potassium chloride (KLOR-CON M20) 20 mEq CR tablet, Take 2 tablets (40 mEq total) by mouth 2 (two) times a day, Disp: 360 tablet, Rfl: 3    furosemide (LASIX) 80 mg tablet, Take 1 tablet (80 mg total) by mouth 2 (two) times a day, Disp: 180 tablet, Rfl: 1    tamsulosin (FLOMAX) 0.4 mg capsule, Take 1 capsule (0.4 mg total) by mouth daily (Patient taking differently: Take 1 capsule (0.4 mg total) by mouth daily with dinner), Disp: 30 capsule, Rfl: 9    multivitamin (THERAGRAN) tablet tablet, Take 1 tablet by mouth daily, Disp: , Rfl:   No Known Allergies       Objective Findings:  OBSERVATION:  Pt transfers independently to stand and ambulates to treatment room, using stand alone cane in RUE, demonstrating step through with proper heel to toe sequencing. He is able to sit comfortably throughout evaluation and transfers independently on plinth.     STRENGTH (seated):  Hip flexion L 5/5, R 4-/5  Hip abd/add strong  Knee flexion L 5/5, R 4/5  Knee extension L 5/5, R 3/5  Ankle DF and EV B " 5/5    AROM (supine):  L 0-130 deg  R 3-100 deg    PALPATION:  Pt exhibits adequate quad activation with QS, with min cues to avoid proximal hip compensation.    Objective   Treatment:   -supine active knee flexion (100 deg). Advised pt to avoid being aggressive with mobility exercises at this early stage in his recovery. He can continue with gentle supine and seated heel slides.  -supine QS x10, 3 sec holds with min cues to avoid hip compensation.   -SAQ x10 with emphasis on quad control.  -GT with having pt hold stand alone cane in LUE for reciprocal gait pattern x40 meters.  -advised pt to continue with RICE for pain/inflammation control.      FOTO: Initial  Patient's FOTO functional outcome score is Score: 55/100 where a higher number = greater function.  Education: Use of assistive device, A home exercise program, Therapy plan of care, anticipated duration, & anticipated outcomes  Time Calculation  Start Time: 0930  Stop Time: 1000  Time Calculation (min): 30 min  Therapeutic Interventions (Time Spent in Minutes)  Therapeutic Exercise: 15  Other Charges       Charge ID Procedure Code Description Qty. Modifiers Charge Entry User Diagnosis    [id number]  THERAPEUTIC PX 1/> AREAS EACH 15 MIN EXERCISES 1 GP Nica Velázquez, PT     [id number]  PT EVAL VISIT LOW COMPLEXITY 1 GP Nica Velázquez PT                Assessment/Plan   Assessment:  Level of Function and Prognosis  Prior Function Comments: No use of A/D, Independent with ADL's, household duties, employed part time as   Current Level of Function: using stand alone cane with ambulation, independent with ADL's, not currently working  Prognosis: Good for established goals  Assessment: Pt presents to PT at 3 days s/p R TKA. He is ambulating well with step through pattern using stand alone cane. He exhibits overall baseline mobility 3-100 deg, so advised him to avoid being aggressive with his range at this early stage in recovery.         Therapy Goals    Short Term Goals  Start goal date: 2/22/24 End goal date: 3/21/24   ST Goal 1: Pt will be independent and compliant with home exercise   program.  Start goal date: 2/22/24 End goal date: 3/21/24   ST Goal 2: Pt will progress to no A/D with functional ambulation distances   with proper gait sequencing.   Start goal date: 2/22/24 End goal date: 3/21/24   ST Goal 3: Pt will demonstrate R knee mobility 0-110 deg or greater.   Long Term Goals  Long term goal(s) met by: 5/22/24  LT Goal 1: Pt will negotiate stairs with alternating pattern and handrail   as needed.   LT Goal 2: Pt will demonstrate R knee mobility 0-120 deg or greater.   LT Goal 3: Pt will improve FOTO score to 73/100, indicating improved   overall function.        Plan:  Plan  Treatment Interventions: Gait training, Manual therapy, Modalities, Neuromuscular re-education, Stair training, Therapeutic activities, Therapeutic exercises  PT Frequency: up to 2x/wk  Plan for next treatment comment: Pt is scheduled to f/u on 2/26/24 with mobility, strength, gait, early balance weight shifting as able.   Treatment duration will be through 5/22/2024       Thank you for allowing us to share in the care of this patient. If you have any questions, recommendations, or further concerns regarding this patient, please feel free to contact me at [address] [facility]  [place] [address] [phone]      * I have reviewed the plan of care and certify a continuing need for medically necessary services    Co-signed by:_________________________ Date and Time:________________

## 2024-02-26 ENCOUNTER — HOSPITAL ENCOUNTER (OUTPATIENT)
Dept: PHYSICAL THERAPY | Facility: HOSPITAL | Age: 74
Setting detail: THERAPIES SERIES
Discharge: STILL ADMITTED OR ADMINISTRATIVE | End: 2024-02-26
Payer: MEDICARE

## 2024-02-26 DIAGNOSIS — R53.1 DECREASED STRENGTH, ENDURANCE, AND MOBILITY: ICD-10-CM

## 2024-02-26 DIAGNOSIS — R68.89 DECREASED STRENGTH, ENDURANCE, AND MOBILITY: ICD-10-CM

## 2024-02-26 DIAGNOSIS — Z74.09 DECREASED STRENGTH, ENDURANCE, AND MOBILITY: ICD-10-CM

## 2024-02-26 DIAGNOSIS — Z96.651 S/P TOTAL KNEE ARTHROPLASTY, RIGHT: Primary | ICD-10-CM

## 2024-02-26 PROCEDURE — 97110 THERAPEUTIC EXERCISES: CPT | Mod: GP | Performed by: PHYSICAL THERAPIST

## 2024-02-26 PROCEDURE — 97116 GAIT TRAINING THERAPY: CPT | Mod: GP | Performed by: PHYSICAL THERAPIST

## 2024-02-26 NOTE — INTERDISCIPLINARY/THERAPY
[address] [facility] [place] [address]  [phone]      Outpatient Physical Therapy Daily Treatment Note    Date of Service: 24  Patient Name: Cesar Yuen  : 1950  Referring Provider: Michael Zimmerman MD  Today's Visit Number: 2  Onset Date: 2024  SOC Date: 2024  Certification Period: 2024  HICN: [id number]  Medical Diagnosis listed first. Additional are Treatment Diagnoses:  1. S/P total knee arthroplasty, right    2. Decreased strength, endurance, and mobility        Subjective   Subjective Comments: Pt reports he is still feeling stiff and swollen, but it is a little better today.  Has patient fallen since last visit? No     Have there been any changes in medications? No  Pain Assessment Scale  Pain Scale: 0-10  Pain Score: 3-Sometimes distracts me       Objective    Treatment:   -intro to NuStep (seat 13) L1 x6 min for mobility.   -supine heel slides 2x10 (100 deg)  -supine QS 2x10, 3 sec holds. (0 deg)  -SAQ 2x10 with emphasis on quad control.  -intro to seated LAQ 2x10 with emphasis on quad control.  -seated heels slides x20 (101 deg)  -intro to seated HS curls with YTB 2x10.  -intro to standing weight shifts in parallel bars: s/s, h/t, staggered each way x10 with forward gaze and no UE support.   -standing B heel raises x10, followed with intro to R HS curls x10 with B UE support.   -f/b walking in parallel bars with light L UE support only x4 lengths each.  -returned to B heel raises x10, followed with R HS curls x10 with B UE support.   -GT with stand alone cane in L UE, with step through pattern, with diminished knee flexion during toe off phase of gait x50 meters.  -advised pt to continue with RICE for pain/inflammation control.           Time Calculation  Start Time: 928  Stop Time:   Time Calculation (min): 38 min  Therapeutic Interventions (Time Spent in Minutes)  Gait/Mobility: 10  Therapeutic Exercise: 28  Other Charges       Charge ID Procedure Code  Description Qty. Modifiers Charge Entry User Diagnosis    [id number]  THERAPEUTIC PX 1/> AREAS EACH 15 MIN EXERCISES 2 GP Nica Velázquez, PT     [id number]  THER PX 1/> AREAS EA 15 MIN GAIT TRAINJ W/STAIR 1 GP Nica Velázquez, PT                Assessment/Plan   Assessment    Level of Function and Prognosis  Assessment: Pt demonstrates overall 0-101 deg. He demonstrates improved quad control today. He is ambulating well with stand alone cane in LUE with step through gait; however, requires cues for increasing knee flexion during toe off phase of gait.        Therapy Goals    Short Term Goals  Start goal date: 2/22/24 End goal date: 3/21/24   ST Goal 1: Pt will be independent and compliant with home exercise   program.  Start goal date: 2/22/24 End goal date: 3/21/24   ST Goal 2: Pt will progress to no A/D with functional ambulation distances   with proper gait sequencing.   Start goal date: 2/22/24 End goal date: 3/21/24   ST Goal 3: Pt will demonstrate R knee mobility 0-110 deg or greater.   Long Term Goals  Long term goal(s) met by: 5/22/24  LT Goal 1: Pt will negotiate stairs with alternating pattern and handrail   as needed.   LT Goal 2: Pt will demonstrate R knee mobility 0-120 deg or greater.   LT Goal 3: Pt will improve FOTO score to 73/100, indicating improved   overall function.        Plan    Plan for next treatment comment: Pt is scheduled to return on 2/28/24 with mobility, strength, gait, and balance as tolerated. may also begin early step activities as able.  Treatment duration will be through 5/22/2024

## 2024-02-28 ENCOUNTER — TELEPHONE (OUTPATIENT)
Dept: ORTHOPEDIC SURGERY | Facility: CLINIC | Age: 74
End: 2024-02-28
Payer: MEDICARE

## 2024-02-28 ENCOUNTER — HOSPITAL ENCOUNTER (OUTPATIENT)
Dept: PHYSICAL THERAPY | Facility: HOSPITAL | Age: 74
Setting detail: THERAPIES SERIES
Discharge: STILL ADMITTED OR ADMINISTRATIVE | End: 2024-02-28
Payer: MEDICARE

## 2024-02-28 ENCOUNTER — ANCILLARY PROCEDURE (OUTPATIENT)
Dept: ULTRASOUND IMAGING | Facility: CLINIC | Age: 74
End: 2024-02-28
Payer: MEDICARE

## 2024-02-28 DIAGNOSIS — Z96.651 S/P TOTAL KNEE ARTHROPLASTY, RIGHT: Primary | ICD-10-CM

## 2024-02-28 DIAGNOSIS — Z96.651 S/P TOTAL KNEE ARTHROPLASTY, RIGHT: ICD-10-CM

## 2024-02-28 DIAGNOSIS — R53.1 DECREASED STRENGTH, ENDURANCE, AND MOBILITY: ICD-10-CM

## 2024-02-28 DIAGNOSIS — R68.89 DECREASED STRENGTH, ENDURANCE, AND MOBILITY: ICD-10-CM

## 2024-02-28 DIAGNOSIS — Z74.09 DECREASED STRENGTH, ENDURANCE, AND MOBILITY: ICD-10-CM

## 2024-02-28 PROCEDURE — 97530 THERAPEUTIC ACTIVITIES: CPT | Mod: GP | Performed by: PHYSICAL THERAPIST

## 2024-02-28 PROCEDURE — 93971 EXTREMITY STUDY: CPT | Mod: RT

## 2024-02-28 PROCEDURE — 97110 THERAPEUTIC EXERCISES: CPT | Mod: GP | Performed by: PHYSICAL THERAPIST

## 2024-02-28 PROCEDURE — 93971 EXTREMITY STUDY: CPT | Mod: 26,RT | Performed by: INTERNAL MEDICINE

## 2024-02-28 NOTE — TELEPHONE ENCOUNTER
Patient's physical therapist expressed concerns for patient's right lower extremity. Patient has not been wearing his SANDRA hose due to discomfort and has had increase swelling in the last few days. Patient's calf is red and firm to touch. Discussed with Luis Kwan PA-C, venous doppler was ordered for today.     Carol Browne PA-C went over to physical therapy to assess patient's leg. Recommended patient be re-measured for SANDRA hose as well as complete the venous doppler. Patient was dispensed an XL regular thigh high pair of sandra hose.

## 2024-02-28 NOTE — INTERDISCIPLINARY/THERAPY
[address] [facility] [place] [address]  [phone]      Outpatient Physical Therapy Daily Treatment Note    Date of Service: 24  Patient Name: Cesar Yuen  : 1950  Referring Provider: Michael Zimmerman MD  Today's Visit Number: 3  Onset Date: 2024  SOC Date: 2024  Certification Period: 2024  HICN: [id number]  Medical Diagnosis listed first. Additional are Treatment Diagnoses:  1. S/P total knee arthroplasty, right    2. Decreased strength, endurance, and mobility        Subjective   Subjective Comments: Pt reports his R foot and ankle are so swollen that he could not wear a regular shoe today and had to resort to a boot. He states his TIEN hose were so uncomfortable that he took them off and started wrapping his R ankle with an ace wrap. He has been icing, but not as often as he should. Today he has no compresssion on. Rates current symptoms 3-10.  Has patient fallen since last visit? No     Have there been any changes in medications? No  Pain Assessment Scale  Pain Scale: 0-10  Pain Score: 4-Distracts me, can do usual activities       Objective    Treatment:   -NuStep (seat 13) L1 progress x8 min for mobility.   -supine heel slides 2x10 (100 deg)  -supine QS 2x10, 3 sec holds. (0 deg)  -SAQ 2x10 with emphasis on quad control.  -seated LAQ 2x10 with emphasis on quad control.  -seated HS curls with YTB 2x10.  -consulted with ortho nurse as well as Carol Browne. She ordered an US to r/o blood clot. Ortho nurse re-measured pt's leg and supplied larger TIEN hose.             Time Calculation  Start Time: 0930  Stop Time: 1020  Time Calculation (min): 50 min  Therapeutic Interventions (Time Spent in Minutes)  Therapeutic Activity: 20  Therapeutic Exercise: 30  Other Charges       Charge ID Procedure Code Description Qty. Modifiers Charge Entry User Diagnosis    [id number]  THERAPEUTIC PX 1/> AREAS EACH 15 MIN EXERCISES 2 GP Nica Velázquez, PT     [id number]   THERAPEUT ACTVITY DIRECT PT CONTACT EACH 15 MIN 1 GP Nica Velázquez, PT                Assessment/Plan   Assessment    Level of Function and Prognosis  Assessment: Pt continues to have good mobility and tolerance to therex. I was concerned by pt's distal swelling, discoloration, and firm soft tissue, so asked ortho's nurse to look at it. We also had NENITA Cunningham inspect pt's leg and she ordered an US to r/o blood clot. The ortho nurse remeasured pt's leg for larger TIEN hose, which they supplied.        Therapy Goals    Short Term Goals  Start goal date: 2/22/24 End goal date: 3/21/24   ST Goal 1: Pt will be independent and compliant with home exercise   program.  Start goal date: 2/22/24 End goal date: 3/21/24   ST Goal 2: Pt will progress to no A/D with functional ambulation distances   with proper gait sequencing.   Start goal date: 2/22/24 End goal date: 3/21/24   ST Goal 3: Pt will demonstrate R knee mobility 0-110 deg or greater.   Long Term Goals  Long term goal(s) met by: 5/22/24  LT Goal 1: Pt will negotiate stairs with alternating pattern and handrail   as needed.   LT Goal 2: Pt will demonstrate R knee mobility 0-120 deg or greater.   LT Goal 3: Pt will improve FOTO score to 73/100, indicating improved   overall function.        Plan    Plan for next treatment comment: Pt is scheduled to f/u on 3/4/24 with mobility, strength, balance, and gait as tolerated.  Treatment duration will be through 5/22/2024

## 2024-02-28 NOTE — TELEPHONE ENCOUNTER
I called Marco and let her know the results of his venous Doppler ultrasound.  He is negative for DVT however he does appear to have a hematoma in the medial thigh area.  He will continue with his activities as tolerated and therapies.  He has a follow-up visit with Carol next week we will continue to monitor him.  If he is having worsening symptoms, increased pain he will call for further evaluation.

## 2024-03-01 ENCOUNTER — TELEPHONE (OUTPATIENT)
Dept: PHARMACY | Facility: HOSPITAL | Age: 74
End: 2024-03-01
Payer: MEDICARE

## 2024-03-01 PROCEDURE — 93298 REM INTERROG DEV EVAL SCRMS: CPT | Mod: 26 | Performed by: INTERNAL MEDICINE

## 2024-03-01 NOTE — TELEPHONE ENCOUNTER
Marco had left total knee replacement on 2/19. He was discharged with 14 days of enoxaparin 40 mg subcut daily. His last dose will be ~3/5. Discussed with Dr. Machado, with 5 days left, we will restart warfarin with previous dosing (warfarin 5 mg Mon/Wed/Fri and 7.5 mg all other days).   I spoke with Marco over the phone and he verbalized understanding to start warfarin this evening in addition to his enoxaparin. Continue both until he runs out of enoxaparin. We will see him at [facility] next Wednesday, March 6th at 0830.

## 2024-03-04 ENCOUNTER — HOSPITAL ENCOUNTER (OUTPATIENT)
Dept: PHYSICAL THERAPY | Facility: HOSPITAL | Age: 74
Setting detail: THERAPIES SERIES
Discharge: STILL ADMITTED OR ADMINISTRATIVE | End: 2024-03-04
Payer: MEDICARE

## 2024-03-04 DIAGNOSIS — R53.1 DECREASED STRENGTH, ENDURANCE, AND MOBILITY: ICD-10-CM

## 2024-03-04 DIAGNOSIS — Z74.09 DECREASED STRENGTH, ENDURANCE, AND MOBILITY: ICD-10-CM

## 2024-03-04 DIAGNOSIS — R68.89 DECREASED STRENGTH, ENDURANCE, AND MOBILITY: ICD-10-CM

## 2024-03-04 DIAGNOSIS — Z96.651 S/P TOTAL KNEE ARTHROPLASTY, RIGHT: Primary | ICD-10-CM

## 2024-03-04 PROCEDURE — 97110 THERAPEUTIC EXERCISES: CPT | Mod: GP | Performed by: PHYSICAL THERAPIST

## 2024-03-04 PROCEDURE — 97116 GAIT TRAINING THERAPY: CPT | Mod: GP | Performed by: PHYSICAL THERAPIST

## 2024-03-04 NOTE — INTERDISCIPLINARY/THERAPY
[address] [facility] [place] [address]  [phone]      Outpatient Physical Therapy Daily Treatment Note    Date of Service: 3/04/24  Patient Name: Cesar Yuen  : 1950  Referring Provider: Michael Zimmerman MD  Today's Visit Number: 4    Onset Date: 2024  SOC Date: 2024  Certification Period: 2024  HICN: [id number]  Medical Diagnosis listed first. Additional are Treatment Diagnoses:  1. S/P total knee arthroplasty, right    2. Decreased strength, endurance, and mobility        Subjective   Subjective Comments: Pt reports his swelling is down because he can now put a shoe back on, but his foot is still cold, so he thinks his circulation is being affected. His US indicated no blood clot. He has an ortho f/u on 3/6/24.  Has patient fallen since last visit? No     Have there been any changes in medications? No  Pain Assessment Scale  Pain Scale: 0-10  Pain Score: 3-Sometimes distracts me       Objective    Treatment:   -NuStep (seat 13) progress L2 x8 min for mobility.   -supine heel slides 2x10 (104 deg)  -supine QS 2x10, 3 sec holds (0 deg)  -SAQ 2x10 with emphasis on quad control.  -intro to supine SLR with emphasis on quad control 2x5. Added to HEP.  -seated LAQ 2x10 with emphasis on quad control.  -seated HS curls with YTB 2x10.  -f/b amb within parallel bars x4 each way.   -standing sequence: B heel raises x10, R HS curls x10, and mini squats with mod cues for keeping knees behind toes x10 with light B UE support.   -side stepping each way in parallel bars x3 each with no UE support.  -repeat standing sequence (minus heel raise) with min cues for proper mechanics with mini squats.  -intro to 6 in step up/down leading with each x10 with LUE support.   -advised pt to continue with changes in HEP.           Time Calculation  Start Time: 930  Stop Time: 1008  Time Calculation (min): 38 min  Therapeutic Interventions (Time Spent in Minutes)  Gait/Mobility: 15  Therapeutic Exercise:  23  Other Charges       Charge ID Procedure Code Description Qty. Modifiers Charge Entry User Diagnosis    [id number]  THERAPEUTIC PX 1/> AREAS EACH 15 MIN EXERCISES 2 GP Nica Velázquez, PT     [id number] HC THER PX 1/> AREAS EA 15 MIN GAIT TRAINJ W/STAIR 1 GP Nica Velázquez, PT                Assessment/Plan   Assessment    Level of Function and Prognosis  Assessment: Pt tolerated therex well with no overall increased symptoms. He continues to demonstrate excellent mobility for this stage in recovery at 0-104 deg today, advising pt to avoid being aggressive.        Therapy Goals    Short Term Goals  Start goal date: 2/22/24 End goal date: 3/21/24   ST Goal 1: Pt will be independent and compliant with home exercise   program.  Start goal date: 2/22/24 End goal date: 3/21/24   ST Goal 2: Pt will progress to no A/D with functional ambulation distances   with proper gait sequencing.   Start goal date: 2/22/24 End goal date: 3/21/24   ST Goal 3: Pt will demonstrate R knee mobility 0-110 deg or greater.   Long Term Goals  Long term goal(s) met by: 5/22/24  LT Goal 1: Pt will negotiate stairs with alternating pattern and handrail   as needed.   LT Goal 2: Pt will demonstrate R knee mobility 0-120 deg or greater.   LT Goal 3: Pt will improve FOTO score to 73/100, indicating improved   overall function.        Plan    Plan for next treatment comment: Pt is scheduled to return on 3/6/24 with mobility, strength, balance, gait, and step activities as able.  Treatment duration will be through 5/22/2024

## 2024-03-06 ENCOUNTER — ANTICOAGULATION VISIT (OUTPATIENT)
Dept: ANTICOAGULATION | Facility: CLINIC | Age: 74
End: 2024-03-06
Payer: MEDICARE

## 2024-03-06 ENCOUNTER — OFFICE VISIT (OUTPATIENT)
Dept: ORTHOPEDIC SURGERY | Facility: CLINIC | Age: 74
End: 2024-03-06
Payer: MEDICARE

## 2024-03-06 ENCOUNTER — HOSPITAL ENCOUNTER (OUTPATIENT)
Dept: PHYSICAL THERAPY | Facility: HOSPITAL | Age: 74
Setting detail: THERAPIES SERIES
Discharge: STILL ADMITTED OR ADMINISTRATIVE | End: 2024-03-06
Payer: MEDICARE

## 2024-03-06 VITALS — TEMPERATURE: 98 F | DIASTOLIC BLOOD PRESSURE: 70 MMHG | SYSTOLIC BLOOD PRESSURE: 110 MMHG

## 2024-03-06 VITALS — DIASTOLIC BLOOD PRESSURE: 71 MMHG | SYSTOLIC BLOOD PRESSURE: 112 MMHG | BODY MASS INDEX: 30.93 KG/M2 | WEIGHT: 234.4 LBS

## 2024-03-06 DIAGNOSIS — Z74.09 DECREASED STRENGTH, ENDURANCE, AND MOBILITY: ICD-10-CM

## 2024-03-06 DIAGNOSIS — Z96.651 S/P TOTAL KNEE ARTHROPLASTY, RIGHT: Primary | ICD-10-CM

## 2024-03-06 DIAGNOSIS — R68.89 DECREASED STRENGTH, ENDURANCE, AND MOBILITY: ICD-10-CM

## 2024-03-06 DIAGNOSIS — I48.3 TYPICAL ATRIAL FLUTTER (CMS/HCC): ICD-10-CM

## 2024-03-06 DIAGNOSIS — R53.1 DECREASED STRENGTH, ENDURANCE, AND MOBILITY: ICD-10-CM

## 2024-03-06 LAB — INR (AMB): 1.3 (ref 2–3)

## 2024-03-06 PROCEDURE — 97110 THERAPEUTIC EXERCISES: CPT | Mod: GP | Performed by: PHYSICAL THERAPIST

## 2024-03-06 PROCEDURE — 99024 POSTOP FOLLOW-UP VISIT: CPT | Performed by: PHYSICIAN ASSISTANT

## 2024-03-06 PROCEDURE — G0463 HOSPITAL OUTPT CLINIC VISIT: HCPCS

## 2024-03-06 PROCEDURE — 85610 PROTHROMBIN TIME: CPT | Mod: QW

## 2024-03-06 PROCEDURE — 97116 GAIT TRAINING THERAPY: CPT | Mod: GP | Performed by: PHYSICAL THERAPIST

## 2024-03-06 RX ORDER — WARFARIN SODIUM 5 MG/1
TABLET ORAL
Qty: 40 TABLET | Refills: 0 | Status: SHIPPED | OUTPATIENT
Start: 2024-03-06 | End: 2024-04-17 | Stop reason: SDUPTHER

## 2024-03-06 ASSESSMENT — ENCOUNTER SYMPTOMS
ARTHRALGIAS: 1
JOINT SWELLING: 1

## 2024-03-06 NOTE — INTERDISCIPLINARY/THERAPY
[address] [facility] [place] [address]  [phone]      Outpatient Physical Therapy Daily Treatment Note    Date of Service: 3/06/24  Patient Name: Cesar Yuen  : 1950  Referring Provider: Michael Zimmerman MD  Today's Visit Number: 5  Onset Date: 2024  SOC Date: 2024  Certification Period: 2024  HICN: [id number]  Medical Diagnosis listed first. Additional are Treatment Diagnoses:  1. S/P total knee arthroplasty, right    2. Decreased strength, endurance, and mobility        Subjective   Subjective Comments: Pt reports he is feeling better than he was, but still swollen and stiff. He has an ortho f/u later this morning.  Has patient fallen since last visit? No     Have there been any changes in medications? No  Pain Assessment Scale  Pain Scale: 0-10  Pain Score: 3-Sometimes distracts me       Objective    Treatment:   -NuStep (seat 13) progress L2 x8 min for mobility.   -supine heel slides progress 3x10 (104 deg)  -supine QS progress x20, 3 sec holds (0 deg)  -SAQ progress 3x10 with emphasis on quad control.  -supine SLR with emphasis on quad control 2x5.   -seated LAQ progress 3x10 with emphasis on quad control.  -seated HS curls with YTB progress 3x10.  -f/b amb within parallel bars x4 each way.   -side step each way within parallel bars x2 each way.  -standing sequence: B heel raises x10, R HS curls x10, and mini squats with mod cues for keeping knees behind toes x10 with light B UE support.   -6 in step up/down leading with each x10 with LUE support.   -advised pt to continue with changes in HEP.           Time Calculation  Start Time: 0930  Stop Time: 1008  Time Calculation (min): 38 min  Therapeutic Interventions (Time Spent in Minutes)  Gait/Mobility: 15  Therapeutic Exercise: 23  Other Charges       Charge ID Procedure Code Description Qty. Modifiers Charge Entry User Diagnosis    [id number] HC THERAPEUTIC PX 1/> AREAS EACH 15 MIN EXERCISES 2 GP Nica Velázquez, PT      [id number] HC THER PX 1/> AREAS EA 15 MIN GAIT TRAINJ W/STAIR 1 GP Nica Velázquez, PT                Assessment/Plan   Assessment    Level of Function and Prognosis  Assessment: Pt continues to demonstrate excellent mobility and improved tolerance to all therex.        Therapy Goals    Short Term Goals  Start goal date: 2/22/24 End goal date: 3/21/24   ST Goal 1: Pt will be independent and compliant with home exercise   program.  Start goal date: 2/22/24 End goal date: 3/21/24   ST Goal 2: Pt will progress to no A/D with functional ambulation distances   with proper gait sequencing.   Start goal date: 2/22/24 End goal date: 3/21/24   ST Goal 3: Pt will demonstrate R knee mobility 0-110 deg or greater.   Long Term Goals  Long term goal(s) met by: 5/22/24  LT Goal 1: Pt will negotiate stairs with alternating pattern and handrail   as needed.   LT Goal 2: Pt will demonstrate R knee mobility 0-120 deg or greater.   LT Goal 3: Pt will improve FOTO score to 73/100, indicating improved   overall function.        Plan    Plan for next treatment comment: Pt is scheduled to f/u on 3/11/24 with mobility, strength, progress with Airexpad activities as able, and progress with lateral steps as able.  Treatment duration will be through 5/22/2024

## 2024-03-06 NOTE — LETTER
03/06/24      [facility] ORTH[facility] SURG[facility]  [address] [place] [address] [phone]  Dept: [phone]        To whom it may concern:    Cesar Yuen had a right total knee arthroplasty with Dr. Michael Zimmerman on 2/19/24. Our recommendation is for the patient to take amoxicillin 2 grams ONE HOUR by mouth prior to all dental cleanings and procedures. We also recommend that the patient wait at least 3 months after surgery before having elective dental work done. It is recommended to take these antibiotics for a lifetime prior to these appointments. If you have any questions, do not hesitate to contact our office at [phone].    Dr. Michael Zimmerman

## 2024-03-06 NOTE — PROGRESS NOTES
Subjective   Patient ID: Cesar Yuen is a 73 y.o. male.    Chief Complaint:  Chief Complaint   Patient presents with    Right Knee - Post-op     Pt presents walking with the assistance of a cane s/p (2w2d)  RIGHT TOTAL KNEE ARTHROPLASTY WITH ROBOTIC ORTHOPEDIC SURGICAL ASSISTANT with Dr. Zimmerman on 2/19. Rates pain 2/10, not taking any pain medications.      HPI 2 weeks post right total knee arthroplasty.  Rates his pain at 2/10 and currently not taking pain medications.  Has been going to physical therapy and this is going well for him.  Currently wearing his TIEN hose he just finished his Lovenox last evening and is on Coumadin he has been for approximately 4 years due to atrial flutter.    Social History     Occupational History    Occupation:    Tobacco Use    Smoking status: Never    Smokeless tobacco: Never   Vaping Use    Vaping Use: Never used   Substance and Sexual Activity    Alcohol use: Yes     Alcohol/week: 1.0 standard drink of alcohol     Types: 1 Standard drinks or equivalent per week     Comment: rarely    Drug use: Never    Sexual activity: Defer      Review of Systems   Musculoskeletal:  Positive for arthralgias, gait problem and joint swelling.             Objective   Ortho Exam      Patient ambulates with a mild limp of his right lower extremity with use of a straight cane.  He has mild swelling of his right knee although no effusion.  Well-healed incision with negative Homans maneuver.  Active assistive range of motion of right knee is approximately 5 degrees from full extension to 95 degrees of flexion with a fairly soft endpoint.    Assessment   Diagnoses and all orders for this visit:    S/P total knee arthroplasty, right              Plan    Total knee arthroplasty doing well.  Patient will continue with physical therapy as he has been doing.  He will wear his TIEN hose for 2 more weeks.  I also advised that he will not need to continue with Lovenox injections and simply  continue with his Coumadin, I will inform his pharmacist of this.  Follow-up in 1 month with x-rays of right knee including hip to ankle.

## 2024-03-06 NOTE — PROGRESS NOTES
Subjective   Cesar Yuen presents to the anticoagulation clinic for monitoring of his long term warfarin therapy. He is on warfarin for history of atrial flutter.   Marco had a  left knee replacement on 2/19 and warfarin was held prior to the procedure. He was discharged with 14 days of enoxaparin 40 mg subcutaneous daily and had last dose last night (3/5). He restarted warfarin 5 mg MWF and 7.5 mg all other days of the week last Friday (3/1).     Today Cesar reports the following:   Bleeding signs/symptoms: Yes  Notes some bleeding/bruising on knee at surgical site. Sees ortho today.   Major bleeding event: No  Thrombosis signs/symptoms: No  Thromboembolic event: No  Missed doses: No  Medication changes: No  Dietary changes: No  Bacterial/viral infection: no  Other concerns: No    Current Outpatient Medications   Medication Sig Dispense Refill    warfarin (COUMADIN) 5 mg tablet Take 1 tablet (5 mg) by mouth every Mon/Fri and 1.5 tablet (7.5 mg) all other days of the week or as directed by anticoag clinic. 40 tablet 0    celecoxib (CeleBREX) 200 mg capsule Take 1 capsule (200 mg total) by mouth daily 14 capsule 0    oxyCODONE (ROXICODONE) 5 mg immediate release tablet Take 1 tablet (5 mg total) by mouth every 4 (four) hours as needed for pain scale 8-10/10. Max Daily Amount: 30 mg 42 tablet 0    potassium chloride (KLOR-CON M20) 20 mEq CR tablet Take 2 tablets (40 mEq total) by mouth 2 (two) times a day 360 tablet 3    furosemide (LASIX) 80 mg tablet Take 1 tablet (80 mg total) by mouth 2 (two) times a day 180 tablet 1    tamsulosin (FLOMAX) 0.4 mg capsule Take 1 capsule (0.4 mg total) by mouth daily (Patient taking differently: Take 1 capsule (0.4 mg total) by mouth daily with dinner) 30 capsule 9    multivitamin (THERAGRAN) tablet tablet Take 1 tablet by mouth daily       No current facility-administered medications for this visit.         Objective     /71 (BP Location: Left arm, Patient Position:  "Sitting, Cuff Size: Regular Adult)   Wt 106.3 kg (234 lb 6.4 oz)   BMI 30.93 kg/m²     Assessment/Plan      Anticoagulation Summary  As of 3/6/2024      INR goal:  2.0-3.0   INR used for dosin.3 (3/6/2024)   Full warfarin instructions:  3/6: 10 mg; Otherwise 5 mg every Mon, Fri; 7.5 mg all other days   Next INR check:  3/12/2024   Priority:  Maintenance    Indications    Typical atrial flutter (CMS/HCC) (Resolved) [I48.3]                 Anticoagulation Care Providers       Provider Role Specialty Phone number    Balbir Machado MD Referring Family Medicine [phone]            Anticoagulation therapy status: INR is less than goal and dose adjustment required. Marco has been a \"slow responder\" previously and restarted his warfarin only 5 days ago. Will boost today with 10 mg and then increase warfarin to 5 mg Mon/Fri and 7.5 mg all other days of the week. Recheck INR in 1 week. Communication sent to Carol Ramos CNP and Dr. Machado. Patient verbalized understanding regarding new dose, monitoring and INR. New warfarin prescription sent to pharmacy per patient request.    Please reference Anticoagulation episode for additional documentation.   "

## 2024-03-06 NOTE — PROGRESS NOTES
Patient completed Nozin treatment for 5 days following surgery: yes  Dermabond Prineo Skin Closure removed. The wound is well healed without signs of infection.   Wound care and activity instructions given. Return as directed.

## 2024-03-11 ENCOUNTER — HOSPITAL ENCOUNTER (OUTPATIENT)
Dept: PHYSICAL THERAPY | Facility: HOSPITAL | Age: 74
Setting detail: THERAPIES SERIES
Discharge: STILL ADMITTED OR ADMINISTRATIVE | End: 2024-03-11
Payer: MEDICARE

## 2024-03-11 DIAGNOSIS — Z74.09 DECREASED STRENGTH, ENDURANCE, AND MOBILITY: ICD-10-CM

## 2024-03-11 DIAGNOSIS — R68.89 DECREASED STRENGTH, ENDURANCE, AND MOBILITY: ICD-10-CM

## 2024-03-11 DIAGNOSIS — Z96.651 S/P TOTAL KNEE ARTHROPLASTY, RIGHT: Primary | ICD-10-CM

## 2024-03-11 DIAGNOSIS — R53.1 DECREASED STRENGTH, ENDURANCE, AND MOBILITY: ICD-10-CM

## 2024-03-11 PROCEDURE — 97116 GAIT TRAINING THERAPY: CPT | Mod: GP | Performed by: PHYSICAL THERAPIST

## 2024-03-11 PROCEDURE — 97110 THERAPEUTIC EXERCISES: CPT | Mod: GP | Performed by: PHYSICAL THERAPIST

## 2024-03-11 PROCEDURE — 97112 NEUROMUSCULAR REEDUCATION: CPT | Mod: GP | Performed by: PHYSICAL THERAPIST

## 2024-03-11 NOTE — INTERDISCIPLINARY/THERAPY
[address] [facility] [place] [address]  [phone]      Outpatient Physical Therapy Daily Treatment Note    Date of Service: 3/11/24  Patient Name: Cesar Yuen  : 1950  Referring Provider: Michael Zimmerman MD  Today's Visit Number: 6  Onset Date: 2024  SOC Date: 2024  Certification Period: 2024  HICN: [id number]  Medical Diagnosis listed first. Additional are Treatment Diagnoses:  1. S/P total knee arthroplasty, right    2. Decreased strength, endurance, and mobility        Subjective   Subjective Comments: Pt reports he is getting a little better each day. He arrives with no cane today.  Has patient fallen since last visit? No     Have there been any changes in medications? No  Pain Assessment Scale  Pain Scale: 0-10  Pain Score: 2-Notice pain, does not interfere with activities       Objective    Treatment:   -NuStep (progress seat 12) L2 x8 min for mobility.   -supine heel slides 3x10 (107 deg)  -SAQ progress 2x15 with emphasis on quad control.  -supine SLR with emphasis on quad control progress 3x5.   -seated LAQ progress 2x15 with emphasis on quad control.  -seated HS curls with YTB progress 2x15.  -standing sequence: B heel raises x10, progress alt HS curls x10, and mini squats with mod cues for keeping knees behind toes x10 progress fingertip support.   -6 in step up/down leading with each x10 with LUE support.   -f/b amb within parallel bars x4 each way.   -side step each way within parallel bars x2 each way.  -intro to 6 in lateral step up/over/back x8 with light UE support.  -Airex balance weight shifting: s/s, h/t, staggered each way x10 with SBA and occasional fingertip support.  -advised pt to continue with changes in HEP.             Time Calculation  Start Time: 930  Stop Time: 1010  Time Calculation (min): 40 min  Therapeutic Interventions (Time Spent in Minutes)  Gait/Mobility: 10  Neuromuscular Re-Education: 8  Therapeutic Exercise: 22  Other Charges        Charge ID Procedure Code Description Qty. Modifiers Charge Entry User Diagnosis    [id number] HC THERAPEUTIC PX 1/> AREAS EACH 15 MIN EXERCISES 1 GP Nica Velázquez, PT     [id number] HC THER PX 1/> AREAS EACH 15 MIN NEUROMUSC REEDUCA 1 GP Velázquez Nica, PT     [id number] HC THER PX 1/> AREAS EA 15 MIN GAIT TRAINJ W/STAIR 1 GP Nica Velázquez, PT                Assessment/Plan   Assessment    Level of Function and Prognosis  Assessment: Pt demonstrates 0-107 deg today. He tolerated progression of reps with exercises and is doing better with step activities. He was challenged by Airex pad, requiring occasional fingertip support.        Therapy Goals    Short Term Goals  Start goal date: 2/22/24 End goal date: 3/21/24   ST Goal 1: Pt will be independent and compliant with home exercise   program.  Start goal date: 2/22/24 End goal date: 3/21/24   ST Goal 2: Pt will progress to no A/D with functional ambulation distances   with proper gait sequencing.   Start goal date: 2/22/24 End goal date: 3/21/24   ST Goal 3: Pt will demonstrate R knee mobility 0-110 deg or greater.   Long Term Goals  Long term goal(s) met by: 5/22/24  LT Goal 1: Pt will negotiate stairs with alternating pattern and handrail   as needed.   LT Goal 2: Pt will demonstrate R knee mobility 0-120 deg or greater.   LT Goal 3: Pt will improve FOTO score to 73/100, indicating improved   overall function.        Plan    Plan for next treatment comment: Pt is scheduled to return on 3/13/24 with end range mobility, progress step activities, and begin leg press as able.  Treatment duration will be through 5/22/2024

## 2024-03-12 ENCOUNTER — ANTICOAGULATION VISIT (OUTPATIENT)
Dept: ANTICOAGULATION | Facility: CLINIC | Age: 74
End: 2024-03-12
Payer: MEDICARE

## 2024-03-12 VITALS — SYSTOLIC BLOOD PRESSURE: 114 MMHG | WEIGHT: 235 LBS | DIASTOLIC BLOOD PRESSURE: 66 MMHG | BODY MASS INDEX: 31 KG/M2

## 2024-03-12 DIAGNOSIS — I48.92 ATRIAL FLUTTER, UNSPECIFIED TYPE (CMS/HCC): Primary | ICD-10-CM

## 2024-03-12 LAB — INR (AMB): 1.8 (ref 2–3)

## 2024-03-12 PROCEDURE — G0463 HOSPITAL OUTPT CLINIC VISIT: HCPCS

## 2024-03-12 PROCEDURE — 85610 PROTHROMBIN TIME: CPT | Mod: QW

## 2024-03-12 NOTE — PROGRESS NOTES
Subjective   Cesar Yuen presents to the anticoagulation clinic for monitoring of his long term warfarin therapy. Marco had a left knee replacement on 2/19 and warfarin was restarted on 3/1. Marco confirmed taking 2 tablets last Wednesday then 1 tablet on Monday/Friday and 1.5 tablets all other days of the week. He has 5 mg tablets.     Today Cesar reports the following:   Bleeding signs/symptoms: No   Major bleeding event: No  Thrombosis signs/symptoms: No  Thromboembolic event: No  Missed doses: No  Medication changes: No  Dietary changes: No  Bacterial/viral infection: no  Other concerns: No    Current Outpatient Medications   Medication Sig Dispense Refill    warfarin (COUMADIN) 5 mg tablet Take 1 tablet (5 mg) by mouth every Monday and Friday and 1.5 tablets (7.5 mg) all other days of the week or as directed by anticoag clinic. 40 tablet 0    celecoxib (CeleBREX) 200 mg capsule Take 1 capsule (200 mg total) by mouth daily (Patient not taking: Reported on 3/6/2024) 14 capsule 0    oxyCODONE (ROXICODONE) 5 mg immediate release tablet Take 1 tablet (5 mg total) by mouth every 4 (four) hours as needed for pain scale 8-10/10. Max Daily Amount: 30 mg (Patient not taking: Reported on 3/6/2024) 42 tablet 0    potassium chloride (KLOR-CON M20) 20 mEq CR tablet Take 2 tablets (40 mEq total) by mouth 2 (two) times a day 360 tablet 3    furosemide (LASIX) 80 mg tablet Take 1 tablet (80 mg total) by mouth 2 (two) times a day 180 tablet 1    tamsulosin (FLOMAX) 0.4 mg capsule Take 1 capsule (0.4 mg total) by mouth daily (Patient taking differently: Take 1 capsule (0.4 mg total) by mouth daily with dinner) 30 capsule 9    multivitamin (THERAGRAN) tablet tablet Take 1 tablet by mouth daily       No current facility-administered medications for this visit.         Objective     /66 (BP Location: Left arm, Patient Position: Sitting, Cuff Size: Regular Adult)   Wt 106.6 kg (235 lb)   BMI 31.00 kg/m²     Assessment/Plan   "    Anticoagulation Summary  As of 3/12/2024      INR goal:  2.0-3.0   INR used for dosin.8 (3/12/2024)   Full warfarin instructions:  5 mg every Mon, Fri; 7.5 mg all other days   Next INR check:  3/26/2024   Priority:  Maintenance    Indications    Typical atrial flutter (CMS/HCC) (Resolved) [I48.3]                 Anticoagulation Care Providers       Provider Role Specialty Phone number    Balbir Machado MD Referring Family Medicine [phone]            Anticoagulation therapy status: INR is less than goal but, no dose adjustment required. Continue warfarin 5 mg every Monday/Friday and 7.5 mg all other days of the week. This is an increased dose from what Marco was taking prior to his knee surgery and in the past has been a \"slow responder.\" If INR remains subtherapeutic next visit, will increase his dose further. Recheck INR in 2 weeks. Patient verbalized understanding regarding dose, monitoring and INR.      Please reference Anticoagulation episode for additional documentation.   "

## 2024-03-13 ENCOUNTER — HOSPITAL ENCOUNTER (OUTPATIENT)
Dept: PHYSICAL THERAPY | Facility: HOSPITAL | Age: 74
Setting detail: THERAPIES SERIES
Discharge: STILL ADMITTED OR ADMINISTRATIVE | End: 2024-03-13
Payer: MEDICARE

## 2024-03-13 DIAGNOSIS — Z96.651 S/P TOTAL KNEE ARTHROPLASTY, RIGHT: Primary | ICD-10-CM

## 2024-03-13 DIAGNOSIS — R53.1 DECREASED STRENGTH, ENDURANCE, AND MOBILITY: ICD-10-CM

## 2024-03-13 DIAGNOSIS — Z74.09 DECREASED STRENGTH, ENDURANCE, AND MOBILITY: ICD-10-CM

## 2024-03-13 DIAGNOSIS — R68.89 DECREASED STRENGTH, ENDURANCE, AND MOBILITY: ICD-10-CM

## 2024-03-13 PROCEDURE — 97116 GAIT TRAINING THERAPY: CPT | Mod: GP | Performed by: PHYSICAL THERAPIST

## 2024-03-13 PROCEDURE — 97110 THERAPEUTIC EXERCISES: CPT | Mod: GP | Performed by: PHYSICAL THERAPIST

## 2024-03-13 NOTE — INTERDISCIPLINARY/THERAPY
[address] [facility]  [place] [address]  [phone]      Outpatient Physical Therapy Daily Treatment Note    Date of Service: 3/13/24  Patient Name: Cesar Yuen  : 1950  Referring Provider: Michael Zimmerman MD  Today's Visit Number: 7  Onset Date: 2024  SOC Date: 2024  Certification Period: 2024  HICN: [id number]  Medical Diagnosis listed first. Additional are Treatment Diagnoses:  1. S/P total knee arthroplasty, right    2. Decreased strength, endurance, and mobility        Subjective   Subjective Comments: Pt reports he is doing pretty good. He has to remind himself to bend his knee when he walks because he tends to walk with it stiff otherwise.  Has patient fallen since last visit? No     Have there been any changes in medications? No  Pain Assessment Scale  Pain Scale: 0-10  Pain Score: 2-Notice pain, does not interfere with activities       Objective    Treatment:   -NuStep (seat 12) L2 x8 min for mobility.   -supine heel slides progress 2x15 (104-110 deg)  -SAQ progress 1x30 with emphasis on quad control.  -supine SLR with emphasis on quad control progress 2x10.  -seated LAQ progress 1x30 with emphasis on quad control.  -seated HS curls with YTB progress 1x30.  -standing sequence: alt HS curls x10 and mini squats with mod cues for keeping knees behind toes [id number] fingertip support. Repeated 2nd bout.  -6 in step up/down leading with each progress x15 with LUE support.   -f/b amb within parallel bars x4 each way with no UE support.  -side step each way within parallel bars x2 each way with no UE support.  -6 in lateral step up/over/back progress x10 with light UE support.  -advised pt to continue with progression in HEP.            Time Calculation  Start Time: 0930  Stop Time: 1010  Time Calculation (min): 40 min  Therapeutic Interventions (Time Spent in Minutes)  Gait/Mobility: 10  Therapeutic Exercise: 30  Other Charges       Charge ID Procedure Code Description Qty.  Modifiers Charge Entry User Diagnosis    [id number]  THERAPEUTIC PX 1/> AREAS EACH 15 MIN EXERCISES 2 GP Nica Velázquez, PT     [id number]  THER PX 1/> AREAS EA 15 MIN GAIT TRAINJ W/STAIR 1 GP Nica Velázquez, PT                Assessment/Plan   Assessment    Level of Function and Prognosis  Assessment: Pt demonstrates 0-110 deg today. He tolerated progression of reps with less rest well today.        Therapy Goals    Short Term Goals  Start goal date: 2/22/24 End goal date: 3/21/24   ST Goal 1: Pt will be independent and compliant with home exercise   program.  Start goal date: 2/22/24 End goal date: 3/21/24   ST Goal 2: Pt will progress to no A/D with functional ambulation distances   with proper gait sequencing.   Start goal date: 2/22/24 End goal date: 3/21/24   ST Goal 3: Pt will demonstrate R knee mobility 0-110 deg or greater.   Long Term Goals  Long term goal(s) met by: 5/22/24  LT Goal 1: Pt will negotiate stairs with alternating pattern and handrail   as needed.   LT Goal 2: Pt will demonstrate R knee mobility 0-120 deg or greater.   LT Goal 3: Pt will improve FOTO score to 73/100, indicating improved   overall function.        Plan    Plan for next treatment comment: Pt is scheduled to f/u on 3/18/24 with mobility, progress to 8 inch step and Airex activities.  Treatment duration will be through 5/22/2024

## 2024-03-18 ENCOUNTER — HOSPITAL ENCOUNTER (OUTPATIENT)
Dept: PHYSICAL THERAPY | Facility: HOSPITAL | Age: 74
Setting detail: THERAPIES SERIES
Discharge: STILL ADMITTED OR ADMINISTRATIVE | End: 2024-03-18
Payer: MEDICARE

## 2024-03-18 DIAGNOSIS — Z96.651 S/P TOTAL KNEE ARTHROPLASTY, RIGHT: Primary | ICD-10-CM

## 2024-03-18 DIAGNOSIS — Z74.09 DECREASED STRENGTH, ENDURANCE, AND MOBILITY: ICD-10-CM

## 2024-03-18 DIAGNOSIS — R53.1 DECREASED STRENGTH, ENDURANCE, AND MOBILITY: ICD-10-CM

## 2024-03-18 DIAGNOSIS — R68.89 DECREASED STRENGTH, ENDURANCE, AND MOBILITY: ICD-10-CM

## 2024-03-18 PROCEDURE — 97110 THERAPEUTIC EXERCISES: CPT | Mod: GP | Performed by: PHYSICAL THERAPIST

## 2024-03-18 PROCEDURE — 97112 NEUROMUSCULAR REEDUCATION: CPT | Mod: GP | Performed by: PHYSICAL THERAPIST

## 2024-03-18 NOTE — INTERDISCIPLINARY/THERAPY
[address] [facility] [place] [address]  [phone]      Outpatient Physical Therapy Daily Treatment Note    Date of Service: 3/18/24  Patient Name: Cesar Yuen  : 1950  Referring Provider: Michael Zimmerman MD  Today's Visit Number: 8  Onset Date: 2024  SOC Date: 2024  Certification Period: 2024  HICN: [id number]  Medical Diagnosis listed first. Additional are Treatment Diagnoses:  1. S/P total knee arthroplasty, right    2. Decreased strength, endurance, and mobility        Subjective   Subjective Comments: Pt reports Saturday night he went to stand up with a plate in his hand and his R knee suddenly felt really sore. He did not sleep well that night and it hurt into , so he went and bought Tylenol and it helped. He states he slept better last night and it does not feel bad today.  Has patient fallen since last visit? No     Have there been any changes in medications? No  Pain Assessment Scale  Pain Scale: 0-10  Pain Score: 2-Notice pain, does not interfere with activities       Objective    Treatment:   -NuStep (seat 12) L2 x8 min for mobility.   -supine heel slides 2x15 (110- 115 deg)  -SAQ 1x30 with emphasis on quad control, added 2#.  -supine SLR with emphasis on quad control progress 2x12.  -seated LAQ 1x30 with emphasis on quad control, added 2#.  -seated HS curls progress RTB x20.  -standing weight shifts progress on Airex pad: s/s, h/t, staggered each way, and tandem each way x10 with forward gaze and occasional light L UE support.  -standing sequence progress on Airex pad: alt HS curls x10 and mini squats with mod cues for keeping knees behind toes x10 with fingertip support.  -progress 8 in step up/down leading with each x10 with LUE support.   -progress 8 in lateral step up/over/back x8 with light UE support.  -advised pt to continue with progression in HEP.              Time Calculation  Start Time: 0930  Stop Time: 1009  Time Calculation (min): 39  min  Therapeutic Interventions (Time Spent in Minutes)  Neuromuscular Re-Education: 15  Therapeutic Exercise: 24  Other Charges       Charge ID Procedure Code Description Qty. Modifiers Charge Entry User Diagnosis    [id number]  THERAPEUTIC PX 1/> AREAS EACH 15 MIN EXERCISES 2 GP Nica Velázquez, PT     [id number]  THER PX 1/> AREAS EACH 15 MIN NEUROMUSC REEDUCA 1 GP Nica Velázquez, PT                Assessment/Plan   Assessment    Level of Function and Prognosis  Assessment: Pt made nice flexion gain to 115 deg today. He continues to require cues for maintaining quad control wtih SLR. He tolerated progression of Airex pad activities as well as 8 in step well with min cues.        Therapy Goals    Short Term Goals  Start goal date: 2/22/24 End goal date: 3/21/24   ST Goal 1: Pt will be independent and compliant with home exercise   program.  Start goal date: 2/22/24 End goal date: 3/21/24   ST Goal 2: Pt will progress to no A/D with functional ambulation distances   with proper gait sequencing.   Start goal date: 2/22/24 End goal date: 3/21/24   ST Goal 3: Pt will demonstrate R knee mobility 0-110 deg or greater.   Long Term Goals  Long term goal(s) met by: 5/22/24  LT Goal 1: Pt will negotiate stairs with alternating pattern and handrail   as needed.   LT Goal 2: Pt will demonstrate R knee mobility 0-120 deg or greater.   LT Goal 3: Pt will improve FOTO score to 73/100, indicating improved   overall function.        Plan    Plan for next treatment comment: Pt is scheduled to return on 3/20/24 with mobility, current strength progression, 8 in step activities, and intro to leg press.  Treatment duration will be through 5/22/2024

## 2024-03-20 ENCOUNTER — TELEPHONE (OUTPATIENT)
Dept: SPORTS MEDICINE | Facility: CLINIC | Age: 74
End: 2024-03-20
Payer: MEDICARE

## 2024-03-20 NOTE — TELEPHONE ENCOUNTER
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 Please advise. Thank you

## 2024-03-21 NOTE — TELEPHONE ENCOUNTER
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  This will be on March 1, 2024.  Patient states that he has been driving his car over town.  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 The provider feels that he will not be ready till approximately 6 weeks postop.

## 2024-03-25 ENCOUNTER — HOSPITAL ENCOUNTER (OUTPATIENT)
Dept: PHYSICAL THERAPY | Facility: HOSPITAL | Age: 74
Setting detail: THERAPIES SERIES
Discharge: STILL ADMITTED OR ADMINISTRATIVE | End: 2024-03-25
Payer: MEDICARE

## 2024-03-25 DIAGNOSIS — R68.89 DECREASED STRENGTH, ENDURANCE, AND MOBILITY: ICD-10-CM

## 2024-03-25 DIAGNOSIS — Z96.651 S/P TOTAL KNEE ARTHROPLASTY, RIGHT: Primary | ICD-10-CM

## 2024-03-25 DIAGNOSIS — Z74.09 DECREASED STRENGTH, ENDURANCE, AND MOBILITY: ICD-10-CM

## 2024-03-25 DIAGNOSIS — R53.1 DECREASED STRENGTH, ENDURANCE, AND MOBILITY: ICD-10-CM

## 2024-03-25 PROCEDURE — 97110 THERAPEUTIC EXERCISES: CPT | Mod: GP | Performed by: PHYSICAL THERAPIST

## 2024-03-25 PROCEDURE — 97112 NEUROMUSCULAR REEDUCATION: CPT | Mod: GP | Performed by: PHYSICAL THERAPIST

## 2024-03-25 NOTE — INTERDISCIPLINARY/THERAPY
[address] [facility]  [place] [address]  [phone]      Outpatient Physical Therapy Daily Treatment Note    Date of Service: 3/25/24  Patient Name: Cesar Yuen  : 1950  Referring Provider: Michael Zimmerman MD  Today's Visit Number: 9  Onset Date: 2024  SOC Date: 2024  Certification Period: 2024  HICN: [id number]  Medical Diagnosis listed first. Additional are Treatment Diagnoses:  1. S/P total knee arthroplasty, right    2. Decreased strength, endurance, and mobility        Subjective   Subjective Comments: Pt reports he is feeling pretty good and not having much for pain anymore. 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  Has patient fallen since last visit? No     Have there been any changes in medications? No          Objective    Treatment:   -NuStep (seat 12) L2 x8 min for mobility.   -supine heel slides progress 1x30 (115-121 deg)  -resting extension (0 deg)  -SAQ 1x30 with emphasis on quad control progress 3#, 1x30, 3 sec holds.  -supine SLR with emphasis on quad control progress 2x15.  -seated LAQ 1x30 with emphasis on quad control, progress 3#.  -seated HS curls RTB progress x30.  -standing weight shifts on Airexpad: s/s, h/t, staggered each way x10 with forward gaze and occasional fingertip support.   -standing sequence on Airex pad: alt HS curls progress x15 and mini squats with mod cues for keeping knees behind toes progress x15 with light UE support.  -8 in step up/down leading with each progress x15 with LUE support.   -8 in lateral step up/over/back progress x10 with light LUE support.  -intro to 4 in ecc step down in R stance 1x10 with LUE support and min cues for proper sequencing.   -advised pt to continue with changes in HEP.               Time Calculation  Start Time: 930  Stop Time: 1012  Time Calculation (min): 42 min  Therapeutic Interventions (Time Spent in Minutes)  Neuromuscular  Re-Education: 18  Therapeutic Exercise: 24  Other Charges       Charge ID Procedure Code Description Qty. Modifiers Charge Entry User Diagnosis    [id number] HC THERAPEUTIC PX 1/> AREAS EACH 15 MIN EXERCISES 2 GP Nica Velázquez, PT     [id number] HC THER PX 1/> AREAS EACH 15 MIN NEUROMUSC REEDUCA 1 GP Nica Velázquez, PT                Assessment/Plan   Assessment    Level of Function and Prognosis  Assessment: Pt tolerated progression of reps well today. He met mobility goal of 0-120 with 121 deg at best today. He continues to require cues to maintain knee ext with SLR and posterior weight with mini squats.        Therapy Goals    Short Term Goals  Start goal date: 2/22/24 End goal date: 3/21/24   ST Goal 1: Pt will be independent and compliant with home exercise   program.  Start goal date: 2/22/24 End goal date: 3/21/24   ST Goal 2: Pt will progress to no A/D with functional ambulation distances   with proper gait sequencing.   Start goal date: 2/22/24 End goal date: 3/21/24   ST Goal 3: Pt will demonstrate R knee mobility 0-110 deg or greater.   Long Term Goals  Long term goal(s) met by: 5/22/24  LT Goal 1: Pt will negotiate stairs with alternating pattern and handrail   as needed.   LT Goal 2: Pt will demonstrate R knee mobility 0-120 deg or greater.   LT Goal 3: Pt will improve FOTO score to 73/100, indicating improved   overall function.        Plan    Plan for next treatment comment: Pt is scheduled to return on 3/27/24 with progress note. also continue with 4 in ecc step downs and may progress to retro stepping with 8 in.  Treatment duration will be through 5/22/2024

## 2024-03-27 ENCOUNTER — HOSPITAL ENCOUNTER (OUTPATIENT)
Dept: PHYSICAL THERAPY | Facility: HOSPITAL | Age: 74
Setting detail: THERAPIES SERIES
Discharge: STILL ADMITTED OR ADMINISTRATIVE | End: 2024-03-27
Payer: MEDICARE

## 2024-03-27 DIAGNOSIS — Z96.651 S/P TOTAL KNEE ARTHROPLASTY, RIGHT: Primary | ICD-10-CM

## 2024-03-27 DIAGNOSIS — R68.89 DECREASED STRENGTH, ENDURANCE, AND MOBILITY: ICD-10-CM

## 2024-03-27 DIAGNOSIS — Z74.09 DECREASED STRENGTH, ENDURANCE, AND MOBILITY: ICD-10-CM

## 2024-03-27 DIAGNOSIS — R53.1 DECREASED STRENGTH, ENDURANCE, AND MOBILITY: ICD-10-CM

## 2024-03-27 PROCEDURE — 97116 GAIT TRAINING THERAPY: CPT | Mod: GP | Performed by: PHYSICAL THERAPIST

## 2024-03-27 PROCEDURE — 97110 THERAPEUTIC EXERCISES: CPT | Mod: GP | Performed by: PHYSICAL THERAPIST

## 2024-03-27 NOTE — INTERDISCIPLINARY/THERAPY
[address] [facility]  [place] [address]  [phone]      Outpatient Physical Therapy Daily Treatment Note/10th Visit Progress Note    Date of Service: 3/27/24  Patient Name: Cesar Yuen  : 1950  Referring Provider: Garrett Saez DO  Today's Visit Number: 10  Onset Date: 2024  SOC Date: 2024  Certification Period: 2024  HICN: [id number]  Medical Diagnosis listed first. Additional are Treatment Diagnoses:  1. S/P total knee arthroplasty, right    2. Decreased strength, endurance, and mobility        Subjective   Subjective Comments: Pt reports he is doing well with no new complaints.  Has patient fallen since last visit? No     Have there been any changes in medications? No  Pain Assessment Scale  Pain Scale: 0-10  Pain Score: 1-Hardly notice pain       Objective    Treatment:   -NuStep (seat 12) L4 x8 min for mobility.   -supine heel slides 1x30 (116 with  deg)  -resting extension (0 deg)  -SAQ 1x30 with emphasis on quad control with 3# 1x30, 3 sec holds.  -supine SLR with emphasis on quad control 2x15.  -seated LAQ 1x30 with emphasis on quad control, progress 3#.  -seated HS curls progress GTB x30. Provided GTB for HEP.  -assessed LE strength:   -reviewed goals: overall 5/5 in all planes, except quads 4+/5  -FOTO survey  -negotiated stairs with alternating pattern, both ascending and descending, and light use of handrail x2.  -8 in step progress up/over/back leading with RLE x10 with light UE support.  -reviewed standing mini squats x10 with VC and TCs to keep knees behind toes.  -intro to forward alternating mini lunges x10 each with light UE support.  -reviewed 4 in ecc step down in R stance 1x10 with LUE support and min cues for proper sequencing.   -advised pt to continue with changes in HEP.           Time Calculation  Start Time: 0930  Stop Time: 1015  Time Calculation (min): 45 min  Therapeutic Interventions (Time Spent in Minutes)  Gait/Mobility: 10  Therapeutic  Exercise: 35  Other Charges       Charge ID Procedure Code Description Qty. Modifiers Charge Entry User Diagnosis    [id number]  THERAPEUTIC PX 1/> AREAS EACH 15 MIN EXERCISES 2 GP Nica Velázquez, PT     [id number]  THER PX 1/> AREAS EA 15 MIN GAIT TRAINJ W/STAIR 1 GP Nica Velázquez, PT                Assessment/Plan   Assessment    Level of Function and Prognosis  Assessment: Pt has met majority of goals at this time. He required mod cues with eccentric step downs and proper mechanics with mini squats. He tolerated progressive 8 in step activities well.        Therapy Goals    Short Term Goals  Start goal date: 2/22/24 End goal date: 3/21/24   ST Goal 1: Pt will be independent and compliant with home exercise   program.  Goal 1 Status: met  Start goal date: 2/22/24 End goal date: 3/21/24   ST Goal 2: Pt will progress to no A/D with functional ambulation distances   with proper gait sequencing.   Goal 2 Status: met  Start goal date: 2/22/24 End goal date: 3/21/24   ST Goal 3: Pt will demonstrate R knee mobility 0-110 deg or greater.   Goal 3 Status: met  Long Term Goals  Long term goal(s) met by: 5/22/24  LT Goal 1: Pt will negotiate stairs with alternating pattern and handrail   as needed.   Goal Status: met  LT Goal 2: Pt will demonstrate R knee mobility 0-120 deg or greater.   Goal Status: met  LT Goal 3: Pt will improve FOTO score to 73/100, indicating improved   overall function.   Goal Status: met     Progress Report    Reporting Dates 2/22/24 through 3/27/24.  Patient's treatment has focused on mobility, strength, gait/stairs, balance, and home program. Patient is demonstrating improvements in all areas. Patient has not yet reached maximal benefit for eccentric control, progressive step activities, and progressive strengthening.    It is recommended that the client attend skilled rehabilitative therapy for up to up to 2x/wk  with an expected duration through 5/22/2024.   Treatment may  consist of: Gait training; Manual therapy; Modalities; Neuromuscular re-education; Stair training; Therapeutic activities; Therapeutic exercises      Plan    Plan for next treatment comment: Pt is scheduled to f/u with Christ Layton next week. Continue to focus on maintaining quad control with SLRs, eccentric control with step downs, mini squat mechanics, and mini lunges.  Treatment duration will be through 5/22/2024

## 2024-04-01 ENCOUNTER — HOSPITAL ENCOUNTER (OUTPATIENT)
Dept: PHYSICAL THERAPY | Facility: HOSPITAL | Age: 74
Setting detail: THERAPIES SERIES
Discharge: STILL ADMITTED OR ADMINISTRATIVE | End: 2024-04-01
Payer: MEDICARE

## 2024-04-01 DIAGNOSIS — R68.89 DECREASED STRENGTH, ENDURANCE, AND MOBILITY: ICD-10-CM

## 2024-04-01 DIAGNOSIS — Z74.09 DECREASED STRENGTH, ENDURANCE, AND MOBILITY: ICD-10-CM

## 2024-04-01 DIAGNOSIS — R53.1 DECREASED STRENGTH, ENDURANCE, AND MOBILITY: ICD-10-CM

## 2024-04-01 DIAGNOSIS — Z96.651 S/P TOTAL KNEE ARTHROPLASTY, RIGHT: Primary | ICD-10-CM

## 2024-04-01 PROCEDURE — 97110 THERAPEUTIC EXERCISES: CPT | Mod: GP | Performed by: PHYSICAL THERAPIST

## 2024-04-01 PROCEDURE — 97112 NEUROMUSCULAR REEDUCATION: CPT | Mod: GP | Performed by: PHYSICAL THERAPIST

## 2024-04-01 PROCEDURE — 93298 REM INTERROG DEV EVAL SCRMS: CPT | Mod: 26 | Performed by: INTERNAL MEDICINE

## 2024-04-01 NOTE — INTERDISCIPLINARY/THERAPY
[address] [place] [address]  [phone]      Outpatient Physical Therapy Daily Treatment Note    Date of Service: 24  Patient Name: Cesar Yuen  : 1950  Referring Provider: Garrett Saez DO  Today's Visit Number: 11  Onset Date: 2024  SOC Date: 2024  Certification Period: 2024  HICN: [id number]  Medical Diagnosis listed first. Additional are Treatment Diagnoses:  1. S/P total knee arthroplasty, right    2. Decreased strength, endurance, and mobility        Subjective   Subjective Comments: Fair compliance w/ HEP.  Pain well controlled.  Has patient fallen since last visit? No     Have there been any changes in medications? No  Pain Assessment Scale  Pain Scale: 0-10  Pain Score: 2-Notice pain, does not interfere with activities  Patient's Stated Pain Goal: 0-No pain       Objective      Right knee arom: 0 - 131 deg (after today's Tx)    NuStep - 5 min - level 4  Stationary upright bike - 5 min - level 4.5 - seat 10  Supine knee extension stretch w/ heel propped - 3 min - 5 min ankle weight over knee  Supine quad set w/ heel propped - 20 reps  Supine SLR - 20 reps   Seated knee ext RROM - 20 reps - 10 lb ankle weights   Seated single knee flex rrom on Matrix machine - 2 sets - 10 reps - 25 lbs   Squat - q-pads used for biofeedback to facilitate equal WBing - 2 sets - 10 reps   Forward step down using L LE - 2 sets - 10 reps           Time Calculation  Start Time: 929  Stop Time: 100  Time Calculation (min): 35 min  Therapeutic Interventions (Time Spent in Minutes)  Neuromuscular Re-Education: 9  Therapeutic Activity: 6  Therapeutic Exercise: 20  Other Charges       Charge ID Procedure Code Description Qty. Modifiers Charge Entry User Diagnosis    [id number] HC THERAPEUTIC PX 1/> AREAS EACH 15 MIN EXERCISES 1 GP Ashvin Layton, SANTOS     [id number] HC THER PX 1/> AREAS EACH 15 MIN NEUROMUSC REEDUCA 1 GP Ashvin Layton, PT                 Assessment/Plan   Assessment    Level of Function and Prognosis  Assessment: Marco continues to gain knee ROM which will ease the difficulty of functional activities such as donning/doffing socks & shoes.  His LE strength is steadily increasing improving his performance to descend stairs.  He continues to benefit from PT to increase LE strength.        Therapy Goals    Short Term Goals  Start goal date: 2/22/24 End goal date: 3/21/24   ST Goal 1: Pt will be independent and compliant with home exercise   program.  Goal 1 Status: met  Start goal date: 2/22/24 End goal date: 3/21/24   ST Goal 2: Pt will progress to no A/D with functional ambulation distances   with proper gait sequencing.   Goal 2 Status: met  Start goal date: 2/22/24 End goal date: 3/21/24   ST Goal 3: Pt will demonstrate R knee mobility 0-110 deg or greater.   Goal 3 Status: met  Long Term Goals  Long term goal(s) met by: 5/22/24  LT Goal 1: Pt will negotiate stairs with alternating pattern and handrail   as needed.   Goal Status: met  LT Goal 2: Pt will demonstrate R knee mobility 0-120 deg or greater.   Goal Status: met  LT Goal 3: Pt will improve FOTO score to 73/100, indicating improved   overall function.   Goal Status: met       Plan    Plan for next treatment comment: Repeat today's Tx to improve strength & motor control.  Treatment duration will be through 5/22/2024

## 2024-04-03 ENCOUNTER — LAB (OUTPATIENT)
Dept: LAB | Facility: CLINIC | Age: 74
End: 2024-04-03
Payer: MEDICARE

## 2024-04-03 ENCOUNTER — HOSPITAL ENCOUNTER (OUTPATIENT)
Dept: PHYSICAL THERAPY | Facility: HOSPITAL | Age: 74
Setting detail: THERAPIES SERIES
Discharge: STILL ADMITTED OR ADMINISTRATIVE | End: 2024-04-03
Payer: MEDICARE

## 2024-04-03 ENCOUNTER — OFFICE VISIT (OUTPATIENT)
Dept: ORTHOPEDIC SURGERY | Facility: CLINIC | Age: 74
End: 2024-04-03
Payer: MEDICARE

## 2024-04-03 ENCOUNTER — ANCILLARY PROCEDURE (OUTPATIENT)
Dept: RADIOLOGY | Facility: CLINIC | Age: 74
End: 2024-04-03
Payer: MEDICARE

## 2024-04-03 ENCOUNTER — OFFICE VISIT (OUTPATIENT)
Dept: UROLOGY | Facility: CLINIC | Age: 74
End: 2024-04-03
Payer: MEDICARE

## 2024-04-03 VITALS — DIASTOLIC BLOOD PRESSURE: 70 MMHG | SYSTOLIC BLOOD PRESSURE: 110 MMHG

## 2024-04-03 VITALS — OXYGEN SATURATION: 92 % | TEMPERATURE: 98.2 F | HEIGHT: 73 IN | WEIGHT: 235 LBS | BODY MASS INDEX: 31.14 KG/M2

## 2024-04-03 DIAGNOSIS — Z96.651 S/P TOTAL KNEE ARTHROPLASTY, RIGHT: Primary | ICD-10-CM

## 2024-04-03 DIAGNOSIS — N32.0 BLADDER NECK CONTRACTURE: Primary | ICD-10-CM

## 2024-04-03 DIAGNOSIS — R33.9 URINARY RETENTION: ICD-10-CM

## 2024-04-03 DIAGNOSIS — R53.1 DECREASED STRENGTH, ENDURANCE, AND MOBILITY: ICD-10-CM

## 2024-04-03 DIAGNOSIS — N32.0 ACQUIRED CONTRACTURE OF BLADDER NECK: ICD-10-CM

## 2024-04-03 DIAGNOSIS — Z74.09 DECREASED STRENGTH, ENDURANCE, AND MOBILITY: ICD-10-CM

## 2024-04-03 DIAGNOSIS — R68.89 DECREASED STRENGTH, ENDURANCE, AND MOBILITY: ICD-10-CM

## 2024-04-03 DIAGNOSIS — N35.919 STRICTURE OF MALE URETHRA, UNSPECIFIED STRICTURE TYPE: ICD-10-CM

## 2024-04-03 LAB
BACTERIA #/AREA URNS HPF: ABNORMAL /HPF
BILIRUB UR QL: NEGATIVE
CLARITY UR: CLEAR
COLOR UR: YELLOW
GLUCOSE UR QL: NEGATIVE MG/DL
HGB UR QL: NEGATIVE
KETONES UR-MCNC: NEGATIVE MG/DL
LEUKOCYTE ESTERASE UR QL STRIP: NEGATIVE
NITRITE UR QL: NEGATIVE
PH UR: 6 PH
PROT UR STRIP-MCNC: NEGATIVE MG/DL
RBC #/AREA URNS HPF: ABNORMAL /HPF
SP GR UR: 1.01 (ref 1–1.03)
SQUAMOUS #/AREA URNS HPF: ABNORMAL /HPF
UROBILINOGEN UR-MCNC: 0.2 MG/DL
WBC #/AREA URNS HPF: ABNORMAL /HPF

## 2024-04-03 PROCEDURE — 99212 OFFICE O/P EST SF 10 MIN: CPT | Performed by: NURSE PRACTITIONER

## 2024-04-03 PROCEDURE — 97110 THERAPEUTIC EXERCISES: CPT | Mod: GP | Performed by: PHYSICAL THERAPIST

## 2024-04-03 PROCEDURE — 99024 POSTOP FOLLOW-UP VISIT: CPT | Performed by: PHYSICIAN ASSISTANT

## 2024-04-03 PROCEDURE — 77073 BONE LENGTH STUDIES: CPT | Mod: PO

## 2024-04-03 PROCEDURE — 77073 BONE LENGTH STUDIES: CPT | Mod: 26 | Performed by: RADIOLOGY

## 2024-04-03 PROCEDURE — 73562 X-RAY EXAM OF KNEE 3: CPT | Mod: RT,PO

## 2024-04-03 PROCEDURE — G0463 HOSPITAL OUTPT CLINIC VISIT: HCPCS | Performed by: NURSE PRACTITIONER

## 2024-04-03 PROCEDURE — 73562 X-RAY EXAM OF KNEE 3: CPT | Mod: 26,RT,NCP | Performed by: RADIOLOGY

## 2024-04-03 PROCEDURE — 81001 URINALYSIS AUTO W/SCOPE: CPT

## 2024-04-03 ASSESSMENT — PAIN SCALES - GENERAL: PAINLEVEL: 0-NO PAIN

## 2024-04-03 ASSESSMENT — ENCOUNTER SYMPTOMS
JOINT SWELLING: 1
ARTHRALGIAS: 0

## 2024-04-03 NOTE — PROGRESS NOTES
"Urology Progress Note  4/3/2024    Chief Complaint:  Chief Complaint   Patient presents with    [facility]     3 month Urethral stricture       History of Present Illness:  Patient is a 73 year old male presenting to Urology Clinic today in follow-up after undergoing a cystoscopy with urethral dilation and temporary urethral implant insertion (Optilume) for a bladder neck contracture and urethral stricture on 12/13/2023 with Dr. Taylor. Patient was last seen in Urology Clinic on 1/2/2024 by Dr. Taylor at which time it was recommended a repeat follow-up cystoscopy in 3-6 months to ensure continued patency of the bladder neck/urethral stricture.     Patient states that since he was last seen, he has noticed improvement in his urinary stream, stream strength and also does not have to push or strain to be able to start his urinary flow.  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  Patient denies any splitting urinary stream, denies any dribbling or leaking of urine.  He states that he feels that the procedure worked well for him, and would rather not undergo a cystoscopy to ensure patency of the stricture and bladder neck, as he feels that he messes with it too much, it might revert back to where he was before.  Postvoid residual amount of 518 mL was reviewed with the patient, and he states that he has previously been told he has had well over 2-3000 in his bladder at one point.    Past Medical History:  Past Medical History:   Diagnosis Date    Arthritis     Atrial flutter (CMS/HCC)     Chipped tooth     \"cracked tooth\" Back right upper, left upper back    Clotting disorder (CMS/HCC)     Chronic anticoagulation, Warfarin    Dysrhythmia     Hx a-flutter, s/p ablation and loop recorder 2/8/2021    Edema     BLE    History of transfusion     " After surgery    Injury of back 01/14/2021    Seen on CT    Pneumonia 03/11/2023    Urinary retention     S/p urethral stent 12/2023     Past Surgical History:  Past Surgical History:   Procedure Laterality Date    ABDOMINAL SURGERY      anuerysm    ABLATION A-FLUTTER N/A 2/8/2021    Procedure: Ablation A-flutter loop implant;  Surgeon: Timoteo Burk DO;  Location: [facility] EP Lab;  Service: Electrophysiology;  Laterality: N/A;    APPENDECTOMY      HERNIA REPAIR      x 3    LOOP RECORDER INSERTION N/A 2/8/2021    Procedure: LOOP RECORDER INSERTION;  Surgeon: Timoteo Burk DO;  Location: [facility] EP Lab;  Service: Electrophysiology;  Laterality: N/A;    PROSTATECTOMY N/A 4/28/2022    Procedure: REVOLIX LASER PROSTATECTOMY;  Surgeon: Attila Taylor MD;  Location: [facility] OR;  Service: Urology;  Laterality: N/A;    TOTAL KNEE ARTHROPLASTY Left 7/24/2023    Procedure: LEFT TOTAL KNEE ARTHROPLASTY WITH ROBOTIC ORTHOPEDIC SURGICAL ASSISTANT;  Surgeon: Garrett Saez DO;  Location: [facility] OR;  Service: Orthopedics;  Laterality: Left;    TOTAL KNEE ARTHROPLASTY Right 2/19/2024    Procedure: RIGHT TOTAL KNEE ARTHROPLASTY WITH ROBOTIC ORTHOPEDIC SURGICAL ASSISTANT;  Surgeon: Michael Zimmerman MD;  Location: [facility] OR;  Service: Orthopedics;  Laterality: Right;    TRANSURETHRAL RESECTION OF BLADDER TUMOR N/A 12/22/2020    Procedure: CYSTOSCOPY WITH BLADDER BIOPSY;  Surgeon: Daniele Johnson MD;  Location: [facility] OR;  Service: Urology;  Laterality: N/A;    TRANSURETHRAL RESECTION OF PROSTATE N/A 12/22/2020    Procedure: CYSTOSCOPY TRANSURETHRAL RESECTION PROSTATE, exam under anasthesia;  Surgeon: Daniele Johnson MD;  Location: [facility] OR;  Service: Urology;  Laterality: N/A;    TRANSURETHRAL RESECTION OF PROSTATE N/A 4/27/2021    Procedure: CYSTOSCOPY TRANSURETHRAL RESECTION PROSTATE;  Surgeon: Daniele Johnson MD;  Location: [facility] OR;  Service: Urology;  Laterality: N/A;    TRANSURETHRAL RESECTION OF PROSTATE N/A 9/27/2021    Procedure: CYSTOSCOPY  TRANSURETHRAL RESECTION PROSTATE;  Surgeon: Attila Taylor MD;  Location: [facility] OR;  Service: Urology;  Laterality: N/A;  NOT EARLY MORNING    URETHRA SURGERY N/A 12/13/2023    Procedure: CYSTOSCOPY URETHRAL DILATION AND TEMPORARY URETHRAL IMPLANT INSERTION (OPTILUME);  Surgeon: Attila Taylor MD;  Location: [facility] OR;  Service: Urology;  Laterality: N/A;      Social History:  Social History     Tobacco Use    Smoking status: Never    Smokeless tobacco: Never   Substance Use Topics    Alcohol use: Yes     Alcohol/week: 1.0 standard drink of alcohol     Types: 1 Standard drinks or equivalent per week     Comment: rarely     Family History:      Family History   Problem Relation Age of Onset    Alzheimer's disease Mother     Diabetes Father     Heart attack Mother's Brother      Allergies:  No Known Allergies  Medications:  Current Outpatient Medications   Medication Sig Dispense Refill    warfarin (COUMADIN) 5 mg tablet Take 1 tablet (5 mg) by mouth every Monday and Friday and 1.5 tablets (7.5 mg) all other days of the week or as directed by [facility] clinic. 40 tablet 0    potassium chloride (KLOR-CON M20) 20 mEq CR tablet Take 2 tablets (40 mEq total) by mouth 2 (two) times a day 360 tablet 3    furosemide (LASIX) 80 mg tablet Take 1 tablet (80 mg total) by mouth 2 (two) times a day 180 tablet 1    tamsulosin (FLOMAX) 0.4 mg capsule Take 1 capsule (0.4 mg total) by mouth daily (Patient taking differently: Take 1 capsule (0.4 mg total) by mouth daily with dinner) 30 capsule 9    multivitamin (THERAGRAN) tablet tablet Take 1 tablet by mouth daily      celecoxib (CeleBREX) 200 mg capsule Take 1 capsule (200 mg total) by mouth daily (Patient not taking: Reported on 3/6/2024) 14 capsule 0    oxyCODONE (ROXICODONE) 5 mg immediate release tablet Take 1 tablet (5 mg total) by mouth every 4 (four) hours as needed for pain scale 8-10/10. Max Daily Amount: 30 mg (Patient not taking: Reported on 3/6/2024) 42 tablet 0     No  "current facility-administered medications for this visit.     REVIEW OF SYSTEMS:  GENERAL/CONSTITUTIONAL:  No change in appetite.  Denies chills.  Energy level is good.  Denies fevers.  RESPIRATORY:  No shortness of breath.  CARDIOVASCULAR:  No chest pain.  GASTROINTESTINAL:  No constipation, diarrhea, nausea, or vomiting.  GENITOURINARY:  See HPI for complete details.    VITAL SIGNS:   height is 1.854 m (6' 1\") and weight is 106.6 kg (235 lb). His temporal temperature is 36.8 °C (98.2 °F). His oxygen saturation is 92%.     PHYSICAL EXAMINATION:  GENERAL APPEARANCE:  Alert and oriented x3, pleasant, well nourished, well developed, in no acute distress male.  HEAD:  Normocephalic, atraumatic.  NECK:  Neck supple, trachea midline  LYMPH NODES:  No cervical or supracervical adenopathy.  SKIN:  Normal, warm and dry.  HEART:  Regular rate and rhythm, no murmurs.  LUNGS:  Respirations even and unlabored, clear bilaterally.  ABDOMEN:  Soft, nontender, nondistended, bowel sounds present.   BACK:  No costovertebral angle tenderness, spine nontender to palpation.  MUSCULOSKELETAL:  Normal.  EXTREMITIES:  No edema.  NEUROLOGIC:  Gait normal.    Lab Results   Component Value Date    PSA 0.29 01/24/2023    PSA 1.22 06/18/2020     Lab Results   Component Value Date    COLORU Yellow 04/03/2024    CLARITYU Clear 04/03/2024    SPECGRAVU 1.010 04/03/2024    LEUKOCYTESU Negative 04/03/2024    NITRITEU Negative 04/03/2024    PROTUR Negative 04/03/2024    KETONESU Negative 04/03/2024    BILIRUBINU Negative 04/03/2024    BLOODU Negative 04/03/2024    GLUCOSEU Negative 04/03/2024    SEBASTIÁN 6.0 04/03/2024    WBC 16.5 (H) 02/20/2024    RBC 4.45 02/20/2024     Assessment and Plan:   Marco was seen today for establish care.    Diagnoses and all orders for this visit:    Bladder neck contracture    Stricture of male urethra, unspecified stricture type    Urinary retention    73 year old male presenting to Urology Clinic today in follow-up after " undergoing a cystoscopy with urethral dilation and temporary urethral implant insertion (Optilume) for a bladder neck contracture and urethral stricture on 12/13/2023 with Dr. Taylor.  Discussed with patient that it was recommended by Dr. Prieto to undergo a repeat cystoscopy to ensure continued patency of the bladder neck and urethral stricture, however patient would like to avoid this for now if at all possible as he feels his symptoms have markedly improved since undergoing the procedure initially, and fears that this would ruin any progress he has made.  Therefore, patient has agreed to at least be seen in clinic in approximately 6 months for reevaluation of symptoms and postvoid residual amount.  The patient was explicitly instructed to seek immediate medical attention for any fevers, chills, sweats, flank pain, inability to void, worsening ability to void, gross hematuria, and he was encouraged to contact urology clinic with questions or concerns prior to next appointment.  Lalitha Patino, DNP

## 2024-04-03 NOTE — PROGRESS NOTES
Subjective   Patient ID: Cesar Yuen is a 73 y.o. male.    Chief Complaint:  Chief Complaint   Patient presents with    Right Knee - Post-op      S/p 6w,2d  RIGHT TOTAL KNEE ARTHROPLASTY WITH ROBOTIC ORTHOPEDIC SURGICAL ASSISTANT with Dr. Zimmerman on 2/19.  Pain 0/10, not taking any pain medication.   He has one more session of PT.  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     HPI patient is seen 6 weeks post right total knee arthroplasty.  Denies pain and currently not taking any pain medication.  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  He has 1 more visit of physical therapy.    Social History     Occupational History    Occupation:    Tobacco Use    Smoking status: Never    Smokeless tobacco: Never   Vaping Use    Vaping Use: Never used   Substance and Sexual Activity    Alcohol use: Yes     Alcohol/week: 1.0 standard drink of alcohol     Types: 1 Standard drinks or equivalent per week     Comment: rarely    Drug use: Never    Sexual activity: Defer      Review of Systems   Musculoskeletal:  Positive for gait problem and joint swelling. Negative for arthralgias.             Objective   Ortho Exam      Right knee incision is well-healed he has mild erythema present of the right knee and also mild effusion present.  Range of motion of the right knee is 0 to 130 degrees, he has no varus or valgus instability of the right knee present.  He does have 1+ pitting edema of the right lower extremity.      Assessment   Diagnoses and all orders for this visit:    S/P total knee arthroplasty, right  -     X-ray knee 3 views right  -     X-ray leg length evaluation              Plan    Right knee x-ray does show total knee arthroplasty, no acute change.  Patient will continue with independent physical therapy exercise, he was  given a note to return to work full duty without restriction, last visit will be in 6 weeks.

## 2024-04-03 NOTE — LETTER
[facility]  [address] [place] [address]  [phone]  Dept: [phone]  April 3, 2024     Patient: Cesar Yuen   YOB: 1950   Date of Visit: 4/3/2024       To Whom It May Concern:    It is my medical opinion that Cesar Yuen may return to full duty immediately with no restrictions.    If you have any questions or concerns, please have the patient contact [facility] or work directly with employer’s internal employee health to address specific needs.            Sincerely,        NENITA FARMER    Electronically signed by NENITA FARMER at 10:35 AM    CC: No Recipients

## 2024-04-03 NOTE — INTERDISCIPLINARY/THERAPY
[address] [place] [address]  [phone]      Outpatient Physical Therapy Daily Treatment Note    Date of Service: 24  Patient Name: Cesar Yuen  : 1950  Referring Provider: Garrett Saez DO  Today's Visit Number: 12  Onset Date: 2024  SOC Date: 2024  Certification Period: 2024  HICN: [id number]  Medical Diagnosis listed first. Additional are Treatment Diagnoses:  1. S/P total knee arthroplasty, right    2. Decreased strength, endurance, and mobility        Subjective   Subjective Comments: Nothing new to report.  Has patient fallen since last visit? No     Have there been any changes in medications? No  Pain Assessment Scale  Pain Scale: 0-10  Pain Score: 0-No pain       Objective    Right knee arom: 2 - 0 - 131 deg (after today's Tx)    NuStep - 5 min - level 4  Stationary upright bike - 5 min - level 5.0 - seat 10  Supine knee extension stretch w/ heel propped - 2 min - 10 lb ankle weights over knee (increased from 0 to 2 deg extension)   Supine quad set w/ heel propped - 20 reps  Supine SLR - 20 reps   Seated knee ext RROM - 20 reps - 10 lb ankle weights - cueing for slow eccentric lowering   Seated single knee flex rrom on Matrix machine - 2 sets - 10 reps - 35 lbs   Squat to table - 2 sets - 10 reps - cueing for equal WBing   Forward alternating step down using L LE - 20 reps each LE - cueing for slow eccentric control            Time Calculation  Start Time: 925  Stop Time:   Time Calculation (min): 42 min  Therapeutic Interventions (Time Spent in Minutes)  Therapeutic Activity: 6  Therapeutic Exercise: 36  Other Charges       Charge ID Procedure Code Description Qty. Modifiers Charge Entry User Diagnosis    [id number] HC THERAPEUTIC PX 1/> AREAS EACH 15 MIN EXERCISES 3 GP Ashvin Layton, PT                Assessment/Plan   Assessment    Level of Function and Prognosis  Assessment: Marco demonstrated an improvement in Right knee extension from 0  deg to 2 degrees today.  He has maintained excellent knee flexion rom.  With minor cueing he performs strengthening exercises with good technique and minimal pain.        Therapy Goals    Short Term Goals  Start goal date: 2/22/24 End goal date: 3/21/24   ST Goal 1: Pt will be independent and compliant with home exercise   program.  Goal 1 Status: met  Start goal date: 2/22/24 End goal date: 3/21/24   ST Goal 2: Pt will progress to no A/D with functional ambulation distances   with proper gait sequencing.   Goal 2 Status: met  Start goal date: 2/22/24 End goal date: 3/21/24   ST Goal 3: Pt will demonstrate R knee mobility 0-110 deg or greater.   Goal 3 Status: met  Long Term Goals  Long term goal(s) met by: 5/22/24  LT Goal 1: Pt will negotiate stairs with alternating pattern and handrail   as needed.   Goal Status: met  LT Goal 2: Pt will demonstrate R knee mobility 0-120 deg or greater.   Goal Status: met  LT Goal 3: Pt will improve FOTO score to 73/100, indicating improved   overall function.   Goal Status: met       Plan    Plan for next treatment comment: Ensure correct technique with ther ex & HEP.  Discuss POC.  Treatment duration will be through 5/22/2024

## 2024-04-08 ENCOUNTER — HOSPITAL ENCOUNTER (OUTPATIENT)
Dept: PHYSICAL THERAPY | Facility: HOSPITAL | Age: 74
Setting detail: THERAPIES SERIES
Discharge: STILL ADMITTED OR ADMINISTRATIVE | End: 2024-04-08
Payer: MEDICARE

## 2024-04-08 DIAGNOSIS — Z96.651 S/P TOTAL KNEE ARTHROPLASTY, RIGHT: Primary | ICD-10-CM

## 2024-04-08 DIAGNOSIS — R68.89 DECREASED STRENGTH, ENDURANCE, AND MOBILITY: ICD-10-CM

## 2024-04-08 DIAGNOSIS — Z74.09 DECREASED STRENGTH, ENDURANCE, AND MOBILITY: ICD-10-CM

## 2024-04-08 DIAGNOSIS — R53.1 DECREASED STRENGTH, ENDURANCE, AND MOBILITY: ICD-10-CM

## 2024-04-08 PROCEDURE — 97110 THERAPEUTIC EXERCISES: CPT | Mod: GP | Performed by: PHYSICAL THERAPIST

## 2024-04-08 NOTE — INTERDISCIPLINARY/THERAPY
"  [address] [place] [address]  [phone]      Outpatient Physical Therapy Discharge Note    Date of Service: 24  Patient Name: Cesar Yuen  : 1950  Today's Visit Number: 13  Onset Date: 2024  SOC Date: 2024  Certification Period: 2024  HICN: [id number]  1. S/P total knee arthroplasty, right    2. Decreased strength, endurance, and mobility        Subjective   Subjective Comments: Pt reports he is doing well and ready to continue on his own.         Pain Assessment Scale  Pain Scale: 0-10  Pain Score: 0-No pain  Past Medical History:   Diagnosis Date    Arthritis     Atrial flutter (CMS/HCC)     Chipped tooth     \"cracked tooth\" Back right upper, left upper back    Clotting disorder (CMS/HCC)     Chronic anticoagulation, Warfarin    Dysrhythmia     Hx a-flutter, s/p ablation and loop recorder 2021    Edema     BLE    History of transfusion     After surgery    Injury of back 2021    Seen on CT    Pneumonia 2023    Urinary retention     S/p urethral stent 2023       Current Outpatient Medications:     warfarin (COUMADIN) 5 mg tablet, Take 1 tablet (5 mg) by mouth every Monday and Friday and 1.5 tablets (7.5 mg) all other days of the week or as directed by antico clinic., Disp: 40 tablet, Rfl: 0    celecoxib (CeleBREX) 200 mg capsule, Take 1 capsule (200 mg total) by mouth daily (Patient not taking: Reported on 3/6/2024), Disp: 14 capsule, Rfl: 0    oxyCODONE (ROXICODONE) 5 mg immediate release tablet, Take 1 tablet (5 mg total) by mouth every 4 (four) hours as needed for pain scale 8-10/10. Max Daily Amount: 30 mg (Patient not taking: Reported on 3/6/2024), Disp: 42 tablet, Rfl: 0    potassium chloride (KLOR-CON M20) 20 mEq CR tablet, Take 2 tablets (40 mEq total) by mouth 2 (two) times a day, Disp: 360 tablet, Rfl: 3    furosemide (LASIX) 80 mg tablet, Take 1 tablet (80 mg total) by mouth 2 (two) times a day, Disp: 180 tablet, Rfl: 1    " tamsulosin (FLOMAX) 0.4 mg capsule, Take 1 capsule (0.4 mg total) by mouth daily (Patient taking differently: Take 1 capsule (0.4 mg total) by mouth daily with dinner), Disp: 30 capsule, Rfl: 9    multivitamin (THERAGRAN) tablet tablet, Take 1 tablet by mouth daily, Disp: , Rfl:   No Known Allergies       Objective Findings:  R knee AROM: 1-130 deg    LE STRENGTH: overall 5/5, with exception of R quad 4+/5      Objective    Treatment:   -NuStep (seat 10) L4 x8 min for mobility.   -supine heel slides 1x30 (116 with  deg)  -resting extension (1 deg)  -supine SLR with emphasis on quad control 1x20.  -discussed seated HS curls GTB x30.   -8 in step up/over/back leading with RLE x10 with light LUE support.  -4 in ecc step down in R stance 1x10 with LUE support.  -reviewed standing mini squats x10 with occasional VC and TCs to keep knees behind toes.  -forward alternating mini lunges x10 each with light LUE support.  -intro to squat rows with yellow bungee x10. Added to HEP.  -reviewed goals  -FOTO survey  -provided pt with written instructions for progressive home program.           FOTO: Discharge  Patient's FOTO functional outcome score is Score: 81/100 where a higher number = greater function.      Time Calculation  Start Time: 0929  Stop Time: 1007  Time Calculation (min): 38 min  Therapeutic Interventions (Time Spent in Minutes)  Therapeutic Exercise: 38  Other Charges       Charge ID Procedure Code Description Qty. Modifiers Charge Entry User Diagnosis    [id number]  THERAPEUTIC PX 1/> AREAS EACH 15 MIN EXERCISES 3 GP Nica Velázquez, PT                Assessment/Plan   Assessment  Level of Function and Prognosis  Current Level of Function: no A/D with ambulation, negotiating stairs with alternate pattern, returning to work with no restrictions.  Assessment: Pt has met goals at this time and is ready to continue independently.  Therapy Goals    Short Term Goals  Start goal date: 2/22/24 End goal  date: 3/21/24   ST Goal 1: Pt will be independent and compliant with home exercise   program.  Goal 1 Status: met  Start goal date: 2/22/24 End goal date: 3/21/24   ST Goal 2: Pt will progress to no A/D with functional ambulation distances   with proper gait sequencing.   Goal 2 Status: met  Start goal date: 2/22/24 End goal date: 3/21/24   ST Goal 3: Pt will demonstrate R knee mobility 0-110 deg or greater.   Goal 3 Status: met  Long Term Goals  Long term goal(s) met by: 5/22/24  LT Goal 1: Pt will negotiate stairs with alternating pattern and handrail   as needed.   Goal Status: met  LT Goal 2: Pt will demonstrate R knee mobility 0-120 deg or greater.   Goal Status: met  LT Goal 3: Pt will improve FOTO score to 73/100, indicating improved   overall function.   Goal Status: met     This patient has been discharged from therapy because: goals have been met  patient has elected to discontinue  Date of last visit 4/8/24  Total number of therapy visits provided: 13        Plan   Recommendation: D/C pt to allow him to continue independently with home program. Advised pt to contact me with any questions or concerns regarding program.

## 2024-04-17 DIAGNOSIS — I48.3 TYPICAL ATRIAL FLUTTER (CMS/HCC): ICD-10-CM

## 2024-04-17 RX ORDER — WARFARIN SODIUM 5 MG/1
TABLET ORAL
Qty: 90 TABLET | Refills: 0 | Status: SHIPPED | OUTPATIENT
Start: 2024-04-17 | End: 2024-05-30 | Stop reason: SDUPTHER

## 2024-04-23 ENCOUNTER — ANTICOAGULATION VISIT (OUTPATIENT)
Dept: ANTICOAGULATION | Facility: CLINIC | Age: 74
End: 2024-04-23
Payer: MEDICARE

## 2024-04-23 VITALS — WEIGHT: 232.4 LBS | BODY MASS INDEX: 30.66 KG/M2 | DIASTOLIC BLOOD PRESSURE: 71 MMHG | SYSTOLIC BLOOD PRESSURE: 114 MMHG

## 2024-04-23 DIAGNOSIS — I48.92 ATRIAL FLUTTER, UNSPECIFIED TYPE (CMS/HCC): Primary | ICD-10-CM

## 2024-04-23 LAB — INR (AMB): 2.4 (ref 2–3)

## 2024-04-23 PROCEDURE — G0463 HOSPITAL OUTPT CLINIC VISIT: HCPCS

## 2024-04-23 PROCEDURE — 85610 PROTHROMBIN TIME: CPT | Mod: QW

## 2024-04-23 NOTE — PROGRESS NOTES
Subjective   Cesar Yuen presents to the anticoagulation clinic for monitoring of his long term warfarin therapy. Marco confirmed warfarin 5 mg on Mon/Fri and 7.5 mg all other days of the week.     Today Cesar reports the following:   Bleeding signs/symptoms: No   Major bleeding event: No  Thrombosis signs/symptoms: No  Thromboembolic event: No  Missed doses: No  Medication changes: No  Dietary changes: No  Bacterial/viral infection: no  Other concerns: No    Current Outpatient Medications   Medication Sig Dispense Refill    warfarin (COUMADIN) 5 mg tablet Take 1 tablet (5 mg) by mouth every Monday and Friday and 1.5 tablets (7.5 mg) daily all other days of the week or as directed by anticoag clinic. 90 tablet 0    celecoxib (CeleBREX) 200 mg capsule Take 1 capsule (200 mg total) by mouth daily (Patient not taking: Reported on 3/6/2024) 14 capsule 0    oxyCODONE (ROXICODONE) 5 mg immediate release tablet Take 1 tablet (5 mg total) by mouth every 4 (four) hours as needed for pain scale 8-10/10. Max Daily Amount: 30 mg (Patient not taking: Reported on 3/6/2024) 42 tablet 0    potassium chloride (KLOR-CON M20) 20 mEq CR tablet Take 2 tablets (40 mEq total) by mouth 2 (two) times a day 360 tablet 3    furosemide (LASIX) 80 mg tablet Take 1 tablet (80 mg total) by mouth 2 (two) times a day 180 tablet 1    tamsulosin (FLOMAX) 0.4 mg capsule Take 1 capsule (0.4 mg total) by mouth daily 30 capsule 9    multivitamin (THERAGRAN) tablet tablet Take 1 tablet by mouth daily       No current facility-administered medications for this visit.         Objective     /71 (BP Location: Left arm, Patient Position: Sitting, Cuff Size: Large Adult)   Wt 105.4 kg (232 lb 6.4 oz)   BMI 30.66 kg/m²     Assessment/Plan      Anticoagulation Summary  As of 2024      INR goal:  2.0-3.0   INR used for dosin.4 (2024)   Full warfarin instructions:  5 mg every Mon, Fri; 7.5 mg all other days   No change documented:   Mendoza Dick, PharmD   Next INR check:  5/28/2024   Priority:  Maintenance    Indications    Typical atrial flutter (CMS/HCC) (Resolved) [I48.3]                 Anticoagulation Care Providers       Provider Role Specialty Phone number    Balbir Machado MD Referring Family Medicine [phone]            Anticoagulation therapy status: INR is stable, no dose adjustment required. Continue warfarin 5 mg on Mondays/Fridays and 7.5 mg all other days of the week. Follow-up in 1 month. Patient verbalized understanding regarding dose, monitoring and INR.      Please reference Anticoagulation episode for additional documentation.

## 2024-04-30 ENCOUNTER — LAB (OUTPATIENT)
Dept: LAB | Facility: CLINIC | Age: 74
End: 2024-04-30
Payer: MEDICARE

## 2024-04-30 DIAGNOSIS — R31.29 HEMATURIA, MICROSCOPIC: ICD-10-CM

## 2024-04-30 LAB
BACTERIA #/AREA URNS HPF: NORMAL /HPF
BILIRUB UR QL: NEGATIVE
CLARITY UR: CLEAR
COLOR UR: YELLOW
GLUCOSE UR QL: NEGATIVE MG/DL
HGB UR QL: NEGATIVE
KETONES UR-MCNC: NEGATIVE MG/DL
LEUKOCYTE ESTERASE UR QL STRIP: NEGATIVE
NITRITE UR QL: NEGATIVE
PH UR: 6.5 PH
PROT UR STRIP-MCNC: NEGATIVE MG/DL
RBC #/AREA URNS HPF: NORMAL /HPF
SP GR UR: 1.01 (ref 1–1.03)
SQUAMOUS #/AREA URNS HPF: NORMAL /HPF
UROBILINOGEN UR-MCNC: 0.2 MG/DL
WBC #/AREA URNS HPF: NORMAL /HPF

## 2024-04-30 PROCEDURE — 81001 URINALYSIS AUTO W/SCOPE: CPT

## 2024-05-02 PROCEDURE — 93298 REM INTERROG DEV EVAL SCRMS: CPT | Mod: 26 | Performed by: INTERNAL MEDICINE

## 2024-05-16 DIAGNOSIS — I48.92 ATRIAL FLUTTER, UNSPECIFIED TYPE (CMS/HCC): ICD-10-CM

## 2024-05-16 DIAGNOSIS — Z13.220 NEED FOR LIPID SCREENING: ICD-10-CM

## 2024-05-16 DIAGNOSIS — Z12.5 SCREENING FOR PROSTATE CANCER: ICD-10-CM

## 2024-05-16 DIAGNOSIS — Z00.00 MEDICARE ANNUAL WELLNESS VISIT, SUBSEQUENT: Primary | ICD-10-CM

## 2024-05-21 ENCOUNTER — ANTICOAGULATION VISIT (OUTPATIENT)
Dept: ANTICOAGULATION | Facility: CLINIC | Age: 74
End: 2024-05-21
Payer: MEDICARE

## 2024-05-21 VITALS
SYSTOLIC BLOOD PRESSURE: 132 MMHG | WEIGHT: 239 LBS | DIASTOLIC BLOOD PRESSURE: 75 MMHG | BODY MASS INDEX: 31.68 KG/M2 | HEIGHT: 73 IN

## 2024-05-21 DIAGNOSIS — I48.92 ATRIAL FLUTTER, UNSPECIFIED TYPE (CMS/HCC): Primary | ICD-10-CM

## 2024-05-21 LAB — INR (AMB): 2.2 (ref 2–3)

## 2024-05-21 PROCEDURE — G0463 HOSPITAL OUTPT CLINIC VISIT: HCPCS

## 2024-05-21 PROCEDURE — 85610 PROTHROMBIN TIME: CPT | Mod: QW

## 2024-05-21 ASSESSMENT — PAIN SCALES - GENERAL: PAINLEVEL: 0-NO PAIN

## 2024-05-21 NOTE — PROGRESS NOTES
"Subjective   Cesar Yuen presents to the anticoagulation clinic for monitoring of his long term warfarin therapy.       Today Cesar reports the following:   Bleeding signs/symptoms: No   Major bleeding event: No  Thrombosis signs/symptoms: No  Thromboembolic event: No  Missed doses: No  Medication changes: No  Dietary changes: No  Bacterial/viral infection: no  Other concerns: No    Current Outpatient Medications   Medication Sig Dispense Refill    warfarin (COUMADIN) 5 mg tablet Take 1 tablet (5 mg) by mouth every Monday and Friday and 1.5 tablets (7.5 mg) daily all other days of the week or as directed by anticoag clinic. 90 tablet 0    celecoxib (CeleBREX) 200 mg capsule Take 1 capsule (200 mg total) by mouth daily (Patient not taking: Reported on 3/6/2024) 14 capsule 0    oxyCODONE (ROXICODONE) 5 mg immediate release tablet Take 1 tablet (5 mg total) by mouth every 4 (four) hours as needed for pain scale 8-10/10. Max Daily Amount: 30 mg (Patient not taking: Reported on 3/6/2024) 42 tablet 0    potassium chloride (KLOR-CON M20) 20 mEq CR tablet Take 2 tablets (40 mEq total) by mouth 2 (two) times a day 360 tablet 3    furosemide (LASIX) 80 mg tablet Take 1 tablet (80 mg total) by mouth 2 (two) times a day 180 tablet 1    tamsulosin (FLOMAX) 0.4 mg capsule Take 1 capsule (0.4 mg total) by mouth daily 30 capsule 9    multivitamin (THERAGRAN) tablet tablet Take 1 tablet by mouth daily       No current facility-administered medications for this visit.         Objective     /75 (BP Location: Left arm, Patient Position: Sitting, Cuff Size: Large Adult)   Ht 73\" (185.4 cm)   Wt 108.4 kg (239 lb)   BMI 31.53 kg/m²     Assessment/Plan      Anticoagulation Summary  As of 2024      INR goal:  2.0-3.0   INR used for dosin.2 (2024)   Full warfarin instructions:  5 mg every Mon, Fri; 7.5 mg all other days   No change documented:  Eric Johnson PharmD   Next INR check:  2024   Priority:  " Maintenance    Indications    Typical atrial flutter (CMS/HCC) (Resolved) [I48.3]                 Anticoagulation Care Providers       Provider Role Specialty Phone number    Balbir Machado MD Referring Family Medicine [phone]            Anticoagulation therapy status: INR is stable, no dose adjustment required.  Patient verbalized understanding regarding dose, monitoring and INR.      Please reference Anticoagulation episode for additional documentation.

## 2024-06-02 PROCEDURE — 93298 REM INTERROG DEV EVAL SCRMS: CPT | Mod: 26 | Performed by: INTERNAL MEDICINE

## 2024-06-05 ENCOUNTER — LAB (OUTPATIENT)
Dept: LAB | Facility: CLINIC | Age: 74
End: 2024-06-05
Payer: MEDICARE

## 2024-06-05 ENCOUNTER — OFFICE VISIT (OUTPATIENT)
Dept: FAMILY MEDICINE | Facility: CLINIC | Age: 74
End: 2024-06-05
Payer: MEDICARE

## 2024-06-05 VITALS
OXYGEN SATURATION: 94 % | HEART RATE: 70 BPM | WEIGHT: 235 LBS | SYSTOLIC BLOOD PRESSURE: 110 MMHG | HEIGHT: 70 IN | TEMPERATURE: 97 F | DIASTOLIC BLOOD PRESSURE: 56 MMHG | BODY MASS INDEX: 33.64 KG/M2

## 2024-06-05 DIAGNOSIS — Z00.00 MEDICARE ANNUAL WELLNESS VISIT, SUBSEQUENT: ICD-10-CM

## 2024-06-05 DIAGNOSIS — E87.70 HYPERVOLEMIA, UNSPECIFIED HYPERVOLEMIA TYPE: ICD-10-CM

## 2024-06-05 DIAGNOSIS — Z12.5 SCREENING FOR PROSTATE CANCER: ICD-10-CM

## 2024-06-05 DIAGNOSIS — Z13.220 NEED FOR LIPID SCREENING: ICD-10-CM

## 2024-06-05 DIAGNOSIS — I48.3 TYPICAL ATRIAL FLUTTER (CMS/HCC): ICD-10-CM

## 2024-06-05 DIAGNOSIS — Z12.11 SCREENING FOR COLON CANCER: Primary | ICD-10-CM

## 2024-06-05 DIAGNOSIS — I48.92 ATRIAL FLUTTER, UNSPECIFIED TYPE (CMS/HCC): ICD-10-CM

## 2024-06-05 LAB
ALBUMIN SERPL-MCNC: 4.1 G/DL (ref 3.5–5.3)
ALP SERPL-CCNC: 51 U/L (ref 45–115)
ALT SERPL-CCNC: 11 U/L (ref 7–52)
ANION GAP SERPL CALC-SCNC: 9 MMOL/L (ref 3–11)
AST SERPL-CCNC: 16 U/L
BILIRUB SERPL-MCNC: 0.66 MG/DL (ref 0.2–1.4)
BUN SERPL-MCNC: 18 MG/DL (ref 7–25)
CALCIUM ALBUM COR SERPL-MCNC: 9 MG/DL (ref 8.6–10.3)
CALCIUM SERPL-MCNC: 9.1 MG/DL (ref 8.6–10.3)
CHLORIDE SERPL-SCNC: 103 MMOL/L (ref 98–107)
CHOLEST SERPL-MCNC: 184 MG/DL (ref 0–199)
CO2 SERPL-SCNC: 29 MMOL/L (ref 21–32)
CREAT SERPL-MCNC: 0.96 MG/DL (ref 0.7–1.3)
EGFRCR SERPLBLD CKD-EPI 2021: 83 ML/MIN/1.73M*2
ERYTHROCYTE [DISTWIDTH] IN BLOOD BY AUTOMATED COUNT: 15.5 % (ref 11.5–15)
FASTING STATUS PATIENT QL REPORTED: YES
GLUCOSE SERPL-MCNC: 104 MG/DL (ref 70–105)
HCT VFR BLD AUTO: 43 % (ref 38–50)
HDLC SERPL-MCNC: 34 MG/DL
HGB BLD-MCNC: 14.2 G/DL (ref 13.2–17.2)
LDLC SERPL CALC-MCNC: 126 MG/DL (ref 20–99)
MCH RBC QN AUTO: 28.7 PG (ref 29–34)
MCHC RBC AUTO-ENTMCNC: 33 G/DL (ref 32–36)
MCV RBC AUTO: 86.9 FL (ref 82–97)
PLATELET # BLD AUTO: 274 10*3/UL (ref 130–350)
PMV BLD AUTO: 7.1 FL (ref 6.9–10.8)
POTASSIUM SERPL-SCNC: 3.8 MMOL/L (ref 3.5–5.1)
PROT SERPL-MCNC: 6.6 G/DL (ref 6–8.3)
PSA SERPL-MCNC: 0.39 NG/ML (ref 0–4)
RBC # BLD AUTO: 4.94 10*6/ΜL (ref 4.1–5.8)
SODIUM SERPL-SCNC: 141 MMOL/L (ref 135–145)
TRIGL SERPL-MCNC: 121 MG/DL
WBC # BLD AUTO: 5.8 10*3/UL (ref 3.7–9.6)

## 2024-06-05 PROCEDURE — 36415 COLL VENOUS BLD VENIPUNCTURE: CPT

## 2024-06-05 PROCEDURE — 80061 LIPID PANEL: CPT

## 2024-06-05 PROCEDURE — 85027 COMPLETE CBC AUTOMATED: CPT

## 2024-06-05 PROCEDURE — 84153 ASSAY OF PSA TOTAL: CPT

## 2024-06-05 PROCEDURE — 96160 PT-FOCUSED HLTH RISK ASSMT: CPT | Performed by: FAMILY MEDICINE

## 2024-06-05 PROCEDURE — G0439 PPPS, SUBSEQ VISIT: HCPCS | Performed by: FAMILY MEDICINE

## 2024-06-05 PROCEDURE — 80053 COMPREHEN METABOLIC PANEL: CPT

## 2024-06-05 RX ORDER — POTASSIUM CHLORIDE 20 MEQ/1
40 TABLET, EXTENDED RELEASE ORAL 2 TIMES DAILY
Qty: 180 TABLET | Refills: 4 | Status: SHIPPED | OUTPATIENT
Start: 2024-06-05

## 2024-06-05 RX ORDER — WARFARIN SODIUM 5 MG/1
TABLET ORAL
Qty: 135 TABLET | Refills: 4 | Status: SHIPPED | OUTPATIENT
Start: 2024-06-05 | End: 2024-09-18

## 2024-06-05 RX ORDER — FUROSEMIDE 80 MG/1
80 TABLET ORAL 2 TIMES DAILY
Qty: 90 TABLET | Refills: 4 | Status: SHIPPED | OUTPATIENT
Start: 2024-06-05

## 2024-06-05 ASSESSMENT — PATIENT HEALTH QUESTIONNAIRE - PHQ9
SUM OF ALL RESPONSES TO PHQ9 QUESTIONS 1 & 2: 0
2. FEELING DOWN, DEPRESSED OR HOPELESS: NOT AT ALL
1. LITTLE INTEREST OR PLEASURE IN DOING THINGS: NOT AT ALL

## 2024-06-05 ASSESSMENT — PAIN SCALES - GENERAL: PAINLEVEL: 0-NO PAIN

## 2024-06-05 NOTE — PROGRESS NOTES
"Subjective     Cesar Yuen is a 73 y.o. male who presents for a subsequent Medicare Wellness visit.     History of Present Illness    Marco presented for his annual physical. He reported no new health issues since his last visit. The patient has a history of swelling in the lower extremities, which he reported as being better than usual, although he noted that there was no significant change overnight.     The patient is on a regimen of blood thinners, a diuretic, and potassium. He reported no side effects from these medications. He also mentioned that he has not had many immunizations in the past, with the exception of a tetanus shot a few years ago.    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 He also expressed some concern about the cost of his medical care, noting that his bill keeps increasing.            Comprehensive Medical and Social History  Patient Active Problem List   Diagnosis    Lymphedema    Bladder diverticulum    Atrial flutter (CMS/HCC)    BPH with obstruction/lower urinary tract symptoms    Status post placement of implantable loop recorder    Anticoagulated    Acute pain of left knee    Primary osteoarthritis of left knee    Arthritis of left knee    Urinary retention    Primary osteoarthritis of right knee    Osteoarthritis of right knee    Arthritis of right knee     Past Medical History:   Diagnosis Date    Arthritis     Atrial flutter (CMS/HCC)     Chipped tooth     \"cracked tooth\" Back right upper, left upper back    Clotting disorder (CMS/HCC)     Chronic anticoagulation, Warfarin    Dysrhythmia     Hx a-flutter, s/p ablation and loop recorder 2/8/2021    Edema     BLE    History of transfusion     After surgery    Injury of back 01/14/2021    Seen on CT    Pneumonia 03/11/2023    Urinary retention     S/p urethral stent 12/2023     Past Surgical History:   Procedure Laterality Date    " ABDOMINAL SURGERY      anuerysm    ABLATION A-FLUTTER N/A 2/8/2021    Procedure: Ablation A-flutter loop implant;  Surgeon: Timoteo Burk DO;  Location: [facility] EP Lab;  Service: Electrophysiology;  Laterality: N/A;    APPENDECTOMY      HERNIA REPAIR      x 3    LOOP RECORDER INSERTION N/A 2/8/2021    Procedure: LOOP RECORDER INSERTION;  Surgeon: Timoteo Burk DO;  Location: [facility] EP Lab;  Service: Electrophysiology;  Laterality: N/A;    PROSTATECTOMY N/A 4/28/2022    Procedure: REVOLIX LASER PROSTATECTOMY;  Surgeon: Attila Taylor MD;  Location: [facility] OR;  Service: Urology;  Laterality: N/A;    TOTAL KNEE ARTHROPLASTY Left 7/24/2023    Procedure: LEFT TOTAL KNEE ARTHROPLASTY WITH ROBOTIC ORTHOPEDIC SURGICAL ASSISTANT;  Surgeon: Garrett Saez DO;  Location: [facility] OR;  Service: Orthopedics;  Laterality: Left;    TOTAL KNEE ARTHROPLASTY Right 2/19/2024    Procedure: RIGHT TOTAL KNEE ARTHROPLASTY WITH ROBOTIC ORTHOPEDIC SURGICAL ASSISTANT;  Surgeon: Michael Zimmerman MD;  Location: [facility] OR;  Service: Orthopedics;  Laterality: Right;    TRANSURETHRAL RESECTION OF BLADDER TUMOR N/A 12/22/2020    Procedure: CYSTOSCOPY WITH BLADDER BIOPSY;  Surgeon: Daniele Johnson MD;  Location: [facility] OR;  Service: Urology;  Laterality: N/A;    TRANSURETHRAL RESECTION OF PROSTATE N/A 12/22/2020    Procedure: CYSTOSCOPY TRANSURETHRAL RESECTION PROSTATE, exam under anasthesia;  Surgeon: Daniele Johnson MD;  Location: [facility] OR;  Service: Urology;  Laterality: N/A;    TRANSURETHRAL RESECTION OF PROSTATE N/A 4/27/2021    Procedure: CYSTOSCOPY TRANSURETHRAL RESECTION PROSTATE;  Surgeon: Daniele Johnson MD;  Location: [facility] OR;  Service: Urology;  Laterality: N/A;    TRANSURETHRAL RESECTION OF PROSTATE N/A 9/27/2021    Procedure: CYSTOSCOPY TRANSURETHRAL RESECTION PROSTATE;  Surgeon: Attila aTylor MD;  Location: [facility] OR;  Service: Urology;  Laterality: N/A;  NOT EARLY MORNING    URETHRA SURGERY N/A 12/13/2023    Procedure: CYSTOSCOPY URETHRAL  DILATION AND TEMPORARY URETHRAL IMPLANT INSERTION (OPTILUME);  Surgeon: Attila Taylor MD;  Location: [facility] OR;  Service: Urology;  Laterality: N/A;     No Known Allergies  Current Outpatient Medications   Medication Sig Dispense Refill    tamsulosin (FLOMAX) 0.4 mg capsule Take 1 capsule (0.4 mg total) by mouth daily 30 capsule 9    multivitamin (THERAGRAN) tablet tablet Take 1 tablet by mouth daily      furosemide (LASIX) 80 mg tablet Take 1 tablet (80 mg total) by mouth 2 (two) times a day 90 tablet 4    potassium chloride (KLOR-CON M20) 20 mEq CR tablet Take 2 tablets (40 mEq total) by mouth 2 (two) times a day 180 tablet 4    warfarin (COUMADIN) 5 mg tablet Take 1 tablet (5 mg) by mouth every Monday and Friday and 1.5 tablets (7.5 mg) daily all other days of the week or as directed by antico clinic. 135 tablet 4     No current facility-administered medications for this visit.     Social History     Socioeconomic History    Marital status: Single    Number of children: 1   Occupational History    Occupation:    Tobacco Use    Smoking status: Never    Smokeless tobacco: Never   Vaping Use    Vaping status: Never Used   Substance and Sexual Activity    Alcohol use: Yes     Alcohol/week: 1.0 standard drink of alcohol     Types: 1 Standard drinks or equivalent per week     Comment: rarely    Drug use: Never    Sexual activity: Defer     Social Determinants of Health     Tobacco Use: Low Risk  (6/5/2024)    Patient History     Smoking Tobacco Use: Never     Smokeless Tobacco Use: Never   Alcohol Use: Not At Risk (12/10/2020)    AUDIT-C     Frequency of Alcohol Consumption: Monthly or less     Average Number of Drinks: 1 or 2     Frequency of Binge Drinking: Never   Financial Resource Strain: Unknown (12/10/2020)    Overall Financial Resource Strain (CARDIA)     Difficulty of Paying Living Expenses: Patient declined   Food Insecurity: No Food Insecurity (2/19/2024)    Hunger Vital Sign     Worried About  "Running Out of Food in the Last Year: Never true     Ran Out of Food in the Last Year: Never true   Transportation Needs: No Transportation Needs (2/19/2024)    PRAPARE - Transportation     Lack of Transportation (Medical): No     Lack of Transportation (Non-Medical): No   Housing Stability: Low Risk  (2/19/2024)    Housing Stability Vital Sign     Unable to Pay for Housing in the Last Year: No     Number of Places Lived in the Last Year: 1     Unstable Housing in the Last Year: No   Utilities: Patient Declined (2/19/2024)    [facility] Utilities     Threatened with loss of utilities: Patient declined     Family History   Problem Relation Age of Onset    Alzheimer's disease Mother     Diabetes Father     No Known Problems Brother     No Known Problems Brother     No Known Problems Brother     No Known Problems Daughter     Heart attack Mother's Brother        Activities of Daily Living        PHQ2-9 Depression Screening  Over the past 2 weeks, how often have you been bothered by any of the following problems?  Little interest or pleasure in doing things: Not at all  Feeling down, depressed, or hopeless: Not at all  Patient Health Questionnaire-2 Score: 0    Diet and Activity  Diet and Exercise  Do you follow a special diet at home?: No  How many servings of fruits and vegetables do you get daily? : 3    Fall Risk Screening  STEADI Fall Risk  Get Up and Go Score: Moderate: Fast (less than 11 sec) AND imbalance        Review of Systems  Review of Systems    Patient Care Team:  Balbir Machado MD as PCP - General (Family Medicine)     Objective     /56 (BP Location: Left arm, Patient Position: Sitting, Cuff Size: Regular Adult)   Pulse 70   Temp 36.1 °C (97 °F) (Temporal)   Ht 1.778 m (5' 10\")   Wt 106.6 kg (235 lb)   SpO2 94%   BMI 33.72 kg/m²   Body mass index is 33.72 kg/m².    Physical Exam  Vitals and nursing note reviewed.   Constitutional:       General: He is not in acute distress.     Appearance: Normal " appearance. He is well-developed. He is not diaphoretic.   HENT:      Head: Normocephalic and atraumatic.      Right Ear: Tympanic membrane and ear canal normal.      Left Ear: Tympanic membrane and ear canal normal.      Ears:      Comments: Hard of hearing     Nose: Nose normal.      Mouth/Throat:      Mouth: Mucous membranes are moist.      Pharynx: No oropharyngeal exudate or posterior oropharyngeal erythema.   Eyes:      General: No scleral icterus.     Conjunctiva/sclera: Conjunctivae normal.      Pupils: Pupils are equal, round, and reactive to light.   Neck:      Thyroid: No thyromegaly.      Trachea: No tracheal deviation.   Cardiovascular:      Rate and Rhythm: Normal rate and regular rhythm.      Heart sounds: Normal heart sounds. No murmur heard.     No friction rub. No gallop.   Pulmonary:      Effort: Pulmonary effort is normal.      Breath sounds: Normal breath sounds. No wheezing or rales.   Abdominal:      General: There is no distension.      Palpations: Abdomen is soft. There is no mass.      Tenderness: There is no abdominal tenderness. There is no guarding.      Hernia: No hernia is present.   Musculoskeletal:         General: Normal range of motion.      Cervical back: Neck supple.   Lymphadenopathy:      Cervical: No cervical adenopathy.   Skin:     General: Skin is warm and dry.   Neurological:      Mental Status: He is alert.      Motor: No abnormal muscle tone.   Psychiatric:         Behavior: Behavior normal.         Judgment: Judgment normal.         Problem List Items Addressed This Visit          Cardiac and Vasculature    Atrial flutter (CMS/HCC)    Relevant Medications    warfarin (COUMADIN) 5 mg tablet     Other Visit Diagnoses       Screening for colon cancer    -  Primary    Relevant Orders    Ambulatory referral to General Surgery    Hypervolemia, unspecified hypervolemia type        Relevant Medications    furosemide (LASIX) 80 mg tablet    potassium chloride (KLOR-CON M20) 20  mEq CR tablet    Medicare annual wellness visit, subsequent                 Assessment/Plan     Atrial flutter/ fib: Rate controlled and anticoagulated.    Lower Extremity Edema: Stable, no significant change in swelling. No reported pain or discomfort.  -Continue current management.    Medication Management: Current medications include blood thinner, water pill, and potassium. No reported side effects.  -Renew prescriptions for the coming year.    Colon Cancer Screening: No previous colonoscopy performed. Patient agrees to proceed with colonoscopy.  -Referral for colonoscopy.    Immunization: Discussed new RSV immunization. Patient has not had many immunizations in the past.  -Recommend RSV immunization from pharmacy.    Follow-up: No current issues reported.  -Annual follow-up in one year.           During the course of the visit the patient was educated and counseled about appropriate screening and preventive services including:   Diabetes screening  Nutrition counseling   Obesity screen performed  Depression screen performed  Advanced directives: has an advanced directive - a copy has been provided  RSV vaccine    Written screening schedule individualized for the patient as above based on current USPSTF, age-appropriate medicare services and ACIP as described above was given.    Mental health conditions and/or risk factors are as listed (if any) in the ROS above as specific to this patient.  Direct cognitive impairment was assessed.  Throughout the interview there were no psychosocial or behavioral risks noted.    Patient's lifestyle was evaluated to promote wellness in terms of but not limited to: fall prevention, nutrition, physical activity, tobacco/alcohol if present and weight management.  These were addressed specifically with this patient as listed within the social history, discussion notes, and above.        Balbir Machado MD FAAFP    06/06/24

## 2024-06-05 NOTE — PATIENT INSTRUCTIONS
Preventative Care     Patient’s Personalized Health Advice and 5-10 Year Plan:      Physical Activity:   Physical activity can help you maintain a healthy weight, prevent or control illness, reduce stress, and sleep better. It can also help you improve your balance to avoid falls. Try to build up to and maintain a total of 30 minutes of activity each day. If you are able, try walking, doing yard or housework, and taking the stairs more often. You can also strengthen your muscles with exercises done while sitting or lying down.   Emotional Health:   Feeling “down in the dumps” or anxious every now and then is a natural part of life. If this feeling lasts for a few weeks or more, talk with me as soon as possible. It could be a sign of a problem that needs treatment. There are many types of treatment available.   Falls:   You can reduce your risk of falling by making changes in your home. Remove items that may cause tripping, improve lighting, and consider installing grab bars.   Talk with me if you have problems with balance and walking. To prevent falls, you may need your vision, hearing, or blood pressure checked. Exercises to improve your strength and balance, or using a cane or walker, may help.   Review your medicines with me at every visit because some can affect balance. Please be sure to let me know if you fall or are fearful you may fall.   Pain:   We all have aches and pains at times, but chronic pain can change how you feel and live every day. Please talk with me about any symptoms of chronic pain so that we can determine how best to treat.   Sleep:   Getting a good night’s sleep is vital to your health and well-being and can help prevent or manage health problems. Often, sleep can be improved by changing behaviors, including when you go to bed and what you do before bed. Sleep apnea can cause problems such as struggling to stay awake during the day. Please let me know if you would like to learn more about  improving your sleep and/or think you may have sleep apnea.   Home Safety:   You may benefit from a safety check to identify hazards in your home.   Seat Belt:   Please remember to wear a seat belt when driving or riding in a vehicle. It is one of the most important things you can do to stay safe in a car.   Nutrition:   Remember to eat plenty of fruits, vegetables, whole grains, and dairy. Drink at least 64 ounces (8 full glasses) of water a day unless you have been advised to limit fluids.   Alcohol:   Alcohol can have a greater effect on older people, who may feel its effects at a lower amount. Older people should limit alcoholic drinks (no more than one a day for women and no more than two a day for men). Please let me know if alcohol use becomes a problem.   Tobacco:   Not smoking or using other forms of tobacco is one of the most important things you can do for your health.        Additional Support:   Sometimes it can be challenging to manage all aspects of daily life. Finding the right support can help you maintain or improve your health and independence. Please let me know if you would like to talk further about finding resources to assist you.     Advance Directives:   There may come a time when medical decisions need to be made on your behalf. Please talk with your family and with me about your wishes. It is important to provide information about your decisions and any formal advance directives for your medical record.    SCREENINGS:  What Coverage & Frequency Why Previously Done   EKG One-time covered benefit during Welcome To Medicare Physical (IPPE) Check heart rate and rhythm for baseline. Date of Last ECG Order     Last order of ECG 12-LEAD was found on 2/6/2024 from Appointment on 2/6/2024        Lung Cancer Screening via Low Dos Chest CT Medicare covers annually if history of smoking with no signs or symptoms of lung cancer.  Screening for lung cancer.  Provider will need to advise of the  importance of quitting/avoiding smoking and provide smoking cessation services. Date of Last Chest CT Order     No order of CT CHEST LUNG CANCER LOW DOSE SCREENING is found.         AAA Screen Ultrasound for Abdominal Aortic Aneurysm One-time benefit with Welcome to Medicare Visit for men age 65-75 with history of smoking or positive family history of AAA or has smoked 100 or more cigarettes in his lifetime.   For early detection of abdominal aortic aneurysm. Date of Last AAA US Ordered     No order of US ABDOMINAL AORTA ANEURYSM SCREENING is found.         Osteoporosis Screening Every two years after the age of 65 for those at risk. Person with vertebral abnormalities on x-ray, or have received daily steroid for more than 3 months have increased risk for bone loss. Date of Last Dexa Ordered     No order of DXA BONE DENSITY is found.         Heart Disease Screening  Covers blood tests for all Medicare beneficiaries every 5 years to test for: Cholesterol Levels To check for high cholesterol, which can increase your risk of heart attack, stroke and other problems. Most Recent Cholesterol Results     Lab Results   Component Value Date    CHOL 184 06/05/2024      Lab Results   Component Value Date    HDL 34 (L) 06/05/2024        Lab Results   Component Value Date    LDLCALC 126 (H) 06/05/2024        Lab Results   Component Value Date    TRIG 121 06/05/2024      Colon cancer screening Recommend initiating colon cancer screening at age 45. Medicare will reimburse colonoscopy every 10 years and more frequently if at increased risk for developing colon cancer.? Medicare will cover FIT or 3 send home FOBTs annually (age 45 or greater) or Cologuard every 3 years (between the ages of 45-85).  Ordered as a screening to detect colon cancer before there are symptoms, so it can be more effectively treated. Last Colon Screening(s) Ordered      No order of COLOGUARD CANCER SCREENING KIT is found.           Last Referral To General  "Surgery     Last order of AMB REFERRAL TO GENERAL SURGERY was found on 12/16/2021 from Ancillary Orders on 12/21/2021          Last Referral to Gastroenterology     No order of AMB REFERRAL TO GASTROENTEROLOGY is found.        Diabetes screening Once yearly for patients previously tested but not diagnosed with pre-diabetes or patients who were never tested. Twice yearly for patients with pre-diabetes.  To identify diabetes that may not have symptoms. Last Fasting Glucose and A1C     No results found for: \"GLUF\"     Lab Results   Component Value Date    HGBA1C 6.0 01/24/2023      Sexually Transmitted Infection Screening Consider if sexually active and at risk for STI. Chlamydia, gonorrhea and syphilis annually for woman. Syphilis annually for men. To detect infections that may not have symptoms, to prevent complications.     Testing for Hepatitis C Covered Medicare benefit once if born [id number]. Covered annually, regardless of birth year, if considered high risk. To detect hepatitis C infection that may have no symptoms, to prevent complications. Last Hepatitis C Screening Result     Lab Results   Component Value Date    HEPCAB Non-reactive 01/24/2023      Glaucoma Screening  Covered benefit annually if diabetic, family history of glaucoma,  Americans age 50+,  Americans age 65+ Screen for glaucoma and prevent complications.  Date of Last Referral Placed to Ophthalmology     No order of AMB REFERRAL TO OPHTHALMOLOGY is found.          Date of Last Referral Placed to Optometry  No order of AMB REFERRAL TO OPTOMETRY is found.         Prostate Cancer Screening Covers PSA blood test for all male patients 50 and older once every twelve months.   Detect risk for prostate cancer. Date of Last PSA Ordered   Last order of PSA SCREEN was found on 6/5/2024 from Lab on 6/5/2024          Last PSA Result   Results for orders placed or performed in visit on 01/24/23   PSA screen Blood, Venous   Result Value Ref " Range    PSA 0.29 0.00 - 4.00 ng/mL              Health Maintenance:     Health Maintenance Due   Topic Date Due   • Depression Screening  Never done   • Colorectal Cancer Screening  Never done   • RSV 60 Years and Older or Pregnant (1 - 1-dose 60+ series) Never done   • Medicare Annual Wellness Visit (AWV)  01/24/2024           IMMUNIZATIONS:   Immunizations you have received:   Immunization History   Administered Date(s) Administered   • Tdap 06/13/2020        See grid below for the immunization recommendations.     What Age How Often Why   Flu Vaccine    Age 18 and older Every year Protects against flu virus and its complications.   Pneumonia Vaccine Age 65+  < 65 based on risk factors. Medicare covers an initial pneumonia vaccine and a booster pneumonia vaccine at least one year after the first vaccine. Protects against common types of pneumonia.   Shingles (Zoster) Vaccine Age 50 Not a covered benefit under Medicare.  Once in your lifetime Protects against shingles.   Tetanus Vaccine Age 18 and older Not a covered benefit under Medicare.  Every 10 years (at least one-time Tdap) Protects against tetanus infection.   Hepatitis B Vaccine Age 18 and older based on risk factors Covered if you are at high risk for hepatitis.  Your risk increases if you have End Stage Renal Disease (ESRD), diabetes, living with someone who has Hep B, or if health care worker. Protects against hepatitis, which can cause illness and complications.

## 2024-06-10 ENCOUNTER — TELEPHONE (OUTPATIENT)
Dept: FAMILY MEDICINE | Facility: CLINIC | Age: 74
End: 2024-06-10
Payer: MEDICARE

## 2024-06-11 NOTE — TELEPHONE ENCOUNTER
Spoke with patient. He was wondering if he needed to do his colonoscopy since he received a phone call stating he did not have to worry about cancer. It was explained to him that the phone call he received was for prostate cancer not colon cancer. I will send the general surgery number to patient through [employer] per his request. He had no further questions.

## 2024-06-17 ENCOUNTER — TELEPHONE (OUTPATIENT)
Dept: SURGERY | Facility: CLINIC | Age: 74
End: 2024-06-17
Payer: MEDICARE

## 2024-06-17 NOTE — TELEPHONE ENCOUNTER
1st attempt to contact patient, HIPAA compliant voicemail left to return call.  06/17/24 1:55 PM Misa Kilgore RN

## 2024-06-19 NOTE — TELEPHONE ENCOUNTER
3rd attempt to contact patient, HIPAA compliant voicemail left to return call.  06/19/24 8:48 AM Zeina Liz LPN

## 2024-06-19 NOTE — TELEPHONE ENCOUNTER
2nd attempt to contact patient, HIPAA compliant voicemail left to return call.  06/19/24 8:47 AM Zeina Liz LPN

## 2024-06-20 ENCOUNTER — TELEPHONE (OUTPATIENT)
Dept: SURGERY | Facility: CLINIC | Age: 74
End: 2024-06-20
Payer: MEDICARE

## 2024-06-20 DIAGNOSIS — Z12.11 SCREENING FOR COLON CANCER: Primary | ICD-10-CM

## 2024-06-20 NOTE — TELEPHONE ENCOUNTER
1st attempt to contact patient, HIPAA compliant voicemail left to return call.  06/20/24 2:57 PM Phyllis Lanier LPN

## 2024-06-21 NOTE — TELEPHONE ENCOUNTER
Contacted patient, scheduled Colonoscopy for 7/03/24 with Dr. Brasher  at [facility] .    Reviewed:     - if taking blood thinners to ask PCP for specific instructions Coumadin (Warfarin) stop 5 days prior to surgery with INR prior . Sent message to Dr Machado to contact patient with instructions. Advised patient to call Dr Machado's office if he doesn't hear from them. You may continue aspirin.     - if taking diabetic medications Not Applicable    - if taking weight loss medications Not Applicable    - Stop iron, supplements, vitamins, fish oil and fiber supplements 7 days prior to your colonoscopy    - Need to have a  to accompany you the day of your colonoscopy to drive you home afterwards. You will not be able to drive home.     - Low Fiber and Low Residue diet 5 days prior to your colonoscopy.     - Nothing to eat starting the day prior to surgery until after the procedure is complete    - Bowel prep includes purchasing 8.3 oz Miralax and Dulcolax or Bisacodyl Tablets 5 mg 4 tablets     - Surgery schedulers will call 2-3 days prior to your procedure to give procedure time.     Instructions were sent through [employer] per patients request.    Case Request placed and Talia at [phone] contacted with date.     Update online schedule Yes

## 2024-06-25 ENCOUNTER — TELEPHONE (OUTPATIENT)
Dept: FAMILY MEDICINE | Facility: CLINIC | Age: 74
End: 2024-06-25
Payer: MEDICARE

## 2024-06-25 NOTE — TELEPHONE ENCOUNTER
Patient notified to stop warfarin 5 days prior to colonoscopy per Dr. Machado. No further questions.

## 2024-06-25 NOTE — TELEPHONE ENCOUNTER
----- Message from FELY MACHADO sent at 6/21/2024  4:18 PM MDT -----  He should stop his warfarin 5 days prior to colonoscopy  ----- Message -----  From: Misa Kilgore RN  Sent: 6/21/2024   2:59 PM MDT  To: Fely Machado MD    Marco Yuen is scheduled for a colonoscopy on 7/03/24 with Dr Brasher. Can your office please contact him with instructions on stopping his Warfarin prior? Thank you

## 2024-06-26 NOTE — PRE-PROCEDURE INSTRUCTIONS
Pre-Surgery Instructions:   Medication Instructions    furosemide (LASIX) 80 mg tablet Continue as prescribed do not hold    potassium chloride (KLOR-CON M20) 20 mEq CR tablet Continue as prescribed do not hold    warfarin (COUMADIN) 5 mg tablet Contact PCP for instructions    tamsulosin (FLOMAX) 0.4 mg capsule Continue as prescribed do not hold    multivitamin (THERAGRAN) tablet tablet Stop 1 week prior to surgery/procedure       Preop questionnaire:  Preop Questionairre  Does patient have physical restrictions or limitations?: No  Has patient had an upper respiratory tract infection in the last 2 weeks?: No  Home Oxygen: No    Preop instructions:  Pre-op Phone Call  Surgery Time Verified: No  Arrival Time Verified: No  Surgery Location Verified: Yes  Instructed to shower within 12 hours: Yes  Patient has special physician orders: No  Does Patient Require Bowel Prep for Procedure?: Yes  Type: Miralax/Bisocodyl  Instructed to remove/not apply makeup, petroleum products, lotion, powder, scents, or deodorant on the morning of surgery: Yes  Recommend eye glasses for day of surgery, contact lenses must be removed prior to surgery:: Yes  Instructed to bring CPAP mask: N/A  Instructed to bring Oxygen tank from home: N/A  Does the patient wear a glucose monitoring device?: N/A  Instructed to remove all jewelry, including piercings, and leave at home.: Yes  Instructed to leave all valuables at home: Yes  Instructed to leave all medications at home: Yes  Instructed to bring a valid photo ID, insurance card and form of payment: Yes  NPO Status Reinforced: Yes  Instruct patient to ensure transportation is available following surgery/procedure:: Yes  Consider having a caregiver stay with the patient for 24 hours following anesthesia:: Yes

## 2024-06-28 ENCOUNTER — ANESTHESIA EVENT (OUTPATIENT)
Dept: GASTROENTEROLOGY | Facility: HOSPITAL | Age: 74
End: 2024-06-28
Payer: MEDICARE

## 2024-06-28 ASSESSMENT — ENCOUNTER SYMPTOMS: DYSRHYTHMIAS: 1

## 2024-06-28 NOTE — ANESTHESIA PREPROCEDURE EVALUATION
"Pre-Procedure Assessment    Patient: Cesar Yuen, male, 74 y.o.    Ht Readings from Last 1 Encounters:   06/05/24 1.778 m (5' 10\")     Wt Readings from Last 1 Encounters:   06/05/24 106.6 kg (235 lb)       Last Vitals  BP      Temp      Pulse     Resp      SpO2      Pain Score         Problem list reviewed and Medical history reviewed           Airway   Mallampati: II  TM distance: >3 FB  Neck ROM: full      Dental    (+) chipped    Pulmonary    (+) pneumonia (2023), decreased breath sounds  Cardiovascular - normal exam  Exercise tolerance: good (>7 METS)  (+) dysrhythmias (a flutter, ablation, 2021)    ECG reviewed  Rhythm: regular  Rate: normal  ROS comment: EKG 2/2024  Sinus or ectopic atrial rhythm  . Abnormal R-wave progression, early transitio      Mental Status/Neuro/Psych    Pt is alert.      (+) arthritis, back injury    GI/Hepatic/Renal    (+) urinary retention (bph)    Endo/Other    (+) history of blood transfusion (post op), clotting disorder (anticoagulated)  Abdominal   (+) obese    Abdomen: soft.          Social History     Tobacco Use   • Smoking status: Never   • Smokeless tobacco: Never   Substance Use Topics   • Alcohol use: Yes     Alcohol/week: 1.0 standard drink of alcohol     Types: 1 Standard drinks or equivalent per week     Comment: rarely      Hematology   WBC   Date Value Ref Range Status   06/05/2024 5.8 3.7 - 9.6 10*3/uL Final     RBC   Date Value Ref Range Status   06/05/2024 4.94 4.10 - 5.80 10*6/µL Final     MCV   Date Value Ref Range Status   06/05/2024 86.9 82.0 - 97.0 fL Final     Hemoglobin   Date Value Ref Range Status   06/05/2024 14.2 13.2 - 17.2 g/dL Final     Hematocrit   Date Value Ref Range Status   06/05/2024 43.0 38.0 - 50.0 % Final     Platelets   Date Value Ref Range Status   06/05/2024 274 130 - 350 10*3/uL Final      Coagulation   Protime   Date Value Ref Range Status   02/06/2024 20.4 (H) 9.4 - 12.5 SECONDS Final     PTT   Date Value Ref Range Status "   02/06/2024 43.7 (H) 25.1 - 36.5 SECONDS Final     INR   Date Value Ref Range Status   05/21/2024 2.2 (A) 2.0 - 3.0 Final     Comment:     Lot: [id number]  Exp: 4/30/25      General Chemistry   Calcium   Date Value Ref Range Status   06/05/2024 9.1 8.6 - 10.3 mg/dL Final     BUN   Date Value Ref Range Status   06/05/2024 18 7 - 25 mg/dL Final     Creatinine   Date Value Ref Range Status   06/05/2024 0.96 0.70 - 1.30 mg/dL Final     Glucose   Date Value Ref Range Status   06/05/2024 104 70 - 105 mg/dL Final     Sodium   Date Value Ref Range Status   06/05/2024 141 135 - 145 mmol/L Final     Potassium   Date Value Ref Range Status   06/05/2024 3.8 3.5 - 5.1 MMOL/L Final     Magnesium   Date Value Ref Range Status   03/15/2023 2.2 1.8 - 2.4 mg/dL Final     CO2   Date Value Ref Range Status   06/05/2024 29 21 - 32 mmol/L Final     Chloride   Date Value Ref Range Status   06/05/2024 103 98 - 107 mmol/L Final     Anesthesia Plan    ASA 3   NPO status reviewed: > 8 hours    MAC         Induction: intravenous                 Anesthetic plan and risks discussed with patient.

## 2024-07-03 ENCOUNTER — ANESTHESIA (OUTPATIENT)
Dept: GASTROENTEROLOGY | Facility: HOSPITAL | Age: 74
End: 2024-07-03
Payer: MEDICARE

## 2024-07-03 ENCOUNTER — HOSPITAL ENCOUNTER (OUTPATIENT)
Dept: GASTROENTEROLOGY | Facility: HOSPITAL | Age: 74
Setting detail: OUTPATIENT SURGERY
Discharge: HOME | End: 2024-07-03
Payer: MEDICARE

## 2024-07-03 VITALS
WEIGHT: 228 LBS | HEART RATE: 68 BPM | HEIGHT: 69 IN | DIASTOLIC BLOOD PRESSURE: 65 MMHG | RESPIRATION RATE: 16 BRPM | BODY MASS INDEX: 33.77 KG/M2 | TEMPERATURE: 96.6 F | OXYGEN SATURATION: 92 % | SYSTOLIC BLOOD PRESSURE: 98 MMHG

## 2024-07-03 DIAGNOSIS — Z12.11 SCREENING FOR COLON CANCER: ICD-10-CM

## 2024-07-03 PROCEDURE — (BLANK)

## 2024-07-03 PROCEDURE — 00812 ANES LWR INTST SCR COLSC: CPT | Performed by: NURSE ANESTHETIST, CERTIFIED REGISTERED

## 2024-07-03 PROCEDURE — 6360000200 HC RX 636 W HCPCS (ALT 250 FOR IP): Performed by: NURSE ANESTHETIST, CERTIFIED REGISTERED

## 2024-07-03 PROCEDURE — (BLANK) HC RECOVERY PHASE-2 1ST 1/2 HOUR ACUITY LEVEL 1

## 2024-07-03 PROCEDURE — G0121 COLON CA SCRN NOT HI RSK IND: HCPCS | Performed by: SURGERY

## 2024-07-03 PROCEDURE — 2580000300 HC RX 258: Performed by: NURSE PRACTITIONER

## 2024-07-03 PROCEDURE — 99100 ANES PT EXTEME AGE<1 YR&>70: CPT | Performed by: NURSE ANESTHETIST, CERTIFIED REGISTERED

## 2024-07-03 PROCEDURE — 93298 REM INTERROG DEV EVAL SCRMS: CPT | Mod: 26 | Performed by: INTERNAL MEDICINE

## 2024-07-03 PROCEDURE — (BLANK) HC MAC ANESTHESIA FACILITY CHARGE 1ST 15 MIN

## 2024-07-03 RX ORDER — PROPOFOL 10 MG/ML
INJECTION, EMULSION INTRAVENOUS AS NEEDED
Status: DISCONTINUED | OUTPATIENT
Start: 2024-07-03 | End: 2024-07-03 | Stop reason: SURG

## 2024-07-03 RX ORDER — LIDOCAINE HYDROCHLORIDE 20 MG/ML
INJECTION, SOLUTION EPIDURAL; INFILTRATION; INTRACAUDAL; PERINEURAL AS NEEDED
Status: DISCONTINUED | OUTPATIENT
Start: 2024-07-03 | End: 2024-07-03 | Stop reason: SURG

## 2024-07-03 RX ORDER — SODIUM CHLORIDE, SODIUM LACTATE, POTASSIUM CHLORIDE, CALCIUM CHLORIDE 600; 310; 30; 20 MG/100ML; MG/100ML; MG/100ML; MG/100ML
50 INJECTION, SOLUTION INTRAVENOUS CONTINUOUS
Status: DISCONTINUED | OUTPATIENT
Start: 2024-07-03 | End: 2024-07-04 | Stop reason: HOSPADM

## 2024-07-03 RX ADMIN — PROPOFOL 50 MG: 10 INJECTION, EMULSION INTRAVENOUS at 07:07

## 2024-07-03 RX ADMIN — PROPOFOL 100 MG: 10 INJECTION, EMULSION INTRAVENOUS at 07:03

## 2024-07-03 RX ADMIN — PROPOFOL 50 MG: 10 INJECTION, EMULSION INTRAVENOUS at 07:12

## 2024-07-03 RX ADMIN — SODIUM CHLORIDE, POTASSIUM CHLORIDE, SODIUM LACTATE AND CALCIUM CHLORIDE 50 ML/HR: 600; 310; 30; 20 INJECTION, SOLUTION INTRAVENOUS at 06:25

## 2024-07-03 RX ADMIN — LIDOCAINE HYDROCHLORIDE 50 MG: 20 INJECTION, SOLUTION EPIDURAL; INFILTRATION; INTRACAUDAL; PERINEURAL at 07:03

## 2024-07-03 ASSESSMENT — ENCOUNTER SYMPTOMS: EXERCISE TOLERANCE: GOOD (>7 METS)

## 2024-07-03 NOTE — ANESTHESIA POSTPROCEDURE EVALUATION
Patient: Cesar Yuen    Procedure Summary       Date: 07/03/24 Room / Location: [facility] Endoscopy    Anesthesia Start: 0700 Anesthesia Stop:     Procedure: COLONOSCOPY Diagnosis: Screening for colon cancer    Scheduled Providers: Saji Brasher MD Responsible Provider: Sukhwinder Velázquez CRNA    Anesthesia Type: MAC ASA Status: 3            Anesthesia Type: MAC    Last vitals   Vitals Value Taken Time   BP     Temp     Pulse     Resp     SpO2     Pain Score         Anesthesia Post Evaluation    Patient location during evaluation: PACU  Patient participation: complete - patient participated  Level of consciousness: awake and alert  Pain management: adequate  Airway patency: patent  Cardiovascular status: acceptable  Respiratory status: acceptable  Hydration status: acceptable  May dismiss recovered patient based on consultation with the appropriate physicians and/or meeting appropriate discharge criteria     No notable events documented.    Cosmetic?  This procedure is not cosmetic.

## 2024-07-03 NOTE — H&P
"Preoperative HP  Attending is Dr. Brasher  Date: 7/3/2024   Patient: Cesar Yuen   MRN: [id number]      HISTORY OF PRESENT ILLNESS:  Cesar Yuen is a 74 y.o.  male who presents for screening colonoscopy.     Previous colonoscopy performed never          Past Medical History:   Diagnosis Date    Arthritis     Atrial flutter (CMS/HCC)     Chipped tooth     \"cracked tooth\" Back right upper, left upper back    Clotting disorder (CMS/HCC)     Chronic anticoagulation, Warfarin    Dysrhythmia     Hx a-flutter, s/p ablation and loop recorder 2/8/2021    Edema     BLE    History of transfusion     After surgery    Injury of back 01/14/2021    Seen on CT    Pneumonia 03/11/2023    Urinary retention     S/p urethral stent 12/2023     Past Surgical History:   Procedure Laterality Date    ABDOMINAL SURGERY      anuerysm    ABLATION A-FLUTTER N/A 2/8/2021    Procedure: Ablation A-flutter loop implant;  Surgeon: Timoteo Burk DO;  Location: [facility] EP Lab;  Service: Electrophysiology;  Laterality: N/A;    APPENDECTOMY      HERNIA REPAIR      x 3    LOOP RECORDER INSERTION N/A 2/8/2021    Procedure: LOOP RECORDER INSERTION;  Surgeon: Timoteo Burk DO;  Location: [facility] EP Lab;  Service: Electrophysiology;  Laterality: N/A;    PROSTATECTOMY N/A 4/28/2022    Procedure: REVOLIX LASER PROSTATECTOMY;  Surgeon: Attila Taylor MD;  Location: [facility] OR;  Service: Urology;  Laterality: N/A;    TOTAL KNEE ARTHROPLASTY Left 7/24/2023    Procedure: LEFT TOTAL KNEE ARTHROPLASTY WITH ROBOTIC ORTHOPEDIC SURGICAL ASSISTANT;  Surgeon: Garrett Saez DO;  Location: [facility] OR;  Service: Orthopedics;  Laterality: Left;    TOTAL KNEE ARTHROPLASTY Right 2/19/2024    Procedure: RIGHT TOTAL KNEE ARTHROPLASTY WITH ROBOTIC ORTHOPEDIC SURGICAL ASSISTANT;  Surgeon: Michael Zimmerman MD;  Location: [facility] OR;  Service: Orthopedics;  Laterality: Right;    TRANSURETHRAL RESECTION OF BLADDER TUMOR N/A 12/22/2020    Procedure: CYSTOSCOPY WITH BLADDER BIOPSY;  Surgeon: " Daniele Johnson MD;  Location: [facility] OR;  Service: Urology;  Laterality: N/A;    TRANSURETHRAL RESECTION OF PROSTATE N/A 12/22/2020    Procedure: CYSTOSCOPY TRANSURETHRAL RESECTION PROSTATE, exam under anasthesia;  Surgeon: Daneile Johnson MD;  Location: [facility] OR;  Service: Urology;  Laterality: N/A;    TRANSURETHRAL RESECTION OF PROSTATE N/A 4/27/2021    Procedure: CYSTOSCOPY TRANSURETHRAL RESECTION PROSTATE;  Surgeon: Daniele Johnson MD;  Location: [facility] OR;  Service: Urology;  Laterality: N/A;    TRANSURETHRAL RESECTION OF PROSTATE N/A 9/27/2021    Procedure: CYSTOSCOPY TRANSURETHRAL RESECTION PROSTATE;  Surgeon: Attila Taylor MD;  Location: [facility] OR;  Service: Urology;  Laterality: N/A;  NOT EARLY MORNING    URETHRA SURGERY N/A 12/13/2023    Procedure: CYSTOSCOPY URETHRAL DILATION AND TEMPORARY URETHRAL IMPLANT INSERTION (OPTILUME);  Surgeon: Attila Taylor MD;  Location: [facility] OR;  Service: Urology;  Laterality: N/A;     (Not in a hospital admission)    Current Outpatient Medications   Medication Sig Dispense Refill    furosemide (LASIX) 80 mg tablet Take 1 tablet (80 mg total) by mouth 2 (two) times a day 90 tablet 4    potassium chloride (KLOR-CON M20) 20 mEq CR tablet Take 2 tablets (40 mEq total) by mouth 2 (two) times a day 180 tablet 4    warfarin (COUMADIN) 5 mg tablet Take 1 tablet (5 mg) by mouth daily every Monday and Friday and 1.5 tablets (7.5 mg) daily all other days of the week or as directed by [facility] clinic. 135 tablet 4    tamsulosin (FLOMAX) 0.4 mg capsule Take 1 capsule (0.4 mg total) by mouth daily 30 capsule 9    multivitamin (THERAGRAN) tablet tablet Take 1 tablet by mouth daily       Current Facility-Administered Medications   Medication Dose Route Frequency Provider Last Rate Last Admin    LR infusion  50 mL/hr intravenous Continuous Staci Miller CNP 50 mL/hr at 07/03/24 0625 50 mL/hr at 07/03/24 0625     No Known Allergies  Family Status   Relation Name Status    Mother       Father   at age 86    Brother      Brother  Alive    Brother  Alive    Brother  Alive    Daughter  Alive    Mat Uncle  (Not Specified)   No partnership data on file     Social History     Socioeconomic History    Marital status: Single    Number of children: 1   Occupational History    Occupation:    Tobacco Use    Smoking status: Never    Smokeless tobacco: Never   Vaping Use    Vaping status: Never Used   Substance and Sexual Activity    Alcohol use: Yes     Alcohol/week: 1.0 standard drink of alcohol     Types: 1 Standard drinks or equivalent per week     Comment: rarely    Drug use: Never    Sexual activity: Defer     Social Determinants of Health     Tobacco Use: Low Risk  (7/3/2024)    Patient History     Smoking Tobacco Use: Never     Smokeless Tobacco Use: Never   Alcohol Use: Not At Risk (12/10/2020)    AUDIT-C     Frequency of Alcohol Consumption: Monthly or less     Average Number of Drinks: 1 or 2     Frequency of Binge Drinking: Never   Financial Resource Strain: Unknown (12/10/2020)    Overall Financial Resource Strain (CARDIA)     Difficulty of Paying Living Expenses: Patient declined   Food Insecurity: No Food Insecurity (2024)    Hunger Vital Sign     Worried About Running Out of Food in the Last Year: Never true     Ran Out of Food in the Last Year: Never true   Transportation Needs: No Transportation Needs (2024)    PRAPARE - Transportation     Lack of Transportation (Medical): No     Lack of Transportation (Non-Medical): No   Housing Stability: Low Risk  (2024)    Housing Stability Vital Sign     Unable to Pay for Housing in the Last Year: No     Number of Places Lived in the Last Year: 1     Unstable Housing in the Last Year: No   Utilities: Patient Declined (2024)    [facility] Utilities     Threatened with loss of utilities: Patient declined     Balbir Machado MD    I have independently reviewed the patient's medical, social, surgical and  family history as listed above. In addition I have reviewed their allergies and medications.     Resuscitation Status: full    REVIEW OF SYSTEMS:  10 points ROS positive per HPI, all other systems negative.      PHYSICAL EXAMINATION:  Vital Signs Last 24 Hours:  [unfilled]  Weight: 103.4 kg (228 lb)  [unfilled]    Constitutional: Awake, alert, no acute distress  Eyes:  Pupils are equal, round, reactive to light.  EOMI. No scleral icterus.  Conjunctiva are without injection.  ENMT: Mucous membranes moist, Dentition and gums are intact.   Neck: Soft, supple, trachea midline.    Endocrine: The thyroid is without masses and mobile with swallow.   Lymphatic: There is no cervical, submandibular, supraclavicular/infraclavicular, or inguinal adenopathy.  Respiratory: Lungs are clear to auscultation and percussion bilaterally.   Cardiovascular: Regular rate and rhythm. No murmurs, rubs, or gallops.  no bilateral lower extremity edema. 2+ bilateral radial, dorsalis pedis, and posterior tibial pulses. No carotid bruits on ascultation.  Abdomen: Non-distended, non-tender, normoactive bowel sounds present, No masses, umbilical or inguinal hernias, or flank tenderness. No hepatosplenomegaly. No abdominal bruit noted.  Rectal: External exam shows normal anal folds, no external lesions, digital exam shows normal sphincter and no internal masses.    Musculoskeletal: No spinal or CVA tenderness. Full range of motion in the upper and lower extremities.    Skin: No skin rashes or lesions to inspection.  No petechia.    Neurologic: Cranial nerves II through XII are grossly intact and symmetric. No motor deficits. Reflexes are 2+ in biceps, triceps, patella, and achilles. Toes downgoing.    Psychiatric: The patient is alert and oriented times 3.  The patient's affect is not blunted and mood is appropriate.    INVESTIGATIONS:      MEDICAL DECISION MAKING:  I have personally reviewed previous records    ASSESSMENT & PLAN:  74 y.o. male  presenting for  screening colonoscopy.       I have thoroughly counseled the patient regarding their diagnosis. Please see the Procedure note for the indication for the procedure and who was present for the consent.      The patient was informed that the proposed endoscopy involves placing a flexible lighted tube into the intestine and does offer a very high likelihood of completing a full examination of the proposed portion of the intestine for the purposes of diagnosing the cause of symptoms, guiding treatment, or preventing disease.  Please see the GI Procedure note for the endoscopy description documentation.    The patient was made aware of the risks of the procedure, including but not limited to perforation of the intestine, hemorrhage from the intestine, heart arrhythmias, infection, missed lesions, and a remote possibility of death.  Additional difficulties may be encountered during recovery to include abdominal pain, bleeding, nausea, and vomiting. Surgery may be required to correct these complications.     In the course of the evaluation I did discuss other therapeutic options with the patient, including radiologic studies and surgical exploration. The risks and benefits of these options were also discussed which include but are not limited to missed lesions or surgical complications.     Also discussed were possible problems or difficulties the patient may encounter if treatment was not pursued at this time. These include bleeding, death, or infection from undiagnosed disease.      The patient was informed that Dr. Brasher will be primarily responsible for the procedure.  Assistance during the procedure and during hospitalization may also be provided by other physicians, nurses and technicians, some of whom may be students.      The patient was informed that some medical information or findings encountered in the course of this procedure may be utilized for research and/or teaching purposes. However,  should this occur, their identity will be kept anonymous with the exception of criminal cases when evidence is required to be provided to legal authorities for forensic purposes.     The patient will be provided additional education resources by the support staff including availability of an educational video.  If there are ever any questions regarding their diagnosis or the procedure, the patient is encouraged to ask.    All of the patient’s questions have been answered to their satisfaction and they have indicated a clear understanding of this discussion.    Saji Brasher MD

## 2024-07-03 NOTE — DISCHARGE INSTRUCTIONS
Post Endoscopy Discharge Instructions                                                                                                                                                                                                                                                                                         NORMAL SYMPTOMS YOU MAY EXPERIENCE IN THE NEXT 24 HOURS  -Slight bloated feeling and/or Gas  -A Small amount of blood in your stool for 24 hours  -Mild Cramping  -Sore throat  -Drowsiness and/or Forgetfulness    WHEN TO CALL THE DOCTOR  -Unusual Pain  -Fever  -Prolonged or Heavy Bleeding  -Blood in stool 2-3 days after the procedure   -Any new symptom  -Vomiting that is unrelieved     Activity  When you have received medication for your procedure you may feel tired, forgetful, and lightheaded. Your judgement and motor abilities may be slightly impaired for a period of twenty-four (24) hours.   -Do not drive a vehicle or operate machinery  -Avoid alcoholic beverages  -Postpone signing of legal documents or making important decisions  -Limit your activities  To assist with cramping and bloating use a heating pad or heat pack. Place a towel between the pack and your skin. Leave the heat applied for 20-30 minutes. Remove if your skin turns bright red.     Diet  Begin your diet with liquids and light food (jello, soups, toast, juice, etc.). Progress to your normal diet if you are not nauseated unless otherwise ordered by your physician. Drink enough liquids to keep your urine clear or pale yellow.     Medications  If Applicable, take regularly scheduled medications as prescribed by your physician    Follow-up  Refer back to the first page of this After Visit Summary to view upcoming appointments you have scheduled.     Results  Dr. Brasher's office will call you with results in 7-10 Business Days for Follow up Care however *Please note that some results may take up to 3 weeks.*      *If you have any  questions or problems, please call [facility] [address] at [phone].  After hours, please call the Physician's Office at [phone].   If you feel it is an emergency, go directly to the ER at [facility].

## 2024-07-19 ENCOUNTER — TELEPHONE (OUTPATIENT)
Dept: UROLOGY | Facility: CLINIC | Age: 74
End: 2024-07-19
Payer: MEDICARE

## 2024-07-19 NOTE — TELEPHONE ENCOUNTER
VM at 131pm 7/18/24    Caller would like to discuss medication     Patient: Cesar Yuen    Callback Number: [phone]    Additional Info: Pt called and left  stating he is almost out of his tamsulosin. Wants to know if he will need to continue taking it or if he should stop.

## 2024-07-23 NOTE — TELEPHONE ENCOUNTER
Returned patients call.  L/M on V/M letting patient know that YES he does need to continue taking his Flomax.  I informed him that he has a follow-up appointment with Sukumar in December and he should continue to take his meds till than.  I called to verify that [facility] in [place] has refills on file.  Emerita BENTLEY MA

## 2024-07-31 NOTE — INTERVAL H&P NOTE
H&P reviewed. The patient was examined and there are no changes to the H&P.   unable to formally assess 2* cognition/poor command following

## 2024-08-03 PROCEDURE — 93298 REM INTERROG DEV EVAL SCRMS: CPT | Mod: 26 | Performed by: INTERNAL MEDICINE

## 2024-08-08 ENCOUNTER — TELEPHONE (OUTPATIENT)
Dept: PHARMACY | Facility: HOSPITAL | Age: 74
End: 2024-08-08
Payer: MEDICARE

## 2024-08-08 DIAGNOSIS — I48.92 ATRIAL FLUTTER, UNSPECIFIED TYPE (CMS/HCC): Primary | ICD-10-CM

## 2024-08-08 NOTE — TELEPHONE ENCOUNTER
I spoke with Marco on the phone and asked that he swing in for an INR at his earliest convenience. He agreed - will make sure he has an order available.

## 2024-08-13 ENCOUNTER — LAB (OUTPATIENT)
Dept: LAB | Facility: CLINIC | Age: 74
End: 2024-08-13
Payer: MEDICARE

## 2024-08-13 ENCOUNTER — ANTICOAGULATION VISIT (OUTPATIENT)
Dept: PHARMACY | Facility: HOSPITAL | Age: 74
End: 2024-08-13
Payer: MEDICARE

## 2024-08-13 DIAGNOSIS — I48.3 TYPICAL ATRIAL FLUTTER (CMS/HCC): ICD-10-CM

## 2024-08-13 LAB — INR PPP: 1.6 (ref 0.9–1.1)

## 2024-08-13 PROCEDURE — 36416 COLLJ CAPILLARY BLOOD SPEC: CPT

## 2024-08-13 PROCEDURE — 85610 PROTHROMBIN TIME: CPT

## 2024-08-13 NOTE — PROGRESS NOTES
Subjective   Cesar Yuen is contacted by the anticoagulation clinic for monitoring of his long term warfarin therapy.     INR: 1.6  Extra 2.5 mg today and increase dosing to 5 mg on Monday, 7.5 mg all other days.  Follow-up INR: 2 weeks.    Current Outpatient Medications   Medication Sig Dispense Refill    furosemide (LASIX) 80 mg tablet Take 1 tablet (80 mg total) by mouth 2 (two) times a day 90 tablet 4    potassium chloride (KLOR-CON M20) 20 mEq CR tablet Take 2 tablets (40 mEq total) by mouth 2 (two) times a day 180 tablet 4    warfarin (COUMADIN) 5 mg tablet Take 1 tablet (5 mg) by mouth daily every Monday and Friday and 1.5 tablets (7.5 mg) daily all other days of the week or as directed by anticoag clinic. 135 tablet 4    tamsulosin (FLOMAX) 0.4 mg capsule Take 1 capsule (0.4 mg total) by mouth daily 30 capsule 9    multivitamin (THERAGRAN) tablet tablet Take 1 tablet by mouth daily       No current facility-administered medications for this visit.         Objective     There were no vitals taken for this visit.    Assessment/Plan      Anticoagulation Summary  As of 2024      INR goal:  2.0-3.0   INR used for dosin.6 (2024)   Full warfarin instructions:  : 10 mg; Otherwise 5 mg every Mon; 7.5 mg all other days   Next INR check:  2024   Priority:  Maintenance    Indications    Typical atrial flutter (CMS/HCC) (Resolved) [I48.3]                 Anticoagulation Care Providers       Provider Role Specialty Phone number    Balbir Machado MD Referring Family Medicine [phone]            Anticoagulation therapy status: INR is less than goal and dose adjustment required.  Patient verbalized understanding regarding new dose, monitoring and INR.      Please reference Anticoagulation episode for additional documentation.

## 2024-09-03 PROCEDURE — 93298 REM INTERROG DEV EVAL SCRMS: CPT | Mod: 26 | Performed by: INTERNAL MEDICINE

## 2024-09-18 ENCOUNTER — ANTICOAGULATION VISIT (OUTPATIENT)
Dept: ANTICOAGULATION | Facility: CLINIC | Age: 74
End: 2024-09-18
Payer: MEDICARE

## 2024-09-18 VITALS — BODY MASS INDEX: 34.64 KG/M2 | SYSTOLIC BLOOD PRESSURE: 129 MMHG | WEIGHT: 234.6 LBS | DIASTOLIC BLOOD PRESSURE: 76 MMHG

## 2024-09-18 DIAGNOSIS — I48.3 TYPICAL ATRIAL FLUTTER (CMS/HCC): ICD-10-CM

## 2024-09-18 LAB — INR PPP: 2.6 (ref 0.9–1.1)

## 2024-09-18 PROCEDURE — G0463 HOSPITAL OUTPT CLINIC VISIT: HCPCS

## 2024-09-18 PROCEDURE — 85610 PROTHROMBIN TIME: CPT | Mod: QW

## 2024-09-18 RX ORDER — WARFARIN SODIUM 5 MG/1
TABLET ORAL
Start: 2024-09-18

## 2024-09-18 NOTE — PROGRESS NOTES
Subjective   Cesar Yuen presents to the anticoagulation clinic for monitoring of his long term warfarin therapy. Marco confirmed 1 tablet on  and 1.5 tablets all other days of the week. He has 5 mg tablets.    Today Cesar reports the following:   Bleeding signs/symptoms: No   Major bleeding event: No  Thrombosis signs/symptoms: No  Thromboembolic event: No  Missed doses: No  Medication changes: No  Dietary changes: No  Bacterial/viral infection: no  Other concerns: No    Current Outpatient Medications   Medication Sig Dispense Refill    warfarin (COUMADIN) 5 mg tablet Take 1 tablet (5 mg) by mouth daily every Monday and 1.5 tablets (7.5 mg) daily all other days of the week or as directed by anticoag clinic.      furosemide (LASIX) 80 mg tablet Take 1 tablet (80 mg total) by mouth 2 (two) times a day 90 tablet 4    potassium chloride (KLOR-CON M20) 20 mEq CR tablet Take 2 tablets (40 mEq total) by mouth 2 (two) times a day 180 tablet 4    tamsulosin (FLOMAX) 0.4 mg capsule Take 1 capsule (0.4 mg total) by mouth daily 30 capsule 9    multivitamin (THERAGRAN) tablet tablet Take 1 tablet by mouth daily       No current facility-administered medications for this visit.         Objective     /76 (BP Location: Left arm, Patient Position: Sitting, Cuff Size: Large Adult)   Wt 106.4 kg (234 lb 9.6 oz)   BMI 34.64 kg/m²     Assessment/Plan      Anticoagulation Summary  As of 2024      INR goal:  2.0-3.0   INR used for dosin.6 (2024)   Full warfarin instructions:  5 mg every Mon; 7.5 mg all other days   No change documented:  Mendoza Dick, PharmD   Next INR check:  10/16/2024   Priority:  Maintenance    Indications    Typical atrial flutter (CMS/HCC) (Resolved) [I48.3]                 Anticoagulation Care Providers       Provider Role Specialty Phone number    Balbir Machado MD Referring Family Medicine [phone]            Anticoagulation therapy status: INR is stable, no dose  adjustment required. Continue warfarin 5 mg on Mondays and 7.5 mg all other days of the week. Follow up INR in 4 weeks. Patient verbalized understanding regarding dose, monitoring and INR.      Please reference Anticoagulation episode for additional documentation.

## 2024-10-04 PROCEDURE — 93298 REM INTERROG DEV EVAL SCRMS: CPT | Mod: 26 | Performed by: INTERNAL MEDICINE

## 2024-10-10 ENCOUNTER — TELEPHONE (OUTPATIENT)
Dept: UROLOGY | Facility: CLINIC | Age: 74
End: 2024-10-10
Payer: MEDICARE

## 2024-10-10 NOTE — TELEPHONE ENCOUNTER
Call Type: Incoming Voicemail    [Incoming VMs Only]  Left On (Date/Time): 10/10 8:39 AM    Return Call Needed? Yes  Callback Number:  [phone]    Pt Name: Cesar Yuen      URO Provider: Lalitha Patino CNP    Reason For Call: Lab     Call Notes:   Patient called a left a voicemail stating that they believe they have a UTI as they have had dark urine for 4 days and would like to have a lab done and an appointment.     Message sent to nursing team in regards to scheduling.       [CALL CENTER INSTRUCTIONS]  On Return Call, Transfer to: Main PAS Line  Ok to Transfer During Shutdown?: No

## 2024-10-10 NOTE — TELEPHONE ENCOUNTER
Called Marco back. 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

## 2024-10-11 NOTE — TELEPHONE ENCOUNTER
Caller would like to discuss appointment     Patient: Cesar Yuen    Callback Number: [phone]     Best Availability: anytime    Additional Info: Patient is calling regarding an Infection, stating that he is in [place] until 1Pm today.    I advised caller of a callback by 24 hours.

## 2024-10-16 DIAGNOSIS — R82.90 CLOUDY URINE: Primary | ICD-10-CM

## 2024-10-21 DIAGNOSIS — I48.3 TYPICAL ATRIAL FLUTTER (CMS/HCC): ICD-10-CM

## 2024-10-21 RX ORDER — WARFARIN SODIUM 5 MG/1
TABLET ORAL
Qty: 135 TABLET | Refills: 4 | Status: CANCELLED | OUTPATIENT
Start: 2024-10-21

## 2024-10-21 NOTE — TELEPHONE ENCOUNTER
Care Due:                  Date            Visit Type   Department     Provider  --------------------------------------------------------------------------------                              MEDICARE                              [facility]     [facility]  Last Visit: 06-      VISIT        MEDICINE       FELY BAILEY  Next Visit: None Scheduled  None         None Found                                                            Last  Test          Frequency    Reason                     Performed    Due Date  --------------------------------------------------------------------------------  INR.........  2 months...  warfarin.................  09- 11-    Health [facility] Embedded Care Due Messages. Reference number: [id number]. 10/21/2024 1:11:02 PM [place]

## 2024-10-22 ENCOUNTER — ANTICOAGULATION VISIT (OUTPATIENT)
Dept: ANTICOAGULATION | Facility: CLINIC | Age: 74
End: 2024-10-22
Payer: MEDICARE

## 2024-10-22 ENCOUNTER — LAB (OUTPATIENT)
Dept: LAB | Facility: CLINIC | Age: 74
End: 2024-10-22
Payer: MEDICARE

## 2024-10-22 VITALS — SYSTOLIC BLOOD PRESSURE: 121 MMHG | BODY MASS INDEX: 35.88 KG/M2 | WEIGHT: 243 LBS | DIASTOLIC BLOOD PRESSURE: 81 MMHG

## 2024-10-22 DIAGNOSIS — I48.3 TYPICAL ATRIAL FLUTTER (CMS/HCC): ICD-10-CM

## 2024-10-22 DIAGNOSIS — R82.90 CLOUDY URINE: ICD-10-CM

## 2024-10-22 LAB
BACTERIA #/AREA URNS HPF: ABNORMAL /HPF
BILIRUB UR QL: NEGATIVE
CLARITY UR: ABNORMAL
COLOR UR: YELLOW
GLUCOSE UR QL: NEGATIVE MG/DL
HGB UR QL: ABNORMAL
INR PPP: 1.4 (ref 0.9–1.1)
KETONES UR-MCNC: NEGATIVE MG/DL
LEUKOCYTE ESTERASE UR QL STRIP: NEGATIVE
MUCOUS THREADS #/AREA URNS HPF: PRESENT /[HPF]
NITRITE UR QL: NEGATIVE
PH UR: 6 PH
PROT UR STRIP-MCNC: NEGATIVE MG/DL
RBC #/AREA URNS HPF: ABNORMAL /HPF
SP GR UR: 1.01 (ref 1–1.03)
SQUAMOUS #/AREA URNS HPF: ABNORMAL /HPF
UROBILINOGEN UR-MCNC: 0.2 MG/DL
WBC #/AREA URNS HPF: ABNORMAL /HPF

## 2024-10-22 PROCEDURE — 81001 URINALYSIS AUTO W/SCOPE: CPT

## 2024-10-22 PROCEDURE — G0463 HOSPITAL OUTPT CLINIC VISIT: HCPCS

## 2024-10-22 PROCEDURE — 85610 PROTHROMBIN TIME: CPT | Mod: QW

## 2024-10-22 RX ORDER — WARFARIN SODIUM 5 MG/1
TABLET ORAL
Qty: 135 TABLET | Refills: 4 | Status: SHIPPED | OUTPATIENT
Start: 2024-10-22

## 2024-10-22 ASSESSMENT — PAIN SCALES - GENERAL: PAINLEVEL: 0-NO PAIN

## 2024-10-22 NOTE — PROGRESS NOTES
Subjective   Cesar Yuen presents to the anticoagulation clinic for monitoring of his long term warfarin therapy.       Today Cesar reports the following:   Bleeding signs/symptoms: No   Major bleeding event: No  Thrombosis signs/symptoms: No  Thromboembolic event: No  Missed doses: Yes  missed 3 doses recently  Medication changes: No  Dietary changes: No  Bacterial/viral infection: no  Other concerns: No    Current Outpatient Medications   Medication Sig Dispense Refill    warfarin (COUMADIN) 5 mg tablet Take 1 tablet (5 mg) by mouth daily every Monday and 1.5 tablets (7.5 mg) daily all other days of the week or as directed by anticoag clinic.      furosemide (LASIX) 80 mg tablet Take 1 tablet (80 mg total) by mouth 2 (two) times a day 90 tablet 4    potassium chloride (KLOR-CON M20) 20 mEq CR tablet Take 2 tablets (40 mEq total) by mouth 2 (two) times a day 180 tablet 4    tamsulosin (FLOMAX) 0.4 mg capsule Take 1 capsule (0.4 mg total) by mouth daily 30 capsule 9    multivitamin (THERAGRAN) tablet tablet Take 1 tablet by mouth daily       No current facility-administered medications for this visit.         Objective     /81 (BP Location: Left arm, Patient Position: Sitting)   Wt 110.2 kg (243 lb)   BMI 35.88 kg/m²     Assessment/Plan      Anticoagulation Summary  As of 10/22/2024      INR goal:  2.0-3.0   INR used for dosin.4 (10/22/2024)   Full warfarin instructions:  5 mg every Mon; 7.5 mg all other days   No change documented:  Robi Solorio, PharmD   Next INR check:  2024   Priority:  Maintenance    Indications    Typical atrial flutter (CMS/HCC) (Resolved) [I48.3]                 Anticoagulation Care Providers       Provider Role Specialty Phone number    Balbir Machado MD Referring Family Medicine [phone]            Anticoagulation therapy status: INR is stable, no dose adjustment required.  Patient verbalized understanding regarding dose, monitoring and INR.    Additional  education : Marco lost his warfarin this weekend and didn't take 3 doses.  He is going to get it refilled and start it back up.  Follow up INR in one month when he has been back on the warfarin.      Please reference Anticoagulation episode for additional documentation.

## 2024-10-22 NOTE — TELEPHONE ENCOUNTER
No care due was identified.  Health [facility] Embedded Care Due Messages. Reference number: [id number]. 10/22/2024 9:04:18 AM [place]

## 2024-10-24 ENCOUNTER — TELEPHONE (OUTPATIENT)
Dept: UROLOGY | Facility: CLINIC | Age: 74
End: 2024-10-24
Payer: MEDICARE

## 2024-10-24 DIAGNOSIS — N35.919 STRICTURE OF MALE URETHRA, UNSPECIFIED STRICTURE TYPE: Primary | ICD-10-CM

## 2024-10-24 RX ORDER — FUROSEMIDE 10 MG/ML
10 INJECTION INTRAMUSCULAR; INTRAVENOUS ONCE
Status: CANCELLED | OUTPATIENT
Start: 2024-10-24 | End: 2024-10-24

## 2024-10-24 NOTE — TELEPHONE ENCOUNTER
Caller would like to discuss  results       Patient: Cesar Yuen    Callback Number: [phone]     Best Availability: anytime    Additional Info: Patient is calling asking for [place] nurse to discuss his UA results.  He stated that he got it done in [place].     I advised caller of a callback by 24 hours.

## 2024-11-04 PROCEDURE — 93298 REM INTERROG DEV EVAL SCRMS: CPT | Mod: 26 | Performed by: INTERNAL MEDICINE

## 2024-11-19 ENCOUNTER — HOSPITAL ENCOUNTER (OUTPATIENT)
Dept: CT IMAGING | Facility: HOSPITAL | Age: 74
Discharge: HOME | End: 2024-11-19
Payer: MEDICARE

## 2024-11-19 ENCOUNTER — ANTICOAGULATION VISIT (OUTPATIENT)
Dept: ANTICOAGULATION | Facility: CLINIC | Age: 74
End: 2024-11-19
Payer: MEDICARE

## 2024-11-19 VITALS — WEIGHT: 244.6 LBS | SYSTOLIC BLOOD PRESSURE: 107 MMHG | DIASTOLIC BLOOD PRESSURE: 70 MMHG | BODY MASS INDEX: 36.12 KG/M2

## 2024-11-19 DIAGNOSIS — N35.919 STRICTURE OF MALE URETHRA, UNSPECIFIED STRICTURE TYPE: ICD-10-CM

## 2024-11-19 LAB
ANION GAP SERPL CALC-SCNC: 9 MMOL/L (ref 3–11)
BUN SERPL-MCNC: 17 MG/DL (ref 7–25)
CALCIUM SERPL-MCNC: 9.1 MG/DL (ref 8.6–10.3)
CHLORIDE SERPL-SCNC: 103 MMOL/L (ref 98–107)
CO2 SERPL-SCNC: 30 MMOL/L (ref 21–32)
CREAT SERPL-MCNC: 1.02 MG/DL (ref 0.7–1.3)
EGFRCR SERPLBLD CKD-EPI 2021: 77 ML/MIN/1.73M*2
GLUCOSE SERPL-MCNC: 96 MG/DL (ref 70–105)
INR PPP: 3.3 (ref 0.9–1.1)
POTASSIUM SERPL-SCNC: 3.8 MMOL/L (ref 3.5–5.1)
SODIUM SERPL-SCNC: 142 MMOL/L (ref 135–145)

## 2024-11-19 PROCEDURE — G0463 HOSPITAL OUTPT CLINIC VISIT: HCPCS

## 2024-11-19 PROCEDURE — 80048 BASIC METABOLIC PNL TOTAL CA: CPT | Performed by: NURSE PRACTITIONER

## 2024-11-19 PROCEDURE — 85610 PROTHROMBIN TIME: CPT | Mod: QW

## 2024-11-19 PROCEDURE — 6360000200 HC RX 636 W HCPCS (ALT 250 FOR IP): Performed by: NURSE PRACTITIONER

## 2024-11-19 PROCEDURE — 74178 CT ABD&PLV WO CNTR FLWD CNTR: CPT

## 2024-11-19 PROCEDURE — 2550000100 HC RX 255: Performed by: NURSE PRACTITIONER

## 2024-11-19 PROCEDURE — 36415 COLL VENOUS BLD VENIPUNCTURE: CPT | Performed by: NURSE PRACTITIONER

## 2024-11-19 PROCEDURE — 74178 CT ABD&PLV WO CNTR FLWD CNTR: CPT | Mod: 26 | Performed by: RADIOLOGY

## 2024-11-19 RX ORDER — FUROSEMIDE 10 MG/ML
10 INJECTION INTRAMUSCULAR; INTRAVENOUS ONCE
Status: COMPLETED | OUTPATIENT
Start: 2024-11-19 | End: 2024-11-19

## 2024-11-19 RX ORDER — IOPAMIDOL 755 MG/ML
100 INJECTION, SOLUTION INTRAVASCULAR ONCE
Status: COMPLETED | OUTPATIENT
Start: 2024-11-19 | End: 2024-11-19

## 2024-11-19 RX ADMIN — IOPAMIDOL 100 ML: 755 INJECTION, SOLUTION INTRAVENOUS at 11:15

## 2024-11-19 RX ADMIN — FUROSEMIDE 10 MG: 10 INJECTION, SOLUTION INTRAMUSCULAR; INTRAVENOUS at 10:30

## 2024-11-19 NOTE — PROGRESS NOTES
Subjective   Cesar Yuen presents to the anticoagulation clinic for monitoring of his long term warfarin therapy. His hearing aids were not working today. Marco confirmed 1 tablet of warfarin on Mondays and 1.5 tablets all other days of the week. He has 5 mg tablets.     Today Cesar reports the following:   Bleeding signs/symptoms: Yes  Seeing some blood in urine. Has a urogram today and cysto with urology on Nov 26th. Has had procedures in the past for urethral stricture..   Major bleeding event: No  Thrombosis signs/symptoms: No  Thromboembolic event: No  Missed doses: No  Medication changes: No  Dietary changes: No  Bacterial/viral infection: no  Other concerns: No    Current Outpatient Medications   Medication Sig Dispense Refill    warfarin (COUMADIN) 5 mg tablet Take 1 tablet (5 mg) by mouth daily every Monday and Friday and 1.5 tablets (7.5 mg) daily all other days of the week or as directed by anticoag clinic. 135 tablet 4    warfarin (COUMADIN) 5 mg tablet Take 1 tablet (5 mg) by mouth daily every Monday and 1.5 tablets (7.5 mg) daily all other days of the week or as directed by anticoag clinic.      furosemide (LASIX) 80 mg tablet Take 1 tablet (80 mg total) by mouth 2 (two) times a day 90 tablet 4    potassium chloride (KLOR-CON M20) 20 mEq CR tablet Take 2 tablets (40 mEq total) by mouth 2 (two) times a day 180 tablet 4    tamsulosin (FLOMAX) 0.4 mg capsule Take 1 capsule (0.4 mg total) by mouth daily 30 capsule 9    multivitamin (THERAGRAN) tablet tablet Take 1 tablet by mouth daily       No current facility-administered medications for this visit.         Objective     /70 (BP Location: Left arm, Patient Position: Sitting, Cuff Size: Long Adult)   Wt 110.9 kg (244 lb 9.6 oz)   BMI 36.12 kg/m²     Assessment/Plan      Anticoagulation Summary  As of 11/19/2024      INR goal:  2.0-3.0   INR used for dosing:  3.3 (11/19/2024)   Full warfarin instructions:  11/19: 2.5 mg; Otherwise 5 mg every  Mon; 7.5 mg all other days   Next INR check:  12/10/2024   Priority:  Maintenance    Indications    Typical atrial flutter (CMS/HCC) (Resolved) [I48.3]                 Anticoagulation Care Providers       Provider Role Specialty Phone number    Balbir Machado MD Referring Family Medicine [phone]            Anticoagulation therapy status: INR is greater than goal and dose adjustment required. Take 2.5 mg today then resume warfarin 5 mg on Mondays and 7.5 mg all other days of the week (10% decrease in dose this week). Supratherapeutic INR is unusual for Marco so will make a one time adjustment for now. Follow up INR in 3 weeks. If INR remains elevated, will decrease dose at that time. Patient verbalized understanding regarding new dose, monitoring and INR.    Please reference Anticoagulation episode for additional documentation.

## 2024-11-26 ENCOUNTER — PROCEDURE VISIT (OUTPATIENT)
Dept: UROLOGY | Facility: CLINIC | Age: 74
End: 2024-11-26
Payer: MEDICARE

## 2024-11-26 VITALS
RESPIRATION RATE: 16 BRPM | DIASTOLIC BLOOD PRESSURE: 78 MMHG | HEIGHT: 69 IN | OXYGEN SATURATION: 93 % | WEIGHT: 244 LBS | TEMPERATURE: 97.6 F | HEART RATE: 48 BPM | BODY MASS INDEX: 36.14 KG/M2 | SYSTOLIC BLOOD PRESSURE: 133 MMHG

## 2024-11-26 DIAGNOSIS — N35.919 STRICTURE OF MALE URETHRA, UNSPECIFIED STRICTURE TYPE: ICD-10-CM

## 2024-11-26 PROCEDURE — 52000 CYSTOURETHROSCOPY: CPT | Performed by: SURGERY

## 2024-11-26 ASSESSMENT — PAIN SCALES - GENERAL: PAINLEVEL_OUTOF10: 0-NO PAIN

## 2024-11-26 NOTE — PROGRESS NOTES
Cystoscopy Procedure Note    11/26/24    Pre-operative Diagnosis:   1. Stricture of male urethra, unspecified stricture type  Cystoscopy male          Post-operative Diagnosis:   1. Stricture of male urethra, unspecified stricture type  Cystoscopy male          Surgeon: Yuri Hollingsworth MD    Procedure: Cystoscopy    Procedure Details   The risks, benefits, complications, treatment options, and expected outcomes were discussed with the patient. The patient concurred with the proposed plan, giving informed consent.    Cystoscopy was performed today under local anesthesia, using sterile technique. The patient was placed in the lithotomy position, prepped with Betadine, and draped in the usual sterile fashion. A 17 Macanese flexible cystoscope was inserted into the urethral meatus and then used to inspect both the entire urethra and bladder.    Findings:  The anterior urethra and meatus are unremarkable except for some wide caliber urethral stricture of approximately 20 Macanese in diameter at the bulb.  The prostate showed evidence of a prior transurethral resection.  There was a bladder neck contracture of approximately 22 Macanese in diameter.. In the bladder there is no evidence of cancer, and there is no evidence of stone.  Both ureteral orifices seen effluxing clear urine and in the orthotopic position.  Severely trabeculated bladder with multiple large diverticula.                  Complications:  None; patient tolerated the procedure well.    Plan:   Follow up in 12 months.

## 2024-11-29 DIAGNOSIS — R39.12 BENIGN PROSTATIC HYPERPLASIA WITH WEAK URINARY STREAM: ICD-10-CM

## 2024-11-29 DIAGNOSIS — N40.1 BENIGN PROSTATIC HYPERPLASIA WITH WEAK URINARY STREAM: ICD-10-CM

## 2024-11-29 DIAGNOSIS — N32.0 BLADDER NECK CONTRACTURE: Primary | ICD-10-CM

## 2024-11-29 RX ORDER — TAMSULOSIN HYDROCHLORIDE 0.4 MG/1
0.4 CAPSULE ORAL DAILY
Qty: 30 CAPSULE | Refills: 9 | OUTPATIENT
Start: 2024-11-29

## 2024-11-30 DIAGNOSIS — R39.12 BENIGN PROSTATIC HYPERPLASIA WITH WEAK URINARY STREAM: ICD-10-CM

## 2024-11-30 DIAGNOSIS — N40.1 BENIGN PROSTATIC HYPERPLASIA WITH WEAK URINARY STREAM: ICD-10-CM

## 2024-12-01 RX ORDER — TAMSULOSIN HYDROCHLORIDE 0.4 MG/1
0.4 CAPSULE ORAL DAILY
Qty: 90 CAPSULE | Refills: 2 | Status: SHIPPED | OUTPATIENT
Start: 2024-12-01

## 2024-12-03 ENCOUNTER — TELEPHONE (OUTPATIENT)
Dept: UROLOGY | Facility: CLINIC | Age: 74
End: 2024-12-03
Payer: MEDICARE

## 2024-12-03 NOTE — TELEPHONE ENCOUNTER
Left a detailed message on personalized voicemail inquiring if he still wants to keep appointment on 12/5 or not. He was last seen on 11/26 for cysto and recommendation was to follow up in a year. Which he is already scheduled for. If calls back please try [facility] provider line.

## 2024-12-04 NOTE — TELEPHONE ENCOUNTER
This is a documented call only.    Patient: Cesar Yuen    Additional Info: Pt called to return call.    Transferred to: [facility]

## 2024-12-04 NOTE — TELEPHONE ENCOUNTER
This is a documented call only.    Patient: Cesar Yuen    Additional Info: Pt returning call.    Transferred to:[facility]

## 2024-12-04 NOTE — TELEPHONE ENCOUNTER
"Spoke with patient who stated that he feels he does not need the appointment that is scheduled for tomorrow and will just follow up \"yearly\". Patient scheduled for appointment December of 2025. Advised patient to contact the clinic if there are any questions or concerns before then. Patient verbalized understanding.    "

## 2024-12-05 PROCEDURE — 93298 REM INTERROG DEV EVAL SCRMS: CPT | Mod: 26 | Performed by: INTERNAL MEDICINE

## 2024-12-10 ENCOUNTER — ANTICOAGULATION VISIT (OUTPATIENT)
Dept: ANTICOAGULATION | Facility: CLINIC | Age: 74
End: 2024-12-10
Payer: MEDICARE

## 2024-12-10 VITALS
BODY MASS INDEX: 36.14 KG/M2 | SYSTOLIC BLOOD PRESSURE: 138 MMHG | WEIGHT: 244 LBS | HEIGHT: 69 IN | DIASTOLIC BLOOD PRESSURE: 67 MMHG

## 2024-12-10 LAB — INR PPP: 2.4 (ref 0.9–1.1)

## 2024-12-10 PROCEDURE — 85610 PROTHROMBIN TIME: CPT | Mod: QW

## 2024-12-10 PROCEDURE — G0463 HOSPITAL OUTPT CLINIC VISIT: HCPCS

## 2024-12-10 NOTE — PROGRESS NOTES
"Subjective   Cesar Yuen presents to the anticoagulation clinic for monitoring of his long term warfarin therapy.       Today Cesar reports the following:   Bleeding signs/symptoms: No   Major bleeding event: No  Thrombosis signs/symptoms: No  Thromboembolic event: No  Missed doses: No  Medication changes: No  Dietary changes: No  Bacterial/viral infection: no  Other concerns: No    Current Outpatient Medications   Medication Sig Dispense Refill    tamsulosin (FLOMAX) 0.4 mg capsule Take 1 capsule (0.4 mg total) by mouth daily 90 capsule 2    warfarin (COUMADIN) 5 mg tablet Take 1 tablet (5 mg) by mouth daily every Monday and Friday and 1.5 tablets (7.5 mg) daily all other days of the week or as directed by anticoag clinic. 135 tablet 4    warfarin (COUMADIN) 5 mg tablet Take 1 tablet (5 mg) by mouth daily every Monday and 1.5 tablets (7.5 mg) daily all other days of the week or as directed by anticoag clinic.      furosemide (LASIX) 80 mg tablet Take 1 tablet (80 mg total) by mouth 2 (two) times a day 90 tablet 4    potassium chloride (KLOR-CON M20) 20 mEq CR tablet Take 2 tablets (40 mEq total) by mouth 2 (two) times a day 180 tablet 4    multivitamin (THERAGRAN) tablet tablet Take 1 tablet by mouth daily       No current facility-administered medications for this visit.         Objective     /67 (BP Location: Left arm, Patient Position: Sitting, Cuff Size: Large Adult)   Ht 69\" (175.3 cm)   Wt 110.7 kg (244 lb)   BMI 36.03 kg/m²     Assessment/Plan      Anticoagulation Summary  As of 12/10/2024      INR goal:  2.0-3.0   INR used for dosin.4 (12/10/2024)   Full warfarin instructions:  5 mg every Mon; 7.5 mg all other days   No change documented:  Eric Johnson, PharmD   Next INR check:  --    Indications    Typical atrial flutter (CMS/HCC) (Resolved) [I48.3]                 Anticoagulation Care Providers       Provider Role Specialty Phone number    Balbir Machado MD Referring Family Medicine " [phone]            Anticoagulation therapy status: INR is stable, no dose adjustment required.  Patient verbalized understanding regarding dose, monitoring and INR.    Please reference Anticoagulation episode for additional documentation.

## 2024-12-12 ENCOUNTER — OFFICE VISIT (OUTPATIENT)
Dept: URGENT CARE | Facility: CLINIC | Age: 74
End: 2024-12-12
Payer: MEDICARE

## 2024-12-12 VITALS
TEMPERATURE: 97.8 F | BODY MASS INDEX: 36.36 KG/M2 | RESPIRATION RATE: 20 BRPM | DIASTOLIC BLOOD PRESSURE: 78 MMHG | SYSTOLIC BLOOD PRESSURE: 125 MMHG | WEIGHT: 246.2 LBS | HEART RATE: 58 BPM | OXYGEN SATURATION: 95 %

## 2024-12-12 DIAGNOSIS — N30.01 ACUTE CYSTITIS WITH HEMATURIA: ICD-10-CM

## 2024-12-12 DIAGNOSIS — R39.9 UTI SYMPTOMS: Primary | ICD-10-CM

## 2024-12-12 LAB
BACTERIA #/AREA URNS HPF: ABNORMAL /HPF
BILIRUB UR QL: NEGATIVE
CLARITY UR: ABNORMAL
COLOR UR: YELLOW
GLUCOSE UR QL: NEGATIVE MG/DL
HGB UR QL: ABNORMAL
KETONES UR-MCNC: NEGATIVE MG/DL
LEUKOCYTE ESTERASE UR QL STRIP: ABNORMAL
NITRITE UR QL: NEGATIVE
PH UR: 6.5 PH
PROT UR STRIP-MCNC: 30 MG/DL
RBC #/AREA URNS HPF: >100 /HPF
SP GR UR: 1.01 (ref 1–1.03)
SQUAMOUS #/AREA URNS HPF: ABNORMAL /HPF
UROBILINOGEN UR-MCNC: 0.2 MG/DL
WBC #/AREA URNS HPF: >100 /HPF

## 2024-12-12 PROCEDURE — 87088 URINE BACTERIA CULTURE: CPT | Performed by: PHYSICIAN ASSISTANT

## 2024-12-12 PROCEDURE — 99214 OFFICE O/P EST MOD 30 MIN: CPT | Performed by: PHYSICIAN ASSISTANT

## 2024-12-12 PROCEDURE — 81001 URINALYSIS AUTO W/SCOPE: CPT | Performed by: PHYSICIAN ASSISTANT

## 2024-12-12 PROCEDURE — G0463 HOSPITAL OUTPT CLINIC VISIT: HCPCS | Performed by: PHYSICIAN ASSISTANT

## 2024-12-12 RX ORDER — CEPHALEXIN 500 MG/1
500 CAPSULE ORAL 4 TIMES DAILY
Qty: 28 CAPSULE | Refills: 0 | Status: SHIPPED | OUTPATIENT
Start: 2024-12-12 | End: 2024-12-19

## 2024-12-12 ASSESSMENT — ENCOUNTER SYMPTOMS
COLOR CHANGE: 0
SEIZURES: 0
ARTHRALGIAS: 0
CHILLS: 0
FEVER: 0
SORE THROAT: 0
ABDOMINAL PAIN: 0
FREQUENCY: 1
BACK PAIN: 0
SHORTNESS OF BREATH: 0
EYE PAIN: 0
VOMITING: 0
HEMATURIA: 0
COUGH: 0
PALPITATIONS: 0
DYSURIA: 1

## 2024-12-12 NOTE — PROGRESS NOTES
"Subjective      Cesar Yuen is a 74 y.o. male who presents for dysuria, urgency and frequent urination      History of Present Illness            Patient presents to the urgent care with urinary symptomology.  He is maintained on Coumadin.  Last INR was within normal range.  Started have problems last day or 2.  Is able to empty his bladder.  No fevers or chills.    The following have been reviewed and updated as appropriate in this visit:   Tobacco  Allergies  Meds  Problems  Med Hx  Surg Hx  Fam Hx         No Known Allergies  Current Outpatient Medications   Medication Sig Dispense Refill    tamsulosin (FLOMAX) 0.4 mg capsule Take 1 capsule (0.4 mg total) by mouth daily 90 capsule 2    warfarin (COUMADIN) 5 mg tablet Take 1 tablet (5 mg) by mouth daily every Monday and Friday and 1.5 tablets (7.5 mg) daily all other days of the week or as directed by anticoag clinic. 135 tablet 4    warfarin (COUMADIN) 5 mg tablet Take 1 tablet (5 mg) by mouth daily every Monday and 1.5 tablets (7.5 mg) daily all other days of the week or as directed by anticoag clinic.      furosemide (LASIX) 80 mg tablet Take 1 tablet (80 mg total) by mouth 2 (two) times a day 90 tablet 4    potassium chloride (KLOR-CON M20) 20 mEq CR tablet Take 2 tablets (40 mEq total) by mouth 2 (two) times a day 180 tablet 4    multivitamin (THERAGRAN) tablet tablet Take 1 tablet by mouth daily      cephalexin 500 mg capsule Take 1 capsule (500 mg total) by mouth 4 (four) times a day for 7 days 28 capsule 0     No current facility-administered medications for this visit.     Past Medical History:   Diagnosis Date    Arthritis     Atrial flutter (CMS/HCC)     Chipped tooth     \"cracked tooth\" Back right upper, left upper back    Clotting disorder (CMS/HCC)     Chronic anticoagulation, Warfarin    Dysrhythmia     Hx a-flutter, s/p ablation and loop recorder 2/8/2021    Edema     BLE    History of transfusion     After surgery    Injury of back " 01/14/2021    Seen on CT    Pneumonia 03/11/2023    Urinary retention     S/p urethral stent 12/2023     Past Surgical History:   Procedure Laterality Date    ABDOMINAL SURGERY      anuerysm    ABLATION A-FLUTTER N/A 2/8/2021    Procedure: Ablation A-flutter loop implant;  Surgeon: Timoteo Burk DO;  Location: [facility] EP Lab;  Service: Electrophysiology;  Laterality: N/A;    APPENDECTOMY      COLONOSCOPY  7/3/2024    COLONOSCOPY 7/3/2024 [facility] ENDOSCOPY    HERNIA REPAIR      x 3    LOOP RECORDER INSERTION N/A 2/8/2021    Procedure: LOOP RECORDER INSERTION;  Surgeon: Timoteo Burk DO;  Location: [facility] EP Lab;  Service: Electrophysiology;  Laterality: N/A;    PROSTATECTOMY N/A 4/28/2022    Procedure: REVOLIX LASER PROSTATECTOMY;  Surgeon: Attila Taylor MD;  Location: [facility] OR;  Service: Urology;  Laterality: N/A;    TOTAL KNEE ARTHROPLASTY Left 7/24/2023    Procedure: LEFT TOTAL KNEE ARTHROPLASTY WITH ROBOTIC ORTHOPEDIC SURGICAL ASSISTANT;  Surgeon: Garrett Saez DO;  Location: [facility] OR;  Service: Orthopedics;  Laterality: Left;    TOTAL KNEE ARTHROPLASTY Right 2/19/2024    Procedure: RIGHT TOTAL KNEE ARTHROPLASTY WITH ROBOTIC ORTHOPEDIC SURGICAL ASSISTANT;  Surgeon: Michael Zimmerman MD;  Location: [facility] OR;  Service: Orthopedics;  Laterality: Right;    TRANSURETHRAL RESECTION OF BLADDER TUMOR N/A 12/22/2020    Procedure: CYSTOSCOPY WITH BLADDER BIOPSY;  Surgeon: Daniele Johnson MD;  Location: [facility] OR;  Service: Urology;  Laterality: N/A;    TRANSURETHRAL RESECTION OF PROSTATE N/A 12/22/2020    Procedure: CYSTOSCOPY TRANSURETHRAL RESECTION PROSTATE, exam under anasthesia;  Surgeon: Daniele Johnson MD;  Location: [facility] OR;  Service: Urology;  Laterality: N/A;    TRANSURETHRAL RESECTION OF PROSTATE N/A 4/27/2021    Procedure: CYSTOSCOPY TRANSURETHRAL RESECTION PROSTATE;  Surgeon: Daniele Johnson MD;  Location: [facility] OR;  Service: Urology;  Laterality: N/A;    TRANSURETHRAL RESECTION OF PROSTATE N/A 9/27/2021    Procedure:  CYSTOSCOPY TRANSURETHRAL RESECTION PROSTATE;  Surgeon: Attila Taylor MD;  Location: [facility] OR;  Service: Urology;  Laterality: N/A;  NOT EARLY MORNING    URETHRA SURGERY N/A 12/13/2023    Procedure: CYSTOSCOPY URETHRAL DILATION AND TEMPORARY URETHRAL IMPLANT INSERTION (OPTILUME);  Surgeon: Attila Taylor MD;  Location: [facility] OR;  Service: Urology;  Laterality: N/A;     Family History   Problem Relation Age of Onset    Alzheimer's disease Mother     Diabetes Father     No Known Problems Brother     No Known Problems Brother     No Known Problems Brother     No Known Problems Daughter     Heart attack Mother's Brother      Social History     Socioeconomic History    Marital status: Single    Number of children: 1   Occupational History    Occupation:    Tobacco Use    Smoking status: Never    Smokeless tobacco: Never   Vaping Use    Vaping status: Never Used   Substance and Sexual Activity    Alcohol use: Yes     Alcohol/week: 1.0 standard drink of alcohol     Types: 1 Standard drinks or equivalent per week     Comment: rarely    Drug use: Never    Sexual activity: Defer     Social Drivers of Health     Tobacco Use: Low Risk  (12/12/2024)    Patient History     Smoking Tobacco Use: Never     Smokeless Tobacco Use: Never   Alcohol Use: Not At Risk (12/10/2020)    AUDIT-C     Frequency of Alcohol Consumption: Monthly or less     Average Number of Drinks: 1 or 2     Frequency of Binge Drinking: Never   Financial Resource Strain: Unknown (12/10/2020)    Overall Financial Resource Strain (CARDIA)     Difficulty of Paying Living Expenses: Patient declined   Food Insecurity: No Food Insecurity (2/19/2024)    Hunger Vital Sign     Worried About Running Out of Food in the Last Year: Never true     Ran Out of Food in the Last Year: Never true   Transportation Needs: No Transportation Needs (2/19/2024)    PRAPARE - Transportation     Lack of Transportation (Medical): No     Lack of Transportation (Non-Medical): No    Housing Stability: Low Risk  (2/19/2024)    Housing Stability Vital Sign     Unable to Pay for Housing in the Last Year: No     Number of Places Lived in the Last Year: 1     Unstable Housing in the Last Year: No   Utilities: Patient Declined (2/19/2024)    [facility] Utilities     Threatened with loss of utilities: Patient declined       Review of Systems   Constitutional:  Negative for chills and fever.   HENT:  Negative for ear pain and sore throat.    Eyes:  Negative for pain and visual disturbance.   Respiratory:  Negative for cough and shortness of breath.    Cardiovascular:  Negative for chest pain and palpitations.   Gastrointestinal:  Negative for abdominal pain and vomiting.   Genitourinary:  Positive for dysuria, frequency and urgency. Negative for hematuria.   Musculoskeletal:  Negative for arthralgias and back pain.   Skin:  Negative for color change and rash.   Neurological:  Negative for seizures and syncope.   All other systems reviewed and are negative.      Objective   /78   Pulse 58   Temp 36.6 °C (97.8 °F)   Resp 20   Wt 111.7 kg (246 lb 3.2 oz)   SpO2 95%   BMI 36.36 kg/m²     Physical Exam  Vitals and nursing note reviewed.   Constitutional:       Appearance: Normal appearance.   HENT:      Head: Normocephalic.   Eyes:      Extraocular Movements: Extraocular movements intact.      Pupils: Pupils are equal, round, and reactive to light.   Cardiovascular:      Rate and Rhythm: Normal rate and regular rhythm.      Heart sounds: Normal heart sounds.   Pulmonary:      Breath sounds: Normal breath sounds.   Neurological:      Mental Status: He is alert.         Recent Results (from the past 24 hours)   Urinalysis, dipstick Urine, Clean Catch    Collection Time: 12/12/24  4:17 PM   Result Value Ref Range    Color, Urine Yellow Yellow    Clarity, Urine Slightly Cloudy (A) Clear    Specific Gravity, Urine 1.015 1.003 - 1.030    Leukocytes, Urine Moderate (A) Negative    Nitrite, Urine Negative  Negative    Protein, Urine 30 (A) Negative MG/DL    Ketones, Urine Negative Negative MG/DL    Urobilinogen, Urine 0.2 <2.0 mg/dL    Bilirubin, Urine Negative Negative    Blood, Urine Large (A) Negative    Glucose, Urine Negative Negative MG/DL    pH, Urine 6.5 5.0 - 8.0 PH   Urinalysis, Micro (part 2 of panel) Urine, Clean Catch    Collection Time: 12/12/24  4:17 PM    Specimen: Urine, Clean Catch   Result Value Ref Range    RBC, Urine >100 (A) None seen, 0-2, Negative /HPF    WBC, Urine >100 (A) 0 - 4 /HPF    Squamous Epithelial, Urine 0-4 None Seen-9 /HPF    Bacteria, Urine Moderate (A) None seen, Few /HPF       Assessment/Plan   Assessment/Plan          Note patient is very hard of hearing.  Discussion that he has a lot of red blood cells in his urine but all large amount of bacteria and white blood cells.  We will culture his urine.  Will place him on Keflex to hopefully minimize his changes in INR.  He was educated in writing that he needs to recheck his INR in 3 days time and at the end of treatment.  Will culture his urine.  Will contact him for needs and changes in antibiotics.     Diagnosis Plan   1. UTI symptoms  Urinalysis w/microscopic, reflex culture Urine, Clean Catch    Urine culture    cephalexin 500 mg capsule      2. Acute cystitis with hematuria          Patient Instructions   You are started on Keflex for urinary tract infection.  IT important that you recheck your INR shelter through the treatment and at the end of it to make sure that the antibiotic does not change this.  We will culture your urine.  It takes around 2 days time for this to come back.  We will contact you for needs and changes in antibiotic    NENITA Bravo

## 2024-12-12 NOTE — PATIENT INSTRUCTIONS
You are started on Keflex for urinary tract infection.  IT important that you recheck your INR correction through the treatment and at the end of it to make sure that the antibiotic does not change this.  We will culture your urine.  It takes around 2 days time for this to come back.  We will contact you for needs and changes in antibiotic

## 2024-12-13 LAB — BACTERIA UR CULT: ABNORMAL

## 2025-01-06 PROCEDURE — 93298 REM INTERROG DEV EVAL SCRMS: CPT | Mod: 26 | Performed by: INTERNAL MEDICINE

## 2025-02-04 ENCOUNTER — TELEPHONE (OUTPATIENT)
Dept: FAMILY MEDICINE | Facility: CLINIC | Age: 75
End: 2025-02-04
Payer: MEDICARE

## 2025-02-05 ENCOUNTER — ANTICOAGULATION VISIT (OUTPATIENT)
Dept: ANTICOAGULATION | Facility: CLINIC | Age: 75
End: 2025-02-05
Payer: MEDICARE

## 2025-02-05 VITALS — DIASTOLIC BLOOD PRESSURE: 66 MMHG | SYSTOLIC BLOOD PRESSURE: 108 MMHG | BODY MASS INDEX: 34.7 KG/M2 | WEIGHT: 235 LBS

## 2025-02-05 LAB — INR PPP: 2 (ref 0.9–1.1)

## 2025-02-05 PROCEDURE — 85610 PROTHROMBIN TIME: CPT | Mod: QW

## 2025-02-05 PROCEDURE — G0463 HOSPITAL OUTPT CLINIC VISIT: HCPCS

## 2025-02-05 NOTE — PROGRESS NOTES
Subjective   Cesar Yuen presents to the anticoagulation clinic for monitoring of his long term warfarin therapy. Marco confirmed warfarin 5 mg on  and 7.5 mg all other days of the week.     Today Cesar reports the following:   Bleeding signs/symptoms: No   Major bleeding event: No  Thrombosis signs/symptoms: No  Thromboembolic event: No  Missed doses: No  Medication changes: No  Dietary changes: No  Bacterial/viral infection: no  Other concerns: No    Current Outpatient Medications   Medication Sig Dispense Refill    tamsulosin (FLOMAX) 0.4 mg capsule Take 1 capsule (0.4 mg total) by mouth daily 90 capsule 2    warfarin (COUMADIN) 5 mg tablet Take 1 tablet (5 mg) by mouth daily every Monday and 1.5 tablets (7.5 mg) daily all other days of the week or as directed by anticoag clinic.      furosemide (LASIX) 80 mg tablet Take 1 tablet (80 mg total) by mouth 2 (two) times a day 90 tablet 4    potassium chloride (KLOR-CON M20) 20 mEq CR tablet Take 2 tablets (40 mEq total) by mouth 2 (two) times a day 180 tablet 4    multivitamin (THERAGRAN) tablet tablet Take 1 tablet by mouth daily       No current facility-administered medications for this visit.         Objective     /66 (BP Location: Left arm, Patient Position: Sitting, Cuff Size: Regular Adult)   Wt 106.6 kg (235 lb)   BMI 34.70 kg/m²     Assessment/Plan      Anticoagulation Summary  As of 2025      INR goal:  2.0-3.0   INR used for dosin.0 (2025)   Full warfarin instructions:  5 mg every Mon; 7.5 mg all other days   No change documented:  Mendoza Dick, PharmD   Next INR check:  3/5/2025   Priority:  Maintenance    Indications    Typical atrial flutter (CMS/HCC) (Resolved) [I48.3]                 Anticoagulation Care Providers       Provider Role Specialty Phone number    Balbir Machado MD Referring Family Medicine [phone]            Anticoagulation therapy status: INR is stable, no dose adjustment required. Continue  warfarin 5 mg on Mondays and 7.5 mg all other days of the week. Follow up INR in 4 weeks. Patient verbalized understanding regarding dose, monitoring and INR.      Please reference Anticoagulation episode for additional documentation.

## 2025-02-25 ENCOUNTER — TELEPHONE (OUTPATIENT)
Dept: CARDIOLOGY | Facility: CLINIC | Age: 75
End: 2025-02-25
Payer: MEDICARE

## 2025-02-25 NOTE — TELEPHONE ENCOUNTER
Patient left a voicemail stating he missed a call from our clinic. Patient would like call back. Thank you.

## 2025-02-25 NOTE — TELEPHONE ENCOUNTER
Spoke to pt in regards to getting scheduled with Gisela Alba for an annual cardiology visit. Pt stated that he was not having any issues and he has his device checked every month and he does not think he needs to be seen as of right now.

## 2025-02-27 ENCOUNTER — ANTICOAGULATION VISIT (OUTPATIENT)
Dept: ANTICOAGULATION | Facility: CLINIC | Age: 75
End: 2025-02-27
Payer: MEDICARE

## 2025-02-27 ENCOUNTER — LAB (OUTPATIENT)
Dept: LAB | Facility: CLINIC | Age: 75
End: 2025-02-27
Payer: MEDICARE

## 2025-02-27 ENCOUNTER — RESULTS FOLLOW-UP (OUTPATIENT)
Dept: FAMILY MEDICINE | Facility: CLINIC | Age: 75
End: 2025-02-27

## 2025-02-27 DIAGNOSIS — I48.92 ATRIAL FLUTTER, UNSPECIFIED TYPE (CMS/HCC): ICD-10-CM

## 2025-02-27 LAB
INR BLD: 2.5
PROTHROMBIN TIME: 27.4 SECONDS (ref 9.4–12.5)

## 2025-02-27 PROCEDURE — 85610 PROTHROMBIN TIME: CPT

## 2025-02-27 PROCEDURE — 36415 COLL VENOUS BLD VENIPUNCTURE: CPT

## 2025-02-27 NOTE — PROGRESS NOTES
Subjective   Cesar Yuen is contacted by the anticoagulation clinic for monitoring of his long term warfarin therapy.     INR: 2.5  Continue current Warfarin dosing  Follow-up INR: 1 month    Current Outpatient Medications   Medication Sig Dispense Refill    tamsulosin (FLOMAX) 0.4 mg capsule Take 1 capsule (0.4 mg total) by mouth daily 90 capsule 2    warfarin (COUMADIN) 5 mg tablet Take 1 tablet (5 mg) by mouth daily every Monday and 1.5 tablets (7.5 mg) daily all other days of the week or as directed by anticoag clinic.      furosemide (LASIX) 80 mg tablet Take 1 tablet (80 mg total) by mouth 2 (two) times a day 90 tablet 4    potassium chloride (KLOR-CON M20) 20 mEq CR tablet Take 2 tablets (40 mEq total) by mouth 2 (two) times a day 180 tablet 4    multivitamin (THERAGRAN) tablet tablet Take 1 tablet by mouth daily       No current facility-administered medications for this visit.         Objective     There were no vitals taken for this visit.    Assessment/Plan      Anticoagulation Summary  As of 2025      INR goal:  2.0-3.0   INR used for dosin.5 (2025)   Full warfarin instructions:  5 mg every Mon; 7.5 mg all other days   No change documented:  Eric Johnson, PharmD   Next INR check:  3/27/2025   Priority:  Maintenance    Indications    Typical atrial flutter (CMS/HCC) (Resolved) [I48.3]                 Anticoagulation Care Providers       Provider Role Specialty Phone number    Balbir Machado MD Referring Family Medicine [phone]            Anticoagulation therapy status: INR is stable, no dose adjustment required.  Patient verbalized understanding regarding dose, monitoring and INR.      Please reference Anticoagulation episode for additional documentation.

## 2025-03-15 DIAGNOSIS — E87.70 HYPERVOLEMIA, UNSPECIFIED HYPERVOLEMIA TYPE: ICD-10-CM

## 2025-03-15 NOTE — TELEPHONE ENCOUNTER
For Clinic Staff: please review this note,  Centralized RN/Nurse Triage: medication(s) lasix not filled due to -No valid appointment within protocol timeline of 6 or 12 months and unable to give a courtesy refill due past due greater than 90 days.  Please review if courtesy refill is appropriate and if appointment should be scheduled.

## 2025-03-15 NOTE — TELEPHONE ENCOUNTER
Care Due:                  Date            Visit Type   Department     Provider  --------------------------------------------------------------------------------                              MEDICARE                              [facility]     [facility]  Last Visit: 06-      VISIT        MEDICINE       FELY BAILEY  Next Visit: None Scheduled  None         None Found                                                            Last  Test          Frequency    Reason                     Performed    Due Date  --------------------------------------------------------------------------------  Office Visit  6 months...  furosemide, warfarin.....  06- 12-    [facility] Embedded Care Due Messages. Reference number: [id number]. 3/15/2025 9:46:14 AM DARL[place]

## 2025-03-18 RX ORDER — FUROSEMIDE 80 MG/1
80 TABLET ORAL 2 TIMES DAILY
Qty: 90 TABLET | Refills: 0 | Status: SHIPPED | OUTPATIENT
Start: 2025-03-18

## 2025-04-01 ENCOUNTER — ANTICOAGULATION VISIT (OUTPATIENT)
Dept: ANTICOAGULATION | Facility: CLINIC | Age: 75
End: 2025-04-01
Payer: MEDICARE

## 2025-04-01 VITALS — DIASTOLIC BLOOD PRESSURE: 58 MMHG | SYSTOLIC BLOOD PRESSURE: 106 MMHG | BODY MASS INDEX: 32.72 KG/M2 | WEIGHT: 221.6 LBS

## 2025-04-01 DIAGNOSIS — I48.3 TYPICAL ATRIAL FLUTTER (CMS/HCC): ICD-10-CM

## 2025-04-01 LAB — INR PPP: 3.3 (ref 0.9–1.1)

## 2025-04-01 PROCEDURE — 85610 PROTHROMBIN TIME: CPT | Mod: QW

## 2025-04-01 PROCEDURE — G0463 HOSPITAL OUTPT CLINIC VISIT: HCPCS

## 2025-04-01 RX ORDER — WARFARIN SODIUM 5 MG/1
TABLET ORAL
Start: 2025-04-01

## 2025-04-01 NOTE — PROGRESS NOTES
Subjective   Cesar Yuen presents to the anticoagulation clinic for monitoring of his long term warfarin therapy. Marco confirmed warfarin 5 mg tablets - taking 1 tablet on Mondays and 1 and 1/2 tablets all other days of the week. One missed dose last week.     Today Cesar reports the following:   Bleeding signs/symptoms: No   Major bleeding event: No  Thrombosis signs/symptoms: No  Thromboembolic event: No  Missed doses: Yes  1 missed dose last week while traveling.  Medication changes: No  Dietary changes: Yes  Eating less and lost ~20 pounds in the last 3 months..Started playing pickleball 5 days a week.   Bacterial/viral infection: no  Other concerns: No    Current Outpatient Medications   Medication Sig Dispense Refill    warfarin (COUMADIN) 5 mg tablet Take 1 tablet (5 mg) by mouth on Mon/Wed/Fri and 1.5 tablets (7.5 mg) all other days of the week or as directed by anticoag clinic.      furosemide (LASIX) 80 mg tablet Take 1 tablet (80 mg total) by mouth 2 (two) times a day 90 tablet 0    tamsulosin (FLOMAX) 0.4 mg capsule Take 1 capsule (0.4 mg total) by mouth daily 90 capsule 2    potassium chloride (KLOR-CON M20) 20 mEq CR tablet Take 2 tablets (40 mEq total) by mouth 2 (two) times a day 180 tablet 4    multivitamin (THERAGRAN) tablet tablet Take 1 tablet by mouth daily       No current facility-administered medications for this visit.         Objective     /58 (BP Location: Left arm, Patient Position: Sitting, Cuff Size: Regular Adult)   Wt 100.5 kg (221 lb 9.6 oz)   BMI 32.72 kg/m²     Assessment/Plan      Anticoagulation Summary  As of 4/1/2025      INR goal:  2.0-3.0   INR used for dosing:  3.3 (4/1/2025)   Full warfarin instructions:  4/1: 5 mg; Otherwise 5 mg every Mon, Wed, Fri; 7.5 mg all other days   Next INR check:  4/22/2025   Priority:  Maintenance    Indications    Typical atrial flutter (CMS/HCC) (Resolved) [I48.3]                 Anticoagulation Care Providers       Provider Role  Specialty Phone number    Balbir Machado MD Referring Family Medicine [phone]            Anticoagulation therapy status: INR is greater than goal and dose adjustment required. Suspect dietary changes and exercise is contributing so will decrease dose. Take warfarin 5 mg today (instead of 7.5 mg) then decrease warfarin to 5 mg on Mon/Wed/Fri and 7.5 mg all other days of the week. This is a 10% decrease in weekly dose. Marco repeated back instructions. Follow up INR in 3 weeks. Patient verbalized understanding regarding new dose, monitoring and INR.    Please reference Anticoagulation episode for additional documentation.

## 2025-04-22 ENCOUNTER — ANTICOAGULATION VISIT (OUTPATIENT)
Dept: ANTICOAGULATION | Facility: CLINIC | Age: 75
End: 2025-04-22
Payer: MEDICARE

## 2025-04-22 VITALS — DIASTOLIC BLOOD PRESSURE: 65 MMHG | SYSTOLIC BLOOD PRESSURE: 112 MMHG | BODY MASS INDEX: 32.58 KG/M2 | WEIGHT: 220.6 LBS

## 2025-04-22 LAB — INR PPP: 2.1 (ref 0.9–1.1)

## 2025-04-22 PROCEDURE — G0463 HOSPITAL OUTPT CLINIC VISIT: HCPCS

## 2025-04-22 PROCEDURE — 85610 PROTHROMBIN TIME: CPT | Mod: QW

## 2025-04-22 NOTE — PROGRESS NOTES
Subjective   Cesar Yuen presents to the anticoagulation clinic for monitoring of his long term warfarin therapy. Marco confirmed warfarin 5 mg on Mon/Wed/Fri and 7.5 mg all other days of the week.     Today Cesar reports the following:   Bleeding signs/symptoms: Yes  Does occasionally have blood in urine - not new, has had procedures in the past. Comes and goes. States last time was one week ago.   Major bleeding event: No  Thrombosis signs/symptoms: No  Thromboembolic event: No  Missed doses: No  Medication changes: No  Dietary changes: No  Bacterial/viral infection: no  Other concerns: No    Current Outpatient Medications   Medication Sig Dispense Refill    warfarin (COUMADIN) 5 mg tablet Take 1 tablet (5 mg) by mouth on Mon/Wed/Fri and 1.5 tablets (7.5 mg) all other days of the week or as directed by anticoag clinic.      furosemide (LASIX) 80 mg tablet Take 1 tablet (80 mg total) by mouth 2 (two) times a day 90 tablet 0    tamsulosin (FLOMAX) 0.4 mg capsule Take 1 capsule (0.4 mg total) by mouth daily 90 capsule 2    potassium chloride (KLOR-CON M20) 20 mEq CR tablet Take 2 tablets (40 mEq total) by mouth 2 (two) times a day 180 tablet 4    multivitamin (THERAGRAN) tablet tablet Take 1 tablet by mouth daily       No current facility-administered medications for this visit.         Objective     /65 (BP Location: Left arm, Patient Position: Sitting, Cuff Size: Regular Adult)   Wt 100.1 kg (220 lb 9.6 oz)   BMI 32.58 kg/m²     Assessment/Plan      Anticoagulation Summary  As of 2025      INR goal:  2.0-3.0   INR used for dosin.1 (2025)   Full warfarin instructions:  5 mg every Mon, Wed, Fri; 7.5 mg all other days   No change documented:  Mendoza Dick, PharmD   Next INR check:  2025   Priority:  Maintenance    Indications    Typical atrial flutter (CMS/HCC) (Resolved) [I48.3]                 Anticoagulation Care Providers       Provider Role Specialty Phone number    Jaennette  MD Balbir [facility] [phone]            Anticoagulation therapy status: INR is stable, no dose adjustment required. Continue warfarin 5 mg on Mon/Wed/Fri and 7.5 mg all other days of the week. Follow up INR in 4 weeks. Patient verbalized understanding regarding dose, monitoring and INR.    Please reference Anticoagulation episode for additional documentation.

## 2025-05-04 DIAGNOSIS — I48.3 TYPICAL ATRIAL FLUTTER (CMS/HCC): ICD-10-CM

## 2025-05-05 RX ORDER — WARFARIN SODIUM 5 MG/1
TABLET ORAL
Qty: 47 TABLET | Refills: 0 | Status: SHIPPED | OUTPATIENT
Start: 2025-05-05

## 2025-05-10 PROCEDURE — 93298 REM INTERROG DEV EVAL SCRMS: CPT | Mod: 26 | Performed by: INTERNAL MEDICINE

## 2025-05-12 ENCOUNTER — TELEPHONE (OUTPATIENT)
Dept: CARDIOLOGY | Facility: CLINIC | Age: 75
End: 2025-05-12
Payer: MEDICARE

## 2025-05-12 NOTE — TELEPHONE ENCOUNTER
"Cesar has a MDT ILR.  Implanted on 2/8/21. Indication: AFL, unspecified.    Received a message today from [facility]: [facility] forwarded this message to us- Cesar Gee MDT ILR would like to discontinue monitoring.    I called him and he stated that, \"It has been 4 years and nothing has changed in those 4 years.  And I received a bill for over $100.\" He feels that the ILR has run it's course.      Message sent to EP NURY provider.       "

## 2025-05-12 NOTE — TELEPHONE ENCOUNTER
Clarified with Marco.  He does not need it removed.  Just stop monitoring.  Instructed him to unplug his home medtronic monitor. We will stop monitoring him at this time.

## 2025-05-16 DIAGNOSIS — E87.70 HYPERVOLEMIA, UNSPECIFIED HYPERVOLEMIA TYPE: ICD-10-CM

## 2025-05-16 RX ORDER — FUROSEMIDE 80 MG/1
80 TABLET ORAL 2 TIMES DAILY
Qty: 90 TABLET | Refills: 0 | Status: CANCELLED | OUTPATIENT
Start: 2025-05-16

## 2025-05-16 NOTE — TELEPHONE ENCOUNTER
Care Due:                  Date            Visit Type   Department     Provider  --------------------------------------------------------------------------------                              MEDICARE                              [facility]     [facility]  Last Visit: 06-      VISIT        MEDICINE       FELY BAILEY  Next Visit: None Scheduled  None         None Found                                                            Last  Test          Frequency    Reason                     Performed    Due Date  --------------------------------------------------------------------------------  Office Visit  6 months...  furosemide, potassium,     06- 12-                             tamsulosin, warfarin.....    BMP.........  6 months...  furosemide...............  Not Found    Overdue    [employer] Embedded Care Due Messages. Reference number: [id number]. 5/16/2025 6:19:48 AM DARLING

## 2025-05-18 DIAGNOSIS — E87.70 HYPERVOLEMIA, UNSPECIFIED HYPERVOLEMIA TYPE: ICD-10-CM

## 2025-05-18 NOTE — TELEPHONE ENCOUNTER
No care due was identified.  [facility] Embedded Care Due Messages. Reference number: [id number]. 5/18/2025 3:08:24 PM MDT

## 2025-05-19 RX ORDER — FUROSEMIDE 80 MG/1
80 TABLET ORAL 2 TIMES DAILY
Qty: 90 TABLET | Refills: 0 | Status: SHIPPED | OUTPATIENT
Start: 2025-05-19

## 2025-05-20 ENCOUNTER — ANTICOAGULATION VISIT (OUTPATIENT)
Dept: ANTICOAGULATION | Facility: CLINIC | Age: 75
End: 2025-05-20
Payer: MEDICARE

## 2025-05-20 VITALS
HEART RATE: 77 BPM | DIASTOLIC BLOOD PRESSURE: 55 MMHG | SYSTOLIC BLOOD PRESSURE: 113 MMHG | BODY MASS INDEX: 33.62 KG/M2 | WEIGHT: 227 LBS | HEIGHT: 69 IN

## 2025-05-20 LAB — INR PPP: 2.7 (ref 0.9–1.1)

## 2025-05-20 PROCEDURE — G0463 HOSPITAL OUTPT CLINIC VISIT: HCPCS

## 2025-05-20 PROCEDURE — 85610 PROTHROMBIN TIME: CPT | Mod: QW

## 2025-05-20 NOTE — PROGRESS NOTES
"Subjective   Cesar Yuen presents to the anticoagulation clinic for monitoring of his long term warfarin therapy.       Today Cesar reports the following:   Bleeding signs/symptoms: No   Major bleeding event: No  Thrombosis signs/symptoms: No  Thromboembolic event: No  Missed doses: No  Medication changes: No  Dietary changes: No  Bacterial/viral infection: no  Other concerns: No    Current Outpatient Medications   Medication Sig Dispense Refill    furosemide (LASIX) 80 mg tablet Take 1 tablet (80 mg total) by mouth 2 (two) times a day 90 tablet 0    warfarin (COUMADIN) 5 mg tablet Take 1 tablet (5 mg) by mouth on Mon/Wed/Fri and 1.5 tablets (7.5 mg) all other days of the week or as directed by anticoag clinic. 47 tablet 0    tamsulosin (FLOMAX) 0.4 mg capsule Take 1 capsule (0.4 mg total) by mouth daily 90 capsule 2    potassium chloride (KLOR-CON M20) 20 mEq CR tablet Take 2 tablets (40 mEq total) by mouth 2 (two) times a day 180 tablet 4    multivitamin (THERAGRAN) tablet tablet Take 1 tablet by mouth daily       No current facility-administered medications for this visit.         Objective     /55 (BP Location: Left arm, Patient Position: Sitting, Cuff Size: Large Adult)   Pulse 77   Ht 69\" (175.3 cm)   Wt 103 kg (227 lb)   BMI 33.52 kg/m²     Assessment/Plan      Anticoagulation Summary  As of 2025      INR goal:  2.0-3.0   INR used for dosin.7 (2025)   Full warfarin instructions:  5 mg every Mon, Wed, Fri; 7.5 mg all other days   No change documented:  Eric Johnson, Suri   Next INR check:  --   Priority:  Maintenance    Indications    Typical atrial flutter (CMS/HCC) (Resolved) [I48.3]                 Anticoagulation Care Providers       Provider Role Specialty Phone number    Balbir Machado MD Referring Family Medicine [phone]            Anticoagulation therapy status: INR is stable, no dose adjustment required.  Patient verbalized understanding regarding dose, monitoring " and INR.    Please reference Anticoagulation episode for additional documentation.

## 2025-05-28 ENCOUNTER — TELEPHONE (OUTPATIENT)
Dept: FAMILY MEDICINE | Facility: CLINIC | Age: 75
End: 2025-05-28
Payer: MEDICARE

## 2025-05-28 NOTE — TELEPHONE ENCOUNTER
Patient called in to return a call and reschedule his appointment for his upcoming MWV. He did move it back from 11am on 9/23 to 1pm on 9/24. He wanted to make sure that Dr. 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 Please call and advise if necessary.

## 2025-05-29 DIAGNOSIS — E87.70 HYPERVOLEMIA, UNSPECIFIED HYPERVOLEMIA TYPE: ICD-10-CM

## 2025-05-29 NOTE — TELEPHONE ENCOUNTER
No care due was identified.  [facility] Embedded Care Due Messages. Reference number: [id number]. 5/29/2025 8:32:56 AM [place]

## 2025-05-30 RX ORDER — POTASSIUM CHLORIDE 20 MEQ/1
40 TABLET, EXTENDED RELEASE ORAL 2 TIMES DAILY
Qty: 360 TABLET | Refills: 0 | Status: SHIPPED | OUTPATIENT
Start: 2025-05-30

## 2025-06-06 DIAGNOSIS — N40.1 BENIGN PROSTATIC HYPERPLASIA WITH WEAK URINARY STREAM: ICD-10-CM

## 2025-06-06 DIAGNOSIS — R39.12 BENIGN PROSTATIC HYPERPLASIA WITH WEAK URINARY STREAM: ICD-10-CM

## 2025-06-06 RX ORDER — TAMSULOSIN HYDROCHLORIDE 0.4 MG/1
0.4 CAPSULE ORAL DAILY
Qty: 90 CAPSULE | Refills: 0 | Status: SHIPPED | OUTPATIENT
Start: 2025-06-06

## 2025-06-09 DIAGNOSIS — I48.3 TYPICAL ATRIAL FLUTTER (CMS/HCC): ICD-10-CM

## 2025-06-10 RX ORDER — WARFARIN SODIUM 5 MG/1
TABLET ORAL
Qty: 47 TABLET | Refills: 2 | Status: SHIPPED | OUTPATIENT
Start: 2025-06-10

## 2025-06-17 ENCOUNTER — ANTICOAGULATION VISIT (OUTPATIENT)
Dept: ANTICOAGULATION | Facility: CLINIC | Age: 75
End: 2025-06-17
Payer: MEDICARE

## 2025-06-17 VITALS — WEIGHT: 216.6 LBS | BODY MASS INDEX: 31.99 KG/M2 | SYSTOLIC BLOOD PRESSURE: 111 MMHG | DIASTOLIC BLOOD PRESSURE: 65 MMHG

## 2025-06-17 LAB — INR PPP: 2.2 (ref 0.9–1.1)

## 2025-06-17 PROCEDURE — G0463 HOSPITAL OUTPT CLINIC VISIT: HCPCS

## 2025-06-17 PROCEDURE — 85610 PROTHROMBIN TIME: CPT | Mod: QW

## 2025-06-17 NOTE — PROGRESS NOTES
Subjective   Cesar Yuen presents to the anticoagulation clinic for monitoring of his long term warfarin therapy. Marco confirmed warfarin 5 mg on Mon/Wed/Fri and 7.5 mg all other days of the week.     Today Cesar reports the following:   Bleeding signs/symptoms: No   Major bleeding event: No  Thrombosis signs/symptoms: No  Thromboembolic event: No  Missed doses: No  Medication changes: No  Dietary changes: No  Bacterial/viral infection: no  Other concerns: No    Current Outpatient Medications   Medication Sig Dispense Refill    warfarin (COUMADIN) 5 mg tablet Take 1 tablet (5 mg) by mouth on Mon/Wed/Fri and 1.5 tablets (7.5 mg) all other days of the week or as directed by anticoag clinic. 47 tablet 2    tamsulosin (FLOMAX) 0.4 mg capsule Take 1 capsule (0.4 mg total) by mouth daily 90 capsule 0    potassium chloride (KLOR-CON M20) 20 mEq CR tablet Take 2 tablets (40 mEq total) by mouth 2 times a day 360 tablet 0    furosemide (LASIX) 80 mg tablet Take 1 tablet (80 mg total) by mouth 2 (two) times a day 90 tablet 0    multivitamin (THERAGRAN) tablet tablet Take 1 tablet by mouth daily       No current facility-administered medications for this visit.         Objective     /65 (BP Location: Left arm, Patient Position: Sitting, Cuff Size: Regular Adult)   Wt 98.2 kg (216 lb 9.6 oz)   BMI 31.99 kg/m²     Assessment/Plan      Anticoagulation Summary  As of 2025      INR goal:  2.0-3.0   INR used for dosin.2 (2025)   Full warfarin instructions:  5 mg every Mon, Wed, Fri; 7.5 mg all other days   No change documented:  Mendoza Dick, PharmD   Next INR check:  7/15/2025   Priority:  Maintenance    Indications    Typical atrial flutter (CMS/HCC) (Resolved) [I48.3]                 Anticoagulation Care Providers       Provider Role Specialty Phone number    Balbir Machado MD Referring Family Medicine [phone]            Anticoagulation therapy status: INR is stable, no dose adjustment  required. Continue warfarin 5 mg on Mon/Wed/Fri and 7.5 mg all other days of the week. Follow up INR in 4 weeks. Patient verbalized understanding regarding dose, monitoring and INR.      Please reference Anticoagulation episode for additional documentation.

## 2025-07-03 DIAGNOSIS — E87.70 HYPERVOLEMIA, UNSPECIFIED HYPERVOLEMIA TYPE: ICD-10-CM

## 2025-07-03 RX ORDER — FUROSEMIDE 80 MG/1
80 TABLET ORAL 2 TIMES DAILY
Qty: 60 TABLET | Refills: 0 | Status: SHIPPED | OUTPATIENT
Start: 2025-07-03

## 2025-07-03 NOTE — TELEPHONE ENCOUNTER
No care due was identified.  [facility] Embedded Care Due Messages. Reference number: [id number]. 7/03/2025 6:31:09 AM [place]

## 2025-07-03 NOTE — TELEPHONE ENCOUNTER
For Clinic Staff: please review this note,  Centralized RN/Nurse Triage: medication(s) furosemide not filled due to *90 day or less supply or previous Rx indicates a medication plan or an appt is needed.  Chart review completed and Centralized refill team is unable to determine.  Or med plan is not specific enough.  Labs due

## 2025-07-15 ENCOUNTER — ANTICOAGULATION VISIT (OUTPATIENT)
Dept: ANTICOAGULATION | Facility: CLINIC | Age: 75
End: 2025-07-15
Payer: MEDICARE

## 2025-07-15 VITALS — DIASTOLIC BLOOD PRESSURE: 72 MMHG | SYSTOLIC BLOOD PRESSURE: 117 MMHG | WEIGHT: 223.4 LBS | BODY MASS INDEX: 32.99 KG/M2

## 2025-07-15 LAB — INR PPP: 3.1 (ref 0.9–1.1)

## 2025-07-15 PROCEDURE — G0463 HOSPITAL OUTPT CLINIC VISIT: HCPCS

## 2025-07-15 PROCEDURE — 85610 PROTHROMBIN TIME: CPT | Mod: QW

## 2025-07-15 NOTE — PROGRESS NOTES
Subjective   Cesar Yuen presents to the anticoagulation clinic for monitoring of his long term warfarin therapy. Marco confirmed warfarin 5 mg on Mon/Wed/Fri and 7.5 mg all other days of the week. See below about new supplements started.     Today Cesar reports the following:   Bleeding signs/symptoms: No   Major bleeding event: No  Thrombosis signs/symptoms: No  Thromboembolic event: No  Missed doses: No  Medication changes: Yes  Recently started a few supplements. Taking Biolimitless Master [employer]otics and Cell Signals - has been taking for a while. He also started Liveden supplement - first dose today.  Dietary changes: No  Bacterial/viral infection: no  Other concerns: No    Current Outpatient Medications   Medication Sig Dispense Refill    furosemide (LASIX) 80 mg tablet Take 1 tablet (80 mg total) by mouth 2 times a day 60 tablet 0    warfarin (COUMADIN) 5 mg tablet Take 1 tablet (5 mg) by mouth on Mon/Wed/Fri and 1.5 tablets (7.5 mg) all other days of the week or as directed by anticoag clinic. 47 tablet 2    tamsulosin (FLOMAX) 0.4 mg capsule Take 1 capsule (0.4 mg total) by mouth daily 90 capsule 0    potassium chloride (KLOR-CON M20) 20 mEq CR tablet Take 2 tablets (40 mEq total) by mouth 2 times a day 360 tablet 0    multivitamin (THERAGRAN) tablet tablet Take 1 tablet by mouth daily       No current facility-administered medications for this visit.         Objective     /72 (BP Location: Left arm, Patient Position: Sitting, Cuff Size: Regular Adult)   Wt 101.3 kg (223 lb 6.4 oz)   BMI 32.99 kg/m²     Assessment/Plan      Anticoagulation Summary  As of 7/15/2025      INR goal:  2.0-3.0   INR used for dosing:  3.1 (7/15/2025)   Full warfarin instructions:  5 mg every Mon, Wed, Fri; 7.5 mg all other days   No change documented:  Mendoza Dick, PharmD   Next INR check:  8/5/2025   Priority:  Maintenance    Indications    Typical atrial flutter (CMS/HCC) (Resolved) [I48.3]                  Anticoagulation Care Providers       Provider Role Specialty Phone number    Balbir Machado MD Referring Family Medicine [phone]            Anticoagulation therapy status: INR is stable, no dose adjustment required. Continue warfarin 5 mg on Mon/Wed/Fri and 7.5 mg all other days for now. With new supplement (Liveden), expect INR to come down with the super greens (contains spinach, kale, broccoli, etic). Reviewed use of supplements and potential interactions, advised against adding more supplements and consider stopping or limiting use. May need to stop them in the future if INR becomes labile. Will monitor his INR a little bit closer now. Follow up INR in 2-3 weeks. Patient verbalized understanding regarding dose, monitoring and INR.    Will look more at the ingredients and potential interactions - may call patient to come in sooner.     Please reference Anticoagulation episode for additional documentation.       ________________________________  ADDENDUM: Supplement packaging labels reviewed and ingredients/drug/supplement interactions checked with warfarin using [place] supplement database interaction . No interactions likely with the Biol[employer] Master [employer] or [employer] Cell Signals.     Liveden supplement has potential for interaction with warfarin (potential to both increase or decrease effects). At yesterday's appointment, Marco said he just started this one with first dose that morning (/15).   Warfarin / Supplement Ingredient Interactions:     Moderate interaction potential   -Artichoke, beet, bilberry, spirulina, bromelain, burdock, dandelion, tres, papaya, reishi mushroom,   may increase levels and effects of warfarin and/or bleeding risk.   -Broccoli, chlorella, mayra hip, spinach, and wheatgrass  may reduce levels  and effects of warfarin.     Minor interaction potential   -Beet root, broccoli sprout, mayra hips may decrease the levels and effect of warfarin.   -Fucus  vesiculosuss, tres root, may increase levels of warfarin.     Specific amounts of each ingredient is not listed. Will monitor INR closer. Called Marco and left message to have him come back to INR clinic in 1-2 weeks to keep a closer eye on INR while on this supplement. Can also consider stopping the LiveDen supplement.     Mendoza Dick, EdgarD

## 2025-08-05 ENCOUNTER — ANTICOAGULATION VISIT (OUTPATIENT)
Dept: ANTICOAGULATION | Facility: CLINIC | Age: 75
End: 2025-08-05
Payer: MEDICARE

## 2025-08-05 VITALS — DIASTOLIC BLOOD PRESSURE: 65 MMHG | WEIGHT: 223 LBS | SYSTOLIC BLOOD PRESSURE: 112 MMHG | BODY MASS INDEX: 32.93 KG/M2

## 2025-08-05 LAB — INR PPP: 2.5 (ref 0.9–1.1)

## 2025-08-05 PROCEDURE — G0463 HOSPITAL OUTPT CLINIC VISIT: HCPCS

## 2025-08-05 PROCEDURE — 85610 PROTHROMBIN TIME: CPT | Mod: QW

## 2025-08-18 DIAGNOSIS — E87.70 HYPERVOLEMIA, UNSPECIFIED HYPERVOLEMIA TYPE: ICD-10-CM

## 2025-08-20 RX ORDER — FUROSEMIDE 80 MG/1
80 TABLET ORAL 2 TIMES DAILY
Qty: 60 TABLET | Refills: 0 | Status: SHIPPED | OUTPATIENT
Start: 2025-08-20

## 2025-09-03 ENCOUNTER — ANTICOAGULATION VISIT (OUTPATIENT)
Dept: ANTICOAGULATION | Facility: CLINIC | Age: 75
End: 2025-09-03
Payer: MEDICARE

## 2025-09-03 VITALS
HEIGHT: 69 IN | WEIGHT: 228 LBS | DIASTOLIC BLOOD PRESSURE: 60 MMHG | SYSTOLIC BLOOD PRESSURE: 113 MMHG | BODY MASS INDEX: 33.77 KG/M2 | HEART RATE: 62 BPM

## 2025-09-03 LAB — INR PPP: 2.3 (ref 0.9–1.1)

## 2025-09-03 PROCEDURE — G0463 HOSPITAL OUTPT CLINIC VISIT: HCPCS

## 2025-09-03 ASSESSMENT — PAIN SCALES - GENERAL: PAINLEVEL_OUTOF10: 0-NO PAIN

## (undated) DEVICE — SYRINGE 50ML CATH TIP STL

## (undated) DEVICE — SYRINGE 12CC LUER LOCK TIP STL MONOJECT

## (undated) DEVICE — SOL SOD CHL INJ .9% 1000ML BAG USP VIAFLEX PLASTIC CONTAINER

## (undated) DEVICE — SPONGE GAUZE 4X4-12 STL 10'S

## (undated) DEVICE — TOWEL DISP BLUE STL 4 PK

## (undated) DEVICE — CATH THERMOCOOL SMARTTOUCH FJ SF BI-DIRECTIONAL

## (undated) DEVICE — IMMOBILIZER KNEE TRI PANEL 16

## (undated) DEVICE — GLOVE SENSICARE MICRO PF 6.5

## (undated) DEVICE — PREP SKIN CHLORAPREP ORNG 26ML STL

## (undated) DEVICE — TRAY CYSTO CUSTOM RCRH CUSTOM PACK

## (undated) DEVICE — GOWN SURGICAL AERO CHROME W/TOWEL XLG X-LONG LEVEL 4 STL

## (undated) DEVICE — CONTAINER SPECIMEN 4OZ STL SCREW TOP BLISTER PACK

## (undated) DEVICE — GLOVE SURG SENSICARE PI ORTHO 8.0 LF PF

## (undated) DEVICE — PAD KNEE LARGE COLD COMPRESSION

## (undated) DEVICE — IRRIGATION WOUND IRRISEPT WOUND DEBRIDEMENT SYSTEM

## (undated) DEVICE — SUTURE VICRYL 1 CP-1

## (undated) DEVICE — BAG DRAIN URINE 4000ML

## (undated) DEVICE — GLOVE SURG SENSICARE PI ORTHO 7.5 LF PF

## (undated) DEVICE — GLOVE BIOGEL PI INDICATOR SIZE 8.0 UNDERGLOVE 0

## (undated) DEVICE — GLOVE SURG SZ 6 GREEN W/ ALOE VERA

## (undated) DEVICE — SYSTEM POLAR CARE WAVE COLD VPULSE WO PADS

## (undated) DEVICE — BLADE SAGITTAL 18.0X1.27X90MM

## (undated) DEVICE — SOL SOD CHL IRR .9% 3000ML BAG USP UROMATIC PLASTIC CONTAINER

## (undated) DEVICE — BLANKET WARMING UPPER BODY @ HYPOTHERMIA FORCED AIR

## (undated) DEVICE — GLOVE SENSICARE 8.0 SURG

## (undated) DEVICE — DRILL PIN CAS FIX 3.2D X 80MM

## (undated) DEVICE — PAD BOVIE ADULT 9' CORD REM ELECTRODE PATIENT RETURN

## (undated) DEVICE — GLOVE BIOGEL PI IND SURGICAL SZ 7.5

## (undated) DEVICE — GLOVE SENSICARE 7.5 SURG

## (undated) DEVICE — SEALER BIPOLAR 6.0 AQUAMANTYS 5.7 X 3.48 MM 30D MIN 5 EA

## (undated) DEVICE — CLOSURE SKIN DERMABOND PRINEO 42CM

## (undated) DEVICE — WATER STL IRR 500ML BTL USP PLASTIC POUR BOTTLE

## (undated) DEVICE — PATCH REFERENCE CARTO 3 EXT EXTERNAL

## (undated) DEVICE — DRAPE UNIT ROSA ROBOTIC

## (undated) DEVICE — TIP HIGH FLOW INTERPULSE BONE CLEANING HIP

## (undated) DEVICE — GLOVE SURG PROTEXIS PI BLUE SZ 7.0 UNDERGLOVE

## (undated) DEVICE — Device

## (undated) DEVICE — SHEATH PINNACLE 7F 10CM MGW INTRODUCER W MINI GUIDE WIRE

## (undated) DEVICE — GOWN SURG IMPERVIOUS XLG ASTOUND SLEEVE

## (undated) DEVICE — MANIFOLD 4 PORT NEPTUNE

## (undated) DEVICE — KIT ROOM TURNOVER LITHOTOMY CT01 INFECTION CONTROL SYSTEM

## (undated) DEVICE — GLOVE SENSICARE SLT 7.5 SURG STL POWDER FREE

## (undated) DEVICE — GLOVE SENSICARE 7.0 SURG

## (undated) DEVICE — SOL SOD CHL IRR .9% 1000ML BTL USP PLASTIC POUR BOTTLE

## (undated) DEVICE — GLOVE 7 DERMAPRENE ULTRA POWDER FREE 8514

## (undated) DEVICE — BIPOLAR CUTTING LOOP 30MM

## (undated) DEVICE — BANDAGE ESMARCH 6"X 12' STL @

## (undated) DEVICE — EVACUATOR ELLIK BLADDER BARD

## (undated) DEVICE — SOL LAC RING INJ 1000ML BAG USP VIAFLEX PLASTIC CONTAINER

## (undated) DEVICE — GOWN SURG 3X XLNG IMPERVIOUS SMARTSLEEVE

## (undated) DEVICE — SUTURE VICRYL 1 CT

## (undated) DEVICE — INTERPULSE UNIT DISP IRRIGATION UNIT

## (undated) DEVICE — BASIN LG DISPOSABLE

## (undated) DEVICE — GLOVE GAMMEX SZ 8 STL GREEN POWDER FREE

## (undated) DEVICE — DRESSING 4X10 INCH MEPILEX BORDER POST OP - NON-AG

## (undated) DEVICE — CATH COUDE FOLEY 18F STL

## (undated) DEVICE — TUBING SUCTION 3/16"X10'

## (undated) DEVICE — LOOP CUTTING 24FR BIPOLAR YELLOW FOR 27005BA TELESCOPE

## (undated) DEVICE — WATER STL IRR 250ML BTL USP PLASTIC POUR BOTTLE

## (undated) DEVICE — GOWN SURG IMPERVIOUS XLONG XLG ASTOUND RAGLAN SLEEVE

## (undated) DEVICE — SYRINGE 20CC LUER LOCK TIP STL MONOJECT

## (undated) DEVICE — DEVICE STABILIZ FOLEY STATLOCK W/SKIN PREP

## (undated) DEVICE — BANDAGE ACE WRAP DBL 6IN NST ABCC8538610000

## (undated) DEVICE — WIRE GUIDE .035 ST SENSOR DUAL FLEX

## (undated) DEVICE — ELECTRODE PLASMAROLLER 24FR MEDIUM 12-16 DEGREE LOOP

## (undated) DEVICE — SYRINGE 35CC LUER LOCK TIP STL MONOJECT

## (undated) DEVICE — IMPLANTABLE DEVICE
Type: IMPLANTABLE DEVICE | Site: KNEE | Status: NON-FUNCTIONAL
Removed: 2023-07-24

## (undated) DEVICE — GLOVE BIOGEL PI IND 7.0 SURGICAL

## (undated) DEVICE — GLOVE SENSICARE SLT 7.0 SURG

## (undated) DEVICE — BAG DRAIN UROLOGY

## (undated) DEVICE — SET TUBING SMARTABLAT COOLFLOW

## (undated) DEVICE — SUTURE VICRYL 2-0 CT2 27" UNDYED

## (undated) DEVICE — SHEATH PINNACLE 8F MINI GW INTRODUCER MINI GUIDE WIRE

## (undated) DEVICE — SYSTEM POLAR CARE WAVE COLD THERAPY

## (undated) DEVICE — GOWN SURGICAL AERO CHROME W/TOWEL LG LEVEL 4 STL

## (undated) DEVICE — DRESSING TELFA 8X3

## (undated) DEVICE — CATH BI-DIR CS FJ CURVE

## (undated) DEVICE — DRESSING MEPILEX BORDER FLEX LITE 2X5

## (undated) DEVICE — DRESSING 4X12 INCH MEPILEX BORDER POST OP NON-AG

## (undated) DEVICE — SYRINGE 12CC LUER LOCK TIP CTL MONOJECT / RING CONTROL

## (undated) DEVICE — SYSTEM MIX CLEAR BONE CEMENT MIXER

## (undated) DEVICE — NEEDLE HYPO 18G 1 1/2" SAFETY MONOJECT MAGELLAN

## (undated) DEVICE — GLOVE SURG SZ 7 GREEN W/ ALOE VERA

## (undated) DEVICE — GLOVE SENSICARE 6.5 SURG

## (undated) DEVICE — TRAY TOTAL JOINT CUSTOM SPRH CUSTOM PACK

## (undated) DEVICE — ELECTRODE DUAL DISPERSIVE 10' CABLE THERMOGARD PLUS ABC

## (undated) DEVICE — GLOVE SURG PROTEXIS PI BLUE SZ 7.5 UNDERGLOVE

## (undated) DEVICE — BAG DECANTER II

## (undated) DEVICE — DRAPE PLASTIC IMPERVIOUS SPLIT

## (undated) DEVICE — GLOVE SENSICARE SLT 8.0 SURG

## (undated) DEVICE — ELECTRODE 24FR BIPOLAR BALL COAGULATION YELLOW 6/PKG

## (undated) DEVICE — GUIDE NAVIGATION ORTHO SCAN

## (undated) DEVICE — DRILL PIN CAS FIX 3.2D X 150MM

## (undated) DEVICE — SHEATH PREFACE MULTI SHORT 8F PURPOSE GUIDING 62CM

## (undated) DEVICE — DRAPE IOBAN INCISE 23X23

## (undated) DEVICE — KIT ROOM TURNOVER STANDARD 01C INFECTION CONTROL SYSTEM